# Patient Record
Sex: FEMALE | Race: WHITE | NOT HISPANIC OR LATINO | Employment: UNEMPLOYED | URBAN - METROPOLITAN AREA
[De-identification: names, ages, dates, MRNs, and addresses within clinical notes are randomized per-mention and may not be internally consistent; named-entity substitution may affect disease eponyms.]

---

## 2019-05-16 ASSESSMENT — MIFFLIN-ST. JEOR
SCORE: 1720.74
SCORE: 1720.74

## 2019-05-20 ENCOUNTER — SURGERY - HEALTHEAST (OUTPATIENT)
Dept: SURGERY | Facility: AMBULATORY SURGERY CENTER | Age: 59
End: 2019-05-20

## 2019-05-20 ENCOUNTER — HOSPITAL ENCOUNTER (OUTPATIENT)
Dept: SURGERY | Facility: AMBULATORY SURGERY CENTER | Age: 59
Discharge: HOME | End: 2019-05-20
Attending: PHYSICAL MEDICINE & REHABILITATION | Admitting: PHYSICAL MEDICINE & REHABILITATION
Payer: MEDICARE

## 2019-05-20 ASSESSMENT — MIFFLIN-ST. JEOR
SCORE: 1720.74
SCORE: 1720.74

## 2019-07-09 ENCOUNTER — OFFICE VISIT (OUTPATIENT)
Dept: FAMILY MEDICINE | Facility: CLINIC | Age: 59
End: 2019-07-09
Payer: MEDICARE

## 2019-07-09 VITALS
TEMPERATURE: 98.3 F | OXYGEN SATURATION: 93 % | SYSTOLIC BLOOD PRESSURE: 138 MMHG | BODY MASS INDEX: 45.1 KG/M2 | DIASTOLIC BLOOD PRESSURE: 76 MMHG | HEART RATE: 82 BPM | WEIGHT: 271 LBS | RESPIRATION RATE: 18 BRPM

## 2019-07-09 DIAGNOSIS — J44.9 CHRONIC OBSTRUCTIVE PULMONARY DISEASE, UNSPECIFIED COPD TYPE (H): Primary | ICD-10-CM

## 2019-07-09 DIAGNOSIS — R06.02 SOB (SHORTNESS OF BREATH): ICD-10-CM

## 2019-07-09 PROCEDURE — 99214 OFFICE O/P EST MOD 30 MIN: CPT | Performed by: FAMILY MEDICINE

## 2019-07-09 RX ORDER — ALBUTEROL SULFATE 90 UG/1
AEROSOL, METERED RESPIRATORY (INHALATION)
Refills: 0 | COMMUNITY
Start: 2019-07-01 | End: 2020-03-03

## 2019-07-09 RX ORDER — TIZANIDINE 2 MG/1
TABLET ORAL
Refills: 4 | COMMUNITY
Start: 2019-07-03

## 2019-07-09 RX ORDER — BUDESONIDE AND FORMOTEROL FUMARATE DIHYDRATE 160; 4.5 UG/1; UG/1
2 AEROSOL RESPIRATORY (INHALATION) 2 TIMES DAILY
Qty: 3 INHALER | Refills: 3 | Status: SHIPPED | OUTPATIENT
Start: 2019-07-09 | End: 2019-07-31

## 2019-07-09 RX ORDER — PREDNISONE 20 MG/1
TABLET ORAL
Refills: 0 | COMMUNITY
Start: 2019-07-08 | End: 2019-07-31

## 2019-07-09 RX ORDER — ALBUTEROL SULFATE 90 UG/1
2 AEROSOL, METERED RESPIRATORY (INHALATION)
COMMUNITY
Start: 2019-07-01 | End: 2019-07-31

## 2019-07-09 RX ORDER — CLONAZEPAM 0.5 MG/1
TABLET ORAL
Refills: 0 | COMMUNITY
Start: 2019-05-06 | End: 2019-11-05

## 2019-07-09 RX ORDER — TOPIRAMATE 100 MG/1
100 TABLET, FILM COATED ORAL DAILY
Refills: 1 | COMMUNITY
Start: 2019-03-18 | End: 2020-08-03

## 2019-07-09 RX ORDER — NALOXONE HYDROCHLORIDE 4 MG/.1ML
SPRAY NASAL
Refills: 0 | COMMUNITY
Start: 2019-03-04

## 2019-07-09 RX ORDER — TIOTROPIUM BROMIDE 18 UG/1
CAPSULE ORAL; RESPIRATORY (INHALATION)
Refills: 0 | COMMUNITY
Start: 2019-07-01 | End: 2019-07-09

## 2019-07-09 RX ORDER — BUSPIRONE HYDROCHLORIDE 15 MG/1
TABLET ORAL
Refills: 1 | COMMUNITY
Start: 2019-05-28 | End: 2019-11-05

## 2019-07-09 RX ORDER — ATORVASTATIN CALCIUM 20 MG/1
TABLET, FILM COATED ORAL
Refills: 3 | COMMUNITY
Start: 2019-03-27 | End: 2020-03-04

## 2019-07-09 RX ORDER — PRAVASTATIN SODIUM 40 MG
TABLET ORAL
Refills: 0 | COMMUNITY
Start: 2019-06-19 | End: 2020-03-03

## 2019-07-09 RX ORDER — CHOLECALCIFEROL (VITAMIN D3) 50 MCG
1 TABLET ORAL DAILY
Refills: 1 | COMMUNITY
Start: 2019-06-20

## 2019-07-09 RX ORDER — ALBUTEROL SULFATE 0.83 MG/ML
SOLUTION RESPIRATORY (INHALATION)
Refills: 0 | COMMUNITY
Start: 2019-07-02 | End: 2019-12-03

## 2019-07-09 RX ORDER — TIOTROPIUM BROMIDE 18 UG/1
18 CAPSULE ORAL; RESPIRATORY (INHALATION)
COMMUNITY
Start: 2019-07-01 | End: 2019-07-31

## 2019-07-09 RX ORDER — ARIPIPRAZOLE 10 MG/1
TABLET ORAL
Refills: 1 | COMMUNITY
Start: 2019-05-28 | End: 2019-11-05

## 2019-07-09 RX ORDER — METHYLPREDNISOLONE 4 MG
TABLET, DOSE PACK ORAL
Refills: 0 | COMMUNITY
Start: 2019-05-06 | End: 2019-07-31

## 2019-07-09 RX ORDER — MINOCYCLINE HYDROCHLORIDE 100 MG/1
CAPSULE ORAL
Refills: 2 | COMMUNITY
Start: 2019-06-07 | End: 2020-01-27

## 2019-07-09 RX ORDER — LISINOPRIL 10 MG/1
10 TABLET ORAL DAILY
Refills: 0 | COMMUNITY
Start: 2019-06-07 | End: 2020-01-27

## 2019-07-09 ASSESSMENT — PAIN SCALES - GENERAL: PAINLEVEL: NO PAIN (0)

## 2019-07-09 NOTE — PROGRESS NOTES
Subjective     Paula Ho is a 59 year old female who presents to clinic today for the following health issues:    HPI   ED/UC Followup:    Facility:  [facility] ED   Date of visit: 07/01/2019  Reason for visit: SOB and Edema   Current Status: still having SOB and coughing non-stop            Patient Active Problem List   Diagnosis     Chronic knee pain     History reviewed. No pertinent surgical history.    Social History     Tobacco Use     Smoking status: Current Every Day Smoker     Smokeless tobacco: Never Used   Substance Use Topics     Alcohol use: Yes     History reviewed. No pertinent family history.        Reviewed and updated as needed this visit by Provider         Review of Systems   ROS COMP: Constitutional, HEENT, cardiovascular, pulmonary, GI, , musculoskeletal, neuro, skin, endocrine and psych systems are negative, except as otherwise noted.      Objective    /76 (BP Location: Right arm, Patient Position: Chair, Cuff Size: Adult Regular)   Pulse 82   Temp 98.3  F (36.8  C) (Oral)   Resp 18   Wt 122.9 kg (271 lb)   SpO2 93%   BMI 45.10 kg/m    Body mass index is 45.1 kg/m .  Physical Exam   GENERAL: healthy, alert and no distress  NECK: no adenopathy, no asymmetry, masses, or scars and thyroid normal to palpation  RESP: diffuse wheezing  CV: regular rate and rhythm, normal S1 S2, no S3 or S4, no murmur, click or rub, no peripheral edema and peripheral pulses strong  ABDOMEN: soft, nontender, no hepatosplenomegaly, no masses and bowel sounds normal  MS: no gross musculoskeletal defects noted, no edema    Diagnostic Test Results:  Labs reviewed in Epic        Assessment & Plan     (J44.9) Chronic obstructive pulmonary disease, unspecified COPD type (H)  (primary encounter diagnosis)  Comment:   Plan: budesonide-formoterol (SYMBICORT) 160-4.5         MCG/ACT Inhaler        With exacerbation. Continue with antibiotic (doxycycline) and prednisone given from [facility]. Added  Symbicort BID. RTC in 1 month for recheck.    (R06.02) SOB (shortness of breath)  Comment:   Plan: CT Chest w Contrast        Likely from above. R/o PE, ca's. Patient has history of smoking.        Tobacco Cessation:   reports that she has been smoking.  She has never used smokeless tobacco.  Tobacco Cessation Action Plan: Information offered: Patient not interested at this time        See Patient Instructions    Return in about 1 month (around 8/9/2019) for COPD.    Esdras Dobson MD, MD  [facility]

## 2019-07-09 NOTE — PATIENT INSTRUCTIONS
At [facility], we strive to deliver an exceptional experience to you, every time we see you.  If you receive a survey in the mail, please send us back your thoughts. We really do value your feedback.    Based on your medical history, these are the current health maintenance/preventive care services that you are due for (some may have been done at this visit.)  Health Maintenance Due   Topic Date Due     PREVENTIVE CARE VISIT  1960     URINE DRUG SCREEN  1960     HEPATITIS C SCREENING  1960     ADVANCE CARE PLANNING  1960     MAMMO SCREENING  1960     PAP  1960     HIV SCREENING  05/29/1976     DTAP/TDAP/TD IMMUNIZATION (1 - Tdap) 05/29/1986     LIPID  05/29/2006     PHQ-2  01/01/2019         Suggested websites for health information:  Www.Stackpop.ISORG : Up to date and easily searchable information on multiple topics.  Www.HeadCount.gov : medication info, interactive tutorials, watch real surgeries online  Www.familydoctor.org : good info from the Academy of Family Physicians  Www.cdc.gov : public health info, travel advisories, epidemics (H1N1)  Www.aap.org : children's health info, normal development, vaccinations  Www.health.[facility].[place].us : [place] dept of health, public health issues in [place], N1N1    Your care team:                            Family Medicine Internal Medicine   MD Renan Chambers MD Shantel Branch-Fleming, MD Katya Georgiev PA-C Nam Ho, MD Pediatrics   STEPH Cancino, MD Inocencia Vanegas CNP, MD Deborah Mielke, MD Kim Thein, APRN CNP      Clinic hours: Monday - Thursday 7 am-7 pm; Fridays 7 am-5 pm.   Urgent care: Monday - Friday 11 am-9 pm; Saturday and Sunday 9 am-5 pm.  Pharmacy : Monday -Thursday 8 am-8 pm; Friday 8 am-6 pm; Saturday and Sunday 9 am-5 pm.     Clinic: [phone]   Pharmacy: [phone]

## 2019-07-10 ENCOUNTER — ANCILLARY PROCEDURE (OUTPATIENT)
Dept: CT IMAGING | Facility: CLINIC | Age: 59
End: 2019-07-10
Attending: FAMILY MEDICINE
Payer: MEDICARE

## 2019-07-10 DIAGNOSIS — R06.02 SOB (SHORTNESS OF BREATH): ICD-10-CM

## 2019-07-10 LAB — RADIOLOGIST FLAGS: NORMAL

## 2019-07-10 PROCEDURE — 71260 CT THORAX DX C+: CPT | Performed by: RADIOLOGY

## 2019-07-10 RX ORDER — IOPAMIDOL 755 MG/ML
82 INJECTION, SOLUTION INTRAVASCULAR ONCE
Status: COMPLETED | OUTPATIENT
Start: 2019-07-10 | End: 2019-07-10

## 2019-07-10 RX ADMIN — IOPAMIDOL 82 ML: 755 INJECTION, SOLUTION INTRAVASCULAR at 09:53

## 2019-07-10 NOTE — LETTER
July 10, 2019      Paula Ho  [address]    [place] [address]        Dear Paula,     Your CT scan of the lung showed a right upper lung small nodule. We should repeat another CT scan in 1 year to make sure this small nodule does not change in size.     Sincerely,   Esdras Dobson MD    Resulted Orders   CT Chest Pulmonary Embolism w Contrast   Result Value Ref Range    Radiologist flags Lung nodule     Narrative    CT CHEST PULMONARY EMBOLISM W CONTRAST, 7/10/2019 9:56 AM    History: shortness of breath; SOB (shortness of breath)    Comparison: None.    Technique: Helical acquisition of CT images of the chest from the lung  apices to the kidneys were acquired after the administration of  intravenous contrast according to the CT pulmonary angiogram protocol.  Axial images were reconstructed in 1 and 3 mm slice thickness. Coronal  reconstructions performed. Three-dimensional (3D) post-processed  angiographic images were reconstructed, archived to PACS and used in  the interpretation of this study    Findings:     The contrast bolus is adequate.  There is no pulmonary embolism.    Lung Parenchyma:  The central tracheobronchial tree is patent. No pneumothorax or  pleural effusion. Mild bibasilar atelectasis. No focal airspace  consolidation. Subsegmental atelectasis in the anterior/medial right  upper lobe. Mild apical predominant centrilobular emphysematous  changes. There is a 4 mm pulmonary nodule in the right lung apex  (series 5, image 55). Mild central bronchial wall thickening. Focus of  mucus plugging in the posterior right upper lobe (series 8 image 32).    Mediastinum:  The visualized gland is unremarkable in appearance. The heart is  normal in size, without pericardial. No substantial coronary artery  calcifications. The thoracic vessels are normal in caliber and  configuration. A few nonenlarged mediastinal lymph nodes. Enlarged  right hilar lymph node measuring 1.4 cm. No suspicious  axillary  adenopathy.    Upper abdomen:  Limited evaluation of the upper abdomen by contrast, bolus timing and  coverage. Fat-containing right adrenal lesion measuring 2.5 cm,  compatible with angiomyolipoma. The adrenal glands are within normal  limits. No acute findings within the visualized abdomen.    Bones and soft tissues:  No acute or suspicious osseous findings.      Impression    Impression:  1. No pulmonary embolism identified.  2. Mild central bronchial wall thickening, compatible with mild  bronchitis. Mild apically predominant centrilobular and paraseptal  emphysema.  3. Incidentally noted solid pulmonary nodule in the right upper lobe  measuring up to 4 mm. Recommend follow-up noncontrast CT in 12 months  per 2017 Fleischner Society guidelines.  4. Enlarged right hilar node measuring 1.4 cm, which may be reactive  in etiology. No additional suspicious adenopathy is identified within  the chest. Recommend attention on follow-up.    [Recommend Follow Up: Lung nodule]    This report will be copied to the [facility] to ensure a  provider acknowledges the finding.     I have personally reviewed the examination and initial interpretation  and I agree with the findings.    MICKEY HUERTA, DO

## 2019-07-31 ENCOUNTER — OFFICE VISIT (OUTPATIENT)
Dept: FAMILY MEDICINE | Facility: CLINIC | Age: 59
End: 2019-07-31
Payer: MEDICARE

## 2019-07-31 VITALS
TEMPERATURE: 98.2 F | SYSTOLIC BLOOD PRESSURE: 118 MMHG | RESPIRATION RATE: 20 BRPM | HEART RATE: 77 BPM | OXYGEN SATURATION: 92 % | HEIGHT: 65 IN | WEIGHT: 270 LBS | BODY MASS INDEX: 44.98 KG/M2 | DIASTOLIC BLOOD PRESSURE: 72 MMHG

## 2019-07-31 DIAGNOSIS — F17.200 TOBACCO DEPENDENCE SYNDROME: ICD-10-CM

## 2019-07-31 DIAGNOSIS — J44.9 CHRONIC OBSTRUCTIVE PULMONARY DISEASE, UNSPECIFIED COPD TYPE (H): ICD-10-CM

## 2019-07-31 DIAGNOSIS — J44.1 COPD EXACERBATION (H): Primary | ICD-10-CM

## 2019-07-31 PROCEDURE — 99214 OFFICE O/P EST MOD 30 MIN: CPT | Performed by: FAMILY MEDICINE

## 2019-07-31 RX ORDER — PREDNISONE 20 MG/1
TABLET ORAL
Qty: 20 TABLET | Refills: 0 | Status: SHIPPED | OUTPATIENT
Start: 2019-07-31 | End: 2019-09-04

## 2019-07-31 RX ORDER — BUDESONIDE AND FORMOTEROL FUMARATE DIHYDRATE 160; 4.5 UG/1; UG/1
2 AEROSOL RESPIRATORY (INHALATION) 2 TIMES DAILY
Qty: 3 INHALER | Refills: 3 | Status: SHIPPED | OUTPATIENT
Start: 2019-07-31 | End: 2021-08-31

## 2019-07-31 RX ORDER — VARENICLINE TARTRATE 1 MG/1
1 TABLET, FILM COATED ORAL 2 TIMES DAILY
Qty: 60 TABLET | Refills: 3 | Status: SHIPPED | OUTPATIENT
Start: 2019-07-31 | End: 2020-01-27

## 2019-07-31 RX ORDER — TIOTROPIUM BROMIDE 18 UG/1
18 CAPSULE ORAL; RESPIRATORY (INHALATION) DAILY
Qty: 90 CAPSULE | Refills: 3 | Status: SHIPPED | OUTPATIENT
Start: 2019-07-31 | End: 2020-05-01

## 2019-07-31 RX ORDER — ALBUTEROL SULFATE 90 UG/1
2 AEROSOL, METERED RESPIRATORY (INHALATION) EVERY 4 HOURS PRN
Qty: 1 INHALER | Refills: 3 | Status: SHIPPED | OUTPATIENT
Start: 2019-07-31 | End: 2021-11-01

## 2019-07-31 ASSESSMENT — PAIN SCALES - GENERAL: PAINLEVEL: MODERATE PAIN (5)

## 2019-07-31 ASSESSMENT — MIFFLIN-ST. JEOR: SCORE: 1805.59

## 2019-07-31 NOTE — PROGRESS NOTES
Subjective     Paula Ho is a 59 year old female who presents to clinic today for the following health issues:    HPI   RESPIRATORY SYMPTOMS FOLLOW UP      Duration: > one months    Description  Coughing, wheezing    Severity: severe    Accompanying signs and symptoms: shortness of breath    History (predisposing factors):  tobacco abuse, COPD    Precipitating or alleviating factors: None    Therapies tried and outcome:  Neb treatment, finished antibiotics, cough syrup OTC ( mucinex) - no relief.      Patient Active Problem List   Diagnosis     Chronic knee pain     History reviewed. No pertinent surgical history.    Social History     Tobacco Use     Smoking status: Current Every Day Smoker     Smokeless tobacco: Never Used   Substance Use Topics     Alcohol use: Yes     History reviewed. No pertinent family history.        Reviewed and updated as needed this visit by Provider         Review of Systems   ROS COMP: Constitutional, HEENT, cardiovascular, pulmonary, GI, , musculoskeletal, neuro, skin, endocrine and psych systems are negative, except as otherwise noted.      Objective    There were no vitals taken for this visit.  There is no height or weight on file to calculate BMI.  Physical Exam   GENERAL: healthy, alert and no distress  NECK: no adenopathy, no asymmetry, masses, or scars and thyroid normal to palpation  RESP: diffuse wheezing  CV: regular rate and rhythm, normal S1 S2, no S3 or S4, no murmur, click or rub, no peripheral edema and peripheral pulses strong  ABDOMEN: soft, nontender, no hepatosplenomegaly, no masses and bowel sounds normal  MS: no gross musculoskeletal defects noted, no edema    Diagnostic Test Results:  Labs reviewed in Epic        Assessment & Plan     1. COPD exacerbation (H)  Treat with oral steroidal tapering dose. Restart inhalers since patient not currently taking. RTC in 1 month for recheck.  - predniSONE (DELTASONE) 20 MG tablet; Take 3 tabs by mouth daily x 3 days,  "then 2 tabs daily x 3 days, then 1 tab daily x 3 days, then 1/2 tab daily x 3 days.  Dispense: 20 tablet; Refill: 0    2. Chronic obstructive pulmonary disease, unspecified COPD type (H)  Restart inhalers. Recheck in 1 month.  - albuterol (PROAIR HFA) 108 (90 Base) MCG/ACT inhaler; Inhale 2 puffs into the lungs every 4 hours as needed for shortness of breath / dyspnea or wheezing  Dispense: 1 Inhaler; Refill: 3  - budesonide-formoterol (SYMBICORT) 160-4.5 MCG/ACT Inhaler; Inhale 2 puffs into the lungs 2 times daily  Dispense: 3 Inhaler; Refill: 3  - tiotropium (SPIRIVA HANDIHALER) 18 MCG inhaled capsule; Inhale 1 capsule (18 mcg) into the lungs daily  Dispense: 90 capsule; Refill: 3    3. Tobacco dependence syndrome  Patient wants to quit. Trial Chantix. Discussed potential side effects.  - varenicline (CHANTIX KARISHMA) 0.5 MG X 11 & 1 MG X 42 tablet; Take 0.5 mg tab daily for 3 days, THEN 0.5 mg tab twice daily for 4 days, THEN 1 mg twice daily.  Dispense: 53 tablet; Refill: 0  - varenicline (CHANTIX) 1 MG tablet; Take 1 tablet (1 mg) by mouth 2 times daily  Dispense: 60 tablet; Refill: 3     Tobacco Cessation:   reports that she has been smoking.  She has never used smokeless tobacco.  Tobacco Cessation Action Plan: Pharmacotherapies : Chantix      BMI:   Estimated body mass index is 44.93 kg/m  as calculated from the following:    Height as of this encounter: 1.651 m (5' 5\").    Weight as of this encounter: 122.5 kg (270 lb).           Regular exercise  See Patient Instructions    Return in about 4 weeks (around 8/28/2019) for COPD.    Esdras Dobson MD, MD  [facility]      "

## 2019-08-02 ENCOUNTER — OFFICE VISIT (OUTPATIENT)
Dept: FAMILY MEDICINE | Facility: CLINIC | Age: 59
End: 2019-08-02
Payer: MEDICARE

## 2019-08-02 ENCOUNTER — NURSE TRIAGE (OUTPATIENT)
Dept: URGENT CARE | Facility: URGENT CARE | Age: 59
End: 2019-08-02

## 2019-08-02 VITALS
WEIGHT: 266.8 LBS | RESPIRATION RATE: 18 BRPM | BODY MASS INDEX: 44.45 KG/M2 | HEIGHT: 65 IN | SYSTOLIC BLOOD PRESSURE: 138 MMHG | OXYGEN SATURATION: 94 % | TEMPERATURE: 98.3 F | DIASTOLIC BLOOD PRESSURE: 81 MMHG | HEART RATE: 92 BPM

## 2019-08-02 DIAGNOSIS — R05.9 COUGH: Primary | ICD-10-CM

## 2019-08-02 DIAGNOSIS — R06.02 SOB (SHORTNESS OF BREATH): ICD-10-CM

## 2019-08-02 DIAGNOSIS — J44.1 COPD EXACERBATION (H): ICD-10-CM

## 2019-08-02 PROCEDURE — 99214 OFFICE O/P EST MOD 30 MIN: CPT | Mod: 25 | Performed by: NURSE PRACTITIONER

## 2019-08-02 PROCEDURE — 94640 AIRWAY INHALATION TREATMENT: CPT | Performed by: NURSE PRACTITIONER

## 2019-08-02 RX ORDER — CODEINE PHOSPHATE AND GUAIFENESIN 10; 100 MG/5ML; MG/5ML
1-2 SOLUTION ORAL EVERY 4 HOURS PRN
Qty: 120 ML | Refills: 0 | Status: SHIPPED | OUTPATIENT
Start: 2019-08-02 | End: 2019-08-05

## 2019-08-02 RX ORDER — IPRATROPIUM BROMIDE AND ALBUTEROL SULFATE 2.5; .5 MG/3ML; MG/3ML
3 SOLUTION RESPIRATORY (INHALATION) ONCE
Status: COMPLETED | OUTPATIENT
Start: 2019-08-02 | End: 2019-08-02

## 2019-08-02 RX ORDER — DOXYCYCLINE HYCLATE 100 MG
100 TABLET ORAL 2 TIMES DAILY
Qty: 20 TABLET | Refills: 0 | Status: SHIPPED | OUTPATIENT
Start: 2019-08-02 | End: 2019-09-04

## 2019-08-02 RX ADMIN — IPRATROPIUM BROMIDE AND ALBUTEROL SULFATE 3 ML: 2.5; .5 SOLUTION RESPIRATORY (INHALATION) at 14:10

## 2019-08-02 ASSESSMENT — PAIN SCALES - GENERAL: PAINLEVEL: NO PAIN (0)

## 2019-08-02 ASSESSMENT — MIFFLIN-ST. JEOR: SCORE: 1791.08

## 2019-08-02 NOTE — PATIENT INSTRUCTIONS
Start doxycycline 1 tab twice daily x10 day  Robitussin with codeine for cough  Continue prednisone, spiriva and albuterol  Follow up in 3 days if not improving

## 2019-08-02 NOTE — TELEPHONE ENCOUNTER
Reason for call:  Patient reporting a symptom    Symptom or request: uncontrollable coughing    Duration (how long have symptoms been present): for days    Have you been treated for this before? Yes    Additional comments: Mom wants triage to call patient asap because patient has uncontrollable coughing and was in tears when she called her mom.  Mom wants us to call and check on her.    Phone Number patient can be reached at:  Home number on file [phone] (home)    Best Time:  any    Can we leave a detailed message on this number:  YES    Call taken on 8/2/2019 at 10:27 AM by Lesley Herron

## 2019-08-02 NOTE — PROGRESS NOTES
Subjective     Paula Ho is a 59 year old female who presents to clinic today for the following health issues:    HPI     COPD Follow-Up    Overall, how are your COPD symptoms since your last clinic visit?  Much worse    How much fatigue or shortness of breath do you have when you are walking?  A lot more than usual    How much shortness of breath do you have when you are resting?  More than usual    How often do you cough? All the time    Have you noticed any change in your sputum/phlegm?  Yes- this AM - clear    Have you experienced a recent fever? No    Please describe how far you can walk without stopping to rest:  Unsure - pt states she hasn't tried    How many flights of stairs are you able to walk up without stopping?  Unsure - pt states she hasn't tried  Have you had any Emergency Room Visits, Urgent Care Visits, or Hospital Admissions because of your COPD since your last office visit?  Yes   - 1 month ago    How many times have you gone to the ED, Urgent Care, or been hospitalized for COPD since your last clinic visit?  1    History   Smoking Status     Current Every Day Smoker   Smokeless Tobacco     Never Used     No results found for: FEV1, EWH1XOE        Amount of exercise or physical activity: None    Problems taking medications regularly: No    Medication side effects: possibly - pt would like to discuss Lisinopril, Tizanidine, Amlodipine, Pravastatin about possible cause of cough    Diet: regular (no restrictions)      Can't stop coughing  Using albuterol neb, prednisone, spiriva, albuterol inhaler  Hasn't taken lisinopril or amlodipine in few weeks    Had chest CT few weeks ago:      CT CHEST PULMONARY EMBOLISM W CONTRAST, 7/10/2019 9:56 AM     History: shortness of breath; SOB (shortness of breath)     Comparison: None.     Technique: Helical acquisition of CT images of the chest from the lung  apices to the kidneys were acquired after the administration of  intravenous contrast according to  the CT pulmonary angiogram protocol.  Axial images were reconstructed in 1 and 3 mm slice thickness. Coronal  reconstructions performed. Three-dimensional (3D) post-processed  angiographic images were reconstructed, archived to PACS and used in  the interpretation of this study     Findings:      The contrast bolus is adequate.  There is no pulmonary embolism.     Lung Parenchyma:  The central tracheobronchial tree is patent. No pneumothorax or  pleural effusion. Mild bibasilar atelectasis. No focal airspace  consolidation. Subsegmental atelectasis in the anterior/medial right  upper lobe. Mild apical predominant centrilobular emphysematous  changes. There is a 4 mm pulmonary nodule in the right lung apex  (series 5, image 55). Mild central bronchial wall thickening. Focus of  mucus plugging in the posterior right upper lobe (series 8 image 32).     Mediastinum:  The visualized gland is unremarkable in appearance. The heart is  normal in size, without pericardial. No substantial coronary artery  calcifications. The thoracic vessels are normal in caliber and  configuration. A few nonenlarged mediastinal lymph nodes. Enlarged  right hilar lymph node measuring 1.4 cm. No suspicious axillary  adenopathy.     Upper abdomen:  Limited evaluation of the upper abdomen by contrast, bolus timing and  coverage. Fat-containing right adrenal lesion measuring 2.5 cm,  compatible with angiomyolipoma. The adrenal glands are within normal  limits. No acute findings within the visualized abdomen.     Bones and soft tissues:  No acute or suspicious osseous findings.                                                                      Impression:  1. No pulmonary embolism identified.  2. Mild central bronchial wall thickening, compatible with mild  bronchitis. Mild apically predominant centrilobular and paraseptal  emphysema.  3. Incidentally noted solid pulmonary nodule in the right upper lobe  measuring up to 4 mm. Recommend follow-up  noncontrast CT in 12 months  per 2017 Fleischner Society guidelines.  4. Enlarged right hilar node measuring 1.4 cm, which may be reactive  in etiology. No additional suspicious adenopathy is identified within  the chest. Recommend attention on follow-up.     [Recommend Follow Up: Lung nodule]     This report will be copied to the [facility] to ensure a  provider acknowledges the finding.      I have personally reviewed the examination and initial interpretation  and I agree with the findings.     MICKEY HUERTA, DO    Patient Active Problem List   Diagnosis     Chronic knee pain     History reviewed. No pertinent surgical history.    Social History     Tobacco Use     Smoking status: Current Every Day Smoker     Smokeless tobacco: Never Used   Substance Use Topics     Alcohol use: Yes     History reviewed. No pertinent family history.      Current Outpatient Medications   Medication Sig Dispense Refill     albuterol (PROAIR HFA) 108 (90 Base) MCG/ACT inhaler Inhale 2 puffs into the lungs every 4 hours as needed for shortness of breath / dyspnea or wheezing 1 Inhaler 3     albuterol (PROVENTIL) (2.5 MG/3ML) 0.083% neb solution NEBULIZE THE CONTENTS OF 1 VIAL EVERY 6 HOURS AS NEEDED.  0     amphetamine-dextroamphetamine (ADDERALL) 20 MG tablet Take 20 mg by mouth 2 times daily       ARIPiprazole (ABILIFY) 10 MG tablet TK 1 T PO D  1     atorvastatin (LIPITOR) 20 MG tablet TK 1 T PO QD  3     budesonide-formoterol (SYMBICORT) 160-4.5 MCG/ACT Inhaler Inhale 2 puffs into the lungs 2 times daily 3 Inhaler 3     busPIRone (BUSPAR) 15 MG tablet   1     clonazePAM (KLONOPIN) 0.5 MG tablet TK 1 T PO BID PRN  0     doxycycline hyclate (VIBRA-TABS) 100 MG tablet Take 1 tablet (100 mg) by mouth 2 times daily for 10 days 20 tablet 0     FLUoxetine (PROZAC) 40 MG capsule Take 40 mg by mouth daily       guaiFENesin-codeine (ROBITUSSIN AC) 100-10 MG/5ML solution Take 5-10 mLs by mouth every 4 hours as needed for cough  120 mL 0     lisinopril (PRINIVIL/ZESTRIL) 10 MG tablet Take 10 mg by mouth daily  0     minocycline (MINOCIN/DYNACIN) 100 MG capsule TAKE ONE CAPSULE BY MOUTH EVERY TWELVE HOURS  2     NARCAN 4 MG/0.1ML nasal spray INHALE VIA NASAL ROUTE FOR OVERDOSE AS NEEDED. MAY REPEAT AFTER 2-3 MINUTES  0     oxyCODONE (OXYCONTIN) 60 MG T12A 12 hr tablet Take 1 tablet by mouth 3 times daily       pravastatin (PRAVACHOL) 40 MG tablet take 1 tablet by mouth once a day in the evening  0     predniSONE (DELTASONE) 20 MG tablet Take 3 tabs by mouth daily x 3 days, then 2 tabs daily x 3 days, then 1 tab daily x 3 days, then 1/2 tab daily x 3 days. 20 tablet 0     tiotropium (SPIRIVA HANDIHALER) 18 MCG inhaled capsule Inhale 1 capsule (18 mcg) into the lungs daily 90 capsule 3     topiramate (TOPAMAX) 100 MG tablet TK 2 TS PO IN THE MORNING  1     varenicline (CHANTIX KARISHMA) 0.5 MG X 11 & 1 MG X 42 tablet Take 0.5 mg tab daily for 3 days, THEN 0.5 mg tab twice daily for 4 days, THEN 1 mg twice daily. 53 tablet 0     varenicline (CHANTIX) 1 MG tablet Take 1 tablet (1 mg) by mouth 2 times daily 60 tablet 3     VENTOLIN  (90 Base) MCG/ACT inhaler INHALE 2 PUFFS INTO THE LUNGS Q 6 H PRN  0     vitamin D3 (CHOLECALCIFEROL) 2000 units (50 mcg) tablet Take 1 tablet by mouth daily  1     tiZANidine (ZANAFLEX) 2 MG tablet TK 2 TO 3 TS PO QHS  4     Allergies   Allergen Reactions     Compazine [Prochlorperazine]      Droperidol      BP Readings from Last 3 Encounters:   08/02/19 138/81   07/31/19 118/72   07/09/19 138/76    Wt Readings from Last 3 Encounters:   08/02/19 121 kg (266 lb 12.8 oz)   07/31/19 122.5 kg (270 lb)   07/09/19 122.9 kg (271 lb)                 Reviewed and updated as needed this visit by Provider  Tobacco  Allergies  Meds  Problems  Med Hx  Surg Hx  Fam Hx         Review of Systems   ROS COMP: Constitutional, HEENT, cardiovascular, pulmonary, gi and gu systems are negative, except as otherwise noted.     "  Objective    /81 (BP Location: Right arm, Patient Position: Sitting, Cuff Size: Adult Large)   Pulse 92   Temp 98.3  F (36.8  C) (Oral)   Resp 18   Ht 1.651 m (5' 5\")   Wt 121 kg (266 lb 12.8 oz)   SpO2 94%   BMI 44.40 kg/m    Body mass index is 44.4 kg/m .  Physical Exam   GENERAL: healthy, alert and no distress  EYES: Eyes grossly normal to inspection, PERRL and conjunctivae and sclerae normal  HENT: ear canals and TM's normal, nose and mouth without ulcers or lesions  NECK: no adenopathy, no asymmetry, masses, or scars and thyroid normal to palpation  RESP: lungs clear to auscultation - no rales, rhonchi or wheezes  CV: regular rate and rhythm, normal S1 S2, no S3 or S4, no murmur, click or rub, no peripheral edema and peripheral pulses strong  PSYCH: mentation appears normal, affect normal/bright    Diagnostic Test Results:  none         Assessment & Plan       ICD-10-CM    1. Cough R05 ipratropium - albuterol 0.5 mg/2.5 mg/3 mL (DUONEB) neb solution 3 mL     doxycycline hyclate (VIBRA-TABS) 100 MG tablet     guaiFENesin-codeine (ROBITUSSIN AC) 100-10 MG/5ML solution   2. COPD exacerbation (H) J44.1 doxycycline hyclate (VIBRA-TABS) 100 MG tablet   3. SOB (shortness of breath) R06.02 doxycycline hyclate (VIBRA-TABS) 100 MG tablet     duoneb in clinic today  Start doxycycline 1 tab twice daily x10 day  Robitussin with codeine for cough - patient called her pain center and talked with Fabien who confirmed this was ok and would not violate her pain contract. He was on speaker phone and I heard the entire interaction.  Continue prednisone, spiriva and albuterol  Follow up in 3 days if not improving       Tobacco Cessation:   reports that she has been smoking.  She has never used smokeless tobacco.  Tobacco Cessation Action Plan: Information offered: Patient not interested at this time      BMI:   Estimated body mass index is 44.4 kg/m  as calculated from the following:    Height as of this encounter: " "1.651 m (5' 5\").    Weight as of this encounter: 121 kg (266 lb 12.8 oz).           See Patient Instructions    Return in about 3 days (around 8/5/2019).     The benefits, risks and potential side effects were discussed in detail. Black box warnings discussed as relevant. All patient questions were answered. The patient was instructed to follow up immediately if any adverse reactions develop.    Return precautions discussed, including when to seek urgent/emergent care.    Patient verbalizes understanding and agrees with plan of care. Patient stable for discharge.      LEONA aMrquez [facility]        "

## 2019-08-02 NOTE — TELEPHONE ENCOUNTER
Additional Information    Negative: Bluish (or gray) lips or face    Negative: Severe difficulty breathing (e.g., struggling for each breath, speaks in single words)    Negative: Rapid onset of cough and has hives    Negative: Coughing started suddenly after medicine, an allergic food or bee sting    Negative: Difficulty breathing after exposure to flames, smoke, or fumes    Negative: Sounds like a life-threatening emergency to the triager    Negative: Chest pain present when not coughing    Negative: Difficulty breathing    Negative: Passed out (i.e., fainted, collapsed and was not responding)    Negative: Coughed up > 1 tablespoon (15 ml) blood (Exception: blood-tinged sputum)    Negative: Fever > 103 F (39.4 C)    Negative: Fever > 101 F (38.3 C) and over 60 years of age    Negative: Fever > 100.0 F (37.8 C) and has diabetes mellitus or a weak immune system (e.g., HIV positive, cancer chemotherapy, organ transplant, splenectomy, chronic steroids)    Negative: Fever > 100.0 F (37.8 C) and bedridden (e.g., nursing home patient, stroke, chronic illness, recovering from surgery)    Negative: Increasing ankle swelling    Negative: Wheezing is present    Known COPD or other severe lung disease (i.e., bronchiectasis, cystic fibrosis, lung surgery) and worsening symptoms (i.e., increased sputum purulence or amount, increased breathing difficulty)    Negative: Using nasal washes and pain medicine > 24 hours and sinus pain persists    Negative: Fever returns after gone for over 24 hours and symptoms worse or not improved    Negative: Fever present > 3 days (72 hours)    Negative: Coughing up bunny-colored (reddish-brown) or blood-tinged sputum    Negative: SEVERE coughing spells (e.g., whooping sound after coughing, vomiting after coughing)    Protocols used: COUGH-A-OH    Latasha Nicolas RN, [facility] Triage    Scheduled in clinic to be seen.

## 2019-08-03 DIAGNOSIS — R05.9 COUGH: ICD-10-CM

## 2019-08-05 ENCOUNTER — OFFICE VISIT (OUTPATIENT)
Dept: URGENT CARE | Facility: URGENT CARE | Age: 59
End: 2019-08-05
Payer: MEDICARE

## 2019-08-05 VITALS
WEIGHT: 270.6 LBS | BODY MASS INDEX: 45.03 KG/M2 | DIASTOLIC BLOOD PRESSURE: 82 MMHG | RESPIRATION RATE: 24 BRPM | HEART RATE: 71 BPM | TEMPERATURE: 98.5 F | SYSTOLIC BLOOD PRESSURE: 124 MMHG | OXYGEN SATURATION: 96 %

## 2019-08-05 DIAGNOSIS — Z53.9 ERRONEOUS ENCOUNTER--DISREGARD: Primary | ICD-10-CM

## 2019-08-05 DIAGNOSIS — F33.9 EPISODE OF RECURRENT MAJOR DEPRESSIVE DISORDER, UNSPECIFIED DEPRESSION EPISODE SEVERITY (H): ICD-10-CM

## 2019-08-05 DIAGNOSIS — F41.9 ANXIETY DISORDER, UNSPECIFIED TYPE: Primary | ICD-10-CM

## 2019-08-05 RX ORDER — IPRATROPIUM BROMIDE AND ALBUTEROL SULFATE 2.5; .5 MG/3ML; MG/3ML
3 SOLUTION RESPIRATORY (INHALATION) ONCE
Status: DISCONTINUED | OUTPATIENT
Start: 2019-08-05 | End: 2019-08-05 | Stop reason: CLARIF

## 2019-08-05 RX ORDER — CODEINE PHOSPHATE AND GUAIFENESIN 10; 100 MG/5ML; MG/5ML
1-2 SOLUTION ORAL EVERY 4 HOURS PRN
Qty: 120 ML | Refills: 0 | Status: SHIPPED | OUTPATIENT
Start: 2019-08-05 | End: 2020-01-27

## 2019-08-05 ASSESSMENT — PAIN SCALES - GENERAL: PAINLEVEL: NO PAIN (0)

## 2019-08-05 NOTE — TELEPHONE ENCOUNTER
Med not covered. Will have to use the other medications she was given for cough. Please let patient know

## 2019-08-05 NOTE — TELEPHONE ENCOUNTER
Reason for Call:  Other prescription    Detailed comments: today 8/5/2019, patient already out of medication for cough and asking for a refill.    Phone Number Patient can be reached at: Home number on file [phone] (home)    Best Time: any    Can we leave a detailed message on this number? YES    Call taken on 8/5/2019 at 10:25 AM by Stephanie Llamas

## 2019-08-05 NOTE — PROGRESS NOTES
Patient left after completing the rooming process, as she was upset about her wait time today.  Vitals were reviewed.  Encounter will be marked as erroneous.  Please disregard.

## 2019-08-05 NOTE — TELEPHONE ENCOUNTER
Requested Prescriptions   Pending Prescriptions Disp Refills     guaiFENesin-codeine (ROBITUSSIN AC) 100-10 MG/5ML solution 120 mL 0     Sig: Take 5-10 mLs by mouth every 4 hours as needed for cough       There is no refill protocol information for this order        Routing refill request to provider for review/approval because:  Drug not on the [facility] refill protocol         Karen Mauro RN, BSN, PHN

## 2019-08-05 NOTE — PROGRESS NOTES
Patient transferring care to [place]. She has seen psychiatry and psychology in the past and is requesting referrals to transfer care to [facility].

## 2019-09-04 ENCOUNTER — OFFICE VISIT (OUTPATIENT)
Dept: FAMILY MEDICINE | Facility: CLINIC | Age: 59
End: 2019-09-04
Payer: MEDICARE

## 2019-09-04 VITALS
RESPIRATION RATE: 16 BRPM | DIASTOLIC BLOOD PRESSURE: 70 MMHG | TEMPERATURE: 98.3 F | SYSTOLIC BLOOD PRESSURE: 122 MMHG | BODY MASS INDEX: 45 KG/M2 | HEIGHT: 65 IN | OXYGEN SATURATION: 96 % | WEIGHT: 270.1 LBS | HEART RATE: 90 BPM
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
## 2019-09-04 DIAGNOSIS — L03.317 CELLULITIS OF BUTTOCK: ICD-10-CM

## 2019-09-04 PROCEDURE — 99213 OFFICE O/P EST LOW 20 MIN: CPT | Performed by: NURSE PRACTITIONER

## 2019-09-04 RX ORDER — MUPIROCIN 20 MG/G
OINTMENT TOPICAL 3 TIMES DAILY
Qty: 30 G | Refills: 1 | Status: SHIPPED | OUTPATIENT
Start: 2019-09-04 | End: 2020-03-03

## 2019-09-04 RX ORDER — CETIRIZINE HYDROCHLORIDE 10 MG/1
10 TABLET ORAL DAILY
Qty: 14 TABLET | Refills: 1 | Status: SHIPPED | OUTPATIENT
Start: 2019-09-04

## 2019-09-04 ASSESSMENT — PAIN SCALES - GENERAL: PAINLEVEL: SEVERE PAIN (7)

## 2019-09-04 ASSESSMENT — MIFFLIN-ST. JEOR: SCORE: 1806.05

## 2019-09-04 NOTE — PATIENT INSTRUCTIONS
Start cetirizine (Zyrtec) 10 mg daily x2 weeks  Start mupirocin ointment to the buttock bites that are infected  May use hydrocortisone cream to the areas that are bites but not infected  Patient Education     Bedbugs    After years of being very rare in the , bedbugs are making a comeback. These bugs are small, about the size of an apple seed. They are reddish-brown, oval, and look slightly flattened. Bedbugs feed on human and animal blood, usually at night during sleep. Bedbugs are a nuisance. But they are not a major threat to your health.  Facts about bedbugs    Bedbugs are active mainly at night. During the day, they hide in dark places, often in and around where people or animals sleep. They are commonly found on mattresses and boxsprings and behind headboards. But they can hide anywhere.    Bedbugs are small and hard to see. They are often carried from place to place in items like luggage, furniture, and clothing. This is why they spread so easily.    Bedbugs are not attracted to dirt. Even the cleanest house or hotel can have bedbugs.    Unlike mosquitoes, bedbugs do not transmit disease. If you are bitten, you do not have to worry about catching a blood-borne illness.    Insect repellents have little effect on bedbugs.    Adult bedbugs can live for several months without a blood feeding.    Bedbugs are very hard to get rid of. If an infestation is suspected, it is recommended that a professional  be called.  Signs of bedbugs  Bites can be the first sign of a bedbug infestation. When inspecting for the bugs, look in crevices of mattresses and box springs, behind the headboard, and in and on objects near or under the bed. You may see the bugs themselves. Or, you may see tiny dark stains on fabric or carpets. Smears of blood on sheets and nightclothes upon awakening are another sign. In some cases, the bugs are so well hidden they can t be found unless items are taken apart.  Bedbug  bites  Bedbugs look for food at night. They bite while people or animals are sleeping. The bites are most often painless. Many people never know they ve been bitten. But some people develop an itchy red welt or swelling. And if a person has an allergic reaction, severe itching, blisters, or hives can develop. Bites are often on areas that are exposed, such as the head, neck, arms, and hands. Bedbug bites are not dangerous and don t spread illness. But if the bite is scratched and the skin is broken and irritated, there is a chance that a skin infection can develop.  Treating bites  Bite symptoms usually go away on their own within a week or two. During this time, over-the-counter (OTC) hydrocortisone ointment or cream can help relieve itching and swelling. If itching is bad, an OTC antihistamine that s taken by mouth (oral) can help. If an infection develops from scratching the bites, your healthcare provider can prescribe an antibiotic.  If you were bitten by bedbugs in your home, talk to a licensed pest-control professional or company. They can inspect your home and help you get rid of the bugs safely.  When to call your healthcare provider   If you have bites, call your healthcare provider if you develop any of the following:    A fever of 100.4 F (38 C) or higher, or as directed by your provider    Signs of infection of the bites, such as increased swelling and pain, warmth, or oozing    Signs of allergic reaction, such as hives, spreading rash, throat itching or swelling, or wheezing   Avoiding bedbugs    Avoid buying used beds. But if you do buy used bed frames, mattresses, box springs, or other furniture, check them carefully for bedbugs before bringing them into your home.    If bedbugs are found or suspected in the bed, use mattress and box spring encasement covers that can seal in bedbugs so they will eventually die there.    When traveling, remove linens from the top of the bed and check the mattress and  headboard for signs of the bugs. Place luggage on a hard surface such as a table or on a luggage rack and not on the floor.    If you think you were exposed to bedbugs while traveling, wash all clothing in hot water as soon as you get home. Washing alone will not kill the bugs. Clothing must be put in a dryer at high temperatures, at least 113  F (45  C) for 1 hour.     Never  items discarded on the street for use in your home. These include bed frames, mattresses, box springs, or upholstered furniture. These items may carry bedbugs.     Date Last Reviewed: 3/1/2017    6276-4227 [employer]. [address] [place] [address], [place] [id number]. All rights reserved. This information is not intended as a substitute for professional medical care. Always follow your healthcare professional's instructions.

## 2019-09-04 NOTE — PROGRESS NOTES
Subjective     Paula Ho is a 59 year old female who presents to clinic today for the following health issues:    HPI     Concern - itching  Onset: 3 days    Description:   Itching - buttocks, feet, legs, arms --  - 3 sores on buttocks    Intensity: severe    Progression of Symptoms:  same    Accompanying Signs & Symptoms:  Some red spots in different areas    Previous history of similar problem:   Yes - pt states similar sx's a few months ago (took benadryl for it then)    Precipitating factors:   Worsened by: NA    Alleviating factors:  Improved by: brush - very short relief    Therapies Tried and outcome: Benadryl, Hydrocortisone cream - some improvement noted, Lotions, Vasoline    Requested more medication. Will run out Tuesday and can't get more from pain clinic until Thursday.  Oxycodone 30 mg 3 times daily #75/month, last fill 8/13  [facility] [place] [phone]    Patient Active Problem List   Diagnosis     Chronic knee pain     Anxiety disorder     Major depressive disorder, recurrent episode (H)     History reviewed. No pertinent surgical history.    Social History     Tobacco Use     Smoking status: Current Every Day Smoker     Smokeless tobacco: Never Used   Substance Use Topics     Alcohol use: Yes     History reviewed. No pertinent family history.      Current Outpatient Medications   Medication Sig Dispense Refill     albuterol (PROAIR HFA) 108 (90 Base) MCG/ACT inhaler Inhale 2 puffs into the lungs every 4 hours as needed for shortness of breath / dyspnea or wheezing 1 Inhaler 3     albuterol (PROVENTIL) (2.5 MG/3ML) 0.083% neb solution NEBULIZE THE CONTENTS OF 1 VIAL EVERY 6 HOURS AS NEEDED.  0     amphetamine-dextroamphetamine (ADDERALL) 20 MG tablet Take 20 mg by mouth 2 times daily       ARIPiprazole (ABILIFY) 10 MG tablet TK 1 T PO D  1     atorvastatin (LIPITOR) 20 MG tablet TK 1 T PO QD  3     budesonide-formoterol (SYMBICORT) 160-4.5 MCG/ACT Inhaler Inhale 2 puffs into the lungs 2 times  daily 3 Inhaler 3     busPIRone (BUSPAR) 15 MG tablet   1     cetirizine (ZYRTEC) 10 MG tablet Take 1 tablet (10 mg) by mouth daily 14 tablet 1     clonazePAM (KLONOPIN) 0.5 MG tablet TK 1 T PO BID PRN  0     FLUoxetine (PROZAC) 40 MG capsule Take 40 mg by mouth daily       guaiFENesin-codeine (ROBITUSSIN AC) 100-10 MG/5ML solution Take 5-10 mLs by mouth every 4 hours as needed for cough 120 mL 0     lisinopril (PRINIVIL/ZESTRIL) 10 MG tablet Take 10 mg by mouth daily  0     minocycline (MINOCIN/DYNACIN) 100 MG capsule TAKE ONE CAPSULE BY MOUTH EVERY TWELVE HOURS  2     mupirocin (BACTROBAN) 2 % external ointment Apply topically 3 times daily for 10 days 30 g 1     NARCAN 4 MG/0.1ML nasal spray INHALE VIA NASAL ROUTE FOR OVERDOSE AS NEEDED. MAY REPEAT AFTER 2-3 MINUTES  0     oxyCODONE (OXYCONTIN) 60 MG T12A 12 hr tablet Take 1 tablet by mouth 3 times daily       pravastatin (PRAVACHOL) 40 MG tablet take 1 tablet by mouth once a day in the evening  0     tiotropium (SPIRIVA HANDIHALER) 18 MCG inhaled capsule Inhale 1 capsule (18 mcg) into the lungs daily 90 capsule 3     tiZANidine (ZANAFLEX) 2 MG tablet TK 2 TO 3 TS PO QHS  4     topiramate (TOPAMAX) 100 MG tablet TK 2 TS PO IN THE MORNING  1     varenicline (CHANTIX KARISHMA) 0.5 MG X 11 & 1 MG X 42 tablet Take 0.5 mg tab daily for 3 days, THEN 0.5 mg tab twice daily for 4 days, THEN 1 mg twice daily. 53 tablet 0     varenicline (CHANTIX) 1 MG tablet Take 1 tablet (1 mg) by mouth 2 times daily 60 tablet 3     VENTOLIN  (90 Base) MCG/ACT inhaler INHALE 2 PUFFS INTO THE LUNGS Q 6 H PRN  0     vitamin D3 (CHOLECALCIFEROL) 2000 units (50 mcg) tablet Take 1 tablet by mouth daily  1     Allergies   Allergen Reactions     Compazine [Prochlorperazine]      Droperidol      BP Readings from Last 3 Encounters:   09/04/19 122/70   08/05/19 124/82   08/02/19 138/81    Wt Readings from Last 3 Encounters:   09/04/19 122.5 kg (270 lb 1.6 oz)   08/05/19 122.7 kg (270 lb 9.6 oz)  "  08/02/19 121 kg (266 lb 12.8 oz)                 Reviewed and updated as needed this visit by Provider  Tobacco  Allergies  Meds  Problems  Med Hx  Surg Hx  Fam Hx         Review of Systems   ROS COMP: Constitutional, HEENT, cardiovascular, pulmonary, gi and gu systems are negative, except as otherwise noted.      Objective    /70 (BP Location: Right arm, Patient Position: Sitting, Cuff Size: Adult Regular)   Pulse 90   Temp 98.3  F (36.8  C) (Oral)   Resp 16   Ht 1.651 m (5' 5\")   Wt 122.5 kg (270 lb 1.6 oz)   SpO2 96%   BMI 44.95 kg/m    Body mass index is 44.95 kg/m .  Physical Exam   GENERAL: healthy, alert and no distress  SKIN: open wounds to buttock - appears somewhat ulcerative with mild erythema. Each about 2 cm x 0.5 cm (x2). Also has red papules in clusters of 2-3 generalized to body  PSYCH: mentation appears normal, affect normal/bright    Diagnostic Test Results:  none         Assessment & Plan       ICD-10-CM    1. Bug bite, initial encounter W57.XXXA cetirizine (ZYRTEC) 10 MG tablet   2. Cellulitis of buttock L03.317 mupirocin (BACTROBAN) 2 % external ointment     Start cetirizine (Zyrtec) 10 mg daily x2 weeks  Start mupirocin ointment to the buttock bites that are infected  May use hydrocortisone cream to the areas that are bites but not infected    I will not give narcotics. She has pain contract with [facility] in [facility].     Tobacco Cessation:   reports that she has been smoking.  She has never used smokeless tobacco.        BMI:   Estimated body mass index is 44.95 kg/m  as calculated from the following:    Height as of this encounter: 1.651 m (5' 5\").    Weight as of this encounter: 122.5 kg (270 lb 1.6 oz).           See Patient Instructions    Return in about 1 week (around 9/11/2019), or if symptoms worsen or fail to improve.    The benefits, risks and potential side effects were discussed in detail. Black box warnings discussed as relevant. All patient " questions were answered. The patient was instructed to follow up immediately if any adverse reactions develop.    Return precautions discussed, including when to seek urgent/emergent care.    Patient verbalizes understanding and agrees with plan of care. Patient stable for discharge.      LEONA Marquez [facility]

## 2019-09-06 ENCOUNTER — TELEPHONE (OUTPATIENT)
Dept: FAMILY MEDICINE | Facility: CLINIC | Age: 59
End: 2019-09-06

## 2019-09-06 NOTE — TELEPHONE ENCOUNTER
Please call patient - I regret to inform her that she needs to discuss pain medication with [facility] as I cannot prescribe extra. There are several providers there so someone should be able to help. (patient requested extra pain medications to get her through to her next available date next Thursday as she thinks she'll run out on Tuesday)

## 2019-09-06 NOTE — TELEPHONE ENCOUNTER
This writer attempted to contact Paula on 09/06/19      Reason for call relay provider message, as below  and unable to leave message. Phone line busy, unable to leave message. Was prompted to call back again later.      If patient calls back:   Registered Nurse called. Follow Triage Call workflow        Catina Perdomo RN

## 2019-09-09 NOTE — TELEPHONE ENCOUNTER
This writer attempted to contact patient on 09/09/19      Reason for call provider's message and left message.      If patient calls back:   Registered Nurse called. Follow Triage Call workflow        Noemi Galvez RN

## 2019-09-09 NOTE — TELEPHONE ENCOUNTER
returned call    Best number to reach caller:   Paula Ho (Self) [phone] (H)     Is it ok to leave a detailed message: YES    Transferring to rn line

## 2019-09-24 ASSESSMENT — MIFFLIN-ST. JEOR: SCORE: 1811.46

## 2019-09-26 ENCOUNTER — SURGERY - HEALTHEAST (OUTPATIENT)
Dept: SURGERY | Facility: AMBULATORY SURGERY CENTER | Age: 59
End: 2019-09-26

## 2019-09-26 ASSESSMENT — MIFFLIN-ST. JEOR: SCORE: 1811.46

## 2019-10-07 ENCOUNTER — TELEPHONE (OUTPATIENT)
Dept: PSYCHOLOGY | Facility: CLINIC | Age: 59
End: 2019-10-07

## 2019-10-07 NOTE — TELEPHONE ENCOUNTER
"Called Paula to offer an earlier intake. She decided to take the appt on 10/10, saying \"I am really depressed. I need that appt.\" She will see her pain  and then come to the therapy appt.  "

## 2019-10-10 ENCOUNTER — OFFICE VISIT (OUTPATIENT)
Dept: PSYCHOLOGY | Facility: CLINIC | Age: 59
End: 2019-10-10
Payer: MEDICARE

## 2019-10-10 DIAGNOSIS — F41.9 ANXIETY DISORDER: ICD-10-CM

## 2019-10-10 DIAGNOSIS — F33.9 MAJOR DEPRESSIVE DISORDER, RECURRENT EPISODE (H): Primary | ICD-10-CM

## 2019-10-10 PROCEDURE — 90834 PSYTX W PT 45 MINUTES: CPT | Performed by: PSYCHOLOGIST

## 2019-10-10 ASSESSMENT — COLUMBIA-SUICIDE SEVERITY RATING SCALE - C-SSRS
3. HAVE YOU BEEN THINKING ABOUT HOW YOU MIGHT KILL YOURSELF?: YES
4. HAVE YOU HAD THESE THOUGHTS AND HAD SOME INTENTION OF ACTING ON THEM?: YES
4. HAVE YOU HAD THESE THOUGHTS AND HAD SOME INTENTION OF ACTING ON THEM?: NO
2. HAVE YOU ACTUALLY HAD ANY THOUGHTS OF KILLING YOURSELF LIFETIME?: YES
1. IN THE PAST MONTH, HAVE YOU WISHED YOU WERE DEAD OR WISHED YOU COULD GO TO SLEEP AND NOT WAKE UP?: YES
1. IN THE PAST MONTH, HAVE YOU WISHED YOU WERE DEAD OR WISHED YOU COULD GO TO SLEEP AND NOT WAKE UP?: YES
5. HAVE YOU STARTED TO WORK OUT OR WORKED OUT THE DETAILS OF HOW TO KILL YOURSELF? DO YOU INTEND TO CARRY OUT THIS PLAN?: YES
5. HAVE YOU STARTED TO WORK OUT OR WORKED OUT THE DETAILS OF HOW TO KILL YOURSELF? DO YOU INTEND TO CARRY OUT THIS PLAN?: NO
2. HAVE YOU ACTUALLY HAD ANY THOUGHTS OF KILLING YOURSELF?: YES

## 2019-10-10 ASSESSMENT — ANXIETY QUESTIONNAIRES
2. NOT BEING ABLE TO STOP OR CONTROL WORRYING: MORE THAN HALF THE DAYS
IF YOU CHECKED OFF ANY PROBLEMS ON THIS QUESTIONNAIRE, HOW DIFFICULT HAVE THESE PROBLEMS MADE IT FOR YOU TO DO YOUR WORK, TAKE CARE OF THINGS AT HOME, OR GET ALONG WITH OTHER PEOPLE: EXTREMELY DIFFICULT
1. FEELING NERVOUS, ANXIOUS, OR ON EDGE: MORE THAN HALF THE DAYS
5. BEING SO RESTLESS THAT IT IS HARD TO SIT STILL: MORE THAN HALF THE DAYS
6. BECOMING EASILY ANNOYED OR IRRITABLE: NEARLY EVERY DAY
GAD7 TOTAL SCORE: 15
7. FEELING AFRAID AS IF SOMETHING AWFUL MIGHT HAPPEN: MORE THAN HALF THE DAYS
3. WORRYING TOO MUCH ABOUT DIFFERENT THINGS: MORE THAN HALF THE DAYS

## 2019-10-10 ASSESSMENT — PATIENT HEALTH QUESTIONNAIRE - PHQ9
5. POOR APPETITE OR OVEREATING: MORE THAN HALF THE DAYS
SUM OF ALL RESPONSES TO PHQ QUESTIONS 1-9: 15

## 2019-10-10 NOTE — Clinical Note
Hello,Just wanted you to know that Paula came to her therapy intake bur was in too much pain to say the whole hour. She said she needs to see a psychiatrist to get her Adderall Rx so I referred her to timmy at [place] Oct 23. If that is not needed or appropriate for her, since you would need to follow up with the Rx, let me know. Would the pain clinic be helpful to her? She is scared of surgery.Thanks,Rocio
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
## 2019-10-11 ASSESSMENT — ANXIETY QUESTIONNAIRES: GAD7 TOTAL SCORE: 15

## 2019-10-22 ENCOUNTER — OFFICE VISIT (OUTPATIENT)
Dept: PSYCHOLOGY | Facility: CLINIC | Age: 59
End: 2019-10-22
Payer: MEDICARE

## 2019-10-22 DIAGNOSIS — F33.9 MAJOR DEPRESSIVE DISORDER, RECURRENT EPISODE (H): Primary | ICD-10-CM

## 2019-10-22 DIAGNOSIS — F41.9 ANXIETY DISORDER: ICD-10-CM

## 2019-10-22 PROCEDURE — 90791 PSYCH DIAGNOSTIC EVALUATION: CPT | Performed by: PSYCHOLOGIST

## 2019-10-23 ASSESSMENT — ANXIETY QUESTIONNAIRES
3. WORRYING TOO MUCH ABOUT DIFFERENT THINGS: MORE THAN HALF THE DAYS
5. BEING SO RESTLESS THAT IT IS HARD TO SIT STILL: SEVERAL DAYS
7. FEELING AFRAID AS IF SOMETHING AWFUL MIGHT HAPPEN: MORE THAN HALF THE DAYS
6. BECOMING EASILY ANNOYED OR IRRITABLE: MORE THAN HALF THE DAYS
1. FEELING NERVOUS, ANXIOUS, OR ON EDGE: MORE THAN HALF THE DAYS
2. NOT BEING ABLE TO STOP OR CONTROL WORRYING: MORE THAN HALF THE DAYS
GAD7 TOTAL SCORE: 13
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
## 2019-10-24 ASSESSMENT — ANXIETY QUESTIONNAIRES: GAD7 TOTAL SCORE: 13

## 2019-11-05 ENCOUNTER — OFFICE VISIT (OUTPATIENT)
Dept: PSYCHIATRY | Facility: CLINIC | Age: 59
End: 2019-11-05
Payer: MEDICARE

## 2019-11-05 VITALS
WEIGHT: 265.8 LBS | SYSTOLIC BLOOD PRESSURE: 134 MMHG | RESPIRATION RATE: 24 BRPM | OXYGEN SATURATION: 95 % | DIASTOLIC BLOOD PRESSURE: 77 MMHG | HEIGHT: 65 IN | BODY MASS INDEX: 44.28 KG/M2 | TEMPERATURE: 97.9 F | HEART RATE: 76 BPM

## 2019-11-05 DIAGNOSIS — Z51.81 ENCOUNTER FOR THERAPEUTIC DRUG MONITORING: ICD-10-CM

## 2019-11-05 DIAGNOSIS — F33.2 SEVERE EPISODE OF RECURRENT MAJOR DEPRESSIVE DISORDER, WITHOUT PSYCHOTIC FEATURES (H): ICD-10-CM

## 2019-11-05 DIAGNOSIS — F90.0 ATTENTION DEFICIT HYPERACTIVITY DISORDER (ADHD), PREDOMINANTLY INATTENTIVE TYPE: Primary | ICD-10-CM

## 2019-11-05 DIAGNOSIS — F17.200 TOBACCO DEPENDENCE: ICD-10-CM

## 2019-11-05 PROCEDURE — 90792 PSYCH DIAG EVAL W/MED SRVCS: CPT | Performed by: PSYCHIATRY & NEUROLOGY

## 2019-11-05 RX ORDER — DEXTROAMPHETAMINE SACCHARATE, AMPHETAMINE ASPARTATE, DEXTROAMPHETAMINE SULFATE AND AMPHETAMINE SULFATE 5; 5; 5; 5 MG/1; MG/1; MG/1; MG/1
20 TABLET ORAL 2 TIMES DAILY
Qty: 60 TABLET | Refills: 0 | Status: SHIPPED | OUTPATIENT
Start: 2019-11-05 | End: 2019-12-11

## 2019-11-05 RX ORDER — POLYETHYLENE GLYCOL 3350 17 G
2 POWDER IN PACKET (EA) ORAL
Qty: 168 LOZENGE | Refills: 1 | Status: SHIPPED | OUTPATIENT
Start: 2019-11-05 | End: 2020-01-27

## 2019-11-05 ASSESSMENT — ANXIETY QUESTIONNAIRES
IF YOU CHECKED OFF ANY PROBLEMS ON THIS QUESTIONNAIRE, HOW DIFFICULT HAVE THESE PROBLEMS MADE IT FOR YOU TO DO YOUR WORK, TAKE CARE OF THINGS AT HOME, OR GET ALONG WITH OTHER PEOPLE: EXTREMELY DIFFICULT
6. BECOMING EASILY ANNOYED OR IRRITABLE: NEARLY EVERY DAY
5. BEING SO RESTLESS THAT IT IS HARD TO SIT STILL: MORE THAN HALF THE DAYS
2. NOT BEING ABLE TO STOP OR CONTROL WORRYING: NEARLY EVERY DAY
7. FEELING AFRAID AS IF SOMETHING AWFUL MIGHT HAPPEN: MORE THAN HALF THE DAYS
GAD7 TOTAL SCORE: 18
1. FEELING NERVOUS, ANXIOUS, OR ON EDGE: MORE THAN HALF THE DAYS
3. WORRYING TOO MUCH ABOUT DIFFERENT THINGS: NEARLY EVERY DAY
4. TROUBLE RELAXING: NEARLY EVERY DAY

## 2019-11-05 ASSESSMENT — PAIN SCALES - GENERAL: PAINLEVEL: NO PAIN (0)

## 2019-11-05 ASSESSMENT — PATIENT HEALTH QUESTIONNAIRE - PHQ9: SUM OF ALL RESPONSES TO PHQ QUESTIONS 1-9: 16

## 2019-11-05 ASSESSMENT — MIFFLIN-ST. JEOR: SCORE: 1786.54
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
## 2019-11-05 NOTE — PATIENT INSTRUCTIONS
Treatment Plan:    Continue fluoxetine 60 mg daily for mood and anxiety, topiramate at 200 mg daily for mood.     Discontinue Abilify    Start Adderall 20 mg twice a day as needed for ADHD    Use the nicotine lozenges to try and cut back on smoking.     Go to the lab today for the drug screen.    Continue all other cares per primary care provider.     Continue all other medications as reviewed per electronic medical record today.     Safety plan reviewed. To the Emergency Department as needed or call after hours crisis line at [phone] or [phone]. [place] Crisis Text Line: Text [place] to 821385  or  Suicide LifeLine Chat: [facility].org/chat    Continue therapy as planned.    Schedule an appointment with me in 4 weeks or sooner as needed.  Call [facility] at [phone] to schedule.    Follow up with primary care provider as planned or sooner if needed for acute medical concerns.    Call the psychiatric nurse line with medication questions or concerns at [phone].    ClearFlow may be used to communicate with your provider, but this is not intended to be used for emergencies.    Patient Education:  Increased risk of complications with brain aneurysm if blood pressure too high or uncontrolled while on stimulant medication. Follow-up with primary care provider within a few weeks to check BP. Also ensure you are checking blood pressure at home. Do NOT take Adderall if your blood pressure is higher than 140/90.      Community Resources:    National Suicide Prevention Lifeline: 519.159.3961 (TTY: 417.121.2051). Call anytime for help.  (www.suicidepreventionlifeline.org)  National [place] on Mental Illness (www.raudel.org): 623.663.2488 or 472-239-5289.   Mental Health Association (www.mentalhealth.org): 656.807.9122 or 412-845-9686.  [place] Crisis Text Line: Text [place] to 972550  Suicide LifeLine Chat: suicideDevolia.org/chat

## 2019-11-06 ASSESSMENT — ANXIETY QUESTIONNAIRES: GAD7 TOTAL SCORE: 18

## 2019-11-11 ENCOUNTER — TELEPHONE (OUTPATIENT)
Dept: FAMILY MEDICINE | Facility: CLINIC | Age: 59
End: 2019-11-11

## 2019-11-11 NOTE — TELEPHONE ENCOUNTER
"RN took call.     Patient missed her scheduled apt today at 3:00 with provider, Demetra Meyer. Patient states she fell asleep and missed the time. Was able to borrow her neighbor's car for appointment so did find transportation but overslept and has now missed apt time.   Patient coughing very harshly on phone, crying, very upset and at a loss of what to do. \"I can't do this anymore\". Due to symptoms, RN again advised for ER now or as soon as able. Patient declined, refusing to go, stated \"they will do nothing for me and I am not going to sit there all night\". Patient is aware that [facility] has UC till 9 pm tonight, knows this is an option. Was made aware of RN's recommendation to ER several times.  Patient stating she will come in tomorrow, is able to have a ride, asking for help.  RN agreed to schedule patient for apt tomorrow at 9:00 am for Demetra Meyer CNP. Took same-day slot for this, per RN judgment.   Patient to call back with any worsening symptoms or difficulties.    Catina Perdomo RN  [facility]      "

## 2019-11-11 NOTE — TELEPHONE ENCOUNTER
Reason for call:  Patient reporting a symptom    Symptom or request: chest pain transferring to triage    Duration (how long have symptoms been present):     Have you been treated for this before?     Additional comments:     Phone Number patient can be reached at:      Best Time:      Can we leave a detailed message on this number:      Call taken on 11/11/2019 at 9:45 AM by Lesley Herron

## 2019-11-11 NOTE — TELEPHONE ENCOUNTER
"Writer took call from RN line.    Spoke with patient regarding symptoms.    Hurts for patient to take deep breath, having pain in left side rib cage area. Problem for about last 2-3 days. Has been coughing, history of COPD. About 3 days ago, woke up in morning very congested, lots of coughing-productive and mucous sounding. Has been persisting since, is not worsening, per patient.  Denies any fever, feels she is wheezing at night time, using nebulizer solution and albuterol inhaler since yesterday, is getting some relief from this. Taking shorter breaths as hurts to take deep breath in. No pain with exhalation. May have caused problem with excessive coughing. Potential COPD exacerbation.    During phone conversation, is crying while talking to RN. Is in pain, took one of her Oxycodone pain pills about 15-20 minutes ago. Rates pain 7/10 right now. Is very worried about herself and her dogs, has 2 dogs at home, is by herself, does not know where spouse is at this time, has not seen or heard from him in about 1 week. Has no one to help her at home, no transportation. Is asking for referral for SW, needs help and guidance. Does not want her dogs taken away from her, \"they are my life\". \"If anything were to happen to them, I would die.\"   Asking if can see Demetra Meyer CNP today.  Writer does agree for need to be seen and further evaluated today. Advised if in that severe of pain and can not breath, should go in to ER. Patient noted she can not do that, does not have car and refusing to call 9-1-1 (as suggested) due to her dogs, can not be in hospital or leave alone for long time. This idea made patient cry more.  Offered only OV apt with provider for today at 3:00 pm. Patient was happy with this, stated would give her time to call friends and see if she can get a ride to clinic. Writer also made her aware of UC and hours today, advised needs to be seen by someone today. Patient agreed and understanding. Will work on ride " for apt later today, will call back to office if problems with this or if worsening. Writer wanted earlier apt today, but patient insistent with seeing Demetra Meyer, patient is unsure if could get an earlier ride to clinic for any other appointments today.     Routing to provider to update for appointment later this afternoon, and to also consider placing referral for CC and/or SW outreach. Thank you.    Catina Perdomo RN  [facility]/ [facility]

## 2019-11-12 ENCOUNTER — PATIENT OUTREACH (OUTPATIENT)
Dept: CARE COORDINATION | Facility: CLINIC | Age: 59
End: 2019-11-12

## 2019-11-12 ENCOUNTER — OFFICE VISIT (OUTPATIENT)
Dept: FAMILY MEDICINE | Facility: CLINIC | Age: 59
End: 2019-11-12
Payer: MEDICARE

## 2019-11-12 ENCOUNTER — ANCILLARY PROCEDURE (OUTPATIENT)
Dept: GENERAL RADIOLOGY | Facility: CLINIC | Age: 59
End: 2019-11-12
Attending: NURSE PRACTITIONER
Payer: MEDICARE

## 2019-11-12 VITALS
HEART RATE: 93 BPM | SYSTOLIC BLOOD PRESSURE: 146 MMHG | DIASTOLIC BLOOD PRESSURE: 90 MMHG | RESPIRATION RATE: 24 BRPM | HEIGHT: 65 IN | OXYGEN SATURATION: 94 % | TEMPERATURE: 98.7 F | BODY MASS INDEX: 43.07 KG/M2 | WEIGHT: 258.5 LBS

## 2019-11-12 DIAGNOSIS — Z65.8 SUPPORT SYSTEM DEFICIT: ICD-10-CM

## 2019-11-12 DIAGNOSIS — R05.9 COUGH: ICD-10-CM

## 2019-11-12 DIAGNOSIS — F33.9 EPISODE OF RECURRENT MAJOR DEPRESSIVE DISORDER, UNSPECIFIED DEPRESSION EPISODE SEVERITY (H): ICD-10-CM

## 2019-11-12 DIAGNOSIS — R07.9 CHEST PAIN, UNSPECIFIED TYPE: Primary | ICD-10-CM

## 2019-11-12 DIAGNOSIS — F41.9 ANXIETY DISORDER, UNSPECIFIED TYPE: ICD-10-CM

## 2019-11-12 DIAGNOSIS — R07.9 CHEST PAIN, UNSPECIFIED TYPE: ICD-10-CM

## 2019-11-12 PROCEDURE — 99214 OFFICE O/P EST MOD 30 MIN: CPT | Performed by: NURSE PRACTITIONER

## 2019-11-12 PROCEDURE — 71046 X-RAY EXAM CHEST 2 VIEWS: CPT

## 2019-11-12 PROCEDURE — 93000 ELECTROCARDIOGRAM COMPLETE: CPT | Performed by: NURSE PRACTITIONER

## 2019-11-12 PROCEDURE — 96127 BRIEF EMOTIONAL/BEHAV ASSMT: CPT | Mod: 59 | Performed by: NURSE PRACTITIONER

## 2019-11-12 RX ORDER — DOXYCYCLINE 100 MG/1
100 CAPSULE ORAL 2 TIMES DAILY
Qty: 14 CAPSULE | Refills: 0 | Status: SHIPPED | OUTPATIENT
Start: 2019-11-12 | End: 2019-12-03

## 2019-11-12 RX ORDER — PREDNISONE 20 MG/1
20 TABLET ORAL 2 TIMES DAILY
Qty: 10 TABLET | Refills: 0 | Status: SHIPPED | OUTPATIENT
Start: 2019-11-12 | End: 2019-12-03

## 2019-11-12 ASSESSMENT — ANXIETY QUESTIONNAIRES
3. WORRYING TOO MUCH ABOUT DIFFERENT THINGS: NEARLY EVERY DAY
7. FEELING AFRAID AS IF SOMETHING AWFUL MIGHT HAPPEN: NEARLY EVERY DAY
1. FEELING NERVOUS, ANXIOUS, OR ON EDGE: NEARLY EVERY DAY
GAD7 TOTAL SCORE: 21
5. BEING SO RESTLESS THAT IT IS HARD TO SIT STILL: NEARLY EVERY DAY
2. NOT BEING ABLE TO STOP OR CONTROL WORRYING: NEARLY EVERY DAY
6. BECOMING EASILY ANNOYED OR IRRITABLE: NEARLY EVERY DAY

## 2019-11-12 ASSESSMENT — ACTIVITIES OF DAILY LIVING (ADL): DEPENDENT_IADLS:: CLEANING;COOKING;LAUNDRY;SHOPPING;MEAL PREPARATION;TRANSPORTATION;MEDICATION MANAGEMENT

## 2019-11-12 ASSESSMENT — PATIENT HEALTH QUESTIONNAIRE - PHQ9
5. POOR APPETITE OR OVEREATING: NEARLY EVERY DAY
SUM OF ALL RESPONSES TO PHQ QUESTIONS 1-9: 24

## 2019-11-12 ASSESSMENT — PAIN SCALES - GENERAL: PAINLEVEL: SEVERE PAIN (7)

## 2019-11-12 ASSESSMENT — MIFFLIN-ST. JEOR: SCORE: 1753.43

## 2019-11-12 NOTE — LETTER
[facility]  Complex Care Plan  About Me:    Patient Name:  Paula Ho    YOB: 1960  Age:         59 year old   [place] MRN:    [id number] Telephone Information:  Home Phone [phone]   Mobile Not on file.       Address:  [address]  [place] [address] Email address:  No e-mail address on record      Emergency Contact(s)    Name Relationship Lgl Grd Work Phone Home Phone Mobile Phone   FRANCY SINHA Mother   [phone]            Primary language:  English     needed? No   [place] [facility]:  [phone] op. 1  Other communication barriers: None  Preferred Method of Communication:     Current living arrangement: I live alone( left ,)  Mobility Status/ Medical Equipment: Independent w/Device    Health Maintenance  Health Maintenance Reviewed:      My Access Plan  Medical Emergency 911   Primary Clinic Line   -     24 Hour Appointment Line [phone] or  [phone]) (toll-free)   24 Hour Nurse Line [phone] (toll-free)   Preferred Urgent Care [facility], [phone]   Preferred Hospital     Preferred Pharmacy [id number] - [place], [place] - [address] [facility]     Behavioral Health Crisis Line The National Suicide Prevention Lifeline at 1-964.247.3605 or 911             My Care Team Members  Patient Care Team       Relationship Specialty Notifications Start End    Clinic, [facility] PCP - General   7/9/19     Phone: [phone] Fax: [phone]         [address] [place] [address]    Demetra Meyer APRN CNP Assigned PCP   9/15/19     Phone: [phone] Fax: [phone]         [address] [place] [address]    Ya Vance DO MD Psychiatry  11/5/19     Phone: [phone] Fax: [phone]         [address] [place] [address]    Cindy Ryder LSW Clinic Care Coordinator  Primary Care - CC  11/12/19     Lesley Henry BSW Clinic Care Coordinator Primary Care -   11/12/19      Care Coordination [facility]    Cindy Ryder LSW Clinic Care Coordinator Primary Care -   11/12/19 11/12/19    Lesley Henry BSW Lead Care Coordinator Primary Care -   11/12/19 11/12/19     Care Coordination [facility]            My Care Plans  Self Management and Treatment Plan  Goals and (Comments)  Goals        General    Mental Health Management (pt-stated)     Notes - Note created  11/12/2019  1:53 PM by Cindy Ryder LSW    Goal Statement: I want a Pending sale to [facility]   Measure of Success: will be assessed for a [facility]   Supportive Steps to Achieve: Pt will call Pending sale to [facility]  Barriers: already has a [facility] worker  Strengths: interested  Date to Achieve By: 12/15/19  Patient expressed understanding of goal: yes               Action Plans on File:        Advance Care Plans/Directives Type:        My Medical and Care Information  Problem List   Patient Active Problem List   Diagnosis     Chronic knee pain     Anxiety disorder     Major depressive disorder, recurrent episode (H)      Current Medications and Allergies:  See printed Medication Report.    Care Coordination Start Date: 11/12/2019   Frequency of Care Coordination: 2 weeks   Form Last Updated: 11/12/2019

## 2019-11-12 NOTE — PATIENT INSTRUCTIONS
Continue your nebs  Start doxycycline 1 tab twice daily x7 days  Start prednisone 20 mg twice daily x5 days  I have placed referral for care coordinator    If you are in crisis, you can call the Crisis Connection Hotline 24/7 at 471-735-1878  [facility] open 24/7 for anyone in crisis for assessment, evaluation and care: [phone]  If you are thinking of hurting yourself or someone else, you can also go to the emergency department or call 311

## 2019-11-12 NOTE — PROGRESS NOTES
Clinic Care Coordination Contact    Clinic Care Coordination Contact  OUTREACH Social Work     Referral Information:  Referral Source: PCP Transportation, [facility] services, support, dog care     Primary Diagnosis: Behavioral Health    Chief Complaint   Patient presents with     Clinic Care Coordination - Initial     Soc      Universal Utilization:   Clinic Utilization  Difficulty keeping appointments:: Yes  Compliance Concerns: Yes  No-Show Concerns: Yes  No PCP office visit in Past Year: No  Utilization    Last refreshed: 11/12/2019  1:27 PM:  Hospital Admissions 0           Last refreshed: 11/12/2019  1:27 PM:  ED Visits 0           Last refreshed: 11/12/2019  1:27 PM:  No Show Count (past year) 3              Current as of: 11/12/2019  1:27 PM            Clinical Concerns:  Current Medical Concerns:       Anxiety disorder    Chronic knee pain    Major depressive disorder, recurrent episode (H)        Current Behavioral Concerns: Anxiety , depression,   Concern for dogs if she enters the hospital . Is currently seeing a Psychologist, Rocio TRAORE  and Psychiatrist, Kaitlyn Vance .   Has a [facility] worker.. She thinks she might have to go into the hospital for MH  Care, but is concerned about care for her dogs. Provided [email or web address].  Pt stated no thoughts of hurting herself.   She would call her mother if concerned,  Provided with COPE    2:49 PM     Education Provided to patient: Role of [facility] to request a MH worker. Pet foster [email or web address].   COPE  Pain  Pain (GOAL):: Yes  Location of chronic pain:: back  Progression: Unchanged  Health Maintenance Reviewed:      Medication Management:  Has prescribed medication , taking as directed     Functional Status:  Dependent ADLs:: Ambulation-walker  Dependent IADLs::  Has a PCA 7 hours a day Cleaning, Cooking, Laundry, Shopping, Meal Preparation, Transportation, Medication Management  Bed or wheelchair confined:: No  Mobility  Status: Independent w/Device    Living Situation:  Current living arrangement:: I live alone( left , in process of a divorce.   Final Court date is 11/15/19/)  Type of residence:: Apartment    Diet/Exercise/Sleep:  Inadequate nutrition (GOAL):: No  Food Insecurity: (interested in delivered meals)  Tube Feeding: No  Exercise:: Currently not exercising    Transportation:  Transportation concerns (GOAL):: Yes  Transportation means:: Medical transport(wants [employer]  . Has [facility])     Psychosocial:  Mental health DX:: Yes  Mental health DX how managed:: Medication, Outpatient Counseling, Psychiatrist([facility] worker )  Informal Support system:: None (mother lives in [place]) just left      Financial/Insurance: are MA  Financial/Insurance concerns (GOAL):: No. Not identified at this time.     Resources and Interventions:  Current Resources:    Community Resources: [facility] worker through [employer] , PCA- 7 hours a day, OP Mental Health both Psychology and psychiatry in place      Equipment Currently Used at Home: raised toilet, grab bar, tub/shower for back pain     Advance Care Plan/Directive  Advanced Care Plans/Directives on file:: No    Referrals Placed: [facility] Resources(pt requesting a [facility]  )     Goals:   Goals        General    Mental Health Management (pt-stated)     Notes - Note created  11/12/2019  1:53 PM by Cindy Ryder LSW    Goal Statement: I want a [facility]   Measure of Success: will be assessed for a [facility]   Supportive Steps to Achieve: Pt will call [facility]  Barriers: already has a n [facility] worker  Strengths: interested  Date to Achieve By: 12/15/19  Patient expressed understanding of goal: yes            Patient/Caregiver understanding: fair    Outreach Frequency: 2 weeks  Future Appointments              In 4 weeks Rocio Swanson LP [facility], [facility]    In 1 month Rocio Swanson LP [facility], [facility]        Plan: Pt will call [facility], [phone],to request a  .  SW will send resource for pet foster care, and follow up in 3 weeks        DENI Romero / Constanza for Lesleyluan Henry  [facility] Primary Care   Care Coordination  [facility]  11/12/2019 3:03 PM

## 2019-11-12 NOTE — LETTER
[place] CARE COORDINATION  [facility]  [address] [place] [address]    November 12, 2019    Paula Ho  [address]   [facility] [address]      Dear Paula,    I am a clinic care coordinator who works with [facility] at the [facility] with Demetra Meyer CNP. I wanted to thank you for spending the time to talk with me. Today, I am covering for DENI Neal.  Below is a description of clinic care coordination and how I can further assist you.     The clinic care coordinator is a registered nurse and/or  who understand the health care system. The goal of clinic care coordination is to help you manage your health and improve access to the [facility] in the most efficient manner. The registered nurse can assist you in meeting your health care goals by providing education, coordinating services, and strengthening the communication among your providers. The  can assist you with financial, behavioral, psychosocial, chemical dependency, counseling, and/or psychiatric resources.    As we discussed, you were going to call [facility] [phone] to request a Mental Health .    I am enclosing an application for [employer] as you requested.  You will need to complete your part and bring it back to the clinic for Demetra Meyer to complete.    A possible resource for foster care for your dogs may be [email or web address].    Please feel free to contact Lesley Henry at [phone], with any questions or concerns. We at [facility] are focused on providing you with the highest-quality healthcare experience possible and that all starts with you.     Sincerely,         DENI Romero   [facility] Primary Care   Care Coordination  [facility]  11/12/2019 3:19 PM      Enclosed: I have enclosed a copy of the Complex Care Plan. This has  helpful information and goals that we have talked about. Please keep this in an easy to access place to use as needed.

## 2019-11-12 NOTE — RESULT ENCOUNTER NOTE
Please send letter:    Paula,  Your x-ray results were normal. Please let us know if you have any questions.  Thank you for allowing us to participate in your care.  I hope you're feeling better soon!  LEONA Marquez CNP

## 2019-11-12 NOTE — PROGRESS NOTES
Clinic Care Coordination Contact  [facility]/Voicemail -Social Work     Referral Source: PCP Financial, Transportation, M.H.     Clinical Data: Care Coordinator Outreach    Outreach attempted x 1.  Left message on patient's voicemail with call back information and requested return call to Lesley Henry.  Plan: Care Coordinator will send care coordination introduction letter with care coordinator contact information and explanation of care coordination services via mail. Care Coordinator will try to reach patient again in 1-2 business days.        DENI Romero / covering for DENI Neal  [facility] Primary Care   Care Coordination  [facility]  11/12/2019 1:13 PM

## 2019-11-12 NOTE — LETTER
November 12, 2019      Paula Ho  [address]   [place] [address]        Dear ,    Your x-ray results were normal. Please let us know if you have any questions.   Thank you for allowing us to participate in your care.   I hope you're feeling better soon!     LEONA Marquez CNP

## 2019-11-12 NOTE — PROGRESS NOTES
Subjective     Paula Ho is a 59 year old female who presents to clinic today for the following health issues:    HPI   COPD Follow-Up    Overall, how are your COPD symptoms since your last clinic visit?  No change    How much fatigue or shortness of breath do you have when you are walking?  More than usual    How much shortness of breath do you have when you are resting?  None    How often do you cough? Sometimes    Have you noticed any change in your sputum/phlegm?  No    Have you experienced a recent fever? No    Please describe how far you can walk without stopping to rest:  Less than 10 feet    How many flights of stairs are you able to walk up without stopping?  None    Have you had any Emergency Room Visits, Urgent Care Visits, or Hospital Admissions because of your COPD since your last office visit?  No    History   Smoking Status     Current Every Day Smoker     Packs/day: 0.00   Smokeless Tobacco     Never Used     No results found for: FEV1, EEZ1WKM      How many servings of fruits and vegetables do you eat daily?  2-3    On average, how many sweetened beverages do you drink each day (soda, juice, sweet tea, etc)?   1    How many days per week do you miss taking your medication? 0    Acute Illness     ENT Symptoms             Symptoms: cc Present Absent Comment   Fever/Chills   x    Fatigue  x     Muscle Aches  x     Eye Irritation   x    Sneezing   x    Nasal Jonnathan/Drg   x    Sinus Pressure/Pain  x  Headache    Loss of smell   x    Dental pain   x    Sore Throat   x    Swollen Glands   x    Ear Pain/Fullness   x    Cough  x     Wheeze  x  More at night    Chest Pain  x     Shortness of breath  x  Worse in the morning   Rash   x    Other   x      Symptom duration:  3 days    Symptom severity:  Severe    Treatments tried:  Inhalers and Rx medication- no relief    Contacts:  None         Pleasant 59 year old female presents with concerns for chest pain. She feels it is related to anxiety. No shortness  of breath associated. No nausea or vomiting. not worse with exertion. Only present when thinking about stressors. She has court for divorce on Friday. Her  is on cocaine and alcohol and comes and goes. She hasn't seen him since last week. She has a restraining order against him. Feels depressed. Doesn't want to eat or get dressed. Needs help with transportation. Sees back surgeon who told her she has a problem with L3-L4-L5 but she doesn't want to do back surgery because she doesn't have anyone to watch her dogs. Cough and cold symptoms for 3 days. Feels overwhelmed. Wants to move but can't find another section 8 housing place.    Patient Active Problem List   Diagnosis     Chronic knee pain     Anxiety disorder     Major depressive disorder, recurrent episode (H)     History reviewed. No pertinent surgical history.    Social History     Tobacco Use     Smoking status: Current Every Day Smoker     Packs/day: 0.00     Smokeless tobacco: Never Used   Substance Use Topics     Alcohol use: Yes     Family History   Family history unknown: Yes         Current Outpatient Medications   Medication Sig Dispense Refill     albuterol (PROAIR HFA) 108 (90 Base) MCG/ACT inhaler Inhale 2 puffs into the lungs every 4 hours as needed for shortness of breath / dyspnea or wheezing 1 Inhaler 3     albuterol (PROVENTIL) (2.5 MG/3ML) 0.083% neb solution NEBULIZE THE CONTENTS OF 1 VIAL EVERY 6 HOURS AS NEEDED.  0     amphetamine-dextroamphetamine (ADDERALL) 20 MG tablet Take 1 tablet (20 mg) by mouth 2 times daily 60 tablet 0     amphetamine-dextroamphetamine (ADDERALL) 20 MG tablet Take 20 mg by mouth 2 times daily       atorvastatin (LIPITOR) 20 MG tablet TK 1 T PO QD  3     budesonide-formoterol (SYMBICORT) 160-4.5 MCG/ACT Inhaler Inhale 2 puffs into the lungs 2 times daily 3 Inhaler 3     cetirizine (ZYRTEC) 10 MG tablet Take 1 tablet (10 mg) by mouth daily 14 tablet 1     doxycycline hyclate (VIBRAMYCIN) 100 MG capsule Take 1  capsule (100 mg) by mouth 2 times daily for 7 days 14 capsule 0     FLUoxetine (PROZAC) 40 MG capsule Take 60 mg by mouth daily       guaiFENesin-codeine (ROBITUSSIN AC) 100-10 MG/5ML solution Take 5-10 mLs by mouth every 4 hours as needed for cough 120 mL 0     lisinopril (PRINIVIL/ZESTRIL) 10 MG tablet Take 10 mg by mouth daily  0     minocycline (MINOCIN/DYNACIN) 100 MG capsule TAKE ONE CAPSULE BY MOUTH EVERY TWELVE HOURS  2     NARCAN 4 MG/0.1ML nasal spray INHALE VIA NASAL ROUTE FOR OVERDOSE AS NEEDED. MAY REPEAT AFTER 2-3 MINUTES  0     nicotine (COMMIT) 2 MG lozenge Place 1 lozenge (2 mg) inside cheek every hour as needed for smoking cessation 168 lozenge 1     oxyCODONE (OXYCONTIN) 60 MG T12A 12 hr tablet Take 1 tablet by mouth 3 times daily       pravastatin (PRAVACHOL) 40 MG tablet take 1 tablet by mouth once a day in the evening  0     predniSONE (DELTASONE) 20 MG tablet Take 1 tablet (20 mg) by mouth 2 times daily for 5 days 10 tablet 0     tiotropium (SPIRIVA HANDIHALER) 18 MCG inhaled capsule Inhale 1 capsule (18 mcg) into the lungs daily 90 capsule 3     tiZANidine (ZANAFLEX) 2 MG tablet TK 2 TO 3 TS PO QHS  4     topiramate (TOPAMAX) 100 MG tablet TK 2 TS PO IN THE MORNING  1     varenicline (CHANTIX KARISHMA) 0.5 MG X 11 & 1 MG X 42 tablet Take 0.5 mg tab daily for 3 days, THEN 0.5 mg tab twice daily for 4 days, THEN 1 mg twice daily. 53 tablet 0     varenicline (CHANTIX) 1 MG tablet Take 1 tablet (1 mg) by mouth 2 times daily 60 tablet 3     VENTOLIN  (90 Base) MCG/ACT inhaler INHALE 2 PUFFS INTO THE LUNGS Q 6 H PRN  0     vitamin D3 (CHOLECALCIFEROL) 2000 units (50 mcg) tablet Take 1 tablet by mouth daily  1     Allergies   Allergen Reactions     Compazine [Prochlorperazine]      Droperidol      Lisinopril      Seroquel [Quetiapine]      BP Readings from Last 3 Encounters:   11/12/19 (!) 146/90   11/05/19 134/77   09/04/19 122/70    Wt Readings from Last 3 Encounters:   11/12/19 117.3 kg (258 lb  "8 oz)   11/05/19 120.6 kg (265 lb 12.8 oz)   09/04/19 122.5 kg (270 lb 1.6 oz)                 Reviewed and updated as needed this visit by Provider  Tobacco  Allergies  Meds  Problems  Med Hx  Surg Hx  Fam Hx         Review of Systems   ROS COMP: Constitutional, HEENT, cardiovascular, pulmonary, GI, , musculoskeletal, neuro, skin, endocrine and psych systems are negative, except as otherwise noted.      Objective    BP (!) 146/90 (BP Location: Right arm, Patient Position: Sitting, Cuff Size: Adult Large)   Pulse 93   Temp 98.7  F (37.1  C) (Oral)   Resp 24   Ht 1.651 m (5' 5\")   Wt 117.3 kg (258 lb 8 oz)   SpO2 94%   BMI 43.02 kg/m    Body mass index is 43.02 kg/m .  Physical Exam   GENERAL: healthy, alert and no distress  EYES: Eyes grossly normal to inspection, PERRL and conjunctivae and sclerae normal  HENT: ear canals and TM's normal, nose and mouth without ulcers or lesions  NECK: no adenopathy, no asymmetry, masses, or scars and thyroid normal to palpation  RESP: lungs clear to auscultation - no rales, rhonchi or wheezes  CV: regular rate and rhythm, normal S1 S2, no S3 or S4, no murmur, click or rub  MS: no gross musculoskeletal defects noted, no edema  PSYCH: tearful    Diagnostic Test Results:  Results for orders placed or performed in visit on 11/12/19 (from the past 24 hour(s))   EKG 12-lead complete w/read - Clinics    Narrative    Sinus rhythm  Rate 66      QTcH 390     CHEST TWO VIEWS   11/12/2019 10:11 AM      HISTORY: Lower chest pain x3 days. Chest pain, unspecified type.  Cough.     COMPARISON: CT chest 7/10/2019.                                                                      IMPRESSION: No acute cardiopulmonary disease.     RAÚL SHARMA MD        Assessment & Plan     1. Chest pain, unspecified type  Related to anxiety/panic attack. Normal EKG and chest x-ray.  - EKG 12-lead complete w/read - Clinics  - XR Chest 2 Views; Future    2. Cough  Hx COPD. upper " respiratory infection symptoms on top of chronic COPD. Treatment below. XR normal.   - XR Chest 2 Views; Future  - doxycycline hyclate (VIBRAMYCIN) 100 MG capsule; Take 1 capsule (100 mg) by mouth 2 times daily for 7 days  Dispense: 14 capsule; Refill: 0  - predniSONE (DELTASONE) 20 MG tablet; Take 1 tablet (20 mg) by mouth 2 times daily for 5 days  Dispense: 10 tablet; Refill: 0    3. Episode of recurrent major depressive disorder, unspecified depression episode severity (H)  Requesting social work assistance as above. Denies thought to harm self or others. States her dogs are her support system. [place] but has significant health ailments and is only available by phone at times. Contracts for safety.  - CARE COORDINATION REFERRAL    4. Anxiety disorder, unspecified type  As above  - CARE COORDINATION REFERRAL    5. Support system deficit  As above  - CARE COORDINATION REFERRAL           See Patient Instructions    Return in about 1 week (around 11/19/2019), or if symptoms worsen or fail to improve.     The benefits, risks and potential side effects were discussed in detail. Black box warnings discussed as relevant. All patient questions were answered. The patient was instructed to follow up immediately if any adverse reactions develop.    Return precautions discussed, including when to seek urgent/emergent care.    Patient verbalizes understanding and agrees with plan of care. Patient stable for discharge.      LEONA Marquez [facility]

## 2019-11-13 ASSESSMENT — ANXIETY QUESTIONNAIRES: GAD7 TOTAL SCORE: 21

## 2019-12-03 ENCOUNTER — ANCILLARY PROCEDURE (OUTPATIENT)
Dept: GENERAL RADIOLOGY | Facility: CLINIC | Age: 59
End: 2019-12-03
Attending: NURSE PRACTITIONER
Payer: MEDICARE

## 2019-12-03 ENCOUNTER — OFFICE VISIT (OUTPATIENT)
Dept: FAMILY MEDICINE | Facility: CLINIC | Age: 59
End: 2019-12-03
Payer: MEDICARE

## 2019-12-03 VITALS
OXYGEN SATURATION: 94 % | HEIGHT: 65 IN | TEMPERATURE: 97.6 F | DIASTOLIC BLOOD PRESSURE: 76 MMHG | SYSTOLIC BLOOD PRESSURE: 121 MMHG | BODY MASS INDEX: 43.82 KG/M2 | HEART RATE: 85 BPM | WEIGHT: 263 LBS

## 2019-12-03 DIAGNOSIS — G89.29 CHRONIC BILATERAL LOW BACK PAIN WITH SCIATICA, SCIATICA LATERALITY UNSPECIFIED: ICD-10-CM

## 2019-12-03 DIAGNOSIS — M54.40 CHRONIC BILATERAL LOW BACK PAIN WITH SCIATICA, SCIATICA LATERALITY UNSPECIFIED: ICD-10-CM

## 2019-12-03 DIAGNOSIS — R05.9 COUGH: ICD-10-CM

## 2019-12-03 DIAGNOSIS — R05.9 COUGH: Primary | ICD-10-CM

## 2019-12-03 DIAGNOSIS — J44.9 CHRONIC OBSTRUCTIVE PULMONARY DISEASE, UNSPECIFIED COPD TYPE (H): ICD-10-CM

## 2019-12-03 DIAGNOSIS — I67.1 BRAIN ANEURYSM: ICD-10-CM

## 2019-12-03 DIAGNOSIS — M25.561 CHRONIC PAIN OF RIGHT KNEE: ICD-10-CM

## 2019-12-03 DIAGNOSIS — G89.29 CHRONIC PAIN OF RIGHT KNEE: ICD-10-CM

## 2019-12-03 PROCEDURE — 94640 AIRWAY INHALATION TREATMENT: CPT | Performed by: NURSE PRACTITIONER

## 2019-12-03 PROCEDURE — 71046 X-RAY EXAM CHEST 2 VIEWS: CPT

## 2019-12-03 PROCEDURE — 99214 OFFICE O/P EST MOD 30 MIN: CPT | Mod: 25 | Performed by: NURSE PRACTITIONER

## 2019-12-03 RX ORDER — ALBUTEROL SULFATE 0.83 MG/ML
SOLUTION RESPIRATORY (INHALATION)
Qty: 25 VIAL | Refills: 1 | Status: SHIPPED | OUTPATIENT
Start: 2019-12-03 | End: 2020-07-16

## 2019-12-03 RX ORDER — IPRATROPIUM BROMIDE AND ALBUTEROL SULFATE 2.5; .5 MG/3ML; MG/3ML
3 SOLUTION RESPIRATORY (INHALATION) ONCE
Status: COMPLETED | OUTPATIENT
Start: 2019-12-03 | End: 2019-12-03

## 2019-12-03 RX ORDER — GUAIFENESIN AND DEXTROMETHORPHAN HYDROBROMIDE 600; 30 MG/1; MG/1
1 TABLET, EXTENDED RELEASE ORAL EVERY 12 HOURS
Qty: 10 TABLET | Refills: 0 | Status: SHIPPED | OUTPATIENT
Start: 2019-12-03 | End: 2020-01-27

## 2019-12-03 RX ORDER — AZITHROMYCIN 250 MG/1
TABLET, FILM COATED ORAL
Qty: 6 TABLET | Refills: 0 | Status: SHIPPED | OUTPATIENT
Start: 2019-12-03 | End: 2020-01-27

## 2019-12-03 RX ADMIN — IPRATROPIUM BROMIDE AND ALBUTEROL SULFATE 3 ML: 2.5; .5 SOLUTION RESPIRATORY (INHALATION) at 12:14

## 2019-12-03 ASSESSMENT — MIFFLIN-ST. JEOR: SCORE: 1773.84

## 2019-12-03 ASSESSMENT — PAIN SCALES - GENERAL: PAINLEVEL: SEVERE PAIN (6)

## 2019-12-03 NOTE — NURSING NOTE
The following nebulizer treatment was given:     MEDICATION: Duoneb  : [employer]  LOT #: [id number]  EXPIRATION DATE:  MAY 21  NDC # 1378-1899-77     Nebulizer Start Time:  11:36 am   Nebulizer Stop Time:  11:46am  See Vital Signs Flowsheet    Nargis MARCOS

## 2019-12-03 NOTE — PROGRESS NOTES
Subjective     Paula Ho is a 59 year old female who presents to clinic today for the following health issues:    HPI   ENT Symptoms             Symptoms: cc Present Absent Comment   Fever/Chills  x     Fatigue  x     Muscle Aches  x     Eye Irritation   x    Sneezing   x    Nasal Jonnathan/Drg   x    Sinus Pressure/Pain   x    Loss of smell   x    Dental pain   x    Sore Throat   x    Swollen Glands   x    Ear Pain/Fullness   x    Cough  x     Wheeze  x     Chest Pain  x     Shortness of breath  x     Rash   x    Other         Symptom duration:  a couple weeks   Symptom severity:  severe   Treatments tried:  Prednisone & Doxycycline   Contacts:  none       Pleasant 59 year old female presents with the above concerns. Hx COPD. She was seen in clinic for similar symptoms 11/12/2019 for similar symptoms and given doxycycline and prednisone which she completed. She felt good for a couple of days and then symptoms returned. Now with wheezing, worse at night and hard time sleeping. Has home nebs but machine broke and needs a new one.    Very stressed recently due to recent divorce being finalized. No thoughts to harm self/others. Feels safe. Not afraid of ex-.     She brings in paperwork for [employer]. She has chronic back and right knee pain. She uses the following assistive devices: electric scooter, cane and walker.    She reported today that she has a history of brain aneurysm in Dec 2017. She thinks she's supposed to go back and see the specialist but she can't remember who she saw. Care Everywhere shows she was seen by [facility] and had intervention 12/23/2017 and due for repeat MRA Dec 2019. I gave her contact phone number from their note: [phone].    Requesting referral for physical therapy for knee and back pain. Also sees pain clinic.        Patient Active Problem List   Diagnosis     Chronic knee pain     Anxiety disorder     Major depressive disorder, recurrent episode (H)     Brain aneurysm      History reviewed. No pertinent surgical history.    Social History     Tobacco Use     Smoking status: Current Every Day Smoker     Packs/day: 0.00     Smokeless tobacco: Never Used   Substance Use Topics     Alcohol use: Yes     Family History   Family history unknown: Yes         Current Outpatient Medications   Medication Sig Dispense Refill     albuterol (PROAIR HFA) 108 (90 Base) MCG/ACT inhaler Inhale 2 puffs into the lungs every 4 hours as needed for shortness of breath / dyspnea or wheezing 1 Inhaler 3     albuterol (PROVENTIL) (2.5 MG/3ML) 0.083% neb solution NEBULIZE THE CONTENTS OF 1 VIAL EVERY 6 HOURS AS NEEDED. 25 vial 1     amphetamine-dextroamphetamine (ADDERALL) 20 MG tablet Take 1 tablet (20 mg) by mouth 2 times daily 60 tablet 0     amphetamine-dextroamphetamine (ADDERALL) 20 MG tablet Take 20 mg by mouth 2 times daily       atorvastatin (LIPITOR) 20 MG tablet TK 1 T PO QD  3     azithromycin (ZITHROMAX) 250 MG tablet Take 2 tablets (500 mg) by mouth daily for 1 day, THEN 1 tablet (250 mg) daily for 4 days. 6 tablet 0     budesonide-formoterol (SYMBICORT) 160-4.5 MCG/ACT Inhaler Inhale 2 puffs into the lungs 2 times daily 3 Inhaler 3     cetirizine (ZYRTEC) 10 MG tablet Take 1 tablet (10 mg) by mouth daily 14 tablet 1     dextromethorphan-guaiFENesin (MUCINEX DM)  MG 12 hr tablet Take 1 tablet by mouth every 12 hours 10 tablet 0     FLUoxetine (PROZAC) 40 MG capsule Take 60 mg by mouth daily       guaiFENesin-codeine (ROBITUSSIN AC) 100-10 MG/5ML solution Take 5-10 mLs by mouth every 4 hours as needed for cough 120 mL 0     lisinopril (PRINIVIL/ZESTRIL) 10 MG tablet Take 10 mg by mouth daily  0     minocycline (MINOCIN/DYNACIN) 100 MG capsule TAKE ONE CAPSULE BY MOUTH EVERY TWELVE HOURS  2     NARCAN 4 MG/0.1ML nasal spray INHALE VIA NASAL ROUTE FOR OVERDOSE AS NEEDED. MAY REPEAT AFTER 2-3 MINUTES  0     nicotine (COMMIT) 2 MG lozenge Place 1 lozenge (2 mg) inside cheek every hour as  "needed for smoking cessation 168 lozenge 1     order for DME Equipment being ordered: Nebulizer 1 Units 0     oxyCODONE (OXYCONTIN) 60 MG T12A 12 hr tablet Take 1 tablet by mouth 3 times daily       pravastatin (PRAVACHOL) 40 MG tablet take 1 tablet by mouth once a day in the evening  0     tiotropium (SPIRIVA HANDIHALER) 18 MCG inhaled capsule Inhale 1 capsule (18 mcg) into the lungs daily 90 capsule 3     tiZANidine (ZANAFLEX) 2 MG tablet TK 2 TO 3 TS PO QHS  4     topiramate (TOPAMAX) 100 MG tablet TK 2 TS PO IN THE MORNING  1     varenicline (CHANTIX KARISHMA) 0.5 MG X 11 & 1 MG X 42 tablet Take 0.5 mg tab daily for 3 days, THEN 0.5 mg tab twice daily for 4 days, THEN 1 mg twice daily. 53 tablet 0     varenicline (CHANTIX) 1 MG tablet Take 1 tablet (1 mg) by mouth 2 times daily 60 tablet 3     VENTOLIN  (90 Base) MCG/ACT inhaler INHALE 2 PUFFS INTO THE LUNGS Q 6 H PRN  0     vitamin D3 (CHOLECALCIFEROL) 2000 units (50 mcg) tablet Take 1 tablet by mouth daily  1     Allergies   Allergen Reactions     Compazine [Prochlorperazine]      Droperidol      Lisinopril      Seroquel [Quetiapine]          Reviewed and updated as needed this visit by Provider  Tobacco  Allergies  Meds  Problems  Med Hx  Surg Hx  Fam Hx         Review of Systems   ROS COMP: Constitutional, HEENT, cardiovascular, pulmonary, GI, , musculoskeletal, neuro, skin, endocrine and psych systems are negative, except as otherwise noted.      Objective    /76 (BP Location: Right arm, Patient Position: Chair, Cuff Size: Adult Large)   Pulse 85   Temp 97.6  F (36.4  C) (Oral)   Ht 1.651 m (5' 5\")   Wt 119.3 kg (263 lb)   SpO2 94%   BMI 43.77 kg/m    Body mass index is 43.77 kg/m .  Physical Exam   GENERAL: healthy, alert and no distress  EYES: Eyes grossly normal to inspection, PERRL and conjunctivae and sclerae normal  HENT: ear canals and TM's normal, nose and mouth without ulcers or lesions  NECK: no adenopathy, no asymmetry, " masses, or scars and thyroid normal to palpation  RESP: pre-neb: RLL rhonchi otherwise clear, post-neb: mostly unchanged, maybe mild improvement however patient reported improvement in symptoms   CV: regular rate and rhythm, normal S1 S2, no S3 or S4, no murmur, click or rub, no peripheral edema and peripheral pulses strong  ABDOMEN: soft, nontender, no hepatosplenomegaly, no masses and bowel sounds normal  MS: no gross musculoskeletal defects noted, no edema  PSYCH: mentation appears normal, affect normal/bright    Diagnostic Test Results:  Labs reviewed in Epic  No results found for this or any previous visit (from the past 24 hour(s)).   Chest x-ray: possible RLL infiltrate  Radiology overread pending        Assessment & Plan     1. Cough  If not improving will add prednisone. Just finished course of prednisone 3 weeks ago.   - XR Chest 2 Views; Future  - ipratropium - albuterol 0.5 mg/2.5 mg/3 mL (DUONEB) neb solution 3 mL  - albuterol (PROVENTIL) (2.5 MG/3ML) 0.083% neb solution; NEBULIZE THE CONTENTS OF 1 VIAL EVERY 6 HOURS AS NEEDED.  Dispense: 25 vial; Refill: 1  - dextromethorphan-guaiFENesin (MUCINEX DM)  MG 12 hr tablet; Take 1 tablet by mouth every 12 hours  Dispense: 10 tablet; Refill: 0  - azithromycin (ZITHROMAX) 250 MG tablet; Take 2 tablets (500 mg) by mouth daily for 1 day, THEN 1 tablet (250 mg) daily for 4 days.  Dispense: 6 tablet; Refill: 0  - Nebulizer and Supplies Order (patient will need separate RX for meds)  - order for DME; Equipment being ordered: Nebulizer  Dispense: 1 Units; Refill: 0    2. Chronic bilateral low back pain with sciatica, sciatica laterality unspecified  Referral for PT  - [facility]; Future    3. Chronic pain of right knee  Referral for physical therapy   - [facility]; Future    4. Brain aneurysm  2017. Due for recheck MRA Dec 2019. Gave her the number to schedule with her provider.     5. Chronic  "obstructive pulmonary disease, unspecified COPD type (H)  As above.  - XR Chest 2 Views; Future  - ipratropium - albuterol 0.5 mg/2.5 mg/3 mL (DUONEB) neb solution 3 mL  - albuterol (PROVENTIL) (2.5 MG/3ML) 0.083% neb solution; NEBULIZE THE CONTENTS OF 1 VIAL EVERY 6 HOURS AS NEEDED.  Dispense: 25 vial; Refill: 1  - dextromethorphan-guaiFENesin (MUCINEX DM)  MG 12 hr tablet; Take 1 tablet by mouth every 12 hours  Dispense: 10 tablet; Refill: 0  - azithromycin (ZITHROMAX) 250 MG tablet; Take 2 tablets (500 mg) by mouth daily for 1 day, THEN 1 tablet (250 mg) daily for 4 days.  Dispense: 6 tablet; Refill: 0  - Nebulizer and Supplies Order (patient will need separate RX for meds)  - order for DME; Equipment being ordered: Nebulizer  Dispense: 1 Units; Refill: 0     Tobacco Cessation:   reports that she has been smoking. She has been smoking about 0.00 packs per day. She has never used smokeless tobacco.  Tobacco Cessation Action Plan: Information offered: Patient not interested at this time      BMI:   Estimated body mass index is 43.77 kg/m  as calculated from the following:    Height as of this encounter: 1.651 m (5' 5\").    Weight as of this encounter: 119.3 kg (263 lb).           See Patient Instructions    Start azithromycin today - antibiotic  Start nebs today - for wheezing/cough  Start Mucinex for cough today    Call neurology clinic for follow up: [phone]    Schedule physical therapy for knee and back pain    Return in about 1 week (around 12/10/2019), or if symptoms worsen or fail to improve.    The benefits, risks and potential side effects were discussed in detail. Black box warnings discussed as relevant. All patient questions were answered. The patient was instructed to follow up immediately if any adverse reactions develop.    Return precautions discussed, including when to seek urgent/emergent care.    Patient verbalizes understanding and agrees with plan of care. Patient stable for " discharge.      LEONA Marquez [facility]      Nebulizer Documentation  The patient was seen 12/03/2019. After assessment, the patient will need to be treated with ongoing nebulizer for treatment/management of COPD.

## 2019-12-03 NOTE — PATIENT INSTRUCTIONS
Start azithromycin today - antibiotic  Start nebs today - for wheezing/cough  Start Mucinex for cough today    Call neurology clinic for follow up: [phone]    Schedule physical therapy for knee and back pain

## 2019-12-05 ENCOUNTER — PATIENT OUTREACH (OUTPATIENT)
Dept: CARE COORDINATION | Facility: CLINIC | Age: 59
End: 2019-12-05

## 2019-12-05 ENCOUNTER — TELEPHONE (OUTPATIENT)
Dept: FAMILY MEDICINE | Facility: CLINIC | Age: 59
End: 2019-12-05

## 2019-12-05 NOTE — TELEPHONE ENCOUNTER
Panel Management Review   One phone call and send letter if unable to reach them or MyChart message and send letter if not read after 2 weeks (You will get a message to your inbasket)          Health Maintenance Due   Topic Date Due     SPIROMETRY  1960     URINE DRUG SCREEN  1960     HEPATITIS C SCREENING  1960     ADVANCE CARE PLANNING  1960     COPD ACTION PLAN  1960     DEPRESSION ACTION PLAN  1960     MAMMO SCREENING  1960     HIV SCREENING  05/29/1976     MEDICARE ANNUAL WELLNESS VISIT  05/29/1979     HPV  05/29/1982     PAP  05/29/1986     LIPID  05/29/2006     DTAP/TDAP/TD IMMUNIZATION (2 - Td) 09/25/2017     INFLUENZA VACCINE (1) 09/01/2019        Fail List measure:         Patient is due/failing the following:   MAMMOGRAM and PAP    Action needed:   Patient needs office visit for annual exam.    Type of outreach:    Sent letter.    Questions for provider review:    None                                                                                                                               Niko Villegas MA

## 2019-12-05 NOTE — LETTER
December 5, 2019    Paula Ho  [address]   [place] [facility] [address]    Dear Paula Ho,     At [facility] we care about your health and are committed to providing quality patient care.    Which includes staying current on preventive cancer screenings.  You can increase your chances of finding and treating cancers through regular screenings.      Our records indicate you may be due for the following preventive screening(s):    Mammogram    Mammogram for breast cancer   Recommended every 1-2 years for women age 50 and older  Mammograms help detect breast cancer, which is the most common cancer among women in the [place].  You may need to start having mammograms earlier and more often if you have had breast cancer, breast problems, or a family history of breast cancer.     Cervical Cancer Screening    Pap smear is a screening test used to detect cervical cancer. It is recommended every three years for women 21 and older. The test should be done at least once every three years but women who are at greater risk for cervical cancer may need to have the test more often.    To schedule an appointment or discuss this screening further, you may contact us by phone at the [facility] at [phone] or online through the patient portal/[email or web address]    If you have had any of the screenings listed above at another facility, please call us so that we may update your chart.      Your partners in health,      Quality Committee at [facility]

## 2019-12-05 NOTE — PROGRESS NOTES
Clinic Care Coordination Contact    Follow Up Progress Note      Assessment: Initial call with this SW.  Patient reports she has contacted the county and discussed [place] assessment, as noted in goal below.  Patient is interested in having additional services, such as meal delivery and [employer].  Patient reports when calling, she was informed that her current PCA hours (7 hours daily) could change to allow for additional services under CADI waiver.  Patient reports having been with this PCA for years, stating she is very familiar with her needs.  She reports PCA often assists with morning routine due to chronic back (chronic bilateral low back pain) and right knee pain.  Per office visit note, Patient sees back surgeon who told her she has a problem with L3-L4-L5 but she doesn't want to do back surgery because she doesn't have anyone to watch her dogs. Recently, other SW provided resources for foster of her pets.  Patient reports that she has briefly checked into this, but then she got busy and has not been feeling well.  Referral given for PT for knee and back pain.  She is also seen by pain clinic.    Patient reports that she has researched fostering her dogs for surgery needs, she looked briefly but did not feel well and plans to do at later time.    Per chart review, Patient saw clinic provider yesterday for difficulty breathing.  She reports she feels better now, but had a bad morning.  She has history of COPD, she was seen in the clinic 11/12/2019 for a similar reason.  Per this office visit note, Patient is now wheezing which is worse at night, had difficulty sleeping recently.  Patient has home nebulizer but machine broke and needs a new one.  Provider gave order for new nebulizer.  Please see office visit note for specifics     Patient brought in [employer] paperwork for completion.  Office visit note reports that Patient is very stressed related to current divorce, no thoughts of self harm in that  visit or today.  She feels safe at this time.  Did not add goal as did not fully discuss and agree on patient centered plan.  Patient sees therapist and psychiatrist      It was noted in this office visit that Patient had brain aneurysm.  She is due for a recheck, was given this # from provider to call for recheck, which is due Dec 2019.    Brief call as Patient not feeling well.  We agreed to talk in a week.        Goals addressed this encounter:   Goals Addressed                 This Visit's Progress       General      Mental Health Management (pt-stated)   On Hold     Goal Statement: I want a [facility]   Measure of Success: will be assessed for a [facility]   Supportive Steps to Achieve: Pt will call [facility]  Barriers: already has a n [facility] worker  Strengths: interested  Date to Achieve By: 12/15/19  Patient expressed understanding of goal: yes    Patient is uncertain if she will proceed, CADI services may significantly reduce current PCA hours which is very important to her and her functional needs     DENI Perry, JASON   [facility]   [phone]  12/5/2019 8:42 AM             Intervention/Education provided during outreach: We talked briefly as Patient not feeling well.  SW provided her contact information      Outreach Frequency: monthly    Plan:     Care Coordinator will follow up in 1 week for further needs assessment,  Will update Patient's PCP    DENI Perry, JASON   [facility]   [phone]  12/5/2019 9:16 AM

## 2019-12-09 ENCOUNTER — TELEPHONE (OUTPATIENT)
Dept: FAMILY MEDICINE | Facility: CLINIC | Age: 59
End: 2019-12-09

## 2019-12-09 ENCOUNTER — MEDICAL CORRESPONDENCE (OUTPATIENT)
Dept: HEALTH INFORMATION MANAGEMENT | Facility: CLINIC | Age: 59
End: 2019-12-09

## 2019-12-09 NOTE — TELEPHONE ENCOUNTER
Demetra completed form for patient,this form and patient's med list will be deliver to the  this afternoon for pickup after 4p.  Denzel Veloz,  For [facility]    Please notify patient  to let them know the above info.  And remind the person who is picking up to bring their photo ID.  Denzel Veloz,  For [facility]     NOTE: If patient and or guardians calls the clinic before the care teams gets a chance to call them, please notify the caller the above information regarding pickup times, remind them to bring a photo ID, document and close the encounter.  Denzel Veloz,  For [facility]    FYI:  Anything completed after 2:00p will not be delivered until the next business day after 3p.   Denzel Veloz,  for [facility].

## 2019-12-09 NOTE — TELEPHONE ENCOUNTER
Called patient is notified of this writers note below.  Denzel Veloz,  For 1st Floor [facility] ([facility])

## 2019-12-10 ENCOUNTER — OFFICE VISIT (OUTPATIENT)
Dept: PSYCHIATRY | Facility: CLINIC | Age: 59
End: 2019-12-10
Payer: MEDICARE

## 2019-12-10 VITALS
TEMPERATURE: 97.4 F | DIASTOLIC BLOOD PRESSURE: 85 MMHG | HEART RATE: 94 BPM | SYSTOLIC BLOOD PRESSURE: 131 MMHG | WEIGHT: 264 LBS | OXYGEN SATURATION: 95 % | HEIGHT: 65 IN | BODY MASS INDEX: 43.99 KG/M2

## 2019-12-10 DIAGNOSIS — F90.0 ATTENTION DEFICIT HYPERACTIVITY DISORDER (ADHD), PREDOMINANTLY INATTENTIVE TYPE: ICD-10-CM

## 2019-12-10 DIAGNOSIS — Z51.81 ENCOUNTER FOR THERAPEUTIC DRUG MONITORING: ICD-10-CM

## 2019-12-10 DIAGNOSIS — G47.30 SLEEP APNEA, UNSPECIFIED TYPE: Primary | ICD-10-CM

## 2019-12-10 DIAGNOSIS — F33.2 SEVERE EPISODE OF RECURRENT MAJOR DEPRESSIVE DISORDER, WITHOUT PSYCHOTIC FEATURES (H): Primary | ICD-10-CM

## 2019-12-10 LAB

## 2019-12-10 PROCEDURE — 99214 OFFICE O/P EST MOD 30 MIN: CPT | Performed by: PSYCHIATRY & NEUROLOGY

## 2019-12-10 PROCEDURE — 99207 ZZC CDG-MDM COMPONENT: MEETS MODERATE - DOWN CODED: CPT | Performed by: PSYCHIATRY & NEUROLOGY

## 2019-12-10 PROCEDURE — 80306 DRUG TEST PRSMV INSTRMNT: CPT | Performed by: PSYCHIATRY & NEUROLOGY

## 2019-12-10 RX ORDER — FLUOXETINE 40 MG/1
80 CAPSULE ORAL DAILY
Qty: 60 CAPSULE | Refills: 1 | Status: SHIPPED | OUTPATIENT
Start: 2019-12-10 | End: 2020-02-24

## 2019-12-10 RX ORDER — DEXTROAMPHETAMINE SACCHARATE, AMPHETAMINE ASPARTATE, DEXTROAMPHETAMINE SULFATE AND AMPHETAMINE SULFATE 5; 5; 5; 5 MG/1; MG/1; MG/1; MG/1
20 TABLET ORAL 2 TIMES DAILY
Status: CANCELLED | OUTPATIENT
Start: 2019-12-10

## 2019-12-10 ASSESSMENT — ANXIETY QUESTIONNAIRES
1. FEELING NERVOUS, ANXIOUS, OR ON EDGE: NEARLY EVERY DAY
GAD7 TOTAL SCORE: 16
2. NOT BEING ABLE TO STOP OR CONTROL WORRYING: MORE THAN HALF THE DAYS
IF YOU CHECKED OFF ANY PROBLEMS ON THIS QUESTIONNAIRE, HOW DIFFICULT HAVE THESE PROBLEMS MADE IT FOR YOU TO DO YOUR WORK, TAKE CARE OF THINGS AT HOME, OR GET ALONG WITH OTHER PEOPLE: VERY DIFFICULT
5. BEING SO RESTLESS THAT IT IS HARD TO SIT STILL: SEVERAL DAYS
7. FEELING AFRAID AS IF SOMETHING AWFUL MIGHT HAPPEN: NEARLY EVERY DAY
3. WORRYING TOO MUCH ABOUT DIFFERENT THINGS: MORE THAN HALF THE DAYS
4. TROUBLE RELAXING: MORE THAN HALF THE DAYS
6. BECOMING EASILY ANNOYED OR IRRITABLE: NEARLY EVERY DAY

## 2019-12-10 ASSESSMENT — PATIENT HEALTH QUESTIONNAIRE - PHQ9: SUM OF ALL RESPONSES TO PHQ QUESTIONS 1-9: 14

## 2019-12-10 ASSESSMENT — MIFFLIN-ST. JEOR: SCORE: 1778.38

## 2019-12-10 NOTE — PATIENT INSTRUCTIONS
Treatment Plan:    Continue topamax 200 mg daily    Increase Prozac to 80 mg daily    Continue Adderall 20 mg BID     Get urine drug screen completed    Try to get sleep study scheduled. I will see if your primary care provider can place a referral.     Continue all other cares per primary care provider.     Continue all other medications as reviewed per electronic medical record today.     Safety plan reviewed. To the Emergency Department as needed or call after hours crisis line at [phone] or [phone]. [place] Crisis Text Line. Text [place] to 916214 or Suicide LifeLine Chat: suicidepreventionlifeline.org/chat    Continue therapy as planned. Work on DBT skills.     Schedule an appointment with me in 4 weeks or sooner as needed. Call [facility] at [phone] to schedule.    Follow up with primary care provider as planned or for acute medical concerns.    Call the psychiatric nurse line with medication questions or concerns at [phone].    [facility] may be used to communicate with your provider, but this is not intended to be used for emergencies.

## 2019-12-10 NOTE — Clinical Note
Just saw her for a f/u appt. Wondering if you would be willing to place a referral for a sleep study? It sounds as though she would be willing to participate (finally?). We'll see. I think it would be really beneficial. I worry about both BRUCE and central apnea due to her chronic narcotic pain meds. Thanks! -Ya

## 2019-12-10 NOTE — PROGRESS NOTES
"    Outpatient Psychiatric Progress Note    Name: Paula Ho   : 1960                    Primary Care Provider: LEONA Marquez CNP   Therapist: Rocio La LP    PHQ-9 scores:  PHQ-9 SCORE 2019 2019 12/10/2019   PHQ-9 Total Score 16 24 14       LIBRA-7 scores:  LIBRA-7 SCORE 2019 2019 12/10/2019   Total Score 18 21 16       Patient Identification:  Patient is a 59 year old,   White American female  who presents for return visit with me.  Patient is currently disabled. 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 Patient prefers to be called: \"Paula\".    Interim History:  I last saw Paula Ho for outpatient psychiatry Consultation on 19. During that appointment, we:      Continue fluoxetine 60 mg daily for mood and anxiety, topiramate at 200 mg daily for mood.     Discontinue Abilify    Start Adderall 20 mg twice a day as needed for ADHD    Use the nicotine lozenges to try and cut back on smoking.     Go to the lab today for the drug screen (pt left without completing)    Today Paula reports doing about the same however was quite excited to let me know her divorce paperwork went through.  She just got the paperwork in the mail and was opening it in the office.  She reports her focus and concentration has been a lot better and that \"I am getting things done finally.\"  The patient's energy and motivation has improved.  She does state her mood and anxiety have not improved much at all.  She has been having a lot going on and feels quite overwhelmed.  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  She continues to struggle with sleep.  Per the [place]  she is on narcotic pain medication and was given a recent clonazepam prescription.  She has been taking this for some of her anxiety and sleep.  She states someone told her before " "that she \"stops breathing\" when she is sleeping.  She told me someone in the past told her she should get a sleep study but she has never had one.  Her appetite is been good.  She has been checking her blood pressure at home and states it has remained at her baseline.  Before I even mentioned it, she felt bad that she forgot to do the urine drug screen at the last visit and states she will get it done today.  She has been working closely with her [facility] worker who has been encouraging the patient to do a DBT program.  This has been stressful for the patient, because she reports she does not have time to do an intensive therapy program due to the time commitment.  She is looking forward to meeting with her therapist again tomorrow.  I encouraged her to talk about a lot of these concerns with her therapist.  Patient denies having thoughts of suicide or self-harm.  She continues to have very passive and fleeting thoughts about the fact that it would be easier if she just was not here and didn't have to deal with all of this but reports she would never harm herself.    Psychiatric ROS:  Paula Ho reports mood has been: No change  Anxiety has been: About the same  Sleep has been: Continues to be poor  Kelli sxs: None  Psychosis sxs: None  ADHD/ADD sxs: Improved symptoms, see HPI; patient has had improved focus and concentration, task completion, motivation, energy, better organization, less forgetful  PTSD sxs: None  PHQ9 and GAD7 scores were reviewed today.   Medication side effects: Denies; denies that her sleep is worse after starting Adderall, denies new tics, denies chest pain, denies decreased appetite although she thinks she is overeating less  Current stressors include: Symptoms and Day-to-day tasks and feeling overwhelmed  Coping mechanisms and supports include: Therapy, Family, Hobbies and Friends    Current medications include:   Current Outpatient Medications   Medication Sig     albuterol (PROAIR HFA) " 108 (90 Base) MCG/ACT inhaler Inhale 2 puffs into the lungs every 4 hours as needed for shortness of breath / dyspnea or wheezing     albuterol (PROVENTIL) (2.5 MG/3ML) 0.083% neb solution NEBULIZE THE CONTENTS OF 1 VIAL EVERY 6 HOURS AS NEEDED.     amphetamine-dextroamphetamine (ADDERALL) 20 MG tablet Take 1 tablet (20 mg) by mouth 2 times daily     amphetamine-dextroamphetamine (ADDERALL) 20 MG tablet Take 20 mg by mouth 2 times daily     atorvastatin (LIPITOR) 20 MG tablet TK 1 T PO QD     budesonide-formoterol (SYMBICORT) 160-4.5 MCG/ACT Inhaler Inhale 2 puffs into the lungs 2 times daily     cetirizine (ZYRTEC) 10 MG tablet Take 1 tablet (10 mg) by mouth daily     dextromethorphan-guaiFENesin (MUCINEX DM)  MG 12 hr tablet Take 1 tablet by mouth every 12 hours     FLUoxetine (PROZAC) 40 MG capsule Take 2 capsules (80 mg) by mouth daily     guaiFENesin-codeine (ROBITUSSIN AC) 100-10 MG/5ML solution Take 5-10 mLs by mouth every 4 hours as needed for cough     lisinopril (PRINIVIL/ZESTRIL) 10 MG tablet Take 10 mg by mouth daily     minocycline (MINOCIN/DYNACIN) 100 MG capsule TAKE ONE CAPSULE BY MOUTH EVERY TWELVE HOURS     NARCAN 4 MG/0.1ML nasal spray INHALE VIA NASAL ROUTE FOR OVERDOSE AS NEEDED. MAY REPEAT AFTER 2-3 MINUTES     nicotine (COMMIT) 2 MG lozenge Place 1 lozenge (2 mg) inside cheek every hour as needed for smoking cessation     order for DME Equipment being ordered: Nebulizer     oxyCODONE (OXYCONTIN) 60 MG T12A 12 hr tablet Take 1 tablet by mouth 3 times daily     pravastatin (PRAVACHOL) 40 MG tablet take 1 tablet by mouth once a day in the evening     tiotropium (SPIRIVA HANDIHALER) 18 MCG inhaled capsule Inhale 1 capsule (18 mcg) into the lungs daily     tiZANidine (ZANAFLEX) 2 MG tablet TK 2 TO 3 TS PO QHS     topiramate (TOPAMAX) 100 MG tablet TK 2 TS PO IN THE MORNING     varenicline (CHANTIX KARISHMA) 0.5 MG X 11 & 1 MG X 42 tablet Take 0.5 mg tab daily for 3 days, THEN 0.5 mg tab twice  "daily for 4 days, THEN 1 mg twice daily.     varenicline (CHANTIX) 1 MG tablet Take 1 tablet (1 mg) by mouth 2 times daily     VENTOLIN  (90 Base) MCG/ACT inhaler INHALE 2 PUFFS INTO THE LUNGS Q 6 H PRN     vitamin D3 (CHOLECALCIFEROL) 2000 units (50 mcg) tablet Take 1 tablet by mouth daily     No current facility-administered medications for this visit.      The [place] Prescription Monitoring Program has been reviewed and there are no concerns about diversionary activity for controlled substances at this time. Patient receives narcotic pain medication and benzodiazepine from another doctor. No concerns regarding patient's stimulant use.     Previous medication trials include but not limited to:  Adderall  Effexor  Prozac  seroquel  Benadryl  chantix    Past Medical/Surgical History:  Patient Active Problem List   Diagnosis     Chronic knee pain     Anxiety disorder     Major depressive disorder, recurrent episode (H)     Brain aneurysm     Chronic pain syndrome     Asthma     Chronic GERD     Chronic pain     Cigarette smoker     DJD (degenerative joint disease)     Insomnia     Obesity, Class III, BMI 40-49.9 (morbid obesity) (H)     SAH (subarachnoid hemorrhage) (H)     Status post knee surgery     Tobacco use disorder     Jesús's disease (congenital syphilitic osteochondritis)     Social History:  Current Living situation:  Lives with , 2 dogs. Feels safe at home.  Current use of drugs or alcohol: Alcohol  Up to 3 x week, beer  Tobacco use: Yes Cigarettes  1 ppd Ready to quit?  Yes: cutting back  Nicotine Replacement Therapy tried: Nicotine patch, lozenge    Vital Signs:   /85 (BP Location: Left arm, Patient Position: Chair, Cuff Size: Adult Large)   Pulse 94   Temp 97.4  F (36.3  C) (Oral)   Ht 1.651 m (5' 5\")   Wt 119.7 kg (264 lb)   LMP  (LMP Unknown)   SpO2 95%   Breastfeeding No   BMI 43.93 kg/m      Labs:  Most recent laboratory results reviewed and no new labs.     Review " of Systems:  10 systems (general, cardiovascular, respiratory, eyes, ENT, endocrine, GI, , M/S, neurological) were reviewed. Most pertinent finding(s) is/are: Chronic knee and back pain. The remaining systems are all unremarkable.    Mental Status Examination:  Appearance: awake, alert, adequately groomed, obese, appeared stated age and no apparent distress  Attitude:  cooperative   Eye Contact:  good  Gait and Station: normal, no gross abnormalities noted by observation  Psychomotor Behavior:  no evidence of tardive dyskinesia, dystonia, or tics  Oriented to:  person, place, time, and situation  Attention Span and Concentration: Remains a little distractible, disorganized  Speech: Some mumbling at times otherwise regular rate, rhythm, and volume  Language: intact  Mood:  anxious  Affect:  mood congruent  Associations:  no loose associations  Thought Process: Circumstantial   Thought Content:  no evidence of suicidal ideation (see HPI) or homicidal ideation, no evidence of psychotic thought, no auditory hallucinations present and no visual hallucinations present  Recent and Remote Memory:  Intact to interview. Not formally assessed. No amnesia.  Fund of Knowledge: Low-normal to delayed (IQ testing about 70)  Insight:  fair  Judgment:  fair, adequate for safety  Impulse Control:  fair    Suicide Risk Assessment:  Today Paula Ho reports passive thoughts of death. In addition, there are notable risk factors for self-harm, including anxiety, hopelessness at times, withdrawing and mood change. However, risk is mitigated by ability to volunteer a safety plan, history of seeking help when needed, future oriented and identifies reasons to live including her two dogs. Based on all available evidence including the factors cited above, Paula Ho does not appear to be at imminent risk for self-harm, does not meet criteria for a 72-hr hold, and therefore remains appropriate for ongoing outpatient level of care.   A thorough assessment of risk factors related to suicide and self-harm have been reviewed and are noted above. The patient convincingly denies suicidality on several occasions. Local community safety resources printed and reviewed for patient to use if needed. There was no deceit detected, and the patient presented in a manner that was believable.     DSM5 Diagnosis:  Attention-Deficit/Hyperactivity Disorder  314.00 (F90.0) Predominantly inattentive presentation  296.33 (F33.2) Major Depressive Disorder, Recurrent Episode, Severe _w/o psychotic features  300.02 (F41.1) Generalized Anxiety Disorder  - Tobacco Dependence    Medical comorbidities include:   Patient Active Problem List    Diagnosis Date Noted     Brain aneurysm 12/03/2019     Priority: Medium     Dec 2017       SAH (subarachnoid hemorrhage) (H) 12/22/2017     Priority: Medium     Chronic GERD 01/26/2017     Priority: Medium     Chronic knee pain 12/12/2014     Priority: Medium     Cigarette smoker 07/08/2014     Priority: Medium     Chronic pain syndrome 04/24/2013     Priority: Medium     Obesity, Class III, BMI 40-49.9 (morbid obesity) (H) 03/27/2013     Priority: Medium     Status post knee surgery 03/27/2013     Priority: Medium     Per patient's recollection:  Circa 2002: right knee arthroscopy (Dr. Pappas)  Circa 2004: right knee partial knee replacement (Iam Ritchie MD)  Circa 2006: right TKA (Ron Ryder MD; [facility])  Circa 2008: right TKA revision due to infection (Ron Ryder MD; [facility])  Circa 2009: right TKA revision due to infection (Ron Ryder MD; [facility])  April 2011: right TKA (Ron Ryder MD; [facility])       Anxiety disorder 09/28/2011     Priority: Medium     Chronic pain 09/28/2011     Priority: Medium     Knee and low back         DJD (degenerative joint disease) 09/28/2011     Priority: Medium     Jesús's disease (congenital syphilitic osteochondritis) 09/28/2011     Priority:  Medium     Insomnia 04/13/2011     Priority: Medium     Insomnia  NOS       Major depressive disorder, recurrent episode (H) 07/31/2006     Priority: Medium     LW Onset:  [id number]  LW Onset:  [id number]  ; Depression Major Recurrent  NOS       Tobacco use disorder 07/31/2006     Priority: Medium     LW Onset:  [id number]  ; Tobacco Abuse       Asthma 11/15/2004     Priority: Medium     Asthma  NOS         Psychosocial & Contextual Factors:  Medical Comorbidites and Psychiatric symptoms and current day-to-day tasks    Assessment:  Paula Ho reports little to no improvement in her psychiatric symptoms apart from her ADD symptoms. Mood has remained about the same as well as her anxiety.  However, her jonna 7 and PHQ 9 would suggest a there has been some improvement. She has not noticed any worsening per se.  At this time we will increase her Prozac to 80 mg daily to see if we get any additional benefit from this medication.  She did not get a drug screen at the lab at last visit and so she will go today to have her urine drug screen completed.  As long as that is free of illicit substances we will continue her Adderall at 20 mg twice daily.  She has had improvement in her symptoms and her blood pressure and pulse remain under good control.  She reports pressures at home have also been good.  She denies any headaches.    I feel a lot of her symptoms are likely due to her current psychosocial stressors.  She seems quite overwhelmed with all of the things that she has been needing to do lately regarding doctors appointments, babysitting, and also with her divorce. Her [facility] worker has recommended the patient start DBT.  I feel DBT would be very beneficial for the patient, however, because it is a big commitment timewise, the patient may benefit from working on DBT skills with her individual therapist for the time being.  The patient does seem to have a lower frustration tolerance.  I also think she may have a lack of  nonpharmacologic coping skills for her mood and anxiety.    The patient continues to sleep poorly.  I recommend she seek a referral for sleep study.  She has been told in the past she stops breathing while she is sleeping.  She is also morbidly obese and on narcotic pain medication.  She could potentially be suffering from obstructive sleep apnea and or central sleep apnea.    Medication side effects and alternatives were reviewed. Health promotion activities recommended and reviewed today. All questions addressed. Education and counseling completed regarding risks and benefits of medications and psychotherapy options. Recommend therapy for additional support.     Treatment Plan:    Continue topamax 200 mg daily    Increase Prozac to 80 mg daily    Continue Adderall 20 mg BID     Get urine drug screen completed    Try to get sleep study scheduled. I will see if your primary care provider can place a referral.     Continue all other cares per primary care provider.     Continue all other medications as reviewed per electronic medical record today.     Safety plan reviewed. To the Emergency Department as needed or call after hours crisis line at [phone] or [phone]. [place] Crisis Text Line. Text [place] to 778659 or Suicide LifeLine Chat: suicidepreventionlifeline.org/chat    Continue therapy as planned. Work on DBT skills.     Schedule an appointment with me in 4 weeks or sooner as needed. Call [facility] at [phone] to schedule.    Follow up with primary care provider as planned or for acute medical concerns.    Call the psychiatric nurse line with medication questions or concerns at [phone].    MyChart may be used to communicate with your provider, but this is not intended to be used for emergencies.    Patient Status:  Patient will continue to be seen for ongoing consultation and stabilization.    Signed:   Ya Vance, DO  Psychiatry

## 2019-12-10 NOTE — Clinical Note
Just saw her for f/u. Wondering if you are able to implement any DBT skills with her (and perhaps you already have been:). Her [facility] worker has been trying to convince her to do a DBT program but the time commitment of that overwhelms Paula. Maybe some brief individual DBT stuff could be helpful?Thanks! -Ya

## 2019-12-11 ENCOUNTER — OFFICE VISIT (OUTPATIENT)
Dept: PSYCHOLOGY | Facility: CLINIC | Age: 59
End: 2019-12-11
Payer: MEDICARE

## 2019-12-11 DIAGNOSIS — F90.0 ATTENTION DEFICIT HYPERACTIVITY DISORDER (ADHD), PREDOMINANTLY INATTENTIVE TYPE: ICD-10-CM

## 2019-12-11 DIAGNOSIS — F33.1 MODERATE EPISODE OF RECURRENT MAJOR DEPRESSIVE DISORDER (H): Primary | ICD-10-CM

## 2019-12-11 PROCEDURE — 90834 PSYTX W PT 45 MINUTES: CPT | Performed by: PSYCHOLOGIST

## 2019-12-11 RX ORDER — DEXTROAMPHETAMINE SACCHARATE, AMPHETAMINE ASPARTATE, DEXTROAMPHETAMINE SULFATE AND AMPHETAMINE SULFATE 5; 5; 5; 5 MG/1; MG/1; MG/1; MG/1
20 TABLET ORAL 2 TIMES DAILY
Qty: 60 TABLET | Refills: 0 | Status: SHIPPED | OUTPATIENT
Start: 2019-12-11 | End: 2020-03-03

## 2019-12-11 ASSESSMENT — ANXIETY QUESTIONNAIRES: GAD7 TOTAL SCORE: 16
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
## 2019-12-16 ENCOUNTER — PATIENT OUTREACH (OUTPATIENT)
Dept: CARE COORDINATION | Facility: CLINIC | Age: 59
End: 2019-12-16

## 2019-12-16 NOTE — PROGRESS NOTES
Clinic Care Coordination Contact  [facility]/Voicemail       Clinical Data: Care Coordinator Outreach  Outreach attempted x 1.  Left message on patient's voicemail with call back information and requested return call.  Plan: Care Coordinator will try to reach patient again in 3-5 business days.    DENI Perry, MSW   [facility]  Care Coordination  [facility]   [phone]  12/16/2019 1:39 PM

## 2019-12-17 ENCOUNTER — THERAPY VISIT (OUTPATIENT)
Dept: PHYSICAL THERAPY | Facility: CLINIC | Age: 59
End: 2019-12-17
Attending: NURSE PRACTITIONER
Payer: MEDICARE

## 2019-12-17 DIAGNOSIS — M54.40 CHRONIC BILATERAL LOW BACK PAIN WITH SCIATICA, SCIATICA LATERALITY UNSPECIFIED: Primary | ICD-10-CM

## 2019-12-17 DIAGNOSIS — G89.29 CHRONIC PAIN OF RIGHT KNEE: ICD-10-CM

## 2019-12-17 DIAGNOSIS — G89.29 CHRONIC BILATERAL LOW BACK PAIN WITH SCIATICA, SCIATICA LATERALITY UNSPECIFIED: Primary | ICD-10-CM

## 2019-12-17 DIAGNOSIS — M25.561 CHRONIC PAIN OF RIGHT KNEE: ICD-10-CM

## 2019-12-17 PROCEDURE — 97110 THERAPEUTIC EXERCISES: CPT | Mod: GP | Performed by: PHYSICAL THERAPIST

## 2019-12-17 PROCEDURE — 97161 PT EVAL LOW COMPLEX 20 MIN: CPT | Mod: GP | Performed by: PHYSICAL THERAPIST

## 2019-12-17 PROCEDURE — 97112 NEUROMUSCULAR REEDUCATION: CPT | Mod: GP | Performed by: PHYSICAL THERAPIST

## 2019-12-17 ASSESSMENT — ACTIVITIES OF DAILY LIVING (ADL)
RAW_SCORE: 26
HOW_WOULD_YOU_RATE_THE_OVERALL_FUNCTION_OF_YOUR_KNEE_DURING_YOUR_USUAL_DAILY_ACTIVITIES?: ABNORMAL
AS_A_RESULT_OF_YOUR_KNEE_INJURY,_HOW_WOULD_YOU_RATE_YOUR_CURRENT_LEVEL_OF_DAILY_ACTIVITY?: SEVERELY ABNORMAL
GO UP STAIRS: I AM UNABLE TO DO THE ACTIVITY
STIFFNESS: THE SYMPTOM PREVENTS ME FROM ALL DAILY ACTIVITIES
WEAKNESS: THE SYMPTOM AFFECTS MY ACTIVITY SLIGHTLY
KNEEL ON THE FRONT OF YOUR KNEE: I AM UNABLE TO DO THE ACTIVITY
STAND: ACTIVITY IS VERY DIFFICULT
PAIN: THE SYMPTOM PREVENTS ME FROM ALL DAILY ACTIVITIES
SQUAT: I AM UNABLE TO DO THE ACTIVITY
SWELLING: I HAVE THE SYMPTOM BUT IT DOES NOT AFFECT MY ACTIVITY
LIMPING: THE SYMPTOM AFFECTS MY ACTIVITY MODERATELY
RISE FROM A CHAIR: ACTIVITY IS NOT DIFFICULT
KNEE_ACTIVITY_OF_DAILY_LIVING_SUM: 26
SIT WITH YOUR KNEE BENT: ACTIVITY IS NOT DIFFICULT
GO DOWN STAIRS: ACTIVITY IS VERY DIFFICULT
KNEE_ACTIVITY_OF_DAILY_LIVING_SCORE: 37.14
GIVING WAY, BUCKLING OR SHIFTING OF KNEE: I HAVE THE SYMPTOM BUT IT DOES NOT AFFECT MY ACTIVITY
WALK: ACTIVITY IS VERY DIFFICULT

## 2019-12-17 NOTE — LETTER
DEPARTMENT OF HEALTH AND HUMAN SERVICES  CENTERS FOR MEDICARE & MEDICAID SERVICES    PLAN/UPDATED PLAN OF PROGRESS FOR OUTPATIENT REHABILITATION    PATIENTS NAME:  Paula Ho     : 1960    PROVIDER NUMBER:    [id number]    HICN: [id number]      PROVIDER NAME: [employer] [facility] IF[facility]OMA [facility]    MEDICAL RECORD NUMBER: [id number]     START OF CARE DATE:  SOC Date: 19   TYPE:  PT    PRIMARY/TREATMENT DIAGNOSIS: (Pertinent Medical Diagnosis)     Chronic bilateral low back pain with sciatica, sciatica laterality unspecified  Chronic pain of right knee    VISITS FROM START OF CARE:  Rxs Used: 1     [place] [facility] Initial Evaluation  Subjective:  The history is provided by the patient. No  was used.   Type of problem:  Lumbar and sacroiliac   Condition occurred with:  Insidious onset. This is a chronic condition   Problem details: Chronic pain with  increase pain in past few months. DDD.  Knee pain is minimal..   Patient reports pain:  Lumbar spine left, lumbar spine right, SI joint right, lower lumbar spine and SI joint left.  Associated symptoms:  Loss of balance and loss of strength. Symptoms are exacerbated by twisting, standing, bending and carrying and relieved by rest and other (bio freeze).    Type of problem:  Right knee (HX of several surgeries, TKA , removal due to infections, TKR  )   Condition occurred with:  Other reason (TKA with multiple surgeries.). This is a recurrent condition   Problem details: Pt more concerned over back pain and bleed from aneursym.  Knee pain decreases when up and moving. .   Patient reports pain:  Anterior, posterior, in the joint, lateral and sub patellar. Radiates to: none. Associated with: grinding. Symptoms are exacerbated by bending/squatting, kneeling, ascending stairs, standing and walking and relieved by analgesics and activity/movement.    Paula Ho being seen for  Low back pain and R knee.    Problem began 2019 (MD visit). Where condition occurred: for unknown reasons.Problem occurred: DDD   Pain score: back 6/10 , knee 3/10. General health as reported by patient is fair. Pertinent medical history includes:  Asthma, concussions/dizziness, depression, high blood pressure, migraines/headaches, overweight and smoking.    Surgeries include:  Orthopedic surgery. Other surgery history details: Adam TKA, R several revisions, Aneursym, .  Current medications:  Anti-depressants, high blood pressure medication and pain medication.     Pain is  PATIENTS NAME:  Paula Ho   : 1960    described as aching and stabbing and is constant. Pain is the same all the time. Since onset symptoms are gradually worsening (minimal relief after injections 6 weeks ago.). Imaging testing: nothing recent. Previous treatment includes physical therapy. There was mild improvement following previous treatment.   Patient is . Work activity restrictions: NA.  Red flags:  Pain at rest/night, changes in bowel/bladder and other (eyes- Aneursym leaking, to see MD 2020., incouraged pt to seek help if symptoms worsen).              Objective:  Standing Alignment:    Cervical/Thoracic:  Thoracic kyphosis increased and forward head  Shoulder/UE:  Rounded shoulders  Lumbar:  Lordosis incr  Pelvic:  PSIS high L, iliac crest high L and ASIS high L  Flexibility/Screens:   Negative screens: Knee   Lumbar/SI Evaluation  ROM:  Arom wnl lumbar: flex min loss, ain with return to standing,ext mod oss pain end range, SB  min loss.  Strength: unable to balance for SLS, toe raises. 3+/5 in stiiting  Lumbar DTR's:  normal  Cord Signs:  normal  Lumbar Dermtomes:  normal  Lumbar Palpation:    Tenderness present at Left:    Erector Spinae; PSIS and Vertebral  Tenderness present at Right: Erector Spinae; Piriformis; PSIS; Gluteus Medius and Vertebral  Lumbar Provocation:  normal  Knee Evaluation:  ROM:  Arom wnl knee: ROM = bilaeral  an painfree.    Ligament Testing:  Normal  Special Tests: Normal  Palpation:  Normal  General   ROS    Assessment/Plan:    Patient is a 59 year old female with lumbar and right side knee complaints.    Patient has the following significant findings with corresponding treatment plan.                Diagnosis 1:  Lumbago  Pain -  hot/cold therapy, US, electric stimulation, mechanical traction, manual therapy, self management, education and home program  Decreased ROM/flexibility - manual therapy and therapeutic exercise  Decreased strength - therapeutic exercise and therapeutic activities  Impaired balance - neuro re-education and therapeutic activities  Impaired muscle performance - neuro re-education  Decreased function - therapeutic activities  Impaired posture - neuro re-education    Therapy Evaluation Codes:   1) History comprised of:   Personal factors that impact the plan of care:    PATIENTS NAME:  Paula Ho   : 1960      Anxiety and Overall behavior pattern.    Comorbidity factors that impact the plan of care are:      Depression and aneursym.     Medications impacting care: None.  2) Examination of Body Systems comprised of:   Body structures and functions that impact the plan of care:      Lumbar spine.   Activity limitations that impact the plan of care are:      Bathing, Bending, Cooking, Dressing, Lifting, Squatting/kneeling, Stairs, Standing and Walking.  3) Clinical presentation characteristics are:   Stable/Uncomplicated.  4) Decision-Making    Moderate complexity using standardized patient assessment instrument and/or measureable assessment of functional outcome.  Cumulative Therapy Evaluation is: Low complexity.    Previous and current functional limitations:  (See Goal Flow Sheet for this information)    Short term and Long term goals: (See Goal Flow Sheet for this information)     Communication ability:  Patient appears to be able to clearly communicate and understand verbal and  "written communication and follow directions correctly.  Treatment Explanation - The following has been discussed with the patient:   RX ordered/plan of care  Anticipated outcomes  Possible risks and side effects  This patient would benefit from PT intervention to resume normal activities.   Rehab potential is good.    Frequency:  1 X week, once daily  Duration:  for 6 weeks  Discharge Plan:  Achieve all LTG.  Independent in home treatment program.  Reach maximal therapeutic benefit.      Caregiver Signature/Credentials _____________________________ Date ________       Treating Provider: Judy Carlos PT   I have reviewed and certified the need for these services and plan of treatment while under my care.        PHYSICIAN'S SIGNATURE:   _______________________________  Date___________     LEONA Anderson CNP   Certification period:  Beginning of Cert date period: 12/17/19 to  End of Cert period date: 03/05/20   Functional Level Progress Report: Please see attached \"Goal Flow sheet for Functional level.\"  ____X____ Continue Services or       ________ DC Services                Service dates: From  SOC Date: 12/17/19 date to present                         "

## 2019-12-17 NOTE — PROGRESS NOTES
[place] [facility] Initial Evaluation  Subjective:  The history is provided by the patient. No  was used.   Type of problem:  Lumbar and sacroiliac   Condition occurred with:  Insidious onset. This is a chronic condition   Problem details: Chronic pain with  increase pain in past few months. DDD.  Knee pain is minimal..   Patient reports pain:  Lumbar spine left, lumbar spine right, SI joint right, lower lumbar spine and SI joint left.  Associated symptoms:  Loss of balance and loss of strength. Symptoms are exacerbated by twisting, standing, bending and carrying and relieved by rest and other (bio freeze).    Type of problem:  Right knee (HX of several surgeries, TKA , removal due to infections, TKR 2013 )   Condition occurred with:  Other reason (TKA with multiple surgeries.). This is a recurrent condition   Problem details: Pt more concerned over back pain and bleed from aneursym.  Knee pain decreases when up and moving. .   Patient reports pain:  Anterior, posterior, in the joint, lateral and sub patellar. Radiates to: none. Associated with: grinding. Symptoms are exacerbated by bending/squatting, kneeling, ascending stairs, standing and walking and relieved by analgesics and activity/movement.    Paula Ho being seen for  Low back pain and R knee.   Problem began 12/17/2019 (MD visit). Where condition occurred: for unknown reasons.Problem occurred: DDD   Pain score: back 6/10 , knee 3/10. General health as reported by patient is fair. Pertinent medical history includes:  Asthma, concussions/dizziness, depression, high blood pressure, migraines/headaches, overweight and smoking.    Surgeries include:  Orthopedic surgery. Other surgery history details: Adam TKA, R several revisions, Aneursym, .  Current medications:  Anti-depressants, high blood pressure medication and pain medication.     Pain is described as aching and stabbing and is constant. Pain is the same all the  time. Since onset symptoms are gradually worsening (minimal relief after injections 6 weeks ago.). Imaging testing: nothing recent. Previous treatment includes physical therapy. There was mild improvement following previous treatment.   Patient is . Work activity restrictions: NA.      Red flags:  Pain at rest/night, changes in bowel/bladder and other (eyes- Aneursym leaking, to see MD 1/6/2020., incouraged pt to seek help if symptoms worsen).                      Objective:  Standing Alignment:    Cervical/Thoracic:  Thoracic kyphosis increased and forward head  Shoulder/UE:  Rounded shoulders  Lumbar:  Lordosis incr  Pelvic:  PSIS high L, iliac crest high L and ASIS high L              Flexibility/Screens:   Negative screens: Knee                    Lumbar/SI Evaluation  ROM:  Arom wnl lumbar: flex min loss, ain with return to standing,ext mod oss pain end range, SB  min loss.    Strength: unable to balance for SLS, toe raises. 3+/5 in stiiting    Lumbar DTR's:  normal      Cord Signs:  normal    Lumbar Dermtomes:  normal                  Lumbar Palpation:    Tenderness present at Left:    Erector Spinae; PSIS and Vertebral  Tenderness present at Right: Erector Spinae; Piriformis; PSIS; Gluteus Medius and Vertebral    Lumbar Provocation:  normal                                               Knee Evaluation:  ROM:  Arom wnl knee: ROM = bilaeral an painfree.              Ligament Testing:  Normal                Special Tests: Normal      Palpation:  Normal                General     ROS    Assessment/Plan:    Patient is a 59 year old female with lumbar and right side knee complaints.    Patient has the following significant findings with corresponding treatment plan.                Diagnosis 1:  Lumbago  Pain -  hot/cold therapy, US, electric stimulation, mechanical traction, manual therapy, self management, education and home program  Decreased ROM/flexibility - manual therapy and therapeutic  exercise  Decreased strength - therapeutic exercise and therapeutic activities  Impaired balance - neuro re-education and therapeutic activities  Impaired muscle performance - neuro re-education  Decreased function - therapeutic activities  Impaired posture - neuro re-education    Therapy Evaluation Codes:   1) History comprised of:   Personal factors that impact the plan of care:      Anxiety and Overall behavior pattern.    Comorbidity factors that impact the plan of care are:      Depression and aneursym.     Medications impacting care: None.  2) Examination of Body Systems comprised of:   Body structures and functions that impact the plan of care:      Lumbar spine.   Activity limitations that impact the plan of care are:      Bathing, Bending, Cooking, Dressing, Lifting, Squatting/kneeling, Stairs, Standing and Walking.  3) Clinical presentation characteristics are:   Stable/Uncomplicated.  4) Decision-Making    Moderate complexity using standardized patient assessment instrument and/or measureable assessment of functional outcome.  Cumulative Therapy Evaluation is: Low complexity.    Previous and current functional limitations:  (See Goal Flow Sheet for this information)    Short term and Long term goals: (See Goal Flow Sheet for this information)     Communication ability:  Patient appears to be able to clearly communicate and understand verbal and written communication and follow directions correctly.  Treatment Explanation - The following has been discussed with the patient:   RX ordered/plan of care  Anticipated outcomes  Possible risks and side effects  This patient would benefit from PT intervention to resume normal activities.   Rehab potential is good.    Frequency:  1 X week, once daily  Duration:  for 6 weeks  Discharge Plan:  Achieve all LTG.  Independent in home treatment program.  Reach maximal therapeutic benefit.    Please refer to the daily flowsheet for treatment today, total treatment time  and time spent performing 1:1 timed codes.

## 2019-12-18 ENCOUNTER — MEDICAL CORRESPONDENCE (OUTPATIENT)
Dept: HEALTH INFORMATION MANAGEMENT | Facility: CLINIC | Age: 59
End: 2019-12-18

## 2019-12-18 ENCOUNTER — TRANSFERRED RECORDS (OUTPATIENT)
Dept: HEALTH INFORMATION MANAGEMENT | Facility: CLINIC | Age: 59
End: 2019-12-18

## 2019-12-23 ENCOUNTER — OFFICE VISIT (OUTPATIENT)
Dept: PSYCHOLOGY | Facility: CLINIC | Age: 59
End: 2019-12-23
Payer: MEDICARE

## 2019-12-23 DIAGNOSIS — F33.1 MODERATE EPISODE OF RECURRENT MAJOR DEPRESSIVE DISORDER (H): Primary | ICD-10-CM

## 2019-12-23 PROCEDURE — 90834 PSYTX W PT 45 MINUTES: CPT | Performed by: PSYCHOLOGIST
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
## 2019-12-23 NOTE — PROGRESS NOTES
Clinic Care Coordination Contact  Care Team Conversations    This is second call to Patient.  She is currently at psychiatrist office  and cannot talk.  We agreed on return call later this week.  She reports she does want to talk to  before her surgery consult on 1/6/2020.      DENI Perry, MSW   [facility]  Care Coordination  [facility]   [phone]  12/23/2019 1:16 PM

## 2019-12-27 ENCOUNTER — PATIENT OUTREACH (OUTPATIENT)
Dept: CARE COORDINATION | Facility: CLINIC | Age: 59
End: 2019-12-27

## 2019-12-27 NOTE — PROGRESS NOTES
Clinic Care Coordination Contact  [facility]/Voicemail       Clinical Data: Care Coordinator Outreach  Outreach attempted x 1.  Left message on patient's voicemail with call back information and requested return call.  Plan:Care Coordinator will try to reach patient again in 3-5 business days.    DENI Perry, MSW   [facility]  Care Coordination  [facility]   [phone]  12/27/2019 3:57 PM

## 2020-01-09 NOTE — PROGRESS NOTES
Clinic Care Coordination Contact  [facility]/Voicemail    Clinical Data: Care Coordinator Outreach  Outreach attempted x 2.  No answer after several rings   Plan:Care Coordinator will try to reach patient again in 3-5 business days.    DENI Perry, MSW   [facility]  Care Coordination  [facility]   [phone]  1/9/2020 5:07 PM

## 2020-01-14 ENCOUNTER — TELEPHONE (OUTPATIENT)
Dept: PSYCHIATRY | Facility: CLINIC | Age: 60
End: 2020-01-14

## 2020-01-14 NOTE — TELEPHONE ENCOUNTER
Provider  Dr Katlyn Fields is patients pain management provider at [facility] , they are considering possible discharge from their clinic and wants to discuss the patients stability and chat with Dr Vance. Yesterday the clinic faxed DEVORAH to [facility] fax. Given Peyton number as well      RN  Can call the staff @ [phone] office ask to speak to Allyson or [facility].     Dr Fields's cell is [phone]

## 2020-01-15 ENCOUNTER — PATIENT OUTREACH (OUTPATIENT)
Dept: CARE COORDINATION | Facility: CLINIC | Age: 60
End: 2020-01-15

## 2020-01-15 NOTE — TELEPHONE ENCOUNTER
Called [facility] and spoke with Fabien re: Paula's care there. DEVORAH sent to our clinic. They are considering discharging her from the pain clinic and wanted to ensure she does indeed have current psychiatric care. Pt has been seeing a primary care provider there in addition to her pain management provider Dr. Potts. It sounds as though the patient has recently transitioned to seeing a new primary care provider at [facility]. So remains unclear what role that provider plays vs Dr. Meyer, pt's primary care provider within [place]? Will discuss long-term psychiatric care referral with patient at next follow-up visit due to ongoing complex issues and no longer seeing [place] primary care provider.     Ya Vance,  on 1/15/2020 at 11:54 AM

## 2020-01-15 NOTE — PROGRESS NOTES
Clinic Care Coordination Contact    Follow Up Progress Note      Assessment: Call for update.  Patient stated she has not called the [facility] to discuss a SW as she is going to have aneurysm surgery Feb 6.  She would like call after that for needs assessment.  She reports she will be in the hospital for a several days.      Goals addressed this encounter:   Goals Addressed                 This Visit's Progress       General      Mental Health Management (pt-stated)   On Hold     Goal Statement: I want a [facility]   Measure of Success: will be assessed for a [facility]   Supportive Steps to Achieve: Pt will call [facility]  Barriers: already has a [facility] worker  Strengths: interested  Date to Achieve By: 12/15/19  Patient expressed understanding of goal: yes    Patient is uncertain if she will proceed, CADI services may significantly reduce current PCA hours which is very important to her and her functional needs     DENI Perry, JASON   [facility]  Care Coordination  [facility]   [phone]  12/5/2019 8:42 AM             Intervention/Education provided during outreach:      Outreach Frequency: monthly    Plan:     Care Coordinator will follow up the week of February 10th for update and needs assessment     DENI Perry, JASON   [facility]  Care [facility]   [phone]  1/15/2020 5:56 PM

## 2020-01-27 ENCOUNTER — OFFICE VISIT (OUTPATIENT)
Dept: FAMILY MEDICINE | Facility: CLINIC | Age: 60
End: 2020-01-27
Payer: MEDICARE

## 2020-01-27 VITALS
BODY MASS INDEX: 44.98 KG/M2 | RESPIRATION RATE: 18 BRPM | SYSTOLIC BLOOD PRESSURE: 134 MMHG | OXYGEN SATURATION: 95 % | WEIGHT: 270 LBS | TEMPERATURE: 98.1 F | HEIGHT: 65 IN | HEART RATE: 78 BPM | DIASTOLIC BLOOD PRESSURE: 85 MMHG

## 2020-01-27 DIAGNOSIS — Z01.818 PREOP GENERAL PHYSICAL EXAM: Primary | ICD-10-CM

## 2020-01-27 DIAGNOSIS — I67.1 BRAIN ANEURYSM: ICD-10-CM

## 2020-01-27 DIAGNOSIS — Z79.01 ANTICOAGULATED: ICD-10-CM

## 2020-01-27 DIAGNOSIS — F17.210 CIGARETTE SMOKER: ICD-10-CM

## 2020-01-27 DIAGNOSIS — J44.9 CHRONIC OBSTRUCTIVE PULMONARY DISEASE, UNSPECIFIED COPD TYPE (H): ICD-10-CM

## 2020-01-27 DIAGNOSIS — F11.90 CHRONIC, CONTINUOUS USE OF OPIOIDS: ICD-10-CM

## 2020-01-27 LAB
ALBUMIN SERPL-MCNC: 3.7 G/DL (ref 3.4–5)
ALP SERPL-CCNC: 112 U/L (ref 40–150)
ALT SERPL W P-5'-P-CCNC: 27 U/L (ref 0–50)
ANION GAP SERPL CALCULATED.3IONS-SCNC: 3 MMOL/L (ref 3–14)
AST SERPL W P-5'-P-CCNC: 15 U/L (ref 0–45)
BASOPHILS # BLD AUTO: 0 10E9/L (ref 0–0.2)
BASOPHILS NFR BLD AUTO: 0.3 %
BILIRUB SERPL-MCNC: 0.2 MG/DL (ref 0.2–1.3)
BUN SERPL-MCNC: 16 MG/DL (ref 7–30)
CALCIUM SERPL-MCNC: 8.9 MG/DL (ref 8.5–10.1)
CHLORIDE SERPL-SCNC: 110 MMOL/L (ref 94–109)
CO2 SERPL-SCNC: 26 MMOL/L (ref 20–32)
CREAT SERPL-MCNC: 0.55 MG/DL (ref 0.52–1.04)
DIFFERENTIAL METHOD BLD: NORMAL
EOSINOPHIL # BLD AUTO: 0.1 10E9/L (ref 0–0.7)
EOSINOPHIL NFR BLD AUTO: 0.6 %
ERYTHROCYTE [DISTWIDTH] IN BLOOD BY AUTOMATED COUNT: 14.3 % (ref 10–15)
GFR SERPL CREATININE-BSD FRML MDRD: >90 ML/MIN/{1.73_M2}
GLUCOSE SERPL-MCNC: 147 MG/DL (ref 70–99)
HCT VFR BLD AUTO: 44.4 % (ref 35–47)
HGB BLD-MCNC: 14 G/DL (ref 11.7–15.7)
INR PPP: 0.98 (ref 0.86–1.14)
LYMPHOCYTES # BLD AUTO: 1.6 10E9/L (ref 0.8–5.3)
LYMPHOCYTES NFR BLD AUTO: 20.1 %
MCH RBC QN AUTO: 31 PG (ref 26.5–33)
MCHC RBC AUTO-ENTMCNC: 31.5 G/DL (ref 31.5–36.5)
MCV RBC AUTO: 98 FL (ref 78–100)
MONOCYTES # BLD AUTO: 0.2 10E9/L (ref 0–1.3)
MONOCYTES NFR BLD AUTO: 2.6 %
NEUTROPHILS # BLD AUTO: 6 10E9/L (ref 1.6–8.3)
NEUTROPHILS NFR BLD AUTO: 76.4 %
PLATELET # BLD AUTO: 320 10E9/L (ref 150–450)
POTASSIUM SERPL-SCNC: 4.7 MMOL/L (ref 3.4–5.3)
PROT SERPL-MCNC: 7.1 G/DL (ref 6.8–8.8)
RBC # BLD AUTO: 4.52 10E12/L (ref 3.8–5.2)
SODIUM SERPL-SCNC: 139 MMOL/L (ref 133–144)
WBC # BLD AUTO: 7.8 10E9/L (ref 4–11)

## 2020-01-27 PROCEDURE — 93000 ELECTROCARDIOGRAM COMPLETE: CPT | Performed by: NURSE PRACTITIONER

## 2020-01-27 PROCEDURE — 85025 COMPLETE CBC W/AUTO DIFF WBC: CPT | Performed by: NURSE PRACTITIONER

## 2020-01-27 PROCEDURE — 99215 OFFICE O/P EST HI 40 MIN: CPT | Performed by: NURSE PRACTITIONER

## 2020-01-27 PROCEDURE — 36415 COLL VENOUS BLD VENIPUNCTURE: CPT | Performed by: NURSE PRACTITIONER

## 2020-01-27 PROCEDURE — 80053 COMPREHEN METABOLIC PANEL: CPT | Performed by: NURSE PRACTITIONER

## 2020-01-27 PROCEDURE — 85610 PROTHROMBIN TIME: CPT | Performed by: NURSE PRACTITIONER

## 2020-01-27 RX ORDER — CLOPIDOGREL BISULFATE 75 MG/1
TABLET ORAL
COMMUNITY
Start: 2020-01-17 | End: 2021-05-20

## 2020-01-27 RX ORDER — METHYLPREDNISOLONE 4 MG
TABLET, DOSE PACK ORAL
COMMUNITY
Start: 2020-01-23 | End: 2020-03-03

## 2020-01-27 ASSESSMENT — MIFFLIN-ST. JEOR: SCORE: 1805.59

## 2020-01-27 ASSESSMENT — PAIN SCALES - GENERAL: PAINLEVEL: EXTREME PAIN (8)

## 2020-01-27 NOTE — LETTER
January 28, 2020      Paula KYLEE Ho  [address]   [place] [address]        Dear ,    Your lab results were normal. Please let us know if you have any questions.   Thank you for allowing us to participate in your care.     Best of luck with your surgery,       LEONA Marquez CNP         Results for orders placed or performed in visit on 01/27/20   CBC with platelets differential     Status: None   Result Value Ref Range    WBC 7.8 4.0 - 11.0 10e9/L    RBC Count 4.52 3.8 - 5.2 10e12/L    Hemoglobin 14.0 11.7 - 15.7 g/dL    Hematocrit 44.4 35.0 - 47.0 %    MCV 98 78 - 100 fl    MCH 31.0 26.5 - 33.0 pg    MCHC 31.5 31.5 - 36.5 g/dL    RDW 14.3 10.0 - 15.0 %    Platelet Count 320 150 - 450 10e9/L    % Neutrophils 76.4 %    % Lymphocytes 20.1 %    % Monocytes 2.6 %    % Eosinophils 0.6 %    % Basophils 0.3 %    Absolute Neutrophil 6.0 1.6 - 8.3 10e9/L    Absolute Lymphocytes 1.6 0.8 - 5.3 10e9/L    Absolute Monocytes 0.2 0.0 - 1.3 10e9/L    Absolute Eosinophils 0.1 0.0 - 0.7 10e9/L    Absolute Basophils 0.0 0.0 - 0.2 10e9/L    Diff Method Automated Method    Comprehensive metabolic panel (BMP + Alb, Alk Phos, ALT, AST, Total. Bili, TP)     Status: Abnormal   Result Value Ref Range    Sodium 139 133 - 144 mmol/L    Potassium 4.7 3.4 - 5.3 mmol/L    Chloride 110 (H) 94 - 109 mmol/L    Carbon Dioxide 26 20 - 32 mmol/L    Anion Gap 3 3 - 14 mmol/L    Glucose 147 (H) 70 - 99 mg/dL    Urea Nitrogen 16 7 - 30 mg/dL    Creatinine 0.55 0.52 - 1.04 mg/dL    GFR Estimate >90 >60 mL/min/[1.73_m2]    GFR Estimate If Black >90 >60 mL/min/[1.73_m2]    Calcium 8.9 8.5 - 10.1 mg/dL    Bilirubin Total 0.2 0.2 - 1.3 mg/dL    Albumin 3.7 3.4 - 5.0 g/dL    Protein Total 7.1 6.8 - 8.8 g/dL    Alkaline Phosphatase 112 40 - 150 U/L    ALT 27 0 - 50 U/L    AST 15 0 - 45 U/L   INR     Status: None   Result Value Ref Range    INR 0.98 0.86 - 1.14

## 2020-01-27 NOTE — PATIENT INSTRUCTIONS
Let me know your blood pressure and cholesterol medication when you get home      Patient Education     Before Your Surgery      Call your surgeon if there is any change in your health. This includes signs of a cold or flu (such as a sore throat, runny nose, cough, rash or fever).    Do not smoke, drink alcohol or take over the counter medicine (unless your surgeon or primary care doctor tells you to) for the 24 hours before and after surgery.    If you take prescribed drugs: Follow your doctor s orders about which medicines to take and which to stop until after surgery.    Eating and drinking prior to surgery: follow the instructions from your surgeon    Take a shower or bath the night before surgery. Use the soap your surgeon gave you to gently clean your skin. If you do not have soap from your surgeon, use your regular soap. Do not shave or scrub the surgery site.  Wear clean pajamas and have clean sheets on your bed.   At [facility], we strive to deliver an exceptional experience to you, every time we see you.  If you receive a survey in the mail, please send us back your thoughts. We really do value your feedback.    Based on your medical history, these are the current health maintenance/preventive care services that you are due for (some may have been done at this visit.)  Health Maintenance Due   Topic Date Due     SPIROMETRY  1960     HEPATITIS C SCREENING  1960     ASTHMA ACTION PLAN  1960     ASTHMA CONTROL TEST  1960     ADVANCE CARE PLANNING  1960     COPD ACTION PLAN  1960     DEPRESSION ACTION PLAN  1960     MAMMO SCREENING  1960     HIV SCREENING  05/29/1976     MEDICARE ANNUAL WELLNESS VISIT  05/29/1979     PAP  05/29/1982     LIPID  05/29/2006     DTAP/TDAP/TD IMMUNIZATION (2 - Td) 09/25/2017     INFLUENZA VACCINE (1) 09/01/2019         Suggested websites for health information:  Www.Pricefalls.org : Up to date and easily searchable  information on multiple topics.  Www.medlineplus.gov : medication info, interactive tutorials, watch real surgeries online  Www.familydoctor.org : good info from the Academy of Family Physicians  Www.cdc.gov : public health info, travel advisories, epidemics (H1N1)  Www.aap.org : children's health info, normal development, vaccinations  Www.health.[facility].[place].us : [place] dept of health, public health issues in [place], N1N1    Your care team:                            Family Medicine Internal Medicine   MD Renan Chambers MD Shantel Branch-Fleming, MD Katya Georgiev PA-C Nam Ho, MD Pediatrics   Taurus Torres, PAPANKAJ Jaeger, RIGOBERTO Venegas APRN MD Inocencia Bangura MD Deborah Mielke, MD Kim Thein, APRN CNP      Clinic hours: Monday - Thursday 7 am-7 pm; Fridays 7 am-5 pm.   Urgent care: Monday - Friday 11 am-9 pm; Saturday and Sunday 9 am-5 pm.  Pharmacy : Monday -Thursday 8 am-8 pm; Friday 8 am-6 pm; Saturday and Sunday 9 am-5 pm.     Clinic: [phone]   Pharmacy: [phone]

## 2020-01-27 NOTE — PROGRESS NOTES
[address] [address]  [phone]  Dept: [phone]    PRE-OP EVALUATION:  Today's date: 2020    Paula Ho (: 1960) presents for pre-operative evaluation assessment as requested by Dr. Uribe.  She requires evaluation and anesthesia risk assessment prior to undergoing surgery/procedure for treatment of aneurysm .    Proposed Surgery/ Procedure: brain aneurysm   Date of Surgery/ Procedure: 2020  Time of Surgery/ Procedure: 7:00 AM   Hospital/Surgical Facility: [facility]   Fax number for surgical facility: [phone]  Primary Physician: Demetra Meyer  Type of Anesthesia Anticipated: to be determined    Patient has a Health Care Directive or Living Will:  YES     1. YES - Do you have a history of heart attack, stroke, stent, bypass or surgery on an artery in the head, neck, heart or legs? Brain aneurysm  2. NO - Do you ever have any pain or discomfort in your chest?  3. NO - Do you have a history of  Heart Failure?  4. YES - ARE YOUR TROUBLED BY SHORTNESS OF BREATH WHEN WALKING ON THE LEVEL, UP A SLIGHT HILL OR AT NIGHT? Walking too far  5. NO - Do you currently have a cold, bronchitis or other respiratory infection?  6. NO - Do you have a cough, shortness of breath or wheezing?  7. YES - DO YOU SOMETIMES GET PAINS IN THE CALVES OF YOUR LEGS WHEN YOU WALK? Walking too far  8. NO - Do you or anyone in your family have previous history of blood clots?  9. NO - Do you or does anyone in your family have a serious bleeding problem such as prolonged bleeding following surgeries or cuts?  10. NO - Have you ever had problems with anemia or been told to take iron pills?  11. NO - Have you had any abnormal blood loss such as black, tarry or bloody stools, or abnormal vaginal bleeding?  12. NO - Have you ever had a blood transfusion?  13. NO - Have you or any of your relatives ever had problems with anesthesia?  14. YES - DO YOU  HAVE SLEEP APNEA, EXCESSIVE SNORING OR DAYTIME DROWSINESS? Supposed to use CPAP but doesn't like it  15. NO - Do you have any prosthetic heart valves?  16. NO - Do you have prosthetic joints?  17. NO - Is there any chance that you may be pregnant?      HPI:     HPI related to upcoming procedure: history of brain aneurysm and recent MRI to check it revealed it is leaking per patient report      ASTHMA - Patient has a longstanding history of moderate-severe Asthma . Patient has been doing well overall noting NO SYMPTOMS RECENTLY and continues on medication regimen consisting of symbicort, Spiriva, albuterol without adverse reactions or side effects.     COPD - Patient has a longstanding history of moderate-severe COPD . Patient has been doing well overall noting NO SYMPTOMS RECENTLY and continues on medication regimen consisting of Symbicort, Spiriva, albuterol without adverse reactions or side effects.    DEPRESSION - Patient has a long history of Depression of moderate severity requiring medication for control with recent symptoms being stable..Current symptoms of depression include depressed mood.     HYPERLIPIDEMIA - Patient has a long history of significant Hyperlipidemia requiring medication for treatment with recent good control. Patient reports no problems or side effects with the medication.       MEDICAL HISTORY:     Patient Active Problem List    Diagnosis Date Noted     Chronic obstructive pulmonary disease, unspecified COPD type (H) 02/04/2020     Priority: Medium     Anticoagulated 02/04/2020     Priority: Medium     Brain aneurysm 12/03/2019     Priority: Medium     Dec 2017       SAH (subarachnoid hemorrhage) (H) 12/22/2017     Priority: Medium     Chronic GERD 01/26/2017     Priority: Medium     Chronic knee pain 12/12/2014     Priority: Medium     Cigarette smoker 07/08/2014     Priority: Medium     Chronic, continuous use of opioids 04/24/2013     Priority: Medium     Managed by pain clinic outside  [facility]       Obesity, Class III, BMI 40-49.9 (morbid obesity) (H) 03/27/2013     Priority: Medium     Status post knee surgery 03/27/2013     Priority: Medium     Per patient's recollection:  Circa 2002: right knee arthroscopy (Dr. Pappas)  Circa 2004: right knee partial knee replacement (Iam Ritchie MD)  Circa 2006: right TKA (Ron Ryder MD; [facility])  Circa 2008: right TKA revision due to infection (Ron Ryder MD; [facility])  Circa 2009: right TKA revision due to infection (Ron Ryder MD; [facility])  April 2011: right TKA (Ron Ryder MD; [facility])       Anxiety disorder 09/28/2011     Priority: Medium     Chronic pain 09/28/2011     Priority: Medium     Knee and low back         DJD (degenerative joint disease) 09/28/2011     Priority: Medium     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 No pertinent past medical history.  History reviewed. No pertinent surgical history.  Current Outpatient Medications   Medication Sig Dispense Refill     albuterol (PROAIR HFA) 108 (90 Base) MCG/ACT inhaler Inhale 2 puffs into the lungs every 4 hours as needed for shortness of breath / dyspnea or wheezing 1 Inhaler 3     albuterol (PROVENTIL) (2.5 MG/3ML) 0.083% neb solution NEBULIZE THE CONTENTS OF 1 VIAL EVERY 6 HOURS AS NEEDED. 25 vial 1     amphetamine-dextroamphetamine (ADDERALL) 20 MG tablet Take 1 tablet (20 mg) by mouth 2 times daily 60 tablet 0     atorvastatin (LIPITOR) 20 MG tablet TK 1 T PO QD  3      budesonide-formoterol (SYMBICORT) 160-4.5 MCG/ACT Inhaler Inhale 2 puffs into the lungs 2 times daily 3 Inhaler 3     cetirizine (ZYRTEC) 10 MG tablet Take 1 tablet (10 mg) by mouth daily 14 tablet 1     clopidogrel (PLAVIX) 75 MG tablet        FLUoxetine (PROZAC) 40 MG capsule Take 2 capsules (80 mg) by mouth daily 60 capsule 1     methylPREDNISolone (MEDROL DOSEPAK) 4 MG tablet therapy pack        NARCAN 4 MG/0.1ML nasal spray INHALE VIA NASAL ROUTE FOR OVERDOSE AS NEEDED. MAY REPEAT AFTER 2-3 MINUTES  0     order for DME Equipment being ordered: Nebulizer 1 Units 0     oxyCODONE HCl (ROXICODONE) 20 MG TABS immediate release tablet Take 20 mg by mouth 3 times daily as needed       oxyCODONE IR (ROXICODONE) 10 MG tablet Take 10 mg by mouth 2 times daily as needed       pravastatin (PRAVACHOL) 40 MG tablet take 1 tablet by mouth once a day in the evening  0     tiotropium (SPIRIVA HANDIHALER) 18 MCG inhaled capsule Inhale 1 capsule (18 mcg) into the lungs daily 90 capsule 3     tiZANidine (ZANAFLEX) 2 MG tablet TK 2 TO 3 TS PO QHS  4     topiramate (TOPAMAX) 100 MG tablet TK 2 TS PO IN THE MORNING  1     VENTOLIN  (90 Base) MCG/ACT inhaler INHALE 2 PUFFS INTO THE LUNGS Q 6 H PRN  0     vitamin D3 (CHOLECALCIFEROL) 2000 units (50 mcg) tablet Take 1 tablet by mouth daily  1     OTC products: None, except as noted above    Allergies   Allergen Reactions     Compazine [Prochlorperazine]      Droperidol      Lisinopril      Seroquel [Quetiapine]       Latex Allergy: NO    Social History     Tobacco Use     Smoking status: Current Every Day Smoker     Packs/day: 0.00     Smokeless tobacco: Never Used   Substance Use Topics     Alcohol use: Yes     History   Drug Use No       REVIEW OF SYSTEMS:   Constitutional, neuro, ENT, endocrine, pulmonary, cardiac, gastrointestinal, genitourinary, musculoskeletal, integument and psychiatric systems are negative, except as otherwise noted.    EXAM:   /85 (BP Location:  "Right arm, Patient Position: Chair, Cuff Size: Adult Large)   Pulse 78   Temp 98.1  F (36.7  C) (Oral)   Resp 18   Ht 1.651 m (5' 5\")   Wt 122.5 kg (270 lb)   LMP  (LMP Unknown)   SpO2 95%   BMI 44.93 kg/m      GENERAL APPEARANCE: healthy, alert and no distress     EYES: EOMI, PERRL     HENT: ear canals and TM's normal and nose and mouth without ulcers or lesions     NECK: no adenopathy, no asymmetry, masses, or scars and thyroid normal to palpation     RESP: lungs clear to auscultation - no rales, rhonchi or wheezes     CV: regular rates and rhythm, normal S1 S2, no S3 or S4 and no murmur, click or rub     ABDOMEN:  soft, nontender, no HSM or masses and bowel sounds normal     MS: extremities normal- no gross deformities noted, no evidence of inflammation in joints, FROM in all extremities.     SKIN: no suspicious lesions or rashes     NEURO: Normal strength and tone, sensory exam grossly normal, mentation intact and speech normal     PSYCH: mentation appears normal. and affect normal/bright     LYMPHATICS: No cervical adenopathy    DIAGNOSTICS:     Labs Resulted Today:   Results for orders placed or performed in visit on 01/27/20   CBC with platelets differential     Status: None   Result Value Ref Range    WBC 7.8 4.0 - 11.0 10e9/L    RBC Count 4.52 3.8 - 5.2 10e12/L    Hemoglobin 14.0 11.7 - 15.7 g/dL    Hematocrit 44.4 35.0 - 47.0 %    MCV 98 78 - 100 fl    MCH 31.0 26.5 - 33.0 pg    MCHC 31.5 31.5 - 36.5 g/dL    RDW 14.3 10.0 - 15.0 %    Platelet Count 320 150 - 450 10e9/L    % Neutrophils 76.4 %    % Lymphocytes 20.1 %    % Monocytes 2.6 %    % Eosinophils 0.6 %    % Basophils 0.3 %    Absolute Neutrophil 6.0 1.6 - 8.3 10e9/L    Absolute Lymphocytes 1.6 0.8 - 5.3 10e9/L    Absolute Monocytes 0.2 0.0 - 1.3 10e9/L    Absolute Eosinophils 0.1 0.0 - 0.7 10e9/L    Absolute Basophils 0.0 0.0 - 0.2 10e9/L    Diff Method Automated Method    Comprehensive metabolic panel (BMP + Alb, Alk Phos, ALT, AST, Total. " "Bili, TP)     Status: Abnormal   Result Value Ref Range    Sodium 139 133 - 144 mmol/L    Potassium 4.7 3.4 - 5.3 mmol/L    Chloride 110 (H) 94 - 109 mmol/L    Carbon Dioxide 26 20 - 32 mmol/L    Anion Gap 3 3 - 14 mmol/L    Glucose 147 (H) 70 - 99 mg/dL    Urea Nitrogen 16 7 - 30 mg/dL    Creatinine 0.55 0.52 - 1.04 mg/dL    GFR Estimate >90 >60 mL/min/[1.73_m2]    GFR Estimate If Black >90 >60 mL/min/[1.73_m2]    Calcium 8.9 8.5 - 10.1 mg/dL    Bilirubin Total 0.2 0.2 - 1.3 mg/dL    Albumin 3.7 3.4 - 5.0 g/dL    Protein Total 7.1 6.8 - 8.8 g/dL    Alkaline Phosphatase 112 40 - 150 U/L    ALT 27 0 - 50 U/L    AST 15 0 - 45 U/L   INR     Status: None   Result Value Ref Range    INR 0.98 0.86 - 1.14   EKG 12-lead complete w/read - Clinics     Status: None    Narrative    Sinus rhythm  Rate 68      QTcH 390       No results for input(s): HGB, PLT, INR, NA, POTASSIUM, CR, A1C in the last 70290 hours.     IMPRESSION:   Reason for surgery/procedure: \"leaking aneurysm after coiling\" per MRI from December 2019  Diagnosis/reason for consult: pre-operative evaluation, anxiety, depression, chronic pain on chronic opioids, GERD, tobacco use, asthma/COPD.    The proposed surgical procedure is considered INTERMEDIATE risk.    REVISED CARDIAC RISK INDEX  The patient has the following serious cardiovascular risks for perioperative complications such as (MI, PE, VFib and 3  AV Block):  Cerebrovascular Disease (TIA or CVA)  INTERPRETATION: 1 risks: Class II (low risk - 0.9% complication rate)    The patient has the following additional risks for perioperative complications:  The ASCVD Risk score ([place] FINNEY Jr., et al., 2013) failed to calculate for the following reasons:    Cannot find a previous HDL lab    Cannot find a previous total cholesterol lab  High tolerance to opioid analgesics due to chronic opioid use for chronic back and knee pain  Morbid obesity      ICD-10-CM    1. Preop general physical exam Z01.818 EKG " 12-lead complete w/read - Clinics     CBC with platelets differential     Comprehensive metabolic panel (BMP + Alb, Alk Phos, ALT, AST, Total. Bili, TP)     INR   2. Brain aneurysm I67.1 CBC with platelets differential     Comprehensive metabolic panel (BMP + Alb, Alk Phos, ALT, AST, Total. Bili, TP)     INR   3. Anticoagulated Z79.01 INR   4. Chronic, continuous use of opioids F11.90    5. Cigarette smoker F17.210    6. Chronic obstructive pulmonary disease, unspecified COPD type (H) J44.9        RECOMMENDATIONS:     --Consult hospital rounder / IM to assist post-op medical management    Cardiovascular Risk  Performs 4 METs exercise without symptoms (Light housework (dusting, washing dishes)) .   *note: patient is not on atorvastatin and pravastatin but she's unsure which one she is taking currently - she is to call and let us know to update records      Pulmonary Risk  Incentive spirometry post op  Respiratory Therapy (Respiratory Care IP Consult)  post op  Advised smoking cessation.       Obstructive Sleep Apnea (or suspected sleep apnea)  Hospital staff are advised to monitor for sleep related oxygen desaturations due to suspicion of BRUCE      --Patient is to take all scheduled medications on the day of surgery EXCEPT for modifications listed below.    **Addendum 2/4/2020: patient with influenza A diagnosed 2/1 at [facility] and admitted to ICU on bipap before leaving [place]. Mild shortness of breath today. No fever since hospital. Continues to smoke. Recommend postponing elective surgery due to influenza A in setting of COPD and asthma.     SURGERY NOT RECOMMENDED due to current influenza A in setting of COPD and asthma.    Signed Electronically by: LEONA Marquez CNP    Copy of this evaluation report is provided to requesting physician.    [facility] Preop Guidelines    Revised Cardiac Risk Index

## 2020-01-27 NOTE — Clinical Note
Please fax to surgery center with update in red - in short, surgery not recommended due to current influenza A infection n setting of ongoing COPD and asthma

## 2020-01-28 NOTE — RESULT ENCOUNTER NOTE
Please send letter:    Paula,  Your lab results were normal. Please let us know if you have any questions.  Thank you for allowing us to participate in your care.  Best of luck with your surgery,  LEONA Marquez CNP

## 2020-01-29 RX ORDER — OXYCODONE HYDROCHLORIDE 10 MG/1
10 TABLET ORAL 2 TIMES DAILY PRN
COMMUNITY
End: 2020-03-04

## 2020-01-29 RX ORDER — OXYCODONE HYDROCHLORIDE 20 MG/1
20 TABLET ORAL 3 TIMES DAILY PRN
COMMUNITY
End: 2020-03-03

## 2020-02-01 ENCOUNTER — TRANSFERRED RECORDS (OUTPATIENT)
Dept: HEALTH INFORMATION MANAGEMENT | Facility: CLINIC | Age: 60
End: 2020-02-01

## 2020-02-02 ENCOUNTER — NURSE TRIAGE (OUTPATIENT)
Dept: NURSING | Facility: CLINIC | Age: 60
End: 2020-02-02

## 2020-02-02 NOTE — TELEPHONE ENCOUNTER
"Patient stating she was at [facility] and tested positive for influenza. Patient reporting they were planning to admit patient to ICU.  Patient stating she left as she had to find someone to care for her dogs. \"Can someone call in some antibiotics or something?\"   Advised patient to return to the ER. Patient denies change in symptoms since leaving against medical advice yesterday. Patient denies needing 911 to transport her. Stating her boyfriend is able to drive her back in now.      Jennifer Robledo RN  [facility]      Reason for Disposition    [1] Difficulty breathing AND [2] not severe AND [3] not from stuffy nose (e.g., not relieved by cleaning out the nose)    Additional Information    Negative: Severe difficulty breathing (e.g., struggling for each breath, speaks in single words)    Negative: Bluish (or gray) lips or face now    Negative: Shock suspected (e.g., cold/pale/clammy skin, too weak to stand, low BP, rapid pulse)    Negative: Sounds like a life-threatening emergency to the triager    Negative: Chest pain  (Exception: MILD central chest pain, present only when coughing)    Negative: Headache and stiff neck (can't touch chin to chest)    Protocols used: INFLUENZA FOLLOW-UP CALL-A-AH      "

## 2020-02-04 ENCOUNTER — OFFICE VISIT (OUTPATIENT)
Dept: FAMILY MEDICINE | Facility: CLINIC | Age: 60
End: 2020-02-04
Payer: MEDICARE

## 2020-02-04 ENCOUNTER — TELEPHONE (OUTPATIENT)
Dept: FAMILY MEDICINE | Facility: CLINIC | Age: 60
End: 2020-02-04

## 2020-02-04 VITALS
TEMPERATURE: 98.4 F | WEIGHT: 263.1 LBS | BODY MASS INDEX: 43.83 KG/M2 | DIASTOLIC BLOOD PRESSURE: 74 MMHG | OXYGEN SATURATION: 94 % | SYSTOLIC BLOOD PRESSURE: 109 MMHG | RESPIRATION RATE: 16 BRPM | HEART RATE: 75 BPM | HEIGHT: 65 IN

## 2020-02-04 DIAGNOSIS — J45.40 MODERATE PERSISTENT ASTHMA WITHOUT COMPLICATION: ICD-10-CM

## 2020-02-04 DIAGNOSIS — J44.9 CHRONIC OBSTRUCTIVE PULMONARY DISEASE, UNSPECIFIED COPD TYPE (H): ICD-10-CM

## 2020-02-04 DIAGNOSIS — J10.1 INFLUENZA A: Primary | ICD-10-CM

## 2020-02-04 PROBLEM — Z79.01 ANTICOAGULATED: Status: ACTIVE | Noted: 2020-02-04

## 2020-02-04 LAB
FLUAV+FLUBV AG SPEC QL: NEGATIVE
FLUAV+FLUBV AG SPEC QL: NEGATIVE
SPECIMEN SOURCE: NORMAL

## 2020-02-04 PROCEDURE — 99214 OFFICE O/P EST MOD 30 MIN: CPT | Performed by: NURSE PRACTITIONER

## 2020-02-04 PROCEDURE — 87804 INFLUENZA ASSAY W/OPTIC: CPT | Performed by: NURSE PRACTITIONER

## 2020-02-04 ASSESSMENT — ASTHMA QUESTIONNAIRES
ACT_TOTALSCORE: 16
QUESTION_3 LAST FOUR WEEKS HOW OFTEN DID YOUR ASTHMA SYMPTOMS (WHEEZING, COUGHING, SHORTNESS OF BREATH, CHEST TIGHTNESS OR PAIN) WAKE YOU UP AT NIGHT OR EARLIER THAN USUAL IN THE MORNING: TWO OR THREE NIGHTS A WEEK
QUESTION_1 LAST FOUR WEEKS HOW MUCH OF THE TIME DID YOUR ASTHMA KEEP YOU FROM GETTING AS MUCH DONE AT WORK, SCHOOL OR AT HOME: A LITTLE OF THE TIME
QUESTION_4 LAST FOUR WEEKS HOW OFTEN HAVE YOU USED YOUR RESCUE INHALER OR NEBULIZER MEDICATION (SUCH AS ALBUTEROL): TWO OR THREE TIMES PER WEEK
QUESTION_5 LAST FOUR WEEKS HOW WOULD YOU RATE YOUR ASTHMA CONTROL: WELL CONTROLLED
QUESTION_2 LAST FOUR WEEKS HOW OFTEN HAVE YOU HAD SHORTNESS OF BREATH: THREE TO SIX TIMES A WEEK

## 2020-02-04 ASSESSMENT — PAIN SCALES - GENERAL: PAINLEVEL: NO PAIN (0)

## 2020-02-04 ASSESSMENT — MIFFLIN-ST. JEOR: SCORE: 1774.29

## 2020-02-04 NOTE — PATIENT INSTRUCTIONS
Due to current infection with influenza A, elective surgery is not recommended. Please follow up in 1 week for re-evaluation. Please let your surgeon know and we will fax updated pre-op as well.    Push fluids  Rest  Stop smoking  If worsening, follow up right away

## 2020-02-04 NOTE — PROGRESS NOTES
Subjective     Paula Ho is a 59 year old female who presents to clinic today for the following health issues:    HPI   ENT Symptoms             Symptoms: cc Present Absent Comment   Fever/Chills  x     Fatigue   x    Muscle Aches   x    Eye Irritation   x    Sneezing   x    Nasal Jonnathan/Drg   x    Sinus Pressure/Pain   x    Loss of smell  x     Dental pain   x    Sore Throat   x    Swollen Glands   x    Ear Pain/Fullness   x    Cough   x    Wheeze  x     Chest Pain   x    Shortness of breath  x     Rash   x    Other   x      Symptom duration:  3 days ago   Symptom severity: Feels good    Treatments tried:  inhaler    Contacts:  no      Pleasant 59 year old female presents for follow up from hospital. Diagnosed with influenza A on 2/1 and admitted to ICU at [facility] on bipap. She went home AMA shortly thereafter. 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 She denies chest pain. Has some mild shortness of breath. Continues with inhalers. Not doing nebs because needs new machine - order placed but needs prior auth per patient.    Interface, Nmhcradord[facility] In - 02/01/2020  1:09 AM CST  EXAM: PORTABLE CHEST RADIOGRAPH, 2/1/2020    CLINICAL DATA: Shortness of breath.    COMPARISON: 5/18/2012.    FINDINGS: Portable AP exam.      Lungs are clear with large volumes.    Heart size normal. No pulmonary edema or pleural fluid.    No pneumothorax (patient semiupright).    IMPRESSION  IMPRESSION:     No acute finding in the chest. Clear lungs.    REPORT SIGNED BY DR. Real Joyce    Not using neb because insurance wants prior auth for machine.    DME (Durable Medical Equipment) Orders and Documentation  Orders Placed This Encounter   Procedures     Nebulizer and Supplies Order      The patient was assessed and it was determined the patient is in need of the following listed DME Supplies/Equipment. Please complete supporting documentation below to demonstrate  medical necessity.      Nebulizer Documentation  The patient was seen 02/04/2020. After assessment, the patient will need to be treated with ongoing nebulizer for treatment/management of asthma and COPD.    Patient Active Problem List   Diagnosis     Chronic knee pain     Anxiety disorder     Major depressive disorder, recurrent episode (H)     Brain aneurysm     Chronic, continuous use of opioids     Asthma     Chronic GERD     Chronic pain     Cigarette smoker     DJD (degenerative joint disease)     Insomnia     Obesity, Class III, BMI 40-49.9 (morbid obesity) (H)     SAH (subarachnoid hemorrhage) (H)     Status post knee surgery     Tobacco use disorder     Jesús's disease (congenital syphilitic osteochondritis)     Chronic obstructive pulmonary disease, unspecified COPD type (H)     Anticoagulated     History reviewed. No pertinent surgical history.    Social History     Tobacco Use     Smoking status: Current Every Day Smoker     Packs/day: 0.00     Smokeless tobacco: Never Used   Substance Use Topics     Alcohol use: Yes     Family History   Family history unknown: Yes         Current Outpatient Medications   Medication Sig Dispense Refill     albuterol (PROAIR HFA) 108 (90 Base) MCG/ACT inhaler Inhale 2 puffs into the lungs every 4 hours as needed for shortness of breath / dyspnea or wheezing 1 Inhaler 3     albuterol (PROVENTIL) (2.5 MG/3ML) 0.083% neb solution NEBULIZE THE CONTENTS OF 1 VIAL EVERY 6 HOURS AS NEEDED. 25 vial 1     amphetamine-dextroamphetamine (ADDERALL) 20 MG tablet Take 1 tablet (20 mg) by mouth 2 times daily 60 tablet 0     atorvastatin (LIPITOR) 20 MG tablet TK 1 T PO QD  3     budesonide-formoterol (SYMBICORT) 160-4.5 MCG/ACT Inhaler Inhale 2 puffs into the lungs 2 times daily 3 Inhaler 3     cetirizine (ZYRTEC) 10 MG tablet Take 1 tablet (10 mg) by mouth daily 14 tablet 1     clopidogrel (PLAVIX) 75 MG tablet        FLUoxetine (PROZAC) 40 MG capsule Take 2 capsules (80 mg) by mouth  "daily 60 capsule 1     methylPREDNISolone (MEDROL DOSEPAK) 4 MG tablet therapy pack        NARCAN 4 MG/0.1ML nasal spray INHALE VIA NASAL ROUTE FOR OVERDOSE AS NEEDED. MAY REPEAT AFTER 2-3 MINUTES  0     order for DME Equipment being ordered: Nebulizer 1 Units 0     oxyCODONE HCl (ROXICODONE) 20 MG TABS immediate release tablet Take 20 mg by mouth 3 times daily as needed       oxyCODONE IR (ROXICODONE) 10 MG tablet Take 10 mg by mouth 2 times daily as needed       pravastatin (PRAVACHOL) 40 MG tablet take 1 tablet by mouth once a day in the evening  0     tiotropium (SPIRIVA HANDIHALER) 18 MCG inhaled capsule Inhale 1 capsule (18 mcg) into the lungs daily 90 capsule 3     tiZANidine (ZANAFLEX) 2 MG tablet TK 2 TO 3 TS PO QHS  4     topiramate (TOPAMAX) 100 MG tablet TK 2 TS PO IN THE MORNING  1     VENTOLIN  (90 Base) MCG/ACT inhaler INHALE 2 PUFFS INTO THE LUNGS Q 6 H PRN  0     vitamin D3 (CHOLECALCIFEROL) 2000 units (50 mcg) tablet Take 1 tablet by mouth daily  1     Allergies   Allergen Reactions     Compazine [Prochlorperazine]      Droperidol      Lisinopril      Seroquel [Quetiapine]      BP Readings from Last 3 Encounters:   02/04/20 109/74   01/27/20 134/85   12/10/19 131/85    Wt Readings from Last 3 Encounters:   02/04/20 119.3 kg (263 lb 1.6 oz)   01/27/20 122.5 kg (270 lb)   12/10/19 119.7 kg (264 lb)                      Reviewed and updated as needed this visit by Provider         Review of Systems   ROS COMP: Constitutional, HEENT, cardiovascular, pulmonary, GI, , musculoskeletal, neuro, skin, endocrine and psych systems are negative, except as otherwise noted.      Objective    /74 (BP Location: Left arm, Patient Position: Chair, Cuff Size: Adult Regular)   Pulse 75   Temp 98.4  F (36.9  C) (Oral)   Resp 16   Ht 1.651 m (5' 5\")   Wt 119.3 kg (263 lb 1.6 oz)   LMP  (LMP Unknown)   SpO2 94%   Breastfeeding No   BMI 43.78 kg/m    Body mass index is 43.78 kg/m .  Physical Exam " "  GENERAL: healthy, alert and no distress  EYES: Eyes grossly normal to inspection, PERRL and conjunctivae and sclerae normal  HENT: ear canals and TM's normal, nose and mouth without ulcers or lesions  NECK: no adenopathy, no asymmetry, masses, or scars and thyroid normal to palpation  RESP: mild expiratory wheezing, no rhonchi or crackles  CV: regular rate and rhythm, normal S1 S2, no S3 or S4, no murmur, click or rub, no peripheral edema  MS: no gross musculoskeletal defects noted, no edema  PSYCH: mentation appears normal, affect normal/bright    Diagnostic Test Results:  Labs reviewed in Epic        Assessment & Plan       ICD-10-CM    1. Influenza A J10.1 Influenza A/B antigen   2. Chronic obstructive pulmonary disease, unspecified COPD type (H) J44.9 PULMONARY MEDICINE REFERRAL     Nebulizer and Supplies Order   3. Moderate persistent asthma without complication J45.40 PULMONARY MEDICINE REFERRAL     Nebulizer and Supplies Order   I recommend against elective surgery this week due to acute influenza A infection in setting of ongoing COPD and asthma. If feeling better in 1 week, we can re-address. Also, I will refer to pulmonology to see if we can get better control of her COPD/asthma at baseline.     BMI:   Estimated body mass index is 43.78 kg/m  as calculated from the following:    Height as of this encounter: 1.651 m (5' 5\").    Weight as of this encounter: 119.3 kg (263 lb 1.6 oz).           See Patient Instructions      Due to current infection with influenza A, elective surgery is not recommended. Please follow up in 1 week for re-evaluation. Please let your surgeon know and we will fax updated pre-op as well.    Push fluids  Rest  Stop smoking  If worsening, follow up right away    Return in about 1 week (around 2/11/2020).     Return precautions discussed, including when to seek urgent/emergent care.    Patient verbalizes understanding and agrees with plan of care. Patient stable for " discharge.      LEONA Marquez CNP  [facility]

## 2020-02-05 ENCOUNTER — TELEPHONE (OUTPATIENT)
Dept: PSYCHOLOGY | Facility: CLINIC | Age: 60
End: 2020-02-05

## 2020-02-05 ASSESSMENT — ASTHMA QUESTIONNAIRES: ACT_TOTALSCORE: 16

## 2020-02-05 NOTE — PROGRESS NOTES
Preop note and office note faxed to surgery center with provider message on cover sheet.    Bala Garza CMA

## 2020-02-05 NOTE — TELEPHONE ENCOUNTER
Called pt at 8:45 after she No Showed last week. I asked her to confirm her appt for next Wednesday by th end of today or I would open it for someone else.

## 2020-02-06 ENCOUNTER — TELEPHONE (OUTPATIENT)
Dept: FAMILY MEDICINE | Facility: CLINIC | Age: 60
End: 2020-02-06

## 2020-02-06 NOTE — TELEPHONE ENCOUNTER
Per jamari Mccoy for nebulizer machine. Where does patient plan to get nebulizer? [facility] or medical supply store?      This writer attempted to contact Paula on 02/06/20  Reason for call DME order questions and left message to return call.  When patient calls back, please contact 1st floor [facility]. routine priority.        Bala Garza

## 2020-02-07 DIAGNOSIS — J44.9 COPD (CHRONIC OBSTRUCTIVE PULMONARY DISEASE) (H): Primary | ICD-10-CM

## 2020-02-10 ENCOUNTER — OFFICE VISIT (OUTPATIENT)
Dept: FAMILY MEDICINE | Facility: CLINIC | Age: 60
End: 2020-02-10
Payer: MEDICARE

## 2020-02-10 VITALS
BODY MASS INDEX: 44.15 KG/M2 | SYSTOLIC BLOOD PRESSURE: 143 MMHG | HEART RATE: 82 BPM | OXYGEN SATURATION: 96 % | TEMPERATURE: 97.9 F | HEIGHT: 65 IN | WEIGHT: 265 LBS | DIASTOLIC BLOOD PRESSURE: 89 MMHG

## 2020-02-10 DIAGNOSIS — J44.1 CHRONIC OBSTRUCTIVE PULMONARY DISEASE WITH ACUTE EXACERBATION (H): ICD-10-CM

## 2020-02-10 DIAGNOSIS — Z63.9 FAMILY DISTRESS: ICD-10-CM

## 2020-02-10 DIAGNOSIS — F33.2 SEVERE EPISODE OF RECURRENT MAJOR DEPRESSIVE DISORDER, WITHOUT PSYCHOTIC FEATURES (H): Primary | ICD-10-CM

## 2020-02-10 PROCEDURE — 99214 OFFICE O/P EST MOD 30 MIN: CPT | Performed by: NURSE PRACTITIONER

## 2020-02-10 RX ORDER — PREDNISONE 20 MG/1
20 TABLET ORAL 2 TIMES DAILY
Qty: 10 TABLET | Refills: 0 | Status: SHIPPED | OUTPATIENT
Start: 2020-02-10 | End: 2020-03-03

## 2020-02-10 SDOH — SOCIAL STABILITY - SOCIAL INSECURITY: PROBLEM RELATED TO PRIMARY SUPPORT GROUP, UNSPECIFIED: Z63.9

## 2020-02-10 ASSESSMENT — MIFFLIN-ST. JEOR: SCORE: 1782.91

## 2020-02-10 ASSESSMENT — PAIN SCALES - GENERAL: PAINLEVEL: NO PAIN (0)

## 2020-02-10 NOTE — TELEPHONE ENCOUNTER
RECORDS RECEIVED FROM: internal    DATE RECEIVED: 2.10.20   NOTES STATUS DETAILS   OFFICE NOTE from referring provider Internal 2.4.20 leif shetty    OFFICE NOTE from other specialist Internal 7/31/19 ho    DISCHARGE SUMMARY from hospital N/A    DISCHARGE REPORT from the ER Care Everywhere CE- [facility]- 7/1/19 [facility]    MEDICATION LIST internal     IMAGING  (NEED IMAGES AND REPORTS)     CT SCAN Internal/CE Int- 7/10/19  CE- [facility]- 12.18.19,    CHEST XRAY (CXR) Internal/ in process/CE 12.3.19, 11.12.19 5.1.20- scheduled   CE- [facility]- 2.1.20   CE- [facility]- 7/1/19,    TESTS     PULMONARY FUNCTION TESTING (PFT) In process 5.1.20- scheduled        Action 2.10.20 sv   Action Taken Records request sent to [facility] and [facility]  CXR- 2.1.20  CT- 12.18.19  [facility]  CXR- 7/1/19  Message sent to nurse to place PFT order           Action 2.17.20 sv   Action Taken Received images      Action 2.18.20 sv   Action Taken Received reports, sent to [facility]

## 2020-02-10 NOTE — TELEPHONE ENCOUNTER
This writer attempted to contact patient on 02/10/20      Reason for call informed patient can  neb from clinic and left detailed message.      If patient calls back:   1st floor [place] Care Team (MA/TC) called. Inform patient that someone from the team will contact them, document that pt called and route to care team.     HUDDLED WITH BRIANNE CALIX TO GIVE PATIENT NEB FROM CLINIC    Niko Villegas MA

## 2020-02-10 NOTE — TELEPHONE ENCOUNTER
PA would be handled by the clinic staff since it is a medical billing item- the [facility] PA Team only handles PA's for pharmacy benefit medications.
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
## 2020-02-10 NOTE — PATIENT INSTRUCTIONS
For your breathing:  Continue home inhalers  Start prednisone 1 tab twice daily x5 days  Follow up if not improving in 5 days, sooner as needed    Referral sent for partial hospitalization program for depression - they will call you to schedule  If you feel like you might hurt yourself or someone else, call 911 or go to the emergency department  The [facility] has a behavioral health emergency department that you could consider

## 2020-02-11 NOTE — TELEPHONE ENCOUNTER
Called spoke with patient informed to stop by clinic &  neb machine from the . Neb machine placed at the  for .    SO Villegas MA

## 2020-02-12 ENCOUNTER — PATIENT OUTREACH (OUTPATIENT)
Dept: CARE COORDINATION | Facility: CLINIC | Age: 60
End: 2020-02-12

## 2020-02-12 NOTE — PROGRESS NOTES
"Clinic Care Coordination Contact  [facility]/Voicemail       Clinical Data: Care Coordinator Outreach  Outreach attempted x 1.  Left message on patient's voicemail with call back information and requested return call.  Plan: Care Coordinator will try to reach patient again in 1-2 business days.    ADD: Patient contacted [facility] back. She stated that she is on her way to the clinic. Unable to meet with patient in clinic d/t time constraints. She stated that she is going to be calling the [facility] to see if she can be \"checked in to their mental health unit\". She stated that she feels she needs some more assistance \"in that area right now\". Patient will be calling patient on 2/13    DENI Canchola  Primary Care Clinic- Social Work Care Coordinator  [facility] [place]  Ph: [phone]  2/12/2020 3:49 PM      "

## 2020-02-13 ENCOUNTER — TELEPHONE (OUTPATIENT)
Dept: PSYCHIATRY | Facility: CLINIC | Age: 60
End: 2020-02-13

## 2020-02-13 NOTE — TELEPHONE ENCOUNTER
Reason for Call:   medication refill: Adderal    Do you use a [facility]?  Name of the pharmacy and phone number for the current request:  [facility] [phone]    Name of the medication requested: Adderal refill    Other request: Intake made follow up apt. With Rajiv on 3/3/20    Can we leave a detailed message on this number? YES    Phone number patient can be reached at: Home number on file [phone] (home)    Best Time: any    Call taken on 2/13/2020 at 10:10 AM by Real Perez

## 2020-02-14 ENCOUNTER — HOSPITAL ENCOUNTER (OUTPATIENT)
Dept: BEHAVIORAL HEALTH | Facility: CLINIC | Age: 60
Discharge: HOME | End: 2020-02-14
Attending: PSYCHIATRY & NEUROLOGY | Admitting: PSYCHIATRY & NEUROLOGY
Payer: MEDICARE

## 2020-02-14 PROCEDURE — 90791 PSYCH DIAGNOSTIC EVALUATION: CPT | Performed by: SOCIAL WORKER

## 2020-02-14 ASSESSMENT — COLUMBIA-SUICIDE SEVERITY RATING SCALE - C-SSRS
ATTEMPT LIFETIME: YES
4. HAVE YOU HAD THESE THOUGHTS AND HAD SOME INTENTION OF ACTING ON THEM?: NO
2. HAVE YOU ACTUALLY HAD ANY THOUGHTS OF KILLING YOURSELF LIFETIME?: YES
3. HAVE YOU BEEN THINKING ABOUT HOW YOU MIGHT KILL YOURSELF?: YES
REASONS FOR IDEATION LIFETIME: MOSTLY TO END OR STOP THE PAIN (YOU COULDN'T GO ON LIVING WITH THE PAIN OR HOW YOU WERE FEELING)
REASONS FOR IDEATION PAST MONTH: MOSTLY TO END OR STOP THE PAIN (YOU COULDN'T GO ON LIVING WITH THE PAIN OR HOW YOU WERE FEELING)
4. HAVE YOU HAD THESE THOUGHTS AND HAD SOME INTENTION OF ACTING ON THEM?: YES
1. IN THE PAST MONTH, HAVE YOU WISHED YOU WERE DEAD OR WISHED YOU COULD GO TO SLEEP AND NOT WAKE UP?: NO
LETHALITY/MEDICAL DAMAGE CODE MOST RECENT ACTUAL ATTEMPT: MINOR PHYSICAL DAMAGE
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
## 2020-02-14 NOTE — PROGRESS NOTES
Clinic Care Coordination Contact  [facility]Voic[facility]       Clinical Data: Care Coordinator Outreach  Outreach attempted x 1.  Left message on patient's voicemail with call back information and requested return call.  Plan: Care Coordinator will try to reach patient again in 3-5 business days.    DENI Canchola  Primary Care Clinic- Social Work Care Coordinator  [facility]  Ph: [phone]  2/14/2020 2:29 PM

## 2020-02-14 NOTE — TELEPHONE ENCOUNTER
M for Paula that she will need to see Dr Vance to discuss refill.    Last visit with Dr Vance: 12/10/19. Adderrall was started at that time. See Dr Vance's telephone encounter 1/14/20 re: Paula's PCP change and possible referral to long term psych care to be discussed at next visit.    Hannah Proctor RN on 2/14/2020 at 12:01 PM

## 2020-02-14 NOTE — ADDENDUM NOTE
Encounter addended by: Paula Calvo LICSW on: 2/14/2020 4:41 PM   Actions taken: Clinical Note Signed

## 2020-02-14 NOTE — PROGRESS NOTES
"Outpatient Mental Health Services - Adult    MY COPING PLAN FOR SAFETY    PATIENT'S NAME: Paula Ho  MRN:   [id number]  SAFETY PLAN:  Step 1: Warning signs / cues (Thoughts, images, mood, situation, behavior) that a crisis may be developing:    Thoughts: \"I don't matter\", \"I'm a burden\" and \"I can't do this anymore\"    Images: overdosing visions    Thinking Processes: ruminations (can't stop thinking about my problems): yes, intrusive thoughts (bothersome, unwanted thoughts that come out of nowhere): yes and highly critical and negative thoughts: yes    Mood: worsening depression, hopelessness, helplessness and intense anger    Behaviors: isolating/withdrawing , aggression, not taking care of myself, not taking care of my responsibilities, sleeping too much and not sleeping enough    Situations: relationship problems, trauma  and financial stress   Step 2: Coping strategies - Things I can do to take my mind off of my problems without contacting another person (relaxation technique, physical activity):    Distress Tolerance Strategies:  nothing    Physical Activities: too much pain    Focus on helpful thoughts:  \"I will get through this\"  Step 3: People and social settings that provide distraction:   Name: [place] friend Phone: [phone]     [facility]   Step 4: Remind myself of people and things that are important to me and worth living for:  \" my mother and dogs\".   Step 5: When I am in crisis, I can ask these people to help me use my safety plan:   Name:  [place] Phone:      Step 6: Making the environment safe:     stated it is safe  Step 7: Professionals or agencies I can contact during a crisis:    Suicide Prevention Lifeline: 5-862-974-DWPM (4586)    Crisis Text Line Service (available 24 hours a day, 7 days a week): Text [place] to 478674    Call  **CRISIS (557789) from a cell phone to talk to a team of professionals who can help you.  Crisis Services By [facility]: Phone Number:   [place]     [phone]   [place]    " [phone]   [place]    [phone]   [place]    [phone]   [place]    [phone]   [place] [phone]   [place]     [phone]       Call 911 or go to my nearest emergency department.     I helped develop this safety plan and agree to use it when needed.  I have been given a copy of this plan.      Client signature _________________________________________________________________  Today s date:  2/14/2020  Adapted from Safety Plan Template 2008 Carol Thomas and Mike Whitten is reprinted with the express permission of the authors.  No portion of the Safety Plan Template may be reproduced without the express, written permission.  You can contact the authors at [email or web address] or [email or web address].

## 2020-02-19 ENCOUNTER — TELEPHONE (OUTPATIENT)
Dept: BEHAVIORAL HEALTH | Facility: CLINIC | Age: 60
End: 2020-02-19

## 2020-02-19 NOTE — TELEPHONE ENCOUNTER
Returned her call regarding delaying the 55+ start date due to appointments. She also adds that she is not doing well and may need to be hospitalized. She is at the pain 's office now and will call back later today.

## 2020-02-20 ENCOUNTER — TELEPHONE (OUTPATIENT)
Dept: BEHAVIORAL HEALTH | Facility: CLINIC | Age: 60
End: 2020-02-20

## 2020-02-20 NOTE — PROGRESS NOTES
Clinic Care Coordination Contact  [facility]/Voicemail       Clinical Data: Care Coordinator Outreach  Outreach attempted x 2. Spoke with patient long enough to share that she was unable to talk at the moment. This is [facility]'s second attempt at reaching patient.  Plan: Care Coordinator will try to reach patient again in 1-2 business days.    DENI Canchola  Primary Care Clinic- Social Work Care Coordinator  [facility] [place]  Ph: [phone]  2/20/2020 2:04 PM

## 2020-02-24 DIAGNOSIS — F33.2 SEVERE EPISODE OF RECURRENT MAJOR DEPRESSIVE DISORDER, WITHOUT PSYCHOTIC FEATURES (H): ICD-10-CM

## 2020-02-24 RX ORDER — FLUOXETINE 40 MG/1
80 CAPSULE ORAL DAILY
Qty: 60 CAPSULE | Refills: 0 | Status: SHIPPED | OUTPATIENT
Start: 2020-02-24 | End: 2020-06-10

## 2020-02-24 ASSESSMENT — ACTIVITIES OF DAILY LIVING (ADL): DEPENDENT_IADLS:: CLEANING;COOKING;LAUNDRY;SHOPPING;MEAL PREPARATION;TRANSPORTATION;MEDICATION MANAGEMENT

## 2020-02-24 NOTE — PROGRESS NOTES
Clinic Care Coordination Contact    Follow Up Progress Note      Assessment: Spoke with patient. She stated that she was supposed to be starting intensive outpatient therapy through [facility], however, the time slot she is reserved for won't work out for her. She stated that she was scheduled for her first IOP appointment in the morning tomorrow (2/24), however, her pain clinic just scheduled her for a morning appointment tomorrow. [facility] reviewed goal to obtain CADI worker. She stated that she is still skeptical and unsure if she wants to pursue this, but after further explanation, she stated that she would consider calling to get that assessment completed. No further questions.    Goals addressed this encounter:   Goals Addressed                 This Visit's Progress       Patient Stated      Mental Health Management (pt-stated)   On Hold     Goal Statement: I want a [facility]   Measure of Success: will be assessed for a [facility]   Supportive Steps to Achieve: Pt will call [facility]  Barriers: already has a n [facility] worker  Strengths: interested  Date to Achieve By: 12/15/19  Patient expressed understanding of goal: yes    Patient is uncertain if she will proceed, CADI services may significantly reduce current PCA hours which is very important to her and her functional needs     2/24/2020: Patient still skeptical on getting a CADI worker, but would be willing to look in to it more seriously.         Mental Health Management (pt-stated)        Goal Statement: I need to start intensive outpatient program.   Date Goal set: 2/24/2020  Barriers: Only available certain times of the day and time slot overlaps with a lot of her appointments  Strengths: Patient is motivated to attending IOP through [facility]  Date to Achieve By: 4/24/2020  Patient expressed understanding of goal: Yes  Action steps to achieve this goal:  1. I will communicate with the Intensive Outpatient Program through [facility] to notify  them that I will not be able to attend my first appt on 2/25.  2. I will work with IOP through [facility] to see if there are other time slots I can go in to                 Intervention/Education provided during outreach: Open ended questions     Outreach Frequency: monthly    Plan:     Care Coordinator will follow up in 2 weeks.    DENI Canchola  Primary Care Clinic- Social Work Care Coordinator  [facility] [place] [facility] [place]  Ph: [phone]  2/24/2020 3:52 PM

## 2020-02-24 NOTE — TELEPHONE ENCOUNTER
Refill for: FLUoxetine (PROZAC) 40 MG capsule    Last Appointment: 12/10/19    Next Appointment: 3/3/20    No Shows/Cancellations since last appointment: none    Last Refill in Epic (date and amount/how many days):    Disp Refills Start End AARON   FLUoxetine (PROZAC) 40 MG capsule 60 capsule 1 12/10/2019  --   Sig - Route: Take 2 capsules (80 mg) by mouth daily - Oral     Last office visit note reviewed and summarized below:  Treatment Plan:    Continue topamax 200 mg daily    Increase Prozac to 80 mg daily    Continue Adderall 20 mg BID     Get urine drug screen completed    Try to get sleep study scheduled. I will see if your primary care provider can place a referral.     Continue all other cares per primary care provider.     Continue all other medications as reviewed per electronic medical record today.     Continue therapy as planned. Work on DBT skills.     Schedule an appointment with me in 4 weeks or sooner as needed.    Refill x1 sent. Patient will need follow up for further refills.      Alicia Jackson, RN    Nurse Liaison  [facility]

## 2020-02-24 NOTE — TELEPHONE ENCOUNTER
"Requested Prescriptions   Pending Prescriptions Disp Refills     FLUoxetine (PROZAC) 40 MG capsule  Last Written Prescription Date:  12/10/19  Last Fill Quantity: 60,  # refills: 1   Last Office Visit with [facility], P or [facility] prescribing provider:  02/10/2020-[place]   Future Office Visit:    Next 5 appointments (look out 90 days)    Mar 03, 2020 11:45 AM CST  Return Visit with Ya Vance DO  [facility] ([facility] [id number]  [facility] [id number] [phone]        60 capsule 1     Sig: Take 2 capsules (80 mg) by mouth daily       SSRIs Protocol Failed - 2/24/2020 12:16 PM        Failed - PHQ-9 score less than 5 in past 6 months     Please review last PHQ-9 score.           Passed - Medication is active on med list        Passed - Patient is age 18 or older        Passed - No active pregnancy on record        Passed - No positive pregnancy test in last 12 months        Passed - Recent (6 mo) or future (30 days) visit within the authorizing provider's specialty     Patient had office visit in the last 6 months or has a visit in the next 30 days with authorizing provider or within the authorizing provider's specialty.  See \"Patient Info\" tab in inbasket, or \"Choose Columns\" in Meds & Orders section of the refill encounter.              "

## 2020-02-26 ENCOUNTER — TELEPHONE (OUTPATIENT)
Dept: BEHAVIORAL HEALTH | Facility: CLINIC | Age: 60
End: 2020-02-26

## 2020-02-26 NOTE — TELEPHONE ENCOUNTER
Spoke with Paula. She is upset that her pain dr is reducing her pain meds. She has a dr appointment tomorrow, but will start 55+ on 3/2.

## 2020-03-02 ENCOUNTER — HOSPITAL ENCOUNTER (OUTPATIENT)
Dept: BEHAVIORAL HEALTH | Facility: CLINIC | Age: 60
End: 2020-03-02
Attending: PSYCHIATRY & NEUROLOGY
Payer: MEDICARE

## 2020-03-02 PROBLEM — F33.2 MDD (MAJOR DEPRESSIVE DISORDER), RECURRENT SEVERE, WITHOUT PSYCHOSIS (H): Status: ACTIVE | Noted: 2020-03-02

## 2020-03-02 PROCEDURE — 90853 GROUP PSYCHOTHERAPY: CPT | Performed by: SOCIAL WORKER

## 2020-03-02 PROCEDURE — G0177 OPPS/PHP; TRAIN & EDUC SERV: HCPCS | Performed by: OCCUPATIONAL THERAPIST

## 2020-03-02 ASSESSMENT — ANXIETY QUESTIONNAIRES
6. BECOMING EASILY ANNOYED OR IRRITABLE: SEVERAL DAYS
7. FEELING AFRAID AS IF SOMETHING AWFUL MIGHT HAPPEN: MORE THAN HALF THE DAYS
5. BEING SO RESTLESS THAT IT IS HARD TO SIT STILL: SEVERAL DAYS
1. FEELING NERVOUS, ANXIOUS, OR ON EDGE: SEVERAL DAYS
2. NOT BEING ABLE TO STOP OR CONTROL WORRYING: SEVERAL DAYS
GAD7 TOTAL SCORE: 8
3. WORRYING TOO MUCH ABOUT DIFFERENT THINGS: SEVERAL DAYS

## 2020-03-02 ASSESSMENT — PATIENT HEALTH QUESTIONNAIRE - PHQ9
5. POOR APPETITE OR OVEREATING: SEVERAL DAYS
SUM OF ALL RESPONSES TO PHQ QUESTIONS 1-9: 9

## 2020-03-02 NOTE — GROUP NOTE
Process Group Note    PATIENT'S NAME: Paula Ho  MRN:   [id number]  :   1960  ACCT. 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   Area(s) of treatment focus addressed in this session included Symptom Management, Personal Safety, Abstinence/Relapse Prevention, Develop Socialization / Interpersonal Relationship Skills and Physical Health .    Paula began the 55+ outpatient program today. She reports having co-morbid health issues with an Aneurysm in her brain and needs brain surgery on .  She has a limited support system. Her mom resides up near [place]. Her brother resides north [facility] and has Stage 4 cancer.  She has a friend who is working in [place]. Paula also processed the interpersonal relationship dynamics with her ex- who continues to intermittently stop by. She describes him as a narcissist. Paula was given a copy of her safety plan to use as needed. Denies S/I or safety issues currently. She has a pre-op medical appointment on Wednesday and will be absent from treatment.       Therapeutic Interventions/Treatment Strategies:  Psychotherapist offered support, feedback and validation and reinforced use of skills. Treatment modalities used include Cognitive Behavioral Therapy.    Assessment:    Patient response:   Patient responded to session by accepting feedback, giving feedback, listening, focusing on goals and  verbalizing understanding    Possible barriers to participation / learning include: and no barriers identified    Health Issues:   None reported       Substance Use Review:   Substance Use: No active concerns identified.    Mental Status/Behavioral Observations  Appearance:   Appropriate   Eye Contact:   Good   Psychomotor Behavior: Normal   Attitude:   Cooperative   Orientation:   All  Speech   Rate / Production: Normal    Volume:  Normal   Mood:    Anxious  Depressed   Affect:    Appropriate   Thought Content:   Clear and Safety denies any current safety concerns including suicidal ideation, self-harm, and homicidal ideation  Thought Form:  Coherent  Goal Directed  Logical     Insight:    Good     Plan:     Safety Plan: No current safety concerns identified.  Recommended that patient call 911 or go to the local ED should there be a change in any of these risk factors.     Barriers to treatment: None identified    Patient Contracts (see media tab):  None    Substance Use: Not addressed in session     Continue or Discharge: Patient will continue in 55+ Program (55+) as planned. Patient is likely to benefit from learning and using skills as they work toward the goals identified in their treatment plan. Paula will resume the program on Friday, March 6.      Bharti Osman, RASHEED  March 2, 2020

## 2020-03-02 NOTE — ADDENDUM NOTE
Encounter addended by: Paula Calvo LICSW on: 3/2/2020 4:29 PM   Actions taken: Visit Navigator Flowsheet section accepted

## 2020-03-02 NOTE — GROUP NOTE
Psychotherapy Group Note    PATIENT'S NAME: Paula Ho  MRN:   [id number]  :   1960  ACCT. NUMBER: [id number]  DATE OF SERVICE: 3/02/20  START TIME: 11:00 AM  END TIME: 11:50 AM  FACILITATOR: Bharti Osman LICSW  TOPIC: MH EBP Group: Self-Awareness  55+ Program  TRACK: A2    NUMBER OF PARTICIPANTS: 3    Summary of Group / Topics Discussed:  Self-Awareness: Self-Compassion: Patients received overview of key concepts in developing self-compassion. Patients discussed mindfulness, self-kindness, and finding common humanity. Patients identified their current approach to problems in their lives and learned skills for increasing self-compassion. Patients identified ways they can put self-compassion skills into practice and problem solve barriers to application of skills.     Patient Session Goals / Objectives:    [place] components of self-compassion    Identify ways to practice self-compassion in daily life    Problem solve barriers to self-compassion practice      Patient Participation / Response:  Fully participated with the group by sharing personal reflections / insights and openly received / provided feedback with other participants.    Demonstrated understanding of topics discussed through group discussion and participation and Demonstrated understanding of values, strengths, and challenges to learn about themselves    Treatment Plan:  Patient has an initial individualized treatment plan that was created as part of their diagnostic assessment / admission process.  A master individualized treatment plan is in the process of being developed with the patient and multi-disciplinary care team.    RASHEED Will

## 2020-03-03 ENCOUNTER — OFFICE VISIT (OUTPATIENT)
Dept: PSYCHIATRY | Facility: CLINIC | Age: 60
End: 2020-03-03
Payer: MEDICARE

## 2020-03-03 ENCOUNTER — TELEPHONE (OUTPATIENT)
Dept: ADDICTION MEDICINE | Facility: CLINIC | Age: 60
End: 2020-03-03

## 2020-03-03 VITALS
WEIGHT: 267.2 LBS | OXYGEN SATURATION: 94 % | DIASTOLIC BLOOD PRESSURE: 83 MMHG | BODY MASS INDEX: 44.52 KG/M2 | HEIGHT: 65 IN | RESPIRATION RATE: 16 BRPM | HEART RATE: 76 BPM | SYSTOLIC BLOOD PRESSURE: 132 MMHG | TEMPERATURE: 97.9 F

## 2020-03-03 VITALS — WEIGHT: 267 LBS | BODY MASS INDEX: 42.91 KG/M2 | HEIGHT: 66 IN

## 2020-03-03 DIAGNOSIS — F11.20 OPIOID DEPENDENCE WITH CURRENT USE (H): ICD-10-CM

## 2020-03-03 DIAGNOSIS — F33.1 MODERATE EPISODE OF RECURRENT MAJOR DEPRESSIVE DISORDER (H): Primary | ICD-10-CM

## 2020-03-03 DIAGNOSIS — F13.20 BENZODIAZEPINE DEPENDENCE (H): ICD-10-CM

## 2020-03-03 DIAGNOSIS — F98.8 ATTENTION DEFICIT DISORDER, UNSPECIFIED HYPERACTIVITY PRESENCE: ICD-10-CM

## 2020-03-03 LAB
AMPHETAMINES UR QL: ABNORMAL NG/ML
BARBITURATES UR QL SCN: NOT DETECTED NG/ML
BENZODIAZ UR QL SCN: ABNORMAL NG/ML
BUPRENORPHINE UR QL: NOT DETECTED NG/ML
CANNABINOIDS UR QL: NOT DETECTED NG/ML
COCAINE UR QL SCN: NOT DETECTED NG/ML
D-METHAMPHET UR QL: NOT DETECTED NG/ML
METHADONE UR QL SCN: NOT DETECTED NG/ML
OPIATES UR QL SCN: NOT DETECTED NG/ML
OXYCODONE UR QL SCN: ABNORMAL NG/ML
PCP UR QL SCN: NOT DETECTED NG/ML
PROPOXYPH UR QL: NOT DETECTED NG/ML
TRICYCLICS UR QL SCN: NOT DETECTED NG/ML

## 2020-03-03 PROCEDURE — 99214 OFFICE O/P EST MOD 30 MIN: CPT | Performed by: PSYCHIATRY & NEUROLOGY

## 2020-03-03 PROCEDURE — 80306 DRUG TEST PRSMV INSTRMNT: CPT | Performed by: PSYCHIATRY & NEUROLOGY

## 2020-03-03 ASSESSMENT — MIFFLIN-ST. JEOR
SCORE: 1807.85
SCORE: 1792.89

## 2020-03-03 ASSESSMENT — ANXIETY QUESTIONNAIRES
2. NOT BEING ABLE TO STOP OR CONTROL WORRYING: SEVERAL DAYS
GAD7 TOTAL SCORE: 7
5. BEING SO RESTLESS THAT IT IS HARD TO SIT STILL: SEVERAL DAYS
6. BECOMING EASILY ANNOYED OR IRRITABLE: SEVERAL DAYS
3. WORRYING TOO MUCH ABOUT DIFFERENT THINGS: SEVERAL DAYS
4. TROUBLE RELAXING: SEVERAL DAYS
1. FEELING NERVOUS, ANXIOUS, OR ON EDGE: SEVERAL DAYS
GAD7 TOTAL SCORE: 8
7. FEELING AFRAID AS IF SOMETHING AWFUL MIGHT HAPPEN: SEVERAL DAYS
IF YOU CHECKED OFF ANY PROBLEMS ON THIS QUESTIONNAIRE, HOW DIFFICULT HAVE THESE PROBLEMS MADE IT FOR YOU TO DO YOUR WORK, TAKE CARE OF THINGS AT HOME, OR GET ALONG WITH OTHER PEOPLE: SOMEWHAT DIFFICULT

## 2020-03-03 ASSESSMENT — PAIN SCALES - GENERAL: PAINLEVEL: NO PAIN (0)

## 2020-03-03 ASSESSMENT — PATIENT HEALTH QUESTIONNAIRE - PHQ9: SUM OF ALL RESPONSES TO PHQ QUESTIONS 1-9: 11

## 2020-03-03 NOTE — TELEPHONE ENCOUNTER
Please review referral. Please route back to reception pool #[phone].     Thank you,    Bisi Eddy    [facility]

## 2020-03-03 NOTE — PATIENT INSTRUCTIONS
Treatment Plan:    Continue topamax 100 mg daily    Continue Prozac 80 mg daily    Discontinue Adderall since your aneurysm is unstable and surgery is soon. Adderall can raise blood pressure and heart rate and further risk worsening your aneurysm.     Get urine drug screen completed.    Referral placed for addiction medicine.     Try to get sleep study scheduled. I will see if your primary care provider can place a referral.     Continue all other cares per primary care provider.     Continue all other medications as reviewed per electronic medical record today.     Safety plan reviewed. To the Emergency Department as needed or call after hours crisis line at [phone] or [phone]. [place] Crisis Text Line. Text [place] to 824040 or Suicide LifeLine Chat: suicidepreventionSunLinkline.org/chat    Continue therapy as planned. Work on DBT skills.     Schedule an appointment with me in 4 weeks or sooner as needed. Call [facility] at [phone] to schedule.    Follow up with primary care provider as planned or for acute medical concerns.    Call the psychiatric nurse line with medication questions or concerns at [phone].    SpaceClaimt may be used to communicate with your provider, but this is not intended to be used for emergencies.    Thank you for our work together in the Psychiatry Collaborative Care Model at [facility]. This is our last visit together since you need to establish long-term care. If you are not doing well, please contact your Primary Care Provider office.     This is our last appointment together in the CCPS model since you will need to establish LONG-TERM psychiatric care:  04/06/2020 Office Visit Psychiatry Rosendo Blandon MD    [address] [address]    [place] [address] [phone] (Fax)

## 2020-03-03 NOTE — GROUP NOTE
Psychoeducation Group Note    PATIENT'S NAME: Paula Ho  MRN:   [id number]  :   1960  ACCT. NUMBER: [id number]  DATE OF SERVICE: 3/02/20  START TIME: 10:00 AM  END TIME: 10:50 AM  FACILITATOR: Neelima Richardson OTR/L  TOPIC: MH Life Skills Group: Sensory Approaches in Mental Health  55+ Program  TRACK: A2    NUMBER OF PARTICIPANTS: 3    Summary of Group / Topics Discussed:  Sensory Approaches in Mental Health:  Focused Activity: Patients were provided with verbal and experiential education to identify physical and sensorimotor based activities that can be utilized for stress management, self care, health maintenance, and self regulation.  Patients increased knowledge and awareness of activities that support good self care, build resiliency, and prevent relapse through healthy engagement in mindful focused activities for effective coping with illness and reduction of maladaptive coping skills.     Patient Session Goals / Objectives:    Identified specific physical and sensorimotor based activities for stress management, self care, health maintenance, and self regulation      Improved awareness of activities that are meaningful focused activities that support good self care, build resiliency, and prevent relapse and how this applies to current daily life    Established a plan for practice of these skills in their own environments    Practiced and reflected on how to generalize taught skills to their everyday life      Patient Participation / Response:  Fully participated with the group by sharing personal reflections / insights and openly received / provided feedback with other participants.    Patient presentation: anxious mood; reported numerous health concerns and additional stressors; active participant in group, Demonstrated understanding of content through identifying and practicing engagement in meaningful task work for coping/regulating mood  and Patient would benefit from additional opportunities  to practice the content to be able to generalize it to their everyday life with increased intentionality, consistency, and efficacy in support of their psychiatric recovery     Paula began the 55+ Program today.  She was oriented to Life Skills groups and Life Skills clinic.  Reported numerous stressors in her life and has brain surgery scheduled for 3/18/20.  Reported she is unsure of her recovery time frame.  Reported she has an appointment on 3/4/20 and will miss group.     Treatment Plan:  Patient has an initial individualized treatment plan that was created as part of their diagnostic assessment / admission process.  A master individualized treatment plan is in the process of being developed with the patient and multi-disciplinary care team.    Neelima Richardson, OTR/L
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
## 2020-03-03 NOTE — PROGRESS NOTES
RN Review of Medical History / Physical Health Screen  Outpatient Mental Health Programs - Adult    55+ Program    PATIENT'S NAME: Paula Ho  MRN:   [id number]  :   1960  ACCT. NUMBER: [id number]  CURRENT AGE:  59 year old    DATE OF DIAGNOSTIC ASSESSMENT: 20  DATE OF ADMISSION: 3/2/20     Please see Diagnostic Assessment for additional Medical History.     General Health:   Have you had any exposure to any communicable disease in the past 2-3 weeks? no     Are you aware of safe sex practices? yes       Nutrition:    Are you on a special diet? If yes, please explain:  no   Do you have any concerns regarding your nutritional status? If yes, please explain:  no   Have you had any appetite changes in the last 3 months?  No     Have you had any weight loss or weight gain in the last 3 months?  No     Do you have a history of an eating disorder? no   Do you have a history of being in an eating disorder program? no     Patient height and weight recorded by RN in epic flowsheet: yes      BMI Review:  Was the patient informed of BMI? yes      Findings Above,  General nutrition education         Fall Risk:   Have you had any falls in the past 3 months? no     Do you currently use any assistive devices for mobility?   no      Additional Comments/Assessment: no acute concerns reported    Per completion of the Medical History / Physical Health Screen, is there a recommendation to see / follow up with a primary care physician/clinic or dentist?    No.      Ivis Anderson RN  3/3/2020

## 2020-03-04 ENCOUNTER — OFFICE VISIT (OUTPATIENT)
Dept: FAMILY MEDICINE | Facility: CLINIC | Age: 60
End: 2020-03-04
Payer: MEDICARE

## 2020-03-04 VITALS
RESPIRATION RATE: 16 BRPM | DIASTOLIC BLOOD PRESSURE: 75 MMHG | HEIGHT: 66 IN | TEMPERATURE: 97.8 F | BODY MASS INDEX: 42.91 KG/M2 | HEART RATE: 79 BPM | OXYGEN SATURATION: 95 % | WEIGHT: 267 LBS | SYSTOLIC BLOOD PRESSURE: 117 MMHG

## 2020-03-04 DIAGNOSIS — F17.200 TOBACCO USE DISORDER: ICD-10-CM

## 2020-03-04 DIAGNOSIS — E66.01 OBESITY, CLASS III, BMI 40-49.9 (MORBID OBESITY) (H): ICD-10-CM

## 2020-03-04 DIAGNOSIS — I67.1 BRAIN ANEURYSM: ICD-10-CM

## 2020-03-04 DIAGNOSIS — F33.2 MDD (MAJOR DEPRESSIVE DISORDER), RECURRENT SEVERE, WITHOUT PSYCHOSIS (H): ICD-10-CM

## 2020-03-04 DIAGNOSIS — Z79.01 ANTICOAGULATED: ICD-10-CM

## 2020-03-04 DIAGNOSIS — F11.90 CHRONIC, CONTINUOUS USE OF OPIOIDS: ICD-10-CM

## 2020-03-04 DIAGNOSIS — J44.9 CHRONIC OBSTRUCTIVE PULMONARY DISEASE, UNSPECIFIED COPD TYPE (H): ICD-10-CM

## 2020-03-04 DIAGNOSIS — Z01.818 PREOP GENERAL PHYSICAL EXAM: Primary | ICD-10-CM

## 2020-03-04 LAB
ALBUMIN SERPL-MCNC: 3.9 G/DL (ref 3.4–5)
ALP SERPL-CCNC: 105 U/L (ref 40–150)
ALT SERPL W P-5'-P-CCNC: 26 U/L (ref 0–50)
ANION GAP SERPL CALCULATED.3IONS-SCNC: 8 MMOL/L (ref 3–14)
AST SERPL W P-5'-P-CCNC: 15 U/L (ref 0–45)
BASOPHILS # BLD AUTO: 0 10E9/L (ref 0–0.2)
BASOPHILS NFR BLD AUTO: 0.3 %
BILIRUB SERPL-MCNC: 0.2 MG/DL (ref 0.2–1.3)
BUN SERPL-MCNC: 11 MG/DL (ref 7–30)
CALCIUM SERPL-MCNC: 9.3 MG/DL (ref 8.5–10.1)
CHLORIDE SERPL-SCNC: 108 MMOL/L (ref 94–109)
CO2 SERPL-SCNC: 23 MMOL/L (ref 20–32)
CREAT SERPL-MCNC: 0.59 MG/DL (ref 0.52–1.04)
DIFFERENTIAL METHOD BLD: NORMAL
EOSINOPHIL # BLD AUTO: 0.2 10E9/L (ref 0–0.7)
EOSINOPHIL NFR BLD AUTO: 2.7 %
ERYTHROCYTE [DISTWIDTH] IN BLOOD BY AUTOMATED COUNT: 13.5 % (ref 10–15)
GFR SERPL CREATININE-BSD FRML MDRD: >90 ML/MIN/{1.73_M2}
GLUCOSE SERPL-MCNC: 97 MG/DL (ref 70–99)
HCT VFR BLD AUTO: 42.3 % (ref 35–47)
HGB BLD-MCNC: 13.4 G/DL (ref 11.7–15.7)
INR PPP: 0.97 (ref 0.86–1.14)
LYMPHOCYTES # BLD AUTO: 3.1 10E9/L (ref 0.8–5.3)
LYMPHOCYTES NFR BLD AUTO: 39.4 %
MCH RBC QN AUTO: 30.5 PG (ref 26.5–33)
MCHC RBC AUTO-ENTMCNC: 31.7 G/DL (ref 31.5–36.5)
MCV RBC AUTO: 96 FL (ref 78–100)
MONOCYTES # BLD AUTO: 0.5 10E9/L (ref 0–1.3)
MONOCYTES NFR BLD AUTO: 5.8 %
NEUTROPHILS # BLD AUTO: 4.1 10E9/L (ref 1.6–8.3)
NEUTROPHILS NFR BLD AUTO: 51.8 %
PLATELET # BLD AUTO: 317 10E9/L (ref 150–450)
POTASSIUM SERPL-SCNC: 4 MMOL/L (ref 3.4–5.3)
PROT SERPL-MCNC: 7.3 G/DL (ref 6.8–8.8)
RBC # BLD AUTO: 4.39 10E12/L (ref 3.8–5.2)
SODIUM SERPL-SCNC: 139 MMOL/L (ref 133–144)
WBC # BLD AUTO: 7.9 10E9/L (ref 4–11)

## 2020-03-04 PROCEDURE — 85610 PROTHROMBIN TIME: CPT | Performed by: NURSE PRACTITIONER

## 2020-03-04 PROCEDURE — 36415 COLL VENOUS BLD VENIPUNCTURE: CPT | Performed by: NURSE PRACTITIONER

## 2020-03-04 PROCEDURE — 80053 COMPREHEN METABOLIC PANEL: CPT | Performed by: NURSE PRACTITIONER

## 2020-03-04 PROCEDURE — 85025 COMPLETE CBC W/AUTO DIFF WBC: CPT | Performed by: NURSE PRACTITIONER

## 2020-03-04 PROCEDURE — 99215 OFFICE O/P EST HI 40 MIN: CPT | Performed by: NURSE PRACTITIONER

## 2020-03-04 RX ORDER — LEVOTHYROXINE SODIUM 25 UG/1
25 TABLET ORAL DAILY
COMMUNITY
End: 2021-03-11

## 2020-03-04 RX ORDER — PRAVASTATIN SODIUM 40 MG
40 TABLET ORAL DAILY
COMMUNITY
End: 2023-01-04

## 2020-03-04 RX ORDER — CLONAZEPAM 0.5 MG/1
0.5 TABLET ORAL 2 TIMES DAILY PRN
COMMUNITY
End: 2020-08-03

## 2020-03-04 RX ORDER — LOSARTAN POTASSIUM 50 MG/1
50 TABLET ORAL DAILY
COMMUNITY
End: 2023-02-28

## 2020-03-04 RX ORDER — OXYCODONE HYDROCHLORIDE 15 MG/1
15 TABLET ORAL 3 TIMES DAILY
COMMUNITY

## 2020-03-04 RX ORDER — DEXTROAMPHETAMINE SACCHARATE, AMPHETAMINE ASPARTATE, DEXTROAMPHETAMINE SULFATE AND AMPHETAMINE SULFATE 5; 5; 5; 5 MG/1; MG/1; MG/1; MG/1
20 TABLET ORAL 2 TIMES DAILY
COMMUNITY
End: 2020-08-03

## 2020-03-04 ASSESSMENT — PAIN SCALES - GENERAL: PAINLEVEL: NO PAIN (0)

## 2020-03-04 ASSESSMENT — MIFFLIN-ST. JEOR: SCORE: 1807.85

## 2020-03-04 ASSESSMENT — ANXIETY QUESTIONNAIRES: GAD7 TOTAL SCORE: 7

## 2020-03-04 NOTE — PROGRESS NOTES
[address] [address]  [phone]  Dept: [phone]    PRE-OP EVALUATION:  Today's date: 3/4/2020    Paula Ho (: 1960) presents for pre-operative evaluation assessment as requested by Dr. Uribe.  She requires evaluation and anesthesia risk assessment prior to undergoing surgery/procedure for treatment of brain aneurysm .    Proposed Surgery/ Procedure: Coil/Flow-Diverter Embolization  Date of Surgery/ Procedure: 3/19/2020  Time of Surgery/ Procedure: 7:30 am  Hospital/Surgical Facility: [facility]  Fax number for surgical facility: [phone]  Primary Physician: Demetra Meyer  Type of Anesthesia Anticipated: General    Patient has a Health Care Directive or Living Will:  NO    1. YES - DO YOU HAVE A HISTORY OF HEART ATTACK, STROKE, STENT, BYPASS OR SURGERY ON AN ARTERY IN THE HEAD, NECK, HEART OR LEG?  Had aneurysm years ago and had coil placed.  2. NO - Do you ever have any pain or discomfort in your chest?  3. NO - Do you have a history of  Heart Failure?  4. NO - Are you troubled by shortness of breath when: walking on the level, up a slight hill or at night?  5. NO - Do you currently have a cold, bronchitis or other respiratory infection?  6. NO - Do you have a cough, shortness of breath or wheezing?  7. NO - Do you sometimes get pains in the calves of your legs when you walk?  8. NO - Do you or anyone in your family have previous history of blood clots?  9. NO - Do you or does anyone in your family have a serious bleeding problem such as prolonged bleeding following surgeries or cuts?  10. NO - Have you ever had problems with anemia or been told to take iron pills?  11. NO - Have you had any abnormal blood loss such as black, tarry or bloody stools, or abnormal vaginal bleeding?  12. NO - Have you ever had a blood transfusion?  13. YES- Have you or any of your relatives ever had problems with  anesthesia?  14. NO - Do you have sleep apnea, excessive snoring or daytime drowsiness?  15. NO - Do you have any prosthetic heart valves?  16. NO - Do you have prosthetic joints?  17. NO - Is there any chance that you may be pregnant?      HPI:     HPI related to upcoming procedure: history of brain aneurysm and recent MRI to check it revealed it is leaking per patient report        ASTHMA - Patient has a longstanding history of moderate-severe Asthma . Patient has been doing well overall noting NO SYMPTOMS RECENTLY and continues on medication regimen consisting of symbicort, Spiriva, albuterol without adverse reactions or side effects.      COPD - Patient has a longstanding history of moderate-severe COPD . Patient has been doing well overall noting NO SYMPTOMS RECENTLY and continues on medication regimen consisting of Symbicort, Spiriva, albuterol without adverse reactions or side effects.     DEPRESSION - Patient has a long history of Depression of moderate severity requiring medication for control with recent symptoms being stable - in partial inpatient program. Current symptoms of depression include depressed mood.      HYPERLIPIDEMIA - Patient has a long history of significant Hyperlipidemia requiring medication for treatment with recent good control. Patient reports no problems or side effects with the medication.     MEDICAL HISTORY:     Patient Active Problem List    Diagnosis Date Noted     MDD (major depressive disorder), recurrent severe, without psychosis (H) 03/02/2020     Priority: Medium     Chronic obstructive pulmonary disease, unspecified COPD type (H) 02/04/2020     Priority: Medium     Anticoagulated 02/04/2020     Priority: Medium     Brain aneurysm 12/03/2019     Priority: Medium     Dec 2017       SAH (subarachnoid hemorrhage) (H) 12/22/2017     Priority: Medium     Chronic GERD 01/26/2017     Priority: Medium     Chronic knee pain 12/12/2014     Priority: Medium     Cigarette smoker  07/08/2014     Priority: Medium     Chronic, continuous use of opioids 04/24/2013     Priority: Medium     Managed by pain clinic outside of [place]       Obesity, Class III, BMI 40-49.9 (morbid obesity) (H) 03/27/2013     Priority: Medium     Status post knee surgery 03/27/2013     Priority: Medium     Per patient's recollection:  Circa 2002: right knee arthroscopy (Dr. Pappas)  Circa 2004: right knee partial knee replacement (Iam Ritchie MD)  Circa 2006: right TKA (Ron Ryder MD; [facility])  Circa 2008: right TKA revision due to infection (Ron Ryder MD; [facility])  Circa 2009: right TKA revision due to infection (Ron Ryder MD; [facility])  April 2011: right TKA (Ron Ryder MD; [facility])       Anxiety disorder 09/28/2011     Priority: Medium     Chronic pain 09/28/2011     Priority: Medium     Knee and low back         DJD (degenerative joint disease) 09/28/2011     Priority: Medium     Jesús's disease (congenital syphilitic osteochondritis) 09/28/2011     Priority: Medium     Insomnia 04/13/2011     Priority: Medium     Insomnia  NOS       Major depressive disorder, recurrent episode (H) 07/31/2006     Priority: Medium     LW Onset:  89Gys92  LW Onset:  08Jst56  ; Depression Major Recurrent  NOS       Tobacco use disorder 07/31/2006     Priority: Medium     LW Onset:  67Onn66  ; Tobacco Abuse       Asthma 11/15/2004     Priority: Medium     Asthma  NOS        Past Medical History:   Diagnosis Date     Brain aneurysm      COPD (chronic obstructive pulmonary disease) (H)      Depressive disorder      History of knee replacement procedure of right knee      Lumbar spine pain      Uncomplicated asthma      Past Surgical History:   Procedure Laterality Date     APPENDECTOMY       ENT SURGERY      tonsils     ORTHOPEDIC SURGERY      4 on r knee     Current Outpatient Medications   Medication Sig Dispense Refill     albuterol (PROAIR HFA) 108 (90 Base) MCG/ACT inhaler  Inhale 2 puffs into the lungs every 4 hours as needed for shortness of breath / dyspnea or wheezing 1 Inhaler 3     albuterol (PROVENTIL) (2.5 MG/3ML) 0.083% neb solution NEBULIZE THE CONTENTS OF 1 VIAL EVERY 6 HOURS AS NEEDED. 25 vial 1     amphetamine-dextroamphetamine (ADDERALL) 20 MG tablet Take 20 mg by mouth 2 times daily       budesonide-formoterol (SYMBICORT) 160-4.5 MCG/ACT Inhaler Inhale 2 puffs into the lungs 2 times daily 3 Inhaler 3     cetirizine (ZYRTEC) 10 MG tablet Take 1 tablet (10 mg) by mouth daily 14 tablet 1     clonazePAM (KLONOPIN) 0.5 MG tablet Take 0.5 mg by mouth 2 times daily as needed       clopidogrel (PLAVIX) 75 MG tablet        FLUoxetine (PROZAC) 40 MG capsule Take 2 capsules (80 mg) by mouth daily 60 capsule 0     levothyroxine (SYNTHROID/LEVOTHROID) 25 MCG tablet Take 25 mcg by mouth daily       losartan (COZAAR) 50 MG tablet Take 50 mg by mouth daily       Multiple Vitamins-Minerals (MULTIVITAMIN ADULT PO)        NARCAN 4 MG/0.1ML nasal spray INHALE VIA NASAL ROUTE FOR OVERDOSE AS NEEDED. MAY REPEAT AFTER 2-3 MINUTES  0     order for DME Equipment being ordered: Nebulizer 1 Units 0     oxyCODONE (XTAMPZA) 18 MG 12 hr tablet Take 18 mg by mouth every 12 hours       oxyCODONE IR (ROXICODONE) 15 MG tablet Take 7.5 mg by mouth 2 times daily as needed       pravastatin (PRAVACHOL) 40 MG tablet Take 40 mg by mouth daily       tiotropium (SPIRIVA HANDIHALER) 18 MCG inhaled capsule Inhale 1 capsule (18 mcg) into the lungs daily 90 capsule 3     tiZANidine (ZANAFLEX) 2 MG tablet TK 2 TO 3 TS PO QHS  4     topiramate (TOPAMAX) 100 MG tablet Take 100 mg by mouth daily  1     vitamin D3 (CHOLECALCIFEROL) 2000 units (50 mcg) tablet Take 1 tablet by mouth daily  1     OTC products: None, except as noted above    Allergies   Allergen Reactions     Compazine [Prochlorperazine]      Droperidol      Lisinopril      Seroquel [Quetiapine]       Latex Allergy: NO    Social History     Tobacco Use      "Smoking status: Current Every Day Smoker     Packs/day: 0.00     Smokeless tobacco: Never Used   Substance Use Topics     Alcohol use: Yes     History   Drug Use No       REVIEW OF SYSTEMS:   Constitutional, neuro, ENT, endocrine, pulmonary, cardiac, gastrointestinal, genitourinary, musculoskeletal, integument and psychiatric systems are negative, except as otherwise noted.    EXAM:   /75 (BP Location: Right arm, Patient Position: Sitting, Cuff Size: Adult Large)   Pulse 79   Temp 97.8  F (36.6  C) (Oral)   Resp 16   Ht 1.676 m (5' 6\")   Wt 121.1 kg (267 lb)   LMP  (LMP Unknown)   SpO2 95%   BMI 43.09 kg/m      GENERAL APPEARANCE: healthy, alert and no distress     EYES: EOMI, PERRL     HENT: ear canals and TM's normal and nose and mouth without ulcers or lesions     NECK: no adenopathy, no asymmetry, masses, or scars and thyroid normal to palpation     RESP: lungs clear to auscultation - no rales, rhonchi or wheezes     CV: regular rates and rhythm, normal S1 S2, no S3 or S4 and no murmur, click or rub     ABDOMEN:  soft, nontender, no HSM or masses and bowel sounds normal     MS: extremities normal- no gross deformities noted, no evidence of inflammation in joints, FROM in all extremities.     SKIN: no suspicious lesions or rashes     NEURO: Normal strength and tone, sensory exam grossly normal, mentation intact and speech normal     PSYCH: mentation appears normal. and affect normal/bright     LYMPHATICS: No cervical adenopathy    DIAGNOSTICS:     Labs Resulted Today:   Results for orders placed or performed in visit on 03/04/20   CBC with platelets differential     Status: None   Result Value Ref Range    WBC 7.9 4.0 - 11.0 10e9/L    RBC Count 4.39 3.8 - 5.2 10e12/L    Hemoglobin 13.4 11.7 - 15.7 g/dL    Hematocrit 42.3 35.0 - 47.0 %    MCV 96 78 - 100 fl    MCH 30.5 26.5 - 33.0 pg    MCHC 31.7 31.5 - 36.5 g/dL    RDW 13.5 10.0 - 15.0 %    Platelet Count 317 150 - 450 10e9/L    % Neutrophils 51.8 % " "   % Lymphocytes 39.4 %    % Monocytes 5.8 %    % Eosinophils 2.7 %    % Basophils 0.3 %    Absolute Neutrophil 4.1 1.6 - 8.3 10e9/L    Absolute Lymphocytes 3.1 0.8 - 5.3 10e9/L    Absolute Monocytes 0.5 0.0 - 1.3 10e9/L    Absolute Eosinophils 0.2 0.0 - 0.7 10e9/L    Absolute Basophils 0.0 0.0 - 0.2 10e9/L    Diff Method Automated Method    Comprehensive metabolic panel (BMP + Alb, Alk Phos, ALT, AST, Total. Bili, TP)     Status: None   Result Value Ref Range    Sodium 139 133 - 144 mmol/L    Potassium 4.0 3.4 - 5.3 mmol/L    Chloride 108 94 - 109 mmol/L    Carbon Dioxide 23 20 - 32 mmol/L    Anion Gap 8 3 - 14 mmol/L    Glucose 97 70 - 99 mg/dL    Urea Nitrogen 11 7 - 30 mg/dL    Creatinine 0.59 0.52 - 1.04 mg/dL    GFR Estimate >90 >60 mL/min/[1.73_m2]    GFR Estimate If Black >90 >60 mL/min/[1.73_m2]    Calcium 9.3 8.5 - 10.1 mg/dL    Bilirubin Total 0.2 0.2 - 1.3 mg/dL    Albumin 3.9 3.4 - 5.0 g/dL    Protein Total 7.3 6.8 - 8.8 g/dL    Alkaline Phosphatase 105 40 - 150 U/L    ALT 26 0 - 50 U/L    AST 15 0 - 45 U/L   INR     Status: None   Result Value Ref Range    INR 0.97 0.86 - 1.14       Recent Labs   Lab Test 01/27/20  1440   HGB 14.0      INR 0.98      POTASSIUM 4.7   CR 0.55   Had EKG Jan 2020     IMPRESSION:   Reason for surgery/procedure: \"leaking aneurysm after coiling\" per MRI from December 2019  Diagnosis/reason for consult: pre-operative evaluation, anxiety, depression, chronic pain on chronic opioids, GERD, tobacco use, asthma/COPD.    The proposed surgical procedure is considered HIGH risk.    REVISED CARDIAC RISK INDEX  The patient has the following serious cardiovascular risks for perioperative complications such as (MI, PE, VFib and 3  AV Block):  Cerebrovascular Disease (TIA or CVA)  INTERPRETATION: 1 risks: Class II (low risk - 0.9% complication rate)    The patient has the following additional risks for perioperative complications:  The ASCVD Risk score (Farmervilledana FINNEY Jr., et al., " 2013) failed to calculate for the following reasons:    Cannot find a previous HDL lab    Cannot find a previous total cholesterol lab  High tolerance to opioid analgesics due to chronic opioid use for knee and back pain  Morbid obesity      ICD-10-CM    1. Preop general physical exam Z01.818 CBC with platelets differential     Comprehensive metabolic panel (BMP + Alb, Alk Phos, ALT, AST, Total. Bili, TP)     INR   2. Brain aneurysm I67.1 CBC with platelets differential     Comprehensive metabolic panel (BMP + Alb, Alk Phos, ALT, AST, Total. Bili, TP)     INR   3. Anticoagulated Z79.01 CBC with platelets differential     Comprehensive metabolic panel (BMP + Alb, Alk Phos, ALT, AST, Total. Bili, TP)     INR   4. Chronic, continuous use of opioids F11.90    5. Chronic obstructive pulmonary disease, unspecified COPD type (H) J44.9    6. Tobacco use disorder F17.200    7. Obesity, Class III, BMI 40-49.9 (morbid obesity) (H) E66.01    8. MDD (major depressive disorder), recurrent severe, without psychosis (H) F33.2        RECOMMENDATIONS:     --Consult hospital rounder / IM to assist post-op medical management     Cardiovascular Risk  Performs 4 METs exercise without symptoms (Light housework (dusting, washing dishes)) .       Pulmonary Risk  Incentive spirometry post op  Respiratory Therapy (Respiratory Care IP Consult)  post op  Advised smoking cessation.         Obstructive Sleep Apnea (or suspected sleep apnea)  Hospital staff are advised to monitor for sleep related oxygen desaturations due to suspicion of BRUCE        --Patient is to take all scheduled medications on the day of surgery EXCEPT for modifications listed below.  -Stop Adderall until after surgery (per psychiatry)  -No opioids day of surgery    APPROVAL GIVEN to proceed with proposed procedure, without further diagnostic evaluation       Signed Electronically by: LEONA Marquez CNP    Copy of this evaluation report is provided to requesting  physician.    [facility] Preop Guidelines    Revised Cardiac Risk Index

## 2020-03-04 NOTE — PATIENT INSTRUCTIONS
Before Your Surgery      Call your surgeon if there is any change in your health. This includes signs of a cold or flu (such as a sore throat, runny nose, cough, rash or fever).    Do not smoke, drink alcohol or take over the counter medicine (unless your surgeon or primary care doctor tells you to) for the 24 hours before and after surgery.    If you take prescribed drugs: Follow your doctor s orders about which medicines to take and which to stop until after surgery.    Eating and drinking prior to surgery: follow the instructions from your surgeon    Take a shower or bath the night before surgery. Use the soap your surgeon gave you to gently clean your skin. If you do not have soap from your surgeon, use your regular soap. Do not shave or scrub the surgery site.  Wear clean pajamas and have clean sheets on your bed.     At [facility], we strive to deliver an exceptional experience to you, every time we see you.  If you receive a survey in the mail, please send us back your thoughts. We really do value your feedback.    Based on your medical history, these are the current health maintenance/preventive care services that you are due for (some may have been done at this visit.)  Health Maintenance Due   Topic Date Due     SPIROMETRY  1960     HEPATITIS C SCREENING  1960     ADVANCE CARE PLANNING  1960     COPD ACTION PLAN  1960     DEPRESSION ACTION PLAN  1960     MAMMO SCREENING  1960     HIV SCREENING  05/29/1976     MEDICARE ANNUAL WELLNESS VISIT  05/29/1979     PAP  05/29/1982     LIPID  05/29/2006     DTAP/TDAP/TD IMMUNIZATION (2 - Td) 09/25/2017     INFLUENZA VACCINE (1) 09/01/2019         Suggested websites for health information:  Www.[employer].org : Up to date and easily searchable information on multiple topics.  Www.medlineplus.gov : medication info, interactive tutorials, watch real surgeries online  Www.familydoctor.org : good info from the  Academy [employer]  Www.cdc.gov : public health info, travel advisories, epidemics (H1N1)  Www.aap.org : children's health info, normal development, vaccinations  Www.health.[facility].[place].us : [place] dept of health, public health issues in [place], N1N1    Your care team:                            Family Medicine Internal Medicine   MD Renan Chambers MD Shantel Branch-Fleming, MD Katya Georgiev PA-C Nam Ho, MD Pediatrics   STEPH Cancino, RIGOBERTO Venegas APRN CNP   MD Inocencia Martin MD Deborah Mielke, MD Kim Thein, APRN CNP      Clinic hours: Monday - Thursday 7 am-7 pm; Fridays 7 am-5 pm.   Urgent care: Monday - Friday 11 am-9 pm; Saturday and Sunday 9 am-5 pm.  Pharmacy : Monday -Thursday 8 am-8 pm; Friday 8 am-6 pm; Saturday and Sunday 9 am-5 pm.     Clinic: [phone]   Pharmacy: [phone]

## 2020-03-05 NOTE — TELEPHONE ENCOUNTER
Spoke with psychiatric provider Ya Vance.  Has been prescribed opioids through pain clinic as well as ongoing benzodiazepines.  She has a history of ADHD well-documented and has been prescribed Adderall in the past.  Would likely benefit from Suboxone and possible long-term benzodiazepine taper.  Will be referred to addiction medicine for further evaluation of possible substance use disorder versus dependence and weaning were changed to Suboxone

## 2020-03-06 ENCOUNTER — TELEPHONE (OUTPATIENT)
Dept: PALLIATIVE MEDICINE | Facility: CLINIC | Age: 60
End: 2020-03-06

## 2020-03-06 ENCOUNTER — TELEPHONE (OUTPATIENT)
Dept: FAMILY MEDICINE | Facility: CLINIC | Age: 60
End: 2020-03-06

## 2020-03-06 DIAGNOSIS — G89.29 CHRONIC LOW BACK PAIN WITHOUT SCIATICA, UNSPECIFIED BACK PAIN LATERALITY: ICD-10-CM

## 2020-03-06 DIAGNOSIS — G89.29 CHRONIC PAIN OF RIGHT KNEE: Primary | ICD-10-CM

## 2020-03-06 DIAGNOSIS — M54.50 CHRONIC LOW BACK PAIN WITHOUT SCIATICA, UNSPECIFIED BACK PAIN LATERALITY: ICD-10-CM

## 2020-03-06 DIAGNOSIS — M25.561 CHRONIC PAIN OF RIGHT KNEE: Primary | ICD-10-CM

## 2020-03-06 DIAGNOSIS — G89.4 CHRONIC PAIN SYNDROME: ICD-10-CM

## 2020-03-06 DIAGNOSIS — F11.90 CHRONIC, CONTINUOUS USE OF OPIOIDS: ICD-10-CM

## 2020-03-06 NOTE — TELEPHONE ENCOUNTER
Writer called patient. She states already got the lab results. She is calling about medication and wants call back today asap.    Routing to Demetra Meyer.  Bala Garza CMA

## 2020-03-06 NOTE — TELEPHONE ENCOUNTER
Pt is scheduled with Brie Titus on 03/16 @ 8:30 @ [facility] . Closing encounter as no further follow up is needed.     Bisi Eddy    [facility]

## 2020-03-06 NOTE — TELEPHONE ENCOUNTER
Please call Paula and let her know I said the following:  I have reviewed Paula's chart and [place] . I only manage pain for short bursts rather than chronic pain and I feel she is best suited to be treated by pain specialist. I have placed referral to pain management here at [place] and they will determine if they will take over prescribing. In the meantime until she gets in with the pain clinic at [place], she should continue to see her current pain clinic.

## 2020-03-06 NOTE — TELEPHONE ENCOUNTER
Reason for Call:  Other call back    Detailed comments: Pt calling and was told to return phone call  to Provider and would like a phone call back regarding a concern over medication.    Phone Number Patient can be reached at: Home number on file [phone] (home)    Best Time: anytime    Can we leave a detailed message on this number? YES    Call taken on 3/6/2020 at 12:23 PM by Rosendo Baker

## 2020-03-06 NOTE — TELEPHONE ENCOUNTER

## 2020-03-06 NOTE — TELEPHONE ENCOUNTER
Reason for Call:  Other call back    Detailed comments: Pt states that provider told her that if she hasn't called her by noon today to call in to provider. She would like to request a call back. Thank you.    Phone Number Patient can be reached at: Home number on file [phone] (home)    Best Time: Any    Can we leave a detailed message on this number? NO, said she will answer    Call taken on 3/6/2020 at 10:56 AM by Sole Granado

## 2020-03-06 NOTE — TELEPHONE ENCOUNTER
Patient contacted and informed of the below per provider documentation. Patient verbalizes and appreciates plan. She said [place] called her and she has an appointment with pain clinic on the 16th. She understands she is to use her current pain clinic until she is transitioned over to the [facility] pain clinic.    After chatting with provider, it appears she has an appointment with addiction medicine on the 16th, not pain clinic.     It is unclear if pain clinic will call the patient to schedule appointment. In addition, this is a complex patient and there is also a referral for addiction medicine and pain clinic. Could the team please call  pain clinic #[phone] and request schedulers look into this patient's referrals and call to assist patient to get scheduled?     Thank you!    Nola Fortune RN  [facility]

## 2020-03-09 ENCOUNTER — HOSPITAL ENCOUNTER (OUTPATIENT)
Dept: BEHAVIORAL HEALTH | Facility: CLINIC | Age: 60
End: 2020-03-09
Attending: PSYCHIATRY & NEUROLOGY
Payer: MEDICARE

## 2020-03-09 PROCEDURE — 99207 ZZC CDG-CODE CATEGORY CHANGED: CPT | Performed by: NURSE PRACTITIONER

## 2020-03-09 PROCEDURE — 99214 OFFICE O/P EST MOD 30 MIN: CPT | Performed by: NURSE PRACTITIONER

## 2020-03-09 PROCEDURE — 90853 GROUP PSYCHOTHERAPY: CPT | Performed by: SOCIAL WORKER

## 2020-03-09 PROCEDURE — G0177 OPPS/PHP; TRAIN & EDUC SERV: HCPCS | Performed by: OCCUPATIONAL THERAPIST

## 2020-03-09 NOTE — GROUP NOTE
"Psychoeducation Group Note    PATIENT'S NAME: Paula Ho  MRN:   [id number]  :   1960  ACCT. NUMBER: [id number]  DATE OF SERVICE: 3/09/20  START TIME: 10:00 AM  END TIME: 10:50 AM  FACILITATOR: Neelima Richardson OTR/L  TOPIC: MH Life Skills Group: Lifestyle Balance and Structure  55+ Program  TRACK: A2    NUMBER OF PARTICIPANTS: 5    Summary of Group / Topics Discussed:  Lifestyle Balance and Strucure:  Occupations: Patients were provided with an overview on the importance of daily occupations in support of mental health management.  Patients were assisted to establish, restore, and/or modify performance skills and patterns for improved engagement and promotion of positive mental health through meaningful occupations.  Patients gained awareness of the connection between who they are (self identity) with what they do.    Patient Session Goals / Objectives:    Increased awareness of how patient s functioning in identified meaningful occupations are impacted by their mental health status     Developed performance skills and performance patterns to enhance occupational engagement    Explored ways to generalize new awareness and skills to their everyday life        Patient Participation / Response:  Fully participated with the group by sharing personal reflections / insights and openly received / provided feedback with other participants.    Patient presentation: constricted affect; fair to good concentration; able to ask for assistance as needed, Demonstrated understanding of content through practicing engagment in focused task for coping.  Reported it \"helped me relax\"  and Patient would benefit from additional opportunities to practice the content to be able to generalize it to their everyday life with increased intentionality, consistency, and efficacy in support of their psychiatric recovery    Treatment Plan:  Patient has a current master individualized treatment plan and today was our weekly review of " the patient's progress.  See Epic treatment plan for progress / updates on goals and plan.    Neelima Richardson, OTR/L

## 2020-03-09 NOTE — GROUP NOTE
Process Group Note    PATIENT'S NAME: Puala Ho  MRN:   [id number]  :   1960  ACCT. NUMBER: [id number]  DATE OF SERVICE: 3/09/20  START TIME:  9:00 AM  END TIME:  9:50 AM  FACILITATOR: Bharti Osman [facility]  TOPIC:  Process Group    Diagnoses:  Intellectual Disabilites  317 (F70) Mild- per client report. She stated she was tested at [facility] and that is what they told her.   296.33 (F33.2) Major Depressive Disorder, Recurrent Episode, Severe _ and With anxious distress       55+ Program  TRACK: A2    NUMBER OF PARTICIPANTS: 5          Data:    Session content: At the start of this group, patients were invited to check in by identifying themselves, describing their current emotional status, and identifying issues to address in this group.   Area(s) of treatment focus addressed in this session included Symptom Management, Personal Safety, Community Resources/Discharge Planning, Abstinence/Relapse Prevention, Develop / Improve Independent Living Skills and Develop Socialization / Interpersonal Relationship Skills.    Paula resumed programming. She reports anxious mood, depressed mood with irritability. Denies S/I or safety issues today. She processed having co-morbid physical health issues and her frustration with chronic pain. She is currently in process of moving her providers to one system which is [facility]. She has an appointment on  at 8:30 am with the [facility]. She reports urges to buy chemicals. She having an appointment with Addiction Medicine as well as Pain Management. She continues to get ready for her surgery next week.       Therapeutic Interventions/Treatment Strategies:  Psychotherapist offered support, feedback and validation and reinforced use of skills. Treatment modalities used include Cognitive Behavioral Therapy.    Assessment:    Patient response:   Patient responded to session by accepting feedback, listening, being attentive,  accepting support and verbalizing understanding    Possible barriers to participation / learning include: and no barriers identified    Health Issues:   Yes: Has a brain Aneurysm       Substance Use Review:   Substance Use: Last use: Last week bought art [employer]    Mental Status/Behavioral Observations  Appearance:   Appropriate   Eye Contact:   Good   Psychomotor Behavior: Normal   Attitude:   Cooperative   Orientation:   All  Speech   Rate / Production: Normal    Volume:  Normal   Mood:    Anxious  Depressed  Irritable   Affect:    Appropriate   Thought Content:   Clear and Safety denies any current safety concerns including suicidal ideation, self-harm, and homicidal ideation  Thought Form:  Coherent  Goal Directed  Logical     Insight:    Good     Plan:     Safety Plan: No current safety concerns identified.  Recommended that patient call 911 or go to the local ED should there be a change in any of these risk factors.     Barriers to treatment: None identified    Patient Contracts (see media tab):  None    Substance Use: Not addressed in session     Continue or Discharge: Patient will continue in 55+ Program (55+) as planned. Patient is likely to benefit from learning and using skills as they work toward the goals identified in their treatment plan.  Paula will continue in treatment for mood stabilization and symptom management education to increase functioning level.      Bharti Osman, [facility]  March 9, 2020

## 2020-03-09 NOTE — GROUP NOTE
Psychotherapy Group Note    PATIENT'S NAME: Paula Ho  MRN:   [id number]  :   1960  ACCT. NUMBER: [id number]  DATE OF SERVICE: 3/09/20  START TIME: 11:00 AM  END TIME: 11:50 AM  FACILITATOR: Bharti Osman LICSW  TOPIC: MH EBP Group: Self-Awareness  55+ Program  TRACK: A2    NUMBER OF PARTICIPANTS: 5    Summary of Group / Topics Discussed:  Self-Awareness: Grief: Patients were provided with an overview of how personal losses impact their thoughts, feelings, and behaviors. Different stages of grief were discussed, with a focus on the personal and individual experiences of grief as a natural response to loss. The relationship between grief, depression, and anxiety was also discussed. Patients were provided with information regarding different ways of processing grief and shared their personal experiences.     Patient Session Goals / Objectives:    Defined and explored the concept of grief and the grieving process    Discussed relationship between grief, depression, and anxiety     Normalized and recognized the purpose/benefits of the grieving process    Discussed management of the thoughts and feelings associated with grief      Patient Participation / Response:  Fully participated with the group by sharing personal reflections / insights and openly received / provided feedback with other participants.    Demonstrated understanding of topics discussed through group discussion and participation    Treatment Plan:  Patient has an initial individualized treatment plan that was created as part of their diagnostic assessment / admission process.  A master individualized treatment plan is in the process of being developed with the patient and multi-disciplinary care team.    RASHEED Will
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
## 2020-03-09 NOTE — TELEPHONE ENCOUNTER
See other message, patient has been contacted by the pain clinic.  Denzel Veloz,  For 1st Floor Primary [facility] ([facility])    Patient forgot to take her papers regarding her upcoming surgery on 3/19/2020. Patient has an appointment today with [facility], patient's papers is put in the MA basket to give to patient.  Denzel Veloz,  For 1st Floor Primary [facility] ([facility])

## 2020-03-09 NOTE — PROGRESS NOTES
Pre-op and labs printed and faxed.  Denzel Veloz,  For 1st Floor [facility] ([facility])

## 2020-03-10 ENCOUNTER — PATIENT OUTREACH (OUTPATIENT)
Dept: CARE COORDINATION | Facility: CLINIC | Age: 60
End: 2020-03-10

## 2020-03-10 NOTE — PROGRESS NOTES
"Clinic Care Coordination Contact    Follow Up Progress Note      Assessment: [facility] contacted patient for monthly follow up. Patient stated that she has surgery on Thursday. She stated \"it is what it is\" when asked how she is feeling about it. Patient has not pursued anything in regards to the CADI waiver yet. She reports that she has had a lot of appointments lately and is focusing on her surgery coming up on Thursday. She will look more seriously in to this once things calm down. Patient has started her IOP program and is enjoying it. She feels that it is going well.    Goals addressed this encounter:   Goals Addressed                 This Visit's Progress       Patient Stated      Mental Health Management (pt-stated)   On Hold     Goal Statement: I want a [facility]   Measure of Success: will be assessed for a [facility]   Supportive Steps to Achieve: Pt will call [facility]  Barriers: already has an [facility] worker  Strengths: interested  Date to Achieve By: 4/10/2020  Patient expressed understanding of goal: yes    Patient is uncertain if she will proceed, CADI services may significantly reduce current PCA hours which is very important to her and her functional needs     2/24/2020: Patient still skeptical on getting a CADI worker, but would be willing to look in to it more seriously.     3/10/2020: Patient is looking for a new [facility] worker. Has not pursued CADI waiver yet. Does plan to do this        COMPLETED: Mental Health Management (pt-stated)        Goal Statement: I need to start intensive outpatient program.   Date Goal set: 2/24/2020  Barriers: Only available certain times of the day and time slot overlaps with a lot of her appointments  Strengths: Patient is motivated to attending IOP through [facility]  Date to Achieve By: 4/24/2020  Patient expressed understanding of goal: Yes  Action steps to achieve this goal:  1. I will communicate with the Intensive Outpatient Program through [facility] to " notify them that I will not be able to attend my first appt on 2/25.  2. I will work with IOP through [facility] to see if there are other time slots I can go in to    3/10/2020: Patient started IOP program and states it is going well.                Intervention/Education provided during outreach: Open ended questions     Outreach Frequency: monthly    Plan:   No further follow up needed at this time. Care Coordinator will follow up in 1 month    DENI Canchola  Primary Care Clinic- Social Work Care Coordinator  [facility] [place]  Ph: [phone]  3/10/2020 11:22 AM

## 2020-03-10 NOTE — PROGRESS NOTES
Psychiatry staffing: case discussed  Diagnosis: MDD. Intellectual disability.     Current Outpatient Medications   Medication     albuterol (PROAIR HFA) 108 (90 Base) MCG/ACT inhaler     albuterol (PROVENTIL) (2.5 MG/3ML) 0.083% neb solution     amphetamine-dextroamphetamine (ADDERALL) 20 MG tablet     budesonide-formoterol (SYMBICORT) 160-4.5 MCG/ACT Inhaler     cetirizine (ZYRTEC) 10 MG tablet     clonazePAM (KLONOPIN) 0.5 MG tablet     clopidogrel (PLAVIX) 75 MG tablet     FLUoxetine (PROZAC) 40 MG capsule     levothyroxine (SYNTHROID/LEVOTHROID) 25 MCG tablet     losartan (COZAAR) 50 MG tablet     Multiple Vitamins-Minerals (MULTIVITAMIN ADULT PO)     NARCAN 4 MG/0.1ML nasal spray     order for DME     oxyCODONE (XTAMPZA) 18 MG 12 hr tablet     oxyCODONE IR (ROXICODONE) 15 MG tablet     pravastatin (PRAVACHOL) 40 MG tablet     tiotropium (SPIRIVA HANDIHALER) 18 MCG inhaled capsule     tiZANidine (ZANAFLEX) 2 MG tablet     topiramate (TOPAMAX) 100 MG tablet     vitamin D3 (CHOLECALCIFEROL) 2000 units (50 mcg) tablet     No current facility-administered medications for this encounter.      Past Medical History:   Diagnosis Date     Brain aneurysm      COPD (chronic obstructive pulmonary disease) (H)      Depressive disorder      History of knee replacement procedure of right knee      Lumbar spine pain      Uncomplicated asthma

## 2020-03-10 NOTE — ADDENDUM NOTE
Encounter addended by: April Hairston APRN CNP on: 3/10/2020 12:25 PM   Actions taken: Clinical Note Signed

## 2020-03-11 ENCOUNTER — TELEPHONE (OUTPATIENT)
Dept: FAMILY MEDICINE | Facility: CLINIC | Age: 60
End: 2020-03-11

## 2020-03-11 ENCOUNTER — HOSPITAL ENCOUNTER (OUTPATIENT)
Dept: BEHAVIORAL HEALTH | Facility: CLINIC | Age: 60
End: 2020-03-11
Attending: PSYCHIATRY & NEUROLOGY
Payer: MEDICARE

## 2020-03-11 PROCEDURE — 90853 GROUP PSYCHOTHERAPY: CPT | Performed by: SOCIAL WORKER

## 2020-03-11 PROCEDURE — G0177 OPPS/PHP; TRAIN & EDUC SERV: HCPCS | Performed by: OCCUPATIONAL THERAPIST

## 2020-03-11 PROCEDURE — G0177 OPPS/PHP; TRAIN & EDUC SERV: HCPCS

## 2020-03-11 NOTE — GROUP NOTE
Psychoeducation Group Note    PATIENT'S NAME: Paula Ho  MRN:   [id number]  :   1960  ACCT. NUMBER: [id number]  DATE OF SERVICE: 3/11/20  START TIME: 10:00 AM  END TIME: 10:50 AM  FACILITATOR: Ivis Anderson RN  TOPIC: MH RN Group: [facility]  55+ Program  TRACK: a2    NUMBER OF PARTICIPANTS: 5    Summary of Group / Topics Discussed:  Foundations of Health: Nutrition: Macronutrients: Patient were provided education on major macronutrients, their role in the body, and why it is important to meet daily nutritional needs. Obstacles to meeting daily nutritional needs were identified in group discussion and strategies to promote improved nutrition were explored. Macronutrients discussed include: carbohydrates, proteins and amino acids, fats, fiber, and water.     Patient Session Goals / Objectives:  ? Discussed the role of diet on mood, physical health, energy level, and the development of chronic disease.  ? Identified daily nutritional needs recommended by the USDA via My Plate education resources  ? Developing increased health literacy skills in finding credible nutrition information from reliable sources        Patient Participation / Response:  Fully participated with the group by sharing personal reflections / insights and openly received / provided feedback with other participants.    Demonstrated understanding of topics discussed through group discussion and participation and Identified / Expressed personal readiness to practice skills    Treatment Plan:  Patient has a current master individualized treatment plan.  See Epic treatment plan for more information.    Ivis Anderson RN
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
## 2020-03-11 NOTE — TELEPHONE ENCOUNTER
Labs printed and mailed to patient's home address.  Denzel Veloz,  For [facility]st Floor Primary [facility] ([facility])    Pre-op and labs were faxed after patient's appointment with [facility] on 3/4/2020.  Denzel Veloz,  For 1st Floor Primary Care ([facility])

## 2020-03-11 NOTE — TELEPHONE ENCOUNTER
.Reason for Call:  pre-op done on 03-04-20    Detailed comments: Patient is requesting test results from that visit; surgery 03-19-20 and if info was faxed; Thank you     Phone Number Patient can be reached at: Home number on file [phone] (home)    Best Time:   any    Can we leave a detailed message on this number? YES    Call taken on 3/11/2020 at 8:45 AM by Nayely Cárdenas

## 2020-03-12 ENCOUNTER — HOSPITAL ENCOUNTER (OUTPATIENT)
Dept: BEHAVIORAL HEALTH | Facility: CLINIC | Age: 60
End: 2020-03-12
Attending: PSYCHIATRY & NEUROLOGY
Payer: MEDICARE

## 2020-03-12 PROCEDURE — 90853 GROUP PSYCHOTHERAPY: CPT | Performed by: SOCIAL WORKER

## 2020-03-12 PROCEDURE — G0177 OPPS/PHP; TRAIN & EDUC SERV: HCPCS

## 2020-03-12 NOTE — GROUP NOTE
Psychoeducation Group Note    PATIENT'S NAME: Paula Ho  MRN:   [id number]  :   1960  ACCT. NUMBER: [id number]  DATE OF SERVICE: 3/12/20  START TIME: 10:00 AM  END TIME: 10:50 AM  FACILITATOR: Ivis Anderson RN  TOPIC: MH RN Group: Medication Education and Management  55+ Program  TRACK: a2    NUMBER OF PARTICIPANTS: 5    Summary of Group / Topics Discussed:  Medication Educations and Management:  Medication Jeopardy: Patients provided education regarding medication safety, antidepressants, side effects, neuroleptics, expected medication outcomes, knowledge of diagnosis, symptoms, and symptom management through an engaging jeopardy-style format.     Patient Session Goals / Objectives:    ? Participated in team-based [employer] Captureopardy game  ? Identified strategies for safe use, handling, and disposal of medications  ? Discussed basic aspects of medication safety, side effects, adverse outcomes and contraindications        Patient Participation / Response:  Fully participated with the group by sharing personal reflections / insights and openly received / provided feedback with other participants.     Demonstrated understanding of topics discussed through group discussion and participation    Treatment Plan:  Patient has a current master individualized treatment plan.  See Epic treatment plan for more information.    Ivis Anderson RN

## 2020-03-12 NOTE — GROUP NOTE
Psychoeducation Group Note    PATIENT'S NAME: Paula Ho  MRN:   [id number]  :   1960  ACCT. NUMBER: [id number]  DATE OF SERVICE: 3/11/20  START TIME: 11:00 AM  END TIME: 11:50 AM  FACILITATOR: Neelima Richardson OTR/L  TOPIC: MH Life Skills Group: Lifestyle Balance and Structure  55+ Program  TRACK: A2    NUMBER OF PARTICIPANTS: 5    Summary of Group / Topics Discussed:  Lifestyle Balance and Strucure:  Benefits of Leisure on Mental Health: Patients explored and learned about the benefits and possibilities of leisure activity to create lifestyle balance that supports their mental and physical wellbeing.  Patients were assisted to identify individualized leisure values and interests, recognized the benefits of leisure activity on mental health and problem solved barriers to leisure engagement and strategies to overcome.         Patient Session Goals / Objectives:    Increased awareness of the importance of engagement in leisure activities to support lifestyle balance and perceived quality of life    Identified strategies to recognize and challenge barriers to leisure participation     Facilitated exploration of meaningful leisure interests and values    Practiced and reflected on how to generalize taught skills to their everyday life        Patient Participation / Response:  Fully participated with the group by sharing personal reflections / insights and openly received / provided feedback with other participants.    Patient presentation: distracted; depressed and anxious mood; active at times giving suggestions and sharing personal examples, Verbalized understanding of content and Patient would benefit from additional opportunities to practice the content to be able to generalize it to their everyday life with increased intentionality, consistency, and efficacy in support of their psychiatric recovery    Treatment Plan:  Patient has an initial individualized treatment plan that was created as part of their  diagnostic assessment / admission process.  A master individualized treatment plan is in the process of being developed with the patient and multi-disciplinary care team.    Neelima Richardson OTR/L

## 2020-03-12 NOTE — GROUP NOTE
Psychotherapy Group Note    PATIENT'S NAME: Paula Ho  MRN:   [id number]  :   1960  ACCT. NUMBER: [id number]  DATE OF SERVICE: 3/12/20  START TIME:  9:00 AM  END TIME:  9:50 AM  FACILITATOR: Bharti Osman LICSW  TOPIC:  EBP Group: Emotions Management  55+ Program  TRACK: A2    NUMBER OF PARTICIPANTS: 5    Summary of Group / Topics Discussed:  Emotions Management: Understanding Emotions: Patients discussed the purpose of emotions and function they serve in our lives.  Reviewed core emotions, why they happen (triggers), and how they occur. The group assisted one anothers' understanding that: emotional experiences are important; difficult emotions have a place in our lives; and the differences between various emotions.    Patient Session Goals / Objectives:    Demonstrate understanding of types various emotions    Identify and discuss specific emotions and when they occur; understand triggers    Discuss barriers to emotional regulation      Patient Participation / Response:  Fully participated with the group by sharing personal reflections / insights and openly received / provided feedback with other participants.    Demonstrated understanding of topics discussed through group discussion and participation, Expressed understanding of the relevance / importance of emotions management skills at distressing times in life and Practiced 2-3 new skills in session    Treatment Plan:  Patient has a current master individualized treatment plan.  See Epic treatment plan for more information.    RASHEED Will

## 2020-03-12 NOTE — GROUP NOTE
Process Group Note    PATIENT'S NAME: Paula Ho  MRN:   [id number]  :   1960  ACCT. NUMBER: [id number]  DATE OF SERVICE: 3/12/20  START TIME: 11:00 AM  END TIME: 11:50 AM  FACILITATOR: Bharti Osman [facility]  TOPIC:  Process Group    Diagnoses:  Intellectual Disabilites  317 (F70) Mild- per client report. She stated she was tested at [facility] and that is what they told her.   296.33 (F33.2) Major Depressive Disorder, Recurrent Episode, Severe _ and With anxious distress       55+ Program  TRACK: A2    NUMBER OF PARTICIPANTS: 5          Data:    Session content: At the start of this group, patients were invited to check in by identifying themselves, describing their current emotional status, and identifying issues to address in this group.   Area(s) of treatment focus addressed in this session included Symptom Management, Personal Safety, Community Resources/Discharge Planning, Abstinence/Relapse Prevention, Develop / Improve Independent Living Skills and Develop Socialization / Interpersonal Relationship Skills.    Paula reports anxious mood with worry, depressed mood, interrupted sleep pattern. She reports back pain. Denies S/I or safety issues. She processed her feelings in group associated with concerns with getting the Coronavirus. She identified the ways that she is coping with her fear. She also sanitized her home. She is reading the [employer] website to get informed. Paula is worried about her dog, Peanut who is ill. She has an appointment with the Vet today. She maintains daily contact with her mom. Paula has an appointment with the [facility] on Monday. She is going to bring her medication.        Therapeutic Interventions/Treatment Strategies:  Psychotherapist offered support, feedback and validation and reinforced use of skills. Treatment modalities used include Cognitive Behavioral Therapy.    Assessment:    Patient response:   Patient responded to session  by accepting feedback, giving feedback, listening, focusing on goals and verbalizing understanding    Possible barriers to participation / learning include: and no barriers identified    Health Issues:   Yes: Pain, Associated Psychological Distress       Substance Use Review:   Substance Use: No active concerns identified.    Mental Status/Behavioral Observations  Appearance:   Appropriate   Eye Contact:   Good   Psychomotor Behavior: Normal   Attitude:   Cooperative   Orientation:   All  Speech   Rate / Production: Normal    Volume:  Normal   Mood:    Anxious  Depressed   Affect:    Appropriate  Worrisome   Thought Content:   Rumination and Safety denies any current safety concerns including suicidal ideation, self-harm, and homicidal ideation  Thought Form:  Coherent  Goal Directed  Logical     Insight:    Good     Plan:     Safety Plan: No current safety concerns identified.  Recommended that patient call 911 or go to the local ED should there be a change in any of these risk factors.     Barriers to treatment: None identified    Patient Contracts (see media tab):  None    Substance Use: Not addressed in session     Continue or Discharge: Patient will continue in 55+ Program (55+) as planned. Patient is likely to benefit from learning and using skills as they work toward the goals identified in their treatment plan.  Paula will continue in the program for mood stabilization and symptom management education to increase functioning level.       Bharti Osman, [facility]  March 12, 2020
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
## 2020-03-16 ENCOUNTER — OFFICE VISIT (OUTPATIENT)
Dept: ADDICTION MEDICINE | Facility: CLINIC | Age: 60
End: 2020-03-16
Payer: MEDICARE

## 2020-03-16 VITALS
HEIGHT: 66 IN | BODY MASS INDEX: 43.55 KG/M2 | HEART RATE: 71 BPM | OXYGEN SATURATION: 97 % | RESPIRATION RATE: 16 BRPM | WEIGHT: 271 LBS | TEMPERATURE: 96.8 F

## 2020-03-16 DIAGNOSIS — F11.20 OPIOID DEPENDENCE, UNCOMPLICATED (H): Primary | ICD-10-CM

## 2020-03-16 DIAGNOSIS — G89.29 OTHER CHRONIC PAIN: ICD-10-CM

## 2020-03-16 DIAGNOSIS — Z79.899 LONG TERM PRESCRIPTION BENZODIAZEPINE USE: ICD-10-CM

## 2020-03-16 DIAGNOSIS — F33.2 MDD (MAJOR DEPRESSIVE DISORDER), RECURRENT SEVERE, WITHOUT PSYCHOSIS (H): ICD-10-CM

## 2020-03-16 LAB

## 2020-03-16 PROCEDURE — 80306 DRUG TEST PRSMV INSTRMNT: CPT | Performed by: NURSE PRACTITIONER

## 2020-03-16 PROCEDURE — 99204 OFFICE O/P NEW MOD 45 MIN: CPT | Performed by: NURSE PRACTITIONER

## 2020-03-16 ASSESSMENT — MIFFLIN-ST. JEOR: SCORE: 1826

## 2020-03-19 ENCOUNTER — TELEPHONE (OUTPATIENT)
Dept: PALLIATIVE MEDICINE | Facility: CLINIC | Age: 60
End: 2020-03-19

## 2020-03-19 ENCOUNTER — TRANSFERRED RECORDS (OUTPATIENT)
Dept: HEALTH INFORMATION MANAGEMENT | Facility: CLINIC | Age: 60
End: 2020-03-19

## 2020-04-01 ENCOUNTER — DOCUMENTATION ONLY (OUTPATIENT)
Dept: CARE COORDINATION | Facility: CLINIC | Age: 60
End: 2020-04-01

## 2020-04-02 ENCOUNTER — TELEPHONE (OUTPATIENT)
Dept: FAMILY MEDICINE | Facility: CLINIC | Age: 60
End: 2020-04-02

## 2020-04-02 NOTE — TELEPHONE ENCOUNTER
Reason for Call:  Other prescription    Detailed comments: Pt is requesting that something be sent over to the pharmacy as she thinks she has a UTI. She would like a call back to advise if a telephone visit is needed or to confirm. Thank you.    [id number] - [facility] [address] AT [address]  [phone]    Phone Number Patient can be reached at: Home number on file [phone] (home)    Best Time: Any    Can we leave a detailed message on this number? YES    Call taken on 4/2/2020 at 5:25 PM by Sole Granado

## 2020-04-02 NOTE — TELEPHONE ENCOUNTER
Called patient, informed her Demetra is done for the day and offered her to come to urgent care or make telephone appointment for tomorrow. Patient declined urgent care and made telephone appointment with provider tomorrow morning.    Caity Alvarado MA

## 2020-04-03 ENCOUNTER — VIRTUAL VISIT (OUTPATIENT)
Dept: FAMILY MEDICINE | Facility: CLINIC | Age: 60
End: 2020-04-03
Payer: MEDICARE

## 2020-04-03 DIAGNOSIS — N30.00 ACUTE CYSTITIS WITHOUT HEMATURIA: Primary | ICD-10-CM

## 2020-04-03 PROCEDURE — G2012 BRIEF CHECK IN BY MD/QHP: HCPCS | Performed by: NURSE PRACTITIONER

## 2020-04-03 RX ORDER — CEPHALEXIN 500 MG/1
500 CAPSULE ORAL 2 TIMES DAILY
Qty: 10 CAPSULE | Refills: 0 | Status: SHIPPED | OUTPATIENT
Start: 2020-04-03 | End: 2020-06-22

## 2020-04-03 ASSESSMENT — PAIN SCALES - GENERAL: PAINLEVEL: NO PAIN (0)

## 2020-04-03 NOTE — PROGRESS NOTES
"Subjective     Paula Ho is a 59 year old female who is being evaluated via a billable telephone visit.      The patient has been notified of following:     \"This telephone visit will be conducted via a call between you and your physician/provider. We have found that certain health care needs can be provided without the need for a physical exam.  This service lets us provide the care you need with a short phone conversation.  If a prescription is necessary we can send it directly to your pharmacy.  If lab work is needed we can place an order for that and you can then stop by our lab to have the test done at a later time.    If during the course of the call the physician/provider feels a telephone visit is not appropriate, you will not be charged for this service.\"     Patient has given verbal consent for Telephone visit?  Yes    Paula Ho complains of   Chief Complaint   Patient presents with     UTI       ALLERGIES  Compazine [prochlorperazine]; Droperidol; Lisinopril; and Seroquel [quetiapine]    URINARY TRACT SYMPTOMS  Onset: 1 day    Description:   Painful urination (Dysuria): no            Frequency: YES but only small amounts  Blood in urine (Hematuria): no   Delay in urine (Hesitency): no     Intensity: moderate    Progression of Symptoms:  improving    Accompanying Signs & Symptoms:  Fever/chills: no   Flank pain no   Nausea and vomiting: no   Any vaginal symptoms: burning  Abdominal/Pelvic Pain: no     History:   History of frequent UTI's: no   History of kidney stones: no   Sexually Active: no   Possibility of pregnancy: No    Precipitating factors:   None    Therapies Tried and outcome: None    Last UTI years ago  Had catheter in for recent surgery  No fever  No abdominal pain  No nausea or vomiting  No rash        Patient Active Problem List   Diagnosis     Chronic knee pain     Anxiety disorder     Major depressive disorder, recurrent episode (H)     Brain aneurysm     Chronic, continuous " use of opioids     Asthma     Chronic GERD     Chronic pain     Cigarette smoker     DJD (degenerative joint disease)     Insomnia     Obesity, Class III, BMI 40-49.9 (morbid obesity) (H)     SAH (subarachnoid hemorrhage) (H)     Status post knee surgery     Tobacco use disorder     Jesús's disease (congenital syphilitic osteochondritis)     Chronic obstructive pulmonary disease, unspecified COPD type (H)     Anticoagulated     MDD (major depressive disorder), recurrent severe, without psychosis (H)     Past Surgical History:   Procedure Laterality Date     APPENDECTOMY       ENT SURGERY      tonsils     ORTHOPEDIC SURGERY      4 on r knee       Social History     Tobacco Use     Smoking status: Current Every Day Smoker     Packs/day: 0.00     Smokeless tobacco: Never Used   Substance Use Topics     Alcohol use: Yes     Family History   Problem Relation Age of Onset     Depression Mother      Substance Abuse Father          Current Outpatient Medications   Medication Sig Dispense Refill     albuterol (PROAIR HFA) 108 (90 Base) MCG/ACT inhaler Inhale 2 puffs into the lungs every 4 hours as needed for shortness of breath / dyspnea or wheezing 1 Inhaler 3     albuterol (PROVENTIL) (2.5 MG/3ML) 0.083% neb solution NEBULIZE THE CONTENTS OF 1 VIAL EVERY 6 HOURS AS NEEDED. 25 vial 1     amphetamine-dextroamphetamine (ADDERALL) 20 MG tablet Take 20 mg by mouth 2 times daily       budesonide-formoterol (SYMBICORT) 160-4.5 MCG/ACT Inhaler Inhale 2 puffs into the lungs 2 times daily 3 Inhaler 3     cephALEXin (KEFLEX) 500 MG capsule Take 1 capsule (500 mg) by mouth 2 times daily for 5 days 10 capsule 0     cetirizine (ZYRTEC) 10 MG tablet Take 1 tablet (10 mg) by mouth daily 14 tablet 1     clonazePAM (KLONOPIN) 0.5 MG tablet Take 0.5 mg by mouth 2 times daily as needed       clopidogrel (PLAVIX) 75 MG tablet        FLUoxetine (PROZAC) 40 MG capsule Take 2 capsules (80 mg) by mouth daily 60 capsule 0     levothyroxine  (SYNTHROID/LEVOTHROID) 25 MCG tablet Take 25 mcg by mouth daily       losartan (COZAAR) 50 MG tablet Take 50 mg by mouth daily       Multiple Vitamins-Minerals (MULTIVITAMIN ADULT PO)        NARCAN 4 MG/0.1ML nasal spray INHALE VIA NASAL ROUTE FOR OVERDOSE AS NEEDED. MAY REPEAT AFTER 2-3 MINUTES  0     order for DME Equipment being ordered: Nebulizer 1 Units 0     oxyCODONE (XTAMPZA) 18 MG 12 hr tablet Take 18 mg by mouth every 12 hours       oxyCODONE IR (ROXICODONE) 15 MG tablet Take 7.5 mg by mouth 2 times daily as needed       pravastatin (PRAVACHOL) 40 MG tablet Take 40 mg by mouth daily       tiotropium (SPIRIVA HANDIHALER) 18 MCG inhaled capsule Inhale 1 capsule (18 mcg) into the lungs daily 90 capsule 3     tiZANidine (ZANAFLEX) 2 MG tablet TK 2 TO 3 TS PO QHS  4     topiramate (TOPAMAX) 100 MG tablet Take 100 mg by mouth daily  1     vitamin D3 (CHOLECALCIFEROL) 2000 units (50 mcg) tablet Take 1 tablet by mouth daily  1     Allergies   Allergen Reactions     Compazine [Prochlorperazine]      Droperidol      Lisinopril      Seroquel [Quetiapine]        Reviewed and updated as needed this visit by Provider         Review of Systems   ROS COMP: Constitutional, HEENT, cardiovascular, pulmonary, gi and gu systems are negative, except as otherwise noted.       Objective   Reported vitals:  LMP  (LMP Unknown)    healthy, alert and no distress  Psych: Alert and oriented times 3; coherent speech, normal   rate and volume, able to articulate logical thoughts, able   to abstract reason, no tangential thoughts, no hallucinations   or delusions  Her affect is normal     Diagnostic Test Results:  Labs reviewed in Epic        Assessment/Plan:  1. Acute cystitis without hematuria  Discussed treating over the phone vs sample in clinic. Patient prefers to do phone treatment due to COVID 19. If she is not improving I will enter UA/UC order and she can go to lab and do lab only visit. Supportive cares discussed.  - cephALEXin  (KEFLEX) 500 MG capsule; Take 1 capsule (500 mg) by mouth 2 times daily for 5 days  Dispense: 10 capsule; Refill: 0    Return in about 3 days (around 4/6/2020), or if symptoms worsen or fail to improve.    The benefits, risks and potential side effects were discussed in detail. All patient questions were answered. The patient was instructed to follow up immediately if any adverse reactions develop.    Return precautions discussed, including when to seek urgent/emergent care.    Patient verbalizes understanding and agrees with plan of care. .      Phone call duration:  6 minutes    LEONA Marquez CNP

## 2020-04-08 ENCOUNTER — HOSPITAL ENCOUNTER (OUTPATIENT)
Dept: BEHAVIORAL HEALTH | Facility: CLINIC | Age: 60
End: 2020-04-08
Attending: PSYCHIATRY & NEUROLOGY
Payer: MEDICARE

## 2020-04-08 PROCEDURE — 90853 GROUP PSYCHOTHERAPY: CPT | Mod: GT | Performed by: SOCIAL WORKER

## 2020-04-08 NOTE — GROUP NOTE
Process Group Note  Telemedicine Visit: The patient's condition can be safely assessed and treated via synchronous audio and visual telemedicine encounter.      Reason for Telemedicine Visit: Services only offered telehealth    Originating Site (Patient Location): Patient's home    Distant Site (Provider Location): [facility]    Consent:  The patient/guardian has verbally consented to: the potential risks and benefits of telemedicine (video visit) versus in person care; bill my insurance or make self-payment for services provided; and responsibility for payment of non-covered services.     Mode of Communication:  Video Conference via [employer]    As the provider I attest to compliance with applicable laws and regulations related to telemedicine.    PATIENT'S NAME: Paula Ho  MRN:   [id number]  :   1960  ACCT. NUMBER: [id number]  DATE OF SERVICE: 20  START TIME:  1:00 PM  END TIME:  1:50 PM  FACILITATOR: Bharti Osman [facility]  TOPIC:  Process Group    Diagnoses:  Intellectual Disabilites  317 (F70) Mild- per client report. She stated she was tested at [facility] and that is what they told her.   296.33 (F33.2) Major Depressive Disorder, Recurrent Episode, Severe _ and With anxious distress       55+ Program  TRACK: B2    NUMBER OF PARTICIPANTS: 5          Data:    Session content: At the start of this group, patients were invited to check in by identifying themselves, describing their current emotional status, and identifying issues to address in this group.   Area(s) of treatment focus addressed in this session included Symptom Management, Personal Safety, Community Resources/Discharge Planning and Develop / Improve Independent Living Skills.    Paula processed her feelings related to services being interrupted., changes in her lifestyle related to COVID-19 as well as healing from her surgery. She is feeling frustrated and socially isolated. She also processed  the interpersonal relationship dynamics with her mother including limited support. She resides with [facility] who are supportive and has in home services. She was given numbers to her outpatient health providers.      Therapeutic Interventions/Treatment Strategies:  Psychotherapist offered support, feedback and validation and reinforced use of skills. Treatment modalities used include Cognitive Behavioral Therapy.    Assessment:    Patient response:   Patient responded to session by verbalizing understanding    Possible barriers to participation / learning include: COVID-19    Health Issues:   None reported       Substance Use Review:   Substance Use: No active concerns identified.    Mental Status/Behavioral Observations  Appearance:   Appropriate   Eye Contact:   Good   Psychomotor Behavior: Normal   Attitude:   Cooperative   Orientation:   All  Speech   Rate / Production: Normal    Volume:  Normal   Mood:    Anxious  Depressed   Affect:    Appropriate   Thought Content:   Clear and Safety denies any current safety concerns including suicidal ideation, self-harm, and homicidal ideation  Thought Form:  Coherent  Goal Directed  Logical     Insight:    Good     Plan:     Safety Plan: No current safety concerns identified.  Recommended that patient call 911 or go to the local ED should there be a change in any of these risk factors.     Barriers to treatment: COVID-19    Patient Contracts (see media tab):  None    Substance Use: Not addressed in session     Continue or Discharge: Patient will continue in 55+ Program (55+) as planned. Patient is likely to benefit from learning and using skills as they work toward the goals identified in their treatment plan. Paula will be on the Telemedicine Group Visit on Friday.      Bharti Osman, [facility]  April 8, 2020

## 2020-04-09 ENCOUNTER — PATIENT OUTREACH (OUTPATIENT)
Dept: CARE COORDINATION | Facility: CLINIC | Age: 60
End: 2020-04-09

## 2020-04-09 NOTE — ADDENDUM NOTE
Encounter addended by: Bharti Osman, LICSW on: 4/9/2020 8:42 AM   Actions taken: Charge Capture section accepted

## 2020-04-09 NOTE — PROGRESS NOTES
Clinic Care Coordination Contact  [facility]/Voicemail       Clinical Data: Care Coordinator Outreach  Outreach attempted x 1.  Left message on patient's voicemail with call back information and requested return call.  Plan:  Care Coordinator will try to reach patient again in 2 weeks.    DENI Canchola  Primary Care Clinic- Social Work Care Coordinator  [facility] [place]  Ph: [phone]  4/9/2020 11:31 AM

## 2020-04-10 ENCOUNTER — MEDICAL CORRESPONDENCE (OUTPATIENT)
Dept: HEALTH INFORMATION MANAGEMENT | Facility: CLINIC | Age: 60
End: 2020-04-10

## 2020-04-10 ENCOUNTER — DOCUMENTATION ONLY (OUTPATIENT)
Dept: FAMILY MEDICINE | Facility: CLINIC | Age: 60
End: 2020-04-10

## 2020-04-10 ENCOUNTER — TELEPHONE (OUTPATIENT)
Dept: BEHAVIORAL HEALTH | Facility: CLINIC | Age: 60
End: 2020-04-10

## 2020-04-10 NOTE — PROGRESS NOTES
I received fax via [facility] requesting ICD 10 codes for albuterol. Completed and faxed back at 3:39 PM April 10, 2020

## 2020-04-10 NOTE — TELEPHONE ENCOUNTER
Called Paula today to follow up on absence from group.  Requested she call RASHEED Lemus to follow up.  Will inform team of our conversation.

## 2020-04-14 PROBLEM — G89.29 CHRONIC MIDLINE LOW BACK PAIN WITH BILATERAL SCIATICA: Chronic | Status: ACTIVE | Noted: 2020-01-31

## 2020-04-14 PROBLEM — G89.29 CHRONIC MIDLINE LOW BACK PAIN WITH BILATERAL SCIATICA: Status: ACTIVE | Noted: 2020-01-31

## 2020-04-14 PROBLEM — I10 BENIGN ESSENTIAL HYPERTENSION: Status: ACTIVE | Noted: 2020-04-14

## 2020-04-14 PROBLEM — K21.9 CHRONIC GERD: Status: ACTIVE | Noted: 2017-01-26

## 2020-04-14 PROBLEM — J96.00 ACUTE RESPIRATORY FAILURE (H): Status: RESOLVED | Noted: 2020-02-01 | Resolved: 2020-04-14

## 2020-04-14 PROBLEM — M54.41 CHRONIC MIDLINE LOW BACK PAIN WITH BILATERAL SCIATICA: Chronic | Status: ACTIVE | Noted: 2020-01-31

## 2020-04-14 PROBLEM — F90.9 ATTENTION DEFICIT HYPERACTIVITY DISORDER (ADHD): Status: ACTIVE | Noted: 2020-01-31

## 2020-04-14 PROBLEM — I60.9 SAH (SUBARACHNOID HEMORRHAGE) (H): Status: ACTIVE | Noted: 2017-12-22

## 2020-04-14 PROBLEM — M54.42 CHRONIC MIDLINE LOW BACK PAIN WITH BILATERAL SCIATICA: Chronic | Status: ACTIVE | Noted: 2020-01-31

## 2020-04-14 PROBLEM — I67.1 BRAIN ANEURYSM: Status: ACTIVE | Noted: 2019-12-03

## 2020-04-14 PROBLEM — Z86.79 HISTORY OF SUBARACHNOID HEMORRHAGE: Chronic | Status: ACTIVE | Noted: 2017-12-22

## 2020-04-14 PROBLEM — M54.41 CHRONIC MIDLINE LOW BACK PAIN WITH BILATERAL SCIATICA: Status: ACTIVE | Noted: 2020-01-31

## 2020-04-14 PROBLEM — E78.5 HYPERLIPIDEMIA: Status: ACTIVE | Noted: 2020-04-14

## 2020-04-14 PROBLEM — F90.9 ATTENTION DEFICIT HYPERACTIVITY DISORDER (ADHD): Chronic | Status: ACTIVE | Noted: 2020-01-31

## 2020-04-14 PROBLEM — M54.42 CHRONIC MIDLINE LOW BACK PAIN WITH BILATERAL SCIATICA: Status: ACTIVE | Noted: 2020-01-31

## 2020-04-14 PROBLEM — J96.00 ACUTE RESPIRATORY FAILURE (H): Status: ACTIVE | Noted: 2020-02-01

## 2020-04-14 PROBLEM — F33.2 MDD (MAJOR DEPRESSIVE DISORDER), RECURRENT SEVERE, WITHOUT PSYCHOSIS (H): Chronic | Status: ACTIVE | Noted: 2020-03-02

## 2020-04-15 ENCOUNTER — TELEPHONE (OUTPATIENT)
Dept: BEHAVIORAL HEALTH | Facility: CLINIC | Age: 60
End: 2020-04-15

## 2020-04-15 NOTE — TELEPHONE ENCOUNTER
This author contacted the patient on this date at 3:52 pm in order to complete a program oversight call. The patient was unavailable. As such, this author left a message with the purpose of her call, contact info, and the request that the patient return her call.

## 2020-04-20 ENCOUNTER — TELEPHONE (OUTPATIENT)
Dept: BEHAVIORAL HEALTH | Facility: CLINIC | Age: 60
End: 2020-04-20

## 2020-04-20 NOTE — TELEPHONE ENCOUNTER
Writer left message regarding patient getting [facility] set-up; contact information was provided: [phone].    MARILIN Ramirez on 4/20/2020 at 10:28 AM

## 2020-04-20 NOTE — TELEPHONE ENCOUNTER
"The patient has been notified of following:       this telephone call is being conducted over the phone. Here at [facility], we have found that certain health care needs can be provided without the need for a physical exam.  This service lets us provide the care you need with a phone conversation.     D.    During today s call, this author identified herself, her role with [facility] programming, and the purpose for the phone call (to provide program oversight). In addition, this author checked in about the patient s mental health diagnoses and how they are managing mental health symptoms at this time. The patient identified they are talking their medications, talking with their PCA, and taking care of their dogs to manage mental health symptoms at this time. After, this author checked in to determine if the patient is finding relief from their presenting issue (depression) as a result of services. The patient stated they were finding benefit from programming when they were able to attend prior to tech issues. The patient and this author then tried to troubleshoot tech issues. Later in the call, this author inquired about the patient s thoughts/feelings related to current programming efforts via telephone/telehealth platforms. The patient stated she found programming efforts to be \"okay,\" but found calls with the OT to be most helpful. The patient also identified that returning to face-to-face groups would be beneficial for their care moving forward. In addition, they identified that they believe they are receiving an appropriate level of care for their symptoms. When asked about medications, the patient identified that their medications have changed. More specifically, they identified that they now take 20mg of oxycodone 3x/daily and 5mg of oxycodone 2x/daily. Towards the conclusion of the call, the patient denied they are currently working with a psychiatric provider (on hold due to COVID) and denied they work with an " individual therapist. This author provided recommendations for therapy providers in their area ([facility] in [place], [facility] in [place], and [facility] in [facility]). They were urged to continue services and reach out for both psychiatric and individual therapy sessions. Before concluding, safety was assessed. The patient confirmed they have experienced feelings of depression in the past 2 weeks, denied they have experienced thoughts of ending their life in the past 2 weeks, and denied they have ever attempted to end their life. The patient was assured this author, Maricel Cisse, or Chaitanya ASIF would be in touch with her regarding IT issues. The patient verbally consented to be contacted by Ms. Cisse or Chaitanya ASIF.    I.     Follow up on individual therapy recommendations (see above for referrals provided) and schedule a medication management appointment    A.  During the call, the patient was oriented x4. They were cooperative during the call as evidenced by their engagement and answers to questions. In addition, they were receptive to recommendations made at the completion of the call.     P.   The patient was urged to continue working closely with their programming team (therapist, OT, nurses). They were also urged to continue follow-up care with their medication management provider and establish individual therapy.      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 She stated she was tested at [facility] and that is what they told her.   296.33 (F33.2) Major Depressive Disorder, Recurrent Episode, Severe _ and With anxious distress      I have reviewed the note as documented above.  This accurately captures the substance of my conversation with the patient.    Phone call contact time  Call Started at 9:00am  Call Ended at 10:12am    Elicia Washington PsyD, GOGO

## 2020-04-20 NOTE — TELEPHONE ENCOUNTER
Writer and patient worked to connect patient to [facility] and patient successfully connected via her email on her phone ([email or web address]).    Maricel Cisse, MARILIN on 4/20/2020 at 11:35 AM

## 2020-04-23 ENCOUNTER — PATIENT OUTREACH (OUTPATIENT)
Dept: CARE COORDINATION | Facility: CLINIC | Age: 60
End: 2020-04-23

## 2020-04-23 NOTE — PROGRESS NOTES
Clinic Care Coordination Contact  [facility]/Voicemail       Clinical Data: Care Coordinator Outreach  Outreach attempted x 2.  Left message on patient's voicemail with call back information and requested return call.  Plan: Care Coordinator will try to reach patient again in 2 weeks    DENI Canchola  Primary Care Clinic- Social Work Care Coordinator  [facility] [place]  Ph: [phone]  4/23/2020 11:44 AM

## 2020-04-30 ENCOUNTER — TELEPHONE (OUTPATIENT)
Dept: BEHAVIORAL HEALTH | Facility: CLINIC | Age: 60
End: 2020-04-30

## 2020-05-01 ENCOUNTER — PRE VISIT (OUTPATIENT)
Dept: PULMONOLOGY | Facility: CLINIC | Age: 60
End: 2020-05-01

## 2020-05-01 ENCOUNTER — VIRTUAL VISIT (OUTPATIENT)
Dept: PULMONOLOGY | Facility: CLINIC | Age: 60
End: 2020-05-01
Attending: NURSE PRACTITIONER
Payer: MEDICARE

## 2020-05-01 ENCOUNTER — TELEPHONE (OUTPATIENT)
Dept: BEHAVIORAL HEALTH | Facility: CLINIC | Age: 60
End: 2020-05-01

## 2020-05-01 DIAGNOSIS — F17.210 CIGARETTE NICOTINE DEPENDENCE WITHOUT COMPLICATION: ICD-10-CM

## 2020-05-01 DIAGNOSIS — J44.9 CHRONIC OBSTRUCTIVE PULMONARY DISEASE, UNSPECIFIED COPD TYPE (H): Primary | ICD-10-CM

## 2020-05-01 RX ORDER — TIOTROPIUM BROMIDE 18 UG/1
18 CAPSULE ORAL; RESPIRATORY (INHALATION) DAILY
Qty: 90 CAPSULE | Refills: 3 | Status: SHIPPED | OUTPATIENT
Start: 2020-05-01 | End: 2022-06-30

## 2020-05-01 NOTE — PATIENT INSTRUCTIONS
1. It was very nice talking to you today.     2. Continue current therapies including symbicort, spiriva and albuterol inhalers, and albuterol nebulizers, as directed.     3. Please call if you need refills of anything prior to them running out.     4. Try to stay active and I would encourage getting some sunshine while engaging in social distancing.     5. Wash your hands regularly.     6. If you have issues with seasonal allergies, trial an over the counter antihistamine such as claritin (loratidine) or zyrtec (cetirizine).     Return to clinic in 3 months or sooner if needed.  Please call the clinic with any questions or concerns ([phone]).
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
## 2020-05-01 NOTE — PROGRESS NOTES
"Paula Ho is a 59 year old female who is being evaluated via a billable video visit.      The patient has been notified of following:     \"This video visit will be conducted via a call between you and your physician/provider. We have found that certain health care needs can be provided without the need for an in-person physical exam.  This service lets us provide the care you need with a video conversation.  If a prescription is necessary we can send it directly to your pharmacy.  If lab work is needed we can place an order for that and you can then stop by our lab to have the test done at a later time.    Video visits are billed at different rates depending on your insurance coverage.  Please reach out to your insurance provider with any questions.    If during the course of the call the physician/provider feels a video visit is not appropriate, you will not be charged for this service.\"    Patient has given verbal consent for Video visit? Yes    How would you like to obtain your AVS? Mail a copy    Patient would like the video invitation sent by: Text to cell phone: [phone]    Will anyone else be joining your video visit? No        Video-Visit Details    Type of service:  Video Visit    Video Start Time:   Video End Time:     Originating Location (pt. Location):     Distant Location (provider location):  [facility]     Platform used for Video Visit:             "

## 2020-05-04 ENCOUNTER — TELEPHONE (OUTPATIENT)
Dept: ONCOLOGY | Facility: CLINIC | Age: 60
End: 2020-05-04

## 2020-05-04 NOTE — TELEPHONE ENCOUNTER
Tobacco Treatment Program at the [facility] attempted to reach Ms. Ho on 5/4/2020 regarding the tobacco cessation program to help Ms. Ho to quit smoking. We will attempt to reach Ms. Ho another time.     [facility]aryn Martinez [facility]  Tobacco Treatment Specialist  PH: [phone]

## 2020-05-05 ENCOUNTER — VIRTUAL VISIT (OUTPATIENT)
Dept: FAMILY MEDICINE | Facility: CLINIC | Age: 60
End: 2020-05-05
Payer: MEDICARE

## 2020-05-05 ENCOUNTER — RECORDS - HEALTHEAST (OUTPATIENT)
Dept: SCHEDULING | Facility: CLINIC | Age: 60
End: 2020-05-05

## 2020-05-05 ENCOUNTER — NURSE TRIAGE (OUTPATIENT)
Dept: NURSING | Facility: CLINIC | Age: 60
End: 2020-05-05

## 2020-05-05 DIAGNOSIS — Z79.01 ANTICOAGULATED: ICD-10-CM

## 2020-05-05 DIAGNOSIS — Z71.89 EDUCATED ABOUT COVID-19 VIRUS INFECTION: ICD-10-CM

## 2020-05-05 DIAGNOSIS — Z20.822 EXPOSURE TO COVID-19 VIRUS: Primary | ICD-10-CM

## 2020-05-05 DIAGNOSIS — S40.021A CONTUSION OF RIGHT UPPER EXTREMITY, INITIAL ENCOUNTER: Primary | ICD-10-CM

## 2020-05-05 PROCEDURE — 99442 ZZC PHYSICIAN TELEPHONE EVALUATION 11-20 MIN: CPT | Performed by: NURSE PRACTITIONER

## 2020-05-05 NOTE — PROGRESS NOTES
"Paula Ho is a 59 year old female who is being evaluated via a billable video visit.      The patient has been notified of following:     \"This video visit will be conducted via a call between you and your physician/provider. We have found that certain health care needs can be provided without the need for an in-person physical exam.  This service lets us provide the care you need with a video conversation.  If a prescription is necessary we can send it directly to your pharmacy.  If lab work is needed we can place an order for that and you can then stop by our lab to have the test done at a later time.    Video visits are billed at different rates depending on your insurance coverage.  Please reach out to your insurance provider with any questions.    If during the course of the call the physician/provider feels a video visit is not appropriate, you will not be charged for this service.\"    Patient has given verbal consent for Video visit? Yes    How would you like to obtain your AVS? Mail a copy    Patient would like the video invitation sent by: Text to cell phone: [phone]    Will anyone else be joining your video visit? No        Subjective     Paula Ho is a 59 year old female who presents to clinic today for the following health issues:    HPI  Musculoskeletal problem/pain      Duration: this morning    Description  Location: right arm     Intensity:  moderate    Accompanying signs and symptoms: bruising    History  Previous similar problem: no   Previous evaluation:  none    Precipitating or alleviating factors:  Trauma or overuse: YES  Aggravating factors include: raising right arm.    Therapies tried and outcome: nothing    Video visit was switched to phone visit as patient had difficulty connecting with the codey. \"The blue [place] keeps spinning.\"    Paula has concerns about excessive bruising. Today she has a new bruise from her right elbow to shoulder. It is very painful and limits her " arm movement. She recently had an aneurysm repaired and is on plavix. She is currently taking Plavix every other day. This is all managed by [facility] and her neurosurgeon. She was last seen by them with dosage adjustment on 5/1, 4 days ago.     She also has questions about COVID 19 antibody testing. She was very ill in February and was in the ICU and wants to be tested to see if she had COVID.     Patient Active Problem List   Diagnosis     Chronic knee pain     LIBRA (generalized anxiety disorder)     Brain aneurysm     Chronic, continuous use of opioids     Asthma     Chronic GERD     Chronic pain     Cigarette smoker     DJD (degenerative joint disease)     Insomnia     Obesity, Class III, BMI 40-49.9 (morbid obesity) (H)     History of subarachnoid hemorrhage     Status post knee surgery     Tobacco use disorder     Chronic obstructive pulmonary disease, unspecified COPD type (H)     MDD (major depressive disorder), recurrent severe, without psychosis (H)     Attention deficit hyperactivity disorder (ADHD)     Chronic midline low back pain with bilateral sciatica     Hyperlipidemia     Benign essential hypertension     Past Surgical History:   Procedure Laterality Date     APPENDECTOMY       ORTHOPEDIC SURGERY  2002    Circa 2002: right knee arthroscopy (Dr. Pappas)     ORTHOPEDIC SURGERY  2004    Circa 2004: right knee partial knee replacement (Iam Ritchie MD)     ORTHOPEDIC SURGERY  2006    Circa 2006: right TKA (Ron Ryder MD; [facility])     ORTHOPEDIC SURGERY  2008    Circa 2008: right TKA revision due to infection (Ron Ryder MD; [facility])     ORTHOPEDIC SURGERY  2009    Circa 2009: right TKA revision due to infection (Ron Ryder MD; [facility])     ORTHOPEDIC SURGERY  2011 April 2011: right TKA (Ron Ryder MD; [facility])     TONSILLECTOMY      tonsils       Social History     Tobacco Use     Smoking status: Current Every Day Smoker      Packs/day: 0.00     Smokeless tobacco: Never Used   Substance Use Topics     Alcohol use: Yes     Family History   Problem Relation Age of Onset     Depression Mother      Substance Abuse Father          Current Outpatient Medications   Medication Sig Dispense Refill     albuterol (PROAIR HFA) 108 (90 Base) MCG/ACT inhaler Inhale 2 puffs into the lungs every 4 hours as needed for shortness of breath / dyspnea or wheezing 1 Inhaler 3     albuterol (PROVENTIL) (2.5 MG/3ML) 0.083% neb solution NEBULIZE THE CONTENTS OF 1 VIAL EVERY 6 HOURS AS NEEDED. 25 vial 1     amphetamine-dextroamphetamine (ADDERALL) 20 MG tablet Take 20 mg by mouth 2 times daily       budesonide-formoterol (SYMBICORT) 160-4.5 MCG/ACT Inhaler Inhale 2 puffs into the lungs 2 times daily 3 Inhaler 3     cetirizine (ZYRTEC) 10 MG tablet Take 1 tablet (10 mg) by mouth daily 14 tablet 1     clonazePAM (KLONOPIN) 0.5 MG tablet Take 0.5 mg by mouth 2 times daily as needed       clopidogrel (PLAVIX) 75 MG tablet        FLUoxetine (PROZAC) 40 MG capsule Take 2 capsules (80 mg) by mouth daily 60 capsule 0     levothyroxine (SYNTHROID/LEVOTHROID) 25 MCG tablet Take 25 mcg by mouth daily       losartan (COZAAR) 50 MG tablet Take 50 mg by mouth daily       Multiple Vitamins-Minerals (MULTIVITAMIN ADULT PO)        NARCAN 4 MG/0.1ML nasal spray INHALE VIA NASAL ROUTE FOR OVERDOSE AS NEEDED. MAY REPEAT AFTER 2-3 MINUTES  0     nicotine (NICODERM CQ) 7 MG/24HR 24 hr patch Place 1 patch onto the skin every 24 hours 30 patch 3     nicotine (NICOTROL) 10 MG inhaler Use 1 cartridge as needed for urge to smoke by puffing over course of 20min.  Use 6-16 cart/day; reduce number of cart/day over 6-12 weeks. 50 Cartridge 3     order for DME Equipment being ordered: Nebulizer 1 Units 0     oxyCODONE (XTAMPZA) 18 MG 12 hr tablet Take 18 mg by mouth every 12 hours       oxyCODONE IR (ROXICODONE) 15 MG tablet Take 7.5 mg by mouth 2 times daily as needed       pravastatin  "(PRAVACHOL) 40 MG tablet Take 40 mg by mouth daily       tiotropium (SPIRIVA HANDIHALER) 18 MCG inhaled capsule Inhale 1 capsule (18 mcg) into the lungs daily 90 capsule 3     tiZANidine (ZANAFLEX) 2 MG tablet TK 2 TO 3 TS PO QHS  4     topiramate (TOPAMAX) 100 MG tablet Take 100 mg by mouth daily  1     vitamin D3 (CHOLECALCIFEROL) 2000 units (50 mcg) tablet Take 1 tablet by mouth daily  1     Allergies   Allergen Reactions     Compazine [Prochlorperazine]      Droperidol      Lisinopril      Seroquel [Quetiapine]        Reviewed and updated as needed this visit by Provider         Review of Systems   ROS COMP: Constitutional, HEENT, cardiovascular, pulmonary, gi and gu systems are negative, except as otherwise noted.      Objective    LMP  (LMP Unknown)   Estimated body mass index is 43.74 kg/m  as calculated from the following:    Height as of 3/16/20: 1.676 m (5' 6\").    Weight as of 3/16/20: 122.9 kg (271 lb).  Physical Exam     GENERAL: healthy, alert and no distress  RESP: Speaking normally in complete sentences  PSYCH: mentation appears normal, affect normal/bright, judgement and insight intact, normal speech and appearance well-groomed      Diagnostic Test Results:  Labs reviewed in Epic        Assessment & Plan       ICD-10-CM    1. Contusion of right upper extremity, initial encounter  S40.021A    2. Anticoagulated  Z79.01    3. Educated About Covid-19 Virus Infection  Z71.89    Sounds like a hematoma. I recommended she follow up with the provider who manages her anticoagulation today. She verbalizes understanding and reports she will call when we get off the phone. Emergency department precautions discussed.    I also advised her to call central scheduling to schedule COVID 19 antibody testing.        Phone time: 11 min      Return in about 1 week (around 5/12/2020), or if symptoms worsen or fail to improve.     Return precautions discussed, including when to seek urgent/emergent care.    Patient " verbalizes understanding and agrees with plan of care.     Update 5:10pm: I called patient to be sure she got in touch with her provider who manages her anticoagulation. She reports she talked to the clinic and they were going to talk to her provider and call her back with instructions this evening. She reports it has not gotten worse. UC/ED precautions reviewed again.         LEONA Marquez [facility]

## 2020-05-05 NOTE — TELEPHONE ENCOUNTER
"Pt calling   Was sick with possible corona virus in January  & PCP wants pt to have antibody testing.  Patient is calling requesting COVID serologic antibody testing.  NOTE: Serologic testing is a blood test for 'antibodies' which are made at 10-14 days after you have had symptoms of COVID or were exposed and had an asymptomatic infection.  This does NOT test you for 'active' infection or tell you if you are contagious.    Are you a healthcare worker?  No  Do you have cough, fever, myalgias, or shortness of breath?  No  Were you exposed to a lab confirmed positive or possible case of COVID-19?  Possible exposure *>90  days ago.  Possible exposure > 14 days ago.      The patient was informed: \"Testing is limited each day and it may take time for testing to be available to everyone who has called.  We will be calling you to schedule testing- please confirm the best number to reach you is [phone].\"    Lab order placed per COVID Serologic Testing standing orders.          "

## 2020-05-06 ENCOUNTER — PATIENT OUTREACH (OUTPATIENT)
Dept: CARE COORDINATION | Facility: CLINIC | Age: 60
End: 2020-05-06

## 2020-05-06 NOTE — LETTER
[facility]ORD[facility]  [address]  [place] [address]      May 6, 2020    Paula Ho  [address]   [place] [address]      Dear Paula,    I have been unsuccessful in reaching you since our last contact. At this time the Care Coordination team will make no further attempts to reach you, however this does not change your ability to continue receiving care from your providers at your primary care clinic. If you need additional support from a care coordinator in the future please contact me at [phone].    All of us at [facility] are invested in your health and are here to assist you in meeting your goals.     Sincerely,    DENI Canchola  Primary Care Clinic- Social Work Care Coordinator  [facility] [place]  Ph: [phone]

## 2020-05-07 NOTE — TELEPHONE ENCOUNTER
Tobacco Treatment Program at the [facility] attempted to reach Ms. Ho on 5/7/2020 regarding the tobacco cessation program to help Ms. Ho to quit smoking. We will attempt to reach Ms. Ho another time.     [facility]aryn Martinez [facility]  Tobacco Treatment Specialist  PH: [phone]

## 2020-05-12 DIAGNOSIS — Z20.822 EXPOSURE TO COVID-19 VIRUS: ICD-10-CM

## 2020-05-12 PROCEDURE — 86769 SARS-COV-2 COVID-19 ANTIBODY: CPT | Mod: 90 | Performed by: EMERGENCY MEDICINE

## 2020-05-12 PROCEDURE — 99000 SPECIMEN HANDLING OFFICE-LAB: CPT | Performed by: EMERGENCY MEDICINE

## 2020-05-12 PROCEDURE — 36415 COLL VENOUS BLD VENIPUNCTURE: CPT | Performed by: EMERGENCY MEDICINE

## 2020-05-14 LAB
COVID-19 SPIKE RBD ABY TITER: NORMAL
COVID-19 SPIKE RBD ABY: NEGATIVE

## 2020-05-20 ENCOUNTER — FCC EXTENDED DOCUMENTATION (OUTPATIENT)
Dept: PSYCHOLOGY | Facility: CLINIC | Age: 60
End: 2020-05-20

## 2020-05-20 DIAGNOSIS — F33.1 MODERATE EPISODE OF RECURRENT MAJOR DEPRESSIVE DISORDER (H): Primary | ICD-10-CM
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
## 2020-05-21 NOTE — TELEPHONE ENCOUNTER
Tobacco Treatment Program at the [facility] attempted to reach Ms. Ho on 5/21/2020 regarding the tobacco cessation program to help Ms. Ho to quit smoking. We will attempt to reach Ms. Ho another time.     Karyn Martinez [facility]  Tobacco Treatment Specialist  PH: [phone]

## 2020-05-27 ENCOUNTER — TRANSFERRED RECORDS (OUTPATIENT)
Dept: HEALTH INFORMATION MANAGEMENT | Facility: CLINIC | Age: 60
End: 2020-05-27

## 2020-05-28 ENCOUNTER — PATIENT OUTREACH (OUTPATIENT)
Dept: CARE COORDINATION | Facility: CLINIC | Age: 60
End: 2020-05-28
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
## 2020-05-28 DIAGNOSIS — S41.112A: ICD-10-CM

## 2020-05-28 NOTE — PROGRESS NOTES
Clinic Care Coordination Contact  Community Health Worker Initial Outreach         Patient accepts CC: No, Patient has all resources at this time.     Hawa MAYNARD Community Health Worker  Clinic Care Coordination  [facility] : Jason Galeana  Phone: [phone]      Reason for Referral: Care Transition: ED to outpatient  Additional pertinent details: Perri 5/27/20      Notes:  F/U with general surgery in 2 days?  Keflex 4 times a day

## 2020-06-02 ENCOUNTER — AMBULATORY - HEALTHEAST (OUTPATIENT)
Dept: SURGERY | Facility: AMBULATORY SURGERY CENTER | Age: 60
End: 2020-06-02

## 2020-06-02 DIAGNOSIS — Z11.59 ENCOUNTER FOR SCREENING FOR OTHER VIRAL DISEASES: ICD-10-CM

## 2020-06-03 ENCOUNTER — VIRTUAL VISIT (OUTPATIENT)
Dept: FAMILY MEDICINE | Facility: CLINIC | Age: 60
End: 2020-06-03
Payer: MEDICARE

## 2020-06-03 VITALS — HEIGHT: 66 IN | WEIGHT: 280 LBS | BODY MASS INDEX: 45 KG/M2

## 2020-06-03 DIAGNOSIS — F33.2 MDD (MAJOR DEPRESSIVE DISORDER), RECURRENT SEVERE, WITHOUT PSYCHOSIS (H): Primary | Chronic | ICD-10-CM

## 2020-06-03 DIAGNOSIS — T14.8XXA STAB WOUND: ICD-10-CM

## 2020-06-03 PROCEDURE — 99214 OFFICE O/P EST MOD 30 MIN: CPT | Mod: 95 | Performed by: NURSE PRACTITIONER

## 2020-06-03 ASSESSMENT — PAIN SCALES - GENERAL: PAINLEVEL: SEVERE PAIN (7)

## 2020-06-03 ASSESSMENT — MIFFLIN-ST. JEOR: SCORE: 1861.82
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
## 2020-06-03 NOTE — PATIENT INSTRUCTIONS
At [facility], we strive to deliver an exceptional experience to you, every time we see you. If you receive a survey, please complete it as we do value your feedback.  If you have MyChart, you can expect to receive results automatically within 24 hours of their completion.  Your provider will send a note interpreting your results as well.   If you do not have MyChart, you should receive your results in about a week by mail.    Your care team:                            Family Medicine Internal Medicine   MD Renan Chambers MD Shantel Branch-Fleming, MD Katya Georgiev PA-C Megan Hill, APRDAVID Dobson, MD Pediatrics   Taurus Torres, PAPANKAJ Jaeger, MD Alison Ba APRN CNP   MD Inocencia Martin MD Deborah Mielke, MD Kim Thein, APRN [facility]      Clinic hours: Monday - Thursday 7 am-7 pm; Fridays 7 am-5 pm.   Urgent care: Monday - Friday 11 am-9 pm; Saturday and Sunday 9 am-5 pm.    Clinic: [phone]       [facility]: Monday - Thursday 8 am - 7 pm; Friday 8 am - 6 pm  [facility]: [phone]     Use [email or web address] for 24/7 diagnosis and treatment of dozens of conditions.

## 2020-06-08 DIAGNOSIS — F33.2 SEVERE EPISODE OF RECURRENT MAJOR DEPRESSIVE DISORDER, WITHOUT PSYCHOTIC FEATURES (H): ICD-10-CM

## 2020-06-09 NOTE — TELEPHONE ENCOUNTER
PHQ-9 score:    PHQ 3/3/2020   PHQ-9 Total Score 11   Q9: Thoughts of better off dead/self-harm past 2 weeks More than half the days   F/U: Thoughts of suicide or self-harm -   F/U: Self harm-plan -   F/U: Self-harm action -   F/U: Safety concerns -     Routing refill request to provider for review/approval because:  PHQ9 score too elevated to pass [facility] refill protocol    Renita Pineda RN  [facility]

## 2020-06-10 RX ORDER — FLUOXETINE 40 MG/1
80 CAPSULE ORAL DAILY
Qty: 60 CAPSULE | Refills: 0 | Status: SHIPPED | OUTPATIENT
Start: 2020-06-10 | End: 2020-08-03

## 2020-06-10 NOTE — TELEPHONE ENCOUNTER
Tobacco Treatment Team at the [facility] spoke with Ms. Ho on 6/10/2020 regarding the tobacco cessation program and she agreed to participate with the program.We have set up appt for initial tobacco cessation visit via phone 6/11/20 at 10:00am    RIVAS Mejia  Tobacco Treatment Specialist  PH: [phone]

## 2020-06-11 ENCOUNTER — VIRTUAL VISIT (OUTPATIENT)
Dept: ONCOLOGY | Facility: CLINIC | Age: 60
End: 2020-06-11
Attending: INTERNAL MEDICINE
Payer: MEDICARE

## 2020-06-11 DIAGNOSIS — J44.9 CHRONIC OBSTRUCTIVE PULMONARY DISEASE, UNSPECIFIED COPD TYPE (H): ICD-10-CM

## 2020-06-11 DIAGNOSIS — F17.210 CIGARETTE NICOTINE DEPENDENCE WITHOUT COMPLICATION: ICD-10-CM

## 2020-06-11 DIAGNOSIS — Z72.0 TOBACCO ABUSE DISORDER: Primary | ICD-10-CM

## 2020-06-11 PROCEDURE — 40000114 ZZH STATISTIC NO CHARGE CLINIC VISIT
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
## 2020-06-16 ENCOUNTER — PATIENT OUTREACH (OUTPATIENT)
Dept: CARE COORDINATION | Facility: CLINIC | Age: 60
End: 2020-06-16

## 2020-06-16 NOTE — PROGRESS NOTES
Social Work Telephone Message Note  [facility]    Patient Name:  Paula Ho  /Age:  1960 (60 year old)    Referral Source: Karyn Martinez   Reason for Referral:  housing    Pt was referred to talk with me about housing and she left 2 messages on my voicemail indicating she wanted to meet with me asap to help her find housing. I called her back and had to leave a message. I indicated that I'm not a  but could talk with her about housing and may have info that could help. Will await her return call.     JASON Love, [facility]    [facility]  [phone]

## 2020-06-17 ENCOUNTER — TELEPHONE (OUTPATIENT)
Dept: FAMILY MEDICINE | Facility: CLINIC | Age: 60
End: 2020-06-17

## 2020-06-17 NOTE — TELEPHONE ENCOUNTER
.Reason for Call: Request for an order     Order or referral being requested: COVID-19 TESTING     Date needed: as soon as possible    Has the patient been seen by the PCP for this problem? Not Applicable    Additional comments: pt states she will be having back surgery and a COVID-19 TEST is needed before she can schedule the surgery appointment -  States test is needed by June 19th or 20th - pt is requesting a call back with order status so she can call to get both appointments scheduled.     Phone number Patient can be reached at:  Cell number on file:    Telephone Information:   Mobile [phone]       Best Time:      Can we leave a detailed message on this number?  YES    Call taken on 6/17/2020 at 10:38 AM by Victor Hugo Carlos

## 2020-06-22 ENCOUNTER — PATIENT OUTREACH (OUTPATIENT)
Dept: CARE COORDINATION | Facility: CLINIC | Age: 60
End: 2020-06-22

## 2020-06-22 ENCOUNTER — VIRTUAL VISIT (OUTPATIENT)
Dept: FAMILY MEDICINE | Facility: CLINIC | Age: 60
End: 2020-06-22
Payer: MEDICARE

## 2020-06-22 DIAGNOSIS — F33.2 SEVERE EPISODE OF RECURRENT MAJOR DEPRESSIVE DISORDER, WITHOUT PSYCHOTIC FEATURES (H): Primary | ICD-10-CM

## 2020-06-22 DIAGNOSIS — Z11.59 SCREENING FOR VIRAL DISEASE: Primary | ICD-10-CM

## 2020-06-22 PROCEDURE — U0003 INFECTIOUS AGENT DETECTION BY NUCLEIC ACID (DNA OR RNA); SEVERE ACUTE RESPIRATORY SYNDROME CORONAVIRUS 2 (SARS-COV-2) (CORONAVIRUS DISEASE [COVID-19]), AMPLIFIED PROBE TECHNIQUE, MAKING USE OF HIGH THROUGHPUT TECHNOLOGIES AS DESCRIBED BY CMS-2020-01-R: HCPCS | Performed by: PHYSICAL MEDICINE & REHABILITATION

## 2020-06-22 PROCEDURE — 99441 ZZC PHYSICIAN TELEPHONE EVALUATION 5-10 MIN: CPT | Performed by: NURSE PRACTITIONER

## 2020-06-22 NOTE — LETTER
June 25, 2020        Paula Ho  [address]   [place] [address]    This letter provides a written record that you were tested for COVID-19 on 6/22/20.       Your result was negative. This means that we didn t find the virus that causes COVID-19 in your sample. A test may show negative when you do actually have the virus. This can happen when the virus is in the early stages of infection, before you feel illness symptoms.    If you have symptoms   Stay home and away from others (self-isolate) until you meet ALL of the guidelines below:    You ve had no fever--and no medicine that reduces fever--for 3 full days (72 hours). And      Your other symptoms have gotten better. For example, your cough or breathing has improved. And     At least 10 days have passed since your symptoms started.    During this time:    Stay home. Don t go to work, school or anywhere else.     Stay in your own room, including for meals. Use your own bathroom if you can.    Stay away from others in your home. No hugging, kissing or shaking hands. No visitors.    Clean  high touch  surfaces often (doorknobs, counters, handles, etc.). Use a household cleaning spray or wipes. You can find a full list on the EPA website at www.epa.gov/pesticide-registration/list-n-disinfectants-use-against-sars-cov-2.    Cover your mouth and nose with a mask, tissue or washcloth to avoid spreading germs.    Wash your hands and face often with soap and water.    Going back to work  Check with your employer for any guidelines to follow for going back to work.    Employers: This document serves as formal notice that your employee tested negative for COVID-19, as of the testing date shown above.

## 2020-06-22 NOTE — PATIENT INSTRUCTIONS
At [facility], we strive to deliver an exceptional experience to you, every time we see you. If you receive a survey, please complete it as we do value your feedback.  If you have MyChart, you can expect to receive results automatically within 24 hours of their completion.  Your provider will send a note interpreting your results as well.   If you do not have MyChart, you should receive your results in about a week by mail.    Your care team:                            Family Medicine Internal Medicine   MD Renan Chambers MD Shantel Branch-Fleming, MD Katya Georgiev PA-C Megan Hill, APRDAVID Dobson, MD Pediatrics   Taurus Torres, PAPANKAJ Jaeger, MD Alison Ba APRN CNP   MD Inocencia Martin MD Deborah Mielke, MD Kim Thein, APRN [facility]      Clinic hours: Monday - Thursday 7 am-7 pm; Fridays 7 am-5 pm.   Urgent care: Monday - Friday 11 am-9 pm; Saturday and Sunday 9 am-5 pm.    Clinic: [phone]       [facility]: Monday - Thursday 8 am - 7 pm; Friday 8 am - 6 pm  [facility]: [phone]     Use [email or web address] for 24/7 diagnosis and treatment of dozens of conditions.

## 2020-06-22 NOTE — LETTER
June 22, 2020      Paula Ho  [address]   [place] [facility] [address]        To Whom It May Concern,      Paula Ho is a patient of our clinic. I am writing today to provide some insight into her medical and emotional conditions and to request additional assistance for her so she can continue to get the care that she needs. She suffers from complex medical conditions and has around the clock care. She uses a PCA during the day and has family stay with her at night. Additionally, she has 2 small dogs, which are emotional support dogs. It is necessary for her to have them live with her as she suffers from severe mental health conditions and they help her tremendously.           Sincerely,        LEONA Marquez CNP

## 2020-06-22 NOTE — PROGRESS NOTES
"Paula Ho is a 60 year old female who is being evaluated via a billable telephone visit.      The patient has been notified of following:     \"This telephone visit will be conducted via a call between you and your physician/provider. We have found that certain health care needs can be provided without the need for a physical exam.  This service lets us provide the care you need with a short phone conversation.  If a prescription is necessary we can send it directly to your pharmacy.  If lab work is needed we can place an order for that and you can then stop by our lab to have the test done at a later time.    Telephone visits are billed at different rates depending on your insurance coverage. During this emergency period, for some insurers they may be billed the same as an in-person visit.  Please reach out to your insurance provider with any questions.    If during the course of the call the physician/provider feels a telephone visit is not appropriate, you will not be charged for this service.\"    Patient has given verbal consent for Telephone visit?  Yes    What phone number would you like to be contacted at? [phone]    How would you like to obtain your AVS? Mail a copy    Subjective     Paula Ho is a 60 year old female who presents via phone visit today for the following health issues:    HPI    60 year old female initiated a virtual visit to request a letter for her housing situation. She has a section 8 voucher but is having trouble finding a new apartment due to her health conditions and the care she requires. She has a PCA for 11 hours per day and her cousin stays with her at night. They help with ADLs, housekeeping as well as help provide stability for her mental health. She feels anxious and depressed when left alone. She was previously seeing psychiatry and was going to a 55+ group for mental health but with COVID 19, these programs have either stopped or she's struggled doing them " "virtually. Her dogs also provide tremendous help to her. They have previously been classified as emotional support animals. She wants to get re-established with psychiatry and is looking for a therapist that will see her in person. She states, \"Ever since this coronavirus I have nobody.\" She denies thoughts to harm herself. Feels safe. Contracts for safety.    Patient Active Problem List   Diagnosis     Chronic knee pain     LIBRA (generalized anxiety disorder)     Brain aneurysm     Chronic, continuous use of opioids     Asthma     Chronic GERD     Chronic pain     Cigarette smoker     DJD (degenerative joint disease)     Insomnia     Obesity, Class III, BMI 40-49.9 (morbid obesity) (H)     History of subarachnoid hemorrhage     Status post knee surgery     Tobacco use disorder     Chronic obstructive pulmonary disease, unspecified COPD type (H)     MDD (major depressive disorder), recurrent severe, without psychosis (H)     Attention deficit hyperactivity disorder (ADHD)     Chronic midline low back pain with bilateral sciatica     Hyperlipidemia     Benign essential hypertension     Past Surgical History:   Procedure Laterality Date     APPENDECTOMY       ORTHOPEDIC SURGERY  2002    Circa 2002: right knee arthroscopy (Dr. Pappas)     ORTHOPEDIC SURGERY  2004    Circa 2004: right knee partial knee replacement (Iam Ritchie MD)     ORTHOPEDIC SURGERY  2006    Circa 2006: right TKA (Ron Ryder MD; [facility])     ORTHOPEDIC SURGERY  2008    Circa 2008: right TKA revision due to infection (Ron Ryder MD; [facility])     ORTHOPEDIC SURGERY  2009    Circa 2009: right TKA revision due to infection (Ron Ryder MD; [facility])     ORTHOPEDIC SURGERY  2011 April 2011: right TKA (Ron Ryder MD; [facility])     TONSILLECTOMY      tonsils       Social History     Tobacco Use     Smoking status: Current Every Day Smoker     Packs/day: 0.00     Smokeless tobacco: Never Used "   Substance Use Topics     Alcohol use: Yes     Family History   Problem Relation Age of Onset     Depression Mother      Substance Abuse Father          Current Outpatient Medications   Medication Sig Dispense Refill     albuterol (PROAIR HFA) 108 (90 Base) MCG/ACT inhaler Inhale 2 puffs into the lungs every 4 hours as needed for shortness of breath / dyspnea or wheezing 1 Inhaler 3     albuterol (PROVENTIL) (2.5 MG/3ML) 0.083% neb solution NEBULIZE THE CONTENTS OF 1 VIAL EVERY 6 HOURS AS NEEDED. 25 vial 1     amphetamine-dextroamphetamine (ADDERALL) 20 MG tablet Take 20 mg by mouth 2 times daily       budesonide-formoterol (SYMBICORT) 160-4.5 MCG/ACT Inhaler Inhale 2 puffs into the lungs 2 times daily 3 Inhaler 3     cetirizine (ZYRTEC) 10 MG tablet Take 1 tablet (10 mg) by mouth daily 14 tablet 1     clonazePAM (KLONOPIN) 0.5 MG tablet Take 0.5 mg by mouth 2 times daily as needed       clopidogrel (PLAVIX) 75 MG tablet        FLUoxetine (PROZAC) 40 MG capsule Take 2 capsules (80 mg) by mouth daily 60 capsule 0     levothyroxine (SYNTHROID/LEVOTHROID) 25 MCG tablet Take 25 mcg by mouth daily       losartan (COZAAR) 50 MG tablet Take 50 mg by mouth daily       Multiple Vitamins-Minerals (MULTIVITAMIN ADULT PO)        NARCAN 4 MG/0.1ML nasal spray INHALE VIA NASAL ROUTE FOR OVERDOSE AS NEEDED. MAY REPEAT AFTER 2-3 MINUTES  0     nicotine (NICODERM CQ) 7 MG/24HR 24 hr patch Place 1 patch onto the skin every 24 hours 30 patch 3     nicotine (NICOTROL) 10 MG inhaler Use 1 cartridge as needed for urge to smoke by puffing over course of 20min.  Use 6-16 cart/day; reduce number of cart/day over 6-12 weeks. 50 Cartridge 3     order for DME Equipment being ordered: Nebulizer 1 Units 0     oxyCODONE (XTAMPZA) 18 MG 12 hr tablet Take 18 mg by mouth every 12 hours       oxyCODONE IR (ROXICODONE) 15 MG tablet Take 7.5 mg by mouth 2 times daily as needed       pravastatin (PRAVACHOL) 40 MG tablet Take 40 mg by mouth daily        tiotropium (SPIRIVA HANDIHALER) 18 MCG inhaled capsule Inhale 1 capsule (18 mcg) into the lungs daily 90 capsule 3     tiZANidine (ZANAFLEX) 2 MG tablet TK 2 TO 3 TS PO QHS  4     topiramate (TOPAMAX) 100 MG tablet Take 100 mg by mouth daily  1     vitamin D3 (CHOLECALCIFEROL) 2000 units (50 mcg) tablet Take 1 tablet by mouth daily  1     Allergies   Allergen Reactions     Compazine [Prochlorperazine]      Droperidol      Lisinopril      Seroquel [Quetiapine]        Reviewed and updated as needed this visit by Provider         Review of Systems   Constitutional, HEENT, cardiovascular, pulmonary, gi and gu systems are negative, except as otherwise noted.       Objective   Reported vitals:  LMP  (LMP Unknown)    healthy, alert and no distress  PSYCH: Alert and oriented times 3; coherent speech, normal   rate and volume, able to articulate logical thoughts, able   to abstract reason, no tangential thoughts, no hallucinations   or delusions  Her affect is normal  RESP: No cough, no audible wheezing, able to talk in full sentences  Remainder of exam unable to be completed due to telephone visits    Diagnostic Test Results:  Labs reviewed in Epic        Assessment/Plan:  1. Severe episode of recurrent major depressive disorder, without psychotic features (H)  Note written for housing. I recommend she re-establish with psychiatry and counseling - I recommend in person visits if at all possible as patient benefits most from that type of visit. Contracts for safety. Follow up 4 weeks, sooner as needed.   - MENTAL HEALTH REFERRAL  - Adult; Psychiatry; Psychiatry and Psychotherapy (Individual/Couple/Family Therapy); Other: [facility] [phone]; We will contact you to schedule the appointment or please call with any questi...    Has back injection scheduled in a couple of days. Has COVID 19 testing this afternoon.     Return in about 4 weeks (around 7/20/2020), or if symptoms worsen or fail to  improve.    Return precautions discussed, including when to seek urgent/emergent care.    Patient verbalizes understanding and agrees with plan of care. Patient stable for discharge.      Phone call duration:  9 minutes (1:25-1:34)    LEONA Marquez CNP

## 2020-06-23 ENCOUNTER — VIRTUAL VISIT (OUTPATIENT)
Dept: FAMILY MEDICINE | Facility: CLINIC | Age: 60
End: 2020-06-23
Payer: MEDICARE

## 2020-06-23 ENCOUNTER — TELEPHONE (OUTPATIENT)
Dept: FAMILY MEDICINE | Facility: CLINIC | Age: 60
End: 2020-06-23

## 2020-06-23 VITALS — BODY MASS INDEX: 42.91 KG/M2 | HEIGHT: 66 IN | WEIGHT: 267 LBS

## 2020-06-23 DIAGNOSIS — F11.90 CHRONIC, CONTINUOUS USE OF OPIOIDS: Primary | ICD-10-CM

## 2020-06-23 DIAGNOSIS — M54.41 CHRONIC MIDLINE LOW BACK PAIN WITH BILATERAL SCIATICA: Chronic | ICD-10-CM

## 2020-06-23 DIAGNOSIS — G89.29 CHRONIC KNEE PAIN, UNSPECIFIED LATERALITY: Chronic | ICD-10-CM

## 2020-06-23 DIAGNOSIS — M25.569 CHRONIC KNEE PAIN, UNSPECIFIED LATERALITY: Chronic | ICD-10-CM

## 2020-06-23 DIAGNOSIS — M54.42 CHRONIC MIDLINE LOW BACK PAIN WITH BILATERAL SCIATICA: Chronic | ICD-10-CM

## 2020-06-23 DIAGNOSIS — G89.29 CHRONIC MIDLINE LOW BACK PAIN WITH BILATERAL SCIATICA: Chronic | ICD-10-CM

## 2020-06-23 LAB
SARS-COV-2 RNA SPEC QL NAA+PROBE: NOT DETECTED
SPECIMEN SOURCE: NORMAL

## 2020-06-23 PROCEDURE — 99442 ZZC PHYSICIAN TELEPHONE EVALUATION 11-20 MIN: CPT | Performed by: NURSE PRACTITIONER

## 2020-06-23 RX ORDER — HYDROCODONE BITARTRATE AND ACETAMINOPHEN 5; 325 MG/1; MG/1
1 TABLET ORAL EVERY 6 HOURS PRN
Qty: 8 TABLET | Refills: 0 | Status: SHIPPED | OUTPATIENT
Start: 2020-06-23 | End: 2020-08-03

## 2020-06-23 ASSESSMENT — ANXIETY QUESTIONNAIRES
5. BEING SO RESTLESS THAT IT IS HARD TO SIT STILL: SEVERAL DAYS
GAD7 TOTAL SCORE: 13
7. FEELING AFRAID AS IF SOMETHING AWFUL MIGHT HAPPEN: NOT AT ALL
1. FEELING NERVOUS, ANXIOUS, OR ON EDGE: NEARLY EVERY DAY
2. NOT BEING ABLE TO STOP OR CONTROL WORRYING: NEARLY EVERY DAY
IF YOU CHECKED OFF ANY PROBLEMS ON THIS QUESTIONNAIRE, HOW DIFFICULT HAVE THESE PROBLEMS MADE IT FOR YOU TO DO YOUR WORK, TAKE CARE OF THINGS AT HOME, OR GET ALONG WITH OTHER PEOPLE: EXTREMELY DIFFICULT
6. BECOMING EASILY ANNOYED OR IRRITABLE: SEVERAL DAYS
3. WORRYING TOO MUCH ABOUT DIFFERENT THINGS: NEARLY EVERY DAY

## 2020-06-23 ASSESSMENT — MIFFLIN-ST. JEOR: SCORE: 1802.85

## 2020-06-23 ASSESSMENT — PATIENT HEALTH QUESTIONNAIRE - PHQ9
5. POOR APPETITE OR OVEREATING: MORE THAN HALF THE DAYS
SUM OF ALL RESPONSES TO PHQ QUESTIONS 1-9: 13
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
## 2020-06-23 NOTE — TELEPHONE ENCOUNTER
.Reason for call:  Other   Patient called regarding (reason for call): prescription  Additional comments: pharmacy called with questions about hydrocortisone. Pharmacy wanted to call and let provider know that pt gets pain medication from a different provider. Is provider okay with prescribing the medication on top of the pain pills pt gets from another provider? Please call pharmacy to confirm. [phone]    Phone number to reach patient:  Home number on file [phone] (home)    Best Time:  anytime    Can we leave a detailed message on this number?  YES    Travel screening: Negative

## 2020-06-23 NOTE — PATIENT INSTRUCTIONS
At [facility], we strive to deliver an exceptional experience to you, every time we see you. If you receive a survey, please complete it as we do value your feedback.  If you have MyChart, you can expect to receive results automatically within 24 hours of their completion.  Your provider will send a note interpreting your results as well.   If you do not have MyChart, you should receive your results in about a week by mail.    Your care team:                            Family Medicine Internal Medicine   MD Renan Chambers MD Shantel Branch-Fleming, MD Katya Georgiev PA-C Megan Hill, APRDAVID Dobson, MD Pediatrics   Taurus Torres, PAPANKAJ Jaeger, MD Alison Ba APRN CNP   MD Inocencia Martin MD Deborah Mielke, MD Kim Thein, APRN [facility]      Clinic hours: Monday - Thursday 7 am-7 pm; Fridays 7 am-5 pm.   Urgent care: Monday - Friday 11 am-9 pm; Saturday and Sunday 9 am-5 pm.    Clinic: [phone]       [facility]: Monday - Thursday 8 am - 7 pm; Friday 8 am - 6 pm  [facility]: [phone]     Use [email or web address] for 24/7 diagnosis and treatment of dozens of conditions.
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
## 2020-06-23 NOTE — TELEPHONE ENCOUNTER
"Called and spoke with pharmacist     \"Normal medication oxycodone 15mg TID  5mg BID\" Prescribed by Katlyn Heranndez.    Last filled:  06/16/2020 for a 28day supply.     Pharmacist would like to know if he should be filling the medication that was sent it today:     HYDROcodone-acetaminophen (NORCO) 5-325 MG tablet  8 tablet  0  6/23/2020 6/26/2020  --    Sig - Route: Take 1 tablet by mouth every 6 hours as needed for severe pain - Oral    Sent to pharmacy as: HYDROcodone-Acetaminophen 5-325 MG Oral Tablet (NORCO)    Class: E-Prescribe    Earliest Fill Date: 6/23/2020    Order: [id number]        Routing to provider to please advise and review.    Renita Pineda RN  [facility]    "

## 2020-06-23 NOTE — TELEPHONE ENCOUNTER
Patient states that Demetra is requested to call the following pharmacy so the Vicodin can be released to the patient.    [id number] - [place], [place] - [address] (Pharmacy) [phone]     Patient can be reached @ [phone].

## 2020-06-24 ASSESSMENT — ANXIETY QUESTIONNAIRES: GAD7 TOTAL SCORE: 13

## 2020-06-24 ASSESSMENT — ASTHMA QUESTIONNAIRES: ACT_TOTALSCORE: 16

## 2020-06-24 ASSESSMENT — MIFFLIN-ST. JEOR: SCORE: 1784.25

## 2020-06-24 NOTE — PROGRESS NOTES
"Social Work Intervention  [facility]    Data/Intervention:    Patient Name:  Paula Ho  /Age:  1960 (60 year old)    Visit Type: telephone  Referral Source:   Reason for Referral:  housing    Collaborated With:    -Paula Meyer CNP    Patient Concerns/Issues:   Pt contacted me asking for assistance with a letter she needs for housing as well as getting reconnected with a therapist, a psychiatrist and her  from the [facility]. She isn't sure who the  is/was.   She indicated that she has a new place she could move into if she can get a letter indicating that she needs to have someone stay with her. She states that due to mental health and back issues, she has PCA services daily for several hours as well as her cousin who stays with her at night. \"Being alone is hard for me\". She was going to the 55+ mental health treatment program at [facility] until the pandemic started and the visits are now video. She stated she had a very difficult time trying to join via video.  She has a section 8 voucher. She indicated she has an appt with her PCP Demetra Meyer in the next hour.    Intervention/Education/Resources Provided:  Clarified the needs re the letter and sent a note to Demetra Meyer CNP who did address it with her at the visit. Also  Messaged with Demetra about psych/therapist and she wrote orders for that. I also messaged Pt's previous therapist Rocio Swanson LP who indicated that she couldn't see Pt anymore due to Medicare coverage.    Assessment/Plan:  I will f/u with Pt next week to follow up on access to mental health care.     Provided patient/family with contact information and availability.    Merary Arenas, MSW, [facility]    [facility]  [phone]  "

## 2020-06-25 ENCOUNTER — SURGERY - HEALTHEAST (OUTPATIENT)
Dept: SURGERY | Facility: AMBULATORY SURGERY CENTER | Age: 60
End: 2020-06-25

## 2020-06-25 ASSESSMENT — MIFFLIN-ST. JEOR: SCORE: 1784.25

## 2020-06-25 NOTE — RESULT ENCOUNTER NOTE
Assessment/Plan:    Controlled type 2 diabetes mellitus without complication, without long-term current use of insulin (HCC)  Lab Results   Component Value Date    HGBA1C 5 4 09/19/2017       No results for input(s): POCGLU in the last 72 hours  Blood Sugar Average: Last 72 hrs:  DM type 2 without complications - controlled with last A1C 5 4 - pt no longer following with Endo (retired) - order for Bw given - foot exam done today, UTD on eye exam, on Ace but no statin    Other specified hypothyroidism  Has had some wgt gain and fatigue and hair loss - overdue for Bw - order given, con't current levothyroxine dose for now    Essential hypertriglyceridemia  Due for Bw - order given, con't current fenofibrate for now    Hypovitaminosis D  Due for Vit D level - order given, con't current supplement for now       Diagnoses and all orders for this visit:    Controlled type 2 diabetes mellitus without complication, without long-term current use of insulin (HCC)  -     CBC and differential  -     Comprehensive metabolic panel  -     Hemoglobin A1C  -     Lipid panel  -     Microalbumin / creatinine urine ratio  -     TSH, 3rd generation; Future  -     T4, free; Future  -     TSH, 3rd generation  -     T4, free    Other specified hypothyroidism  -     CBC and differential  -     Comprehensive metabolic panel  -     Hemoglobin A1C  -     Lipid panel  -     Microalbumin / creatinine urine ratio  -     TSH, 3rd generation; Future  -     T4, free; Future  -     TSH, 3rd generation  -     T4, free    Essential hypertriglyceridemia  -     CBC and differential  -     Comprehensive metabolic panel  -     Hemoglobin A1C  -     Lipid panel  -     Microalbumin / creatinine urine ratio  -     TSH, 3rd generation; Future  -     T4, free; Future  -     TSH, 3rd generation  -     T4, free    Hypovitaminosis D  -     Vitamin D 25 hydroxy;  Future  -     Vitamin D 25 hydroxy  -     CBC and differential  -     Comprehensive metabolic Coronavirus (COVID-19) Notification    Your result for COVID-19 is Negative  Letter sent that will serve as a formal notice for your employer panel  -     Hemoglobin A1C  -     Lipid panel  -     Microalbumin / creatinine urine ratio  -     TSH, 3rd generation; Future  -     T4, free; Future  -     TSH, 3rd generation  -     T4, free    Cough  Comments:  Cough in pt with asthma with abnormal lung exam - start Augmentin and Advair INH bid, con't neb/rescue inhaler prn, call with new/worse symptoms, did recommend flu vaccine which pt will get when she is feeling better  Orders:  -     albuterol (2 5 mg/3 mL) 0 083 % nebulizer solution; Take 1 vial (2 5 mg total) by nebulization every 6 (six) hours as needed for wheezing or shortness of breath for up to 30 days  -     Fluticasone-Salmeterol 55-14 MCG/ACT AEPB; Inhale 1 puff 2 (two) times a day  -     amoxicillin-clavulanate (AUGMENTIN) 875-125 mg per tablet; Take 1 tablet by mouth every 12 (twelve) hours for 7 days  -     CBC and differential  -     Comprehensive metabolic panel  -     Hemoglobin A1C  -     Lipid panel  -     Microalbumin / creatinine urine ratio  -     TSH, 3rd generation; Future  -     T4, free; Future  -     TSH, 3rd generation  -     T4, free      Mammo 4/18    PAP 11/17 - has appt for next week    [facility]    Pt is deferring flu vaccine today d/t current cold    Subjective:      Patient ID: Pilar Pagan is a 65 y o  female  HPI Pt here for follow up appt  Def of controlled vs uncontrolled DM was reviewed  Diet was reviewed - wgt up 5 lbs from May 2018  She is no longer following with Endo and last BW was 9/17  She is taking her DM meds as directed  She is UTD on eye exam (6/18) but no recent foot exam is in the chart  She notes no sores/ulcers on feet  Has intermittent numbness at lateral ankle on R foot  She is on ACE but no statin/ASA  She is taking her Levothyroxine at 200 mcg daily  Her last TFT's were in 9/17  She has gained about 5 lbs from May  She notes daytime fatigue she has had no C/D  She has had hair loss        She is on Fenofibrate and has not had FLP in over a year  She does not recall every being on a statin in the past      She follows with Derm for her psoriasis - had some blotches on chest and had bx and was told poss lupus  She has BW ordered - has not done yet  Has been having cold symptoms x 5 days  Congestion/ST/cough/SOB/wheezing  Is using nebs and rescue inhalers more  Mammo 4/18    PAP 11/17 - has appt for next week    [place] 9/14 - 5 yr follow up    Pt is deferring flu vaccine today d/t current cold    Review of Systems   Constitutional: Positive for unexpected weight change  Negative for chills and fever  HENT: Positive for congestion  Negative for ear discharge, ear pain and sore throat  Some cold symptoms with congestion/itchy ears and cough   Eyes: Negative for pain and visual disturbance  Respiratory: Positive for cough  Negative for shortness of breath and wheezing  Cardiovascular: Negative for chest pain, palpitations and leg swelling  Gastrointestinal: Negative for abdominal pain, blood in stool, constipation, diarrhea, nausea and vomiting  Endocrine: Negative for polydipsia and polyuria  Genitourinary: Negative for difficulty urinating and dysuria  Musculoskeletal: Negative for back pain and neck pain  Skin: Negative for rash and wound  Neurological: Negative for dizziness, syncope, light-headedness and headaches  Hematological: Negative for adenopathy  Psychiatric/Behavioral: Negative for behavioral problems and confusion  Objective:    /72   Pulse 90   Temp 98 4 °F (36 9 °C)   Ht 5' 1" (1 549 m)   Wt 85 5 kg (188 lb 6 4 oz)   BMI 35 60 kg/m²      Physical Exam   Constitutional: She appears well-developed and well-nourished  No distress  HENT:   Head: Normocephalic and atraumatic  Right Ear: External ear normal    Left Ear: External ear normal    Mouth/Throat: Oropharynx is clear and moist    Eyes: Conjunctivae are normal  Right eye exhibits no discharge  Left eye exhibits no discharge  Neck: Neck supple  No tracheal deviation present  Cardiovascular: Normal rate, regular rhythm and normal heart sounds  Exam reveals no friction rub  Pulses are no weak pulses  No murmur heard  Pulses:       Dorsalis pedis pulses are 2+ on the right side, and 2+ on the left side  Pulmonary/Chest: Effort normal  No respiratory distress  She has wheezes  She has rales  Abdominal: Soft  She exhibits no distension  There is no tenderness  There is no guarding  Musculoskeletal: She exhibits no edema  Feet:   Right Foot:   Skin Integrity: Positive for dry skin  Negative for ulcer, skin breakdown, erythema, warmth or callus  Left Foot:   Skin Integrity: Positive for dry skin  Negative for ulcer, skin breakdown, erythema, warmth or callus  Lymphadenopathy:     She has cervical adenopathy  Neurological: She is alert  She exhibits normal muscle tone  Skin: Skin is warm and dry  No rash noted  Psychiatric: She has a normal mood and affect  Her behavior is normal    Nursing note and vitals reviewed  Patient's shoes and socks removed  Right Foot/Ankle   Right Foot Inspection  Skin Exam: skin normal, skin intact and dry skin no warmth, no callus, no erythema, no maceration, no abnormal color, no pre-ulcer, no ulcer and no callus                          Toe Exam: ROM and strength within normal limits  Sensory   Vibration: intact    Monofilament testing: intact  Vascular    The right DP pulse is 2+  Left Foot/Ankle  Left Foot Inspection  Skin Exam: skin normal, skin intact and dry skinno warmth, no erythema, no maceration, normal color, no pre-ulcer, no ulcer and no callus                         Toe Exam: ROM and strength within normal limits                   Sensory   Vibration: intact    Monofilament: intact  Vascular    The left DP pulse is 2+  Assign Risk Category:  No deformity present; No loss of protective sensation;  No weak pulses       Risk: 0

## 2020-07-01 ENCOUNTER — VIRTUAL VISIT (OUTPATIENT)
Dept: ONCOLOGY | Facility: CLINIC | Age: 60
End: 2020-07-01
Attending: INTERNAL MEDICINE
Payer: MEDICARE

## 2020-07-01 DIAGNOSIS — Z72.0 TOBACCO ABUSE DISORDER: Primary | ICD-10-CM

## 2020-07-01 PROCEDURE — 40000114 ZZH STATISTIC NO CHARGE CLINIC VISIT

## 2020-07-01 NOTE — PROGRESS NOTES
TOBACCO TREATMENT  VISIT      Subjective:      Patient is a 60 year old White female that was contacted to participate in the Tobacco Treatment Program for Nicotine Dependence on 7/1/2020 by the Tobacco Treatment Specialist. Patient  reports that she has been smoking. She has been smoking about 0.00 packs per day. She has never used smokeless tobacco.       Information:      Agreed to Participate in Program?: Yes    Referral Type: Provider referral     Smoking Status: Every day smoker     Has attempted to quit in the last 30 days?: Yes     Previous Cessation Medication Used : 21mg nicotine patch;7mg nicotine patch;Nicotine inhaler     Tobacco Product Used : Cigarettes       Time to First Use : <30min     Time of Last Cigarette: smoked cigarette today (at least one puff)     Barriers to quit: Limited stress coping tools;Lack of support;Other household member smokes    Visit Type: Phone    Active in Cancer Treatment?: No    Medication Treatment Plan: Already on cessation medication    Cessation Medication Used : 7mg nicotine patch;Nicotine inhaler       Summary of visit:      Paula Ho is still smoking/using tobacco: Yes  These MI interventions were used: Expressed Empathy/Understanding, Supported Autonomy, Collaboration, Evocation, Permission to raise concern or advise and Change talk (evoked)  We discussed: Risks of smoking  Benefits of quitting  Ways to cope with cravings and manage triggers  Total abstinence  Patient is ready to quit smoking or continue to stay 100% smoke-free.  Advice to quit and impact of smoking provided to patient: setting quit date, stress coping skills development, provided resources (sw phone number), no safe amount of smoking.         Plan:      Quit Date: 8/7/20  1. Patient will increase use of nicotine inhaler daily by using more cartridges, avoiding stressing situations that trigger smoking by limiting exposure to stressful people.   2. Patient will call pharmacy today and fill  prescription for 7mg patches and try use of 7mg nicotine patches 1x before next visit.   3. Call pulmonary SW Merary Arenas (P: [phone]) again to assist with housing concerns and section 8.      Follow-up arranged? Yes  Amount of time spent counselinmins    RIVAS Mejia  Tobacco Treatment Specialist

## 2020-07-01 NOTE — LETTER
7/1/2020         RE: Paula Ho  [address]  [place] [id number]        Dear Colleague,    Thank you for referring your patient, Paula Ho, to the [facility]. Please see a copy of my visit note below.    TOBACCO TREATMENT  VISIT      Subjective:      Patient is a 60 year old White female that was contacted to participate in the Tobacco Treatment Program for Nicotine Dependence on 7/1/2020 by the Tobacco Treatment Specialist. Patient  reports that she has been smoking. She has been smoking about 0.00 packs per day. She has never used smokeless tobacco.       Information:      Agreed to Participate in Program?: Yes    Referral Type: Provider referral     Smoking Status: Every day smoker     Has attempted to quit in the last 30 days?: Yes     Previous Cessation Medication Used : 21mg nicotine patch;7mg nicotine patch;Nicotine inhaler     Tobacco Product Used : Cigarettes       Time to First Use : <30min     Time of Last Cigarette: smoked cigarette today (at least one puff)     Barriers to quit: Limited stress coping tools;Lack of support;Other household member smokes    Visit Type: Phone    Active in Cancer Treatment?: No    Medication Treatment Plan: Already on cessation medication    Cessation Medication Used : 7mg nicotine patch;Nicotine inhaler       Summary of visit:      Paula Ho is still smoking/using tobacco: Yes  These MI interventions were used: Expressed Empathy/Understanding, Supported Autonomy, Collaboration, Evocation, Permission to raise concern or advise and Change talk (evoked)  We discussed: Risks of smoking  Benefits of quitting  Ways to cope with cravings and manage triggers  Total abstinence  Patient is ready to quit smoking or continue to stay 100% smoke-free.  Advice to quit and impact of smoking provided to patient: setting quit date, stress coping skills development, provided resources (sw phone number), no safe amount of  smoking.         Plan:      Quit Date: 20  1. Patient will increase use of nicotine inhaler daily by using more cartridges, avoiding stressing situations that trigger smoking by limiting exposure to stressful people.   2. Patient will call pharmacy today and fill prescription for 7mg patches and try use of 7mg nicotine patches 1x before next visit.   3. Call pulmonary SW Merary Arenas (P: [phone]) again to assist with housing concerns and section 8.      Follow-up arranged? Yes  Amount of time spent counselinmins    RIVAS Mejia  Tobacco Treatment Specialist              Again, thank you for allowing me to participate in the care of your patient.        Sincerely,        Tobacco Treatment Specialist

## 2020-07-02 ENCOUNTER — TELEPHONE (OUTPATIENT)
Dept: PULMONOLOGY | Facility: CLINIC | Age: 60
End: 2020-07-02
Payer: MEDICARE

## 2020-07-02 NOTE — TELEPHONE ENCOUNTER
[facility] Call Center    Phone Message    May a detailed message be left on voicemail: yes     Reason for Call: Other: Pt has orders for full PFT' s, please reach out to pt to assist with scheduling, thanks!     Action Taken: Message routed to:  [facility] Pulmonary    Travel Screening: Not Applicable

## 2020-07-03 ENCOUNTER — ANCILLARY PROCEDURE (OUTPATIENT)
Dept: GENERAL RADIOLOGY | Facility: CLINIC | Age: 60
End: 2020-07-03
Attending: INTERNAL MEDICINE
Payer: MEDICARE

## 2020-07-03 ENCOUNTER — ANCILLARY PROCEDURE (OUTPATIENT)
Dept: CT IMAGING | Facility: CLINIC | Age: 60
End: 2020-07-03
Attending: INTERNAL MEDICINE
Payer: MEDICARE

## 2020-07-03 DIAGNOSIS — J44.9 COPD (CHRONIC OBSTRUCTIVE PULMONARY DISEASE) (H): ICD-10-CM

## 2020-07-06 ENCOUNTER — TELEPHONE (OUTPATIENT)
Dept: PULMONOLOGY | Facility: CLINIC | Age: 60
End: 2020-07-06

## 2020-07-07 ENCOUNTER — TELEPHONE (OUTPATIENT)
Dept: PSYCHOLOGY | Facility: CLINIC | Age: 60
End: 2020-07-07
Payer: MEDICARE

## 2020-07-07 ENCOUNTER — TELEPHONE (OUTPATIENT)
Dept: PULMONOLOGY | Facility: CLINIC | Age: 60
End: 2020-07-07

## 2020-07-14 DIAGNOSIS — R05.9 COUGH: ICD-10-CM

## 2020-07-14 DIAGNOSIS — J44.9 CHRONIC OBSTRUCTIVE PULMONARY DISEASE, UNSPECIFIED COPD TYPE (H): ICD-10-CM

## 2020-07-15 ENCOUNTER — VIRTUAL VISIT (OUTPATIENT)
Dept: PSYCHOLOGY | Facility: CLINIC | Age: 60
End: 2020-07-15
Attending: NURSE PRACTITIONER
Payer: MEDICARE

## 2020-07-15 DIAGNOSIS — F33.1 MODERATE EPISODE OF RECURRENT MAJOR DEPRESSIVE DISORDER (H): Primary | ICD-10-CM

## 2020-07-15 PROCEDURE — 99207 ZZC NO BILLABLE SERVICE THIS VISIT: CPT | Mod: TEL
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
## 2020-07-15 NOTE — PATIENT INSTRUCTIONS
"Patient Education     Major Depression  What is depression?   Depression is a serious mood disorder that affects your whole body including your mood and thoughts. It touches every part of your life. It s important to know that depression is not a weakness or character flaw. It s a chemical imbalance in your brain that needs to be treated.  If you have one episode of depression, you are at risk of having more throughout life.  If you don t get treatment, depression can happen more often and be more serious.   What causes depression?   Depression is caused by an imbalance of brain chemicals. Other factors also play a role. It also tends to run in families. Depression can be triggered by life events or certain illnesses. It can also develop without a clear trigger.  What are the symptoms of depression?   While each person may experience symptoms differently, these are the most common symptoms of depression:    Lasting sad, anxious, or  empty  mood    Loss of interest in almost all activities    Appetite and weight changes    Changes in sleep patterns, such as inability to sleep or sleeping too much    Slowing of physical activity, speech, and thinking OR agitation, increased restlessness, and irritability    Decreased energy, feeling tired or \"slowed down\" almost every day    Ongoing feelings of worthlessness or feelings of undue guilt    Trouble concentrating or making decisions    Repeating thoughts of death or suicide, wishing to die, or attempting suicide (Note: This needs emergency treatment )  If you have 5 or more of these symptoms for at least 2 weeks, you may be diagnosed with depression. These symptoms would be a noticeable change from what s  normal  for you  The symptoms of depression may look like other mental health conditions. Always see a healthcare provider for a diagnosis.  How is depression diagnosed?   Depression can happen along with other medical conditions. These include heart disease, or cancer, " as well as other mental health conditions. Early diagnosis and treatment is key to recovery.  A diagnosis is made after a careful mental health exam and medical history done. This is usually done by a mental health professional.  How is depression treated?   Treatment for depression may include one or a combination of the following:    Medicine. Antidepressants work by affecting the brain chemicals. Know that it takes 4 to 8 weeks for these medicines to have a full effect. Keep taking the medicine, even if it doesn t seem to be working at first. Never stop taking your medicine without first talking to your healthcare provider. Some people have to switch medicines or add medicines to get results. Work closely with your healthcare provider to find treatment that works for you.    Therapy. This is most often cognitive behavioral or interpersonal therapy. It focuses on changing the distorted views you have of yourself and your situation. It also works to improve relationships, and identify and manage stressors in your life.    Electroconvulsive therapy (ECT). This treatment may be used to treat severe, life-threatening depression that has not responded to medicines. A mild electrical current is passed through the brain. This triggers a brief seizure. For unknown reasons, the seizures help restore the normal balance of chemicals in the brain and ease symptoms.  With treatment, you should start to feel better within a few weeks. Without treatment, symptoms can last for weeks, months, or even years. Continued treatment may help to prevent depression from appearing again.  Depression can make you feel exhausted, worthless, helpless, and hopeless. It s important to realize that these negative views are part of the depression and don't reflect reality. Negative thinking fades as treatment starts to take effect. Meanwhile, consider the following:    Get help. Some research shows that if depression is treated as soon as  "possible, long-term problems are decreased. If you think you may be depressed, see a healthcare provider as soon as possible.    Set realistic goals in light of the depression and don t take on too much.    Break large tasks into small ones. Set priorities, and do what you can as you can.    Try to be with other people and confide in someone. It s usually better than being alone and secretive.    Do things that make you feel better. Going to a movie, gardening, or taking part in Mu-ism, social, or other activities may help. Doing something nice for someone else can also help you feel better.    Get regular exercise, studies show exercise can improve mood.    Expect your mood to get better slowly, not right away. Feeling better takes time.    Eat healthy, well-balanced meals.    Stay away from alcohol and drugs. These can make depression worse.    It's best to delay important decisions until the depression has lifted. Before deciding to make a big change --change jobs, get  or  -- discuss it with others who know you well and have a more objective view of your situation.    Remember: People don t \"snap out of\" a depression. But they can feel a little better day-by-day.    Try to be patient and focus on the positives. This may help replace the negative thinking that is part of the depression. The negative thoughts will fade as your depression responds to treatment.    Let your family and friends help you  When should I call my healthcare provider?  If you have 5 or more of these symptoms for at least 2 weeks, call your healthcare provider:     Lasting sad, anxious, or  empty  mood    Loss of interest in almost all activities    Appetite and weight changes    Changes in sleep patterns, such as inability to sleep or sleeping too much    Slowing of physical activity, speech, and thinking OR agitation, increased restlessness, and irritability    Decreased energy, feeling tired or \"slowed down\" almost " every day    Ongoing feelings of worthlessness or feelings of undue guilt    Trouble concentrating or making decisions    Repeating thoughts of death or suicide, wishing to die, or attempting suicide (Note: This needs emergency treatment )    If you have thoughts of harming yourself, tell someone right away. Call 911 or go to a hospital emergency room. Ask a friend or family member to stay with you. Don't stay alone. You can also call the toll-free 24-hour hotline of the National Suicide Prevention Lifeline at 800-273-talk (630.548.3061); TTY: 569-263-5LJY (9189) and talk to a trained counselor.  Key points about depression    Depression is a serious mood disorder that affects your whole body including your mood and thoughts.    It s caused by a chemical imbalance in the brain. Some types of depression seem to run in families.    Depression causes ongoing, extreme feelings of sadness, helplessness, hopeless, and irritability. These feelings are usually a noticeable change from what s  normal  for you, and they last for more than 2 weeks.    Depression may be diagnosed after a careful psychiatric exam and medical history done by a mental health professional.    Depression is most often treated with medicine or therapy, or a combination of both.    Next steps  Tips to help you get the most from a visit to your healthcare provider:    Know the reason for your visit and what you want to happen.    Before your visit, write down questions you want answered.    Bring someone with you to help you ask questions and remember what your provider tells you.    At the visit, write down the name of a new diagnosis, and any new medicines, treatments, or tests. Also write down any new instructions your provider gives you.    Know why a new medicine or treatment is prescribed, and how it will help you. Also know what the side effects are.    Ask if your condition can be treated in other ways.    Know why a test or procedure is  recommended and what the results could mean.    Know what to expect if you do not take the medicine or have the test or procedure.    If you have a follow-up appointment, write down the date, time, and purpose for that visit.    Know how you can contact your provider if you have questions.      [id number] [employer]. [address] [place] [address], [place] [address]. All rights reserved. This information is not intended as a substitute for professional medical care. Always follow your healthcare professional's instructions.

## 2020-07-16 RX ORDER — ALBUTEROL SULFATE 0.83 MG/ML
SOLUTION RESPIRATORY (INHALATION)
Qty: 25 VIAL | Refills: 1 | Status: SHIPPED | OUTPATIENT
Start: 2020-07-16 | End: 2021-11-01

## 2020-07-16 NOTE — TELEPHONE ENCOUNTER
Routing refill request to provider for review/approval because:  Failed ACT    Latasha Nicolas RN, [facility] Triage

## 2020-07-22 ENCOUNTER — TELEPHONE (OUTPATIENT)
Dept: ONCOLOGY | Facility: CLINIC | Age: 60
End: 2020-07-22

## 2020-07-22 ENCOUNTER — VIRTUAL VISIT (OUTPATIENT)
Dept: ONCOLOGY | Facility: CLINIC | Age: 60
End: 2020-07-22
Attending: NURSE PRACTITIONER
Payer: MEDICARE

## 2020-07-22 DIAGNOSIS — Z72.0 TOBACCO ABUSE DISORDER: Primary | ICD-10-CM

## 2020-07-22 PROCEDURE — 40001009 ZZH VIDEO/TELEPHONE VISIT; NO CHARGE

## 2020-07-22 RX ORDER — NICOTINE 21 MG/24HR
1 PATCH, TRANSDERMAL 24 HOURS TRANSDERMAL EVERY 24 HOURS
Qty: 30 PATCH | Refills: 3 | Status: CANCELLED | OUTPATIENT
Start: 2020-07-22
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
## 2020-07-22 NOTE — TELEPHONE ENCOUNTER
Patient found use of 7mg nicotine patch ineffective. Reasoning for use of 7mg was previous experience feeling dizzy with use. Patient reports use of 7mg had no impact on her urge to smoke and would sometimes double patch with no success managing urge to smoke.     Request to use 21mg nicotine patch, patient is smoking 20 CPD. Also has use of nicotine inhaler as needed in addition to patch.     Karyn Martinez, [facility]  Tobacco Treatment Specialist  PH: [phone]
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
## 2020-07-23 ENCOUNTER — PATIENT OUTREACH (OUTPATIENT)
Dept: CARE COORDINATION | Facility: CLINIC | Age: 60
End: 2020-07-23

## 2020-07-23 NOTE — PROGRESS NOTES
Social Work Telephone Message Note  [facility]    Patient Name:  Paula Ho  /Age:  1960 (60 year old)    Referral Source: Karyn Martinez CMA  Reason for Referral:  Resource for moving    Contacted Patient on 2020 and left her a voice message encouraging her to contact the [employer] and provided phone # to see if there is a resource to help her with moving into her new home.  Also left my contact info if further questions.     JASON Love, [facility]    [facility]  [phone]

## 2020-07-29 ENCOUNTER — TELEPHONE (OUTPATIENT)
Dept: PSYCHOLOGY | Facility: CLINIC | Age: 60
End: 2020-07-29

## 2020-07-31 ENCOUNTER — TELEPHONE (OUTPATIENT)
Dept: PSYCHOLOGY | Facility: CLINIC | Age: 60
End: 2020-07-31

## 2020-08-03 ENCOUNTER — VIRTUAL VISIT (OUTPATIENT)
Dept: PSYCHIATRY | Facility: CLINIC | Age: 60
End: 2020-08-03
Payer: MEDICARE

## 2020-08-03 ENCOUNTER — VIRTUAL VISIT (OUTPATIENT)
Dept: BEHAVIORAL HEALTH | Facility: CLINIC | Age: 60
End: 2020-08-03
Payer: MEDICARE

## 2020-08-03 DIAGNOSIS — F33.2 SEVERE EPISODE OF RECURRENT MAJOR DEPRESSIVE DISORDER, WITHOUT PSYCHOTIC FEATURES (H): ICD-10-CM

## 2020-08-03 DIAGNOSIS — R69 DIAGNOSIS DEFERRED: Primary | ICD-10-CM

## 2020-08-03 DIAGNOSIS — F41.1 GAD (GENERALIZED ANXIETY DISORDER): ICD-10-CM

## 2020-08-03 DIAGNOSIS — F17.200 TOBACCO DEPENDENCE: Primary | ICD-10-CM

## 2020-08-03 PROCEDURE — 99443 ZZC PHYSICIAN TELEPHONE EVALUATION 21-30 MIN: CPT | Mod: 95 | Performed by: NURSE PRACTITIONER

## 2020-08-03 RX ORDER — TOPIRAMATE 100 MG/1
100 TABLET, FILM COATED ORAL 2 TIMES DAILY
Qty: 60 TABLET | Refills: 1 | Status: SHIPPED | OUTPATIENT
Start: 2020-08-03 | End: 2023-08-18

## 2020-08-03 RX ORDER — FLUOXETINE 40 MG/1
40 CAPSULE ORAL 2 TIMES DAILY
Qty: 60 CAPSULE | Refills: 1 | Status: SHIPPED | OUTPATIENT
Start: 2020-08-03 | End: 2020-10-30

## 2020-08-03 RX ORDER — BUSPIRONE HYDROCHLORIDE 5 MG/1
5 TABLET ORAL 2 TIMES DAILY
Qty: 60 TABLET | Refills: 1 | Status: SHIPPED | OUTPATIENT
Start: 2020-08-03 | End: 2020-09-10

## 2020-08-03 ASSESSMENT — ANXIETY QUESTIONNAIRES
3. WORRYING TOO MUCH ABOUT DIFFERENT THINGS: NEARLY EVERY DAY
GAD7 TOTAL SCORE: 17
1. FEELING NERVOUS, ANXIOUS, OR ON EDGE: NEARLY EVERY DAY
2. NOT BEING ABLE TO STOP OR CONTROL WORRYING: NEARLY EVERY DAY
7. FEELING AFRAID AS IF SOMETHING AWFUL MIGHT HAPPEN: SEVERAL DAYS
6. BECOMING EASILY ANNOYED OR IRRITABLE: NEARLY EVERY DAY
5. BEING SO RESTLESS THAT IT IS HARD TO SIT STILL: SEVERAL DAYS
IF YOU CHECKED OFF ANY PROBLEMS ON THIS QUESTIONNAIRE, HOW DIFFICULT HAVE THESE PROBLEMS MADE IT FOR YOU TO DO YOUR WORK, TAKE CARE OF THINGS AT HOME, OR GET ALONG WITH OTHER PEOPLE: VERY DIFFICULT

## 2020-08-03 ASSESSMENT — PATIENT HEALTH QUESTIONNAIRE - PHQ9
5. POOR APPETITE OR OVEREATING: NEARLY EVERY DAY
SUM OF ALL RESPONSES TO PHQ QUESTIONS 1-9: 22

## 2020-08-03 NOTE — Clinical Note
Anson Mccrary,  I am prescribing Paula buspirone for her anxiety symptoms and reviewed my reluctance to prescribe her benzodiazepine medications which she took well.  I then referred her  to receive behavioral home health services so as to access community support services she had in place before COVID.  She will continue fluoxetine and I asked her to consider talk therapy in the future.  Thank you for the referral.
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
## 2020-08-03 NOTE — Clinical Note
Hello - I was able to connect with this patient today and enroll her into the [facility] case management program. Please let me know if you have additional questions or concerns.    Take care,  JASON Mann [facility]  [facility]   [facility]  [phone]  August 3, 2020  3:10 PM

## 2020-08-03 NOTE — PROGRESS NOTES
[facility]  No data recorded      Social Work Care Navigator Note      Patient: Paula Ho  Date: August 3, 2020  Preferred Name: Paula    Previous PHQ-9:   PHQ-9 SCORE 6/23/2020 7/7/2020 8/3/2020   PHQ-9 Total Score 13 12 22     Previous LIBRA-7:   LIBRA-7 SCORE 6/23/2020 7/7/2020 8/3/2020   Total Score 13 12 17     ARNALDO LEVEL:  No flowsheet data found.    Preferred Contact:  No data recorded    Type of Contact Today: Phone call (patient / identified key support person reached)      Data: (subjective / Objective):    [facility] Introduction:  Hi my name is JASON Mann from your [facility] primary care clinic.     I work closely with your primary care provider, Demetra Meyer.     If it's ok I'd like to talk about some new services available to you, at no out of pocket cost to you.      Before we get started can you verify your insurance for me? Medicare with [employer] [facility]    What social work or case management services do you receive? (If so, are you receiving ACT or TCM?).  None - patient states she used to have a [facility]  but is not connected with services at this time.    Getting to Know You - Whole Person Care:  This new service is called [facility], which is designed to support you as a whole person beyond just your medical needs.        I'm here to be a central point of contact for your healthcare needs and to help with:    Housing    Transportation    Financial resources    Comprehensive Health needs (appointment help, medication costs, etc.)    Employment    Education    Health Insurance applications    And connecting with social supports or community resources    Out of the things I mentioned what would you find helpful?  Patient states she lives alone in her apartment with 11 hours per day of PCA services and owns her own vehicle. Patient states she would like access to [employer]. Patient states she used to have an [employer] worker but  does not at this time and would like assistance getting new services. Patient sees therapist Nithya Winter at [facility].    Patient states she just received a Section 8 voucher that approves her for a two-bedroom apartment and would like assistance to find housing. [facility] and patient to meet for Health & Wellness Assessment on 08/12/20.    Diagnostic Assessment completed 08/03/2020 by Tigist Manjarrez.    Patient response to [facility] Service offering:   Patient enrolled in [facility] today     [facility] mailed out patient welcome letter and enrollment. [facility] completed [facility] enrollment forms and faxed to HIM.     JASON Mann LGSW [facility]   [facility]  [phone]  August 3, 2020  3:19 PM

## 2020-08-04 ASSESSMENT — ANXIETY QUESTIONNAIRES: GAD7 TOTAL SCORE: 17

## 2020-08-06 NOTE — PATIENT INSTRUCTIONS
1.  Buspirone 5 mg by mouth twice daily for anxiety  2.  Fluoxetine 80 mg daily  3.  Refer to behavioral home health care for obtaining access to community resources  4.  Consider talk therapy to process trauma history and manage depression symptoms      Continue all other medical directions per primary care provider.     Continue all other medications as reviewed per electronic medical record today.     Safety plan reviewed. To the Emergency Department as needed or call after hours crisis line at [phone] or [phone]. [place] Crisis Text Line: Text [place] to 790545  or  Suicide LifeLine Chat: suicideMediaBoost.org/chat/    To schedule individual or family therapy, call [facility] at [phone].     Schedule an appointment with me in September weeks or sooner as needed.  Call [facility] at [phone] to schedule.    Follow up with primary care provider as planned or for acute medical concerns.    Call the psychiatric nurse line with medication questions or concerns at [phone].    [employer] Services[employer] may be used to communicate with your provider, but this is not intended to be used for emergencies.    Crisis Resources:    National Suicide Prevention Lifeline: 939.326.4288 (TTY: 416.482.1903). Call anytime for help.  (www.suicidepreventionlifeline.org)  National Springer on Mental Illness (www.raudel.org): 607.483.9038 or 609-523-0836.   Mental Health Association (www.mentalhealth.org): 254.544.1803 or 838-357-7088.  [place] Crisis Text Line: Text [place] to 948837  Suicide LifeLine Chat: suicideMediaBoost.org/chat

## 2020-08-06 NOTE — TELEPHONE ENCOUNTER
Tobacco Treatment Program at the [facility] attempted to reach Ms. Ho on 8/6/2020 regarding the tobacco cessation program to help Ms. Ho to quit smoking. We will attempt to reach Ms. Ho another time.     Karyn Martinez [facility]  Tobacco Treatment Specialist  PH: [phone]

## 2020-08-12 ENCOUNTER — VIRTUAL VISIT (OUTPATIENT)
Dept: BEHAVIORAL HEALTH | Facility: CLINIC | Age: 60
End: 2020-08-12
Payer: MEDICARE

## 2020-08-12 ENCOUNTER — VIRTUAL VISIT (OUTPATIENT)
Dept: ONCOLOGY | Facility: CLINIC | Age: 60
End: 2020-08-12
Attending: NURSE PRACTITIONER
Payer: MEDICARE

## 2020-08-12 DIAGNOSIS — Z72.0 TOBACCO ABUSE DISORDER: Primary | ICD-10-CM

## 2020-08-12 DIAGNOSIS — R69 DIAGNOSIS DEFERRED: Primary | ICD-10-CM

## 2020-08-12 PROCEDURE — 40000114 ZZH STATISTIC NO CHARGE CLINIC VISIT

## 2020-08-12 ASSESSMENT — ANXIETY QUESTIONNAIRES
3. WORRYING TOO MUCH ABOUT DIFFERENT THINGS: NEARLY EVERY DAY
4. TROUBLE RELAXING: NEARLY EVERY DAY
1. FEELING NERVOUS, ANXIOUS, OR ON EDGE: NEARLY EVERY DAY
GAD7 TOTAL SCORE: 16
7. FEELING AFRAID AS IF SOMETHING AWFUL MIGHT HAPPEN: SEVERAL DAYS
5. BEING SO RESTLESS THAT IT IS HARD TO SIT STILL: NOT AT ALL
IF YOU CHECKED OFF ANY PROBLEMS ON THIS QUESTIONNAIRE, HOW DIFFICULT HAVE THESE PROBLEMS MADE IT FOR YOU TO DO YOUR WORK, TAKE CARE OF THINGS AT HOME, OR GET ALONG WITH OTHER PEOPLE: EXTREMELY DIFFICULT
6. BECOMING EASILY ANNOYED OR IRRITABLE: NEARLY EVERY DAY
2. NOT BEING ABLE TO STOP OR CONTROL WORRYING: NEARLY EVERY DAY

## 2020-08-12 ASSESSMENT — PATIENT HEALTH QUESTIONNAIRE - PHQ9: SUM OF ALL RESPONSES TO PHQ QUESTIONS 1-9: 20

## 2020-08-12 NOTE — Clinical Note
Beau Meyer - I met with this patient yesterday. She stated that she needed a lung test appointment. I am not sure what she was referring to but did not see anything scheduled. Can you follow-up with this? I also mailed out a [facility] Mobility form to the patient and let her know she would need to make an appointment with you to have this filled out and returned to [facility].    Anson Alves - I met with this patient yesterday and she stated that another therapist Makayla Estrella reached out to her and she was confused by this, as she thought you were her therapist. I am wondering if you could provide some clarity about this and reach out to the patient?    Please let me know if you have additional questions or concerns.    Thank you,  JASON Mann [facility]  Behavioral Health [place] ([facility])   [facility]  [phone]  August 13, 2020  9:58 AM

## 2020-08-12 NOTE — LETTER
Behavioral Health Home ([facility]): Health Action Plan  [facility] Clinic: [place]    Well and Beyond      Name: Paula Ho  Preferred Name: Paula  : 1960  MRN: [id number]    2020    [facility]  [address] [place] [address]    Dear Paula,    I have enclosed the Health Action Plan (HAP) that we completed together that includes your goals for Behavioral Health [place] ([facility]) Services. As you continue being enrolled in Behavioral Health Home ([facility]) services, I wanted to give you some information so you know what to expect moving forward. Per our conversation I have included [employer] Mob[employer] Form in mailing, Quit Partner in mailing, Moving resources in mailing, [place] Choice assessment - referral completed, Lung test - message pcp - completed, Makayla Estrella - psychology appt? - message [place] - completed. [facility] - referral - completed, and Meals on Wheels - referral placed.    Our next appointment is Wednesday, Aug. 26th at 2pm via telephone appointment.    What to Expect on a Monthly Basis: It is a requirement that I make contact with you on a monthly basis (by phone or meeting with you in clinic) to check in on how things are going and if you need any assistance. Please feel free to reach out to your [facility] team between these contacts if you need assistance or have any questions.    What to Expect every Six Months: Every six months, it is a requirement that we complete a Health Action Plan (HAP) review. During this in-clinic appointment, we will monitor our progress towards existing goals and set new goals for the next 6 month time period.    What kinds of support can [facility] offer now that I am enrolled?   - Housing Coordination  - Transportation Resources  - Financial Resources  - Coordination with the [facility] for Benefits (MA, SNAP benefits, etc)  - Disability Eligibility and Benefits  - Employment and Education Coordination  - Disability Related Information and Education Resources  -  "Referrals for mental health services, chemical dependency assessment/treatment, etc     If you or someone you know is experiencing a mental health crisis and you need help, the following crisis hotlines are available to help.    If you are in immediate danger, call 911.  Suicide Prevention Lifeline: 3-987-135-YXUW (3978)  Crisis Text Line Service: Text \"[place]\" to 939093.    [facility] Emergency Department - Behavioral Emergency Center  [address] [place] [address]  [phone] [facility] Crisis Response Services (Adults & Children)  [phone]   [facility] - Acute Psychiatric Services (APS)  Assessment & Referral: [phone]  Suicide Hotline: 484.588.9651 [place] Crisis Team  733.207.4690   [facility] Adult Mental Health Services   [phone]  Referrals: [phone]  Crisis: [phone] [facility] - Emergency Department  [address] [place] [address]  [phone]   [place] LinkVet  3-203-YsqnSbe (1-550.135.1098) [facility] Mental Health and Resource Crisis Line  [phone]   [facility] - Community Outreach for Psychiatric Emergencies  [phone] [facility] Crisis Services - [facility] Adult Mental Health Services - Crisis Services (24/7)  Information & Referrals: [phone]  Crisis Line: 874.655.9265   [facility] Emergency Center (24/7)  [phone] TDD Crisis Help Line Serving [facility]  [phone]  or text  [place]  to 904670    [facility]  Mental Health  [address] [place] [address]  [phone]  [address] [place] [address]  [phone]  web: co.[facility].  Mental-Health    [facility]  Mental Health  [address], [place] [address] [phone]  web: [facility]" Services    [facility]  Family Services  [address], [place] [address]  [phone]  web: [facility] Services    [facility]  Family Services  [address] [place] [address]  [phone]  web: co.[facility].[place]./adult mental health    [facility]  [address] [facility], [place] [address]  [phone]  [address] [facility] [address] [place] [address]  [phone]  Toll Free: [phone]  web: co.[place].[facility] and human services     Please let me know if you have any questions or if there is anything that we can assist with. I can be reached by phone, Hubspan message, or by email. I look forward to continuing to work with you!    Sincerely,          JASON Mann [facility]  Behavioral Health Home ([facility])   [phone]  [email or web address]

## 2020-08-12 NOTE — PROGRESS NOTES
Behavioral Health Home Services  No data recorded      Social Work Care Navigator Note      Patient: Paula Ho  Date: August 12, 2020  Preferred Name: Paula    Previous PHQ-9:   PHQ-9 SCORE 7/7/2020 8/3/2020 8/12/2020   PHQ-9 Total Score 12 22 20     Previous LIBRA-7:   LIBRA-7 SCORE 7/7/2020 8/3/2020 8/12/2020   Total Score 12 17 16     ARNALDO LEVEL:  No flowsheet data found.    Preferred Contact:  No data recorded    Type of Contact Today: Phone call (patient / identified key support person reached)      Data: (subjective / Objective):    Patient came in to complete the comprehensive wellness assessment for Behavioral Health Home Services.  See [facility] Flowsheets for details on the assessment.  See [facility], Behavioral Health [place] for a copy of the patient's care plan.    Patient stated Goal Areas:  Health;Mental Health;Financial and Social Service Benefits;Transportation    Patient stated goals:  Patient would like assistance with her physical and mental health for creating a balanced and healthy lifestyle. Patient would like assistance with continued SSDI, CADI and social service assistance to aid with county benefits to increase her financial stability. Patient would like assistance with additional transportation services, such as [employer] for reliable transportation to get to her appointments, run errands and get out into the community.     Patient would like assistance with her physical and mental health for creating a balanced and healthy lifestyle. Patient states she has a lot of appointments and sometimes has difficulty keeping track of all of them. Patient states she does have a PCA that comes in every day and is approved for 11.5 hours per day. Patient states she now has a psychiatric prescriber. Patient states her therapist is JASON Marcum at [facility].    Patient states she received a phone call from another therapist and is confused by this, as she is currently seeing a therapist. [facility]  messaged Joselyn Winter MSTOPHER [facility] to gain insight into this and will connect with patient about this.    Patient states she used to have ARMHS services and would like to again. [facility] placed referral with Tex Montilla for ARMHS program.    Patient states she has been diagnosed with COPD and is working with pulmonology. Patient states she has started working with their tobacco treatment program but would like additional resources. [facility] mailed patient Quit Partner resources and provided patient with support/empathy and motivational interviewing.    Patient states she was supposed to have a lung test and follow-up but she has not been scheduled for anything at this time. CC messaged PCP to see what test patient is needing to be scheduled for and will follow up with patient.      Patient states appreciation for extra case management support from [facility] bi-monthly.      Patient would like assistance with continued SSDI, CADI and social service assistance to aid with county benefits to increase her financial stability. Patient states she currently receives SSDI and social security benefits, but the paperwork can be confusing at times and she often has questions. [facility] and patient will meet bi-monthly to go over paperwork and address any patient questions or concerns.     Patient is wanting to see if she would qualify for CADI services, as she is needing equipment for her COPD such as, air purifier, vacuum with Hepa filter, and a new mattress pad. CC placed referral to [facility] Choice  [place]elly. [facility] will follow-up with patient to monitor when assessment is scheduled. [facility] recommended that patient's PCA Sharla attend assessment for additional support and insight into the patients health and mental health needs for CADI services.    Patient has received section-8 voucher to be able to move into a two bedroom apartment. Patient has been looking and may have found apartment. Patient requesting  information on movers. [facility] mailed resources to patient.     Patient states she is interested in receiving Meals on Wheels. [facility] placed referral with agency.      Patient would like assistance with additional transportation services, such as [employer] for reliable transportation to get to her appointments, run errands and get out into the community. Patient states she does have her own vehicle but often finds it difficult to drive do to her chronic health conditions. [facility] mailed out form for [employer] to patient. [facility] educated patient that she would need to make an appointment with her PCP to complete form and then mail it in to [employer]. Patient states understanding. [facility] to monitor and check in with patient about progress with this goal. [facility] mailed out low cost maintenance and auto repair resources.    Patient stated strengths:    Patients states her strengths are the support of her dogs, good advocate for herself, support of good friends, and care for others. Patient states she has a big heart.    JASON Mann LGSW Behavioral Health Home ([facility])   [facility]  [phone]  August 13, 2020  9:52 AM    Addendum:  Health Action Plan approved by Behavioral Health Clinician 8/17/2020. [facility] sent patient a copy of approved HAP in the mail. Next Health Action Plan review due in February of 2021.    [facility] received phone call today from [employer] stating patient has an appointment for intake with MARYA Nicole Supervisor at [phone] on Aug. 26th at 2pm; once completed patient will be assigned [facility] provider.    DENI Mann  8/17/2020  3:03 PM

## 2020-08-12 NOTE — TELEPHONE ENCOUNTER
REwquest for 21mg nicotine patches, patient is smokin 18-20 cigarettes daily. Has 7mg patch, not working for her.     Karyn Martinez, [facility]  Tobacco Treatment Specialist  PH: [phone]

## 2020-08-12 NOTE — LETTER
Behavioral Health Home ([facility]): Health Action Plan  [facility] Clinic: [place]    Well and Beyond      Name: Paula Ho  Preferred Name: Paula  : 1960  MRN: [id number]      My Goals  Goal Areas: Health;Mental Health;Financial and Social Service Benefits;Transportation    Patient stated goals: Patient would like assistance with her physical and mental health for creating a balanced and healthy lifestyle. Patient would like assistance with continued SSDI, CADI and social service assistance to aid with [facility] benefits to increase her financial stability. Patient would like assistance with additional transportation services, such as [employer] for reliable transportation to get to her appointments, run errands and get out into the community.    Strengths related to each goal: Patients states her strengths are the support of her dogs, good advocate for herself, support of good friends, and care for others. Patient states she has a big heart.    Services and Supports Needed: The [facility] Team will provide monthly contact with patient in order to monitor progress towards goals.    Activities / Actions of Team to support goal(s): [facility] team will locate appropriate resources that align with patient's goals and promote health and wellness.    Activities / Actions of Patient / Parent / Guardian to support goal(s): It is a requirement that patient's primary care physician is through the [facility] system and that they are on Medical Assistance/Medicaid. If either of these were to change or if patient needs any type of assistance, they are to reach out to their Behavioral Health [place] ([facility]) team.        Recommended Referral  Tobacco cessation referrals made?: Yes (see comments)  Mental Health / Chemical Dependency Referrals: Yes  Substance Use Referrals: Not Applicable  Mental Health Referrals: [facility] Referrals: [facility] Financial Services;[facility] ;Other (see comments)        My Team Members  and Their Contact Information  Patient Care Team       Relationship Specialty Notifications Start End    MitchDemetra APRN CNP PCP - General Nurse Practitioner  12/5/19     Phone: [phone] Fax: [phone]         [address] [place] [address]    Mitch DemetraLEONA Sibley CNP Assigned PCP   9/15/19     Phone: [phone] Fax: [phone]         [address] [place] [address]    Karyn Martinez, AMALIA Other (see comments)   6/11/20 6/11/21    Tobacco Treatment Specialist    Phone: [phone]         Therkildsen, Tgiist Faiza, NP Nurse Practitioner Nurse Practitioner Admissions 8/3/20     Psychiatry    Phone: [phone] Fax: [phone]             [facility] [address] [place] [address]    Joselyn Winter Therapist  Admissions 8/3/20     Therapist    Phone: [phone] Fax: [phone]          [facility]  [address]  [facility] [address]    Domonique Harris LSW  Clinic Admissions 8/3/20     [facility] [phone]          My Wellness Plan  Safety Concerns: None Reported / Observed  Recommendations / Plan for safety concerns: A safety and risk management plan has not been developed at this time, however patient was encouraged to call [facility] should there be a change in any of these risk factors.  Crisis Plan (emergencies / when urgent support needed): Patient states she feels comfortable contacting her PCA Sharla or calling 911 in a crisis or emergency situation.          Paula Ho co-developed the Health Action Plan with the [facility] Team and received a copy of this document.  Date Health Action Plan Completed/Updated: 08/12/20

## 2020-08-12 NOTE — PROGRESS NOTES
TOBACCO TREATMENT  VISIT      Subjective:      Patient is a 60 year old White female that was contacted to participate in the Tobacco Treatment Program for Nicotine Dependence on 8/12/2020 by the Tobacco Treatment Specialist. Patient  reports that she has been smoking. She has been smoking about 0.50 packs per day. She has never used smokeless tobacco.     Things are not going well at this time. Mom will be having surgery and she is concerned about the outcome. Brother has cancer at this time as well. Patient is still trying to move but doesn t have movers and states it s be challenging to pack, gets winded. Admits smoking has continued to be an issue. Has a close friend who is very supportive at this time.        Information:      Agreed to Participate in Program?: Yes    Referral Type: Provider referral     Smoking Status: Every day smoker     Has attempted to quit in the last 30 days?: Yes     Previous Cessation Medication Used : 21mg nicotine patch;7mg nicotine patch;Nicotine inhaler     Tobacco Product Used : Cigarettes           Amount of Use (CPD) : 17     Time to First Use : <30min     Time of Last Cigarette: smoked cigarette today (at least one puff)                     Barriers to quit: Limited stress coping tools;Lack of support;Other household member smokes    Visit Type: Phone    Active in Cancer Treatment?: No    Medication Treatment Plan: Already on cessation medication       Summary of visit:      Paula Ho is still smoking/using tobacco: Yes  These MI interventions were used: Supported Autonomy, Collaboration, Evocation, Permission to raise concern or advise, Open-ended questions, Reflections: simple and complex and Change talk (evoked)  We discussed: Benefits of quitting  Ways to cope with cravings and manage triggers  Hints for managing nicotine withdrawal  Ways to reduce stress to prevent relapse  Patient is ready to quit smoking or continue to stay 100% smoke-free.  Advice to quit and  impact of smoking provided to patient: stress coping skills development, resources for housing,         Plan:      1. Contact disabilityhub and  for housing help  2. Continue use of nicotine patches to reduce smoking.   3. Aim to go through boxes/pack to keep self busy to not smoke.     Follow-up arranged? Yes  Amount of time spent counselinmins    RIVAS Mejia  Tobacco Treatment Specialist

## 2020-08-12 NOTE — LETTER
8/12/2020         RE: Paula Ho  [address]  [facility] [address]        Dear Colleague,    Thank you for referring your patient, Paula Ho, to the [facility]. Please see a copy of my visit note below.    TOBACCO TREATMENT  VISIT      Subjective:      Patient is a 60 year old White female that was contacted to participate in the Tobacco Treatment Program for Nicotine Dependence on 8/12/2020 by the Tobacco Treatment Specialist. Patient  reports that she has been smoking. She has been smoking about 0.50 packs per day. She has never used smokeless tobacco.       Information:      Agreed to Participate in Program?: Yes    Referral Type: Provider referral                              Smoking Status: Every day smoker     Has attempted to quit in the last 30 days?: Yes     Previous Cessation Medication Used : 21mg nicotine patch;7mg nicotine patch;Nicotine inhaler     Tobacco Product Used : Cigarettes           Amount of Use (CPD) : 17     Time to First Use : <30min     Time of Last Cigarette: smoked cigarette today (at least one puff)                     Barriers to quit: Limited stress coping tools;Lack of support;Other household member smokes    Visit Type: Phone    Active in Cancer Treatment?: No    Medication Treatment Plan: Already on cessation medication       Summary of visit:      Paula Ho is still smoking/using tobacco: Yes  These MI interventions were used: Supported Autonomy, Collaboration, Evocation, Permission to raise concern or advise, Open-ended questions, Reflections: simple and complex and Change talk (evoked)  We discussed: Benefits of quitting  Ways to cope with cravings and manage triggers  Hints for managing nicotine withdrawal  Ways to reduce stress to prevent relapse  Patient is ready to quit smoking or continue to stay 100% smoke-free.  Advice to quit and impact of smoking provided to patient: stress coping skills development,  resources for housing,         Plan:      1. Contact disabilityhub and  for housing help  2. Continue use of nicotine patches to reduce smoking.   3. Aim to go through boxes/pack to keep self busy to not smoke.     Follow-up arranged? Yes  Amount of time spent counselinmins    RIVAS Mejia  Tobacco Treatment Specialist              Again, thank you for allowing me to participate in the care of your patient.        Sincerely,        Tobacco Treatment Specialist

## 2020-08-13 ENCOUNTER — VIRTUAL VISIT (OUTPATIENT)
Dept: ANESTHESIOLOGY | Facility: CLINIC | Age: 60
End: 2020-08-13
Attending: NURSE PRACTITIONER
Payer: MEDICARE

## 2020-08-13 DIAGNOSIS — G89.29 CHRONIC MIDLINE LOW BACK PAIN WITH BILATERAL SCIATICA: Primary | Chronic | ICD-10-CM

## 2020-08-13 DIAGNOSIS — M54.41 CHRONIC MIDLINE LOW BACK PAIN WITH BILATERAL SCIATICA: Primary | Chronic | ICD-10-CM

## 2020-08-13 DIAGNOSIS — F11.20 OPIOID DEPENDENCE, UNCOMPLICATED (H): ICD-10-CM

## 2020-08-13 DIAGNOSIS — M54.42 CHRONIC MIDLINE LOW BACK PAIN WITH BILATERAL SCIATICA: Primary | Chronic | ICD-10-CM

## 2020-08-13 RX ORDER — NICOTINE 21 MG/24HR
1 PATCH, TRANSDERMAL 24 HOURS TRANSDERMAL EVERY 24 HOURS
Qty: 30 PATCH | Refills: 3 | Status: SHIPPED | OUTPATIENT
Start: 2020-08-13 | End: 2020-12-22

## 2020-08-13 RX ORDER — GUANFACINE 1 MG/1
1 TABLET ORAL AT BEDTIME
Qty: 30 TABLET | Refills: 0 | Status: SHIPPED | OUTPATIENT
Start: 2020-08-13 | End: 2020-09-24

## 2020-08-13 ASSESSMENT — PAIN SCALES - GENERAL: PAINLEVEL: EXTREME PAIN (8)

## 2020-08-13 ASSESSMENT — ANXIETY QUESTIONNAIRES: GAD7 TOTAL SCORE: 16
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
## 2020-08-13 NOTE — PROGRESS NOTES
AVS mailed to the pt with specific instruction that UDS needs to be completed, and the pt needs to sign the CSA and send it back to the clinic.     Nicki Ford LPN
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
## 2020-08-13 NOTE — PATIENT INSTRUCTIONS
Medications:    guanFACINE (TENEX) 1 MG tablet- Take 1 tablet (1 mg) by mouth At Bedtime.    Please provide the clinic with a minium of 1 week notice, on all prescription refills.     Referrals:    Chiropractic Referral- Call to schedule [phone].    Psychology Referral placed- (Someone will contact you to schedule)  Pain psychology Therapies Requested- Biofeedback, Mindfulness training, CBT, Coping and relaxation therapy.     Imaging:    Lumbar Xray ordered- Call to schedule your imaging.    IMAGING SERVICES HOURS:    All imaging modalities are available from 7 a.m. - 9 p.m. Monday through Friday  X-ray, CT, MRI, and General Ultrasound appointments are available from 7 a.m. -3:30 p.m. on Saturdays  X-ray, CT and MRI appointments are available from 8 a.m. - 4:30 p.m. on Sundays  Please call [phone] to schedule imaging exams    Treatment planning:    Urine Drug screen ordered- Please make an appointment with a [place] location to have this done.     A Controlled Substance agreement was mailed to you, as well as a copy for your records. Please sign and date the clinic's copy and mail it back, using the envelope enclosed.     Recommended Follow up:  4-6 weeks with Dr. Irizarry, or earlier if clinically indicated.        Please call [phone] to schedule this appointment if you don't already have an appointment made.     To speak with a nurse, schedule/reschedule/cancel a clinic appointment, or request a medication refill call: [phone]    You can also reach us by [employer]: [email or web address]

## 2020-08-13 NOTE — PROGRESS NOTES
"Paula Ho is a 60 year old female who is being evaluated via a billable video visit.      The patient has been notified of following:     \"This video visit will be conducted via a call between you and your physician/provider. We have found that certain health care needs can be provided without the need for an in-person physical exam.  This service lets us provide the care you need with a video conversation.  If a prescription is necessary we can send it directly to your pharmacy.  If lab work is needed we can place an order for that and you can then stop by our lab to have the test done at a later time.    Video visits are billed at different rates depending on your insurance coverage.  Please reach out to your insurance provider with any questions.    If during the course of the call the physician/provider feels a video visit is not appropriate, you will not be charged for this service.\"    Patient has given verbal consent for Video visit? Yes  How would you like to obtain your AVS? Mail a copy  If you are dropped from the video visit, the video invite should be resent to: Text to cell phone: [phone]  Will anyone else be joining your video visit? No        Rose Denson CMA        "

## 2020-08-14 ENCOUNTER — DOCUMENTATION ONLY (OUTPATIENT)
Dept: BEHAVIORAL HEALTH | Facility: CLINIC | Age: 60
End: 2020-08-14

## 2020-08-14 NOTE — PROGRESS NOTES
Documentation Only     Upon patients enrollment into [facility] services, Epic did not allow for the appropriate selection for [facility] location for utilization on the ERV dashboard. Updated [facility] brief needs flowsheet to reflect this on the ER dashboard.      JASON Mann [facility]  [facility]   [facility]  [phone]  August 14, 2020  3:13 PM

## 2020-08-26 ENCOUNTER — TELEPHONE (OUTPATIENT)
Dept: BEHAVIORAL HEALTH | Facility: CLINIC | Age: 60
End: 2020-08-26

## 2020-08-26 ENCOUNTER — APPOINTMENT (OUTPATIENT)
Dept: BEHAVIORAL HEALTH | Facility: CLINIC | Age: 60
End: 2020-08-26
Payer: MEDICARE

## 2020-08-26 NOTE — TELEPHONE ENCOUNTER
[facility] Services  [facility]: [place]        Social Work Care Navigator Note      Patient: Paula Ho  Date: August 26, 2020  Preferred Name: Paula    Previous PHQ-9:   PHQ-9 SCORE 7/7/2020 8/3/2020 8/12/2020   PHQ-9 Total Score 12 22 20     Previous LIBRA-7:   LIBRA-7 SCORE 7/7/2020 8/3/2020 8/12/2020   Total Score 12 17 16     ARNALDO LEVEL:  No flowsheet data found.    Preferred Contact:  Need for : No  Preferred Contact: Cell      Type of Contact Today: Phone call (not reached/unavailable)      Data: (subjective / Objective):  Attempted to reach patient for [facility] monthly check-in, but was unsuccessful, left message.  Plan to attempt again.      JSAON Mann [facility]  [facility]   [facility]  [phone]  August 26, 2020  2:13 PM

## 2020-08-27 ENCOUNTER — THERAPY VISIT (OUTPATIENT)
Dept: CHIROPRACTIC MEDICINE | Facility: CLINIC | Age: 60
End: 2020-08-27
Attending: ANESTHESIOLOGY
Payer: MEDICARE

## 2020-08-27 DIAGNOSIS — G89.29 CHRONIC LOW BACK PAIN: ICD-10-CM

## 2020-08-27 DIAGNOSIS — M62.838 SPASM OF MUSCLE: ICD-10-CM

## 2020-08-27 DIAGNOSIS — M99.05 SEGMENTAL DYSFUNCTION OF PELVIC REGION: Primary | ICD-10-CM

## 2020-08-27 DIAGNOSIS — M99.02 THORACIC SEGMENT DYSFUNCTION: ICD-10-CM

## 2020-08-27 DIAGNOSIS — M54.42 CHRONIC MIDLINE LOW BACK PAIN WITH BILATERAL SCIATICA: Chronic | ICD-10-CM

## 2020-08-27 DIAGNOSIS — M54.50 CHRONIC LOW BACK PAIN: ICD-10-CM

## 2020-08-27 DIAGNOSIS — G89.29 CHRONIC MIDLINE LOW BACK PAIN WITH BILATERAL SCIATICA: Chronic | ICD-10-CM

## 2020-08-27 DIAGNOSIS — M54.41 CHRONIC MIDLINE LOW BACK PAIN WITH BILATERAL SCIATICA: Chronic | ICD-10-CM

## 2020-08-27 DIAGNOSIS — M99.03 SEGMENTAL DYSFUNCTION OF LUMBAR REGION: ICD-10-CM

## 2020-08-27 PROCEDURE — 98941 CHIROPRACT MANJ 3-4 REGIONS: CPT | Mod: AT | Performed by: CHIROPRACTOR

## 2020-08-27 PROCEDURE — 99203 OFFICE O/P NEW LOW 30 MIN: CPT | Mod: 25 | Performed by: CHIROPRACTOR

## 2020-08-27 NOTE — PROGRESS NOTES
Initial Chiropractic Clinic Visit    PCP: Demetra Meyer    Paula Ho is a 60 year old female who is seen  in consultation at the request of  Dane Irizarry M.D. presenting with low back pain . Patient reports that the onset was around 2014 insidiously.  When asked, patient denies:, falling, slipping, bending and reaching or sleeping awkwardly. The pain is located in her lower lumbar spine on the right. She rates the pain at an 8 out of 10 and describes the pain as a dull achy pain with periods of sharpness depending one movement/position.  There are currently no ratiatind symptoms and her sleep is interrupted by low back pain.    Injury:     Location of Pain: right, lower lumbar at the following level(s) L4  and L5   Duration of Pain: 6+ year(s)  Rating of Pain at worst: 9/10  Rating of Pain Currently: 8/10  Symptoms are better with: Rest and laying down  Symptoms are worse with: standing, stairs and walking  Additional Features:      Health History  as reported by the patient:    How does the patient rate their own health:   Fair    Current or past medical history:   Asthma, Chest pain, Depression, Emphysema, Migraines/headaches, Numbness/tingling, Overweight, Pain at night/rest, Sleep disorder/apnea and Smoking    Medical allergies  None    Past Traumas/Surgeries  Orthopedic: right total knee    Family History  The family history includes Depression in her mother; Substance Abuse in her father.    Medications:  Anti-depressants and Pain    Occupation:      Primary job tasks:       Barriers as home/work:   stairs    Additional health Issues:                 Paula was asked to complete the Oswestry Low Back Disability Index and Mahnaz Start Back screening tool, today in the office.  Patient declined to complete the form.. Keel Start Total Score: Sub Score:      Review of Systems  Musculoskeletal: as above  Remainder of review of systems is negative including constitutional, CV, pulmonary, GI, Skin  "and Neurologic except as noted in HPI or medical history.    Past Medical History:   Diagnosis Date     Acute respiratory failure (H) 02/01/2020    Diagnosed with influenza A on 2/1 and admitted to ICU at [facility] on bipap. She went home AMA shortly thereafter.      Past Surgical History:   Procedure Laterality Date     APPENDECTOMY       ORTHOPEDIC SURGERY  2002    Circa 2002: right knee arthroscopy (Dr. Pappas)     ORTHOPEDIC SURGERY  2004    Circa 2004: right knee partial knee replacement (Iam Ritchie MD)     ORTHOPEDIC SURGERY  2006    Circa 2006: right TKA (Ron Ryder MD; [facility])     ORTHOPEDIC SURGERY  2008    Circa 2008: right TKA revision due to infection (Ron Ryder MD; [facility])     ORTHOPEDIC SURGERY  2009    Circa 2009: right TKA revision due to infection (Ron Ryder MD; [facility])     ORTHOPEDIC SURGERY  2011 April 2011: right TKA (Ron Ryder MD; [facility])     TONSILLECTOMY      tonsils     Objective  LMP  (LMP Unknown)       GENERAL APPEARANCE: healthy, alert and mild distress   GAIT: NORMAL  SKIN: no suspicious lesions or rashes  NEURO: Normal strength and tone, mentation intact and speech normal  PSYCH:  mentation appears normal and affect normal/bright    Low back exam:    Inspection:  \"     no visible deformity in the low back       normal skin\",    ROM:       full flexion       limited extension due to pain    Tender:       paraspinal muscles    Non Tender:       remainder of lumbar spine    Strength:       hip flexion 5/5 Normal       knee extension 5/5 Normal       ankle dorsiflexion 5/5 Normal       ankle plantarflexion 5/5 Normal       dorsiflexion of the great toe 5/5 Normal    Sensation:      grossly intact throughout lower extremities    Segmental spinal dysfunction/restrictions found at:T11 , T12 , L4 , L5  and PSIS Right     The following soft tissue hypotonicities were observed:Piriformis: right, referred pain: no    Trigger " points were found in:Lumbar erector spine and Piriformis    Muscle spasm found in:Lumbar erector spine and Piriformis      Radiology:      Assessment:    1. Segmental dysfunction of pelvic region    2. Chronic low back pain    3. Segmental dysfunction of lumbar region    4. Spasm of muscle    5. Thoracic segment dysfunction    6. Chronic midline low back pain with bilateral sciatica        RX ordered/plan of care  Anticipated outcomes  Possible risks and side effects    After discussing the risk and benefits of care, patient consented to treatment    Prognosis: Good      Patient's condition:  Patient had restrictions pre-manipulation    Treatment effectiveness:  Post manipulation there is better intersegmental movement and Patient claims to feel looser post manipulation      Plan:    Procedures:    Evaluation and Management:  41302 Moderate level exam 30 min    CMT:  74909 Chiropractic manipulative treatment 3-4 regions performed   Thoracic: Activator, T10, T11, T12, Prone  Lumbar: Activator, L4, L5, Prone  Pelvis: Drop Table, PSIS Right , Prone    Modalities:  37706: MSTM:  To Lumbar erector spine and Piriformis  for 5 min    Therapeutic procedures:  None    Response to Treatment  Reduction in symptoms as reported by patient    Prognosis: Good      Treatment plan and goals:  Goals:  SLEEPING: the patient specific goal is to attain his pre-injury status of 5 hours comfortably  Walking to have Paula walk for 15 min.    Frequency of care  Duration of care is estimated to be 8 weeks, from the initial treatment.  It is estimated that the patient will need a total of 8 visits to resolve this episode.  For the initial therapeutic trial of care, the frequency is recommended at 1 X week, once daily.  A reevaluation would be clinically appropriate in 8 visits, to determine progress and further course of care.    In-Office Treatment  Evaluation  Spinal Chiropractic Manipulative Therapy:    Modalities:  MSTM      Recommendations:    Instructions:  ice 20 minutes every other hour as needed    Follow-up:  Return to care in one week     Disclaimer: This note consists of symbols derived from keyboarding, dictation and/or voice recognition software. As a result, there may be errors in the script that have gone undetected. Please consider this when interpreting information found in this chart.

## 2020-09-02 ENCOUNTER — THERAPY VISIT (OUTPATIENT)
Dept: CHIROPRACTIC MEDICINE | Facility: CLINIC | Age: 60
End: 2020-09-02
Payer: MEDICARE

## 2020-09-02 DIAGNOSIS — M99.03 SEGMENTAL DYSFUNCTION OF LUMBAR REGION: ICD-10-CM

## 2020-09-02 DIAGNOSIS — M99.05 SEGMENTAL DYSFUNCTION OF PELVIC REGION: Primary | ICD-10-CM

## 2020-09-02 DIAGNOSIS — M99.02 THORACIC SEGMENT DYSFUNCTION: ICD-10-CM

## 2020-09-02 DIAGNOSIS — M62.838 SPASM OF MUSCLE: ICD-10-CM

## 2020-09-02 DIAGNOSIS — M54.50 LUMBAGO: ICD-10-CM

## 2020-09-02 PROCEDURE — 98941 CHIROPRACT MANJ 3-4 REGIONS: CPT | Mod: AT | Performed by: CHIROPRACTOR

## 2020-09-02 NOTE — PROGRESS NOTES
Visit #:  2 of 8, based on treatment plan    Subjective:  Paula Ho is a 60 year old female who is seen in f/u up for:        Segmental dysfunction of pelvic region  Lumbago  Segmental dysfunction of lumbar region  Spasm of muscle  Thoracic segment dysfunction.     Since last visit on 8/27/2020,  Paula Ho reports the following changes: Pain immediately after last treatment: 5/10 and their pain level today 7/10.  Paula reports that she was feeling better for about a day and then her symptoms began to return.  She had a difficult night last night, tossing and turning due to back pain.    Area of chief complaint:  Lumbar :  Symptoms are graded at 7/10. The quality is described as stiff, achey, dull.  Motion has been about the same. Patient feels that they felt better and then her symptoms returned.        Objective:  The following was observed:    P: palpatory tendernessPiriformis R>>L    A: static palpation demonstrates intersegmental asymmetry , thoracic, lumbar, pelvis    R: motion palpation notes restricted motion, T10, T11 , T12 , L4 , L5  and PSIS Right     T: The following soft tissue hypotonicities were observed:  Piriformis: right, ache, dull pain and stiff, referred pain: no      Assessment:    Segmental spinal dysfunction/restrictions found at:  T10  T11  T12  L4  L5  PSIS Right    Diagnoses:      1. Segmental dysfunction of pelvic region    2. Lumbago    3. Segmental dysfunction of lumbar region    4. Spasm of muscle    5. Thoracic segment dysfunction        Patient's condition:  Patient had restrictions pre-manipulation    Treatment effectiveness:  Post manipulation there is better intersegmental movement and Patient claims to feel looser post manipulation      Procedures:  CMT:  12861 Chiropractic manipulative treatment 3-4 regions performed   Thoracic: Activator, T10, T11, T12, Prone  Lumbar: Activator, L4, L5, Prone  Pelvis: Drop Table, PSIS Right , Prone    Modalities:  87886: MSTM:  To  Piriformis  for 5 min    Therapeutic procedures:  None      Prognosis: Good    Progress towards Goals: Patient is making progress towards the goal     Response to Treatment:   Reduction in symptoms as reported by patient      Recommendations:    Instructions:  walk 10 minutes    Follow-up:   Return to care in one week

## 2020-09-02 NOTE — TELEPHONE ENCOUNTER
Tobacco Treatment Program at the [facility] attempted to reach Ms. Ho on 9/2/2020 for scheduled tobacco cessation visit to help Ms. Ho to quit smoking. We will attempt to reach Ms. Ho another time.     [facility]  Tobacco Treatment Specialist  PH: [phone]    Called twice, left msgs.

## 2020-09-09 ENCOUNTER — VIRTUAL VISIT (OUTPATIENT)
Dept: BEHAVIORAL HEALTH | Facility: CLINIC | Age: 60
End: 2020-09-09
Payer: MEDICARE

## 2020-09-09 DIAGNOSIS — R69 DIAGNOSIS DEFERRED: Primary | ICD-10-CM

## 2020-09-09 NOTE — PROGRESS NOTES
Behavioral Health Home Services  [facility]: [place]        Social Work Care Navigator Note      Patient: Paula Ho  Date: September 9, 2020  Preferred Name: Paula    Previous PHQ-9:   PHQ-9 SCORE 7/7/2020 8/3/2020 8/12/2020   PHQ-9 Total Score 12 22 20     Previous LIBRA-7:   LIBRA-7 SCORE 7/7/2020 8/3/2020 8/12/2020   Total Score 12 17 16     ARNALDO LEVEL:  No flowsheet data found.    Preferred Contact:  Need for : No  Preferred Contact: Cell      Type of Contact Today: Phone call (patient / identified key support person reached)      Data: (subjective / Objective):  Recent ED/IP Admission or Discharge?   None    Patient Goals:  Goal Areas: Health;Mental Health;Financial and Social Service Benefits;Transportation  Patient stated goals: Patient would like assistance with her physical and mental health for creating a balanced and healthy lifestyle. Patient would like assistance with continued SSDI, CADI and social service assistance to aid with county benefits to increase her financial stability. Patient would like assistance with additional transportation services, such as [employer] for reliable transportation to get to her appointments, run errands and get out into the community.        [facility] Core Service Provided:  Comprehensive Care Management: utilized the electronic medical record / patient registry to identify and support patient's health conditions / needs more effectively   Care Transitions: focused on the coordinated and seamless movement of patient between or within different levels of care or settings  Care Coordination: provided care management services/referrals necessary to ensure patient and their identified supports have access to medical, behavioral health, pharmacology and recovery support services.  Ensured that patient's care is integrated across all settings and services.   Referral to Community and Social Support Services: Provided patient with referrals (see plan  section)  Followed-up with patient on whether or not they completed a referral    Current Stressors / Issues / Care Plan Objective Addressed Today:  Patient states her back is quite painful today. Patient states she has decided to move to a two-bedroom apartment in [place], [place] as of Oct. 2nd [id number], [place] [address]. Patient states she is nervous to move to new building that does not have an elevator. Patient states she has been preparing and packing. Patient states she is trying to train her two dogs to use a potty pad since they will be on the 2nd floor and it may be difficult for her to let her dogs out.    Patient states she is receiving Meals on Wheels, is on the waiting list for ARM, received application for [facility] - 3P (will be seeing provider soon to have this completed) but has not heard back from [facility] Choice Assessment at [phone]. [facility] called to place referral again, awaiting response.    Patient states her PCA is helpful and [facility] provided moving resources and referrals to [facility] [place] [facility]. Patient states she has been trying to apply for EA for moving expenses but has not heard back. CC to monitor.        Intervention:  Motivational Interviewing: Expressed Empathy/Understanding, Supported Autonomy, Collaboration, Evocation, Permission to raise concern or advise, Open-ended questions and Reflections: simple and complex   Target Behavior(s): Explored and resolved challenges to attending appointments as scheduled and Explored current social supports and reinforced opportunities to increase engagement    Assessment: (Progress on Goals / Homework):  Patient would benefit from continued coordination in reaching their goals set for the Behavioral Health Home ([facility]) program. [facility] reviewed Health Action Plan goals and will continue to monitor progress and work with patient and their care team.      Plan: (Homework, other):  Patient was encouraged to continue to  seek condition-related information and education.      Scheduled a Phone follow up appointment with MISA RICE in 2 weeks     Patient has set self-identified goals and will monitor progress until the next appointment on: 09/23/2020.         JASON Mann [facility]  Behavioral Health Home ([facility])   [facility]  [phone]  September 9, 2020  3:19 PM

## 2020-09-10 ENCOUNTER — VIRTUAL VISIT (OUTPATIENT)
Dept: PSYCHIATRY | Facility: CLINIC | Age: 60
End: 2020-09-10
Payer: MEDICARE

## 2020-09-10 DIAGNOSIS — F41.1 GAD (GENERALIZED ANXIETY DISORDER): Primary | ICD-10-CM

## 2020-09-10 DIAGNOSIS — F90.0 ATTENTION DEFICIT HYPERACTIVITY DISORDER (ADHD), PREDOMINANTLY INATTENTIVE TYPE: ICD-10-CM

## 2020-09-10 DIAGNOSIS — F43.10 PTSD (POST-TRAUMATIC STRESS DISORDER): ICD-10-CM

## 2020-09-10 DIAGNOSIS — F33.1 MODERATE EPISODE OF RECURRENT MAJOR DEPRESSIVE DISORDER (H): ICD-10-CM

## 2020-09-10 PROCEDURE — 99214 OFFICE O/P EST MOD 30 MIN: CPT | Mod: 95 | Performed by: NURSE PRACTITIONER

## 2020-09-10 RX ORDER — BUSPIRONE HYDROCHLORIDE 10 MG/1
10 TABLET ORAL 2 TIMES DAILY
Qty: 60 TABLET | Refills: 1 | Status: SHIPPED | OUTPATIENT
Start: 2020-09-10 | End: 2020-10-30

## 2020-09-10 RX ORDER — ATOMOXETINE 25 MG/1
25 CAPSULE ORAL DAILY
Qty: 30 CAPSULE | Refills: 1 | Status: SHIPPED | OUTPATIENT
Start: 2020-09-10 | End: 2020-10-30 | Stop reason: DRUGHIGH

## 2020-09-10 ASSESSMENT — ANXIETY QUESTIONNAIRES
3. WORRYING TOO MUCH ABOUT DIFFERENT THINGS: SEVERAL DAYS
7. FEELING AFRAID AS IF SOMETHING AWFUL MIGHT HAPPEN: NOT AT ALL
IF YOU CHECKED OFF ANY PROBLEMS ON THIS QUESTIONNAIRE, HOW DIFFICULT HAVE THESE PROBLEMS MADE IT FOR YOU TO DO YOUR WORK, TAKE CARE OF THINGS AT HOME, OR GET ALONG WITH OTHER PEOPLE: VERY DIFFICULT
1. FEELING NERVOUS, ANXIOUS, OR ON EDGE: SEVERAL DAYS
GAD7 TOTAL SCORE: 5
2. NOT BEING ABLE TO STOP OR CONTROL WORRYING: SEVERAL DAYS
6. BECOMING EASILY ANNOYED OR IRRITABLE: SEVERAL DAYS
5. BEING SO RESTLESS THAT IT IS HARD TO SIT STILL: NOT AT ALL

## 2020-09-10 ASSESSMENT — PATIENT HEALTH QUESTIONNAIRE - PHQ9
SUM OF ALL RESPONSES TO PHQ QUESTIONS 1-9: 8
5. POOR APPETITE OR OVEREATING: SEVERAL DAYS

## 2020-09-10 NOTE — PATIENT INSTRUCTIONS
1.  Buspirone increased to 10 mg by mouth twice daily for anxiety  2.  Fluoxetine 80 mg daily  3.  Refer to behavioral home health care for obtaining access to community resources  4.  Consider talk therapy to process trauma history and manage depression symptoms      Continue all other medications as reviewed per electronic medical record today.     Safety plan reviewed. To the Emergency Department as needed or call after hours crisis line at [phone] or [phone]. [place] Crisis Text Line. Text [place] to 042969 or Suicide LifeLine Chat: [employer].org/chat/    To schedule individual or family therapy, call [facility] at [phone].    Schedule an appointment with me in 6 weeks or sooner as needed. Call [facility] at [phone] to schedule.    Follow up with primary care provider as planned or for acute medical concerns.    Call the psychiatric nurse line with medication questions or concerns at [phone].    [employer] may be used to communicate with your provider, but this is not intended to be used for emergencies.    Crisis Resources:    National Suicide Prevention Lifeline: 982.225.1393 (TTY: 759.215.3434). Call anytime for help.  (www.suicidepreventionlifeline.org)  National [place] on Mental Illness (www.raudel.org): 273.673.7822 or 153-812-3794.   Mental Health Association (www.mentalhealth.org): 302.258.2925 or 759-589-5430.  [place] Crisis Text Line: Text [place] to 048210  Suicide LifeLine Chat: suicidepaOnde.org/chat

## 2020-09-10 NOTE — PROGRESS NOTES
"Paula Ho is a 60 year old female who is being evaluated via a billable telephone visit.      The patient has been notified of following:     \"This telephone visit will be conducted via a call between you and your physician/provider. We have found that certain health care needs can be provided without the need for a physical exam.  This service lets us provide the care you need with a short phone conversation.  If a prescription is necessary we can send it directly to your pharmacy.  If lab work is needed we can place an order for that and you can then stop by our lab to have the test done at a later time.    Telephone visits are billed at different rates depending on your insurance coverage. During this emergency period, for some insurers they may be billed the same as an in-person visit.  Please reach out to your insurance provider with any questions.    If during the course of the call the physician/provider feels a telephone visit is not appropriate, you will not be charged for this service.\"    Patient has given verbal consent for Telephone visit?  Yes    What phone number would you like to be contacted at? [phone]    How would you like to obtain your AVS? MyChart    Phone call duration: 30 minutes    Tigist Manjarrez NP        Outpatient Psychiatric Progress Note    Name: Paula Ho   : 1960                    Primary Care Provider: LEONA Marquez CNP   Therapist: None     PHQ-9 scores:  PHQ-9 SCORE 8/3/2020 2020 9/10/2020   PHQ-9 Total Score 22 20 8       LIBRA-7 scores:  LIBRA-7 SCORE 8/3/2020 2020 9/10/2020   Total Score 17 16 5       Patient Identification:    Patient is a 60 year old year old, single  White American female  who presents for return visit with me.  Patient is currently disabled. Patient attended the session alone. Patient prefers to be called: \"Paula\".    Interim History:    I last saw Paula Ho for outpatient psychiatry Consultation on " August 3, 2020.     During that appointment, she reported that she had been off of her  medications for several months due to her provider leaving and COVID restrictions that  Have left her without community resources that she once had.  I am referring her to behavioral home care for assistance in getting community services she once had.  .  She has a trauma and questionable substance use history.  Given that she is taking opioid medications for chronic back and knee pain,  I am not going to prescribe her benzodiazepine medication for her anxiety due to risk for respiratory depression exacerbated by her emphysema diagnosis.  She verbalized understanding of this.  Instead she agreed to try buspirone 5 mg twice daily.   For her depression and anxiety she will take her fluoxetine 80 mg daily.  I asked her to consider talk therapy  To process life stressorsand grief reactions due the loss of her .  She does not feel safe where she lives and alternative housing will be explored with behavioral home health care services.  .     Current medications include: albuterol (PROAIR HFA) 108 (90 Base) MCG/ACT inhaler, Inhale 2 puffs into the lungs every 4 hours as needed for shortness of breath / dyspnea or wheezing  albuterol (PROVENTIL) (2.5 MG/3ML) 0.083% neb solution, NEBULIZE THE CONTENTS OF 1 VIAL EVERY 6 HOURS AS NEEDED.  budesonide-formoterol (SYMBICORT) 160-4.5 MCG/ACT Inhaler, Inhale 2 puffs into the lungs 2 times daily  busPIRone (BUSPAR) 5 MG tablet, Take 1 tablet (5 mg) by mouth 2 times daily  clopidogrel (PLAVIX) 75 MG tablet,   FLUoxetine (PROZAC) 40 MG capsule, Take 1 capsule (40 mg) by mouth 2 times daily  guanFACINE (TENEX) 1 MG tablet, Take 1 tablet (1 mg) by mouth At Bedtime  levothyroxine (SYNTHROID/LEVOTHROID) 25 MCG tablet, Take 25 mcg by mouth daily  losartan (COZAAR) 50 MG tablet, Take 50 mg by mouth daily  Multiple Vitamins-Minerals (MULTIVITAMIN ADULT PO),   NARCAN 4 MG/0.1ML nasal spray, INHALE  "VIA NASAL ROUTE FOR OVERDOSE AS NEEDED. MAY REPEAT AFTER 2-3 MINUTES  nicotine (NICODERM CQ) 21 MG/24HR 24 hr patch, Place 1 patch onto the skin every 24 hours  nicotine (NICOTROL) 10 MG inhaler, Use 1 cartridge as needed for urge to smoke by puffing over course of 20min.  Use 6-16 cart/day; reduce number of cart/day over 6-12 weeks.  order for DME, Equipment being ordered: Nebulizer  oxyCODONE IR (ROXICODONE) 15 MG tablet, Take 7.5 mg by mouth 2 times daily as needed  pravastatin (PRAVACHOL) 40 MG tablet, Take 40 mg by mouth daily  tiotropium (SPIRIVA HANDIHALER) 18 MCG inhaled capsule, Inhale 1 capsule (18 mcg) into the lungs daily  tiZANidine (ZANAFLEX) 2 MG tablet, TK 2 TO 3 TS PO QHS  topiramate (TOPAMAX) 100 MG tablet, Take 1 tablet (100 mg) by mouth 2 times daily  vitamin D3 (CHOLECALCIFEROL) 2000 units (50 mcg) tablet, Take 1 tablet by mouth daily  cetirizine (ZYRTEC) 10 MG tablet, Take 1 tablet (10 mg) by mouth daily (Patient not taking: Reported on 8/3/2020)    No current facility-administered medications on file prior to visit.        The [place] Prescription Monitoring Program has been reviewed and there are no concerns about diversionary activity for controlled substances at this time.      I was able to review most recent Primary Care Provider, specialty provider, and therapy visit notes that I have access to.     Today, patient reports   That she is in a lot of pain.  She is going to move October 2.  Many days she cries and if she knew she would go to [facility] if she would kill herself, she would do it but she feels like she would go to [facility] instead.  She has two dogs.  She has not heard from the CADI waiver.  She hurts worse when she goes to the chiropractor so she has opted not to go.   She has daily PCA visits.  Tested for ADHD through [facility].  Paula tells me that it is hard for her to finish projects.  Her mind is jumping \"all over\".  She has 5 tabs of Adderall left.  She also " informed me that she is receiving clonazepam from her primary provider and is adamant about not overusing her prescriptions.       has a past medical history of Acute respiratory failure (H) (02/01/2020).    Social history updates:    Paula is isolating to herself in her apartment.  She engages in very limited leisure activities.    Substance use updates:    She denies alcohol use  Tobacco use: Yes Cigarettes  Ready to quit?  Yes is trying to cut back nicotine Replacement Therapy tried: None    Vital Signs:   LMP  (LMP Unknown)     Labs:    Most recent laboratory results reviewed and no new labs.     Review of Systems:  10 systems (general, cardiovascular, respiratory, eyes, ENT, endocrine, GI, , M/S, neurological) were reviewed. Most pertinent finding(s) is/are: Chronic lower back pain, reports no chest pain, no skin rashes, no shortness of breath. The remaining systems are all unremarkable.    Mental Status Examination:  Appearance:  awake, alert and severe distress  Attitude:  cooperative   Eye Contact:  Unable to assess  Gait and Station: No dizziness or falls  Psychomotor Behavior:  Unable to assess  Oriented to:  time, person, and place  Attention Span and Concentration:  Normal  Speech:   pressured speech and Speaks: English  Mood:  anxious and depressed  Affect:  intensity is heightened  Associations:  no loose associations  Thought Process:  tangental  Thought Content:  passive suicidal ideation present, no auditory hallucinations present and no visual hallucinations present  Recent and Remote Memory:  intact Not formally assessed. No amnesia.  Fund of Knowledge: appropriate  Insight:  fair  Judgment:  fair  Impulse Control:  fair    Suicide Risk Assessment:  Today Paula Ho reports that she has entertaining thoughts of wanting to end her life but would not do so due to her spiritual beliefs. In addition, there are notable risk factors for self-harm, including single status, anxiety, substance  abuse, comorbid medical condition of Chronic pain, suicidal ideation, hopelessness and mood change. However, risk is mitigated by spiritual/Cheondoism beliefs, history of seeking help when needed, future oriented, no access to firearms or weapons, denies suicidal intent or plan and denies homicidal ideation, intent, or plan. Therefore, based on all available evidence including the factors cited above, Paula Ho does not appear to be at imminent risk for self-harm, does not meet criteria for a 72-hr hold, and therefore remains appropriate for ongoing outpatient level of care.  A thorough assessment of risk factors related to suicide and self-harm have been reviewed and are noted above. The patient convincingly denies suicidality on several occasions. Local community safety resources printed and reviewed for patient to use if needed. There was no deceit detected, and the patient presented in a manner that was believable.     DSM5 Diagnosis:  296.32 (F33.1) Major Depressive Disorder, Recurrent Episode, Moderate With melancholic features  300.02 (F41.1) Generalized Anxiety Disorder  309.81 (F43.10) Posttraumatic Stress Disorder (includes Posttraumatic Stress Disorder for Children 6 Years and Younger)  Without dissociative symptoms    Medical comorbidities include:   Patient Active Problem List    Diagnosis Date Noted     Hyperlipidemia 04/14/2020     Priority: Medium     Benign essential hypertension 04/14/2020     Priority: Medium     MDD (major depressive disorder), recurrent severe, without psychosis (H) 03/02/2020     Priority: Medium     Chronic obstructive pulmonary disease, unspecified COPD type (H) 02/04/2020     Priority: Medium     No PFTS  In EPIC       Attention deficit hyperactivity disorder (ADHD) 01/31/2020     Priority: Medium     Chronic midline low back pain with bilateral sciatica 01/31/2020     Priority: Medium     Brain aneurysm 12/03/2019     Priority: Medium     Dec 2017  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 Device is MR compatible to 3 BREANN 3/2020       History of subarachnoid hemorrhage 12/22/2017     Priority: Medium     H/O of SAH, Cam 2, Hunt-Thomas 1, from a ruptured 9mm left Pcomm aneurysm with a wide neck and a fetal left PCA arising from the aneurysm neck, s/p successful coil embolization 12/23/2017 by Dr. Otto Hurley       Chronic GERD 01/26/2017     Priority: Medium     Chronic knee pain 12/12/2014     Priority: Medium     Cigarette smoker 07/08/2014     Priority: Medium     Chronic, continuous use of opioids 04/24/2013     Priority: Medium     Managed by pain clinic outside of [place].  UDS + cocaine in December 2019 without confirmation testing at her pain clinic per patient report       Obesity, Class III, BMI 40-49.9 (morbid obesity) (H) 03/27/2013     Priority: Medium     Status post knee surgery 03/27/2013     Priority: Medium     Per patient's recollection:  Circa 2002: right knee arthroscopy (Dr. Pappas)  Circa 2004: right knee partial knee replacement (Iam Ritchie MD)  Circa 2006: right TKA (Ron Ryder MD; [facility])  Circa 2008: right TKA revision due to infection (Ron Ryder MD; [facility])  Circa 2009: right TKA revision due to infection (Ron Ryder MD; [facility])  April 2011: right TKA (Ron Ryder MD; [facility])       LIBRA (generalized anxiety disorder) 09/28/2011     Priority: Medium     Chronic pain 09/28/2011     Priority: Medium     Knee and low back         DJD (degenerative joint disease) 09/28/2011     Priority: Medium     Insomnia 04/13/2011     Priority: Medium     Insomnia  NOS       Tobacco use disorder 07/31/2006     Priority: Medium     Asthma 11/15/2004     Priority: Medium     Asthma  NOS         Assessment:    Paula Ho reports feeling more anxious and depressed.  She is moving on October 2 and is feeling overwhelmed about the process.  She continues to sleep  poorly due to chronic pain issues.  Given her lack of access to community needs I am referring her to behavioral home health care to get support.  She will continue with fluoxetine 80 mg daily.  Her buspirone was increased to 10 mg twice daily to address anxiety issues.  I am asking her to consider trauma therapy given her history.  She tells me she is getting clonazepam for primary care provider which is not something I am comfortable prescribing to her as she is adamant about using it..    Medication side effects and alternatives were reviewed. Health promotion activities recommended and reviewed today. All questions addressed. Education and counseling completed regarding risks and benefits of medications and psychotherapy options.    Treatment Plan:      1.  Buspirone increased to 10 mg by mouth twice daily for anxiety  2.  Fluoxetine 80 mg daily  3.  Refer to behavioral home health care for obtaining access to community resources  4.  Consider talk therapy to process trauma history and manage depression symptoms      Continue all other medications as reviewed per electronic medical record today.     Safety plan reviewed. To the Emergency Department as needed or call after hours crisis line at [phone] or [phone]. [place] Crisis Text Line. Text [place] to 828438 or Suicide LifeLine Chat: suicidepreventionlifeline.org/chat/    To schedule individual or family therapy, call [facility] at [phone].    Schedule an appointment with me in 6 weeks or sooner as needed. Call [facility] at [phone] to schedule.    Follow up with primary care provider as planned or for acute medical concerns.    Call the psychiatric nurse line with medication questions or concerns at [phone].    MyChart may be used to communicate with your provider, but this is not intended to be used for emergencies.    Crisis Resources:    National Suicide Prevention Lifeline: 303.880.5233 (TTY:  990.363.6574). Call anytime for help.  (www.suicidepreventionlifeline.org)  National [place] on Mental Illness (www.raudel.org): 177.719.9147 or 070-939-3454.   Mental Health Association (www.mentalhealth.org): 526.851.9371 or 250-502-7093.  [place] Crisis Text Line: Text [place] to 930588  Suicide LifeLine Chat: suicideBlackDuck.org/chat    Administrative Billing:   Time spent with patient was 30 minutes and greater than 50% of time or 20 minutes was spent in counseling and coordination of care regarding above diagnoses and treatment plan.    Patient Status:  Patient will continue to be seen for ongoing consultation and stabilization.    Signed:   VIRGILIO Kidd-BC   Psychiatry

## 2020-09-10 NOTE — PROGRESS NOTES
"Paula Ho is a 60 year old female who is being evaluated via a billable telephone visit.      The patient has been notified of following:     \"This telephone visit will be conducted via a call between you and your physician/provider. We have found that certain health care needs can be provided without the need for a physical exam.  This service lets us provide the care you need with a short phone conversation.  If a prescription is necessary we can send it directly to your pharmacy.  If lab work is needed we can place an order for that and you can then stop by our lab to have the test done at a later time.    Telephone visits are billed at different rates depending on your insurance coverage. During this emergency period, for some insurers they may be billed the same as an in-person visit.  Please reach out to your insurance provider with any questions.    If during the course of the call the physician/provider feels a telephone visit is not appropriate, you will not be charged for this service.\"    Patient has given verbal consent for Telephone visit?  Yes    What phone number would you like to be contacted at? [phone]    How would you like to obtain your AVS? MyChart    Phone call duration: 30 minutes    Tigist Manjarrez NP        Outpatient Psychiatric Progress Note    Name: Paula Ho   : 1960                    Primary Care Provider: LEONA Marquez CNP   Therapist: ***     PHQ-9 scores:  PHQ-9 SCORE 8/3/2020 2020 9/10/2020   PHQ-9 Total Score 22 20 8       LIBRA-7 scores:  LIBRA-7 SCORE 8/3/2020 2020 9/10/2020   Total Score 17 16 5       Patient Identification:    Patient is a 60 year old year old, {[facility] RELATIONSHIP STATUS:517724}  White American female  who presents for return visit with me.  Patient is currently {[facility] EMPLOYMENT:411983}. Patient attended the session {[facility] ATTENDANCE:208392}. Patient prefers to be called: \"***\".    Interim History:    I last saw " Paula Ho for outpatient psychiatry {[facility] LAST [facility]ISIT:834162} on ***.     During that appointment, we *** .     Current medications include: albuterol (PROAIR HFA) 108 (90 Base) MCG/ACT inhaler, Inhale 2 puffs into the lungs every 4 hours as needed for shortness of breath / dyspnea or wheezing  albuterol (PROVENTIL) (2.5 MG/3ML) 0.083% neb solution, NEBULIZE THE CONTENTS OF 1 VIAL EVERY 6 HOURS AS NEEDED.  budesonide-formoterol (SYMBICORT) 160-4.5 MCG/ACT Inhaler, Inhale 2 puffs into the lungs 2 times daily  busPIRone (BUSPAR) 5 MG tablet, Take 1 tablet (5 mg) by mouth 2 times daily  clopidogrel (PLAVIX) 75 MG tablet,   FLUoxetine (PROZAC) 40 MG capsule, Take 1 capsule (40 mg) by mouth 2 times daily  guanFACINE (TENEX) 1 MG tablet, Take 1 tablet (1 mg) by mouth At Bedtime  levothyroxine (SYNTHROID/LEVOTHROID) 25 MCG tablet, Take 25 mcg by mouth daily  losartan (COZAAR) 50 MG tablet, Take 50 mg by mouth daily  Multiple Vitamins-Minerals (MULTIVITAMIN ADULT PO),   NARCAN 4 MG/0.1ML nasal spray, INHALE VIA NASAL ROUTE FOR OVERDOSE AS NEEDED. MAY REPEAT AFTER 2-3 MINUTES  nicotine (NICODERM CQ) 21 MG/24HR 24 hr patch, Place 1 patch onto the skin every 24 hours  nicotine (NICOTROL) 10 MG inhaler, Use 1 cartridge as needed for urge to smoke by puffing over course of 20min.  Use 6-16 cart/day; reduce number of cart/day over 6-12 weeks.  order for DME, Equipment being ordered: Nebulizer  oxyCODONE IR (ROXICODONE) 15 MG tablet, Take 7.5 mg by mouth 2 times daily as needed  pravastatin (PRAVACHOL) 40 MG tablet, Take 40 mg by mouth daily  tiotropium (SPIRIVA HANDIHALER) 18 MCG inhaled capsule, Inhale 1 capsule (18 mcg) into the lungs daily  tiZANidine (ZANAFLEX) 2 MG tablet, TK 2 TO 3 TS PO QHS  topiramate (TOPAMAX) 100 MG tablet, Take 1 tablet (100 mg) by mouth 2 times daily  vitamin D3 (CHOLECALCIFEROL) 2000 units (50 mcg) tablet, Take 1 tablet by mouth daily  cetirizine (ZYRTEC) 10 MG tablet, Take 1 tablet (10 mg) by  "mouth daily (Patient not taking: Reported on 8/3/2020)    No current facility-administered medications on file prior to visit.        The [place] Prescription Monitoring Program has been reviewed and there are no concerns about diversionary activity for controlled substances at this time.      I was able to review most recent Primary Care Provider, specialty provider, and therapy visit notes that I have access to.     Today, patient reports ***  That she is in a lot of pain.  She is going to move October 2.  Many days she cries and if she knew she would go to heaven.  She has two dogs.  She has not heard from the [employer] waiver.  She hurts worse when she goes to the chiropractor.   She has daily PCA visits.  Tested for ADHD through [facility]  It is hard for her to finish projects.  Her mind is jumping all over\".  She has 5 tabs of Adderall left.       has a past medical history of Acute respiratory failure (H) (02/01/2020).    Social history updates:    ***    Substance use updates:    ***  Tobacco use: {[facility] YES/NO:703319}    Vital Signs:   LMP  (LMP Unknown)     Labs:    Most recent laboratory results reviewed and no new labs. ***    Review of Systems:  10 systems (general, cardiovascular, respiratory, eyes, ENT, endocrine, GI, , M/S, neurological) were reviewed. Most pertinent finding(s) is/are: ***. The remaining systems are all unremarkable.    Mental Status Examination:  Appearance:  {[facility] APPEARANCE:617880}  Attitude:  { :945238}   Eye Contact:  {[facility] EYE:483589}  Gait and Station: {[facility] GAIT:009306}  Psychomotor Behavior:  { :803129}  Oriented to:  { :209955}  Attention Span and Concentration:  {:504983}  Speech:   {[facility] SPEECH:179470}  Mood:  { :476928}  Affect:  { :896425}  Associations:  { :274180}  Thought Process:  { :453282}  Thought Content:  {[facility] TC:972112}  Recent and Remote Memory:  { :359746} Not formally assessed. No amnesia.  Fund of Knowledge: { :918355}  Insight:  { " :760499}  Judgment:  { :355448}  Impulse Control:  { :276077}    Suicide Risk Assessment:  Today Paula Ho reports ***. In addition, there are notable risk factors for self-harm, including {[facility] SUICIDE RISKS:302764}. However, risk is mitigated by {[facility] SUICIDE PROTECT:061034}. Therefore, based on all available evidence including the factors cited above, Paula Ho does not appear to be at imminent risk for self-harm, does not meet criteria for a 72-hr hold, and therefore remains appropriate for ongoing outpatient level of care.  A thorough assessment of risk factors related to suicide and self-harm have been reviewed and are noted above. The patient convincingly denies suicidality on several occasions. Local community safety resources printed and reviewed for patient to use if needed. There was no deceit detected, and the patient presented in a manner that was believable.     DSM5 Diagnosis:  {DSM5  Diagnosis:497456}    Medical comorbidities include:   Patient Active Problem List    Diagnosis Date Noted     Hyperlipidemia 04/14/2020     Priority: Medium     Benign essential hypertension 04/14/2020     Priority: Medium     MDD (major depressive disorder), recurrent severe, without psychosis (H) 03/02/2020     Priority: Medium     Chronic obstructive pulmonary disease, unspecified COPD type (H) 02/04/2020     Priority: Medium     No PFTS  In EPIC       Attention deficit hyperactivity disorder (ADHD) 01/31/2020     Priority: Medium     Chronic midline low back pain with bilateral sciatica 01/31/2020     Priority: Medium     Brain aneurysm 12/03/2019     Priority: Medium     Dec 2017  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 Device is MR compatible to 3 BREANN 3/2020       History of subarachnoid hemorrhage 12/22/2017     Priority: Medium     H/O of SAH, Cam 2, Hunt-Thomas 1, from a ruptured 9mm left Pcomm aneurysm with a wide neck and a fetal left PCA arising  from the aneurysm neck, s/p successful coil embolization 12/23/2017 by Dr. Otto Hurley       Chronic GERD 01/26/2017     Priority: Medium     Chronic knee pain 12/12/2014     Priority: Medium     Cigarette smoker 07/08/2014     Priority: Medium     Chronic, continuous use of opioids 04/24/2013     Priority: Medium     Managed by pain clinic outside of [place].  UDS + cocaine in December 2019 without confirmation testing at her pain clinic per patient report       Obesity, Class III, BMI 40-49.9 (morbid obesity) (H) 03/27/2013     Priority: Medium     Status post knee surgery 03/27/2013     Priority: Medium     Per patient's recollection:  Circa 2002: right knee arthroscopy (Dr. Pappas)  Circa 2004: right knee partial knee replacement (Iam Ritchie MD)  Circa 2006: right TKA (Ron Ryder MD; [facility])  Circa 2008: right TKA revision due to infection (Ron Ryder MD; [facility])  Circa 2009: right TKA revision due to infection (Ron Ryder MD; [facility])  April 2011: right TKA (Ron Ryder MD; [facility])       LIBRA (generalized anxiety disorder) 09/28/2011     Priority: Medium     Chronic pain 09/28/2011     Priority: Medium     Knee and low back         DJD (degenerative joint disease) 09/28/2011     Priority: Medium     Insomnia 04/13/2011     Priority: Medium     Insomnia  NOS       Tobacco use disorder 07/31/2006     Priority: Medium     Asthma 11/15/2004     Priority: Medium     Asthma  NOS         Assessment:    Paula Ho  ***.    Medication side effects and alternatives were reviewed. Health promotion activities recommended and reviewed today. All questions addressed. Education and counseling completed regarding risks and benefits of medications and psychotherapy options.    Treatment Plan:        ***    Continue all other medications as reviewed per electronic medical record today.     Safety plan reviewed. To the Emergency Department as needed or call  after hours crisis line at [phone] or [phone]. [place] Crisis Text Line. Text [place] to 851326 or Suicide LifeLine Chat: suicideSumavisos.org/chat/    To schedule individual or family therapy, call [facility] at [phone].    Schedule an appointment with me in *** or sooner as needed. Call [facility] at [phone] to schedule.    Follow up with primary care provider as planned or for acute medical concerns.    Call the psychiatric nurse line with medication questions or concerns at [phone].    Adfaceshart may be used to communicate with your provider, but this is not intended to be used for emergencies.    Crisis Resources:    National Suicide Prevention Lifeline: 318.683.4627 (TTY: 221.622.4130). Call anytime for help.  (www.suicidepreventionlifeline.org)  National [place] on Mental Illness (www.raudel.org): 196.351.8412 or 171-353-6103.   Mental Health Association (www.mentalhealth.org): 502.563.2970 or 312-418-7257.  [place] Crisis Text Line: Text [place] to 911386  Suicide LifeLine Chat: suicideSumavisos.org/chat    Administrative Billing:   Time spent with patient was 30 minutes and greater than 50% of time or 20 minutes was spent in counseling and coordination of care regarding above diagnoses and treatment plan.    Patient Status:  {SCHUHSTATUS:073815}    Signed:   VIRGILIO Kidd-BC   Psychiatry

## 2020-09-11 ASSESSMENT — ANXIETY QUESTIONNAIRES: GAD7 TOTAL SCORE: 5

## 2020-09-16 NOTE — TELEPHONE ENCOUNTER
Tobacco Treatment Program at the [facility] attempted to reach Ms. Ho on 9/16/2020 regarding the tobacco cessation program to help Ms. Ho to quit smoking. We will attempt to reach Ms. Ho another time.     [facility]aryn Martinez [facility]  Tobacco Treatment Specialist  PH: [phone]

## 2020-09-24 ENCOUNTER — VIRTUAL VISIT (OUTPATIENT)
Dept: BEHAVIORAL HEALTH | Facility: CLINIC | Age: 60
End: 2020-09-24
Payer: MEDICARE

## 2020-09-24 DIAGNOSIS — M54.42 CHRONIC MIDLINE LOW BACK PAIN WITH BILATERAL SCIATICA: Chronic | ICD-10-CM

## 2020-09-24 DIAGNOSIS — M54.41 CHRONIC MIDLINE LOW BACK PAIN WITH BILATERAL SCIATICA: Chronic | ICD-10-CM

## 2020-09-24 DIAGNOSIS — G89.29 CHRONIC MIDLINE LOW BACK PAIN WITH BILATERAL SCIATICA: Chronic | ICD-10-CM

## 2020-09-24 DIAGNOSIS — R69 DIAGNOSIS DEFERRED: Primary | ICD-10-CM

## 2020-09-24 RX ORDER — GUANFACINE 1 MG/1
1 TABLET ORAL AT BEDTIME
Qty: 30 TABLET | Refills: 1 | Status: SHIPPED | OUTPATIENT
Start: 2020-09-24 | End: 2020-10-30

## 2020-09-24 NOTE — PROGRESS NOTES
Behavioral Health Home Services  [facility]: [place]        Social Work Care Navigator Note      Patient: Paula Ho  Date: September 24, 2020  Preferred Name: Paula    Previous PHQ-9:   PHQ-9 SCORE 8/3/2020 8/12/2020 9/10/2020   PHQ-9 Total Score 22 20 8     Previous LIBRA-7:   LIBRA-7 SCORE 8/3/2020 8/12/2020 9/10/2020   Total Score 17 16 5     ARNALDO LEVEL:  No flowsheet data found.    Preferred Contact:  Need for : No  Preferred Contact: Cell      Type of Contact Today: Phone call (patient / identified key support person reached)      Data: (subjective / Objective):  Recent ED/IP Admission or Discharge?   None    Patient Goals:  Goal Areas: Health;Mental Health;Financial and Social Service Benefits;Transportation  Patient stated goals: Patient would like assistance with her physical and mental health for creating a balanced and healthy lifestyle. Patient would like assistance with continued SSDI, CADI and social service assistance to aid with county benefits to increase her financial stability. Patient would like assistance with additional transportation services, such as [employer] for reliable transportation to get to her appointments, run errands and get out into the community.        [facility] Core Service Provided:  Comprehensive Care Management: utilized the electronic medical record / patient registry to identify and support patient's health conditions / needs more effectively   Care Transitions: focused on the coordinated and seamless movement of patient between or within different levels of care or settings  Care Coordination: provided care management services/referrals necessary to ensure patient and their identified supports have access to medical, behavioral health, pharmacology and recovery support services.  Ensured that patient's care is integrated across all settings and services.   Individual and Family Support: aimed to help clients reduce barriers to achieving goals, increase  health literacy and knowledge about chronic condition(s), increase self-efficacy skills, and improve health outcomes  Referral to Community and Social Support Services: Provided patient with referrals (see plan section)  Followed-up with patient on whether or not they completed a referral    Current Stressors / Issues / Care Plan Objective Addressed Today:  Patient states she is stressed and in pain from packing and her upcoming move on Oct. 2nd. Patient states she has hired movers to help her move but is concerned about the cost. Patient states she did get a call from [facility] services yesterday but she was unable to talk to them at the time. Patient states they told her they would call again but [facility] encouraged patient to call them back to follow-up, as they may provide help with moving supports/resources and place a referral to the Bridging program.    Patient states she received a long term care (LTC) packet of paperwork from [facility] and has no idea what this is for. [facility] will be calling [facility] to follow-up on CADI referral and get information on LTC paperwork. Patient may need help filling out LTC forms, [facility] to place referral to Behavioral Health Home ([facility]) Community Health Worker when needed.     [facility] placed phone call to [facility] at [phone] ([place]). [facility] states they did receive referral but no  has been assigned yet. [facility] states they should be assigning a worker in the next week or so. [facility] states the LT paperwork is for the CADI assessment and  will go over paperwork with patient during assessment. [facility] called patient to update. Patient stated understanding.    Patient states she is currently getting meals through [employer] but when she moves to [place] she will not be able to continue services. CC to check on additional meal delivery services, such as [employer] once patient has  moved.        Intervention:  Motivational Interviewing: Expressed Empathy/Understanding, Supported Autonomy, Collaboration, Evocation, Permission to raise concern or advise, Open-ended questions and Reflections: simple and complex   Target Behavior(s): Explored and resolved challenges to attending appointments as scheduled and Explored current social supports and reinforced opportunities to increase engagement    Assessment: (Progress on Goals / Homework):  Patient would benefit from continued coordination in reaching their goals set for the Behavioral Health Home ([facility]) program. [facility] reviewed Health Action Plan goals and will continue to monitor progress and work with patient and their care team.      Plan: (Homework, other):  Patient was encouraged to continue to seek condition-related information and education.      Scheduled a Phone follow up appointment with SW  in 2 weeks     Patient has set self-identified goals and will monitor progress until the next appointment on: 10/08/2020.        JASON Mann [facility]  Behavioral Health Home ([facility])   [facility]  [phone]  September 24, 2020  3:35 PM

## 2020-10-02 ENCOUNTER — VIRTUAL VISIT (OUTPATIENT)
Dept: BEHAVIORAL HEALTH | Facility: CLINIC | Age: 60
End: 2020-10-02
Payer: MEDICARE

## 2020-10-02 DIAGNOSIS — R69 DIAGNOSIS DEFERRED: Primary | ICD-10-CM

## 2020-10-02 PROCEDURE — 99207 PR NO BILLABLE SERVICE THIS VISIT: CPT

## 2020-10-02 NOTE — PROGRESS NOTES
Behavioral Health Home Services  [facility]: [place]        Social Work Care Navigator Note      Patient: Paula Ho  Date: October 2, 2020  Preferred Name: Paula    Previous PHQ-9:   PHQ-9 SCORE 8/3/2020 8/12/2020 9/10/2020   PHQ-9 Total Score 22 20 8     Previous LIBRA-7:   LIBRA-7 SCORE 8/3/2020 8/12/2020 9/10/2020   Total Score 17 16 5     ARNALDO LEVEL:  No flowsheet data found.    Preferred Contact:  Need for : No  Preferred Contact: Cell      Type of Contact Today: Phone call (patient / identified key support person reached)      Data: (subjective / Objective):  Recent ED/IP Admission or Discharge?   None    Patient Goals:  Goal Areas: Health;Mental Health;Financial and Social Service Benefits;Transportation  Patient stated goals: Patient would like assistance with her physical and mental health for creating a balanced and healthy lifestyle. Patient would like assistance with continued SSDI, CADI and social service assistance to aid with county benefits to increase her financial stability. Patient would like assistance with additional transportation services, such as [employer] for reliable transportation to get to her appointments, run errands and get out into the community.        [facility] Core Service Provided:  Comprehensive Care Management: utilized the electronic medical record / patient registry to identify and support patient's health conditions / needs more effectively   Care Transitions: focused on the coordinated and seamless movement of patient between or within different levels of care or settings  Care Coordination: provided care management services/referrals necessary to ensure patient and their identified supports have access to medical, behavioral health, pharmacology and recovery support services.  Ensured that patient's care is integrated across all settings and services.   Individual and Family Support: aimed to help clients reduce barriers to achieving goals, increase health  literacy and knowledge about chronic condition(s), increase self-efficacy skills, and improve health outcomes  Referral to Community and Social Support Services: Assisted in scheduling an appointment to a referral / service (see plan section)  Coordinated / Confirmed patient's appointment time or referral and transportation plan  Followed-up with patient on whether or not they completed a referral    Current Stressors / Issues / Care Plan Objective Addressed Today:  Patient called [facility] in tears this am. Patient states she has the opportunity to move into a two bedroom apartment at her old apartment building, which is what she wants to do. Patient states she is having difficulty with getting her new apartment , [address] at [phone], to send email cancellation to her Section [employer] worker, Aga at [phone]. Patient states her old apartment landlord Erica is wondering how long this process might take, as patient's old one-bedroom apartment has been rented out.     SW and patient were able to conference call patient's Section [employer]  today to discuss how to remove barriers and move forward with patient wanting to stay at her old apartment building and move to a new two bedroom. Section 8 states all she needs is an email cancellation from Yessenia Rosario to move forward with paperwork and approval for patient to stay at old apartment building but move to 2-bedroom.     Section 8 states once email is received she will be able to start new paperwork for approved ground floor two bedroom apartment. Section 8 states that patient is not allowed to pay out of pocket any monies to upgrade apartments, as patient has been financially certified through the Counts include 234 beds at the [facility] for her income. Patient states understanding of this but does have safety concerns about being on the ground floor. SWCC and patient discussed coping skills, use of her PCA and additional supports to make her feel comfortable in her new  ground floor apartment she will be moving in to.    SWCC and patient were able to conference call patient's new apartment building and request speaking to Yessenia Rosario, .  Aisha answered the phone and stated Yessenia was not available or on site at this time. SWCC, patient and Aisha discussed cancellation and what has been requested by Section 8 housing. Aisha states agreement that patient does not need to fill out additional cancellation paperwork, but patient will lose $200 deposit (patient stated understanding of this) and will have [address] email cancellation notice to Section [employer] .    SWCC and patient were able to conference call patient's current apartment , Erica, to discuss and update her with the recent information.  is wondering how long it will be until Section 8 housing paperwork and onsite inspection can be completed. [facility] inquired if  had contact information for Aga  with Section 8 to coordinate paperwork and site inspection..  states she has been emailing with Section and will reach out to discuss timeline.  states patient may be able to move to new two-bedroom ground floor apartment this weekend, but will need to contact Section 8 before this can happen.    Patient states she will be calling the movers to place things on hold until she gets authorization from Section 8 and current . [facility] provided support/empathy, education, resources, motivational interviewing, and care coordination today and will be following up with patient next week.    Intervention:  Motivational Interviewing: Expressed Empathy/Understanding, Supported Autonomy, Collaboration, Evocation, Permission to raise concern or advise, Open-ended questions, Reflections: simple and complex, Rolled with resistance: Emphasized patient autonomy, Simple reflection, Reframed sustain talk in the direction  of change and Evoked patient agenda, Change talk (evoked) and Reframe   Target Behavior(s): Explored current social supports and reinforced opportunities to increase engagement    Assessment: (Progress on Goals / Homework):  Patient would benefit from continued coordination in reaching their goals set for the Behavioral Health Home ([facility]) program. SWCC reviewed Health Action Plan goals and will continue to monitor progress and work with patient and their care team.      Plan: (Homework, other):  Patient was encouraged to continue to seek condition-related information and education.      Scheduled a Phone follow up appointment with SW CC in 1 week     Patient has set self-identified goals and will monitor progress until the next appointment on: 10/08/2020.         JASON Mann [facility]  Behavioral Health Home ([facility])   [facility]  [phone]  October 2, 2020  11:55 AM

## 2020-10-08 ENCOUNTER — VIRTUAL VISIT (OUTPATIENT)
Dept: BEHAVIORAL HEALTH | Facility: CLINIC | Age: 60
End: 2020-10-08
Payer: MEDICARE

## 2020-10-08 DIAGNOSIS — R69 DIAGNOSIS DEFERRED: Primary | ICD-10-CM

## 2020-10-08 NOTE — PROGRESS NOTES
Behavioral Health Home Services  [facility]: [place]        Social Work Care Navigator Note      Patient: Paula Ho  Date: October 8, 2020  Preferred Name: Paula    Previous PHQ-9:   PHQ-9 SCORE 8/3/2020 8/12/2020 9/10/2020   PHQ-9 Total Score 22 20 8     Previous LIBRA-7:   LIBRA-7 SCORE 8/3/2020 8/12/2020 9/10/2020   Total Score 17 16 5     ARNALDO LEVEL:  No flowsheet data found.    Preferred Contact:  Need for : No  Preferred Contact: Cell      Type of Contact Today: Phone call (patient / identified key support person reached)      Data: (subjective / Objective):  Recent ED/IP Admission or Discharge?   None    Patient Goals:  Goal Areas: Health;Mental Health;Financial and Social Service Benefits;Transportation  Patient stated goals: Patient would like assistance with her physical and mental health for creating a balanced and healthy lifestyle. Patient would like assistance with continued SSDI, CADI and social service assistance to aid with county benefits to increase her financial stability. Patient would like assistance with additional transportation services, such as [employer] for reliable transportation to get to her appointments, run errands and get out into the community.        [facility] Core Service Provided:  Comprehensive Care Management: utilized the electronic medical record / patient registry to identify and support patient's health conditions / needs more effectively   Care Transitions: focused on the coordinated and seamless movement of patient between or within different levels of care or settings  Care Coordination: provided care management services/referrals necessary to ensure patient and their identified supports have access to medical, behavioral health, pharmacology and recovery support services.  Ensured that patient's care is integrated across all settings and services.   Individual and Family Support: aimed to help clients reduce barriers to achieving goals, increase health  literacy and knowledge about chronic condition(s), increase self-efficacy skills, and improve health outcomes  Referral to Community and Social Support Services: Provided patient with referrals (see plan section)  Followed-up with patient on whether or not they completed a referral    Current Stressors / Issues / Care Plan Objective Addressed Today:  Patient states she has successfully moved into her first floor apartment at her current apartment building. Patient states she is tired, sore and bruised from all the moving. Patient states she is not feeling well this am. Patient states she is feeling a bit nauseas and has diarrhea. Patient states her PCA will be at her house this morning and will be checking in with her, taking her vitals and temp. [facility] provided patient with the [facility] phone number and nurses line and encouraged patient to call if symptoms worsened.    Patient states she did receive a call from [facility]  for CADI waiver intake but she was not able to talk with them at the time. Patient states she has their phone number and will be calling to schedule an intake appointment.    Patient states she is interested in getting [employer] delivery services, as she does not like Meals on Wheels, which she is currently receiving. [facility] called [employer] and was told that patient's [facility]  would need to place the referral for this for services to start. [facility] placed phone call to [facility] Care Team at [phone] to send referral to [employer], left message, awaiting response. [facility] provided support empathy, education, resources and care coordination today.      Intervention:  Motivational Interviewing: Expressed Empathy/Understanding, Supported Autonomy, Collaboration, Evocation, Permission to raise concern or advise, Open-ended questions and Reflections: simple and complex   Target Behavior(s): Explored and resolved challenges to attending appointments as scheduled, Explored  current social supports and reinforced opportunities to increase engagement and Explored patient's current diet and knowledge of influence on mood    Assessment: (Progress on Goals / Homework):  Patient would benefit from continued coordination in reaching their goals set for the Behavioral Health Home ([facility]) program. SWCC reviewed Health Action Plan goals and will continue to monitor progress and work with patient and their care team.      Plan: (Homework, other):  Patient was encouraged to continue to seek condition-related information and education.      Scheduled a Phone follow up appointment with MISA CC in 1 week     Patient has set self-identified goals and will monitor progress until the next appointment on: 10/15/2020.         JASON Mann [facility]  Behavioral Health Home ([facility])   [facility]  [phone]  October 8, 2020  10:35 AM

## 2020-10-09 ENCOUNTER — TELEPHONE (OUTPATIENT)
Dept: BEHAVIORAL HEALTH | Facility: CLINIC | Age: 60
End: 2020-10-09

## 2020-10-09 NOTE — TELEPHONE ENCOUNTER
Behavioral Health Home Services  [facility]        Social Work Care Navigator Note      Patient: Paula Ho  Date: October 9, 2020  Preferred Name: Paula    Previous PHQ-9:   PHQ-9 SCORE 8/3/2020 8/12/2020 9/10/2020   PHQ-9 Total Score 22 20 8     Previous LIBRA-7:   LIBRA-7 SCORE 8/3/2020 8/12/2020 9/10/2020   Total Score 17 16 5     ARNALDO LEVEL:  No flowsheet data found.    Preferred Contact:  Need for : No  Preferred Contact: Cell      Type of Contact Today: Phone call (patient / identified key support person reached)      Data: (subjective / Objective):  Patient Goals  Goal Areas: Health;Mental Health;Financial and Social Service Benefits;Transportation  Patient stated goals: Patient would like assistance with her physical and mental health for creating a balanced and healthy lifestyle. Patient would like assistance with continued SSDI, CADI and social service assistance to aid with county benefits to increase her financial stability. Patient would like assistance with additional transportation services, such as [employer] for reliable transportation to get to her appointments, run errands and get out into the community.      [facility] Service Provided:  Comprehensive Care Management: utilized the electronic medical record / patient registry to identify and support patient's health conditions / needs more effectively   Care Transitions: focused on the coordinated and seamless movement of patient between or within different levels of care or settings  Care Coordination: provided care management services/referrals necessary to ensure patient and their identified supports have access to medical, behavioral health, pharmacology and recovery support services.  Ensured that patient's care is integrated across all settings and services.   Referral to Community and Social Support Services: Assisted in scheduling an appointment to a referral / service (see plan section)  Coordinated / Confirmed  patient's appointment time or referral and transportation plan  Followed-up with patient on whether or not they completed a referral     Data:  [facility] received phone call from [facility]  Wanda Mendez, for referral to [employer], per patient request. [facility]  states that [facility] will need to contact [facility] to place referral for [employer].   [facility] placed phone call to [facility] and spoke with Froylan, who confirmed [place] has [place] services scheduled for Monday, Oct 12th with Orville Linda at [phone]. [facility] was transferred to  and was able to leave a message that patient would like referral placed for [employer] to be included in assessment. [facility] was able to leave  [facility]'s contact information in case she made need additional collateral information.     Plan:  [facility] to follow-up with patient to discuss assessment and outcome    JASON Mann [facility]  Behavioral Health [place] ([facility])   [facility]  [phone]  October 9, 2020  8:36 AM

## 2020-10-12 ENCOUNTER — TELEPHONE (OUTPATIENT)
Dept: BEHAVIORAL HEALTH | Facility: CLINIC | Age: 60
End: 2020-10-12

## 2020-10-12 NOTE — TELEPHONE ENCOUNTER
Behavioral Health Home Services  [facility]        Social Work Care Navigator Note      Patient: Paula Ho  Date: October 12, 2020  Preferred Name: Paula    Previous PHQ-9:   PHQ-9 SCORE 8/3/2020 8/12/2020 9/10/2020   PHQ-9 Total Score 22 20 8     Previous LIBRA-7:   LIBRA-7 SCORE 8/3/2020 8/12/2020 9/10/2020   Total Score 17 16 5     ARNALDO LEVEL:  No flowsheet data found.    Preferred Contact:  Need for : No  Preferred Contact: Cell      Type of Contact Today: Phone call (patient / identified key support person reached)      Data: (subjective / Objective):  Patient Goals  Goal Areas: Health;Mental Health;Financial and Social Service Benefits;Transportation  Patient stated goals: Patient would like assistance with her physical and mental health for creating a balanced and healthy lifestyle. Patient would like assistance with continued SSDI, CADI and social service assistance to aid with county benefits to increase her financial stability. Patient would like assistance with additional transportation services, such as [employer] for reliable transportation to get to her appointments, run errands and get out into the community.      [facility] Service Provided:  Comprehensive Care Management: utilized the electronic medical record / patient registry to identify and support patient's health conditions / needs more effectively   Care Transitions: focused on the coordinated and seamless movement of patient between or within different levels of care or settings  Care Coordination: provided care management services/referrals necessary to ensure patient and their identified supports have access to medical, behavioral health, pharmacology and recovery support services.  Ensured that patient's care is integrated across all settings and services.   Referral to Community and Social Support Services: Coordinated / Confirmed patient's appointment time or referral and transportation plan     Data:  [facility]  received phone call from [facility] [place]  Orville Linda at [phone].  states needing information for [facility]  to contact them for [employer] referral.  states she will be contacting Wanda Mendez, [facility]  to place referral at [phone].  states she will be contacting patient to see if she would like to continue with CADI referral, as this may impact her PCA of 11.5 hours per day.    Plan:  SWCC will follow-up with patient and continue to monitor [place] referral.    JASON Mann [facility]  Behavioral Health Home ([facility])   [facility]  [phone]  October 12, 2020  1:24 PM

## 2020-10-15 ENCOUNTER — VIRTUAL VISIT (OUTPATIENT)
Dept: BEHAVIORAL HEALTH | Facility: CLINIC | Age: 60
End: 2020-10-15
Payer: MEDICARE

## 2020-10-15 DIAGNOSIS — R69 DIAGNOSIS DEFERRED: Primary | ICD-10-CM

## 2020-10-15 NOTE — PROGRESS NOTES
Behavioral Health Home Services  [facility] Clinic: [place]        Social Work Care Navigator Note      Patient: Paula Ho  Date: October 15, 2020  Preferred Name: Paula    Previous PHQ-9:   PHQ-9 SCORE 8/3/2020 8/12/2020 9/10/2020   PHQ-9 Total Score 22 20 8     Previous LIBRA-7:   LIBRA-7 SCORE 8/3/2020 8/12/2020 9/10/2020   Total Score 17 16 5     ARNALDO LEVEL:  No flowsheet data found.    Preferred Contact:  Need for : No  Preferred Contact: Cell      Type of Contact Today: Phone call (patient / identified key support person reached)      Data: (subjective / Objective):  Recent ED/IP Admission or Discharge?   None    Patient Goals:  Goal Areas: Health;Mental Health;Financial and Social Service Benefits;Transportation  Patient stated goals: Patient would like assistance with her physical and mental health for creating a balanced and healthy lifestyle. Patient would like assistance with continued SSDI, CADI and social service assistance to aid with county benefits to increase her financial stability. Patient would like assistance with additional transportation services, such as [employer] for reliable transportation to get to her appointments, run errands and get out into the community.        [facility] Core Service Provided:  Comprehensive Care Management: utilized the electronic medical record / patient registry to identify and support patient's health conditions / needs more effectively   Care Transitions: focused on the coordinated and seamless movement of patient between or within different levels of care or settings  Care Coordination: provided care management services/referrals necessary to ensure patient and their identified supports have access to medical, behavioral health, pharmacology and recovery support services.  Ensured that patient's care is integrated across all settings and services.   Individual and Family Support: aimed to help clients reduce barriers to achieving goals, increase health  literacy and knowledge about chronic condition(s), increase self-efficacy skills, and improve health outcomes  Referral to Community and Social Support Services: Provided patient with referrals (see plan section)  Assisted in scheduling an appointment to a referral / service (see plan section)  Followed-up with patient on whether or not they completed a referral    Current Stressors / Issues / Care Plan Objective Addressed Today:  Patient states she is doing ok and slowly making progress after her move last week. Patient states she has not been sleeping well due to her chronic pain. Patient states she was able to connect with [place]  Or[place] Linda via telephone at [phone]. Patient states she will be calling [facility]  to follow up on the benefits of CADI v her current PCA services and to see if  placed referral for [employer].    Patient states she is needing to follow up with her psychiatrist. [facility] was able to schedule appointment for patient today. Patient states she has upcoming diagnostic testing and yearly follow up for her brain aneurysm on Nov. 18th. Patient states she will be at the hospital for 6-7 hours for contrast testing and diagnostics but expects to come home the same day.    Patient reports additional pain since her move and has been spending a lot more time in bed. Patient states she is interested in having OT come out to do a safety check and an assessment at her new apartment. Patient states she has an appointment with her provider tomorrow. [facility] messaged provider to place referral to OT for safety check and assessment.    Intervention:  Motivational Interviewing: Expressed Empathy/Understanding, Supported Autonomy, Collaboration, Evocation, Permission to raise concern or advise, Open-ended questions, Reflections: simple and complex and Rolled with resistance: Emphasized patient autonomy, Simple reflection and Evoked patient agenda   Target Behavior(s): Explored  thoughts about taking an anti-depressant, Explored thoughts and readiness to participate in individual therapy, Explored and resolved challenges to attending appointments as scheduled, Explored patient's knowledge of the impact of exercise on mood, Explored current exercise activities and thoughts about how this influences mood, Explored current social supports and reinforced opportunities to increase engagement and Explored current sleep hygeine and patient's perception and knowledge of relationship to mood    Assessment: (Progress on Goals / Homework):  Patient would benefit from continued coordination in reaching their goals set for the Behavioral Health Home ([facility]) program. SWCC reviewed Health Action Plan goals and will continue to monitor progress and work with patient and their care team.      Plan: (Homework, other):  Patient was encouraged to continue to seek condition-related information and education.      Scheduled a Phone follow up appointment with MISA RICE in 2 weeks     Patient has set self-identified goals and will monitor progress until the next appointment on: 10/29/2020.         JASON Mann LGSW Behavioral Health Home ([facility])   [facility]  [phone]  October 15, 2020  10:29 AM                  Next 5 appointments (look out 90 days)    Oct 16, 2020 11:00 AM  Office Visit with LEONA Anderson CNP [facility] ([facility]) [address] [address]  [phone]   Nov 11, 2020 11:20 AM  Pre-Op physical with LEONA Anderson CNP [facility] ([facility]) [address] [address]  [phone]

## 2020-10-15 NOTE — Clinical Note
Anson Hollis I was able to connect with this patient today and she reports that she has been having additional pain and spending more time in bed. Patient stated that she has an appointment with you tomorrow and was wondering if you could place an order to OT for a safety check and assessment and complete the face-to-face for this. What are your thoughts? She just recently moved to a new apartment and I thought this might be a good idea.    Thank you,  JASON Mann [facility]  Behavioral Health [place] ([facility])   [facility]  [phone]  October 15, 2020  10:32 AM

## 2020-10-19 NOTE — TELEPHONE ENCOUNTER
Tobacco Treatment Program at the [facility] attempted to reach Ms. Ho on 10/19/2020 regarding the tobacco cessation program to help Ms. Ho to quit smoking. We will attempt to reach Ms. Ho another time.     Karyn Martinez [facility]  Tobacco Treatment Specialist  PH: [phone]

## 2020-10-29 ENCOUNTER — VIRTUAL VISIT (OUTPATIENT)
Dept: BEHAVIORAL HEALTH | Facility: CLINIC | Age: 60
End: 2020-10-29
Payer: MEDICARE

## 2020-10-29 DIAGNOSIS — R69 DIAGNOSIS DEFERRED: Primary | ICD-10-CM

## 2020-10-29 NOTE — PROGRESS NOTES
Behavioral Health Home Services  [facility] [place]        Social Work Care Navigator Note      Patient: Paula Ho  Date: October 29, 2020  Preferred Name: Paula    Previous PHQ-9:   PHQ-9 SCORE 8/3/2020 8/12/2020 9/10/2020   PHQ-9 Total Score 22 20 8     Previous LIBRA-7:   LIBRA-7 SCORE 8/3/2020 8/12/2020 9/10/2020   Total Score 17 16 5     ARNALDO LEVEL:  No flowsheet data found.    Preferred Contact:  Need for : No  Preferred Contact: Cell      Type of Contact Today: Phone call (patient / identified key support person reached)      Data: (subjective / Objective):  Recent ED/IP Admission or Discharge?   None    Patient Goals:  Goal Areas: Health;Mental Health;Financial and Social Service Benefits;Transportation  Patient stated goals: Patient would like assistance with her physical and mental health for creating a balanced and healthy lifestyle. Patient would like assistance with continued SSDI, CADI and social service assistance to aid with county benefits to increase her financial stability. Patient would like assistance with additional transportation services, such as [employer] for reliable transportation to get to her appointments, run errands and get out into the community.        [facility] Core Service Provided:  Comprehensive Care Management: utilized the electronic medical record / patient registry to identify and support patient's health conditions / needs more effectively   Care Transitions: focused on the coordinated and seamless movement of patient between or within different levels of care or settings  Care Coordination: provided care management services/referrals necessary to ensure patient and their identified supports have access to medical, behavioral health, pharmacology and recovery support services.  Ensured that patient's care is integrated across all settings and services.   Individual and Family Support: aimed to help clients reduce barriers to achieving goals, increase health  literacy and knowledge about chronic condition(s), increase self-efficacy skills, and improve health outcomes  Referral to Community and Social Support Services: Assisted in scheduling an appointment to a referral / service (see plan section)  Followed-up with patient on whether or not they completed a referral    Current Stressors / Issues / Care Plan Objective Addressed Today:  Patient reports she had a rough night and did not sleep well. Patient reports she may have had an allergic reaction to her medication with her hand swelling up. Patient reports this has happened before. Patient reports she has been able to take some benadryl, get some rest and will have her PCA monitor. [facility] encouraged patient to check in with PCP or clinic triage RN if symptoms worsen. Patient states agreement with this.    Patient and [facility] discussed meal delivery options. Patient's [place] [employer]  [place] at [phone], informed [facility] that patient is ineligible for meal delivery services duel to her [facility] insurance and will need CADI waiver to be able access further meal delivery options. [facility] was able to inform patient of this and will continue to receive Meals on Wheels for now. Patient reported that she left a message a few days ago to connect with [place] [employer]  to weigh the benefits of a CADI waiver versus her current PCA services. Patient reports she will be calling worker again today.    Patient and [facility] discussed patient's upcoming testing and pre-op appointment on Nov 11th with her PCP. [facility] messaged provider to remind her to place order for OT safety check and assessment and complete face to face. Patient recently moved to [facility] and would like to see what accomodation she could make and improve her upper body strength.     Patient states she is tired today and is looking forward to connecting with her psychiatrist tomorrow to review her medications. [facility] will connect with patient again in two  weeks.    Patient is making progress within her goal areas: Health;Mental Health;Financial and Social Service Benefits;Transportation.    Intervention:  Motivational Interviewing: Expressed Empathy/Understanding, Supported Autonomy, Collaboration, Evocation, Permission to raise concern or advise, Open-ended questions and Reflections: simple and complex   Target Behavior(s): Explored thoughts about taking an anti-depressant, Explored and resolved challenges related to taking anti-depressants as prescribed, Explored and resolved challenges to attending appointments as scheduled, Explored current social supports and reinforced opportunities to increase engagement and Explored current sleep hygeine and patient's perception and knowledge of relationship to mood    Assessment: (Progress on Goals / Homework):  Patient would benefit from continued coordination in reaching their goals set for the Behavioral Health Home ([facility]) program. [facility] reviewed Health Action Plan goals and will continue to monitor progress and work with patient and their care team.      Plan: (Homework, other):  Patient was encouraged to continue to seek condition-related information and education.      Scheduled a Phone follow up appointment with SW  in 2 weeks     Patient has set self-identified goals and will monitor progress until the next appointment on: 11/12/2020.         JASON Mann [facility]  Behavioral Health Home ([facility])   [facility]  [phone]  October 29, 2020  10:27 AM                  Next 5 appointments (look out 90 days)    Nov 11, 2020 11:20 AM  Pre-Op physical with LEONA Anderson CNP  [facility] ([facility]) [address] [place] [address] [id number] [phone]

## 2020-10-29 NOTE — Clinical Note
Anson Mccrary - I wanted to reach out to you to see if you could place an order for OT safety check and assessment and complete face to face, as patient has an appointment with you on 11/11. Patient recently moved to new apartment and would like to see what accomodation she could make and improve her upper body strength.    Thank you,  JASON Mann LGSW Behavioral Health Home ([facility])   [facility]  [phone]  October 29, 2020  10:29 AM

## 2020-10-30 ENCOUNTER — VIRTUAL VISIT (OUTPATIENT)
Dept: PSYCHIATRY | Facility: CLINIC | Age: 60
End: 2020-10-30
Payer: MEDICARE

## 2020-10-30 DIAGNOSIS — F41.1 GAD (GENERALIZED ANXIETY DISORDER): ICD-10-CM

## 2020-10-30 DIAGNOSIS — F98.8 ATTENTION DEFICIT DISORDER, UNSPECIFIED HYPERACTIVITY PRESENCE: Primary | ICD-10-CM

## 2020-10-30 DIAGNOSIS — Z71.89 ENCOUNTER FOR HOME SAFETY REVIEW FOR INJURY PREVENTION: Primary | ICD-10-CM

## 2020-10-30 DIAGNOSIS — F33.2 SEVERE EPISODE OF RECURRENT MAJOR DEPRESSIVE DISORDER, WITHOUT PSYCHOTIC FEATURES (H): ICD-10-CM

## 2020-10-30 PROCEDURE — 99214 OFFICE O/P EST MOD 30 MIN: CPT | Mod: 95 | Performed by: NURSE PRACTITIONER

## 2020-10-30 RX ORDER — BUSPIRONE HYDROCHLORIDE 10 MG/1
10 TABLET ORAL 2 TIMES DAILY
Qty: 60 TABLET | Refills: 1 | Status: SHIPPED | OUTPATIENT
Start: 2020-10-30 | End: 2020-12-22 | Stop reason: DRUGHIGH

## 2020-10-30 RX ORDER — CLONIDINE HYDROCHLORIDE 0.1 MG/1
0.1 TABLET ORAL AT BEDTIME
Qty: 30 TABLET | Refills: 1 | Status: SHIPPED | OUTPATIENT
Start: 2020-10-30 | End: 2020-12-22

## 2020-10-30 RX ORDER — ATOMOXETINE 40 MG/1
40 CAPSULE ORAL DAILY
Qty: 30 CAPSULE | Refills: 1 | Status: SHIPPED | OUTPATIENT
Start: 2020-10-30 | End: 2020-12-22

## 2020-10-30 RX ORDER — FLUOXETINE 40 MG/1
40 CAPSULE ORAL 2 TIMES DAILY
Qty: 60 CAPSULE | Refills: 1 | Status: SHIPPED | OUTPATIENT
Start: 2020-10-30 | End: 2020-12-22

## 2020-10-30 ASSESSMENT — PATIENT HEALTH QUESTIONNAIRE - PHQ9
SUM OF ALL RESPONSES TO PHQ QUESTIONS 1-9: 8
5. POOR APPETITE OR OVEREATING: SEVERAL DAYS

## 2020-10-30 ASSESSMENT — ANXIETY QUESTIONNAIRES
7. FEELING AFRAID AS IF SOMETHING AWFUL MIGHT HAPPEN: NOT AT ALL
6. BECOMING EASILY ANNOYED OR IRRITABLE: NEARLY EVERY DAY
2. NOT BEING ABLE TO STOP OR CONTROL WORRYING: SEVERAL DAYS
1. FEELING NERVOUS, ANXIOUS, OR ON EDGE: SEVERAL DAYS
5. BEING SO RESTLESS THAT IT IS HARD TO SIT STILL: NOT AT ALL
3. WORRYING TOO MUCH ABOUT DIFFERENT THINGS: SEVERAL DAYS
IF YOU CHECKED OFF ANY PROBLEMS ON THIS QUESTIONNAIRE, HOW DIFFICULT HAVE THESE PROBLEMS MADE IT FOR YOU TO DO YOUR WORK, TAKE CARE OF THINGS AT HOME, OR GET ALONG WITH OTHER PEOPLE: EXTREMELY DIFFICULT
GAD7 TOTAL SCORE: 7

## 2020-10-30 NOTE — TELEPHONE ENCOUNTER
FINAL ATTEMPT TO REACH    Tobacco Treatment Program at the [facility] attempted to reach Ms. Ho on 10/30/2020 regarding the tobacco cessation program to help Ms. Ho to quit smoking.     Karyn Martinez [facility]  Tobacco Treatment Specialist  PH: [phone]

## 2020-10-30 NOTE — PROGRESS NOTES
"Paula Ho is a 60 year old female who is being evaluated via a billable telephone visit.      The patient has been notified of following:     \"This telephone visit will be conducted via a call between you and your physician/provider. We have found that certain health care needs can be provided without the need for a physical exam.  This service lets us provide the care you need with a short phone conversation.  If a prescription is necessary we can send it directly to your pharmacy.  If lab work is needed we can place an order for that and you can then stop by our lab to have the test done at a later time.    Telephone visits are billed at different rates depending on your insurance coverage. During this emergency period, for some insurers they may be billed the same as an in-person visit.  Please reach out to your insurance provider with any questions.    If during the course of the call the physician/provider feels a telephone visit is not appropriate, you will not be charged for this service.\"    Patient has given verbal consent for Telephone visit?  Yes    What phone number would you like to be contacted at? [phone]    How would you like to obtain your AVS? Mail a copy    Phone call duration: 30 minutes    Tigist Manjarrez NP        Outpatient Psychiatric Progress Note    Name: Paula Ho   : 1960                    Primary Care Provider: LEONA Marquez CNP   Therapist: None    PHQ-9 scores:  PHQ-9 SCORE 8/3/2020 2020 9/10/2020   PHQ-9 Total Score 22 20 8       LIBRA-7 scores:  LIBRA-7 SCORE 8/3/2020 2020 9/10/2020   Total Score 17 16 5       Patient Identification:    Patient is a 60 year old year old, single  White American female  who presents for return visit with me.  Patient is currently unemployed. Patient attended the session alone. Patient prefers to be called: \" Paula\".    Interim History:    I last saw Paula Ho for outpatient psychiatry Return Visit " on September 10, 2020.     During that appointment, she reported  feeling more anxious and depressed.  She is moving on October 2 and is feeling overwhelmed about the process.  She continues to sleep poorly due to chronic pain issues.  Given her lack of access to community needs I am referring her to behavioral home health care to get support.  She will continue with fluoxetine 80 mg daily.  Her buspirone was increased to 10 mg twice daily to address anxiety issues.  I am asking her to consider trauma therapy given her history.  She tells me she is getting clonazepam for primary care provider which is not something I am comfortable prescribing to her as she is adamant about using it..    .     Current medications include:      albuterol (PROAIR HFA) 108 (90 Base) MCG/ACT inhaler, Inhale 2 puffs into the lungs every 4 hours as needed for shortness of breath / dyspnea or wheezing       albuterol (PROVENTIL) (2.5 MG/3ML) 0.083% neb solution, NEBULIZE THE CONTENTS OF 1 VIAL EVERY 6 HOURS AS NEEDED.       atomoxetine (STRATTERA) 25 MG capsule, Take 1 capsule (25 mg) by mouth daily       budesonide-formoterol (SYMBICORT) 160-4.5 MCG/ACT Inhaler, Inhale 2 puffs into the lungs 2 times daily       busPIRone (BUSPAR) 10 MG tablet, Take 1 tablet (10 mg) by mouth 2 times daily       cetirizine (ZYRTEC) 10 MG tablet, Take 1 tablet (10 mg) by mouth daily (Patient not taking: Reported on 8/3/2020)       clopidogrel (PLAVIX) 75 MG tablet,        FLUoxetine (PROZAC) 40 MG capsule, Take 1 capsule (40 mg) by mouth 2 times daily       guanFACINE (TENEX) 1 MG tablet, Take 1 tablet (1 mg) by mouth At Bedtime       levothyroxine (SYNTHROID/LEVOTHROID) 25 MCG tablet, Take 25 mcg by mouth daily       losartan (COZAAR) 50 MG tablet, Take 50 mg by mouth daily       Multiple Vitamins-Minerals (MULTIVITAMIN ADULT PO),        NARCAN 4 MG/0.1ML nasal spray, INHALE VIA NASAL ROUTE FOR OVERDOSE AS NEEDED. MAY REPEAT AFTER 2-3 MINUTES       nicotine  (NICODERM CQ) 21 MG/24HR 24 hr patch, Place 1 patch onto the skin every 24 hours       nicotine (NICOTROL) 10 MG inhaler, Use 1 cartridge as needed for urge to smoke by puffing over course of 20min.  Use 6-16 cart/day; reduce number of cart/day over 6-12 weeks.       order for DME, Equipment being ordered: Nebulizer       oxyCODONE IR (ROXICODONE) 15 MG tablet, Take 7.5 mg by mouth 2 times daily as needed       pravastatin (PRAVACHOL) 40 MG tablet, Take 40 mg by mouth daily       tiotropium (SPIRIVA HANDIHALER) 18 MCG inhaled capsule, Inhale 1 capsule (18 mcg) into the lungs daily       tiZANidine (ZANAFLEX) 2 MG tablet, TK 2 TO 3 TS PO QHS       topiramate (TOPAMAX) 100 MG tablet, Take 1 tablet (100 mg) by mouth 2 times daily       vitamin D3 (CHOLECALCIFEROL) 2000 units (50 mcg) tablet, Take 1 tablet by mouth daily    No current facility-administered medications on file prior to visit.        The [place] Prescription Monitoring Program has been reviewed and there are no concerns about diversionary activity for controlled substances at this time.      I was able to review most recent Primary Care Provider, specialty provider, and therapy visit notes that I have access to.     Today, patient reports that she has been missing calls.  She moved.  Now she is trying to get settled but is in a lot of pain and can only organize a little at a time.  She gets tired during the day.  She has no energy and does not want to get out of bed.  Pain medication only lasts 2 to 2 and 1/2 hours.  She wants to sit and cry all the time.  She has friends and family for support.       has a past medical history of Acute respiratory failure (H) (02/01/2020).    Social history updates:    Since moving to her new apartment any day has been busy unpacking.  She lives with her 2 Pomeranians.      Substance use updates:    She does not drink alcohol  Tobacco use: Yes Cigarettes  Ready to quit?  No  Nicotine Replacement Therapy tried: Nicotine  patch    Vital Signs:   LMP  (LMP Unknown)     Labs:    Most recent laboratory results reviewed and no new labs.     Review of Systems:  10 systems (general, cardiovascular, respiratory, eyes, ENT, endocrine, GI, , M/S, neurological) were reviewed. Most pertinent finding(s) is/are: She reports chronic pain, malaise, no chest pain, occasional hand swelling. The remaining systems are all unremarkable.    Mental Status Examination:  Appearance:  awake, alert  Attitude:  cooperative   Eye Contact:  Unable to assess  Gait and Station: No assistive Devices used and No dizziness or falls  Psychomotor Behavior:  Unable to assess  Oriented to:  time, person, and place  Attention Span and Concentration:  Normal  Speech:   clear, coherent and Speaks: English  Mood:  anxious and depressed  Affect:  intensity is heightened  Associations:  no loose associations  Thought Process:  logical and goal oriented  Thought Content:  no evidence of suicidal ideation or homicidal ideation, no auditory hallucinations present and no visual hallucinations present  Recent and Remote Memory:  intact Not formally assessed. No amnesia.  Fund of Knowledge: appropriate  Insight:  good  Judgment:  intact  Impulse Control:  intact    Suicide Risk Assessment:  Today Paula oH reports that she is having no thoughts to want to harm herself or to hurt other people. In addition, there are notable risk factors for self-harm, including single status, anxiety, comorbid medical condition of Chronic pain, withdrawing and mood change. However, risk is mitigated by history of seeking help when needed, future oriented, no access to firearms or weapons, denies suicidal intent or plan and denies homicidal ideation, intent, or plan. Therefore, based on all available evidence including the factors cited above, Paula Ho does not appear to be at imminent risk for self-harm, does not meet criteria for a 72-hr hold, and therefore remains appropriate for  ongoing outpatient level of care.  A thorough assessment of risk factors related to suicide and self-harm have been reviewed and are noted above. The patient convincingly denies suicidality on several occasions. Local community safety resources printed and reviewed for patient to use if needed. There was no deceit detected, and the patient presented in a manner that was believable.     DSM5 Diagnosis:  Attention-Deficit/Hyperactivity Disorder  314.01 (F90.8) Other Specified Attention-Deficit / Hyperactiity Disorder  296.32 (F33.1) Major Depressive Disorder, Recurrent Episode, Moderate With melancholic features  300.02 (F41.1) Generalized Anxiety Disorder    Medical comorbidities include:   Patient Active Problem List    Diagnosis Date Noted     Hyperlipidemia 04/14/2020     Priority: Medium     Benign essential hypertension 04/14/2020     Priority: Medium     MDD (major depressive disorder), recurrent severe, without psychosis (H) 03/02/2020     Priority: Medium     Chronic obstructive pulmonary disease, unspecified COPD type (H) 02/04/2020     Priority: Medium     No PFTS  In EPIC       Attention deficit hyperactivity disorder (ADHD) 01/31/2020     Priority: Medium     Chronic midline low back pain with bilateral sciatica 01/31/2020     Priority: Medium     Brain aneurysm 12/03/2019     Priority: Medium     Dec 2017  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 Device is MR compatible to 3 BREANN 3/2020       History of subarachnoid hemorrhage 12/22/2017     Priority: Medium     H/O of SAH, Cam 2, Hunt-Thomas 1, from a ruptured 9mm left Pcomm aneurysm with a wide neck and a fetal left PCA arising from the aneurysm neck, s/p successful coil embolization 12/23/2017 by Dr. Otto Hurley       Chronic GERD 01/26/2017     Priority: Medium     Chronic knee pain 12/12/2014     Priority: Medium     Cigarette smoker 07/08/2014     Priority: Medium     Chronic, continuous use of opioids  04/24/2013     Priority: Medium     Managed by pain clinic outside of [place].  UDS + cocaine in December 2019 without confirmation testing at her pain clinic per patient report       Obesity, Class III, BMI 40-49.9 (morbid obesity) (H) 03/27/2013     Priority: Medium     Status post knee surgery 03/27/2013     Priority: Medium     Per patient's recollection:  Circa 2002: right knee arthroscopy (Dr. Pappas)  Circa 2004: right knee partial knee replacement (Iam Ritchie MD)  Circa 2006: right TKA (Ron Ryder MD; [facility])  Circa 2008: right TKA revision due to infection (Ron Ryder MD; [facility])  Circa 2009: right TKA revision due to infection (Ron Ryder MD; [facility])  April 2011: right TKA (Ron Ryder MD; [facility])       LIBRA (generalized anxiety disorder) 09/28/2011     Priority: Medium     Chronic pain 09/28/2011     Priority: Medium     Knee and low back         DJD (degenerative joint disease) 09/28/2011     Priority: Medium     Insomnia 04/13/2011     Priority: Medium     Insomnia  NOS       Tobacco use disorder 07/31/2006     Priority: Medium     Asthma 11/15/2004     Priority: Medium     Asthma  NOS         Assessment:    Paula Ho reports ongoing depression and anxiety symptoms but they have been less intense since moving to her new apartment.  She continues to have trouble sleeping at night.  Paula is concerned about her limited focus and concentration to complete projects around her apartment.  To address all these concerns, she will continue fluoxetine 40 mg twice daily, buspirone 10 mg twice daily.  Strattera will be increased to 40 mg daily to with address attentional concerns.  Guanfacine has been discontinued at bedtime and instead she will take clonidine 0.1 mg tablet to try to help her relax and get better sleep.  Finally, I will connect with Tamar Blakely to see if she would be able to meet with Paula for a few sessions to help her  develop coping skills to manage her anxiety and depression.  Chronic pain complicates this picture..    Medication side effects and alternatives were reviewed. Health promotion activities recommended and reviewed today. All questions addressed. Education and counseling completed regarding risks and benefits of medications and psychotherapy options.    Treatment Plan:        1.  Continue Fluoxetine 40 mg twice daily    2.  Discontinue Guanfacine    3.  Add Clonidine 0.1 mg at bedtime    3.  Continue Buspirone 10 mg twice daily    4.  Increase Strattera to  40 mg daily     5.  See Tamar Blakely for talk therapy       Continue all other medications as reviewed per electronic medical record today.     Safety plan reviewed. To the Emergency Department as needed or call after hours crisis line at [phone] or [phone]. [place] Crisis Text [place]. Text [place] to 559204 or Suicide LifeLine Chat: StrongSteam.org/chat/    To schedule individual or family therapy, call [facility] at [phone].    Schedule an appointment with me in 6 weeks  or sooner as needed. Call [facility] at [phone] to schedule.    Follow up with primary care provider as planned or for acute medical concerns.    Call the psychiatric nurse line with medication questions or concerns at [phone].    Ourpalmhart may be used to communicate with your provider, but this is not intended to be used for emergencies.    Crisis Resources:    National Suicide Prevention Lifeline: 789.799.5657 (TTY: 305.309.5063). Call anytime for help.  (www.suicidepreventionlifeline.org)  National [place] on Mental Illness (www.raudel.org): 608.685.4839 or 324-122-9282.   Mental Health Association (www.mentalhealth.org): 693.570.6494 or 323-647-5366.  [place] Crisis Text [place]: Text [place] to 927546  Suicide LifeLine Chat: StrongSteam.org/chat    Administrative Billing:   Time spent with patient was 30 minutes and  greater than 50% of time or 20 minutes was spent in counseling and coordination of care regarding above diagnoses and treatment plan.    Patient Status:  Patient will continue to be seen for ongoing consultation and stabilization.    Signed:   VIRGILIO Kidd-BC   Psychiatry

## 2020-10-30 NOTE — PATIENT INSTRUCTIONS
1.  Continue Fluoxetine 40 mg twice daily    2.  Discontinue Guanfacine    3.  Add Clonidine 0.1 mg at bedtime    3.  Continue Buspirone 10 mg twice daily    4.  Increase Strattera to  40 mg daily     5.  See Tamar Blakely for talk therapy       Continue all other medications as reviewed per electronic medical record today.     Safety plan reviewed. To the Emergency Department as needed or call after hours crisis line at [phone] or [phone]. [place] Crisis Text Line. Text [place] to 637407 or Suicide LifeLine Chat: suicideRollbar.org/chat/    To schedule individual or family therapy, call [facility] at [phone].    Schedule an appointment with me in 6 weeks  or sooner as needed. Call [facility] at [phone] to schedule.    Follow up with primary care provider as planned or for acute medical concerns.    Call the psychiatric nurse line with medication questions or concerns at [phone].    SocialBrohart may be used to communicate with your provider, but this is not intended to be used for emergencies.    Crisis Resources:    National Suicide Prevention Lifeline: 944.534.3526 (TTY: 746.639.3865). Call anytime for help.  (www.suicidepreventionlifeline.org)  National [place] on Mental Illness (www.raudel.org): 848.102.1022 or 704-397-2769.   Mental Health Association (www.mentalhealth.org): 178.215.2450 or 065-405-6069.  [place] Crisis Text [place]: Text [place] to 602295  Suicide LifeLine Chat: suicideTour Engineline.org/chat

## 2020-10-31 ASSESSMENT — ANXIETY QUESTIONNAIRES: GAD7 TOTAL SCORE: 7

## 2020-11-02 ENCOUNTER — TELEPHONE (OUTPATIENT)
Dept: BEHAVIORAL HEALTH | Facility: CLINIC | Age: 60
End: 2020-11-02

## 2020-11-02 DIAGNOSIS — I10 BENIGN ESSENTIAL HYPERTENSION: Primary | ICD-10-CM

## 2020-11-02 NOTE — TELEPHONE ENCOUNTER
[facility]  [facility]: [place]        Social Work Care Navigator Note      Patient: Paula Ho  Date: November 2, 2020  Preferred Name: Paula    Previous PHQ-9:   PHQ-9 SCORE 8/12/2020 9/10/2020 10/30/2020   PHQ-9 Total Score 20 8 8     Previous LIBRA-7:   LIBRA-7 SCORE 8/12/2020 9/10/2020 10/30/2020   Total Score 16 5 7     ARNALDO LEVEL:  No flowsheet data found.    Preferred Contact:  Need for : No  Preferred Contact: Cell      Type of Contact Today: Phone call (not reached/unavailable)      Data: (subjective / Objective):  Attempted to reach patient for [facility] Care Coordination, but was unsuccessful, left detailed message.  Appointment scheduled for Nov 12th.    [facility] received message from psychiatric prescriber inquiring if BP monitor was covered by insurance. [facility] was able to speak with Joy from [employer] Customer Service at [phone].  [facility] states patient is eligible for BP monitor every three years and to date has not used this benefit.  [facility] provided [facility] with billing code  Automatic Blood Pressure Monitor.    [facility] messaged patient's PCP to place order, send to DME processing facility, and message patient.    [facility] messaged patient's psychiatry provider with update.    JASON Mann [facility]  [facility]   [facility]  [phone]  November 2, 2020  11:02 AM            Next 5 appointments (look out 90 days)    Nov 11, 2020 11:20 AM  Pre-Op physical with LEONA Anderson CNP  [facility] ([facility]) [address] [place] [id number] [phone]

## 2020-11-02 NOTE — TELEPHONE ENCOUNTER
DME (Durable Medical Equipment) Orders and Documentation  Orders Placed This Encounter   Procedures     Home Blood Pressure Monitor Order      The patient was assessed and it was determined the patient is in need of the following listed DME Supplies/Equipment. Please complete supporting documentation below to demonstrate medical necessity.      DME All Other Item(s) Documentation    List reason for need and supporting documentation for medical necessity below for each DME item.     1. Home BP monitor - for patient to monitor home BP        Order placed in 1st floor TC basket on 2nd floor

## 2020-11-03 NOTE — TELEPHONE ENCOUNTER
DME rx for home blood pressure monitor faxed to [facility] medical equipment, fax [phone].      ROSANNA Steiner.

## 2020-11-05 ENCOUNTER — TELEPHONE (OUTPATIENT)
Dept: BEHAVIORAL HEALTH | Facility: CLINIC | Age: 60
End: 2020-11-05

## 2020-11-11 ENCOUNTER — OFFICE VISIT (OUTPATIENT)
Dept: FAMILY MEDICINE | Facility: CLINIC | Age: 60
End: 2020-11-11
Payer: MEDICARE

## 2020-11-11 ENCOUNTER — ANCILLARY PROCEDURE (OUTPATIENT)
Dept: GENERAL RADIOLOGY | Facility: CLINIC | Age: 60
End: 2020-11-11
Attending: NURSE PRACTITIONER
Payer: MEDICARE

## 2020-11-11 VITALS
DIASTOLIC BLOOD PRESSURE: 72 MMHG | BODY MASS INDEX: 44.68 KG/M2 | RESPIRATION RATE: 14 BRPM | TEMPERATURE: 97.9 F | SYSTOLIC BLOOD PRESSURE: 107 MMHG | HEART RATE: 76 BPM | OXYGEN SATURATION: 96 % | WEIGHT: 276.8 LBS

## 2020-11-11 DIAGNOSIS — R06.02 SHORTNESS OF BREATH: ICD-10-CM

## 2020-11-11 DIAGNOSIS — Z01.818 PRE-OP EXAM: Primary | ICD-10-CM

## 2020-11-11 DIAGNOSIS — R07.9 CHEST PAIN, UNSPECIFIED TYPE: ICD-10-CM

## 2020-11-11 DIAGNOSIS — I67.1 BRAIN ANEURYSM: ICD-10-CM

## 2020-11-11 DIAGNOSIS — Z23 NEED FOR PROPHYLACTIC VACCINATION AND INOCULATION AGAINST INFLUENZA: ICD-10-CM

## 2020-11-11 DIAGNOSIS — R07.89 ATYPICAL CHEST PAIN: ICD-10-CM

## 2020-11-11 LAB
ANION GAP SERPL CALCULATED.3IONS-SCNC: 4 MMOL/L (ref 3–14)
BUN SERPL-MCNC: 18 MG/DL (ref 7–30)
CALCIUM SERPL-MCNC: 9.3 MG/DL (ref 8.5–10.1)
CHLORIDE SERPL-SCNC: 106 MMOL/L (ref 94–109)
CO2 SERPL-SCNC: 28 MMOL/L (ref 20–32)
CREAT SERPL-MCNC: 0.63 MG/DL (ref 0.52–1.04)
GFR SERPL CREATININE-BSD FRML MDRD: >90 ML/MIN/{1.73_M2}
GLUCOSE SERPL-MCNC: 103 MG/DL (ref 70–99)
HGB BLD-MCNC: 14.3 G/DL (ref 11.7–15.7)
POTASSIUM SERPL-SCNC: 4.6 MMOL/L (ref 3.4–5.3)
SODIUM SERPL-SCNC: 138 MMOL/L (ref 133–144)

## 2020-11-11 PROCEDURE — 99215 OFFICE O/P EST HI 40 MIN: CPT | Mod: 25 | Performed by: NURSE PRACTITIONER

## 2020-11-11 PROCEDURE — 90682 RIV4 VACC RECOMBINANT DNA IM: CPT | Performed by: NURSE PRACTITIONER

## 2020-11-11 PROCEDURE — 93000 ELECTROCARDIOGRAM COMPLETE: CPT | Performed by: NURSE PRACTITIONER

## 2020-11-11 PROCEDURE — 86769 SARS-COV-2 COVID-19 ANTIBODY: CPT | Performed by: NURSE PRACTITIONER

## 2020-11-11 PROCEDURE — 36415 COLL VENOUS BLD VENIPUNCTURE: CPT | Performed by: NURSE PRACTITIONER

## 2020-11-11 PROCEDURE — 80048 BASIC METABOLIC PNL TOTAL CA: CPT | Performed by: NURSE PRACTITIONER

## 2020-11-11 PROCEDURE — 71046 X-RAY EXAM CHEST 2 VIEWS: CPT | Performed by: RADIOLOGY

## 2020-11-11 PROCEDURE — 85018 HEMOGLOBIN: CPT | Performed by: NURSE PRACTITIONER

## 2020-11-11 PROCEDURE — G0008 ADMIN INFLUENZA VIRUS VAC: HCPCS | Performed by: NURSE PRACTITIONER

## 2020-11-11 RX ORDER — CLONAZEPAM 0.5 MG/1
0.5 TABLET ORAL 2 TIMES DAILY PRN
COMMUNITY
End: 2021-12-29

## 2020-11-11 RX ORDER — OXYCODONE HYDROCHLORIDE 5 MG/1
5 TABLET ORAL EVERY 6 HOURS PRN
COMMUNITY
End: 2021-02-23

## 2020-11-11 ASSESSMENT — PAIN SCALES - GENERAL: PAINLEVEL: NO PAIN (0)

## 2020-11-11 NOTE — PROGRESS NOTES
[address] [address]  Phone: [phone]  Primary Provider: Demetra Monsalve  Pre-op Performing Provider: DEMETRA MONSALVE    PREOPERATIVE EVALUATION:  Today's date: 11/11/2020    Paula Ho is a 60 year old female who presents for a preoperative evaluation.    Surgical Information:  Surgery/Procedure: Patient is unsure of exact procedure. She reports it is for ongoing assessment of her brain aneurysm and she will be sedated for several hours.  Surgery Location: [facility]  Surgeon: Rony  Surgery Date: 11/18/20  Time of Surgery: 6:30AM  Where patient plans to recover: At home with family  Fax number for surgical facility: TBD    Type of Anesthesia Anticipated: to be determined    Subjective     HPI related to upcoming procedure: Patient with history of brain aneurysm which required intervention March 2020. She is unsure of procedure to be done next week which is to further evaluate aneurysm.    Has chest pain that moves around her chest - sometimes just to the right of her sterum, middle, and left chest. She has shortness of breath as well as some activity intolerance. Sometimes the shortness of breath and chest pain occur simultaneous and sometimes it's independent of one another. Unable to do a flight of stairs without getting winded. Pain doesn't always occur with stairs. She's not sure if the pain radiates. No nausea or vomiting. No diaphoresis. Recently turned down opportunity to move to second floor apartment due to chest pain and shortness of breath. Smoking 1/2 ppd. Established with pulmonology. Due for follow up. Tried to schedule PFTs but unable due to COVID.      Preop Questions 11/11/2020   1. Have you ever had a heart attack or stroke? No   2. Have you ever had surgery on your heart or blood vessels, such as a stent placement, a coronary artery bypass, or surgery on an artery in your head, neck, heart, or legs? YES -  stent for brain aneurysm   3. Do you have chest pain with activity? YES    4. Do you have a history of  heart failure? No   5. Do you currently have a cold, bronchitis or symptoms of other infection? No   6. Do you have a cough, shortness of breath, or wheezing? YES - ongoing. Followed by pulmonology. She was unable to complete PFTs due to COVID.     7. Do you or anyone in your family have previous history of blood clots? No   8. Do you or does anyone in your family have a serious bleeding problem such as prolonged bleeding following surgeries or cuts? No   9. Have you ever had problems with anemia or been told to take iron pills? YES - past   10. Have you had any abnormal blood loss such as black, tarry or bloody stools, or abnormal vaginal bleeding? No   11. Have you ever had a blood transfusion? No   12. Are you willing to have a blood transfusion if it is medically needed before, during, or after your surgery? Yes   13. Have you or any of your relatives ever had problems with anesthesia? No   14. Do you have sleep apnea, excessive snoring or daytime drowsiness? YES - was supposed to do sleep study but on hold due to COVID   14a. Do you have a CPAP machine? No   15. Do you have any artifical heart valves or other implanted medical devices like a pacemaker, defibrillator, or continuous glucose monitor? No   16. Do you have artificial joints? YES - knee   17. Are you allergic to latex? No   18. Is there any chance that you may be pregnant? No     Health Care Directive:  Patient does not have a Health Care Directive or Living Will: Discussed advance care planning with patient; however, patient declined at this time.    Preoperative Review of :   reviewed - controlled substances prescribed by other outside provider(s).        [id number]}  HPI related to upcoming procedure: history of brain aneurysm and recent MRI to check it revealed it is leaking per patient report        ASTHMA - Patient has a longstanding  history of moderate-severe Asthma . Patient has been doing well overall noting NO SYMPTOMS RECENTLY and continues on medication regimen consisting of symbicort, Spiriva, albuterol without adverse reactions or side effects.      COPD - Patient has a longstanding history of moderate-severe COPD . Patient has been doing well overall noting NO SYMPTOMS RECENTLY and continues on medication regimen consisting of Symbicort, Spiriva, albuterol without adverse reactions or side effects.     DEPRESSION - Patient has a long history of Depression of moderate severity requiring medication for control with recent symptoms being stable - in partial inpatient program. Current symptoms of depression include depressed mood.      HYPERLIPIDEMIA - Patient has a long history of significant Hyperlipidemia requiring medication for treatment with recent good control. Patient reports no problems or side effects with the medication.       Review of SystemsExcept as noted above  CONSTITUTIONAL: NEGATIVE for fever, chills, change in weight  INTEGUMENTARY/SKIN: NEGATIVE for worrisome rashes, moles or lesions  EYES: NEGATIVE for vision changes or irritation  ENT/MOUTH: NEGATIVE for ear, mouth and throat problems  RESP: NEGATIVE for significant cough or SOB  BREAST: NEGATIVE for masses, tenderness or discharge  CV: NEGATIVE for chest pain, palpitations or peripheral edema  GI: NEGATIVE for nausea, abdominal pain, heartburn, or change in bowel habits  : NEGATIVE for frequency, dysuria, or hematuria  MUSCULOSKELETAL: NEGATIVE for significant arthralgias or myalgia  NEURO: NEGATIVE for weakness, dizziness or paresthesias  ENDOCRINE: NEGATIVE for temperature intolerance, skin/hair changes  HEME: NEGATIVE for bleeding problems  PSYCHIATRIC: NEGATIVE for changes in mood or affect    Patient Active Problem List    Diagnosis Date Noted     Hyperlipidemia 04/14/2020     Priority: Medium     Benign essential hypertension 04/14/2020     Priority: Medium      MDD (major depressive disorder), recurrent severe, without psychosis (H) 03/02/2020     Priority: Medium     Chronic obstructive pulmonary disease, unspecified COPD type (H) 02/04/2020     Priority: Medium     No PFTS  In EPIC       Attention deficit hyperactivity disorder (ADHD) 01/31/2020     Priority: Medium     Chronic midline low back pain with bilateral sciatica 01/31/2020     Priority: Medium     Brain aneurysm 12/03/2019     Priority: Medium     Dec 2017  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 Device is MR compatible to 3 BREANN 3/2020       History of subarachnoid hemorrhage 12/22/2017     Priority: Medium     H/O of SAH, Cam 2, Hunt-Thomas 1, from a ruptured 9mm left Pcomm aneurysm with a wide neck and a fetal left PCA arising from the aneurysm neck, s/p successful coil embolization 12/23/2017 by Dr. Otto Hurley       Chronic GERD 01/26/2017     Priority: Medium     Chronic knee pain 12/12/2014     Priority: Medium     Cigarette smoker 07/08/2014     Priority: Medium     Chronic, continuous use of opioids 04/24/2013     Priority: Medium     Managed by pain clinic outside of [place].  UDS + cocaine in December 2019 without confirmation testing at her pain clinic per patient report       Obesity, Class III, BMI 40-49.9 (morbid obesity) (H) 03/27/2013     Priority: Medium     Status post knee surgery 03/27/2013     Priority: Medium     Per patient's recollection:  Circa 2002: right knee arthroscopy (Dr. Pappas)  Circa 2004: right knee partial knee replacement (Iam Ritchie MD)  Circa 2006: right TKA (Ron Ryder MD; [facility])  Circa 2008: right TKA revision due to infection (Ron Ryder MD; [facility])  Circa 2009: right TKA revision due to infection (Ron Ryder MD; [facility])  April 2011: right TKA (Ron Ryder MD; [facility])       LIBRA (generalized anxiety disorder) 09/28/2011     Priority: Medium     Chronic pain  09/28/2011     Priority: Medium     Knee and low back         DJD (degenerative joint disease) 09/28/2011     Priority: Medium     Insomnia 04/13/2011     Priority: Medium     Insomnia  NOS       Tobacco use disorder 07/31/2006     Priority: Medium     Asthma 11/15/2004     Priority: Medium     Asthma  NOS        Past Medical History:   Diagnosis Date     Acute respiratory failure (H) 02/01/2020    Diagnosed with influenza A on 2/1 and admitted to ICU at [facility] on bipap. She went home AMA shortly thereafter.      Past Surgical History:   Procedure Laterality Date     APPENDECTOMY       ORTHOPEDIC SURGERY  2002    Circa 2002: right knee arthroscopy (Dr. Pappas)     ORTHOPEDIC SURGERY  2004    Circa 2004: right knee partial knee replacement (Iam Ritchie MD)     ORTHOPEDIC SURGERY  2006    Circa 2006: right TKA (Ron Ryder MD; [facility])     ORTHOPEDIC SURGERY  2008    Circa 2008: right TKA revision due to infection (Ron Ryder MD; [facility])     ORTHOPEDIC SURGERY  2009    Circa 2009: right TKA revision due to infection (Ron Ryder MD; [facility])     ORTHOPEDIC SURGERY  2011 April 2011: right TKA (Ron Ryder MD; [facility])     TONSILLECTOMY      tonsils     Current Outpatient Medications   Medication Sig Dispense Refill     albuterol (PROAIR HFA) 108 (90 Base) MCG/ACT inhaler Inhale 2 puffs into the lungs every 4 hours as needed for shortness of breath / dyspnea or wheezing 1 Inhaler 3     albuterol (PROVENTIL) (2.5 MG/3ML) 0.083% neb solution NEBULIZE THE CONTENTS OF 1 VIAL EVERY 6 HOURS AS NEEDED. 25 vial 1     atomoxetine (STRATTERA) 40 MG capsule Take 1 capsule (40 mg) by mouth daily 30 capsule 1     budesonide-formoterol (SYMBICORT) 160-4.5 MCG/ACT Inhaler Inhale 2 puffs into the lungs 2 times daily 3 Inhaler 3     busPIRone (BUSPAR) 10 MG tablet Take 1 tablet (10 mg) by mouth 2 times daily 60 tablet 1     cetirizine (ZYRTEC) 10 MG tablet Take 1 tablet  (10 mg) by mouth daily 14 tablet 1     clonazePAM (KLONOPIN) 0.5 MG tablet Take 0.5 mg by mouth 2 times daily as needed       cloNIDine (CATAPRES) 0.1 MG tablet Take 1 tablet (0.1 mg) by mouth At Bedtime 30 tablet 1     clopidogrel (PLAVIX) 75 MG tablet        FLUoxetine (PROZAC) 40 MG capsule Take 1 capsule (40 mg) by mouth 2 times daily 60 capsule 1     levothyroxine (SYNTHROID/LEVOTHROID) 25 MCG tablet Take 25 mcg by mouth daily       losartan (COZAAR) 50 MG tablet Take 50 mg by mouth daily       Multiple Vitamins-Minerals (MULTIVITAMIN ADULT PO)        oxyCODONE (ROXICODONE) 5 MG tablet Take 5 mg by mouth every 6 hours as needed       oxyCODONE IR (ROXICODONE) 15 MG tablet Take 7.5 mg by mouth 2 times daily as needed       pravastatin (PRAVACHOL) 40 MG tablet Take 40 mg by mouth daily       tiotropium (SPIRIVA HANDIHALER) 18 MCG inhaled capsule Inhale 1 capsule (18 mcg) into the lungs daily 90 capsule 3     tiZANidine (ZANAFLEX) 2 MG tablet TK 2 TO 3 TS PO QHS  4     topiramate (TOPAMAX) 100 MG tablet Take 1 tablet (100 mg) by mouth 2 times daily 60 tablet 1     vitamin D3 (CHOLECALCIFEROL) 2000 units (50 mcg) tablet Take 1 tablet by mouth daily  1     NARCAN 4 MG/0.1ML nasal spray INHALE VIA NASAL ROUTE FOR OVERDOSE AS NEEDED. MAY REPEAT AFTER 2-3 MINUTES  0     nicotine (NICODERM CQ) 21 MG/24HR 24 hr patch Place 1 patch onto the skin every 24 hours (Patient not taking: Reported on 11/11/2020) 30 patch 3     nicotine (NICOTROL) 10 MG inhaler Use 1 cartridge as needed for urge to smoke by puffing over course of 20min.  Use 6-16 cart/day; reduce number of cart/day over 6-12 weeks. (Patient not taking: Reported on 11/11/2020) 50 Cartridge 3     order for DME Equipment being ordered: Nebulizer 1 Units 0       Allergies   Allergen Reactions     Compazine [Prochlorperazine]      Droperidol      Lisinopril      Seroquel [Quetiapine]         Social History     Tobacco Use     Smoking status: Current Every Day Smoker      Packs/day: 0.50     Smokeless tobacco: Never Used   Substance Use Topics     Alcohol use: Not Currently     Family History   Problem Relation Age of Onset     Depression Mother      Substance Abuse Father      History   Drug Use No     Comment: remote history of recreational cocaine and marijuana use         Objective     /72   Pulse 76   Temp 97.9  F (36.6  C) (Oral)   Resp 14   Wt 125.6 kg (276 lb 12.8 oz)   LMP  (LMP Unknown)   SpO2 96%   BMI 44.68 kg/m      Physical Exam    GENERAL APPEARANCE: healthy, alert and no distress     EYES: EOMI,  PERRL     HENT: ear canals and TM's normal and nose and mouth without ulcers or lesions     NECK: no adenopathy, no asymmetry, masses, or scars and thyroid normal to palpation     RESP: lungs clear to auscultation - no rales, rhonchi or wheezes     CV: regular rates and rhythm, normal S1 S2, no S3 or S4 and no murmur, click or rub     ABDOMEN:  soft, nontender, obese abdomen     MS: extremities normal- no gross deformities noted, no evidence of inflammation in joints, FROM in all extremities.     SKIN: no suspicious lesions or rashes     NEURO: Normal strength and tone, sensory exam grossly normal, mentation intact and speech normal     PSYCH: mentation appears normal. and affect normal/bright     LYMPHATICS: No cervical adenopathy    Recent Labs   Lab Test 03/04/20  1519 01/27/20  1440   HGB 13.4 14.0    320   INR 0.97 0.98    139   POTASSIUM 4.0 4.7   CR 0.59 0.55        Diagnostics:  Recent Results (from the past 48 hour(s))   Basic metabolic panel  (Ca, Cl, CO2, Creat, Gluc, K, Na, BUN)    Collection Time: 11/11/20 12:26 PM   Result Value Ref Range    Sodium 138 133 - 144 mmol/L    Potassium 4.6 3.4 - 5.3 mmol/L    Chloride 106 94 - 109 mmol/L    Carbon Dioxide 28 20 - 32 mmol/L    Anion Gap 4 3 - 14 mmol/L    Glucose 103 (H) 70 - 99 mg/dL    Urea Nitrogen 18 7 - 30 mg/dL    Creatinine 0.63 0.52 - 1.04 mg/dL    GFR Estimate >90 >60  mL/min/[1.73_m2]    GFR Estimate If Black >90 >60 mL/min/[1.73_m2]    Calcium 9.3 8.5 - 10.1 mg/dL   Hemoglobin    Collection Time: 11/11/20 12:26 PM   Result Value Ref Range    Hemoglobin 14.3 11.7 - 15.7 g/dL      EKG: Sinus rhythm   Rate 68         QTcH 394    Revised Cardiac Risk Index (RCRI):  The patient has the following serious cardiovascular risks for perioperative complications:   - No serious cardiac risks = 0 points     RCRI Interpretation: 0 points: Class I (very low risk - 0.4% complication rate)           Assessment & Plan   The proposed surgical procedure is considered HIGH risk.    Pre-op exam  - Basic metabolic panel  (Ca, Cl, CO2, Creat, Gluc, K, Na, BUN)  - Hemoglobin  Brain aneurysm  Atypical chest pain  Patient reports worsening chest pain and shortness of breath. EKG unremarkable. While this might be her ongoing breathing issues, recommend cardiology clearance before proceeding with elective surgery.   - EKG 12-lead complete w/read - Clinics  - XR Chest 2 Views; Future  - CARDIOLOGY EVAL ADULT REFERRAL; Future    Shortness of breath  As above  - EKG 12-lead complete w/read - Clinics  - XR Chest 2 Views; Future  - COVID-19 Virus (Coronavirus) Antibody & Titer Reflex  - CARDIOLOGY EVAL ADULT REFERRAL; Future    Need for prophylactic vaccination and inoculation against influenza  - INFLUENZA QUAD, RECOMBINANT, P-FREE (RIV4) (FLUBLOCK) [27773]  Possible Sleep Apnea: patient has sleep evaluation planned   No flowsheet data found.         Risks and Recommendations:  The patient has the following additional risks and recommendations for perioperative complications:   - Consult Hospitalist / IM to assist with post-op medical management   - Morbid obesity (BMI >40)  Cardiovascular:   - Cardiology consulted  Pulmonary:    - Incentive spirometry post-op   - Active nicotine user, advised smoking cessation  Obstructive Sleep Apnea:   Has sleep evaluation planned.   Social and Substance:    -  Patient is taking medications for chronic pain   - Active nicotine user, advised smoking cessation    Medication Instructions:  Patient is to take all scheduled medications on the day of surgery EXCEPT for modifications listed below:   Per neuroradiologist.    RECOMMENDATION:  Patient referred to cardiology for evaluation before surgery. Surgery approval pending completion of consultation.    Signed Electronically by: LEONA Marquez CNP    Copy of this evaluation report is provided to requesting physician.    This visit took place during the COVID 19 global pandemic.   PPE worn during the visit included: surgical mask and face shield by me and mask by patient     Preop [facility] [place] Preop Guidelines    Revised Cardiac Risk Index

## 2020-11-12 ENCOUNTER — TELEPHONE (OUTPATIENT)
Dept: FAMILY MEDICINE | Facility: CLINIC | Age: 60
End: 2020-11-12

## 2020-11-12 ENCOUNTER — VIRTUAL VISIT (OUTPATIENT)
Dept: BEHAVIORAL HEALTH | Facility: CLINIC | Age: 60
End: 2020-11-12
Payer: MEDICARE

## 2020-11-12 DIAGNOSIS — R69 DIAGNOSIS DEFERRED: Primary | ICD-10-CM

## 2020-11-12 LAB
COVID-19 SPIKE RBD ABY TITER: NORMAL
COVID-19 SPIKE RBD ABY: NEGATIVE

## 2020-11-12 NOTE — PROGRESS NOTES
Behavioral Health Home Services  [facility]: [place]        Social Work Care Navigator Note      Patient: Paula Ho  Date: November 12, 2020  Preferred Name: Paula    Previous PHQ-9:   PHQ-9 SCORE 8/12/2020 9/10/2020 10/30/2020   PHQ-9 Total Score 20 8 8     Previous LIBRA-7:   LIBRA-7 SCORE 8/12/2020 9/10/2020 10/30/2020   Total Score 16 5 7     ARNALDO LEVEL:  No flowsheet data found.    Preferred Contact:  Need for : No  Preferred Contact: Cell      Type of Contact Today: Phone call (patient / identified key support person reached)      Data: (subjective / Objective):  Recent ED/IP Admission or Discharge?   None    Patient Goals:  Goal Areas: Health;Mental Health;Financial and Social Service Benefits;Transportation  Patient stated goals: Patient would like assistance with her physical and mental health for creating a balanced and healthy lifestyle. Patient would like assistance with continued SSDI, CADI and social service assistance to aid with county benefits to increase her financial stability. Patient would like assistance with additional transportation services, such as [employer] for reliable transportation to get to her appointments, run errands and get out into the community.        [facility] Core Service Provided:  Comprehensive Care Management: utilized the electronic medical record / patient registry to identify and support patient's health conditions / needs more effectively   Care Transitions: focused on the coordinated and seamless movement of patient between or within different levels of care or settings  Care Coordination: provided care management services/referrals necessary to ensure patient and their identified supports have access to medical, behavioral health, pharmacology and recovery support services.  Ensured that patient's care is integrated across all settings and services.   Individual and Family Support: aimed to help clients reduce barriers to achieving goals, increase  health literacy and knowledge about chronic condition(s), increase self-efficacy skills, and improve health outcomes  Referral to Community and Social Support Services: Provided patient with referrals (see plan section)  Assisted in scheduling an appointment to a referral / service (see plan section)  Coordinated / Confirmed patient's appointment time or referral and transportation plan  Followed-up with patient on whether or not they completed a referral    Current Stressors / Issues / Care Plan Objective Addressed Today:  [facility] and patient able to meet today via telehealth visit for Behavioral Health Home ([facility]) monthly check-in.    1. Discussed patient's physical health symptoms have increased. Patient reports she has recently had more difficulty breathing and followed up with her PCP yesterday who referred her to radiology to complete echo next week. Patient reports she has not heard from home care for OT safety check and upper body strengthening. PCP placed order on 10/30/2020. [facility] was able to provide patient with [facility] phone number at [phone]. Patient reports she will call and follow up with referral.    Patient reports she is experiencing increasing back pain. Patient reports she is concerned her current pain medications are not affective. Patient reports she has been working with her pain clinic. [facility] provided support/empathy, patient self-advocacy, and coping skills.    Patient reports she has not picked up her blood pressure monitor and is wondering if this could be mailed. [facility] provided patient with phone number at [phone]. Patient reports she will call to follow-up with medical equipment.    2. Discussed current mental health. Patient reports due to pain she has been tired and listless, affecting her mood. Patient reports she recently had an appointment with psychiatric prescriber and her medications have been changed to help manage these symptoms. [facility] will continue to monitor and  discuss with patient. [facility] was able to schedule psychiatric follow-up appointment for patient today.  Patient reports she was able to connect with Behavioral Health Clinician Tamar Blakely for additional support.    3. Discussed PCA v CADI waiver. Patient reports she has not contacted [facility] at [phone] to discuss pros and cons of each service to decide which one she would like to receive services from. [facility] encouraged patient to reach out to , as they will close case after  days after referral has been placed. Patient reports understanding and will call [facility] [employer]  today.    4. Discussed financial and community/ resources. Patient reports she would like information for winter clothing/boots/coats and holiday resources. [facility] mailed out resources to patient today.      Intervention:  Motivational Interviewing: Expressed Empathy/Understanding, Supported Autonomy, Collaboration, Evocation, Permission to raise concern or advise, Open-ended questions, Reflections: simple and complex and Change talk (evoked)   Target Behavior(s): Explored thoughts about taking an anti-depressant, Explored and resolved challenges related to taking anti-depressants as prescribed, Explored thoughts and readiness to participate in individual therapy, Explored and resolved challenges to attending appointments as scheduled, Explored patient's knowledge of the impact of exercise on mood, Explored current exercise activities and thoughts about how this influences mood, Explored current social supports and reinforced opportunities to increase engagement and Explored current sleep hygeine and patient's perception and knowledge of relationship to mood    Assessment: (Progress on Goals / Homework):  Patient would benefit from continued coordination in reaching their goals set for the Behavioral Health Home ([facility]) program. [facility] reviewed Health Action Plan goals and will continue to  monitor progress and work with patient and their care team.      Plan: (Homework, other):  Patient was encouraged to continue to seek condition-related information and education.      Scheduled a Phone follow up appointment with MISA RICE in 3 weeks     Patient has set self-identified goals and will monitor progress until the next appointment on: 12/03/2020.         JASON Mann [facility]  Behavioral Health Home ([facility])   [facility]  [phone]  November 12, 2020  2:40 PM

## 2020-11-12 NOTE — TELEPHONE ENCOUNTER
I called Merary back. Because of jeffrey aneurysm, dobutamine stress test not appropriate.    I will refer to cardiology to be cleared before surgery due to chest pain and shortness of breath.

## 2020-11-12 NOTE — TELEPHONE ENCOUNTER
Merary from [facility]  Has questions regarding the stress test you ordered this patient    Call to discuss [phone]

## 2020-11-16 ENCOUNTER — TELEPHONE (OUTPATIENT)
Dept: FAMILY MEDICINE | Facility: CLINIC | Age: 60
End: 2020-11-16

## 2020-11-16 NOTE — TELEPHONE ENCOUNTER
COVID antibody test was negative. She has not had COVID.    Also, please remind her to schedule with cardiology because she needs to be cleared before her procedure this week

## 2020-11-16 NOTE — TELEPHONE ENCOUNTER
.Reason for call:  Results   Name of test or procedure: antibody test  Date of test or procedure: 11/12/2020  Location of test or procedure: [facility]    Additional comments: pt would like a call back with results    Phone number to reach patient:  Home number on file [phone] (home)    Best Time:  anytime    Can we leave a detailed message on this number?  YES    Travel screening: Negative

## 2020-11-16 NOTE — TELEPHONE ENCOUNTER
Patient requesting results for 11/11/20 COVID antibody that have not yet been interpreted yet.    Karishma Chan RN

## 2020-11-17 NOTE — TELEPHONE ENCOUNTER
PRIMARY CARE PHYSICIAN:  Kaia Villafana PA-C    CHIEF COMPLAINT:  Chest pain.    HISTORY OF PRESENT ILLNESS:  Patient is a 53-year-old male with past history   of CVA, seizure disorder, who came to the ER with above.  Per patient, he had   CVAs last year.  Since then, he has been having intermittent chest pain over   the past year.  At this time, he is having more chest pain that is why his   significant other brought him here for further checkup.  Also, patient was   having some tingling and numbness sensation over his left side of the body and   they wanted to rule out whether he is having another stroke.  Patient said he   has a sharp chest pain on the left side of the chest, worsened with cough,   nothing makes it better.  Pain does radiate to the arm, periodically   associated with tingling and numbness sensation.  Also, he was complaining   about some productive cough with yellowish sputum production.  Apart from   that, patient denied any abdominal pain, bowel or bladder problem.    REVIEW OF SYSTEMS:  All other systems were reviewed and negative, the rest by   HPI.    ALLERGY HISTORY:  HE ALLERGIC TO ASPIRIN AND NSAIDS.    PAST MEDICAL HISTORY:  History of cerebrovascular accident, essential   hypertension, seizure disorder, and asthma.    SOCIAL HISTORY:  The patient smokes half a pack a day and drinks a few beers a   day.  Denies illicit drug abuse.    OUTPATIENT MEDICATIONS:  Amlodipine 10 mg daily, trazodone 100 mg at bedtime,   hydralazine 50 mg b.i.d., metoprolol 25 mg daily, Keppra 500 mg b.i.d.,   mirtazapine 15 mg at bedtime, and lisinopril 20 mg daily.    FAMILY HISTORY:  No pertinent family history.    PHYSICAL EXAMINATION:  VITAL SIGNS:  Temperature 98.6, pulse rate 86, respiratory rate 18, blood   pressure 130/86, satting 94% on room air.  GENERAL:  Alert, communicative.  HEENT:  Normocephalic, atraumatic.  NECK:  Supple.  No neck gland enlargement.  CARDIOVASCULAR:  Normal first and second  Patient contacted and informed. She will call to schedule asap and R/S procedure since it is tomorrow.    Bala Garza CMA     sound.  No murmur.  RESPIRATORY:  Normal respiratory effort.  No wheezing.  ABDOMEN:  Soft, bowel sounds present, nontender.  CENTRAL NERVOUS SYSTEM:  Cranial nerves II-X intact.  No neurological deficit.  EXTREMITIES:  No edema, clubbing, or cyanosis.  PSYCHIATRIC:  Normal affect and mood.  Alert and oriented x4.  SKIN:  Warm and moist.    LABORATORY DATA:  WBC 6.3, hemoglobin 12.3, platelets 128.  Sodium 136,   potassium 3.8, BUN 6, creatinine 0.7, and troponin negative.    IMAGING:  CT scan of the head, no acute issue identified.  Chest x-ray, no   acute issue identified.    ASSESSMENT AND PLAN:  1.  Chest pain, pleuritic in nature.  Etiology from atypical pneumonia versus   pulmonary embolism versus musculoskeletal pain.  I will check D-dimer.  If   D-dimer is elevated, then I will go ahead and order CT/PE study to rule out   pulmonary embolism.  Also, I will treat the patient with IV azithromycin for   atypical pneumonia.  I will continue to check his troponins and get a stress   test for him in the morning since he never had stress test done in the past.  2.  Left-sided tingling and numbness sensation.  CT scan of the head is   negative.  MRI of the head is pending.  3.  Microcytic anemia, hemoglobin is stable.  Patient is on iron supplement.  4.  Hyperglycemia.  We will check A1c for him.  5.  Elevated liver enzymes, chronic, but compared to previous blood test has   been improving.  We will follow up with CMP in the morning.  6.  Deep venous thrombosis prophylaxis, heparin.  7.  Patient is a full code.  8.  Nicotine dependence: spent>10 minutes on smoking cessation education.       ____________________________________     MD NAYLA SANDHU / SUSY    DD:  05/16/2017 15:51:46  DT:  05/16/2017 18:16:40    D#:  [id number]  Job#:  [id number]

## 2020-11-20 ENCOUNTER — VIRTUAL VISIT (OUTPATIENT)
Dept: CARDIOLOGY | Facility: CLINIC | Age: 60
End: 2020-11-20
Payer: MEDICARE

## 2020-11-20 DIAGNOSIS — R07.1 CHEST PAIN ON BREATHING: Primary | ICD-10-CM

## 2020-11-20 PROCEDURE — 99203 OFFICE O/P NEW LOW 30 MIN: CPT | Mod: 95 | Performed by: INTERNAL MEDICINE

## 2020-11-20 NOTE — PATIENT INSTRUCTIONS
Thank you for considering participating in the [employer] research study.  My research coordinator, Bright will call you with details about the study.  In the end, we will plan on doing a test to look for heart vessel blockages, either with a CT scan or a stress test.
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
## 2020-12-03 ENCOUNTER — ALLIED HEALTH/NURSE VISIT (OUTPATIENT)
Dept: BEHAVIORAL HEALTH | Facility: CLINIC | Age: 60
End: 2020-12-03
Payer: MEDICARE

## 2020-12-03 DIAGNOSIS — R69 DIAGNOSIS DEFERRED: Primary | ICD-10-CM

## 2020-12-03 NOTE — PROGRESS NOTES
Behavioral Health Home Services  [facility]: [place]        Social Work Care Navigator Note      Patient: Paula Ho  Date: December 3, 2020  Preferred Name: Paula    Previous PHQ-9:   PHQ-9 SCORE 8/12/2020 9/10/2020 10/30/2020   PHQ-9 Total Score 20 8 8     Previous LIBRA-7:   LIBRA-7 SCORE 8/12/2020 9/10/2020 10/30/2020   Total Score 16 5 7     ARNALDO LEVEL:  No flowsheet data found.    Preferred Contact:  Need for : No  Preferred Contact: Cell      Type of Contact Today: Phone call (patient / identified key support person reached)      Data: (subjective / Objective):  Recent ED/IP Admission or Discharge?   None    Patient Goals:  Goal Areas: Health;Mental Health;Financial and Social Service Benefits;Transportation  Patient stated goals: Patient would like assistance with her physical and mental health for creating a balanced and healthy lifestyle. Patient would like assistance with continued SSDI, CADI and social service assistance to aid with county benefits to increase her financial stability. Patient would like assistance with additional transportation services, such as [employer] for reliable transportation to get to her appointments, run errands and get out into the community.        [facility] Core Service Provided:  Comprehensive Care Management: utilized the electronic medical record / patient registry to identify and support patient's health conditions / needs more effectively   Care Transitions: focused on the coordinated and seamless movement of patient between or within different levels of care or settings  Care Coordination: provided care management services/referrals necessary to ensure patient and their identified supports have access to medical, behavioral health, pharmacology and recovery support services.  Ensured that patient's care is integrated across all settings and services.   Individual and Family Support: aimed to help clients reduce barriers to achieving goals, increase  health literacy and knowledge about chronic condition(s), increase self-efficacy skills, and improve health outcomes  Referral to Community and Social Support Services: Followed-up with patient on whether or not they completed a referral    Current Stressors / Issues / Care Plan Objective Addressed Today:  [facility] and patient were able to meet today via telehealth visit for Behavioral Health Home ([facility]) monthly check-in.    1. Patient reports she has not received her blood pressure monitor and has not called  Home medical. [facility] placed call to [phone] and spoke with [place]. Intake reports they do have order but are waiting for Advanced Beneficiary notice from patient. [facility] had intake email form and will check in with patient at next visit and either help her fill it out or make sure she has returned this.    2. Patient reports she continues to experience pain and SOB in her chest. Patient reports she is following up with providers to help determine what may be causing this. Patient continues to work with pain clinic.    3. [facility] to check on  Home Care orderat [phone] for OT safety check and upper body strengthening.    4.  Discussed current mental health. Patient reports due to pain she has been tired and listless, affecting her mood. Patient reports she will be following up with her psychiatric prescriber and her medications later this month to help manage these symptoms. [facility] will continue to monitor and discuss with patient. [facility] to check to see if patient needing additional support of Behavioral Health Clinician appointment at next visit.    5. Patient reports she did connect with  [place] [facility]  [place] at [phone] to discuss pros and cons of each service and has decided she will not be applying for CADI services and is happy with currently level of PCA services and additional supports.    Intervention:  Motivational Interviewing: Expressed Empathy/Understanding, Supported Autonomy,  Collaboration, Evocation, Permission to raise concern or advise, Open-ended questions and Reflections: simple and complex   Target Behavior(s): Explored thoughts about taking an anti-depressant, Explored and resolved challenges related to taking anti-depressants as prescribed, Explored thoughts and readiness to participate in individual therapy, Explored and resolved challenges to attending appointments as scheduled, Explored current social supports and reinforced opportunities to increase engagement and Explored current sleep hygeine and patient's perception and knowledge of relationship to mood    Assessment: (Progress on Goals / Homework):  Patient would benefit from continued coordination in reaching their goals set for the Behavioral Health Home ([facility]) program. [facility] reviewed Health Action Plan goals and will continue to monitor progress and work with patient and their care team.      Plan: (Homework, other):  Patient was encouraged to continue to seek condition-related information and education.      Scheduled a Phone follow up appointment with MISA RICE in 2 weeks     Patient has set self-identified goals and will monitor progress until the next appointment on: 12/17/2020.         JASON Mann [facility]  Behavioral Health Home ([facility])   [facility]  [phone]

## 2020-12-08 ENCOUNTER — TELEPHONE (OUTPATIENT)
Dept: FAMILY MEDICINE | Facility: CLINIC | Age: 60
End: 2020-12-08

## 2020-12-08 NOTE — TELEPHONE ENCOUNTER
Reason for Call:  Other call back    Detailed comments: patient calling she states all her covid tests came back negative and she went and did the saliva testing and it came back positive on 12/6/2020 and she would like someone to call her to discuss how long she quarantines, if she has to get tested again.Please advise.Thank you     Phone Number Patient can be reached at: Cell number on file:    Telephone Information:   Mobile [phone]       Best Time: any    Can we leave a detailed message on this number? YES    Call taken on 12/8/2020 at 5:48 PM by Rebekah Issa

## 2020-12-09 ENCOUNTER — VIRTUAL VISIT (OUTPATIENT)
Dept: FAMILY MEDICINE | Facility: CLINIC | Age: 60
End: 2020-12-09
Payer: MEDICARE

## 2020-12-09 DIAGNOSIS — I20.89 STABLE ANGINA PECTORIS (H): Primary | ICD-10-CM

## 2020-12-09 DIAGNOSIS — U07.1 INFECTION DUE TO 2019 NOVEL CORONAVIRUS: Primary | ICD-10-CM

## 2020-12-09 PROCEDURE — 99442 PR PHYSICIAN TELEPHONE EVALUATION 11-20 MIN: CPT | Mod: 95 | Performed by: NURSE PRACTITIONER

## 2020-12-09 NOTE — PROGRESS NOTES
"Paula Ho is a 60 year old female who is being evaluated via a billable telephone visit.      The patient has been notified of following:     \"This telephone visit will be conducted via a call between you and your physician/provider. We have found that certain health care needs can be provided without the need for a physical exam.  This service lets us provide the care you need with a short phone conversation.  If a prescription is necessary we can send it directly to your pharmacy.  If lab work is needed we can place an order for that and you can then stop by our lab to have the test done at a later time.    Telephone visits are billed at different rates depending on your insurance coverage. During this emergency period, for some insurers they may be billed the same as an in-person visit.  Please reach out to your insurance provider with any questions.    If during the course of the call the physician/provider feels a telephone visit is not appropriate, you will not be charged for this service.\"    Patient has given verbal consent for Telephone visit?  Yes    What phone number would you like to be contacted at? See demographics    How would you like to obtain your AVS? MyChart    Subjective     Paula Ho is a 60 year old female who presents via phone visit today for the following health issues:    HPI     Pleasant 60 year old female initiated a virtual visit to discuss COVID test results  Had positive COVID test 2 days ago  For the most part she is feeling well  Nose stuffy at night  Normal taste and smell  Baseline shortness of breath  Normal taste and smell  She was exposed to landlord who was exposed to someone who was in her home with COVID           Review of Systems   Constitutional, HEENT, cardiovascular, pulmonary, gi and gu systems are negative, except as otherwise noted.       Objective          Vitals:  No vitals were obtained today due to virtual visit.    healthy, alert and no " "distress  PSYCH: Alert and oriented times 3; coherent speech, normal   rate and volume, able to articulate logical thoughts, able   to abstract reason, no tangential thoughts, no hallucinations   or delusions  Her affect is normal  RESP: No cough, no audible wheezing, able to talk in full sentences  Remainder of exam unable to be completed due to telephone visits            Assessment/Plan:    Assessment & Plan     Infection due to 2019 novel coronavirus  Symptoms are very mild right now and patient reports she's virtually asymptomatic - she has her baseline symptoms. I recommend fluids, rest, OTC medications and close follow up if worsening at all, especially given her history of pulmonary issues.        Tobacco Cessation:   reports that she has been smoking. She has been smoking about 0.50 packs per day. She has never used smokeless tobacco.        BMI:   Estimated body mass index is 44.68 kg/m  as calculated from the following:    Height as of 6/23/20: 1.676 m (5' 6\").    Weight as of 11/11/20: 125.6 kg (276 lb 12.8 oz).            See Patient Instructions    Return in about 1 week (around 12/16/2020), or if symptoms worsen or fail to improve.    LEONA Marquez CNP  [facility]    Phone call duration:  12:12 minutes (2:49-3:01)                "

## 2020-12-09 NOTE — TELEPHONE ENCOUNTER
Please call patient and assist her with scheduling virtual apt or tell her to submit Evisit please or she can visit [facility] or [facility] website for general answers to COVID questions.     Karishma Chan RN

## 2020-12-09 NOTE — TELEPHONE ENCOUNTER
Pt schedule virtual visit December 9, 2020 with Demetra Meyer CNP to discuss    ROSANNA Steiner.

## 2020-12-15 ENCOUNTER — TELEPHONE (OUTPATIENT)
Dept: CARDIOLOGY | Facility: CLINIC | Age: 60
End: 2020-12-15

## 2020-12-15 NOTE — TELEPHONE ENCOUNTER
1st attempt.    Left voicemail with number for [facility] to schedule CTA. Pt is aware they are not done at [place].    Mago Bullock  Medical Specialty Procedure   [facility]

## 2020-12-15 NOTE — LETTER
[facility] CARE COORDINATION  [address] [address]    November 12, 2019    Paula Ho  [address]   [facility] [address]      Dear Paula,    I am a clinic care coordinator who works with [facility] at the [facility]. Demetra  I have been trying to reach you recently to introduce Clinic Care Coordination and to see if there was anything I could assist you with.  I am covering for another , Lesley CHEEMA, today.  I wanted toprovide you with a contact number, so that you can call  with questions or concerns about your health care. Below is a description of clinic care coordination and how I can further assist you.     The clinic care coordinator is a registered nurse and/or  who understand the health care system. The goal of clinic care coordination is to help you manage your health and improve access to the [facility] in the most efficient manner. The registered nurse can assist you in meeting your health care goals by providing education, coordinating services, and strengthening the communication among your providers. The  can assist you with financial, behavioral, psychosocial, chemical dependency, counseling, and/or psychiatric resources.    Please feel free to contact me at ***, with any questions or concerns. We at [facility] are focused on providing you with the highest-quality healthcare experience possible and that all starts with you.     Sincerely,     DENI LIAO    Enclosed: {ccenclosed:[id number]}  
hip kit

## 2020-12-17 ENCOUNTER — ALLIED HEALTH/NURSE VISIT (OUTPATIENT)
Dept: BEHAVIORAL HEALTH | Facility: CLINIC | Age: 60
End: 2020-12-17
Payer: MEDICARE

## 2020-12-17 ENCOUNTER — TELEPHONE (OUTPATIENT)
Dept: BEHAVIORAL HEALTH | Facility: CLINIC | Age: 60
End: 2020-12-17

## 2020-12-17 DIAGNOSIS — R69 DIAGNOSIS DEFERRED: Primary | ICD-10-CM

## 2020-12-17 NOTE — PROGRESS NOTES
Behavioral Health Home Services  [facility]: [place]        Social Work Care Navigator Note      Patient: Paula Ho  Date: December 17, 2020  Preferred Name: Paula    Previous PHQ-9:   PHQ-9 SCORE 8/12/2020 9/10/2020 10/30/2020   PHQ-9 Total Score 20 8 8     Previous LIBRA-7:   LIBRA-7 SCORE 8/12/2020 9/10/2020 10/30/2020   Total Score 16 5 7     ARNALDO LEVEL:  No flowsheet data found.    Preferred Contact:  Need for : No  Preferred Contact: Cell      Type of Contact Today: Phone call (patient / identified key support person reached)      Data: (subjective / Objective):  Recent ED/IP Admission or Discharge?   None    Patient Goals:  Goal Areas: Health;Mental Health;Financial and Social Service Benefits;Transportation  Patient stated goals: Patient would like assistance with her physical and mental health for creating a balanced and healthy lifestyle. Patient would like assistance with continued SSDI, CADI and social service assistance to aid with county benefits to increase her financial stability. Patient would like assistance with additional transportation services, such as [employer] for reliable transportation to get to her appointments, run errands and get out into the community.        [facility] Core Service Provided:  Comprehensive Care Management: utilized the electronic medical record / patient registry to identify and support patient's health conditions / needs more effectively   Care Transitions: focused on the coordinated and seamless movement of patient between or within different levels of care or settings  Care Coordination: provided care management services/referrals necessary to ensure patient and their identified supports have access to medical, behavioral health, pharmacology and recovery support services.  Ensured that patient's care is integrated across all settings and services.   Individual and Family Support: aimed to help clients reduce barriers to achieving goals, increase  health literacy and knowledge about chronic condition(s), increase self-efficacy skills, and improve health outcomes  Referral to Community and Social Support Services: Provided patient with referrals (see plan section)  Assisted in scheduling an appointment to a referral / service (see plan section)  Followed-up with patient on whether or not they completed a referral    Current Stressors / Issues / Care Plan Objective Addressed Today:  [facility] and patient were able to meet today for telehealth visit and Behavioral Health Home ([facility]) monthly check-in.    1. Patient reports she has not received the form for blood pressure monitor and has not called [facility]. [facility] was able to mail out Advanced Beneficiary Notice for patient to sign and also sent pre-stamped/pre-addressed envelope for patient to mail form into [facility]. CC and patient will continue to monitor.     2. 2. Patient reports she continues to experience pain and SOB in her chest. Patient reports she was recently diagnosed with Covid and wonders if this is a symptom. [facility] encouraged patient to reach out to providers or go to the ED if symptoms worsen. Patient continues to following up with providers. Patient reports she needs to get a CTS scan, [facility] was able to provide patient with phone number for scheduling department today. Patient reports she will follow up with this.  Patient continues to work with pain clinic.    3. Patient reports her PCA has not been able to come over due to her current diagnosis of Covid. Patient reports her symptoms seem to be mild and reports her quarantine period is almost over. Patient reports no need for additional services right now but would like to start working with OT at home for upper body strengthening. [facility] was able to follow-up with OT order placed on 10/30 and OT intake reports for home services a new home care order will be needed. [facility] messaged provider.    4.  Discussed current mental health. Patient  reports due to pain she has been tired and listless, affecting her mood. Patient reports she will be following up with her psychiatric prescriber and her medications later this month to help manage these symptoms. [facility] will continue to monitor and discuss with patient.          Intervention:  Motivational Interviewing: Expressed Empathy/Understanding, Supported Autonomy, Collaboration, Evocation, Permission to raise concern or advise, Open-ended questions and Reflections: simple and complex   Target Behavior(s): Explored thoughts about taking an anti-depressant, Explored and resolved challenges related to taking anti-depressants as prescribed, Explored thoughts and readiness to participate in individual therapy, Explored and resolved challenges to attending appointments as scheduled, Explored patient's knowledge of the impact of exercise on mood, Explored current exercise activities and thoughts about how this influences mood, Explored current social supports and reinforced opportunities to increase engagement and Explored current sleep hygeine and patient's perception and knowledge of relationship to mood    Assessment: (Progress on Goals / Homework):  Patient would benefit from continued coordination in reaching their goals set for the Behavioral Health Home ([facility]) program. [facility] reviewed Health Action Plan goals and will continue to monitor progress and work with patient and their care team.      Plan: (Homework, other):  Patient was encouraged to continue to seek condition-related information and education.      Scheduled a Phone follow up appointment with MISA RICE in 3 weeks     Patient has set self-identified goals and will monitor progress until the next appointment on: 01/07/2021.        JASON Mann [facility]  Behavioral Health Home ([facility])   [facility]  [phone]

## 2020-12-17 NOTE — TELEPHONE ENCOUNTER
Behavioral Health Home Services  [facility]: [place]        Social Work Care Navigator Note      Patient: Paula Ho  Date: December 17, 2020  Preferred Name: Paula    Previous PHQ-9:   PHQ-9 SCORE 8/12/2020 9/10/2020 10/30/2020   PHQ-9 Total Score 20 8 8     Previous LIBRA-7:   LIBRA-7 SCORE 8/12/2020 9/10/2020 10/30/2020   Total Score 16 5 7     ARNALDO LEVEL:  No flowsheet data found.    Preferred Contact:  Need for : No  Preferred Contact: Cell      Type of Contact Today: Phone call (not reached/unavailable)      Data: (subjective / Objective):  Attempted to reach patient for [facility] [place] ([facility]) monthly check-in, but was unsuccessful, left message.  Plan to attempt again.     JASON Mann [facility]  [facility] [place] ([facility])   [facility]  [phone]  December 17, 2020  10:48 AM

## 2020-12-17 NOTE — Clinical Note
Anson Mccrary,    I was able to meet with this patient today and follow up with OT for referral. It looks like a new referral for [facility] for RN (medication and vitals) and OT safety eval/upper body strengthening would need to be placed.  Could you do this for her?    Thank you,  JASON Mann [facility]  Behavioral Health Home (formerly [facility])   [facility]  [phone]

## 2020-12-18 DIAGNOSIS — I67.1 BRAIN ANEURYSM: Primary | ICD-10-CM

## 2020-12-18 DIAGNOSIS — J44.9 CHRONIC OBSTRUCTIVE PULMONARY DISEASE, UNSPECIFIED COPD TYPE (H): ICD-10-CM

## 2020-12-21 DIAGNOSIS — G89.29 CHRONIC MIDLINE LOW BACK PAIN WITH BILATERAL SCIATICA: Chronic | ICD-10-CM

## 2020-12-21 DIAGNOSIS — M54.42 CHRONIC MIDLINE LOW BACK PAIN WITH BILATERAL SCIATICA: Chronic | ICD-10-CM

## 2020-12-21 DIAGNOSIS — M54.41 CHRONIC MIDLINE LOW BACK PAIN WITH BILATERAL SCIATICA: Chronic | ICD-10-CM

## 2020-12-21 RX ORDER — GUANFACINE 1 MG/1
1 TABLET ORAL AT BEDTIME
Qty: 30 TABLET | Refills: 0 | Status: SHIPPED | OUTPATIENT
Start: 2020-12-21 | End: 2020-12-22

## 2020-12-21 NOTE — TELEPHONE ENCOUNTER
Patient notified to schedule clinic follow up with Dr. De La Rosa for continued pain management and medication management.    Guanfacine refilled with no additional refills.

## 2020-12-22 ENCOUNTER — VIRTUAL VISIT (OUTPATIENT)
Dept: PSYCHOLOGY | Facility: CLINIC | Age: 60
End: 2020-12-22
Payer: MEDICARE

## 2020-12-22 DIAGNOSIS — F98.8 ATTENTION DEFICIT DISORDER, UNSPECIFIED HYPERACTIVITY PRESENCE: ICD-10-CM

## 2020-12-22 DIAGNOSIS — F33.2 SEVERE EPISODE OF RECURRENT MAJOR DEPRESSIVE DISORDER, WITHOUT PSYCHOTIC FEATURES (H): Primary | ICD-10-CM

## 2020-12-22 DIAGNOSIS — F41.1 GAD (GENERALIZED ANXIETY DISORDER): ICD-10-CM

## 2020-12-22 PROCEDURE — 99214 OFFICE O/P EST MOD 30 MIN: CPT | Mod: 95 | Performed by: NURSE PRACTITIONER

## 2020-12-22 RX ORDER — CLONIDINE HYDROCHLORIDE 0.1 MG/1
0.1 TABLET ORAL AT BEDTIME
Qty: 30 TABLET | Refills: 1 | Status: SHIPPED | OUTPATIENT
Start: 2020-12-22 | End: 2021-03-24

## 2020-12-22 RX ORDER — BUSPIRONE HYDROCHLORIDE 5 MG/1
5 TABLET ORAL 3 TIMES DAILY
Qty: 30 TABLET | Refills: 1 | Status: SHIPPED | OUTPATIENT
Start: 2020-12-22 | End: 2021-01-05

## 2020-12-22 RX ORDER — ATOMOXETINE 40 MG/1
40 CAPSULE ORAL DAILY
Qty: 30 CAPSULE | Refills: 1 | Status: SHIPPED | OUTPATIENT
Start: 2020-12-22 | End: 2021-02-23 | Stop reason: DRUGHIGH

## 2020-12-22 RX ORDER — FLUOXETINE 40 MG/1
40 CAPSULE ORAL 2 TIMES DAILY
Qty: 60 CAPSULE | Refills: 1 | Status: SHIPPED | OUTPATIENT
Start: 2020-12-22 | End: 2021-03-24

## 2020-12-22 RX ORDER — BUPROPION HYDROCHLORIDE 150 MG/1
150 TABLET ORAL EVERY MORNING
Qty: 150 TABLET | Refills: 1 | Status: SHIPPED | OUTPATIENT
Start: 2020-12-22 | End: 2021-01-21

## 2020-12-22 ASSESSMENT — ANXIETY QUESTIONNAIRES
GAD7 TOTAL SCORE: 15
2. NOT BEING ABLE TO STOP OR CONTROL WORRYING: NEARLY EVERY DAY
6. BECOMING EASILY ANNOYED OR IRRITABLE: NEARLY EVERY DAY
IF YOU CHECKED OFF ANY PROBLEMS ON THIS QUESTIONNAIRE, HOW DIFFICULT HAVE THESE PROBLEMS MADE IT FOR YOU TO DO YOUR WORK, TAKE CARE OF THINGS AT HOME, OR GET ALONG WITH OTHER PEOPLE: EXTREMELY DIFFICULT
5. BEING SO RESTLESS THAT IT IS HARD TO SIT STILL: NOT AT ALL
1. FEELING NERVOUS, ANXIOUS, OR ON EDGE: NEARLY EVERY DAY
3. WORRYING TOO MUCH ABOUT DIFFERENT THINGS: NEARLY EVERY DAY
7. FEELING AFRAID AS IF SOMETHING AWFUL MIGHT HAPPEN: NOT AT ALL

## 2020-12-22 ASSESSMENT — PATIENT HEALTH QUESTIONNAIRE - PHQ9
5. POOR APPETITE OR OVEREATING: NEARLY EVERY DAY
SUM OF ALL RESPONSES TO PHQ QUESTIONS 1-9: 17

## 2020-12-22 NOTE — PATIENT INSTRUCTIONS
1.  Add Wellbutrin  mg daily  2.  Decrease Buspirone to  5 mg twice  3.  Fluoxetine 40 mg twice daily  4.  Continue Clonidine 0.1  mg at bedtime   5.  Continue Atomoxetine 40 mg daily  6.  Take Clonazepam sparingly      Continue all other medications as reviewed per electronic medical record today.     Safety plan reviewed. To the Emergency Department as needed or call after hours crisis line at [phone] or [phone]. [place] Crisis Text Line. Text [place] to 439935 or Suicide LifeLine Chat: suicidePalm Commerce Information Technology.org/chat/    To schedule individual or family therapy, call [facility] at [phone].    Schedule an appointment with me in January or sooner as needed. Call [facility] at [phone] to schedule.    Follow up with primary care provider as planned or for acute medical concerns.    Call the psychiatric nurse line with medication questions or concerns at [phone].    [employer] may be used to communicate with your provider, but this is not intended to be used for emergencies.    Crisis Resources:    National Suicide Prevention Lifeline: 505.999.5773 (TTY: 342.876.3576). Call anytime for help.  (www.suicidepreventionlifeline.org)  National [place] on Mental Illness (www.raudel.org): 929.630.7659 or 403-027-0935.   Mental Health Association (www.mentalhealth.org): 680.250.5509 or 988-745-6018.  [place] Crisis Text Line: Text [place] to 175386  Suicide LifeLine Chat: suicidePalm Commerce Information Technology.org/chat

## 2020-12-22 NOTE — PROGRESS NOTES
"Paula Ho is a 60 year old female who is being evaluated via a billable telephone visit.      The patient has been notified of following:     \"This telephone visit will be conducted via a call between you and your physician/provider. We have found that certain health care needs can be provided without the need for a physical exam.  This service lets us provide the care you need with a short phone conversation.  If a prescription is necessary we can send it directly to your pharmacy.  If lab work is needed we can place an order for that and you can then stop by our lab to have the test done at a later time.    Telephone visits are billed at different rates depending on your insurance coverage. During this emergency period, for some insurers they may be billed the same as an in-person visit.  Please reach out to your insurance provider with any questions.    If during the course of the call the physician/provider feels a telephone visit is not appropriate, you will not be charged for this service.\"    Patient has given verbal consent for Telephone visit?  Yes    What phone number would you like to be contacted at? [phone]    How would you like to obtain your AVS? MyChart    Phone call duration: 30 minutes    Tigist Manjarrez NP          Outpatient Psychiatric Progress Note    Name: Paula Ho   : 1960                    Primary Care Provider: LEONA Marquez CNP   Therapist: None      PHQ-9 scores:  PHQ-9 SCORE 9/10/2020 10/30/2020 2020   PHQ-9 Total Score 8 8 17       LIBRA-7 scores:  LIBRA-7 SCORE 9/10/2020 10/30/2020 2020   Total Score 5 7 15       Patient Identification:    Patient is a 60 year old year old, single  White American female  who presents for return visit with me.  Patient is currently unemployed. Patient attended the session alone. Patient prefers to be called: \"Paula\".    Interim History:    I last saw Paula Ho for outpatient psychiatry Return " Visit on October 30, 2020.     During that appointment, she reported  ongoing depression and anxiety symptoms but they have been less intense since moving to her new apartment.  She continues to have trouble sleeping at night.  Paula is concerned about her limited focus and concentration to complete projects around her apartment.  To address all these concerns, she will continue fluoxetine 40 mg twice daily, buspirone 10 mg twice daily.  Strattera will be increased to 40 mg daily to with address attentional concerns.  Guanfacine has been discontinued at bedtime and instead she will take clonidine 0.1 mg tablet to try to help her relax and get better sleep.  Finally, I will connect with Tamar Blakely to see if she would be able to meet with Paula for a few sessions to help her develop coping skills to manage her anxiety and depression.  Chronic pain complicates this picture.. .     Current medications include:      albuterol (PROAIR HFA) 108 (90 Base) MCG/ACT inhaler, Inhale 2 puffs into the lungs every 4 hours as needed for shortness of breath / dyspnea or wheezing       albuterol (PROVENTIL) (2.5 MG/3ML) 0.083% neb solution, NEBULIZE THE CONTENTS OF 1 VIAL EVERY 6 HOURS AS NEEDED.       atomoxetine (STRATTERA) 40 MG capsule, Take 1 capsule (40 mg) by mouth daily       budesonide-formoterol (SYMBICORT) 160-4.5 MCG/ACT Inhaler, Inhale 2 puffs into the lungs 2 times daily       busPIRone (BUSPAR) 10 MG tablet, Take 1 tablet (10 mg) by mouth 2 times daily       cetirizine (ZYRTEC) 10 MG tablet, Take 1 tablet (10 mg) by mouth daily       clonazePAM (KLONOPIN) 0.5 MG tablet, Take 0.5 mg by mouth 2 times daily as needed       cloNIDine (CATAPRES) 0.1 MG tablet, Take 1 tablet (0.1 mg) by mouth At Bedtime       clopidogrel (PLAVIX) 75 MG tablet,        FLUoxetine (PROZAC) 40 MG capsule, Take 1 capsule (40 mg) by mouth 2 times daily       levothyroxine (SYNTHROID/LEVOTHROID) 25 MCG tablet, Take 25 mcg by mouth daily        losartan (COZAAR) 50 MG tablet, Take 50 mg by mouth daily       oxyCODONE (ROXICODONE) 5 MG tablet, Take 5 mg by mouth every 6 hours as needed       oxyCODONE IR (ROXICODONE) 15 MG tablet, Take 7.5 mg by mouth 2 times daily as needed       pravastatin (PRAVACHOL) 40 MG tablet, Take 40 mg by mouth daily       tiotropium (SPIRIVA HANDIHALER) 18 MCG inhaled capsule, Inhale 1 capsule (18 mcg) into the lungs daily       tiZANidine (ZANAFLEX) 2 MG tablet, TK 2 TO 3 TS PO QHS       topiramate (TOPAMAX) 100 MG tablet, Take 1 tablet (100 mg) by mouth 2 times daily       vitamin D3 (CHOLECALCIFEROL) 2000 units (50 mcg) tablet, Take 1 tablet by mouth daily       guanFACINE (TENEX) 1 MG tablet, Take 1 tablet (1 mg) by mouth At Bedtime (Patient not taking: Reported on 12/22/2020)       Multiple Vitamins-Minerals (MULTIVITAMIN ADULT PO),        NARCAN 4 MG/0.1ML nasal spray, INHALE VIA NASAL ROUTE FOR OVERDOSE AS NEEDED. MAY REPEAT AFTER 2-3 MINUTES       nicotine (NICODERM CQ) 21 MG/24HR 24 hr patch, Place 1 patch onto the skin every 24 hours (Patient not taking: Reported on 11/11/2020)       nicotine (NICOTROL) 10 MG inhaler, Use 1 cartridge as needed for urge to smoke by puffing over course of 20min.  Use 6-16 cart/day; reduce number of cart/day over 6-12 weeks. (Patient not taking: Reported on 11/11/2020)       order for DME, Equipment being ordered: Nebulizer    No current facility-administered medications on file prior to visit.        The [place] Prescription Monitoring Program has been reviewed and there are no concerns about diversionary activity for controlled substances at this time.      I was able to review most recent Primary Care Provider, specialty provider, and therapy visit notes that I have access to.     Today, patient reports that she is having times crying when thinking of deaths that have happened during this time.  She had tested positive for COVID and had to be under quarantined.  The [facility]  will check her lungs next Monday.  She has very little energy.  She has been sleeping a lot.  The past two days she has been crying.       has a past medical history of Acute respiratory failure (H) (02/01/2020).    Social history updates:    She has no friends and she  Does not stay in contact with her mother.      Substance use updates:    She denies alcohol use - she does not like using the cannabis vapor  Tobacco use: Yes Cigarettes  Ready to quit?  No  Nicotine Replacement Therapy tried: none     Vital Signs:   LMP  (LMP Unknown)     Labs:    Most recent laboratory results reviewed and no new labs.     Review of Systems:  10 systems (general, cardiovascular, respiratory, eyes, ENT, endocrine, GI, , M/S, neurological) were reviewed. Most pertinent finding(s) is/are: She reports chronic pain, no chest pain, no skin rashes. The remaining systems are all unremarkable.    Mental Status Examination:  Appearance:  awake, alert and Moderate distress  Attitude:  cooperative   Eye Contact:  Unable to assess  Gait and Station: No dizziness or falls  Psychomotor Behavior:  Unable to assess  Oriented to:  time, person, and place  Attention Span and Concentration:  Normal  Speech:   pressured speech and Speaks: English  Mood:  anxious and depressed  Affect:  intensity is heightened  Associations:  no loose associations  Thought Process:  linear  Thought Content:  passive suicidal ideation present, no auditory hallucinations present and no visual hallucinations present  Recent and Remote Memory:  intact Not formally assessed. No amnesia.  Fund of Knowledge: appropriate  Insight:  good  Judgment:  intact  Impulse Control:  intact    Suicide Risk Assessment:  Today Paula Ho reports that she is having some thoughts of not wanting to live. In addition, there are notable risk factors for self-harm, including single status, anxiety, suicidal ideation, purposelessness/no reason for living, hopelessness and mood change.  However, risk is mitigated by history of seeking help when needed, denies suicidal intent or plan and denies homicidal ideation, intent, or plan. Therefore, based on all available evidence including the factors cited above, Paula Ho does not appear to be at imminent risk for self-harm, does not meet criteria for a 72-hr hold, and therefore remains appropriate for ongoing outpatient level of care.  A thorough assessment of risk factors related to suicide and self-harm have been reviewed and are noted above. The patient convincingly denies suicidality on several occasions. Local community safety resources printed and reviewed for patient to use if needed. There was no deceit detected, and the patient presented in a manner that was believable.     DSM5 Diagnosis:  Attention-Deficit/Hyperactivity Disorder  314.01 (F90.8) Other Specified Attention-Deficit / Hyperactiity Disorder  296.33 (F33.2) Major Depressive Disorder, Recurrent Episode, Severe With mixed features  300.02 (F41.1) Generalized Anxiety Disorder    Medical comorbidities include:   Patient Active Problem List    Diagnosis Date Noted     Infection due to 2019 novel coronavirus 12/09/2020     Priority: Medium     Hyperlipidemia 04/14/2020     Priority: Medium     Benign essential hypertension 04/14/2020     Priority: Medium     MDD (major depressive disorder), recurrent severe, without psychosis (H) 03/02/2020     Priority: Medium     Chronic obstructive pulmonary disease, unspecified COPD type (H) 02/04/2020     Priority: Medium     No PFTS  In EPIC       Attention deficit hyperactivity disorder (ADHD) 01/31/2020     Priority: Medium     Chronic midline low back pain with bilateral sciatica 01/31/2020     Priority: Medium     Brain aneurysm 12/03/2019     Priority: Medium     Dec 2017  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 Device is MR compatible to 3 BREANN 3/2020       History of subarachnoid hemorrhage  12/22/2017     Priority: Medium     H/O of SAH, Cam 2, Hunt-Thomas 1, from a ruptured 9mm left Pcomm aneurysm with a wide neck and a fetal left PCA arising from the aneurysm neck, s/p successful coil embolization 12/23/2017 by Dr. Otto Hurley       Chronic GERD 01/26/2017     Priority: Medium     Chronic knee pain 12/12/2014     Priority: Medium     Cigarette smoker 07/08/2014     Priority: Medium     Chronic, continuous use of opioids 04/24/2013     Priority: Medium     Managed by pain clinic outside of [place].  UDS + cocaine in December 2019 without confirmation testing at her pain clinic per patient report       Obesity, Class III, BMI 40-49.9 (morbid obesity) (H) 03/27/2013     Priority: Medium     Status post knee surgery 03/27/2013     Priority: Medium     Per patient's recollection:  Circa 2002: right knee arthroscopy (Dr. Pappas)  Circa 2004: right knee partial knee replacement (Iam Ritchie MD)  Circa 2006: right TKA (Ron Ryder MD; [facility])  Circa 2008: right TKA revision due to infection (Ron Ryder MD; [facility])  Circa 2009: right TKA revision due to infection (Ron Ryder MD; [facility])  April 2011: right TKA (Ron Ryder MD; [facility])       LIBRA (generalized anxiety disorder) 09/28/2011     Priority: Medium     Chronic pain 09/28/2011     Priority: Medium     Knee and low back         DJD (degenerative joint disease) 09/28/2011     Priority: Medium     Insomnia 04/13/2011     Priority: Medium     Insomnia  NOS       Tobacco use disorder 07/31/2006     Priority: Medium     Asthma 11/15/2004     Priority: Medium     Asthma  NOS         Assessment:    Paula Ho presented today in great distress.  She is saddened by being alone during the holidays and feels rejected by her mother whom she gave $500.02.  Given her ongoing depression symptoms and low motivation and energy I added Wellbutrin  mg daily.  Buspirone is ineffective in managing  her anxiety and I decreased it to 5 mg twice daily.  She will continue with her fluoxetine 40 mg twice daily to combat depression and anxiety symptoms.  She is instructed to take her clonazepam sparingly and no prescriptions were written today.  Clonidine 0.1 mg at bedtime is ordered to help her sleep and decrease nightmares.  She will continue with atomoxetine 40 mg daily for attentional deficits and improve focus and concentration to manage things around her home.  Finally she will continue seeing social work through behavioral health home care to access community resources so as to be autonomous.      Medication side effects and alternatives were reviewed. Health promotion activities recommended and reviewed today. All questions addressed. Education and counseling completed regarding risks and benefits of medications and psychotherapy options.    Treatment Plan:      1.  Add Wellbutrin  mg daily  2.  Decrease Buspirone to  5 mg twice  daily  3.  Fluoxetine 40 mg twice daily  4.  Continue Clonidine 0.1  mg at bedtime   5.  Continue Atomoxetine 40 mg daily  6.  Take Clonazepam sparingly      Continue all other medications as reviewed per electronic medical record today.     Safety plan reviewed. To the Emergency Department as needed or call after hours crisis line at [phone] or [phone]. [place] Crisis Text Line. Text [place] to 636613 or Suicide LifeLine Chat: suicidepreventionlifeline.org/chat/    To schedule individual or family therapy, call [facility] at [phone].    Schedule an appointment with me in January or sooner as needed. Call [facility] at [phone] to schedule.    Follow up with primary care provider as planned or for acute medical concerns.    Call the psychiatric nurse line with medication questions or concerns at [phone].    MyChart may be used to communicate with your provider, but this is not intended to be used for emergencies.    Crisis  Resources:    National Suicide Prevention Lifeline: 454.627.1219 (TTY: 551.996.6369). Call anytime for help.  (www.suicidepreventionlifeline.org)  National [employer] Mental Illness (www.raudel.org): 526.266.9915 or 794-110-3078.   Mental Health Association (www.mentalhealth.org): 219.162.7927 or 017-653-2040.  [place] Crisis Text Line: Text [place] to 403746  Suicide LifeLine Chat: suicidepreBriefCam.org/chat    Administrative Billing:   Time spent with patient was 30 minutes and greater than 50% of time or 20 minutes was spent in counseling and coordination of care regarding above diagnoses and treatment plan.    Patient Status:  Patient will continue to be seen for ongoing consultation and stabilization.    Signed:   VIRGILIO Kidd-BC   Psychiatry

## 2020-12-23 ASSESSMENT — ANXIETY QUESTIONNAIRES: GAD7 TOTAL SCORE: 15

## 2020-12-24 ENCOUNTER — TELEPHONE (OUTPATIENT)
Dept: FAMILY MEDICINE | Facility: CLINIC | Age: 60
End: 2020-12-24

## 2020-12-24 NOTE — TELEPHONE ENCOUNTER
S: Referral for HC due to Covid  B: Hx of depression, anxiety  A: Call to pt who reports she will be out of town for the next few days, not homebound  R: Will cancel HC referral  Thank you  Gloria Cartwright RN BSN  [facility]  Clinical Coordinator  [phone]

## 2020-12-28 ENCOUNTER — HOSPITAL ENCOUNTER (OUTPATIENT)
Dept: CT IMAGING | Facility: CLINIC | Age: 60
Discharge: HOME | End: 2020-12-28
Attending: INTERNAL MEDICINE | Admitting: INTERNAL MEDICINE
Payer: MEDICARE

## 2020-12-28 VITALS
OXYGEN SATURATION: 96 % | DIASTOLIC BLOOD PRESSURE: 64 MMHG | RESPIRATION RATE: 20 BRPM | SYSTOLIC BLOOD PRESSURE: 107 MMHG | HEART RATE: 61 BPM

## 2020-12-28 DIAGNOSIS — I20.89 STABLE ANGINA PECTORIS (H): ICD-10-CM

## 2020-12-28 PROCEDURE — 75574 CT ANGIO HRT W/3D IMAGE: CPT

## 2020-12-28 PROCEDURE — 250N000013 HC RX MED GY IP 250 OP 250 PS 637: Mod: GY | Performed by: STUDENT IN AN ORGANIZED HEALTH CARE EDUCATION/TRAINING PROGRAM

## 2020-12-28 PROCEDURE — 250N000011 HC RX IP 250 OP 636: Performed by: STUDENT IN AN ORGANIZED HEALTH CARE EDUCATION/TRAINING PROGRAM

## 2020-12-28 PROCEDURE — 75574 CT ANGIO HRT W/3D IMAGE: CPT | Mod: 26 | Performed by: STUDENT IN AN ORGANIZED HEALTH CARE EDUCATION/TRAINING PROGRAM

## 2020-12-28 RX ORDER — NITROGLYCERIN 0.4 MG/1
.4-.8 TABLET SUBLINGUAL
Status: DISCONTINUED | OUTPATIENT
Start: 2020-12-28 | End: 2020-12-29 | Stop reason: HOSPADM

## 2020-12-28 RX ORDER — METOPROLOL TARTRATE 1 MG/ML
5-15 INJECTION, SOLUTION INTRAVENOUS
Status: DISCONTINUED | OUTPATIENT
Start: 2020-12-28 | End: 2020-12-29 | Stop reason: HOSPADM

## 2020-12-28 RX ORDER — ONDANSETRON 2 MG/ML
4 INJECTION INTRAMUSCULAR; INTRAVENOUS
Status: DISCONTINUED | OUTPATIENT
Start: 2020-12-28 | End: 2020-12-29 | Stop reason: HOSPADM

## 2020-12-28 RX ORDER — ACYCLOVIR 200 MG/1
0-1 CAPSULE ORAL
Status: DISCONTINUED | OUTPATIENT
Start: 2020-12-28 | End: 2020-12-29 | Stop reason: HOSPADM

## 2020-12-28 RX ORDER — DIPHENHYDRAMINE HYDROCHLORIDE 50 MG/ML
25-50 INJECTION INTRAMUSCULAR; INTRAVENOUS
Status: DISCONTINUED | OUTPATIENT
Start: 2020-12-28 | End: 2020-12-29 | Stop reason: HOSPADM

## 2020-12-28 RX ORDER — METOPROLOL TARTRATE 25 MG/1
25-100 TABLET, FILM COATED ORAL
Status: COMPLETED | OUTPATIENT
Start: 2020-12-28 | End: 2020-12-28

## 2020-12-28 RX ORDER — IVABRADINE 5 MG/1
5-15 TABLET, FILM COATED ORAL
Status: DISCONTINUED | OUTPATIENT
Start: 2020-12-28 | End: 2020-12-29 | Stop reason: HOSPADM

## 2020-12-28 RX ORDER — DIPHENHYDRAMINE HCL 25 MG
25 CAPSULE ORAL
Status: DISCONTINUED | OUTPATIENT
Start: 2020-12-28 | End: 2020-12-29 | Stop reason: HOSPADM

## 2020-12-28 RX ORDER — IOPAMIDOL 755 MG/ML
120 INJECTION, SOLUTION INTRAVASCULAR ONCE
Status: COMPLETED | OUTPATIENT
Start: 2020-12-28 | End: 2020-12-28

## 2020-12-28 RX ORDER — METHYLPREDNISOLONE SODIUM SUCCINATE 125 MG/2ML
125 INJECTION, POWDER, LYOPHILIZED, FOR SOLUTION INTRAMUSCULAR; INTRAVENOUS
Status: DISCONTINUED | OUTPATIENT
Start: 2020-12-28 | End: 2020-12-29 | Stop reason: HOSPADM

## 2020-12-28 RX ADMIN — NITROGLYCERIN 0.8 MG: 0.4 TABLET SUBLINGUAL at 09:48

## 2020-12-28 RX ADMIN — IOPAMIDOL 120 ML: 755 INJECTION, SOLUTION INTRAVENOUS at 09:41

## 2020-12-28 RX ADMIN — METOPROLOL TARTRATE 100 MG: 100 TABLET, FILM COATED ORAL at 08:24

## 2020-12-28 NOTE — PROGRESS NOTES
Pt arrived for Coronary CT angiogram. Test, meds and side effects reviewed with pt.  Resting HR  60 bpm. Given 100 mg PO Metoprolol per verbal order. Administered 0.8 mg SL nitro on CTA table per order. CTA completed.  Patient tolerated procedure well and denies symptoms of allergic reaction.  Post monitoring completed and VSS.  D/C instructions reviewed with pt whom verbalized understanding of need to increase PO fluids today. D/C to gold waiting room accompanied by staff.

## 2020-12-28 NOTE — RESULT ENCOUNTER NOTE
Your test result is normal. No change in plan. Follow up as recommended.     Dr Forte  Note to self - No CAC present.

## 2021-01-05 ENCOUNTER — OFFICE VISIT (OUTPATIENT)
Dept: FAMILY MEDICINE | Facility: CLINIC | Age: 61
End: 2021-01-05
Payer: MEDICARE

## 2021-01-05 VITALS
RESPIRATION RATE: 20 BRPM | SYSTOLIC BLOOD PRESSURE: 136 MMHG | BODY MASS INDEX: 44.68 KG/M2 | WEIGHT: 278 LBS | OXYGEN SATURATION: 95 % | HEIGHT: 66 IN | DIASTOLIC BLOOD PRESSURE: 82 MMHG | HEART RATE: 81 BPM | TEMPERATURE: 98.1 F

## 2021-01-05 DIAGNOSIS — I67.1 BRAIN ANEURYSM: ICD-10-CM

## 2021-01-05 DIAGNOSIS — Z01.818 PREOP GENERAL PHYSICAL EXAM: Primary | ICD-10-CM

## 2021-01-05 DIAGNOSIS — Z12.31 VISIT FOR SCREENING MAMMOGRAM: ICD-10-CM

## 2021-01-05 DIAGNOSIS — J06.9 UPPER RESPIRATORY TRACT INFECTION, UNSPECIFIED TYPE: ICD-10-CM

## 2021-01-05 PROCEDURE — 99214 OFFICE O/P EST MOD 30 MIN: CPT | Performed by: NURSE PRACTITIONER

## 2021-01-05 RX ORDER — AZITHROMYCIN 250 MG/1
TABLET, FILM COATED ORAL
Qty: 6 TABLET | Refills: 0 | Status: SHIPPED | OUTPATIENT
Start: 2021-01-05 | End: 2021-01-10

## 2021-01-05 ASSESSMENT — MIFFLIN-ST. JEOR: SCORE: 1852.75

## 2021-01-05 ASSESSMENT — PAIN SCALES - GENERAL: PAINLEVEL: NO PAIN (0)

## 2021-01-05 NOTE — PROGRESS NOTES
[address] [address]  Phone: [phone]  Primary Provider: Demetra Monsalve  Pre-op Performing Provider: DEMETRA MONSALVE    PREOPERATIVE EVALUATION:  Today's date: 1/5/2021    Paula Ho is a 60 year old female who presents for a preoperative evaluation.    Surgical Information:  Surgery/Procedure: repair brain aneurysm  Surgery Location: [facility]  Surgeon: unknown  Surgery Date: 01/12/21  Time of Surgery: 10:30am  Where patient plans to recover: At home with family  Fax number for surgical facility: TBD    Type of Anesthesia Anticipated: General    Subjective     HPI related to upcoming procedure: Patient with history of brain aneurysm which required intervention March 2020. She is unsure of procedure to be done next week which is to further evaluate aneurysm.    She had pre-op November 2020 for this procedure at which time she reported chest pain that moved around her chest and shortness of breath. She met with cardiology and had CTA angio coronary artery which was unremarkable and cardiology signed off.  Paula has COPD, smokes and had COVID infection early December. She also admits to vaping just prior to the above symptoms commencing.    Preop Questions 1/5/2021   1. Have you ever had a heart attack or stroke? No   2. Have you ever had surgery on your heart or blood vessels, such as a stent placement, a coronary artery bypass, or surgery on an artery in your head, neck, heart, or legs? YES - stent for brain aneurysm   3. Do you have chest pain with activity? YES - negative CTA coronary    4. Do you have a history of  heart failure? No   5. Do you currently have a cold, bronchitis or symptoms of other infection? No   6. Do you have a cough, shortness of breath, or wheezing? No   7. Do you or anyone in your family have previous history of blood clots? No   8. Do you or does anyone in your family have a serious bleeding problem such  as prolonged bleeding following surgeries or cuts? No   9. Have you ever had problems with anemia or been told to take iron pills? YES - past   10. Have you had any abnormal blood loss such as black, tarry or bloody stools, or abnormal vaginal bleeding? No   11. Have you ever had a blood transfusion? No   12. Are you willing to have a blood transfusion if it is medically needed before, during, or after your surgery? Yes   13. Have you or any of your relatives ever had problems with anesthesia? No   14. Do you have sleep apnea, excessive snoring or daytime drowsiness? YES - was supposed to do sleep study but on hold due to COVID   14a. Do you have a CPAP machine? No   15. Do you have any artifical heart valves or other implanted medical devices like a pacemaker, defibrillator, or continuous glucose monitor? No   16. Do you have artificial joints? YES - knee   17. Are you allergic to latex? No   18. Is there any chance that you may be pregnant? No       Health Care Directive:  Patient does not have a Health Care Directive or Living Will: Discussed advance care planning with patient; however, patient declined at this time.    Preoperative Review of :   reviewed - controlled substances reflected in medication list.        HPI related to upcoming procedure: history of brain aneurysm and recent MRI to check it revealed it is leaking per patient report        ASTHMA - Patient has a longstanding history of moderate-severe Asthma . Patient has been doing well overall noting NO SYMPTOMS RECENTLY and continues on medication regimen consisting of symbicort, Spiriva, albuterol without adverse reactions or side effects.      COPD - Patient has a longstanding history of moderate-severe COPD . Patient has been doing well overall noting NO SYMPTOMS RECENTLY and continues on medication regimen consisting of Symbicort, Spiriva, albuterol without adverse reactions or side effects.     DEPRESSION - Patient has a long history of  Depression of moderate severity requiring medication for control with recent symptoms being stable - in partial inpatient program. Current symptoms of depression include depressed mood.      HYPERLIPIDEMIA - Patient has a long history of significant Hyperlipidemia requiring medication for treatment with recent good control. Patient reports no problems or side effects with the medication.     Review of Systems except as noted above  CONSTITUTIONAL: NEGATIVE for fever, chills, change in weight  INTEGUMENTARY/SKIN: NEGATIVE for worrisome rashes, moles or lesions  EYES: NEGATIVE for vision changes or irritation  ENT/MOUTH: NEGATIVE for ear, mouth and throat problems  RESP: NEGATIVE for significant cough or SOB  BREAST: NEGATIVE for masses, tenderness or discharge  CV: NEGATIVE for chest pain, palpitations or peripheral edema  GI: NEGATIVE for nausea, abdominal pain, heartburn, or change in bowel habits  : NEGATIVE for frequency, dysuria, or hematuria  MUSCULOSKELETAL: NEGATIVE for significant arthralgias or myalgia  NEURO: NEGATIVE for weakness, dizziness or paresthesias  ENDOCRINE: NEGATIVE for temperature intolerance, skin/hair changes  HEME: NEGATIVE for bleeding problems  PSYCHIATRIC: NEGATIVE for changes in mood or affect    Patient Active Problem List    Diagnosis Date Noted     Infection due to 2019 novel coronavirus 12/09/2020     Priority: Medium     Hyperlipidemia 04/14/2020     Priority: Medium     Benign essential hypertension 04/14/2020     Priority: Medium     MDD (major depressive disorder), recurrent severe, without psychosis (H) 03/02/2020     Priority: Medium     Chronic obstructive pulmonary disease, unspecified COPD type (H) 02/04/2020     Priority: Medium     No PFTS  In EPIC       Attention deficit hyperactivity disorder (ADHD) 01/31/2020     Priority: Medium     Chronic midline low back pain with bilateral sciatica 01/31/2020     Priority: Medium     Brain aneurysm 12/03/2019     Priority:  Medium     Dec 2017  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 Device is MR compatible to 3 BREANN 3/2020       History of subarachnoid hemorrhage 12/22/2017     Priority: Medium     H/O of SAH, Cam 2, Hunt-Thomas 1, from a ruptured 9mm left Pcomm aneurysm with a wide neck and a fetal left PCA arising from the aneurysm neck, s/p successful coil embolization 12/23/2017 by Dr. Otto Hurley       Chronic GERD 01/26/2017     Priority: Medium     Chronic knee pain 12/12/2014     Priority: Medium     Cigarette smoker 07/08/2014     Priority: Medium     Chronic, continuous use of opioids 04/24/2013     Priority: Medium     Managed by pain clinic outside of [place].  UDS + cocaine in December 2019 without confirmation testing at her pain clinic per patient report       Obesity, Class III, BMI 40-49.9 (morbid obesity) (H) 03/27/2013     Priority: Medium     Status post knee surgery 03/27/2013     Priority: Medium     Per patient's recollection:  Circa 2002: right knee arthroscopy (Dr. Pappas)  Circa 2004: right knee partial knee replacement (Iam Ritchie MD)  Circa 2006: right TKA (Ron Ryder MD; [facility])  Circa 2008: right TKA revision due to infection (Ron Ryder MD; [facility])  Circa 2009: right TKA revision due to infection (Ron Ryder MD; [facility])  April 2011: right TKA (Ron Ryder MD; [facility])       LIBRA (generalized anxiety disorder) 09/28/2011     Priority: Medium     Chronic pain 09/28/2011     Priority: Medium     Knee and low back         DJD (degenerative joint disease) 09/28/2011     Priority: Medium     Insomnia 04/13/2011     Priority: Medium     Insomnia  NOS       Tobacco use disorder 07/31/2006     Priority: Medium     Asthma 11/15/2004     Priority: Medium     Asthma  NOS        Past Medical History:   Diagnosis Date     Acute respiratory failure (H) 02/01/2020    Diagnosed with influenza A on 2/1 and admitted to  ICU at [facility] on bipap. She went home AMA shortly thereafter.      Past Surgical History:   Procedure Laterality Date     APPENDECTOMY       ORTHOPEDIC SURGERY  2002    Circa 2002: right knee arthroscopy (Dr. Pappas)     ORTHOPEDIC SURGERY  2004    Circa 2004: right knee partial knee replacement (Iam Ritchie MD)     ORTHOPEDIC SURGERY  2006    Circa 2006: right TKA (Ron Ryder MD; [facility])     ORTHOPEDIC SURGERY  2008    Circa 2008: right TKA revision due to infection (Ron Ryder MD; [facility])     ORTHOPEDIC SURGERY  2009    Circa 2009: right TKA revision due to infection (Ron Ryder MD; [facility])     ORTHOPEDIC SURGERY  2011 April 2011: right TKA (Ron Ryder MD; [facility])     TONSILLECTOMY      tonsils     Current Outpatient Medications   Medication Sig Dispense Refill     albuterol (PROAIR HFA) 108 (90 Base) MCG/ACT inhaler Inhale 2 puffs into the lungs every 4 hours as needed for shortness of breath / dyspnea or wheezing 1 Inhaler 3     albuterol (PROVENTIL) (2.5 MG/3ML) 0.083% neb solution NEBULIZE THE CONTENTS OF 1 VIAL EVERY 6 HOURS AS NEEDED. 25 vial 1     atomoxetine (STRATTERA) 40 MG capsule Take 1 capsule (40 mg) by mouth daily 30 capsule 1     budesonide-formoterol (SYMBICORT) 160-4.5 MCG/ACT Inhaler Inhale 2 puffs into the lungs 2 times daily 3 Inhaler 3     buPROPion (WELLBUTRIN XL) 150 MG 24 hr tablet Take 1 tablet (150 mg) by mouth every morning 150 tablet 1     cetirizine (ZYRTEC) 10 MG tablet Take 1 tablet (10 mg) by mouth daily 14 tablet 1     clonazePAM (KLONOPIN) 0.5 MG tablet Take 0.5 mg by mouth 2 times daily as needed       cloNIDine (CATAPRES) 0.1 MG tablet Take 1 tablet (0.1 mg) by mouth At Bedtime 30 tablet 1     clopidogrel (PLAVIX) 75 MG tablet        FLUoxetine (PROZAC) 40 MG capsule Take 1 capsule (40 mg) by mouth 2 times daily 60 capsule 1     levothyroxine (SYNTHROID/LEVOTHROID) 25 MCG tablet Take 25 mcg by mouth daily   "     losartan (COZAAR) 50 MG tablet Take 50 mg by mouth daily       Multiple Vitamins-Minerals (MULTIVITAMIN ADULT PO)        NARCAN 4 MG/0.1ML nasal spray INHALE VIA NASAL ROUTE FOR OVERDOSE AS NEEDED. MAY REPEAT AFTER 2-3 MINUTES  0     order for DME Equipment being ordered: Nebulizer 1 Units 0     oxyCODONE (ROXICODONE) 5 MG tablet Take 5 mg by mouth every 6 hours as needed       oxyCODONE IR (ROXICODONE) 15 MG tablet Take 7.5 mg by mouth 2 times daily as needed       pravastatin (PRAVACHOL) 40 MG tablet Take 40 mg by mouth daily       tiotropium (SPIRIVA HANDIHALER) 18 MCG inhaled capsule Inhale 1 capsule (18 mcg) into the lungs daily 90 capsule 3     tiZANidine (ZANAFLEX) 2 MG tablet TK 2 TO 3 TS PO QHS  4     topiramate (TOPAMAX) 100 MG tablet Take 1 tablet (100 mg) by mouth 2 times daily 60 tablet 1     vitamin D3 (CHOLECALCIFEROL) 2000 units (50 mcg) tablet Take 1 tablet by mouth daily  1       Allergies   Allergen Reactions     Compazine [Prochlorperazine]      Droperidol      Lisinopril      Seroquel [Quetiapine]         Social History     Tobacco Use     Smoking status: Current Every Day Smoker     Packs/day: 0.50     Smokeless tobacco: Never Used   Substance Use Topics     Alcohol use: Not Currently     Family History   Problem Relation Age of Onset     Depression Mother      Substance Abuse Father      History   Drug Use No     Comment: remote history of recreational cocaine and marijuana use         Objective     /82 (BP Location: Right arm, Patient Position: Sitting, Cuff Size: Adult Large)   Pulse 81   Temp 98.1  F (36.7  C) (Oral)   Resp 20   Ht 1.676 m (5' 6\")   Wt 126.1 kg (278 lb)   LMP  (LMP Unknown)   SpO2 95%   BMI 44.87 kg/m      Physical Exam    GENERAL APPEARANCE: healthy, alert and no distress     EYES: EOMI, PERRL     HENT: ear canals and TM's normal and nose and mouth without ulcers or lesions     NECK: no adenopathy, no asymmetry, masses, or scars and thyroid normal to " palpation     RESP: lungs clear to auscultation - no rales, rhonchi or wheezes     CV: regular rates and rhythm, normal S1 S2, no S3 or S4 and no murmur, click or rub     ABDOMEN:  soft, nontender, no HSM or masses and bowel sounds normal     MS: extremities normal- no gross deformities noted, no evidence of inflammation in joints, FROM in all extremities.     SKIN: no suspicious lesions or rashes     NEURO: Normal strength and tone, sensory exam grossly normal, mentation intact and speech normal     PSYCH: mentation appears normal. and affect normal/bright     LYMPHATICS: No cervical adenopathy    Recent Labs   Lab Test 11/11/20  1226 03/04/20  1519 01/27/20  1440   HGB 14.3 13.4 14.0   PLT  --  317 320   INR  --  0.97 0.98    139 139   POTASSIUM 4.6 4.0 4.7   CR 0.63 0.59 0.55   Patient had EKG and labs last month and were per her baseline and unremarkable.     Diagnostics:  No labs were ordered during this visit.   No EKG this visit, completed in the last 90 days.    Revised Cardiac Risk Index (RCRI):  The patient has the following serious cardiovascular risks for perioperative complications:   - No serious cardiac risks = 0 points     RCRI Interpretation: 0 points: Class I (very low risk - 0.4% complication rate)           Assessment & Plan   The proposed surgical procedure is considered HIGH risk.    Paula was seen today for pre-op exam.    Diagnoses and all orders for this visit:    Preop general physical exam    Brain aneurysm    Upper respiratory tract infection, unspecified type  -     azithromycin (ZITHROMAX) 250 MG tablet; Take 2 tablets (500 mg) by mouth daily for 1 day, THEN 1 tablet (250 mg) daily for 4 days.    Visit for screening mammogram  -     *MA Screening Digital Bilateral; Future      Possible Sleep Apnea: patient has sleep evaluation planned   No flowsheet data found.     Risks and Recommendations:  The patient has the following additional risks and recommendations for perioperative  complications:   - Consult Hospitalist / IM to assist with post-op medical management   - Morbid obesity (BMI >40)  Cardiovascular:   - Cardiology consulted and signed off.   Pulmonary:    - Incentive spirometry post-op   - Active nicotine user, advised smoking cessation  Obstructive Sleep Apnea:   Has sleep evaluation planned.   Social and Substance:    - Patient is taking medications for chronic pain   - Active nicotine user, advised smoking cessation    Medication Instructions:  Patient is to take all scheduled medications on the day of surgery EXCEPT for modifications listed below:   Per neurosurgeon    RECOMMENDATION:  APPROVAL GIVEN to proceed with proposed procedure, without further diagnostic evaluation.    Signed Electronically by: LEONA Marquez CNP    Copy of this evaluation report is provided to requesting physician.    Preop [facility] Preop Guidelines    Revised Cardiac Risk Index

## 2021-01-05 NOTE — PATIENT INSTRUCTIONS
"To schedule your imaging exam at any of the [facility] imaging locations, please call [phone]    Patient Education    Preparing for Your Surgery  Getting started  A surgery nurse will call you to review your health history and instructions. They will give you an arrival time based on your scheduled surgery time.  Please be ready to share the following:    Your doctor's clinic name and phone number    Your medical, surgical and anesthesia history    A list of allergies and sensitivities    A list of medicines, including herbal treatments and over-the-counter drugs    Whether the patient has a legal guardian (ask how to send us the papers in advance)  If your child is having surgery, please ask for a copy of Preparing for Your Child's Surgery.    Preparing for surgery    Within 30 days of surgery: Have an exam at your family clinic (primary care clinic), or go to a pre-operative clinic. This exam is called a \"History and Physical,\" or H&P.    At your H&P exam, talk to your care team about all medicines you take. If you need to stop any medicines before surgery, ask when to start taking them again.  ? We do this for your safety. Many medicines can make you bleed too much during surgery. Some change how well surgery (anesthesia) drugs work.    Call your insurance company to see what it will and won't pay for. Ask if they need to pre-approve the surgery. (If no insurance, call [phone].)    Call your surgeon's clinic if there's any change in your health. This includes signs of a cold or flu (sore throat, runny nose, cough, rash, fever). It also includes a scrape or scratch near the surgery site.    If you have questions on the day of surgery, call your surgery center.  Eating and drinking guidelines  For your safety: Unless your surgeon tells you otherwise, follow the guidelines below.    Eat and drink as usual until 8 hours before surgery. After that, no food or milk.    Drink clear liquids until 2 hours " before surgery. These are liquids you can see through, like water, Gatorade and Propel Water. You may also have black coffee and tea (no cream or milk).    Nothing by mouth within 2 hours of surgery. This includes gum, candy and breath mints.    Stop alcohol the midnight before surgery.    If your family clinic tells you to take medicine on the morning of surgery, it's okay to take it with a sip of water.  Preventing infection    Shower or bathe the night before and morning of your surgery. Follow the instructions your clinic gave you. (If no instructions, use regular soap.)    Don't shave or clip hair near your surgery site. This can lead to skin infection.    Don't smoke the morning of surgery. Smoking increases the risk of infection. You may chew nicotine gum up to 2 hours before surgery. A nicotine patch is okay.  ? Note: Some surgeries require you to completely quit smoking and nicotine. Check with your surgeon.    Your care team will make every effort to keep you safe from infection. We will:  ? Clean our hands often with soap and water (or an alcohol-based hand rub).  ? Clean the skin at your surgery site with a special soap that kills germs. We'll also remove hair from the site as needed.  ? Wear special hair covers, masks, gowns and gloves during surgery.  ? Give antibiotic medicine, if prescribed. Not all surgeries need antibiotics.  What to bring on the day of surgery    Photo ID and insurance card    Copy of your health care directive, if you have one    Glasses and hearing aides (bring cases)  ? You can't wear contacts during surgery    Inhaler and eye drops, if you use them (tell us about these when you arrive)    CPAP machine or breathing device, if you use them    A few personal items, if spending the night    If you have . . .  ? A pacemaker or ICD (cardiac defibrillator): Bring the ID card.  ? An implanted stimulator: Bring the remote control.  ? A legal guardian: Bring a copy of the certified  (court-stamped) guardianship papers.  Please remove any jewelry, including body piercings. Leave jewelry and other valuables at home.  If you're going home the day of surgery  Important: If you don't follow the rules below, we must cancel your surgery.     Arrange for someone to drive you home after surgery. You may not drive, take a taxi or take public transportation by yourself (unless you'll have local anesthesia only).    Arrange for a responsible adult to stay with you overnight. If you don't, we may keep you in the hospital overnight, and you may need to pay the costs yourself.  Questions?   If you have any questions for your care team, list them here: _________________________________________________________________________________________________________________________________________________________________________________________________________________________________________________________________________________________________________________________  For informational purposes only. Not to replace the advice of your health care provider. Copyright   8240-2522 [employer]. All rights reserved. Clinically reviewed by Laya Reyez MD. [employer] [id number] - REV 07/19.

## 2021-01-07 ENCOUNTER — ALLIED HEALTH/NURSE VISIT (OUTPATIENT)
Dept: BEHAVIORAL HEALTH | Facility: CLINIC | Age: 61
End: 2021-01-07
Payer: MEDICARE

## 2021-01-07 DIAGNOSIS — R69 DIAGNOSIS DEFERRED: Primary | ICD-10-CM

## 2021-01-07 NOTE — PROGRESS NOTES
Behavioral Health Home Services  [facility]: [place]        Social Work Care Navigator Note      Patient: Paula Ho  Date: January 7, 2021  Preferred Name: Paula    Previous PHQ-9:   PHQ-9 SCORE 9/10/2020 10/30/2020 12/22/2020   PHQ-9 Total Score 8 8 17     Previous LIBRA-7:   LIBRA-7 SCORE 9/10/2020 10/30/2020 12/22/2020   Total Score 5 7 15     ARNALDO LEVEL:  No flowsheet data found.    Preferred Contact:  Need for : No  Preferred Contact: Cell      Type of Contact Today: Phone call (patient / identified key support person reached)      Data: (subjective / Objective):  Recent ED/IP Admission or Discharge?   None    Patient Goals:  Goal Areas: Health;Mental Health;Financial and Social Service Benefits;Transportation  Patient stated goals: Patient would like assistance with her physical and mental health for creating a balanced and healthy lifestyle. Patient would like assistance with continued SSDI, CADI and social service assistance to aid with county benefits to increase her financial stability. Patient would like assistance with additional transportation services, such as [employer] for reliable transportation to get to her appointments, run errands and get out into the community.        [facility] Core Service Provided:  Comprehensive Care Management: utilized the electronic medical record / patient registry to identify and support patient's health conditions / needs more effectively   Care Transitions: focused on the coordinated and seamless movement of patient between or within different levels of care or settings  Care Coordination: provided care management services/referrals necessary to ensure patient and their identified supports have access to medical, behavioral health, pharmacology and recovery support services.  Ensured that patient's care is integrated across all settings and services.   Individual and Family Support: aimed to help clients reduce barriers to achieving goals, increase  health literacy and knowledge about chronic condition(s), increase self-efficacy skills, and improve health outcomes  Referral to Community and Social Support Services: Provided patient with referrals (see plan section)  Coordinated / Confirmed patient's appointment time or referral and transportation plan  Followed-up with patient on whether or not they completed a referral    Current Stressors / Issues / Care Plan Objective Addressed Today:  SWCC and patient were able to meet today via telehealth visit for Behavioral Health Home ([facility]) monthly check-in.    1. Patient reports she has not received the form [facility] mailed out to her for blood pressure monitor and has not called [facility]. Patient and SWCC confirmed correct address listed. [facility] mailed out Advanced Beneficiary Notice again for patient to sign and also sent pre-stamped/pre-addressed envelope for patient to mail form into [facility]. SWCC and patient will continue to monitor.    2. Patient reports she continues to experience pain and SOB in her chest. CC encouraged patient to reach out to providers or go to the ED if symptoms worsen. Patient continues to following up with providers.   Patient reports she is scheduled for an upcoming pulmonary exam/imaging. Patient reports she will also be undergoing a follow up procedure for her aneurism at [facility] next Tues, Jan. 12th.     Patient reports she is recovering well from Covid and now has her daily PCA services back. Patient reports she was denied services for home care, as they told her she was not homebound, although she is certified disabled by the [facility]/state and receives benefits for this. Patient reports she found the home care rep rude and did not understand that she was indeed home but did not want services on [place] day. Patient reports if another home care referral needed to be placed she would like to try [facility] home care. Patient would still like OT at home for upper body strengthening.  Patient continues to work with pain clinic.    3. Discussed current mental health. Patient reports due to pain she has been tired and listless, affecting her mood. Patient reports psychiatric provider did increase her Wellbutrin in December, but is not noticing a change. Patient reports she will be following up with her psychiatric prescriber and her medications later this month to help manage these symptoms. [facility] will continue to monitor and discuss with patient.     Intervention:  Motivational Interviewing: Expressed Empathy/Understanding, Supported Autonomy, Collaboration, Evocation, Permission to raise concern or advise, Reflections: simple and complex and Change talk (evoked)   Target Behavior(s): Explored thoughts about taking an anti-depressant, Explored and resolved challenges related to taking anti-depressants as prescribed, Explored thoughts and readiness to participate in individual therapy, Explored and resolved challenges to attending appointments as scheduled, Explored patient's knowledge of the impact of exercise on mood, Explored current exercise activities and thoughts about how this influences mood, Explored current social supports and reinforced opportunities to increase engagement and Explored current sleep hygeine and patient's perception and knowledge of relationship to mood    Assessment: (Progress on Goals / Homework):  Patient would benefit from continued coordination in reaching their goals set for the Behavioral Health Home ([facility]) program. [facility] reviewed Health Action Plan goals and will continue to monitor progress and work with patient and their care team.      Plan: (Homework, other):  Patient was encouraged to continue to seek condition-related information and education.      Scheduled a Phone follow up appointment with [facility] in 2 weeks     Patient has set self-identified goals and will monitor progress until the next appointment on: 01/21/2021.        JASON Mann [facility]  Behavioral  Health [place] ([facility])   [facility]  [phone]

## 2021-01-11 NOTE — PROGRESS NOTES
Pre-op and labs printed and faxed to [facility] at fax # [phone].  Denzel Veloz,  For 1st Floor Primary Care

## 2021-01-14 ENCOUNTER — HEALTH MAINTENANCE LETTER (OUTPATIENT)
Age: 61
End: 2021-01-14

## 2021-01-20 ENCOUNTER — TELEPHONE (OUTPATIENT)
Dept: BEHAVIORAL HEALTH | Facility: CLINIC | Age: 61
End: 2021-01-20

## 2021-01-20 ENCOUNTER — ALLIED HEALTH/NURSE VISIT (OUTPATIENT)
Dept: BEHAVIORAL HEALTH | Facility: CLINIC | Age: 61
End: 2021-01-20
Payer: MEDICARE

## 2021-01-20 DIAGNOSIS — G89.29 CHRONIC MIDLINE LOW BACK PAIN WITH BILATERAL SCIATICA: ICD-10-CM

## 2021-01-20 DIAGNOSIS — G89.29 CHRONIC KNEE PAIN, UNSPECIFIED LATERALITY: ICD-10-CM

## 2021-01-20 DIAGNOSIS — M54.41 CHRONIC MIDLINE LOW BACK PAIN WITH BILATERAL SCIATICA: ICD-10-CM

## 2021-01-20 DIAGNOSIS — R69 DIAGNOSIS DEFERRED: Primary | ICD-10-CM

## 2021-01-20 DIAGNOSIS — M25.569 CHRONIC KNEE PAIN, UNSPECIFIED LATERALITY: ICD-10-CM

## 2021-01-20 DIAGNOSIS — M54.42 CHRONIC MIDLINE LOW BACK PAIN WITH BILATERAL SCIATICA: ICD-10-CM

## 2021-01-20 DIAGNOSIS — F11.90 CHRONIC, CONTINUOUS USE OF OPIOIDS: Primary | ICD-10-CM

## 2021-01-20 NOTE — Clinical Note
Anson Mccrary,    I spoke with this patient today. She was upset and crying reporting that her pain clinic would no longer provide services to her. Patient reports her labs at the pain clinic indicated cocaine use, patient denies this. Pain clinic recommending patient to go to detox and 30-day treatment program. Patient denies this and reports she does not want to do any of this, especially leaving her dogs.     Is it possible to put in a referral to one of our  Pain clinics for them to see her and have your team reach out to her to check in with her?    I also reached out to Behavioral Health Clinician Tamar Blakely and she will also be reaching out to the patient for additional support.    Let me know if you have additional questions or concerns.    Thank you,  JASON Mann [facility]  Behavioral Health Home ([facility])   [facility]  [phone]  January 20, 2021  2:38 PM

## 2021-01-20 NOTE — TELEPHONE ENCOUNTER
"Called patient  - per request -  regarding issues with pain clinic. Patient was \"let go\" because of drugs - cocaine in system. She was told to do inpatient tx and come back after that. Patient was tearful about losing her dogs if she goes to inpatient treatment. She does not believe it is needed. Discussed possibly switching to [facility] pain clinic and talking to PCP further about this.  Patient would like a note sent to PCP regarding this request. Patient is not suicidal at this time. Discussed coping behaviors she could do over the next few days to help herself feel better. Patient reported she will try to do this. She will go to ED if needed.  Scheduled an appt with this writer on Monday, 1/25/2021@ 230 pm.  Will continue to assess as needed.   Fang)Reddy,RASHEED,[facility]    "

## 2021-01-20 NOTE — PROGRESS NOTES
Behavioral Health Home Services  [facility]: [facility]        Social Work Care Navigator Note      Patient: Paula Ho  Date: January 20, 2021  Preferred Name: Paula    Previous PHQ-9:   PHQ-9 SCORE 9/10/2020 10/30/2020 12/22/2020   PHQ-9 Total Score 8 8 17     Previous LIBRA-7:   LIBRA-7 SCORE 9/10/2020 10/30/2020 12/22/2020   Total Score 5 7 15     ARNALDO LEVEL:  No flowsheet data found.    Preferred Contact:  Need for : No  Preferred Contact: Cell      Type of Contact Today: Phone call (patient / identified key support person reached)      Data: (subjective / Objective):  Recent ED/IP Admission or Discharge?   None    Patient Goals:  Goal Areas: Health;Mental Health;Financial and Social Service Benefits;Transportation  Patient stated goals: Patient would like assistance with her physical and mental health for creating a balanced and healthy lifestyle. Patient would like assistance with continued SSDI, CADI and social service assistance to aid with county benefits to increase her financial stability. Patient would like assistance with additional transportation services, such as [employer] for reliable transportation to get to her appointments, run errands and get out into the community.        [facility] Core Service Provided:  Comprehensive Care Management: utilized the electronic medical record / patient registry to identify and support patient's health conditions / needs more effectively   Care Transitions: focused on the coordinated and seamless movement of patient between or within different levels of care or settings  Care Coordination: provided care management services/referrals necessary to ensure patient and their identified supports have access to medical, behavioral health, pharmacology and recovery support services.  Ensured that patient's care is integrated across all settings and services.   Individual and Family Support: aimed to help clients reduce barriers to achieving goals, increase  "health literacy and knowledge about chronic condition(s), increase self-efficacy skills, and improve health outcomes  Referral to Community and Social Support Services: Provided patient with referrals (see plan section)  Assisted in scheduling an appointment to a referral / service (see plan section)  Followed-up with patient on whether or not they completed a referral    Current Stressors / Issues / Care Plan Objective Addressed Today:  CC received phone call from patient today. Patient upset and crying on the phone with SWCC today. Patient reports she is needing to find new pain clinic. SWCC and patient discussed concerns, provided support/empathy, reframed and provided coping skills. Patient reports she is feeling overwhelmed by stressors of having to find new pain clinic to help prescribe her medications. Patient reports she has thoughts of \"I am tired of being in pain.\" Patient reports no thoughts today to harm her self or others. Patient reports she would never harm herself as she has her dogs to care for and needs to be there for them. Patient does have PCA that comes in daily as well to help with daily ADLs.    CC messaged Behavioral Health Clinician Tamar Blakely to check in with patient today. Behavioral Health Clinician reports she will be able to call patient today to check in with her.     SWCC and patient discussed referral to [facility] pain clinic. Patient reports she is agreeable to this. SWCC messaged patient's PCP to place referral to pain clinic.    Intervention:  Motivational Interviewing: Expressed Empathy/Understanding, Supported Autonomy, Collaboration, Evocation, Permission to raise concern or advise, Open-ended questions, Reflections: simple and complex, Rolled with resistance: Emphasized patient autonomy, Simple reflection, Reframed sustain talk in the direction of change and Evoked patient agenda, Change talk (evoked) and Reframe   Target Behavior(s): Explored thoughts about " taking an anti-depressant, Explored and resolved challenges related to taking anti-depressants as prescribed, Explored thoughts and readiness to participate in individual therapy, Explored and resolved challenges to attending appointments as scheduled, Explored current social supports and reinforced opportunities to increase engagement and Explored patient's perception of how alcohol and / or drugs influences mood    Assessment: (Progress on Goals / Homework):  Patient would benefit from continued coordination in reaching their goals set for the Behavioral Health Home ([facility]) program. SWCC reviewed Health Action Plan goals and will continue to monitor progress and work with patient and their care team.      Plan: (Homework, other):  Patient was encouraged to continue to seek condition-related information and education.      Scheduled a Phone follow up appointment with MISA RICE in 1 week     Patient has set self-identified goals and will monitor progress until the next appointment on: 01/21/2021.         JASON Mann [facility]  Behavioral Health Home ([facility])   [facility]  [phone]  January 20, 2021  2:35 PM

## 2021-01-21 ENCOUNTER — ALLIED HEALTH/NURSE VISIT (OUTPATIENT)
Dept: BEHAVIORAL HEALTH | Facility: CLINIC | Age: 61
End: 2021-01-21
Payer: MEDICARE

## 2021-01-21 ENCOUNTER — TELEPHONE (OUTPATIENT)
Dept: ANESTHESIOLOGY | Facility: CLINIC | Age: 61
End: 2021-01-21

## 2021-01-21 ENCOUNTER — VIRTUAL VISIT (OUTPATIENT)
Dept: PSYCHOLOGY | Facility: CLINIC | Age: 61
End: 2021-01-21
Payer: MEDICARE

## 2021-01-21 DIAGNOSIS — F33.2 MDD (MAJOR DEPRESSIVE DISORDER), RECURRENT SEVERE, WITHOUT PSYCHOSIS (H): Primary | Chronic | ICD-10-CM

## 2021-01-21 DIAGNOSIS — F98.8 ATTENTION DEFICIT DISORDER, UNSPECIFIED HYPERACTIVITY PRESENCE: ICD-10-CM

## 2021-01-21 DIAGNOSIS — F41.1 GAD (GENERALIZED ANXIETY DISORDER): ICD-10-CM

## 2021-01-21 DIAGNOSIS — R69 DIAGNOSIS DEFERRED: Primary | ICD-10-CM

## 2021-01-21 PROCEDURE — 99214 OFFICE O/P EST MOD 30 MIN: CPT | Mod: 95 | Performed by: NURSE PRACTITIONER

## 2021-01-21 RX ORDER — MIRTAZAPINE 15 MG/1
15 TABLET, FILM COATED ORAL AT BEDTIME
Qty: 30 TABLET | Refills: 1 | Status: SHIPPED | OUTPATIENT
Start: 2021-01-21 | End: 2021-03-24 | Stop reason: SINTOL

## 2021-01-21 ASSESSMENT — ANXIETY QUESTIONNAIRES
IF YOU CHECKED OFF ANY PROBLEMS ON THIS QUESTIONNAIRE, HOW DIFFICULT HAVE THESE PROBLEMS MADE IT FOR YOU TO DO YOUR WORK, TAKE CARE OF THINGS AT HOME, OR GET ALONG WITH OTHER PEOPLE: EXTREMELY DIFFICULT
2. NOT BEING ABLE TO STOP OR CONTROL WORRYING: NEARLY EVERY DAY
GAD7 TOTAL SCORE: 18
7. FEELING AFRAID AS IF SOMETHING AWFUL MIGHT HAPPEN: NEARLY EVERY DAY
1. FEELING NERVOUS, ANXIOUS, OR ON EDGE: NEARLY EVERY DAY
5. BEING SO RESTLESS THAT IT IS HARD TO SIT STILL: NOT AT ALL
6. BECOMING EASILY ANNOYED OR IRRITABLE: NEARLY EVERY DAY
3. WORRYING TOO MUCH ABOUT DIFFERENT THINGS: NEARLY EVERY DAY

## 2021-01-21 ASSESSMENT — PATIENT HEALTH QUESTIONNAIRE - PHQ9
5. POOR APPETITE OR OVEREATING: NEARLY EVERY DAY
SUM OF ALL RESPONSES TO PHQ QUESTIONS 1-9: 19

## 2021-01-21 NOTE — PROGRESS NOTES
Behavioral Health Home Services  [facility]        Social Work Care Navigator Note      Patient: Paula Ho  Date: January 21, 2021  Preferred Name: Paula    Previous PHQ-9:   PHQ-9 SCORE 9/10/2020 10/30/2020 12/22/2020   PHQ-9 Total Score 8 8 17     Previous LIBRA-7:   LIBRA-7 SCORE 9/10/2020 10/30/2020 12/22/2020   Total Score 5 7 15     ARNALDO LEVEL:  No flowsheet data found.    Preferred Contact:  Need for : No  Preferred Contact: Cell      Type of Contact Today: Phone call (patient / identified key support person reached)      Data: (subjective / Objective):  Recent ED/IP Admission or Discharge?   None    Patient Goals:  Goal Areas: Health;Mental Health;Financial and Social Service Benefits;Transportation  Patient stated goals: Patient would like assistance with her physical and mental health for creating a balanced and healthy lifestyle. Patient would like assistance with continued SSDI, CADI and social service assistance to aid with county benefits to increase her financial stability. Patient would like assistance with additional transportation services, such as [employer] for reliable transportation to get to her appointments, run errands and get out into the community.        [facility] Core Service Provided:  Comprehensive Care Management: utilized the electronic medical record / patient registry to identify and support patient's health conditions / needs more effectively   Care Transitions: focused on the coordinated and seamless movement of patient between or within different levels of care or settings  Care Coordination: provided care management services/referrals necessary to ensure patient and their identified supports have access to medical, behavioral health, pharmacology and recovery support services.  Ensured that patient's care is integrated across all settings and services.   Individual and Family Support: aimed to help clients reduce barriers to achieving goals, increase  health literacy and knowledge about chronic condition(s), increase self-efficacy skills, and improve health outcomes  Referral to Community and Social Support Services: Provided patient with referrals (see plan section)  Coordinated / Confirmed patient's appointment time or referral and transportation plan  Followed-up with patient on whether or not they completed a referral    Current Stressors / Issues / Care Plan Objective Addressed Today:  SWCC and patient were able to meet today for Behavioral Health [place] ([facility]) monthly check-in via telehealth visit. All required ROIs have been filed with HIM/patient chart.    1. Patient able to connect with Tamar and now will be starting to see her on a more regular basis. Patient reports she found it very helpful and she felt supported while meeting with therapist yesterday and appreciates the additional support.    2. Struggling with her back pain - dropped by pain clinic, denies any drug use or treatment needed - PCP able to place referral to one of our  pain clinics, hopefully this will help everyone, as we would have access to provider notes. [facility] provided patient phone number to schedule with pain clinic, in case she misses call from scheduling.    3. She is looking into second opinion for her back pain. Patient reports she will be following up with providers for additional referrals. [facility] provided support/empathy and motivational interviewing today.    4. Not sleeping well due to back pain - investigating setting up appt with chiropractor once established with pain clinic, as she has found this beneficial in the past.    5. Mood - depressed and anxious. Reporting 3-4 panic attacks in the past two weeks. Patient reports she will be meeting with her psychiatry provider later today. Provider changed medications a bit, added additional medication to help with sleep.    6. Putting hold on OT referral until after est. w/chiropractic at pain clinic.    7. Patient finally  filled out paperwork for BP cuff. [facility] called  Home Medical at [phone], Telma (customer service), she reports she has not received Advanced Beneficiary Notice at this time. However, Telma was able to investigate a bit further and was able to dispense BP cuff today and it should be mailing out to patient within 5-7 business days.    8. Patient reports since her Covid restrictions her PCAs have changed a bit. Patient reports she now has two daily ([employer]). Patient reports they are a good support for her when she is feeling down to keep her company and help with her daily ADLs.    9. Patient reports her  follow up procedure for her aneurism at [facility] on Tues, Jan. 12th went well and the findings looked stable and her outlook good. Patient reports she will return to them in a year for annual check-in.    10. Patient reports her relationship with her mother has been strained in the past month or two. Patient reports she has been seeking support by talking daily to her brother on the phone.    11. Patient continues to report SOB and was recently placed on antibiotic. Patient reports small amount of improvement.      Intervention:  Motivational Interviewing: Expressed Empathy/Understanding, Supported Autonomy, Collaboration, Evocation, Permission to raise concern or advise, Open-ended questions, Reflections: simple and complex, Change talk (evoked) and Reframe   Target Behavior(s): Explored thoughts about taking an anti-depressant, Explored and resolved challenges related to taking anti-depressants as prescribed, Explored thoughts and readiness to participate in individual therapy, Explored and resolved challenges to attending appointments as scheduled, Explored patient's knowledge of the impact of exercise on mood, Explored current exercise activities and thoughts about how this influences mood, Explored current social supports and reinforced opportunities to increase engagement and Explored current sleep  ruba and patient's perception and knowledge of relationship to mood    Assessment: (Progress on Goals / Homework):  Patient would benefit from continued coordination in reaching their goals set for the Behavioral Health Home ([facility]) program. SWCC reviewed Health Action Plan goals and will continue to monitor progress and work with patient and their care team.      Plan: (Homework, other):  Patient was encouraged to continue to seek condition-related information and education.      Scheduled a Phone follow up appointment with MISA  in 2 weeks     Patient has set self-identified goals and will monitor progress until the next appointment on: 02/04/2021.         JASON Mann [facility]  Behavioral Health Home ([facility])   [facility]  [phone]                    Next 5 appointments (look out 90 days)    Jan 25, 2021  2:30 PM  Telephone Visit with RASHEED Maya  [facility] ([facility][note 3]) [address] [place] [address] [id number] [phone]

## 2021-01-21 NOTE — PROGRESS NOTES
"Paula is a 60 year old who is being evaluated via a billable telephone visit.      What phone number would you like to be contacted at? [phone]  How would you like to obtain your AVS? [facility]          Outpatient Psychiatric Progress Note    Name: Paula Ho   : 1960                    Primary Care Provider: LEONA Marquez CNP   Therapist: None     PHQ-9 scores:  PHQ-9 SCORE 10/30/2020 2020 2021   PHQ-9 Total Score 8 17 19       LIBRA-7 scores:  LIBRA-7 SCORE 10/30/2020 2020 2021   Total Score 7 15 18       Patient Identification:    Patient is a 60 year old year old, single  White American female  who presents for return visit with me.  Patient is currently unemployed. Patient attended the session alone. Patient prefers to be called: \"Paula\".    Interim History:    I last saw Paula Ho for outpatient psychiatry Return Visit on 2020.     During that appointment, she presented  in great distress.  She was saddened by being alone during the holidays and feels rejected by her mother whom she gave $500.02.  Given her ongoing depression symptoms and low motivation and energy I added Wellbutrin  mg daily.  Buspirone was ineffective in managing her anxiety and I decreased it to 5 mg twice daily.  She will continue with her fluoxetine 40 mg twice daily to combat depression and anxiety symptoms.  She is instructed to take her clonazepam sparingly and no prescriptions were written today.  Clonidine 0.1 mg at bedtime is ordered to help her sleep and decrease nightmares.  She will continue with atomoxetine 40 mg daily for attentional deficits and improve focus and concentration to manage things around her home.  Finally she will continue seeing social work through behavioral health home care to access community resources so as to be autonomous.      .     Current medications include:      albuterol (PROAIR HFA) 108 (90 Base) MCG/ACT inhaler, Inhale 2 puffs into " the lungs every 4 hours as needed for shortness of breath / dyspnea or wheezing       albuterol (PROVENTIL) (2.5 MG/3ML) 0.083% neb solution, NEBULIZE THE CONTENTS OF 1 VIAL EVERY 6 HOURS AS NEEDED.       atomoxetine (STRATTERA) 40 MG capsule, Take 1 capsule (40 mg) by mouth daily       budesonide-formoterol (SYMBICORT) 160-4.5 MCG/ACT Inhaler, Inhale 2 puffs into the lungs 2 times daily       buPROPion (WELLBUTRIN XL) 150 MG 24 hr tablet, Take 1 tablet (150 mg) by mouth every morning       cetirizine (ZYRTEC) 10 MG tablet, Take 1 tablet (10 mg) by mouth daily       clonazePAM (KLONOPIN) 0.5 MG tablet, Take 0.5 mg by mouth 2 times daily as needed       cloNIDine (CATAPRES) 0.1 MG tablet, Take 1 tablet (0.1 mg) by mouth At Bedtime       clopidogrel (PLAVIX) 75 MG tablet,        FLUoxetine (PROZAC) 40 MG capsule, Take 1 capsule (40 mg) by mouth 2 times daily       levothyroxine (SYNTHROID/LEVOTHROID) 25 MCG tablet, Take 25 mcg by mouth daily       losartan (COZAAR) 50 MG tablet, Take 50 mg by mouth daily       Multiple Vitamins-Minerals (MULTIVITAMIN ADULT PO),        order for DME, Equipment being ordered: Nebulizer       oxyCODONE (ROXICODONE) 5 MG tablet, Take 5 mg by mouth every 6 hours as needed       oxyCODONE IR (ROXICODONE) 15 MG tablet, Take 7.5 mg by mouth 2 times daily as needed       pravastatin (PRAVACHOL) 40 MG tablet, Take 40 mg by mouth daily       tiotropium (SPIRIVA HANDIHALER) 18 MCG inhaled capsule, Inhale 1 capsule (18 mcg) into the lungs daily       tiZANidine (ZANAFLEX) 2 MG tablet, TK 2 TO 3 TS PO QHS       topiramate (TOPAMAX) 100 MG tablet, Take 1 tablet (100 mg) by mouth 2 times daily       vitamin D3 (CHOLECALCIFEROL) 2000 units (50 mcg) tablet, Take 1 tablet by mouth daily       NARCAN 4 MG/0.1ML nasal spray, INHALE VIA NASAL ROUTE FOR OVERDOSE AS NEEDED. MAY REPEAT AFTER 2-3 MINUTES    No current facility-administered medications on file prior to visit.        The [place] Prescription  Monitoring Program has been reviewed and there are no concerns about diversionary activity for controlled substances at this time.      I was able to review most recent Primary Care Provider, specialty provider, and therapy visit notes that I have access to.     Today, patient reports that she is looking for a different pain doctor.  She had been accused of having cocaine in her system.  In visiting with her she was tearful off and on.  She takes clonazepam when she  Becomes so distressed and not every day.  The pain clinic wants her to go to detox and then to a 30 day program.  She is tired of being depressed.  Paula told me that she will not kill herself.  She wants her back to be better and does not want to take opioid medication.       has a past medical history of Acute respiratory failure (H) (02/01/2020).    Social history updates:    Paula lives alone.  She has financial stress and is unemployed.  Chronic and severe pain prohibits her from supporting herslef.    Substance use updates:    She does not drink alcohol  Tobacco use: Yes Cigarettes  Ready to quit?  No  Nicotine Replacement Therapy tried: None     Vital Signs:   LMP  (LMP Unknown)     Labs:    Most recent laboratory results reviewed and no new labs.     Review of Systems:  10 systems (general, cardiovascular, respiratory, eyes, ENT, endocrine, GI, , M/S, neurological) were reviewed. Most pertinent finding(s) is/are: Generalized chronic pain, no chest pain, some mild shortness of breath, no skin rashes. The remaining systems are all unremarkable.    Mental Status Examination:  Appearance:  awake, alert and Moderate distress  Attitude:  cooperative   Eye Contact:  unable to assess  Gait and Station: No dizziness or falls, Uses cane and Uses walker  Psychomotor Behavior:  unable to assess  Oriented to:  time, person, and place  Attention Span and Concentration:  Normal  Speech:   clear, coherent, pressured speech and Speaks: English  Mood:  anxious,  sad  and depressed  Affect:  intensity is heightened  Associations:  no loose associations  Thought Process:  tangental  Thought Content:  passive suicidal ideation present, no auditory hallucinations present and no visual hallucinations present  Recent and Remote Memory:  intact Not formally assessed. No amnesia.  Fund of Knowledge: appropriate  Insight:  good  Judgment:  intact  Impulse Control:  fair    Suicide Risk Assessment:  Today Paula Ho reports that she has intermittent thoughts of not wanting to live. In addition, there are notable risk factors for self-harm, including single status, anxiety, substance abuse, comorbid medical condition of chronic pain, purposelessness/no reason for living, hopelessness and mood change. However, risk is mitigated by history of seeking help when needed, denies suicidal intent or plan and denies homicidal ideation, intent, or plan. Therefore, based on all available evidence including the factors cited above, Paula Ho does not appear to be at imminent risk for self-harm, does not meet criteria for a 72-hr hold, and therefore remains appropriate for ongoing outpatient level of care.  A thorough assessment of risk factors related to suicide and self-harm have been reviewed and are noted above. The patient convincingly denies suicidality on several occasions. Local community safety resources printed and reviewed for patient to use if needed. There was no deceit detected, and the patient presented in a manner that was believable.     DSM5 Diagnosis:  Attention-Deficit/Hyperactivity Disorder  314.01 (F90.8) Other Specified Attention-Deficit / Hyperactiity Disorder  296.33 (F33.2) Major Depressive Disorder, Recurrent Episode, Severe With mixed features  300.02 (F41.1) Generalized Anxiety Disorder    Medical comorbidities include:   Patient Active Problem List    Diagnosis Date Noted     Infection due to 2019 novel coronavirus 12/09/2020     Priority: Medium      Hyperlipidemia 04/14/2020     Priority: Medium     Benign essential hypertension 04/14/2020     Priority: Medium     MDD (major depressive disorder), recurrent severe, without psychosis (H) 03/02/2020     Priority: Medium     Chronic obstructive pulmonary disease, unspecified COPD type (H) 02/04/2020     Priority: Medium     No PFTS  In EPIC       Attention deficit hyperactivity disorder (ADHD) 01/31/2020     Priority: Medium     Chronic midline low back pain with bilateral sciatica 01/31/2020     Priority: Medium     Brain aneurysm 12/03/2019     Priority: Medium     Dec 2017  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 Device is MR compatible to 3 BREANN 3/2020       History of subarachnoid hemorrhage 12/22/2017     Priority: Medium     H/O of SAH, Cam 2, Hunt-Thomas 1, from a ruptured 9mm left Pcomm aneurysm with a wide neck and a fetal left PCA arising from the aneurysm neck, s/p successful coil embolization 12/23/2017 by Dr. Otto Hurley       Chronic GERD 01/26/2017     Priority: Medium     Chronic knee pain 12/12/2014     Priority: Medium     Cigarette smoker 07/08/2014     Priority: Medium     Chronic, continuous use of opioids 04/24/2013     Priority: Medium     Managed by pain clinic outside of [place].  UDS + cocaine in December 2019 without confirmation testing at her pain clinic per patient report       Obesity, Class III, BMI 40-49.9 (morbid obesity) (H) 03/27/2013     Priority: Medium     Status post knee surgery 03/27/2013     Priority: Medium     Per patient's recollection:  Circa 2002: right knee arthroscopy (Dr. Pappas)  Circa 2004: right knee partial knee replacement (Iam Ritchie MD)  Circa 2006: right TKA (Ron Ryder MD; [facility])  Circa 2008: right TKA revision due to infection (Ron Ryder MD; [facility])  Circa 2009: right TKA revision due to infection (Ron Ryder MD; [facility])  April 2011: right TKA (Ron Ryder  MD; [facility])       LIBRA (generalized anxiety disorder) 09/28/2011     Priority: Medium     Chronic pain 09/28/2011     Priority: Medium     Knee and low back         DJD (degenerative joint disease) 09/28/2011     Priority: Medium     Insomnia 04/13/2011     Priority: Medium     Insomnia  NOS       Tobacco use disorder 07/31/2006     Priority: Medium     Asthma 11/15/2004     Priority: Medium     Asthma  NOS         Assessment:    Paula Ho presented tearful and in distress today as she has been trying to manage this pain she has been experiencing for quite some time.  She is in the process of looking for a new pain clinic as she maintains she is not using other substances when drug screens are conducted.  Her anxiety has escalated and therefore I will discontinue the Wellbutrin XL to not make things worse..  I added mirtazapine 15 mg at bedtime to try to help her sleep at night and to live some of her depression which continues.  He will continue the atomoxetine at 40 mg daily for attentional difficulties and will continue fluoxetine for her depression and anxiety symptoms at 40 mg twice daily.  I asked her to consider seeing Tamar Blakely for individual talk therapy given the large amount of life stressors she is experiencing.  She will continue to follow with behavioral health home care social work services to continue to access community supports as her situation changes.  Even though she presented hopeless, she told me she would not do anything to end her life.    Medication side effects and alternatives were reviewed. Health promotion activities recommended and reviewed today. All questions addressed. Education and counseling completed regarding risks and benefits of medications and psychotherapy options.    Treatment Plan:        1.  Discontinue Wellbutrin XL    2.  Add mirtazapine 15 mg at bedtime    3.  Continue atomoxetine 40 mg daily    4.  Continue fluoxetine 40 mg twice daily    5.   Take clonazepam sparingly    6.  Continue clonidine 0.1 mg at bedtime    7.  Continue behavior health home care services    8.  See Tamar Blakely for talk therapy when able to      Continue all other medications as reviewed per electronic medical record today.     Safety plan reviewed. To the Emergency Department as needed or call after hours crisis line at [phone] or [phone]. [place] Crisis Text [place]. Text [place] to 961209 or Suicide LifeLine Chat: suicideGoIP International.org/chat/    To schedule individual or family therapy, call [facility] at [phone].    Schedule an appointment with me in February or sooner as needed. Call [facility] at [phone] to schedule.    Follow up with primary care provider as planned or for acute medical concerns.    Call the psychiatric nurse line with medication questions or concerns at [phone].    MyChart may be used to communicate with your provider, but this is not intended to be used for emergencies.    Crisis Resources:    National Suicide Prevention Lifeline: 759.783.8160 (TTY: 590.140.5716). Call anytime for help.  (www.suicidepreventionlifeline.org)  National [place] on Mental Illness (www.raudel.org): 127.891.5403 or 342-620-8757.   Mental Health Association (www.mentalhealth.org): 974.379.3821 or 186-258-2365.  [place] Crisis Text [place]: Text [place] to 802889  Suicide LifeLine Chat: suicideGoIP International.org/chat    Administrative Billing:   Time spent with patient was 30 minutes and greater than 50% of time or 20 minutes was spent in counseling and coordination of care regarding above diagnoses and treatment plan.    Patient Status:  Patient will continue to be seen for ongoing consultation and stabilization.    Signed:   VIRGILIO Kidd-BC   Psychiatry

## 2021-01-21 NOTE — PATIENT INSTRUCTIONS
1.  Discontinue Wellbutrin XL    2.  Add mirtazapine 15 mg at bedtime    3.  Continue atomoxetine 40 mg daily    4.  Continue fluoxetine 40 mg twice daily    5.  Take clonazepam sparingly    6.  Continue clonidine 0.1 mg at bedtime    7.  Continue behavior health home care services    8.  See Tamar Blakely for talk therapy when able to      Continue all other medications as reviewed per electronic medical record today.     Safety plan reviewed. To the Emergency Department as needed or call after hours crisis line at [phone] or [phone]. [place] Crisis Text [place]. Text [place] to 000310 or Suicide LifeLine Chat: [employer].org/chat/    To schedule individual or family therapy, call [facility] at [phone].    Schedule an appointment with me in February or sooner as needed. Call [facility] at [phone] to schedule.    Follow up with primary care provider as planned or for acute medical concerns.    Call the psychiatric nurse line with medication questions or concerns at [phone].    Sliced Appleshart may be used to communicate with your provider, but this is not intended to be used for emergencies.    Crisis Resources:    National Suicide Prevention Lifeline: 581.275.3848 (TTY: 170.117.7188). Call anytime for help.  (www.suicidepreventionlifeline.org)  National [place] on Mental Illness (www.raudel.org): 743.152.2110 or 722-420-0851.   Mental Health Association (www.mentalhealth.org): 548.661.4771 or 589-740-2842.  [place] Crisis Text [place]: Text [place] to 057300  Suicide LifeLine Chat: [employer].org/chat

## 2021-01-21 NOTE — TELEPHONE ENCOUNTER
[facility] Call Center    Phone Message    May a detailed message be left on voicemail: yes     Reason for Call: Medication Question or concern regarding medication   Prescription Clarification  Name of Medication: oxyCODONE IR (ROXICODONE) 15 MG tablet  Prescribing Provider: Dr. De La Rosa   Pharmacy: N/A   What on the order needs clarification? Patient was wondering if Dr. De La Rosa would be able to fill this for her because patient will be out by 02/19 and her appointment is not until March.    Patient also wants to know if there's something she could sign for her medications so she only has to deal with the pain clinic. Please call patient to advise.. Thank you.           Action Taken: Message routed to:  [facility] Pain    Travel Screening: Not Applicable

## 2021-01-22 ASSESSMENT — ANXIETY QUESTIONNAIRES: GAD7 TOTAL SCORE: 18

## 2021-01-22 NOTE — TELEPHONE ENCOUNTER
I called and LVM for the pt informing her that we can not refill any medication for her since she is not an established patient with our clinic. I informed the pt that she would have to contact whoever is currently prescribing her medication for a refill.    If you have any further questions or concerns you can call [phone].    Rose Denson, CMA

## 2021-01-25 ENCOUNTER — VIRTUAL VISIT (OUTPATIENT)
Dept: BEHAVIORAL HEALTH | Facility: CLINIC | Age: 61
End: 2021-01-25
Payer: MEDICARE

## 2021-01-25 DIAGNOSIS — F33.2 MDD (MAJOR DEPRESSIVE DISORDER), RECURRENT SEVERE, WITHOUT PSYCHOSIS (H): Primary | ICD-10-CM

## 2021-01-25 DIAGNOSIS — F41.1 GAD (GENERALIZED ANXIETY DISORDER): ICD-10-CM

## 2021-01-25 PROCEDURE — 90832 PSYTX W PT 30 MINUTES: CPT | Mod: 95 | Performed by: SOCIAL WORKER

## 2021-01-25 NOTE — Clinical Note
Anson Escoto could you ask your MA to call [facility] and set up follow up appt with you? Thanks,   Tamar

## 2021-01-25 NOTE — PROGRESS NOTES
"Paula Ho is a 60 year old female who is being evaluated via a telephone visit.      The patient has been notified of the following:     \"We have found that certain health care needs can be provided without the need for a face to face visit.  This service lets us provide the care you need with a short phone conversation.      I will have full access to your [place] medical record during this entire phone call.   I will be taking notes for your medical record.     Since this is like an office visit, we will bill your insurance company for this service.  Please check with your medical insurance if this type of telephone visit/virtual care is covered.  You may be responsible for the cost of this service if insurance coverage is denied.      There are potential benefits and risks of telephone visits (e.g. limits to patient confidentiality) that differ from in-person visits.?  Confidentiality still applies for telephone services, and nobody will record the visit.  It is important to be in a quiet, private space that is free of distractions (including cell phone or other devices) during the visit.??     If during the course of the call I believe a telephone visit is not appropriate, you will not be charged for this service\"    Consent has been obtained for this service by care team member: yes.    Start time: 3 pm    End time: 325 pm    [facility]: Integrated Behavioral Health  January 25, 2021      Behavioral Health Clinician Progress Note    Patient Name: Paula Ho           Service Type:  Phone Visit      Service Location:   Phone call (patient / identified key support person reached)        Session Length: 16 - 37      Attendees: Patient    Visit Activities (Refresh list every visit): [facility] and [facility] Only    Diagnostic Assessment Date: 8/3/2020 by Tigist Manjarrez NP Collaborative Psychiatry  Treatment Plan Review Date: not completed  See Flowsheets for today's PHQ-9 and LIBRA-7 " results  Previous PHQ-9:   PHQ-9 SCORE 10/30/2020 12/22/2020 1/21/2021   PHQ-9 Total Score 8 17 19     Previous LIBRA-7:   LIBRA-7 SCORE 10/30/2020 12/22/2020 1/21/2021   Total Score 7 15 18       ARNALDO LEVEL:  No flowsheet data found.    DATA  Extended Session (60+ minutes): No  Interactive Complexity: No  Crisis: No  [facility] Patient: Yes, addressed the follow [facility] Core Component(s):                          Health and Wellness Promotion    Treatment Objective(s) Addressed in This Session:  Target Behavior(s): disease management/lifestyle changes decrease symptoms related to chronic pain    Depressed Mood: Increase interest, engagement, and pleasure in doing things  Decrease frequency and intensity of feeling down, depressed, hopeless  Improve quantity and quality of night time sleep / decrease daytime naps  Feel less tired and more energy during the day   Improve diet, appetite, mindful eating, and / or meal planning  Identify negative self-talk and behaviors: challenge core beliefs, myths, and actions  Improve concentration, focus, and mindfulness in daily activities   Feel less fidgety, restless or slow in daily activities / interpersonal interactions  Anxiety: will experience a reduction in anxiety, will develop more effective coping skills to manage anxiety symptoms, will develop healthy cognitive patterns and beliefs and will increase ability to function adaptively  Relationship Problems: will address relationship difficulties in a more adaptive manner  Functional Impairment: will effectively address problems that interfere with adaptive functioning  Psychological distress related to Pain    Current Stressors / Issues:  First session with patient. She reports she continues to be upset with past pain medication providers.  She also reports struggling with pain that affects her depression.  Discussed symptom management using DBT skills - distress tolerance. Patient will return in one week.       Progress on Treatment  Objective(s) / Homework:  none established    Motivational Interviewing    MI Intervention: Expressed Empathy/Understanding, Supported Autonomy, Collaboration, Evocation, Permission to raise concern or advise, Open-ended questions, Change talk (evoked) and Reframe     Change Talk Expressed by the Patient: Desire to change Need to change Committment to change    Provider Response to Change Talk: E - Evoked more info from patient about behavior change, A - Affirmed patient's thoughts, decisions, or attempts at behavior change, R - Reflected patient's change talk and S - Summarized patient's change talk statements      Care Plan review completed: No    Medication Review:  No changes to current psychiatric medication(s)    Medication Compliance:  Yes    Changes in Health Issues:   Yes: Pain, Associated Psychological Distress    Chemical Use Review:   Substance Use: Chemical use reviewed, no active concerns identified      Tobacco Use: Yes, first time assessed.  Patient reports frequency of use daily. Patient declined discussion at this time    Assessment: Current Emotional / Mental Status (status of significant symptoms):  Risk status (Self / Other harm or suicidal ideation)  Patient has had a history of suicidal ideation: since childhood and suicide attempts: patient reports history although does not want to discuss  Patient denies current fears or concerns for personal safety.  Patient denies current or recent suicidal ideation or behaviors.  Patient denies current or recent homicidal ideation or behaviors.  Patient denies current or recent self injurious behavior or ideation.  Patient denies other safety concerns.  A safety and risk management plan has not been developed at this time, however patient was encouraged to call [facility] should there be a change in any of these risk factors.    Appearance:   phone visit   Eye Contact:   phone visit   Psychomotor Behavior: phone visit   Attitude:   Cooperative   Interested Friendly Pleasant Attentive  Orientation:   All  Speech   Rate / Production: Normal    Volume:  Normal   Mood:    Anxious  Irritable  Normal Sad  Panicked  Affect:    phone visit   Thought Content:  Clear  Rumination   Thought Form:  Coherent  Logical   Insight:    adequate for safety during session    Diagnoses:  1. MDD (major depressive disorder), recurrent severe, without psychosis (H)    2. LIBRA (generalized anxiety disorder)        Collateral Reports Completed:  Routed note to PCP    Plan: (Homework, other):  Patient was given information about behavioral services and encouraged to schedule a follow up appointment with the clinic [facility] in 2 weeks.  She was also given deep Breathing Strategies to practice when experiencing anxiety and depression.  CD Recommendations: No indications of CD issues.  Ayala (Mindy),RASHEED,[facility]

## 2021-01-26 ENCOUNTER — DOCUMENTATION ONLY (OUTPATIENT)
Dept: BEHAVIORAL HEALTH | Facility: CLINIC | Age: 61
End: 2021-01-26

## 2021-01-26 NOTE — PROGRESS NOTES
DOCUMENTATION ONLY:  Upon patients enrollment into [facility] services, [facility] did not allow for the appropriate selection for [facility] location for utilization on the ERV dashboard. Updated [facility] brief needs flowsheet to reflect this on the new Power  dashboard.    JASON Mann [facility]  [facility] ([facility]   [facility] [place]  [phone]  January 26, 2021  11:51 AM

## 2021-02-01 ENCOUNTER — VIRTUAL VISIT (OUTPATIENT)
Dept: BEHAVIORAL HEALTH | Facility: CLINIC | Age: 61
End: 2021-02-01
Payer: MEDICARE

## 2021-02-01 DIAGNOSIS — F41.1 GAD (GENERALIZED ANXIETY DISORDER): ICD-10-CM

## 2021-02-01 DIAGNOSIS — F33.2 MDD (MAJOR DEPRESSIVE DISORDER), RECURRENT SEVERE, WITHOUT PSYCHOSIS (H): Primary | ICD-10-CM

## 2021-02-01 NOTE — PROGRESS NOTES
Called patient re:scheduled appt. She reported she was at the hospital as her niece was in an accident. She would like to reschedule appt for tomorrow @ 4 pm.   Ayala (Mindy)[facility]

## 2021-02-02 ENCOUNTER — VIRTUAL VISIT (OUTPATIENT)
Dept: BEHAVIORAL HEALTH | Facility: CLINIC | Age: 61
End: 2021-02-02
Payer: MEDICARE

## 2021-02-02 DIAGNOSIS — F33.2 MDD (MAJOR DEPRESSIVE DISORDER), RECURRENT SEVERE, WITHOUT PSYCHOSIS (H): Primary | ICD-10-CM

## 2021-02-02 DIAGNOSIS — F41.1 GAD (GENERALIZED ANXIETY DISORDER): ICD-10-CM

## 2021-02-02 PROCEDURE — 90832 PSYTX W PT 30 MINUTES: CPT | Mod: 95 | Performed by: SOCIAL WORKER

## 2021-02-02 NOTE — PROGRESS NOTES
"Paula Ho is a 60 year old female who is being evaluated via a telephone visit.      The patient has been notified of the following:     \"We have found that certain health care needs can be provided without the need for a face to face visit.  This service lets us provide the care you need with a short phone conversation.      I will have full access to your [place] medical record during this entire phone call.   I will be taking notes for your medical record.     Since this is like an office visit, we will bill your insurance company for this service.  Please check with your medical insurance if this type of telephone visit/virtual care is covered.  You may be responsible for the cost of this service if insurance coverage is denied.      There are potential benefits and risks of telephone visits (e.g. limits to patient confidentiality) that differ from in-person visits.?  Confidentiality still applies for telephone services, and nobody will record the visit.  It is important to be in a quiet, private space that is free of distractions (including cell phone or other devices) during the visit.??     If during the course of the call I believe a telephone visit is not appropriate, you will not be charged for this service\"    Consent has been obtained for this service by care team member: yes.    Start time: 4 pm    End time: 425  pm    [facility] Primary Care: Integrated Behavioral Health  February 2, 2021      Behavioral Health Clinician Progress Note    Patient Name: Paula Ho           Service Type:  Phone Visit      Service Location:   Phone call (patient / identified key support person reached)        Session Length: 16 - 37      Attendees: Patient    Visit Activities (Refresh list every visit): [facility] [place] [facility]    Diagnostic Assessment Date: 8/3/2020 by Tigist Manjarrez NP Collaborative Psychiatry  Treatment Plan Review Date: not completed  See Flowsheets for today's PHQ-9 and " LIBRA-7 results  Previous PHQ-9:   PHQ-9 SCORE 10/30/2020 12/22/2020 1/21/2021   PHQ-9 Total Score 8 17 19     Previous LIBRA-7:   LIBRA-7 SCORE 10/30/2020 12/22/2020 1/21/2021   Total Score 7 15 18       ARNALDO LEVEL:  No flowsheet data found.    DATA  Extended Session (60+ minutes): No  Interactive Complexity: No  Crisis: No  [facility] Patient: Yes, addressed the follow [facility] Core Component(s):                          Health and Wellness Promotion    Treatment Objective(s) Addressed in This Session:  Target Behavior(s): disease management/lifestyle changes decrease symptoms related to chronic pain    Depressed Mood: Increase interest, engagement, and pleasure in doing things  Decrease frequency and intensity of feeling down, depressed, hopeless  Improve quantity and quality of night time sleep / decrease daytime naps  Feel less tired and more energy during the day   Improve diet, appetite, mindful eating, and / or meal planning  Identify negative self-talk and behaviors: challenge core beliefs, myths, and actions  Improve concentration, focus, and mindfulness in daily activities   Feel less fidgety, restless or slow in daily activities / interpersonal interactions  Anxiety: will experience a reduction in anxiety, will develop more effective coping skills to manage anxiety symptoms, will develop healthy cognitive patterns and beliefs and will increase ability to function adaptively  Relationship Problems: will address relationship difficulties in a more adaptive manner  Functional Impairment: will effectively address problems that interfere with adaptive functioning  Psychological distress related to Pain    Current Stressors / Issues:  Patient reports symptoms continue.   She also reports struggling with pain that affects her depression. Discussed communication with family members.  Patient will return in two weeks.       Progress on Treatment Objective(s) / Homework:  none established    Motivational Interviewing    MI  Intervention: Expressed Empathy/Understanding, Supported Autonomy, Collaboration, Evocation, Permission to raise concern or advise, Open-ended questions, Change talk (evoked) and Reframe     Change Talk Expressed by the Patient: Desire to change Need to change Committment to change    Provider Response to Change Talk: E - Evoked more info from patient about behavior change, A - Affirmed patient's thoughts, decisions, or attempts at behavior change, R - Reflected patient's change talk and S - Summarized patient's change talk statements      Care Plan review completed: No    Medication Review:  No changes to current psychiatric medication(s)    Medication Compliance:  Yes    Changes in Health Issues:   Yes: Pain, Associated Psychological Distress    Chemical Use Review:   Substance Use: Chemical use reviewed, no active concerns identified      Tobacco Use: Yes, first time assessed.  Patient reports frequency of use daily. Patient declined discussion at this time    Assessment: Current Emotional / Mental Status (status of significant symptoms):  Risk status (Self / Other harm or suicidal ideation)  Patient has had a history of suicidal ideation: since childhood and suicide attempts: patient reports history although does not want to discuss  Patient denies current fears or concerns for personal safety.  Patient denies current or recent suicidal ideation or behaviors.  Patient denies current or recent homicidal ideation or behaviors.  Patient denies current or recent self injurious behavior or ideation.  Patient denies other safety concerns.  A safety and risk management plan has not been developed at this time, however patient was encouraged to call [facility] should there be a change in any of these risk factors.    Appearance:   phone visit   Eye Contact:   phone visit   Psychomotor Behavior: phone visit   Attitude:   Cooperative  Interested Friendly Pleasant Attentive  Orientation:   All  Speech   Rate /  Production: Normal    Volume:  Normal   Mood:    Anxious  Normal  Affect:    phone visit   Thought Content:  Clear  Rumination   Thought Form:  Coherent  Logical   Insight:    adequate for safety during session    Diagnoses:  1. MDD (major depressive disorder), recurrent severe, without psychosis (H)    2. LIBRA (generalized anxiety disorder)        Collateral Reports Completed:  Routed note to PCP    Plan: (Homework, other):  Patient was given information about behavioral services and encouraged to schedule a follow up appointment with the clinic [facility] in 2 weeks.  She was also given deep Breathing Strategies to practice when experiencing anxiety and depression.  CD Recommendations: No indications of CD issues.  RASHEED Ayala (Mindy),[facility]

## 2021-02-03 ENCOUNTER — ANCILLARY PROCEDURE (OUTPATIENT)
Dept: GENERAL RADIOLOGY | Facility: CLINIC | Age: 61
End: 2021-02-03
Attending: ANESTHESIOLOGY
Payer: MEDICARE

## 2021-02-03 ENCOUNTER — VIRTUAL VISIT (OUTPATIENT)
Dept: ANESTHESIOLOGY | Facility: CLINIC | Age: 61
End: 2021-02-03
Payer: MEDICARE

## 2021-02-03 VITALS — HEIGHT: 66 IN | BODY MASS INDEX: 45.8 KG/M2 | WEIGHT: 285 LBS

## 2021-02-03 DIAGNOSIS — G89.29 CHRONIC MIDLINE LOW BACK PAIN WITH BILATERAL SCIATICA: Primary | ICD-10-CM

## 2021-02-03 DIAGNOSIS — M54.41 CHRONIC MIDLINE LOW BACK PAIN WITH BILATERAL SCIATICA: Primary | ICD-10-CM

## 2021-02-03 DIAGNOSIS — F11.20 OPIOID DEPENDENCE, UNCOMPLICATED (H): ICD-10-CM

## 2021-02-03 DIAGNOSIS — F11.20 OPIOID DEPENDENCE, CONTINUOUS (H): ICD-10-CM

## 2021-02-03 DIAGNOSIS — M54.42 CHRONIC MIDLINE LOW BACK PAIN WITH BILATERAL SCIATICA: Primary | ICD-10-CM

## 2021-02-03 DIAGNOSIS — M54.42 CHRONIC MIDLINE LOW BACK PAIN WITH BILATERAL SCIATICA: Chronic | ICD-10-CM

## 2021-02-03 DIAGNOSIS — M54.41 CHRONIC MIDLINE LOW BACK PAIN WITH BILATERAL SCIATICA: Chronic | ICD-10-CM

## 2021-02-03 DIAGNOSIS — G89.29 CHRONIC MIDLINE LOW BACK PAIN WITH BILATERAL SCIATICA: Chronic | ICD-10-CM

## 2021-02-03 LAB

## 2021-02-03 PROCEDURE — 80306 DRUG TEST PRSMV INSTRMNT: CPT | Performed by: NURSE PRACTITIONER

## 2021-02-03 PROCEDURE — 72110 X-RAY EXAM L-2 SPINE 4/>VWS: CPT | Performed by: RADIOLOGY

## 2021-02-03 PROCEDURE — 99214 OFFICE O/P EST MOD 30 MIN: CPT | Mod: 95 | Performed by: STUDENT IN AN ORGANIZED HEALTH CARE EDUCATION/TRAINING PROGRAM

## 2021-02-03 ASSESSMENT — MIFFLIN-ST. JEOR: SCORE: 1884.5

## 2021-02-03 ASSESSMENT — PAIN SCALES - GENERAL: PAINLEVEL: MODERATE PAIN (4)
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
## 2021-02-03 NOTE — PATIENT INSTRUCTIONS
Imaging:    Complete xray previously ordered for you by Dr. Irizarry. You may visit any [facility]     IMAGING SERVICES HOURS:    All imaging modalities are available from 7 a.m. - 9 p.m. Monday through Friday  X-ray, CT, MRI, and General Ultrasound appointments are available from 7 a.m. -3:30 p.m. on Saturdays  X-ray, CT and MRI appointments are available from 8 a.m. - 4:30 p.m. on Sundays  Please call [phone] to schedule imaging exams        Treatment planning:    The providers will not take over opioid management, given the recent urine drug screen positive for cocaine.       Recommended Follow up:      As needed with Dr. Irizarry        Please call [phone], option #1 to schedule your clinic appointment if you don't already have an appointment scheduled.        To speak with a nurse, schedule/reschedule/cancel a clinic appointment, or request a medication refill call: [phone], option #1.    You can also reach us by [facility] EVAK[employer] ROSSII: [email or web address]

## 2021-02-03 NOTE — PROGRESS NOTES
Paula is a 60 year old who is being evaluated via a billable video visit.      How would you like to obtain your AVS? MyChart  If the video visit is dropped, the invitation should be resent by: Text to cell phone: [phone]  Will anyone else be joining your video visit? No          HPI     Review of Systems       Physical Exam

## 2021-02-04 ENCOUNTER — ALLIED HEALTH/NURSE VISIT (OUTPATIENT)
Dept: BEHAVIORAL HEALTH | Facility: CLINIC | Age: 61
End: 2021-02-04
Payer: MEDICARE

## 2021-02-04 DIAGNOSIS — R69 DIAGNOSIS DEFERRED: Primary | ICD-10-CM

## 2021-02-04 ASSESSMENT — ANXIETY QUESTIONNAIRES
2. NOT BEING ABLE TO STOP OR CONTROL WORRYING: NEARLY EVERY DAY
6. BECOMING EASILY ANNOYED OR IRRITABLE: NEARLY EVERY DAY
3. WORRYING TOO MUCH ABOUT DIFFERENT THINGS: SEVERAL DAYS
GAD7 TOTAL SCORE: 10
IF YOU CHECKED OFF ANY PROBLEMS ON THIS QUESTIONNAIRE, HOW DIFFICULT HAVE THESE PROBLEMS MADE IT FOR YOU TO DO YOUR WORK, TAKE CARE OF THINGS AT HOME, OR GET ALONG WITH OTHER PEOPLE: SOMEWHAT DIFFICULT
4. TROUBLE RELAXING: NOT AT ALL
7. FEELING AFRAID AS IF SOMETHING AWFUL MIGHT HAPPEN: NOT AT ALL
5. BEING SO RESTLESS THAT IT IS HARD TO SIT STILL: NOT AT ALL
1. FEELING NERVOUS, ANXIOUS, OR ON EDGE: NEARLY EVERY DAY

## 2021-02-04 ASSESSMENT — PATIENT HEALTH QUESTIONNAIRE - PHQ9: SUM OF ALL RESPONSES TO PHQ QUESTIONS 1-9: 19

## 2021-02-04 NOTE — LETTER
"Behavioral Health Home ([facility]): Health Action Plan  [facility]: [place]    [facility]      Name: Paula Ho  Preferred Name: Paula  : 1960  MRN: [id number]    2021    [facility]  [address] [place] [address]    Dear Paula,    I have enclosed the Health Action Plan (HAP) that we completed together that includes your goals for Behavioral Health Home ([facility]) Services. As you continue being enrolled in Behavioral Health Home ([facility]) services, I wanted to give you some information so you know what to expect moving forward.    What to Expect on a Monthly Basis: It is a requirement that I make contact with you on a monthly basis (by phone or meeting with you in clinic) to check in on how things are going and if you need any assistance. Please feel free to reach out to your [facility] team between these contacts if you need assistance or have any questions.    What to Expect every Six Months: Every six months, it is a requirement that we complete a Health Action Plan (HAP) review. During this in-clinic appointment, we will monitor our progress towards existing goals and set new goals for the next 6 month time period.    What kinds of support can [facility] offer now that I am enrolled?   - Housing Coordination  - Transportation Resources  - Financial Resources  - Coordination with the [facility] for Benefits (MA, SNAP benefits, etc)  - Disability Eligibility and Benefits  - Employment and Education Coordination  - Disability Related Information and Education Resources  - Referrals for mental health services, chemical dependency assessment/treatment, etc     If you or someone you know is experiencing a mental health crisis and you need help, the following crisis hotlines are available to help.    If you are in immediate danger, call 911.  Suicide Prevention Lifeline: 1-944-014-TALK (9964)  Crisis Text Line Service: Text \"[place]\" to 426185.    [facility] Emergency Department - Behavioral Emergency Center  [address] [place] [address]  [phone] [facility] - [employer] Crisis Response Services (Adults & Children)  [phone]   [facility] - Acute Psychiatric Services (APS)  Assessment & Referral: [phone]  Suicide Hotline: 579.745.5434 [facility] Crisis Team  [phone]   [facility] Adult Mental Health Services   [phone]  Referrals: [phone]  Crisis: [phone] [facility] [place] [facility] - Emergency Department  [address] [place] [address]  [phone]   [place] Link[employer]et  5-851-GwjvUzb ([phone]) [facility] Mental Health and Resource Crisis Line  [phone]   [facility] - Community Outreach for Psychiatric Emergencies  [phone] [facility] Crisis Services - [facility] Adult Mental Health Services - Crisis Services (24/7)  Information & Referrals: [phone]  Crisis Line: [phone]   [facility] Emergency Center (24/7)  [phone] TDD Crisis Help Line Serving [facility]  [phone]  or [facility]  to [phone]    [facility]  Mental Health  [address] [place] [address]  [phone]  [address] [place] [address]  [phone]  web: co.[facility].mn.  Mental-Health    [facility]  Mental Health  [address] [place] [address]  [phone]  web: [facility] Family [facility]  Family Services  [address] [address]  [phone]  web: [facility]  Family Services  [address] [place] [address]  [phone]  web: co.[facility].[place]./adult mental health    [facility] and [facility]  [address], [facility] [address], [place] [address]  [phone]  [address]  [place] [address]  [phone]  Toll Free: [phone]  web: [email or web address] and human services     Please let me know if you have any questions or if there is anything that we can assist with. I can be reached by phone, [email or web address] message, or by email. I look forward to continuing to work with you!    Sincerely,          JASON Mann [facility]  [facility]al [facility] [place] ([facility])   [phone]  [email or web address]

## 2021-02-04 NOTE — PROGRESS NOTES
Behavioral Health Home Services  [facility]: [place]        Social Work Care Navigator Note      Patient: Paula Ho  Date: February 4, 2021  Preferred Name: Paula    Previous PHQ-9:   PHQ-9 SCORE 10/30/2020 12/22/2020 1/21/2021   PHQ-9 Total Score 8 17 19     Previous LIBRA-7:   LIBRA-7 SCORE 10/30/2020 12/22/2020 1/21/2021   Total Score 7 15 18     ARNALDO LEVEL:  No flowsheet data found.    Preferred Contact:  Need for : No  Preferred Contact: Cell      Type of Contact Today: Phone call (patient / identified key support person reached)      Data: (subjective / Objective):    Patient came in to complete the comprehensive wellness assessment for Behavioral Health Home Services.  See [facility] Flowsheets for details on the assessment.  See [facility], Behavioral [facility] for a copy of the patient's care plan.    Patient stated Goal Areas:  Health;Mental Health;Financial and Social Service Benefits;Transportation     Patient stated goals:  Patient would like assistance with her physical and mental health for creating a balanced and healthy lifestyle. Patient would like assistance with continued SSDI and social service assistance to aid with county benefits to increase her financial stability. Patient would like assistance with additional transportation services, such as [employer] for reliable transportation to get to her appointments, run errands and get out into the community.      Patient would like assistance with her physical and mental health for creating a balanced and healthy lifestyle. Patient states she has a lot of appointments and sometimes has difficulty keeping track of all of them. Patient states she does have a PCA that comes in every day and is approved for 11.5 hours per day. Patient states she now has a psychiatric prescriber. Patient reports she meets with Behavioral Health Clinician 1-2 times per month.     Patient reports she is looking for a new pain clinic, as her previous clinic  discharged her due to UA coming back positive. Patient reports she is trying to see if  pain clinic will work with her. [facility] and patient to monitor and provide support and resources as requested/needed.    Patient reports she is tired of living with current back pain and would like second opinion from [facility] [employer] at [phone].    Address:  [address] [place] [address] to messaged provider.     Patient states she used to have ARMHS services and would like to again. [facility] placed referral with [employer] for ARMHS program. Per patient we are putting this request on hold until after Covid, as patient is nervous to have someone come out to her home at this time.     Patient states she has been diagnosed with COPD and is working with pulmonology. Patient states she has started working with their tobacco treatment program but would like additional resources. [facility] mailed patient [employer] resources and provided patient with support/empathy and motivational interviewing. Patient continues to work on this. [facility] to [facility] and follow-up with patient.     Patient reports she needs a new walker and requests assistance from [facility]. Patient reports it has been over six years since she got a new walker. [facility] messaged patient's provider to place DME order with [facility].     Patient reports she finally received BP monitor and cuff from [facility].    Patient states appreciation for extra case management support from [facility] bi-monthly.        Patient would like assistance with continued SSDI, CADI and social service assistance to aid with [employer] benefits to increase her financial stability. Patient states she currently receives SSDI and social security benefits, but the paperwork can be confusing at times and she often has questions. [facility] and patient will continue to meet bi-monthly to go over paperwork and address any patient  questions or concerns.      Patient is wanting to see if she would qualify for CADI services, as she is needing equipment for her COPD such as, air purifier, vacuum with Hepa filter, and a new mattress pad. [facility] placed referral to [facility]. [facility] will follow-up with patient to monitor when assessment is scheduled. [facility] recommended that patient's PCA Sharla attend assessment for additional support and insight into the patients health and mental health needs for CADI services. Patient did speak with CADI intake and at this time has decided not to move forward with CADI services, as she is concerned about impacting her daily hours for PCA services.     Patient has received section-8 voucher to be able to move into a two bedroom apartment. Patient has been looking and may have found apartment. Patient requesting information on movers. [facility] mailed resources to patient. - This goal is completed, patient has now moved into two bedroom apartment.     Patient states she is interested in receiving Meals on Wheels. [facility] placed referral with agency. [facility] update - patient unable to access these services until she is on a CADI waver. [facility] has informed patient of this and patient reports understanding.        Patient would like assistance with additional transportation services, such as [employer] for reliable transportation to get to her appointments, run errands and get out into the community. Patient states she does have her own vehicle but often finds it difficult to drive do to her chronic health conditions. [facility] mailed out form for [employer] to patient. [facility] educated patient that she would need to make an appointment with her PCP to complete form and then mail it in to [employer]. Patient states understanding. [facility] to monitor and check in with patient about progress with this goal. [facility] mailed out low cost maintenance and auto repair resources. Patient reports she did receive  resources but never followed up with [employer].  [facility] mailed out application again and will follow-up with patient.     Patient stated strengths:     Patients states her strengths are the support of her dogs, good advocate for herself, support of good friends, and care for others. Patient states she has a big heart.      JASON Mann LGSW Behavioral Health [place] [facility]   [facility]  [phone]      ADDENDUM:    Health Action Plan approved by Behavioral Health Clinician 2/8/2021. [facility] sent patient a copy of approved HAP in the mail. Next Health Action Plan review due in August of 2021.    DENI Mann  2/8/2021  1:22 PM

## 2021-02-04 NOTE — Clinical Note
Hello - I was able to meet with the patient and update her Behavioral Health Home ([facility]) Health Action Plan today for the next six months.    Demetra - Patient is requesting referral to [facility] for second opinion with surgeon for back pain. Can you place order for this?    Patient reports she would like a new walker as hers is over 6-years old. Can you place DME to [facility] for new walker?    Patient is also requesting [employer]. I did send her out a form in Sept but she was not able to connect with you to get this filled out. I have remailed the form out to her and will follow-up with her when she gets it and make an in person appointment with you.    Please see my note for any additional information or clarification and let me know if you have questions.    Thank you,  JASON Mann LGSW Behavioral Health Home ([facility])   [facility]  [phone]

## 2021-02-04 NOTE — LETTER
Behavioral Health Home ([facility]): Health Action Plan  [facility] Clinic: [place]    [facility]      Name: Paula Ho  Preferred Name: Paula  : 1960  MRN: [id number]        My Goals  Goal Areas: Health;Mental Health;Financial and Social Service Benefits;Transportation    Patient stated goals: Patient would like assistance with her physical and mental health for creating a balanced and healthy lifestyle. Patient would like assistance with continued SSDI and social service assistance to aid with [facility] benefits to increase her financial stability. Patient would like assistance with additional transportation services, such as [employer] for reliable transportation to get to her appointments, run errands and get out into the community.     Strengths related to each goal: Patients states her strengths are the support of her dogs, good advocate for herself, support of good friends, and care for others. Patient states she has a big heart.    Services and Supports Needed: The [facility] Team will provide monthly contact with patient in order to monitor progress towards goals.    Activities / Actions of Team to support goal(s): [facility] team will locate appropriate resources that align with patient's goals and promote health and wellness.    Activities / Actions of Patient / Parent / Guardian to support goal(s): It is a requirement that patient's primary care physician is through the [facility] system and that they are on Medical Assistance/Medicaid. If either of these were to change or if patient needs any type of assistance, they are to reach out to their Behavioral Health Home ([facility]) team.        Recommended Referral  Tobacco cessation referrals made?: Yes (see comments)  Mental Health / Chemical Dependency Referrals: Yes  Substance Use Referrals: Not Applicable  Mental Health Referrals: [facility]  [facility] Referrals: [facility] ;Other (see comments)          My Team Members and  Their Contact Information  Patient Care Team       Relationship Specialty Notifications Start End    Demetra Meyer APRN CNP PCP - General Nurse Practitioner  12/5/19     Phone: [phone] Fax: [phone]         [address] [place] [address]    Demetra Meyer APRN CNP Assigned PCP   9/15/19     Phone: [phone] Fax: [phone]         [address] [facility] [address]    Karyn Martinez, AMALIA Other (see comments)   6/11/20 6/11/21    Tobacco Treatment Specialist    Phone: [phone]         Therkildsen, Tigist Faiza, NP Nurse Practitioner Nurse Practitioner Admissions 8/3/20     Psychiatry    Phone: [phone] Fax: [phone]         [address] [place] [address]    Joselyn Winter Therapist  Admissions 8/3/20     Therapist    Phone: [phone] Fax: [phone]          [facility] PSYCHIATRY  [address] [facility] [address]  [facility] [address]    Domonique Harris LSW  Clinic Admissions 8/3/20     [facility] [phone]    Bree Batista MD Assigned Pulmonology Provider   10/23/20     Phone: [phone] Fax: [phone]         [address] [place] [address]    Tigist Manjarrez NP Assigned Behavioral Health Provider   11/15/20     Phone: [phone] Fax: [phone]         [address] [place] [address]    Ace Forte MD Assigned Heart and Vascular Provider   11/29/20     Phone: [phone] Fax: [phone]         [address] [facility] [address] [facility] [address]    [facility] [place] [facility]  12/23/20     Phone: [phone]         Dane Irizarry MD Anesthesiologist Anesthesiology  1/21/21     Phone: [phone] Pager: [phone] Fax: [phone] [address] [facility] [address]          My Wellness Plan  Safety Concerns: None Reported / Observed  Recommendations / Plan for safety concerns: A safety and risk management plan has not been developed at this time,  however patient was encouraged to call [facility] Crisis / 911 should there be a change in any of these risk factors.  Crisis Plan (emergencies / when urgent support needed): Patient states she feels comfortable contacting her PCA Sharla or calling 911 in a crisis or emergency situation.          Paula Ho co-developed the Health Action Plan with the [facility] Team and received a copy of this document.  Date Health Action Plan Completed/Updated: 02/04/21

## 2021-02-05 ASSESSMENT — ANXIETY QUESTIONNAIRES: GAD7 TOTAL SCORE: 10

## 2021-02-09 ENCOUNTER — ALLIED HEALTH/NURSE VISIT (OUTPATIENT)
Dept: NURSING | Facility: CLINIC | Age: 61
End: 2021-02-09
Payer: MEDICARE

## 2021-02-09 DIAGNOSIS — R07.9 RIGHT-SIDED CHEST PAIN: ICD-10-CM

## 2021-02-09 DIAGNOSIS — Z86.16 HISTORY OF COVID-19: ICD-10-CM

## 2021-02-09 DIAGNOSIS — R06.02 SHORTNESS OF BREATH: ICD-10-CM

## 2021-02-09 PROCEDURE — 99207 PR NO CHARGE NURSE ONLY: CPT

## 2021-02-09 PROCEDURE — 93000 ELECTROCARDIOGRAM COMPLETE: CPT

## 2021-02-15 ENCOUNTER — VIRTUAL VISIT (OUTPATIENT)
Dept: BEHAVIORAL HEALTH | Facility: CLINIC | Age: 61
End: 2021-02-15
Payer: MEDICARE

## 2021-02-15 DIAGNOSIS — F33.2 MDD (MAJOR DEPRESSIVE DISORDER), RECURRENT SEVERE, WITHOUT PSYCHOSIS (H): Primary | ICD-10-CM

## 2021-02-15 DIAGNOSIS — F41.1 GAD (GENERALIZED ANXIETY DISORDER): ICD-10-CM

## 2021-02-15 PROCEDURE — 90834 PSYTX W PT 45 MINUTES: CPT | Mod: 95 | Performed by: SOCIAL WORKER

## 2021-02-15 NOTE — PROGRESS NOTES
"Paula Ho is a 60 year old female who is being evaluated via a telephone visit.      The patient has been notified of the following:     \"We have found that certain health care needs can be provided without the need for a face to face visit.  This service lets us provide the care you need with a short phone conversation.      I will have full access to your [place] medical record during this entire phone call.   I will be taking notes for your medical record.     Since this is like an office visit, we will bill your insurance company for this service.  Please check with your medical insurance if this type of telephone visit/virtual care is covered.  You may be responsible for the cost of this service if insurance coverage is denied.      There are potential benefits and risks of telephone visits (e.g. limits to patient confidentiality) that differ from in-person visits.?  Confidentiality still applies for telephone services, and nobody will record the visit.  It is important to be in a quiet, private space that is free of distractions (including cell phone or other devices) during the visit.??     If during the course of the call I believe a telephone visit is not appropriate, you will not be charged for this service\"    Consent has been obtained for this service by care team member: yes.    Start time: 230 pm    End time: 320 pm    [facility] Primary Care: Integrated Behavioral Health  February 15, 2021      Behavioral Health Clinician Progress Note    Patient Name: Paula Ho           Service Type:  Phone Visit      Service Location:   Phone call (patient / identified key support person reached)        Session Length: 38 - 52      Attendees: Patient    Visit Activities (Refresh list every visit): [facility] and [facility] Only    Diagnostic Assessment Date: 8/3/2020 by Tigist Manjarrez NP Collaborative Psychiatry  Treatment Plan Review Date: not completed  See Flowsheets for today's PHQ-9 and " LIBRA-7 results  Previous PHQ-9:   PHQ-9 SCORE 12/22/2020 1/21/2021 2/4/2021   PHQ-9 Total Score 17 19 19     Previous LIBRA-7:   LIBRA-7 SCORE 12/22/2020 1/21/2021 2/4/2021   Total Score 15 18 10       ARNALDO LEVEL:  No flowsheet data found.    DATA  Extended Session (60+ minutes): No  Interactive Complexity: No  Crisis: No  [facility] Patient: Yes, addressed the follow [facility] Core Component(s):                          Health and Wellness Promotion    Treatment Objective(s) Addressed in This Session:  Target Behavior(s): disease management/lifestyle changes decrease symptoms related to chronic pain    Depressed Mood: Increase interest, engagement, and pleasure in doing things  Decrease frequency and intensity of feeling down, depressed, hopeless  Improve quantity and quality of night time sleep / decrease daytime naps  Feel less tired and more energy during the day   Improve diet, appetite, mindful eating, and / or meal planning  Identify negative self-talk and behaviors: challenge core beliefs, myths, and actions  Improve concentration, focus, and mindfulness in daily activities   Feel less fidgety, restless or slow in daily activities / interpersonal interactions  Anxiety: will experience a reduction in anxiety, will develop more effective coping skills to manage anxiety symptoms, will develop healthy cognitive patterns and beliefs and will increase ability to function adaptively  Relationship Problems: will address relationship difficulties in a more adaptive manner  Functional Impairment: will effectively address problems that interfere with adaptive functioning  Psychological distress related to Pain    Current Stressors / Issues:  Patient reports symptoms continue. She continues struggling with pain that affects her depression. She also had a Rule 25 today due possible pain medication issues. Patient also reports weight gain and this has affected her self-esteem. She is interested in weight management. Discussed coping  behaviors to help manage emotion dysregulation. Patient will return in two weeks.     Progress on Treatment Objective(s) / Homework:  none established    Motivational Interviewing    MI Intervention: Expressed Empathy/Understanding, Supported Autonomy, Collaboration, Evocation, Permission to raise concern or advise, Open-ended questions, Change talk (evoked) and Reframe     Change Talk Expressed by the Patient: Desire to change Need to change Committment to change    Provider Response to Change Talk: E - Evoked more info from patient about behavior change, A - Affirmed patient's thoughts, decisions, or attempts at behavior change, R - Reflected patient's change talk and S - Summarized patient's change talk statements      Care Plan review completed: No    Medication Review:  No changes to current psychiatric medication(s)    Medication Compliance:  Yes    Changes in Health Issues:   Yes: Pain, Associated Psychological Distress    Chemical Use Review:   Substance Use: Chemical use reviewed, no active concerns identified      Tobacco Use: Yes, first time assessed.  Patient reports frequency of use daily. Patient declined discussion at this time    Assessment: Current Emotional / Mental Status (status of significant symptoms):  Risk status (Self / Other harm or suicidal ideation)  Patient has had a history of suicidal ideation: since childhood and suicide attempts: patient reports history although does not want to discuss  Patient denies current fears or concerns for personal safety.  Patient denies current or recent suicidal ideation or behaviors.  Patient denies current or recent homicidal ideation or behaviors.  Patient denies current or recent self injurious behavior or ideation.  Patient denies other safety concerns.  A safety and risk management plan has not been developed at this time, however patient was encouraged to call [facility] should there be a change in any of these risk  factors.    Appearance:   phone visit   Eye Contact:   phone visit   Psychomotor Behavior: phone visit   Attitude:   Cooperative  Interested Attentive  Orientation:   All  Speech   Rate / Production: Normal    Volume:  Normal   Mood:    Anxious  Depressed  Irritable  Normal Sad   Affect:    phone visit   Thought Content:  Clear  Rumination   Thought Form:  Coherent  Logical   Insight:    adequate for safety during session    Diagnoses:  1. MDD (major depressive disorder), recurrent severe, without psychosis (H)    2. LIBRA (generalized anxiety disorder)        Collateral Reports Completed:  Routed note to PCP    Plan: (Homework, other):  Patient was given information about behavioral services and encouraged to schedule a follow up appointment with the clinic [facility] in 2 weeks.  She was also given deep Breathing Strategies to practice when experiencing anxiety and depression.  CD Recommendations: No indications of CD issues.  RASHEED Ayala (Mindy),[facility]

## 2021-02-18 ENCOUNTER — ALLIED HEALTH/NURSE VISIT (OUTPATIENT)
Dept: BEHAVIORAL HEALTH | Facility: CLINIC | Age: 61
End: 2021-02-18
Payer: MEDICARE

## 2021-02-18 DIAGNOSIS — R69 DIAGNOSIS DEFERRED: Primary | ICD-10-CM

## 2021-02-18 NOTE — Clinical Note
Demetra Hollis I was able to meet with this patient today. She is requesting the following referrals today: Weight Management clinic and scooter DME order.  She has not received her walker but I have a call in to find out the status of this.     We were able to schedule an appointment with you next week to finish filling out [employer] and having that faxed in, plus she is reporting her asthma is worse and wondering about taking an over the counter decongestant.    She was able to find a new pain clinic - [facility] located in [place] and on [facility]. They did have her complete a Rule 25 and recommended her for methadone clinic. Patient has refused this and wants to be followed by pain clinic.    You will find additional info in my note.   Take care,  JASON Mann [facility]  Behavioral Health Home ([facility])   [facility]  [phone]

## 2021-02-23 ENCOUNTER — TELEPHONE (OUTPATIENT)
Dept: BEHAVIORAL HEALTH | Facility: CLINIC | Age: 61
End: 2021-02-23

## 2021-02-23 ENCOUNTER — MEDICAL CORRESPONDENCE (OUTPATIENT)
Dept: HEALTH INFORMATION MANAGEMENT | Facility: CLINIC | Age: 61
End: 2021-02-23

## 2021-02-23 ENCOUNTER — OFFICE VISIT (OUTPATIENT)
Dept: FAMILY MEDICINE | Facility: CLINIC | Age: 61
End: 2021-02-23
Payer: MEDICARE

## 2021-02-23 VITALS
OXYGEN SATURATION: 96 % | DIASTOLIC BLOOD PRESSURE: 82 MMHG | SYSTOLIC BLOOD PRESSURE: 129 MMHG | HEIGHT: 66 IN | WEIGHT: 282 LBS | BODY MASS INDEX: 45.32 KG/M2 | HEART RATE: 71 BPM

## 2021-02-23 DIAGNOSIS — R79.89 ELEVATED TSH: ICD-10-CM

## 2021-02-23 DIAGNOSIS — Z02.89 ENCOUNTER FOR COMPLETION OF FORM WITH PATIENT: ICD-10-CM

## 2021-02-23 DIAGNOSIS — J44.9 CHRONIC OBSTRUCTIVE PULMONARY DISEASE, UNSPECIFIED COPD TYPE (H): ICD-10-CM

## 2021-02-23 DIAGNOSIS — M54.41 CHRONIC MIDLINE LOW BACK PAIN WITH BILATERAL SCIATICA: Primary | Chronic | ICD-10-CM

## 2021-02-23 DIAGNOSIS — R63.5 WEIGHT GAIN: ICD-10-CM

## 2021-02-23 DIAGNOSIS — G89.29 CHRONIC MIDLINE LOW BACK PAIN WITH BILATERAL SCIATICA: Primary | Chronic | ICD-10-CM

## 2021-02-23 DIAGNOSIS — F40.240 CLAUSTROPHOBIA: ICD-10-CM

## 2021-02-23 DIAGNOSIS — M54.42 CHRONIC MIDLINE LOW BACK PAIN WITH BILATERAL SCIATICA: Primary | Chronic | ICD-10-CM

## 2021-02-23 LAB
ALBUMIN SERPL-MCNC: 3.8 G/DL (ref 3.4–5)
ALP SERPL-CCNC: 102 U/L (ref 40–150)
ALT SERPL W P-5'-P-CCNC: 28 U/L (ref 0–50)
ANION GAP SERPL CALCULATED.3IONS-SCNC: 4 MMOL/L (ref 3–14)
AST SERPL W P-5'-P-CCNC: 10 U/L (ref 0–45)
BASOPHILS # BLD AUTO: 0 10E9/L (ref 0–0.2)
BASOPHILS NFR BLD AUTO: 0.2 %
BILIRUB SERPL-MCNC: 0.1 MG/DL (ref 0.2–1.3)
BUN SERPL-MCNC: 14 MG/DL (ref 7–30)
CALCIUM SERPL-MCNC: 9.2 MG/DL (ref 8.5–10.1)
CHLORIDE SERPL-SCNC: 108 MMOL/L (ref 94–109)
CO2 SERPL-SCNC: 27 MMOL/L (ref 20–32)
CREAT SERPL-MCNC: 0.55 MG/DL (ref 0.52–1.04)
DIFFERENTIAL METHOD BLD: NORMAL
EOSINOPHIL # BLD AUTO: 0.3 10E9/L (ref 0–0.7)
EOSINOPHIL NFR BLD AUTO: 3.1 %
ERYTHROCYTE [DISTWIDTH] IN BLOOD BY AUTOMATED COUNT: 12.7 % (ref 10–15)
GFR SERPL CREATININE-BSD FRML MDRD: >90 ML/MIN/{1.73_M2}
GLUCOSE SERPL-MCNC: 88 MG/DL (ref 70–99)
HCT VFR BLD AUTO: 41.4 % (ref 35–47)
HGB BLD-MCNC: 13.4 G/DL (ref 11.7–15.7)
LYMPHOCYTES # BLD AUTO: 3.8 10E9/L (ref 0.8–5.3)
LYMPHOCYTES NFR BLD AUTO: 44.7 %
MCH RBC QN AUTO: 30.7 PG (ref 26.5–33)
MCHC RBC AUTO-ENTMCNC: 32.4 G/DL (ref 31.5–36.5)
MCV RBC AUTO: 95 FL (ref 78–100)
MONOCYTES # BLD AUTO: 0.5 10E9/L (ref 0–1.3)
MONOCYTES NFR BLD AUTO: 5.8 %
NEUTROPHILS # BLD AUTO: 3.9 10E9/L (ref 1.6–8.3)
NEUTROPHILS NFR BLD AUTO: 46.2 %
PLATELET # BLD AUTO: 294 10E9/L (ref 150–450)
POTASSIUM SERPL-SCNC: 4 MMOL/L (ref 3.4–5.3)
PROT SERPL-MCNC: 7.2 G/DL (ref 6.8–8.8)
RBC # BLD AUTO: 4.37 10E12/L (ref 3.8–5.2)
SODIUM SERPL-SCNC: 139 MMOL/L (ref 133–144)
T4 FREE SERPL-MCNC: 0.79 NG/DL (ref 0.76–1.46)
TSH SERPL DL<=0.005 MIU/L-ACNC: 5.77 MU/L (ref 0.4–4)
WBC # BLD AUTO: 8.5 10E9/L (ref 4–11)

## 2021-02-23 PROCEDURE — 84443 ASSAY THYROID STIM HORMONE: CPT | Performed by: NURSE PRACTITIONER

## 2021-02-23 PROCEDURE — 99214 OFFICE O/P EST MOD 30 MIN: CPT | Performed by: NURSE PRACTITIONER

## 2021-02-23 PROCEDURE — 36415 COLL VENOUS BLD VENIPUNCTURE: CPT | Performed by: NURSE PRACTITIONER

## 2021-02-23 PROCEDURE — 85025 COMPLETE CBC W/AUTO DIFF WBC: CPT | Performed by: NURSE PRACTITIONER

## 2021-02-23 PROCEDURE — 80053 COMPREHEN METABOLIC PANEL: CPT | Performed by: NURSE PRACTITIONER

## 2021-02-23 PROCEDURE — 84439 ASSAY OF FREE THYROXINE: CPT | Performed by: NURSE PRACTITIONER

## 2021-02-23 RX ORDER — DIAZEPAM 5 MG
5 TABLET ORAL
Qty: 1 TABLET | Refills: 0 | Status: SHIPPED | OUTPATIENT
Start: 2021-02-23 | End: 2021-05-05

## 2021-02-23 ASSESSMENT — PAIN SCALES - GENERAL: PAINLEVEL: NO PAIN (0)

## 2021-02-23 ASSESSMENT — MIFFLIN-ST. JEOR: SCORE: 1870.89

## 2021-02-23 NOTE — PROGRESS NOTES
"    Assessment & Plan     Chronic midline low back pain with bilateral sciatica  Patient would like to see new neurosurgery clinic but needs updated MRI before she can schedule. She is also interested in scooter due to pain and breathing.   - MR Lumbar Spine w/o Contrast; Future  - T4 free  - Wheelchair Scooter Order; Future    Weight gain  Labs today. TSH slightly elevated. Will recheck and get TPO. If normal will refer to comprehensive weight management clinic.   - TSH with free T4 reflex  - CBC with platelets differential  - Comprehensive metabolic panel (BMP + Alb, Alk Phos, ALT, AST, Total. Bili, TP)  - **TSH with free T4 reflex FUTURE anytime; Future  - Thyroid peroxidase antibody; Future    Claustrophobia  Discussed indications. Do not take clonazepam on the same day. Patient states she takes clonazepam sparingly and is in agreement. Discussed that she needs a  to and from the procedure.   - diazepam (VALIUM) 5 MG tablet; Take 1 tablet (5 mg) by mouth once as needed (claustrophobia with MRI) Take 30-60 min before procedure.    Encounter for completion of form with patient  [facility]. Copy sent for abstraction. Gave her the original.     Elevated TSH  As above.   - **TSH with free T4 reflex FUTURE anytime; Future  - Thyroid peroxidase antibody; Future    Chronic obstructive pulmonary disease, unspecified COPD type (H)  Referral to therapy for evaluation for scooter.   She is also due for follow up with pulmonology.  - Wheelchair Scooter Order; Future         Tobacco Cessation:   reports that she has been smoking. She has been smoking about 0.50 packs per day. She has never used smokeless tobacco.  Tobacco Cessation Action Plan: Information offered: Patient not interested at this time    BMI:   Estimated body mass index is 45.52 kg/m  as calculated from the following:    Height as of this encounter: 1.676 m (5' 6\").    Weight as of this encounter: 127.9 kg (282 lb). " "  Weight management plan: Discussed healthy diet and exercise guidelines    See Patient Instructions    Return in about 4 weeks (around 3/23/2021).     Return precautions discussed, including when to seek urgent/emergent care.    Patient verbalizes understanding and agrees with plan of care. Patient stable for discharge.      LEONA Marquez CNP  [facility] is a 60 year old who presents for the following health issues     HPI     She tried to schedule with [facility] for a second opinion regarding her back pain but they won't see her without an updated MRI. She has not had an MRI in the last year. She was previously seen by Dr. Sipple at [facility] and has had several injections. She has never had back surgery. Her back issues are long standing, at least 4-5 years. She has low back pain with right sided radiculopathy. She denies loss of bowel or bladder. No saddle anesthesia. No numbness, tingling or weakness, although she does use a walker and cane to walk. She is also requesting a scooter to help with mobility. Along those lines, she has [employer] forms to complete today. She is unable to take a city bus because she can't walk far or do stairs. She estimates that she can walk <1 block due to pain and breathing concerns.    She has noticed that she's gained quite a bit of weight and is often tired. Would like some labs done. If labs are normal she would like to consider comprehensive weight management clinic.    Lastly, she would like to look into getting a motorized scooter due to her breathing, back pain and right knee pain.     Review of Systems   Constitutional, HEENT, cardiovascular, pulmonary, GI, , musculoskeletal, neuro, skin, endocrine and psych systems are negative, except as otherwise noted.      Objective    /82   Pulse 71   Ht 1.676 m (5' 6\")   Wt 127.9 kg (282 lb)   LMP  (LMP Unknown)   SpO2 96%   BMI " 45.52 kg/m    Body mass index is 45.52 kg/m .  Physical Exam   GENERAL: healthy, alert and no distress  EYES: Eyes grossly normal to inspection, PERRL and conjunctivae and sclerae normal  HENT: ear canals and TM's normal, nose and mouth without ulcers or lesions  NECK: no adenopathy, no asymmetry, masses, or scars and thyroid normal to palpation  RESP: lungs clear to auscultation - no rales, rhonchi or wheezes  CV: regular rate and rhythm, normal S1 S2, no S3 or S4, no murmur, click or rub, no peripheral edema  ABDOMEN: soft, nontender, obese  MS: no gross musculoskeletal defects noted, no edema  SKIN: no suspicious lesions or rashes  NEURO: Normal strength and tone, mentation intact and speech normal  PSYCH: mentation appears normal, affect normal/bright    Results for orders placed or performed in visit on 02/23/21   TSH with free T4 reflex     Status: Abnormal   Result Value Ref Range    TSH 5.77 (H) 0.40 - 4.00 mU/L   CBC with platelets differential     Status: None   Result Value Ref Range    WBC 8.5 4.0 - 11.0 10e9/L    RBC Count 4.37 3.8 - 5.2 10e12/L    Hemoglobin 13.4 11.7 - 15.7 g/dL    Hematocrit 41.4 35.0 - 47.0 %    MCV 95 78 - 100 fl    MCH 30.7 26.5 - 33.0 pg    MCHC 32.4 31.5 - 36.5 g/dL    RDW 12.7 10.0 - 15.0 %    Platelet Count 294 150 - 450 10e9/L    % Neutrophils 46.2 %    % Lymphocytes 44.7 %    % Monocytes 5.8 %    % Eosinophils 3.1 %    % Basophils 0.2 %    Absolute Neutrophil 3.9 1.6 - 8.3 10e9/L    Absolute Lymphocytes 3.8 0.8 - 5.3 10e9/L    Absolute Monocytes 0.5 0.0 - 1.3 10e9/L    Absolute Eosinophils 0.3 0.0 - 0.7 10e9/L    Absolute Basophils 0.0 0.0 - 0.2 10e9/L    Diff Method Automated Method    Comprehensive metabolic panel (BMP + Alb, Alk Phos, ALT, AST, Total. Bili, TP)     Status: Abnormal   Result Value Ref Range    Sodium 139 133 - 144 mmol/L    Potassium 4.0 3.4 - 5.3 mmol/L    Chloride 108 94 - 109 mmol/L    Carbon Dioxide 27 20 - 32 mmol/L    Anion Gap 4 3 - 14 mmol/L     Glucose 88 70 - 99 mg/dL    Urea Nitrogen 14 7 - 30 mg/dL    Creatinine 0.55 0.52 - 1.04 mg/dL    GFR Estimate >90 >60 mL/min/[1.73_m2]    GFR Estimate If Black >90 >60 mL/min/[1.73_m2]    Calcium 9.2 8.5 - 10.1 mg/dL    Bilirubin Total 0.1 (L) 0.2 - 1.3 mg/dL    Albumin 3.8 3.4 - 5.0 g/dL    Protein Total 7.2 6.8 - 8.8 g/dL    Alkaline Phosphatase 102 40 - 150 U/L    ALT 28 0 - 50 U/L    AST 10 0 - 45 U/L   T4 free     Status: None   Result Value Ref Range    T4 Free 0.79 0.76 - 1.46 ng/dL               DME (Durable Medical Equipment) Orders and Documentation  No orders of the defined types were placed in this encounter.     The patient was assessed and it was determined the patient is in need of the following listed DME Supplies/Equipment. Please complete supporting documentation below to demonstrate medical necessity.      DME All Other Item(s) Documentation    List reason for need and supporting documentation for medical necessity below for each DME item.     1. Motorized scooter - referral to therapy for assessment

## 2021-02-23 NOTE — PATIENT INSTRUCTIONS
Schedule MRI  To schedule your imaging exam at any of the [facility] imaging locations, please call [phone]    Take valium 30-60 min before MRI. You need a  to and from your MRI  Do not take your clonazepam the same day    Patient Education    At [facility], we strive to deliver an exceptional experience to you, every time we see you. If you receive a survey, please complete it as we do value your feedback.  If you have MyChart, you can expect to receive results automatically within 24 hours of their completion.  Your provider will send a note interpreting your results as well.   If you do not have MyChart, you should receive your results in about a week by mail.    Your care team:                            Family Medicine Internal Medicine   MD Renan Chambers MD Shantel Branch-Fleming, MD Srinivasa Vaka, MD Katya Belousova, PAPANKAJ Meyer, APRN CNP    Esdras Dobson MD Pediatrics   Taurus Torres, PABunnyC  Maxine Jaeger, CNP MD Alison Villeda APRN CNP   MD Inocencia Martin MD Deborah Mielke, MD Kim Thein, APRN [facility]      Clinic hours: Monday - Thursday 7 am-6 pm; Fridays 7 am-5 pm.   Urgent care: Monday - Friday 11 am-9 pm; Saturday and Sunday 9 am-5 pm.    Clinic: [phone]       [facility]: Monday - Thursday 8 am - 7 pm; Friday 8 am - 6 pm  [facility]: [phone]     Use [email or web address] for 24/7 diagnosis and treatment of dozens of conditions.

## 2021-02-23 NOTE — TELEPHONE ENCOUNTER
Behavioral Health Home Services  [facility]: [place]        Social Work Care Navigator Note      Patient: Paula Ho  Date: February 23, 2021  Preferred Name: Paula    Previous PHQ-9:   PHQ-9 SCORE 12/22/2020 1/21/2021 2/4/2021   PHQ-9 Total Score 17 19 19     Previous LIBRA-7:   LIBRA-7 SCORE 12/22/2020 1/21/2021 2/4/2021   Total Score 15 18 10     ARNALDO LEVEL:  No flowsheet data found.    Preferred Contact:  Need for : No  Preferred Contact: Cell      Type of Contact Today: Phone call (patient / identified key support person reached)      Data: (subjective / Objective):  Patient Goals  Goal Areas: Health;Mental Health;Financial and Social Service Benefits;Transportation  Patient stated goals: Patient would like assistance with her physical and mental health for creating a balanced and healthy lifestyle. Patient would like assistance with continued SSDI and social service assistance to aid with county benefits to increase her financial stability. Patient would like assistance with additional transportation services, such as [employer] for reliable transportation to get to her appointments, run errands and get out into the community.       [facility] Service Provided:  Comprehensive Care Management: utilized the electronic medical record / patient registry to identify and support patient's health conditions / needs more effectively   Care Transitions: focused on the coordinated and seamless movement of patient between or within different levels of care or settings  Care Coordination: provided care management services/referrals necessary to ensure patient and their identified supports have access to medical, behavioral health, pharmacology and recovery support services.  Ensured that patient's care is integrated across all settings and services.   Individual and Family Support: aimed to help clients reduce barriers to achieving goals, increase health literacy and knowledge about chronic condition(s), increase  self-efficacy skills, and improve health outcomes  Referral to Community and Social Support Services: Assisted in scheduling an appointment to a referral / service (see plan section)  Coordinated / Confirmed patient's appointment time or referral and transportation plan  Followed-up with patient on whether or not they completed a referral     Data:  [facility] called [facility] at [phone] and spoke with Intake Dilma. Intake reports they did not have rx for walker but were able to pull rx through Epic and will now start the process of filling DME order for 4-Wheel Walker (with seat).  Intake reports they will be reaching out to patient to update her on status of rx.    Plan:  [facility] will follow up with patient at next appointment to make sure DME has contacted her and she has received walker or status of delivery.    JASON Mann [facility]  Behavioral Health Home ([facility])   [facility]  [phone]  February 23, 2021  10:11 AM                  Next 5 appointments (look out 90 days)    Feb 23, 2021  2:20 PM  SHORT with LEONA Anderson CNP  [facility] ([facility] ) [address] [place] [id number] [phone]

## 2021-03-01 ENCOUNTER — VIRTUAL VISIT (OUTPATIENT)
Dept: BEHAVIORAL HEALTH | Facility: CLINIC | Age: 61
End: 2021-03-01
Payer: MEDICARE

## 2021-03-01 DIAGNOSIS — F41.1 GAD (GENERALIZED ANXIETY DISORDER): ICD-10-CM

## 2021-03-01 DIAGNOSIS — F33.2 MDD (MAJOR DEPRESSIVE DISORDER), RECURRENT SEVERE, WITHOUT PSYCHOSIS (H): Primary | ICD-10-CM

## 2021-03-01 NOTE — PROGRESS NOTES
Called patient regarding today's appt. Patient sounded tearful and reported she had to run and pick something up and wondered if able to rescheduled. Rescheduled for 3/2/2021 @ 3pm.  Ayala (Mindy),[facility]

## 2021-03-02 DIAGNOSIS — R63.5 WEIGHT GAIN: ICD-10-CM

## 2021-03-02 DIAGNOSIS — R79.89 ELEVATED TSH: ICD-10-CM

## 2021-03-02 PROCEDURE — 86376 MICROSOMAL ANTIBODY EACH: CPT | Performed by: NURSE PRACTITIONER

## 2021-03-02 PROCEDURE — 84439 ASSAY OF FREE THYROXINE: CPT | Performed by: NURSE PRACTITIONER

## 2021-03-02 PROCEDURE — 84443 ASSAY THYROID STIM HORMONE: CPT | Performed by: NURSE PRACTITIONER

## 2021-03-02 PROCEDURE — 36415 COLL VENOUS BLD VENIPUNCTURE: CPT | Performed by: NURSE PRACTITIONER

## 2021-03-03 LAB
T4 FREE SERPL-MCNC: 0.74 NG/DL (ref 0.76–1.46)
TSH SERPL DL<=0.005 MIU/L-ACNC: 6.55 MU/L (ref 0.4–4)

## 2021-03-04 ENCOUNTER — ALLIED HEALTH/NURSE VISIT (OUTPATIENT)
Dept: BEHAVIORAL HEALTH | Facility: CLINIC | Age: 61
End: 2021-03-04
Payer: MEDICARE

## 2021-03-04 DIAGNOSIS — R69 DIAGNOSIS DEFERRED: Primary | ICD-10-CM

## 2021-03-04 LAB — THYROPEROXIDASE AB SERPL-ACNC: <10 IU/ML

## 2021-03-04 NOTE — PROGRESS NOTES
Behavioral Health Home Services  [facility]: [place]        Social Work Care Navigator Note      Patient: Paula Ho  Date: March 4, 2021  Preferred Name: Paula    Previous PHQ-9:   PHQ-9 SCORE 1/21/2021 2/4/2021 2/23/2021   PHQ-9 Total Score 19 19 15     Previous LIBRA-7:   LIBRA-7 SCORE 1/21/2021 2/4/2021 2/23/2021   Total Score 18 10 13     ARNALDO LEVEL:  No flowsheet data found.    Preferred Contact:  Need for : No  Preferred Contact: Cell      Type of Contact Today: Phone call (patient / identified key support person reached)      Data: (subjective / Objective):  Recent ED/IP Admission or Discharge?   None    Patient Goals:  Goal Areas: Health;Mental Health;Financial and Social Service Benefits;Transportation  Patient stated goals: Patient would like assistance with her physical and mental health for creating a balanced and healthy lifestyle. Patient would like assistance with continued SSDI and social service assistance to aid with county benefits to increase her financial stability. Patient would like assistance with additional transportation services, such as [employer] for reliable transportation to get to her appointments, run errands and get out into the community.         [facility] Core Service Provided:  Comprehensive Care Management: utilized the electronic medical record / patient registry to identify and support patient's health conditions / needs more effectively   Care Transitions: focused on the coordinated and seamless movement of patient between or within different levels of care or settings  Care Coordination: provided care management services/referrals necessary to ensure patient and their identified supports have access to medical, behavioral health, pharmacology and recovery support services.  Ensured that patient's care is integrated across all settings and services.   Individual and Family Support: aimed to help clients reduce barriers to achieving goals, increase health literacy  and knowledge about chronic condition(s), increase self-efficacy skills, and improve health outcomes  Referral to Community and Social Support Services: Provided patient with referrals (see plan section)  Assisted in scheduling an appointment to a referral / service (see plan section)  Coordinated / Confirmed patient's appointment time or referral and transportation plan  Followed-up with patient on whether or not they completed a referral    Current Stressors / Issues / Care Plan Objective Addressed Today:  SWCC and patient were able to meet today for [facility] monthly check-in via telehealth visit. All required ROIs have been filed with HIM/patient chart.    1. Patient reports she was able to meet with her PCP to have her [employer] forms signed and to discuss her worsening asthma. Patient needs updated MRI for neurosurgery, this has been scheduled and then she will see neurosurgeon.     Patient would also like a referral placed for the  Weight Management Clinic. Patient reports she is concerned about all the pounds she has put on and worries this is impacting her health. Patient reports she has been trying the Keto diet but has not found it to be helping. [facility] will follow-up with patient at next visit.     Patient reports she has received her walker and is in the process of being fitted for her new scooter. Patient reports she and her PCP finished filling out her [employer] forms and she has sent them in and is just waiting for her [employer] card to be mailed to her.     Patient reports she has not found a new chiropractor. [facility] mailed patient chiropractic  provider list in her area today.    Patient reports chest pain and breathing is better in the past two weeks. Patient reports her blood pressure has been high. [facility] encouraged patient to contact provider if symptoms worsen or increase. [facility] reviewed patient chart and patient is due for follow-up with PCP around the 23rd of  this month.       2. Patient reports she lies her new pain clinic at [facility] in [place] and another location on [facility] that is closer to her home. Patient reports she has completed a Rule 25 (completed by [facility] in [place]) that was required for admission into the pain clinic. Patient reports she has started an outpatient program through [facility] counseling M-F from 9:30am to 12:30pm. Patient reports she just started this week. Patient reports so she likes it and finds it supportive. Patient requesting to meet with Behavioral Health Clinician Tamar Blakely. [facility] was able to schedule patient with provider today.        3. 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 [facility] to monitor and follow-up with patient.    Intervention:  Motivational Interviewing: Expressed Empathy/Understanding, Supported Autonomy, Collaboration, Evocation, Permission to raise concern or advise, Open-ended questions, Reflections: simple and complex, Rolled with resistance: Emphasized patient autonomy, Simple reflection, Reframed sustain talk in the direction of change and Evoked patient agenda, Change talk (evoked) and Reframe   Target Behavior(s): Explored thoughts about taking an anti-depressant, Explored and resolved challenges related to taking anti-depressants as prescribed, Explored thoughts and readiness to participate in individual therapy, Explored and resolved challenges to attending appointments as scheduled, Explored current social supports and reinforced opportunities to increase engagement, Explored current sleep hygeine and patient's perception and knowledge of relationship to mood and Explored patient's perception of how alcohol and / or drugs influences mood    Assessment: (Progress on Goals / Homework):  Patient would benefit from continued coordination in reaching their  goals set for the Behavioral Health Home ([facility]) program. CC reviewed Health Action Plan goals and will continue to monitor progress and work with patient and their care team.      Plan: (Homework, other):  Patient was encouraged to continue to seek condition-related information and education.      Scheduled a Phone follow up appointment with MISA  in 3 weeks     Patient has set self-identified goals and will monitor progress until the next appointment on: 03/25/2021.         JASON Mann [facility]  Behavioral Health Home ([facility])   [facility]  [phone]

## 2021-03-05 ENCOUNTER — ANCILLARY PROCEDURE (OUTPATIENT)
Dept: MAMMOGRAPHY | Facility: CLINIC | Age: 61
End: 2021-03-05
Attending: NURSE PRACTITIONER
Payer: MEDICARE

## 2021-03-05 ENCOUNTER — AMBULATORY - HEALTHEAST (OUTPATIENT)
Dept: SURGERY | Facility: AMBULATORY SURGERY CENTER | Age: 61
End: 2021-03-05

## 2021-03-05 ENCOUNTER — ANCILLARY PROCEDURE (OUTPATIENT)
Dept: CT IMAGING | Facility: CLINIC | Age: 61
End: 2021-03-05
Attending: NURSE PRACTITIONER
Payer: MEDICARE

## 2021-03-05 DIAGNOSIS — R06.02 SHORTNESS OF BREATH: ICD-10-CM

## 2021-03-05 DIAGNOSIS — Z11.59 ENCOUNTER FOR SCREENING FOR OTHER VIRAL DISEASES: ICD-10-CM

## 2021-03-05 DIAGNOSIS — Z86.16 HISTORY OF COVID-19: ICD-10-CM

## 2021-03-05 DIAGNOSIS — Z12.31 VISIT FOR SCREENING MAMMOGRAM: ICD-10-CM

## 2021-03-05 DIAGNOSIS — R07.9 RIGHT-SIDED CHEST PAIN: ICD-10-CM

## 2021-03-05 PROCEDURE — 71275 CT ANGIOGRAPHY CHEST: CPT | Mod: ME | Performed by: RADIOLOGY

## 2021-03-05 PROCEDURE — 77067 SCR MAMMO BI INCL CAD: CPT | Mod: GC | Performed by: RADIOLOGY

## 2021-03-05 PROCEDURE — G1004 CDSM NDSC: HCPCS | Mod: GC | Performed by: RADIOLOGY

## 2021-03-05 PROCEDURE — 77063 BREAST TOMOSYNTHESIS BI: CPT | Mod: GC | Performed by: RADIOLOGY

## 2021-03-05 RX ORDER — IOPAMIDOL 755 MG/ML
83 INJECTION, SOLUTION INTRAVASCULAR ONCE
Status: COMPLETED | OUTPATIENT
Start: 2021-03-05 | End: 2021-03-05

## 2021-03-05 RX ADMIN — IOPAMIDOL 83 ML: 755 INJECTION, SOLUTION INTRAVASCULAR at 15:28

## 2021-03-06 NOTE — RESULT ENCOUNTER NOTE
Please send letter:    Paula,  Your CT results were normal. Please let us know if you have any questions.  Thank you for allowing us to participate in your care.  LEONA Maruqez CNP

## 2021-03-07 ENCOUNTER — AMBULATORY - HEALTHEAST (OUTPATIENT)
Dept: FAMILY MEDICINE | Facility: CLINIC | Age: 61
End: 2021-03-07

## 2021-03-07 DIAGNOSIS — Z11.59 ENCOUNTER FOR SCREENING FOR OTHER VIRAL DISEASES: ICD-10-CM

## 2021-03-07 DIAGNOSIS — Z11.59 ENCOUNTER FOR SCREENING FOR OTHER VIRAL DISEASES: Primary | ICD-10-CM

## 2021-03-08 DIAGNOSIS — Z11.59 ENCOUNTER FOR SCREENING FOR OTHER VIRAL DISEASES: ICD-10-CM

## 2021-03-08 LAB
SARS-COV-2 RNA RESP QL NAA+PROBE: NORMAL
SPECIMEN SOURCE: NORMAL

## 2021-03-08 PROCEDURE — U0003 INFECTIOUS AGENT DETECTION BY NUCLEIC ACID (DNA OR RNA); SEVERE ACUTE RESPIRATORY SYNDROME CORONAVIRUS 2 (SARS-COV-2) (CORONAVIRUS DISEASE [COVID-19]), AMPLIFIED PROBE TECHNIQUE, MAKING USE OF HIGH THROUGHPUT TECHNOLOGIES AS DESCRIBED BY CMS-2020-01-R: HCPCS | Performed by: PHYSICAL MEDICINE & REHABILITATION

## 2021-03-08 PROCEDURE — U0005 INFEC AGEN DETEC AMPLI PROBE: HCPCS | Performed by: PHYSICAL MEDICINE & REHABILITATION

## 2021-03-09 ENCOUNTER — TELEPHONE (OUTPATIENT)
Dept: FAMILY MEDICINE | Facility: CLINIC | Age: 61
End: 2021-03-09

## 2021-03-09 LAB
LABORATORY COMMENT REPORT: NORMAL
SARS-COV-2 RNA RESP QL NAA+PROBE: NEGATIVE
SPECIMEN SOURCE: NORMAL

## 2021-03-09 NOTE — TELEPHONE ENCOUNTER
Paula called in wondering why she was called to schedule another mammogram and US (made for 3/19/21) read her results from MA screening 3/005 warranted further evaluation. Questions/concerns addressed as able.     Fatimah Richards RN, BSN, CMSRN  [facility]

## 2021-03-10 ENCOUNTER — COMMUNICATION - HEALTHEAST (OUTPATIENT)
Dept: SCHEDULING | Facility: CLINIC | Age: 61
End: 2021-03-10

## 2021-03-11 ENCOUNTER — SURGERY - HEALTHEAST (OUTPATIENT)
Dept: SURGERY | Facility: AMBULATORY SURGERY CENTER | Age: 61
End: 2021-03-11

## 2021-03-11 DIAGNOSIS — E03.9 ACQUIRED HYPOTHYROIDISM: Primary | ICD-10-CM

## 2021-03-11 RX ORDER — LEVOTHYROXINE SODIUM 25 UG/1
25 TABLET ORAL DAILY
Qty: 90 TABLET | Refills: 1 | Status: SHIPPED | OUTPATIENT
Start: 2021-03-11

## 2021-03-11 ASSESSMENT — MIFFLIN-ST. JEOR: SCORE: 1834.14

## 2021-03-16 ENCOUNTER — ANCILLARY PROCEDURE (OUTPATIENT)
Dept: MRI IMAGING | Facility: CLINIC | Age: 61
End: 2021-03-16
Attending: NURSE PRACTITIONER
Payer: MEDICARE

## 2021-03-16 DIAGNOSIS — M54.42 CHRONIC MIDLINE LOW BACK PAIN WITH BILATERAL SCIATICA: Chronic | ICD-10-CM

## 2021-03-16 DIAGNOSIS — G89.29 CHRONIC MIDLINE LOW BACK PAIN WITH BILATERAL SCIATICA: Chronic | ICD-10-CM

## 2021-03-16 DIAGNOSIS — M54.41 CHRONIC MIDLINE LOW BACK PAIN WITH BILATERAL SCIATICA: Chronic | ICD-10-CM

## 2021-03-16 PROCEDURE — 72148 MRI LUMBAR SPINE W/O DYE: CPT | Mod: ME | Performed by: RADIOLOGY

## 2021-03-16 PROCEDURE — G1004 CDSM NDSC: HCPCS | Performed by: RADIOLOGY

## 2021-03-16 NOTE — RESULT ENCOUNTER NOTE
Please send letter:    Paula,  Your MRI results are attached. Please follow up with your back specialist. Please let us know if you have any questions.  Thank you for allowing us to participate in your care.  LEONA Marquez CNP

## 2021-03-16 NOTE — LETTER
March 16, 2021      Paula Ho  [address]    [place] [address]        Dear ,    We are writing to inform you of your test results.    Your MRI results are attached. Please follow up with your back specialist. Please let us know if you have any questions.     Thank you for allowing us to participate in your care.     LEONA Marquez CNP/smh     Resulted Orders   MR Lumbar Spine w/o Contrast    Narrative    MR LUMBAR SPINE W/O CONTRAST 3/16/2021 11:44 AM    Provided History: Low back pain, > 6 wks; low back pain with  radiculopathy x4+ years; Chronic midline low back pain with bilateral  sciatica; Chronic midline low back pain with bilateral sciatica;  Chronic midline low back pain with bilateral sciatica    ICD-10: Chronic midline low back pain with bilateral sciatica; Chronic  midline low back pain with bilateral sciatica; Chronic midline low  back pain with bilateral sciatica    Comparison: Lumbar spine radiograph 2/3/2021    Technique: Sagittal T1-weighted, sagittal STIR, 3D volumetric axial  and sagittal reconstructed T2-weighted images of the lumbar spine were  obtained without intravenous contrast.     Findings: There are 5 lumbar-type vertebrae assumed for the purposes  of this dictation.  The tip of the conus medullaris is at L1.  There  is mild grade 1 anterolisthesis of L3 on L4 and of L4 on L5.  There is  multilevel disc height narrowing and less intradiscal T2 hyperintense  signal scattered throughout the lumbar spine.  There are degenerative  changes of the opposing endplates of L5-S1. Scattered foci of T1 and  T2 hyperintense signal in the vertebral bodies likely represent a  benign process such as cavernous hemangiomas or possibly focal fatty  deposition. On a level by level basis:    T12-L1: No spinal canal or neural foraminal stenosis.    L1-2: Circumferential disc bulge.. Mild neural foraminal stenosis  bilaterally. Spinal canal is patent.    L2-3:  Circumferential disc bulge. Mild neural foraminal stenosis  bilaterally. Spinal canal is patent.    L3-4: Mild grade 1 anterolisthesis. Uncovering of the posterior disc.  Mild facet arthropathy. Mild left neural foraminal stenosis. Right  neural foramen and spinal canal are patent.    L4-5: Mild grade 1 anterolisthesis. Uncovering of the posterior disc.  Mild left neural foraminal stenosis. Right neural foramen and spinal  canal are patent.    L5-S1: Circumferential disc bulge with a superimposed central disc  protrusion and annular fissure. Facet arthropathy bilaterally. Mild to  moderate right neural foraminal stenosis and mild left neural  foraminal stenosis. Spinal canal is patent.    Paraspinous tissues are within normal limits.      Impression    Impression: Multilevel lumbar spondylosis, most pronounced at L5-S1  with mild to moderate right neural foraminal stenosis and mild left  neural foraminal stenosis. No evidence for high-grade spinal canal  stenosis at any level.         ANATOLY MOTA MD

## 2021-03-19 ENCOUNTER — ANCILLARY PROCEDURE (OUTPATIENT)
Dept: MAMMOGRAPHY | Facility: CLINIC | Age: 61
End: 2021-03-19
Attending: NURSE PRACTITIONER
Payer: MEDICARE

## 2021-03-19 DIAGNOSIS — R92.8 ABNORMAL MAMMOGRAM: ICD-10-CM

## 2021-03-19 PROCEDURE — 77065 DX MAMMO INCL CAD UNI: CPT | Mod: GC | Performed by: RADIOLOGY

## 2021-03-19 PROCEDURE — G0279 TOMOSYNTHESIS, MAMMO: HCPCS | Mod: GC | Performed by: RADIOLOGY

## 2021-03-23 ENCOUNTER — HOSPITAL ENCOUNTER (OUTPATIENT)
Dept: OCCUPATIONAL THERAPY | Facility: CLINIC | Age: 61
Setting detail: THERAPIES SERIES
End: 2021-03-23
Attending: NURSE PRACTITIONER
Payer: MEDICARE

## 2021-03-23 DIAGNOSIS — M54.42 CHRONIC MIDLINE LOW BACK PAIN WITH BILATERAL SCIATICA: Chronic | ICD-10-CM

## 2021-03-23 DIAGNOSIS — G89.29 CHRONIC MIDLINE LOW BACK PAIN WITH BILATERAL SCIATICA: Chronic | ICD-10-CM

## 2021-03-23 DIAGNOSIS — J44.9 CHRONIC OBSTRUCTIVE PULMONARY DISEASE, UNSPECIFIED COPD TYPE (H): ICD-10-CM

## 2021-03-23 DIAGNOSIS — M54.41 CHRONIC MIDLINE LOW BACK PAIN WITH BILATERAL SCIATICA: Chronic | ICD-10-CM

## 2021-03-23 PROCEDURE — 97542 WHEELCHAIR MNGMENT TRAINING: CPT | Mod: GO | Performed by: OCCUPATIONAL THERAPIST

## 2021-03-23 NOTE — PROGRESS NOTES
"   SEATING AND WHEELED MOBILITY ASSESSMENT  03/23/21 1200   Quick Adds   Quick Adds Certification   General Information    Rehab Discipline OT   Funding Medicare/[employer]   Service Outpatient;Occupational Therapy;Seating/Wheeled Mobility Evaluation   Height 5'6\"   Weight 282   Start Of Care Date 03/23/21   Referring Physician Demetra Meyer   Orders Evaluate And Treat As Indicated;Per Therapist Evaluation   Orders Date 03/02/21   Patient/Caregiver Goals [facility]   [email or web address] to be contacted   Current Community Support Personal Care Attendant;Transportation Service;Emergency Call System  (11 hrs/day PCa)   Patient role/Employment history Disabled   General Information Comments [facility] counseling 4x per week   Fall Risk Screen   Fall screen completed by OT   Have you fallen 2 or more times in the past year? Yes   Have you fallen and had an injury in the past year? No   Is patient a fall risk? Yes   Medical History   Onset Of Illness/injury Or Date Of Surgery 3/2/21  (order)   Medical Diagnosis COPD   Medical History R TKA x6 surgeries   Cardio-Respiratory Status Inhaler   Home Accessibility   Living Environment Apartment/condo   Primary Entrance Level   Community ADL   Transportation Transportation Services   Community Mobility Requirements Medical Appointments;Shopping   Community ADL Comments Medical rides or metro mobility.  Drug store is 3 blocks away   Cognitive/Visual/Hearing Status   Vision Intact   Hearing Intact   ADL Status   Feeding Independent   Grooming/Hygiene Requires Assist   Dressing Requires Assist   Toileting Requires Assist;Uses Equipment  (rts)   Bathing Requires Assist;Uses Equipment  (grab bar, shower chair)   Meal Preparation Requires Assist   Home Management Requires Assist   Balance   Unsupported Sitting Balance Within Functional Limits   Sitting Balance in Chair Within Functional Limits   Standing Balance Uses Upper Extremities for Balance   Ambulation   Ambulation " Ambulatory   Ambulation Assist Independent   Ambulation Equipment 4 Wheeled Walker with Seat   Ambulation Comments 10 seconds for 25 ft wtih 4ww   Transfers   Transfer Assist Independent   Transfer Method Stand Pivot   Sleep/Rest   Sleep Surface/Equipment adjustable bed   Neuromuscular   Pain Yes   Pain Location back and R knee   Sensory Deficits Reported back and knee   Head and Neck   Head and Neck Position Functional   Head Control Good   Upper Extremities   UE ROM WFL   UE Strength 4+/5   Lower Extremities   LE ROM L WFL r quad limited to ~100 degrees flexion   LE Strength R 4+/5 L quad 2+   Foot Positioning Plantar flexed   Education Assessment   Barriers to Learning Physical   Preferred Learning Style Listening;Demonstration   Assessment/Plan   Criteria for Skilled Interventions Met Yes, Treatment Indicated   Treatment Diagnosis impaired participation in MRADLS   Therapy Frequency once   Planned Therapy Interventions Wheelchair Management/Propulsion Training   Planned Therapy Interventions Comments Disucussed need for scooter for community use due to pain and COPD.  Safe trial of go go in clinic.   Risks and benefits of treatment have been explained Yes   Patient/family & other staff in agreement with plan of care Yes   Certification   Certification date from 03/23/21   Certification date to 03/23/21   Adult OT Eval Goals   OT Eval Goals (Adult) 1    OT Goal 1   Goal Identifier scooter   Goal Description Patient to demonstrate a successful clinical trial of the recommended wheelchair   Target Date 03/23/21   Date Met 03/23/21   3 wheeled Go Go Power Operated Vehicle / Scooter -  This device is being requested for this patient with mobility impairments to allow her to be able to complete all of her community mobility in a safe fashion, without risk of injury from falling, and in a reasonable time frame. She demonstrated during that she was able to transfer to/from the requested scooter, operate the tiller  steering system, able to maintain postural stability and position while operating the POV, and operate the on/off mechanism and the speed dial appropriately and safely. They are very willing and physically / cognitively able to use the recommended equipment to assist with community mobility. There is a mobility limitation that cannot be sufficiently and safely resolved by the use of an appropriately fitted cane or walker and they do not have sufficient upper extremity function to self-propel an optimally-configured manual wheelchair long distances during a typical day due to limitations in strength, endurance, range of motion, and coordination. This equipment is reasonable and necessary with reference to accepted standards of medical practice and treatment of this patient's condition and is not being recommended as a convenience item. Without this recommended equipment, she is highly likely to sustain injuries from falls which those costs far exceed the cost of the requested equipment.    Electronically signed by:  [employer]      Occupational Therapist, Assistive   [phone]      fax: [phone]      [email or web address]  [facility] [place] [address]  March 23, 2021    I have read and concur with the above recommendations.    Physician Printed Name __________________________________________    Physician SIgnature  _____________________________________________    Date of SIgnature ______________________________    Physician Phone  ______________________________

## 2021-03-24 ENCOUNTER — VIRTUAL VISIT (OUTPATIENT)
Dept: PSYCHIATRY | Facility: CLINIC | Age: 61
End: 2021-03-24
Payer: MEDICARE

## 2021-03-24 DIAGNOSIS — F33.2 SEVERE EPISODE OF RECURRENT MAJOR DEPRESSIVE DISORDER, WITHOUT PSYCHOTIC FEATURES (H): ICD-10-CM

## 2021-03-24 DIAGNOSIS — F41.1 GAD (GENERALIZED ANXIETY DISORDER): ICD-10-CM

## 2021-03-24 DIAGNOSIS — F98.8 ATTENTION DEFICIT DISORDER, UNSPECIFIED HYPERACTIVITY PRESENCE: ICD-10-CM

## 2021-03-24 PROCEDURE — 99214 OFFICE O/P EST MOD 30 MIN: CPT | Mod: 95 | Performed by: NURSE PRACTITIONER

## 2021-03-24 RX ORDER — CLONIDINE HYDROCHLORIDE 0.1 MG/1
0.1 TABLET ORAL AT BEDTIME
Qty: 30 TABLET | Refills: 1 | Status: SHIPPED | OUTPATIENT
Start: 2021-03-24 | End: 2021-05-05

## 2021-03-24 RX ORDER — ATOMOXETINE 60 MG/1
60 CAPSULE ORAL DAILY
Qty: 30 CAPSULE | Refills: 1 | Status: SHIPPED | OUTPATIENT
Start: 2021-03-24 | End: 2021-05-05 | Stop reason: DRUGHIGH

## 2021-03-24 RX ORDER — FLUOXETINE 40 MG/1
40 CAPSULE ORAL 2 TIMES DAILY
Qty: 60 CAPSULE | Refills: 1 | Status: SHIPPED | OUTPATIENT
Start: 2021-03-24 | End: 2021-05-05

## 2021-03-24 ASSESSMENT — ANXIETY QUESTIONNAIRES
5. BEING SO RESTLESS THAT IT IS HARD TO SIT STILL: NOT AT ALL
3. WORRYING TOO MUCH ABOUT DIFFERENT THINGS: SEVERAL DAYS
2. NOT BEING ABLE TO STOP OR CONTROL WORRYING: NOT AT ALL
7. FEELING AFRAID AS IF SOMETHING AWFUL MIGHT HAPPEN: NOT AT ALL
GAD7 TOTAL SCORE: 7
IF YOU CHECKED OFF ANY PROBLEMS ON THIS QUESTIONNAIRE, HOW DIFFICULT HAVE THESE PROBLEMS MADE IT FOR YOU TO DO YOUR WORK, TAKE CARE OF THINGS AT HOME, OR GET ALONG WITH OTHER PEOPLE: SOMEWHAT DIFFICULT
6. BECOMING EASILY ANNOYED OR IRRITABLE: NEARLY EVERY DAY
1. FEELING NERVOUS, ANXIOUS, OR ON EDGE: SEVERAL DAYS

## 2021-03-24 ASSESSMENT — PATIENT HEALTH QUESTIONNAIRE - PHQ9
5. POOR APPETITE OR OVEREATING: MORE THAN HALF THE DAYS
SUM OF ALL RESPONSES TO PHQ QUESTIONS 1-9: 7

## 2021-03-24 NOTE — PROGRESS NOTES
"Paula is a 60 year old who is being evaluated via a billable telephone visit.      What phone number would you like to be contacted at? [phone]  How would you like to obtain your AVS? Mail a copy          Outpatient Psychiatric Progress Note    Name: Paula Ho   : 1960                    Primary Care Provider: LEONA Marquez CNP   Therapist: Tamar Blakely    PHQ-9 scores:  PHQ-9 SCORE 2021 2021 3/24/2021   PHQ-9 Total Score 19 15 7       LIBRA-7 scores:  LIBRA-7 SCORE 2021 2021 3/24/2021   Total Score 10 13 7       Patient Identification:    Patient is a 60 year old year old, single  White American female  who presents for return visit with me.  Patient is currently unemployed. Patient attended the session alone. Patient prefers to be called: \"Paula\".    Interim History:    I last saw Paula Ho for outpatient psychiatry Return Visit on 2021.     During that appointment, she presented more hopeful today as she gathers equipment to make her life easier in her home.  Chronic pain continues and she is working now with the pain clinic to help give her relief.  She talked about receiving Adderall for attentional issues but instead I increased her atomoxetine to 60 mg daily.  She will continue mirtazapine 15 mg at bedtime, and fluoxetine 40 mg twice daily, and clonidine 0.1 mg at bedtime.  I asked her to take the clonazepam sparingly and she tells me she does not take it every day.  Loneliness and chronic pain make her symptoms worse.  She has been seeing Tamar Blakely for talk therapy to learn coping skills to manage her symptoms.. .     Current medications include: albuterol (PROAIR HFA) 108 (90 Base) MCG/ACT inhaler, Inhale 2 puffs into the lungs every 4 hours as needed for shortness of breath / dyspnea or wheezing  albuterol (PROVENTIL) (2.5 MG/3ML) 0.083% neb solution, NEBULIZE THE CONTENTS OF 1 VIAL EVERY 6 HOURS AS NEEDED.  atomoxetine (STRATTERA) 60 MG " capsule, Take 1 capsule (60 mg) by mouth daily  budesonide-formoterol (SYMBICORT) 160-4.5 MCG/ACT Inhaler, Inhale 2 puffs into the lungs 2 times daily  cetirizine (ZYRTEC) 10 MG tablet, Take 1 tablet (10 mg) by mouth daily  clonazePAM (KLONOPIN) 0.5 MG tablet, Take 0.5 mg by mouth 2 times daily as needed  cloNIDine (CATAPRES) 0.1 MG tablet, Take 1 tablet (0.1 mg) by mouth At Bedtime  clopidogrel (PLAVIX) 75 MG tablet,   diazepam (VALIUM) 5 MG tablet, Take 1 tablet (5 mg) by mouth once as needed (claustrophobia with MRI) Take 30-60 min before procedure.  FLUoxetine (PROZAC) 40 MG capsule, Take 1 capsule (40 mg) by mouth 2 times daily  levothyroxine (SYNTHROID/LEVOTHROID) 25 MCG tablet, Take 1 tablet (25 mcg) by mouth daily  losartan (COZAAR) 50 MG tablet, Take 50 mg by mouth daily  medical cannabis (Patient's own supply), See Admin Instructions (The purpose of this order is to document that the patient reports taking medical cannabis.  This is not a prescription, and is not used to certify that the patient has a qualifying medical condition.)  mirtazapine (REMERON) 15 MG tablet, Take 1 tablet (15 mg) by mouth At Bedtime  Multiple Vitamins-Minerals (MULTIVITAMIN ADULT PO),   NARCAN 4 MG/0.1ML nasal spray, INHALE VIA NASAL ROUTE FOR OVERDOSE AS NEEDED. MAY REPEAT AFTER 2-3 MINUTES  order for DME, Equipment being ordered: Nebulizer  oxyCODONE IR (ROXICODONE) 15 MG tablet, Take 7.5 mg by mouth 2 times daily as needed  pravastatin (PRAVACHOL) 40 MG tablet, Take 40 mg by mouth daily  tiotropium (SPIRIVA HANDIHALER) 18 MCG inhaled capsule, Inhale 1 capsule (18 mcg) into the lungs daily  tiZANidine (ZANAFLEX) 2 MG tablet, TK 2 TO 3 TS PO QHS  topiramate (TOPAMAX) 100 MG tablet, Take 1 tablet (100 mg) by mouth 2 times daily  vitamin D3 (CHOLECALCIFEROL) 2000 units (50 mcg) tablet, Take 1 tablet by mouth daily    No current facility-administered medications on file prior to visit.        The [place] Prescription Monitoring  Program has been reviewed and there are no concerns about diversionary activity for controlled substances at this time.      I was able to review most recent Primary Care Provider, specialty provider, and therapy visit notes that I have access to.     Today, patient reports that she has to do another Rule 25 and the AA class.  This is mandated by the pain clinic.  She has a hard time reading and writing.  The course lasts 5 weeks.  The program through [facility] in [place] ([place]).  She has to miss class due to pain clinic appointments.  She just started buprenorphine and still takes oxycodone.  A recent MRI resulted in neurosurgeon consult pending.  She does not feel safe where she lives and things have been getting stolen from her garage.  She has not been able to get the COVID vaccine.       has a past medical history of Acute respiratory failure (H) (02/01/2020).    Social history updates:    Paula lives alone    Substance use updates:    She denies alcohol use  Tobacco use: Yes Cigarettes  Ready to quit?  No  Nicotine Replacement Therapy tried: none     Vital Signs:   LMP  (LMP Unknown)     Labs:    Most recent laboratory results reviewed and no new labs.     Review of Systems:  10 systems (general, cardiovascular, respiratory, eyes, ENT, endocrine, GI, , M/S, neurological) were reviewed. Most pertinent finding(s) is/are: chronic jointpain, no chest pain, no shortness of breath. The remaining systems are all unremarkable.    Mental Status Examination:  Appearance:  awake, alert  Attitude:  cooperative   Eye Contact:  unable to assess  Gait and Station: No dizziness or falls and Uses walker  Psychomotor Behavior:  unable to assess  Oriented to:  time, person, and place  Attention Span and Concentration:  Normal  Speech:   pressured speech and Speaks: English  Mood:  anxious and depressed  Affect:  intensity is heightened  Associations:  no loose associations  Thought Process:  goal oriented and  tangental  Thought Content:  no evidence of suicidal ideation or homicidal ideation, no auditory hallucinations present and no visual hallucinations present  Recent and Remote Memory:  intact Not formally assessed. No amnesia.  Fund of Knowledge: appropriate  Insight:  fair  Judgment:  fair  Impulse Control:  fair    Suicide Risk Assessment:  Today Paula Ho reports that she is having no thoughts to want to end her life or to harm other people. In addition, there are notable risk factors for self-harm, including single status, anxiety and comorbid medical condition of Chronic pain. However, risk is mitigated by history of seeking help when needed, future oriented, denies suicidal intent or plan and denies homicidal ideation, intent, or plan. Therefore, based on all available evidence including the factors cited above, Paula Ho does not appear to be at imminent risk for self-harm, does not meet criteria for a 72-hr hold, and therefore remains appropriate for ongoing outpatient level of care.  A thorough assessment of risk factors related to suicide and self-harm have been reviewed and are noted above. The patient convincingly denies suicidality on several occasions. Local community safety resources printed and reviewed for patient to use if needed. There was no deceit detected, and the patient presented in a manner that was believable.     DSM5 Diagnosis:  296.32 (F33.1) Major Depressive Disorder, Recurrent Episode, Moderate _ and With mixed features  300.23 (F40.10) Social Anxiety Disorder  300.02 (F41.1) Generalized Anxiety Disorder  780.52 (G47.00) Insomnia Disorder   With ohter medical comorbidity  Recurrent      Medical comorbidities include:   Patient Active Problem List    Diagnosis Date Noted     Infection due to 2019 novel coronavirus 12/09/2020     Priority: Medium     Hyperlipidemia 04/14/2020     Priority: Medium     Benign essential hypertension 04/14/2020     Priority: Medium     MDD  (major depressive disorder), recurrent severe, without psychosis (H) 03/02/2020     Priority: Medium     Chronic obstructive pulmonary disease, unspecified COPD type (H) 02/04/2020     Priority: Medium     No PFTS  In EPIC       Attention deficit hyperactivity disorder (ADHD) 01/31/2020     Priority: Medium     Chronic midline low back pain with bilateral sciatica 01/31/2020     Priority: Medium     Brain aneurysm 12/03/2019     Priority: Medium     Dec 2017  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 Device is MR compatible to 3 BREANN 3/2020       History of subarachnoid hemorrhage 12/22/2017     Priority: Medium     H/O of SAH, Cam 2, Hunt-Thomas 1, from a ruptured 9mm left Pcomm aneurysm with a wide neck and a fetal left PCA arising from the aneurysm neck, s/p successful coil embolization 12/23/2017 by Dr. Otto Hurley       Chronic GERD 01/26/2017     Priority: Medium     Chronic knee pain 12/12/2014     Priority: Medium     Cigarette smoker 07/08/2014     Priority: Medium     Chronic, continuous use of opioids 04/24/2013     Priority: Medium     Managed by pain clinic outside of [place].  UDS + cocaine in December 2019 without confirmation testing at her pain clinic per patient report       Obesity, Class III, BMI 40-49.9 (morbid obesity) (H) 03/27/2013     Priority: Medium     Status post knee surgery 03/27/2013     Priority: Medium     Per patient's recollection:  Circa 2002: right knee arthroscopy (Dr. Pappas)  Circa 2004: right knee partial knee replacement (Iam Ritchie MD)  Circa 2006: right TKA (Ron Ryder MD; [facility])  Circa 2008: right TKA revision due to infection (Ron Ryder MD; [facility])  Circa 2009: right TKA revision due to infection (Ron Ryder MD; [facility])  April 2011: right TKA (Ron Ryder MD; [facility])       LIBRA (generalized anxiety disorder) 09/28/2011     Priority: Medium     Chronic pain  09/28/2011     Priority: Medium     Knee and low back         DJD (degenerative joint disease) 09/28/2011     Priority: Medium     Insomnia 04/13/2011     Priority: Medium     Insomnia  NOS       Tobacco use disorder 07/31/2006     Priority: Medium     Asthma 11/15/2004     Priority: Medium     Asthma  NOS         Assessment:    Paula Ho is not feeling safe in her current living situation.  Items have been consistently stolen from her garage.  She continues to have difficulty sleeping.  She is not happy with weight gain from the mirtazapine and that has been discontinued.  She will continue atomoxetine for her attentional deficits.  Fluoxetine is scheduled to be taken at 40 mg twice daily.  To help her sleep at night clonidine is ordered at 0.1 mg dose she continues receiving behavioral health home care services from a  to help her access community resources.  Paula feels like this service has been helpful.    Medication side effects and alternatives were reviewed. Health promotion activities recommended and reviewed today. All questions addressed. Education and counseling completed regarding risks and benefits of medications and psychotherapy options.    Treatment Plan:        1.  See Tamar Blakely for talk therapy when able to    2.  Discontinue  mirtazapine    3.  Continue atomoxetine     4.  Continue fluoxetine 40 mg twice daily    5.  Take clonazepam sparingly    6.  Continue clonidine 0.1 mg at bedtime    7.  Continue behavior health home care services      Continue all other medications as reviewed per electronic medical record today.     Safety plan reviewed. To the Emergency Department as needed or call after hours crisis line at [phone] or [phone]. [place] Crisis Text [place]. Text [place] to 788762 or Suicide LifeLine Chat: suicidepreventionlifeline.org/chat/    To schedule individual or family therapy, call [facility] at [phone].    Schedule an appointment  with me in 6 weeks or sooner as needed. Call [facility] at [phone] to schedule.    Follow up with primary care provider as planned or for acute medical concerns.    Call the psychiatric nurse line with medication questions or concerns at [phone].    MyChart may be used to communicate with your provider, but this is not intended to be used for emergencies.    Crisis Resources:    National Suicide Prevention Lifeline: 747.514.3445 (TTY: 344.153.7265). Call anytime for help.  (www.suicidepreventionlifeline.org)  National [place] on Mental Illness (www.raudel.org): 489.410.2619 or 047-247-0943.   Mental Health Association (www.mentalhealth.org): 886.808.2487 or 729-760-7876.  [place] Crisis Text [place]: Text [place] to 071464  Suicide LifeLine Chat: suicidepreSpayeeline.org/chat    Administrative Billing:   Time spent with patient was 30 minutes and greater than 50% of time or 20 minutes was spent in counseling and coordination of care regarding above diagnoses and treatment plan.    Patient Status:  Patient will continue to be seen for ongoing consultation and stabilization.    Signed:   VIRGILIO Kidd-BC   Psychiatry

## 2021-03-24 NOTE — PATIENT INSTRUCTIONS
1.  See Tamar Blakely for talk therapy when able to    2.  Discontinue  mirtazapine    3.  Continue atomoxetine     4.  Continue fluoxetine 40 mg twice daily    5.  Take clonazepam sparingly    6.  Continue clonidine 0.1 mg at bedtime    7.  Continue behavior health home care services      Continue all other medications as reviewed per electronic medical record today.     Safety plan reviewed. To the Emergency Department as needed or call after hours crisis line at [phone] or [phone]. [place] Crisis Text [place]. Text [place] to 763988 or Suicide LifeLine Chat: suicideJigsaw.org/chat/    To schedule individual or family therapy, call [facility] at [phone].    Schedule an appointment with me in 6 weeks or sooner as needed. Call [facility] at [phone] to schedule.    Follow up with primary care provider as planned or for acute medical concerns.    Call the psychiatric nurse line with medication questions or concerns at [phone].    Responde Aihart may be used to communicate with your provider, but this is not intended to be used for emergencies.    Crisis Resources:    National Suicide Prevention Lifeline: 742.927.9785 (TTY: 519.426.2747). Call anytime for help.  (www.suicidepreventionlifeline.org)  National [place] on Mental Illness (www.raudel.org): 122.143.8650 or 959-633-0578.   Mental Health Association (www.mentalhealth.org): 123.775.6249 or 928-138-1725.  [place] Crisis Text [place]: Text [place] to 297312  Suicide LifeLine Chat: suicideJigsaw.org/chat

## 2021-03-25 ENCOUNTER — VIRTUAL VISIT (OUTPATIENT)
Dept: FAMILY MEDICINE | Facility: CLINIC | Age: 61
End: 2021-03-25
Payer: MEDICARE

## 2021-03-25 ENCOUNTER — ALLIED HEALTH/NURSE VISIT (OUTPATIENT)
Dept: BEHAVIORAL HEALTH | Facility: CLINIC | Age: 61
End: 2021-03-25
Payer: MEDICARE

## 2021-03-25 ENCOUNTER — TELEPHONE (OUTPATIENT)
Dept: BEHAVIORAL HEALTH | Facility: CLINIC | Age: 61
End: 2021-03-25

## 2021-03-25 DIAGNOSIS — R69 DIAGNOSIS DEFERRED: Primary | ICD-10-CM

## 2021-03-25 DIAGNOSIS — R07.89 CHEST TIGHTNESS: Primary | ICD-10-CM

## 2021-03-25 DIAGNOSIS — J44.9 CHRONIC OBSTRUCTIVE PULMONARY DISEASE, UNSPECIFIED COPD TYPE (H): ICD-10-CM

## 2021-03-25 PROCEDURE — 99441 PR PHYSICIAN TELEPHONE EVALUATION 5-10 MIN: CPT | Mod: 95 | Performed by: NURSE PRACTITIONER

## 2021-03-25 ASSESSMENT — ANXIETY QUESTIONNAIRES: GAD7 TOTAL SCORE: 7

## 2021-03-25 NOTE — Clinical Note
"Anson Escoto - I was able to connect with patient today. Here is what she reports,\"Patient reports she received phone call from [facility] provider at [facility]. Patient reports she talking with them later today to schedule appt.\"    I will continue to monitor progress with this.    Take care,  JASON [facility]  Behavioral Health Home ([facility])   [facility]  [phone]        "

## 2021-03-25 NOTE — PROGRESS NOTES
"Paula is a 60 year old who is being evaluated via a billable telephone visit.      What phone number would you like to be contacted at? [phone]  How would you like to obtain your AVS? Mail a copy    Assessment & Plan     Chest tightness  Severe x3 days. Needs to be seen now. I recommended emergency department. She is in agreement and will go. Discussed calling 911 although it sounds like she will get a ride. Doesn't sound to be in distress on the phone.     Chronic obstructive pulmonary disease, unspecified COPD type (H)  Started workup last year but then COVID hit hard and her testing was postponed. Will re-refer for ongoing issues.   - PULMONARY MEDICINE REFERRAL             Return in about 1 day (around 3/26/2021) for ED now.    LEONA Marquez CNP [facility]    Igor Mitchell is a 60 year old who presents for the following health issues     HPI     Chest Pain  Onset/Duration: 3 days  Description:   Location: middle of breasts  Character: tight  Radiation: none  Duration: constant   Intensity: severe  Progression of Symptoms: worsening  Accompanying Signs & Symptoms:  Shortness of breath: YES-feels like a balloon going to pop at anytime  Sweating: YES, not new problem  Nausea/vomiting: no  Lightheadedness: no  Palpitations: no  Fever/Chills: no  Cough: no           Heartburn: no  History:   Family history of heart disease: no  Tobacco use: YES  Previous similar symptoms: no   Precipitating factors:   Worse with exertion: YES  Worse with deep breaths: YES           Related to eating: no           Better with burping: no  Alleviating factors: breathing  Therapies tried and outcome: none    \"My chest is killing me. It's so tight it feels like it's going to explode.\"  \"Feels like a balloon is blowing up and won't pop\"  Breathing sometimes feels shallow.  Breathing right now is ok. Last night it wasn't ok  Taking spiriva, albuterol, symbicort  Hasn't been smoking because of " lungs  No known exposures to COVID - already had it in Dec    Ongoing breathing issues - has seen cardiology. Was seeing pulmonology but then COVID was bad and testing didn't get completed. She needs to get back in with them. Has had EKG, CTA angio coronary artery and CT chest for PE which have all been unremarkable    Sees back specialist on Tuesday for second opinion  Just had injections on back few weeks ago - lasted 3 days    Review of Systems   Constitutional, HEENT, cardiovascular, pulmonary, gi and gu systems are negative, except as otherwise noted.      Objective           Vitals:  No vitals were obtained today due to virtual visit.    Physical Exam   healthy, alert and no distress  PSYCH: Alert and oriented times 3; coherent speech, normal   rate and volume, able to articulate logical thoughts, able   to abstract reason, no tangential thoughts, no hallucinations   or delusions  Her affect is normal  RESP: No cough, no audible wheezing, able to talk in full sentences  Remainder of exam unable to be completed due to telephone visits                Phone call duration: 8 minutes
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
## 2021-03-29 ENCOUNTER — VIRTUAL VISIT (OUTPATIENT)
Dept: BEHAVIORAL HEALTH | Facility: CLINIC | Age: 61
End: 2021-03-29
Payer: MEDICARE

## 2021-03-29 DIAGNOSIS — F33.2 MDD (MAJOR DEPRESSIVE DISORDER), RECURRENT SEVERE, WITHOUT PSYCHOSIS (H): Primary | ICD-10-CM

## 2021-03-29 DIAGNOSIS — F41.1 GAD (GENERALIZED ANXIETY DISORDER): ICD-10-CM

## 2021-03-29 NOTE — PROGRESS NOTES
Called patient regarding today's scheduled appointment.  She would like to cancel due to not feeling well today.   Rescheduled.   Fang)RASHEED Blakely,[facility]

## 2021-04-01 ENCOUNTER — DOCUMENTATION ONLY (OUTPATIENT)
Dept: FAMILY MEDICINE | Facility: CLINIC | Age: 61
End: 2021-04-01

## 2021-04-01 NOTE — PROGRESS NOTES
Scooter evaluation and order received. I completed paperwork and will fax back via [employer]. Note that I only faxes back the pages that needed my signature. please combine with the rest in clinic (I was sent the entire packet via [employer]). I can bring hard copy of it all next Monday. Please make copy and send for abstraction and then complete.  LEONA Marquez CNP

## 2021-04-08 ENCOUNTER — ALLIED HEALTH/NURSE VISIT (OUTPATIENT)
Dept: BEHAVIORAL HEALTH | Facility: CLINIC | Age: 61
End: 2021-04-08
Payer: MEDICARE

## 2021-04-08 DIAGNOSIS — R69 DIAGNOSIS DEFERRED: Primary | ICD-10-CM
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
## 2021-04-22 ENCOUNTER — ALLIED HEALTH/NURSE VISIT (OUTPATIENT)
Dept: BEHAVIORAL HEALTH | Facility: CLINIC | Age: 61
End: 2021-04-22
Payer: MEDICARE

## 2021-04-22 DIAGNOSIS — R69 DIAGNOSIS DEFERRED: Primary | ICD-10-CM
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
## 2021-05-05 ENCOUNTER — VIRTUAL VISIT (OUTPATIENT)
Dept: PSYCHIATRY | Facility: CLINIC | Age: 61
End: 2021-05-05
Payer: MEDICARE

## 2021-05-05 DIAGNOSIS — F33.2 SEVERE EPISODE OF RECURRENT MAJOR DEPRESSIVE DISORDER, WITHOUT PSYCHOTIC FEATURES (H): Primary | ICD-10-CM

## 2021-05-05 DIAGNOSIS — F98.8 ATTENTION DEFICIT DISORDER, UNSPECIFIED HYPERACTIVITY PRESENCE: ICD-10-CM

## 2021-05-05 DIAGNOSIS — F41.1 GAD (GENERALIZED ANXIETY DISORDER): ICD-10-CM

## 2021-05-05 PROCEDURE — 99214 OFFICE O/P EST MOD 30 MIN: CPT | Mod: 95 | Performed by: NURSE PRACTITIONER

## 2021-05-05 RX ORDER — FLUOXETINE 40 MG/1
40 CAPSULE ORAL 2 TIMES DAILY
Qty: 60 CAPSULE | Refills: 1 | Status: SHIPPED | OUTPATIENT
Start: 2021-05-05 | End: 2021-05-17

## 2021-05-05 RX ORDER — ATOMOXETINE 80 MG/1
80 CAPSULE ORAL DAILY
Qty: 30 CAPSULE | Refills: 1 | Status: SHIPPED | OUTPATIENT
Start: 2021-05-05 | End: 2021-05-17

## 2021-05-05 RX ORDER — CLONIDINE HYDROCHLORIDE 0.1 MG/1
0.1 TABLET ORAL AT BEDTIME
Qty: 30 TABLET | Refills: 1 | Status: SHIPPED | OUTPATIENT
Start: 2021-05-05 | End: 2021-05-17

## 2021-05-05 ASSESSMENT — ANXIETY QUESTIONNAIRES
IF YOU CHECKED OFF ANY PROBLEMS ON THIS QUESTIONNAIRE, HOW DIFFICULT HAVE THESE PROBLEMS MADE IT FOR YOU TO DO YOUR WORK, TAKE CARE OF THINGS AT HOME, OR GET ALONG WITH OTHER PEOPLE: VERY DIFFICULT
1. FEELING NERVOUS, ANXIOUS, OR ON EDGE: SEVERAL DAYS
7. FEELING AFRAID AS IF SOMETHING AWFUL MIGHT HAPPEN: NOT AT ALL
GAD7 TOTAL SCORE: 8
2. NOT BEING ABLE TO STOP OR CONTROL WORRYING: SEVERAL DAYS
5. BEING SO RESTLESS THAT IT IS HARD TO SIT STILL: NOT AT ALL
3. WORRYING TOO MUCH ABOUT DIFFERENT THINGS: NOT AT ALL
6. BECOMING EASILY ANNOYED OR IRRITABLE: NEARLY EVERY DAY

## 2021-05-05 ASSESSMENT — PATIENT HEALTH QUESTIONNAIRE - PHQ9
SUM OF ALL RESPONSES TO PHQ QUESTIONS 1-9: 17
5. POOR APPETITE OR OVEREATING: NEARLY EVERY DAY

## 2021-05-05 NOTE — PATIENT INSTRUCTIONS
1.  See Tamar Blakely for talk therapy     2.  Continue  Behavior health home care services    3.  Increase atomoxetine to 80 mg daily    4.  Continue fluoxetine 40 mg twice daily    5.  Take clonazepam sparingly    6.  Continue clonidine 0.1 mg at bedtime       Continue all other medications as reviewed per electronic medical record today.     Safety plan reviewed. To the Emergency Department as needed or call after hours crisis line at [phone] or [phone]. [place] Crisis Text [place]. Text [place] to [address]2006 or Suicide LifeLine Chat: suicideSnapflow.org/chat/    To schedule individual or family therapy, call [facility] at [phone].    Schedule an appointment with me in June or sooner as needed. Call [facility] at [phone] to schedule.    Follow up with primary care provider as planned or for acute medical concerns.    Call the psychiatric nurse line with medication questions or concerns at [phone].    Scaleformhart may be used to communicate with your provider, but this is not intended to be used for emergencies.    Crisis Resources:    National Suicide Prevention Lifeline: 843.336.9399 (TTY: 759.742.3554). Call anytime for help.  (www.suicidepreventionlifeline.org)  National [place] on Mental Illness (www.raudel.org): 613.157.3159 or 560-435-1212.   Mental Health Association (www.mentalhealth.org): 699.386.1647 or 056-081-8405.  [place] Crisis Text [place]: Text [place] to 000648  Suicide LifeLine Chat: suicideSnapflow.org/chat
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
## 2021-05-06 ENCOUNTER — VIRTUAL VISIT (OUTPATIENT)
Dept: BEHAVIORAL HEALTH | Facility: CLINIC | Age: 61
End: 2021-05-06
Payer: MEDICARE

## 2021-05-06 DIAGNOSIS — F33.2 MDD (MAJOR DEPRESSIVE DISORDER), RECURRENT SEVERE, WITHOUT PSYCHOSIS (H): ICD-10-CM

## 2021-05-06 DIAGNOSIS — F41.1 GAD (GENERALIZED ANXIETY DISORDER): Primary | ICD-10-CM

## 2021-05-06 PROCEDURE — 90832 PSYTX W PT 30 MINUTES: CPT | Mod: 95 | Performed by: SOCIAL WORKER

## 2021-05-06 ASSESSMENT — ANXIETY QUESTIONNAIRES: GAD7 TOTAL SCORE: 8

## 2021-05-06 NOTE — PROGRESS NOTES
"Paula Ho is a 60 year old female who is being evaluated via a telephone visit.      The patient has been notified of the following:     \"We have found that certain health care needs can be provided without the need for a face to face visit.  This service lets us provide the care you need with a short phone conversation.      I will have full access to your [facility] medical record during this entire phone call.   I will be taking notes for your medical record.     Since this is like an office visit, we will bill your insurance company for this service.  Please check with your medical insurance if this type of telephone visit/virtual care is covered.  You may be responsible for the cost of this service if insurance coverage is denied.      There are potential benefits and risks of telephone visits (e.g. limits to patient confidentiality) that differ from in-person visits.?  Confidentiality still applies for telephone services, and nobody will record the visit.  It is important to be in a quiet, private space that is free of distractions (including cell phone or other devices) during the visit.??     If during the course of the call I believe a telephone visit is not appropriate, you will not be charged for this service\"    Consent has been obtained for this service by care team member: yes.    Start time: 2 pm    End time: 230 pm    [facility] Primary Care: Integrated Behavioral Health  May 6, 2021      Behavioral Health Clinician Progress Note    Patient Name: Paula Ho           Service Type:  Phone Visit      Service Location:   Phone call (patient / identified key support person reached)        Session Length: 16 - 37      Attendees: Patient    Visit Activities (Refresh list every visit): [facility] Only    Diagnostic Assessment Date: 8/3/2020 by Tigist Manjarrez NP Collaborative Psychiatry  Treatment Plan Review Date: not completed  See Flowsheets for today's PHQ-9 and LIBRA-7 " results  Previous PHQ-9:   PHQ-9 SCORE 2/23/2021 3/24/2021 5/5/2021   PHQ-9 Total Score 15 7 17     Previous LIBRA-7:   LIBRA-7 SCORE 2/23/2021 3/24/2021 5/5/2021   Total Score 13 7 8       ARNALDO LEVEL:  No flowsheet data found.    DATA  Extended Session (60+ minutes): No  Interactive Complexity: No  Crisis: No  [facility] Patient: Yes, addressed the follow [facility] Core Component(s):                          Health and Wellness Promotion    Treatment Objective(s) Addressed in This Session:  Target Behavior(s): disease management/lifestyle changes decrease symptoms related to chronic pain    Depressed Mood: Increase interest, engagement, and pleasure in doing things  Decrease frequency and intensity of feeling down, depressed, hopeless  Improve quantity and quality of night time sleep / decrease daytime naps  Feel less tired and more energy during the day   Improve diet, appetite, mindful eating, and / or meal planning  Identify negative self-talk and behaviors: challenge core beliefs, myths, and actions  Improve concentration, focus, and mindfulness in daily activities   Feel less fidgety, restless or slow in daily activities / interpersonal interactions  Anxiety: will experience a reduction in anxiety, will develop more effective coping skills to manage anxiety symptoms, will develop healthy cognitive patterns and beliefs and will increase ability to function adaptively  Relationship Problems: will address relationship difficulties in a more adaptive manner  Functional Impairment: will effectively address problems that interfere with adaptive functioning  Psychological distress related to Pain    Current Stressors / Issues:  Patient reports symptoms continue. She continues struggling with pain that affects her depression. Discussed ongoing relationship conflict that leads to increased depression and urges to self-harm.  Focused on DBT skills- emotion regulation and interpersonal effectiveness to decrease intense emotions and set  personal boundaries. Patient will return in three weeks or earlier as needed.     Progress on Treatment Objective(s) / Homework:  Worsening - PREPARATION (Decided to change - considering how); Intervened by negotiating a change plan and determining options / strategies for behavior change, identifying triggers, exploring social supports, and working towards setting a date to begin behavior change    Motivational Interviewing    MI Intervention: Expressed Empathy/Understanding, Supported Autonomy, Collaboration, Evocation, Permission to raise concern or advise, Open-ended questions, Change talk (evoked) and Reframe     Change Talk Expressed by the Patient: Desire to change Need to change Committment to change    Provider Response to Change Talk: E - Evoked more info from patient about behavior change, A - Affirmed patient's thoughts, decisions, or attempts at behavior change, R - Reflected patient's change talk and S - Summarized patient's change talk statements      Care Plan review completed: No    Medication Review:  No changes to current psychiatric medication(s)    Medication Compliance:  Yes    Changes in Health Issues:   Yes: Pain, Associated Psychological Distress    Chemical Use Review:   Substance Use: Chemical use reviewed, no active concerns identified      Tobacco Use: Yes, first time assessed.  Patient reports frequency of use daily. Patient declined discussion at this time    Assessment: Current Emotional / Mental Status (status of significant symptoms):  Risk status (Self / Other harm or suicidal ideation)  Patient has had a history of suicidal ideation: since childhood and suicide attempts: patient reports history although does not want to discuss  Patient denies current fears or concerns for personal safety.  Patient denies current or recent suicidal ideation or behaviors.  Patient denies current or recent homicidal ideation or behaviors.  Patient reports current or recent self injurious behavior or  ideation including has had urges to self-harm - burn her arms as she has is the past. Patient has not given in to urges and has been able to talk to support people about urges.  Patient denies other safety concerns.  A safety and risk management plan has not been developed at this time, however patient was encouraged to call [facility] should there be a change in any of these risk factors.    Appearance:   phone visit   Eye Contact:   phone visit   Psychomotor Behavior: phone visit   Attitude:   Cooperative  Interested Attentive  Orientation:   All  Speech   Rate / Production: Normal    Volume:  Normal   Mood:    Anxious  Depressed  Sad   Affect:    phone visit   Thought Content:  Clear  Rumination   Thought Form:  Coherent  Logical   Insight:    adequate for safety during session    Diagnoses:  1. LIBRA (generalized anxiety disorder)    2. MDD (major depressive disorder), recurrent severe, without psychosis (H)        Collateral Reports Completed:  Routed note to PCP    Plan: (Homework, other):  Patient was  scheduled a follow up appointment with the clinic [facility] in 3 weeks.  She was also given deep Breathing Strategies to practice when experiencing anxiety and depression.  CD Recommendations: No indications of CD issues.  RASHEED Ayala (Mindy),[facility]

## 2021-05-08 NOTE — TELEPHONE ENCOUNTER
Lm for patient, informed her of note below      Gin Torres    [facility]     
Pt called back stated was in the hospital and did not receive message. Informed of message below again. She is aware.      Radha DIAZ    [facility] [place]    
Recommend that the patient is scheduled for an in-person clinic visit when able to. As noted below by Dr. Funk she should continue working with her current pain clinic until then. It would be helpful to have a DEVORAH done and records available to review prior to her appointment.     Makayla Lee MD  [facility]   
Routing to determine if this can be scheduled as a virtual visit.    Jennifer SALDANA    [facility]    
Says she is unable to figure out the technology to link into group with email Says she wants to stay in group. Identifies she is ok with calling into group today.   
We re taking every precaution to prevent the spread of COVID-19. Our top priority is to protect and care for our patients.     We are reviewing all new patients to determine if their visit is considered essential. We are balancing helping patients with chronic pain with the safety of our patients and staff.    In review of this upcoming new visit, the determination is:  This is non-essential- please cancel    -patient is currently at another pain clinic, and needs to work with them for the time being.  -we can facilitate things but sending out a DEVORAH for records from that pain clinic, so we have them for our first visit.  -given information in the chart, please remind that we are not prescribing first visit, and continue to obtain medications from her current clinic until instructed otherwise.        This will be sent to our  staff to adjust schedule as determined by provider.    Mandie Funk MD on 3/19/2020 at 11:17 AM   
n/a
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
## 2021-05-14 ENCOUNTER — ALLIED HEALTH/NURSE VISIT (OUTPATIENT)
Dept: BEHAVIORAL HEALTH | Facility: CLINIC | Age: 61
End: 2021-05-14
Payer: MEDICARE

## 2021-05-14 ENCOUNTER — TRANSFERRED RECORDS (OUTPATIENT)
Dept: HEALTH INFORMATION MANAGEMENT | Facility: CLINIC | Age: 61
End: 2021-05-14

## 2021-05-14 DIAGNOSIS — R69 DIAGNOSIS DEFERRED: Primary | ICD-10-CM

## 2021-05-14 NOTE — Clinical Note
Anson Hollis I was able to meet with this patient today and get an appt scheduled with you for end of July. She may need refills before then. Can someone from your team review and reach out?    Thank you,  JASON Mann [facility]  [facility] [place] ([facility])   [facility]  [phone]

## 2021-05-14 NOTE — PROGRESS NOTES
[facility]  [facility]: [place]        Social Work Care Navigator Note      Patient: Paula Ho  Date: May 14, 2021  Preferred Name: Paula    Previous PHQ-9:   PHQ-9 SCORE 2/23/2021 3/24/2021 5/5/2021   PHQ-9 Total Score 15 7 17     Previous LIBRA-7:   LIBRA-7 SCORE 2/23/2021 3/24/2021 5/5/2021   Total Score 13 7 8     ARNALDO LEVEL:  No flowsheet data found.    Preferred Contact:  Need for : No  Preferred Contact: Cell      Type of Contact Today: Phone call (patient / identified key support person reached)      Data: (subjective / Objective):  Recent ED/IP Admission or Discharge?   04/29/2021 [facility]    Patient Goals:  Goal Areas: Health;Mental Health;Financial and Social Service Benefits;Transportation  Patient stated goals: Patient would like assistance with her physical and mental health for creating a balanced and healthy lifestyle. Patient would like assistance with continued SSDI and social service assistance to aid with county benefits to increase her financial stability. Patient would like assistance with additional transportation services, such as [employer] for reliable transportation to get to her appointments, run errands and get out into the community.         [facility] Core Service Provided:  Comprehensive Care Management: utilized the electronic medical record / patient registry to identify and support patient's health conditions / needs more effectively   Care Transitions: focused on the coordinated and seamless movement of patient between or within different levels of care or settings  Care Coordination: provided care management services/referrals necessary to ensure patient and their identified supports have access to medical, behavioral health, pharmacology and recovery support services.  Ensured that patient's care is integrated across all settings and services.   Individual and Family Support: aimed to help clients reduce barriers to achieving goals, increase  health literacy and knowledge about chronic condition(s), increase self-efficacy skills, and improve health outcomes  Referral to Community and Social Support Services: Provided patient with referrals (see plan section)  Assisted in scheduling an appointment to a referral / service (see plan section)  Accompanied patient to an appointment / service  Followed-up with patient on whether or not they completed a referral    Current Stressors / Issues / Care Plan Objective Addressed Today:  [facility] and patient were able to meet today for Behavioral Health Home ([facility]) monthly check-in via telehealth visit. All required ROIs have been filed with HIM/patient chart.    1. Patient reports she is feeling ok since her recent ED visit. Patient reports she was recommended to follow-up with general surgeon to have her gall bladder removed. Patient reports she has an appt today at 3pm with general surgeon to follow-up and discuss future treatment plan.    1. Patient reports in the last two weeks she has seen her ex and she has told him to stay away from her and she cannot be his only support. Patient reports in the past week she has had peace with this and he has not come around. Patient reports she feels safe in her home. Patient reports her landlord is moving onsite and reports she will feel even more secure when this happens. [facility] provided support/empathy, motivational interviewing and continued to discuss healthy boundaries/relationships.    Patient reports she appreciated meeting with Behavioral Health Clinician therapist and has another appt at the end of May.      2. Patient reports she will be visiting her mother this weekend to celebrate her birthday. Patient reports she continues to have complicated relationship with her mother. [facility] provided support/empathy and coping skills today. [facility] was able to set up appt with Behavioral Health Clinician for additional support.     3. Patient reports she continues to meet with her [facility]  provider from [facility]. Patient reports she graduated from treatment at  [facility]. Patient reports they are wanting her to complete another additional 30-day after care program every week from 6-8:30pm. Patient reports she does not want to do this and is wondering if she is required to do this. [facility] encouraged patient to follow-up with her provider at the pain clinic to see if this continues to be part of her engagement/requirement with her new pain clinic. Patient is in agreement with this and will discuss with provider. [facility] to follow-up with patient at next appt.      4. Patient reports she continues to go to her new pain clinic every Wednesday to check in and complete UAs. Patient reports she believes in the next few weeks she may be able to drop her appointments to every other week. Patient reports having to go there each week can be difficult with transportation.      5. Patient reports she is taking medications as directed, with no missed doses but does need refills. Patient reports mood is up and down with anxiety increased over the past two weeks. Patient reports she has had two panic attacks in the past two weeks. Patient reports she needs to make an appt with her psychiatry provider. [facility] was able to schedule appt and messaged provider about refills/updates.     6. Patient reports she has not received her scooter but it should be arriving in another week or so. Patient reports she is changing her PCAs and reports her landlord offered to be one of her PCAs, as she has two right now. Patient reports she will keep one of her two current PCAs and hopes her landlord will take the other hours she has available for PCA. [facility] to monitor and follow-up with patient.     7. Patient reports she is needing assistance with utilities and would like [facility] to email resources. [facility] emailed resources for [employer] and will follow-up with patient at next visit.    Intervention:  Motivational  Interviewing: Expressed Empathy/Understanding, Supported Autonomy, Collaboration, Evocation, Permission to raise concern or advise, Open-ended questions, Reflections: simple and complex, Rolled with resistance: Emphasized patient autonomy, Simple reflection, Shifted topic to defuse resistance, Reframed sustain talk in the direction of change and Evoked patient agenda, Change talk (evoked) and Reframe   Target Behavior(s): Explored thoughts about taking an anti-depressant, Explored and resolved challenges related to taking anti-depressants as prescribed, Explored thoughts and readiness to participate in individual therapy, Explored and resolved challenges to attending appointments as scheduled, Explored current social supports and reinforced opportunities to increase engagement and Explored current sleep hygeine and patient's perception and knowledge of relationship to mood    Assessment: (Progress on Goals / Homework):  Patient would benefit from continued coordination in reaching their goals set for the Behavioral Health Home ([facility]) program. [facility] reviewed Health Action Plan goals and will continue to monitor progress and work with patient and their care team.      Plan: (Homework, other):  Patient was encouraged to continue to seek condition-related information and education.      Scheduled a Phone follow up appointment with MISA RICE in 3 weeks     Patient has set self-identified goals and will monitor progress until the next appointment on: 06/03/2021.         JASON Mann [facility]  [facility] Home ([facility])   [facility]  [phone]                  Next 5 appointments (look out 90 days)    Jul 28, 2021  9:00 AM  Office Visit with PFT LAB  [facility] ([facility] [address] [place] [address] [phone]

## 2021-05-17 DIAGNOSIS — F41.1 GAD (GENERALIZED ANXIETY DISORDER): ICD-10-CM

## 2021-05-17 DIAGNOSIS — F98.8 ATTENTION DEFICIT DISORDER, UNSPECIFIED HYPERACTIVITY PRESENCE: ICD-10-CM

## 2021-05-17 DIAGNOSIS — F33.2 SEVERE EPISODE OF RECURRENT MAJOR DEPRESSIVE DISORDER, WITHOUT PSYCHOTIC FEATURES (H): ICD-10-CM

## 2021-05-17 RX ORDER — ATOMOXETINE 80 MG/1
80 CAPSULE ORAL DAILY
Qty: 30 CAPSULE | Refills: 1 | Status: SHIPPED | OUTPATIENT
Start: 2021-05-17 | End: 2021-09-20 | Stop reason: DRUGHIGH

## 2021-05-17 RX ORDER — CLONIDINE HYDROCHLORIDE 0.1 MG/1
0.1 TABLET ORAL AT BEDTIME
Qty: 30 TABLET | Refills: 1 | Status: SHIPPED | OUTPATIENT
Start: 2021-05-17 | End: 2021-08-25

## 2021-05-17 RX ORDER — FLUOXETINE 40 MG/1
40 CAPSULE ORAL 2 TIMES DAILY
Qty: 60 CAPSULE | Refills: 1 | Status: SHIPPED | OUTPATIENT
Start: 2021-05-17 | End: 2021-09-20

## 2021-05-20 ENCOUNTER — OFFICE VISIT (OUTPATIENT)
Dept: FAMILY MEDICINE | Facility: CLINIC | Age: 61
End: 2021-05-20
Payer: MEDICARE

## 2021-05-20 VITALS
SYSTOLIC BLOOD PRESSURE: 122 MMHG | HEIGHT: 66 IN | BODY MASS INDEX: 43.55 KG/M2 | HEART RATE: 82 BPM | DIASTOLIC BLOOD PRESSURE: 78 MMHG | OXYGEN SATURATION: 95 % | WEIGHT: 271 LBS | RESPIRATION RATE: 20 BRPM | TEMPERATURE: 98.7 F

## 2021-05-20 DIAGNOSIS — K80.12 CALCULUS OF GALLBLADDER WITH ACUTE ON CHRONIC CHOLECYSTITIS WITHOUT OBSTRUCTION: ICD-10-CM

## 2021-05-20 DIAGNOSIS — I10 BENIGN ESSENTIAL HYPERTENSION: Chronic | ICD-10-CM

## 2021-05-20 DIAGNOSIS — E78.2 MIXED HYPERLIPIDEMIA: Chronic | ICD-10-CM

## 2021-05-20 DIAGNOSIS — G89.29 CHRONIC MIDLINE LOW BACK PAIN WITH BILATERAL SCIATICA: Chronic | ICD-10-CM

## 2021-05-20 DIAGNOSIS — E66.01 OBESITY, CLASS III, BMI 40-49.9 (MORBID OBESITY) (H): Chronic | ICD-10-CM

## 2021-05-20 DIAGNOSIS — Z01.818 PREOP GENERAL PHYSICAL EXAM: Primary | ICD-10-CM

## 2021-05-20 DIAGNOSIS — F17.200 TOBACCO USE DISORDER: Chronic | ICD-10-CM

## 2021-05-20 DIAGNOSIS — J44.9 CHRONIC OBSTRUCTIVE PULMONARY DISEASE, UNSPECIFIED COPD TYPE (H): Chronic | ICD-10-CM

## 2021-05-20 DIAGNOSIS — M54.42 CHRONIC MIDLINE LOW BACK PAIN WITH BILATERAL SCIATICA: Chronic | ICD-10-CM

## 2021-05-20 DIAGNOSIS — M54.41 CHRONIC MIDLINE LOW BACK PAIN WITH BILATERAL SCIATICA: Chronic | ICD-10-CM

## 2021-05-20 DIAGNOSIS — Z11.59 NEED FOR HEPATITIS C SCREENING TEST: ICD-10-CM

## 2021-05-20 DIAGNOSIS — F17.200 NICOTINE DEPENDENCE, UNCOMPLICATED, UNSPECIFIED NICOTINE PRODUCT TYPE: ICD-10-CM

## 2021-05-20 DIAGNOSIS — Z11.4 SCREENING FOR HIV (HUMAN IMMUNODEFICIENCY VIRUS): ICD-10-CM

## 2021-05-20 PROBLEM — E78.5 HYPERLIPIDEMIA: Chronic | Status: ACTIVE | Noted: 2020-04-14

## 2021-05-20 LAB
ALBUMIN SERPL-MCNC: 4 G/DL (ref 3.4–5)
ALP SERPL-CCNC: 109 U/L (ref 40–150)
ALT SERPL W P-5'-P-CCNC: 24 U/L (ref 0–50)
ANION GAP SERPL CALCULATED.3IONS-SCNC: 2 MMOL/L (ref 3–14)
AST SERPL W P-5'-P-CCNC: 12 U/L (ref 0–45)
BILIRUB SERPL-MCNC: 0.2 MG/DL (ref 0.2–1.3)
BUN SERPL-MCNC: 16 MG/DL (ref 7–30)
CALCIUM SERPL-MCNC: 9.1 MG/DL (ref 8.5–10.1)
CHLORIDE SERPL-SCNC: 107 MMOL/L (ref 94–109)
CHOLEST SERPL-MCNC: 206 MG/DL
CO2 SERPL-SCNC: 29 MMOL/L (ref 20–32)
CREAT SERPL-MCNC: 0.65 MG/DL (ref 0.52–1.04)
ERYTHROCYTE [DISTWIDTH] IN BLOOD BY AUTOMATED COUNT: 12.5 % (ref 10–15)
GFR SERPL CREATININE-BSD FRML MDRD: >90 ML/MIN/{1.73_M2}
GLUCOSE SERPL-MCNC: 105 MG/DL (ref 70–99)
HCT VFR BLD AUTO: 43.5 % (ref 35–47)
HCV AB SERPL QL IA: NONREACTIVE
HDLC SERPL-MCNC: 51 MG/DL
HGB BLD-MCNC: 14.1 G/DL (ref 11.7–15.7)
HIV 1+2 AB+HIV1 P24 AG SERPL QL IA: NONREACTIVE
LDLC SERPL CALC-MCNC: 139 MG/DL
MCH RBC QN AUTO: 30.9 PG (ref 26.5–33)
MCHC RBC AUTO-ENTMCNC: 32.4 G/DL (ref 31.5–36.5)
MCV RBC AUTO: 95 FL (ref 78–100)
NONHDLC SERPL-MCNC: 155 MG/DL
PLATELET # BLD AUTO: 310 10E9/L (ref 150–450)
POTASSIUM SERPL-SCNC: 4.2 MMOL/L (ref 3.4–5.3)
PROT SERPL-MCNC: 7.4 G/DL (ref 6.8–8.8)
RBC # BLD AUTO: 4.57 10E12/L (ref 3.8–5.2)
SODIUM SERPL-SCNC: 138 MMOL/L (ref 133–144)
TRIGL SERPL-MCNC: 82 MG/DL
WBC # BLD AUTO: 8.3 10E9/L (ref 4–11)

## 2021-05-20 PROCEDURE — 99214 OFFICE O/P EST MOD 30 MIN: CPT | Performed by: FAMILY MEDICINE

## 2021-05-20 PROCEDURE — 80053 COMPREHEN METABOLIC PANEL: CPT | Performed by: FAMILY MEDICINE

## 2021-05-20 PROCEDURE — 36415 COLL VENOUS BLD VENIPUNCTURE: CPT | Performed by: FAMILY MEDICINE

## 2021-05-20 PROCEDURE — 86803 HEPATITIS C AB TEST: CPT | Performed by: FAMILY MEDICINE

## 2021-05-20 PROCEDURE — 85027 COMPLETE CBC AUTOMATED: CPT | Performed by: FAMILY MEDICINE

## 2021-05-20 PROCEDURE — 80061 LIPID PANEL: CPT | Performed by: FAMILY MEDICINE

## 2021-05-20 PROCEDURE — 87389 HIV-1 AG W/HIV-1&-2 AB AG IA: CPT | Performed by: FAMILY MEDICINE

## 2021-05-20 ASSESSMENT — PAIN SCALES - GENERAL: PAINLEVEL: NO PAIN (0)

## 2021-05-20 ASSESSMENT — MIFFLIN-ST. JEOR: SCORE: 1821

## 2021-05-20 NOTE — PROGRESS NOTES
[address] [address]  Phone: [phone]  Primary Provider: Demetra Meyer  Pre-op Performing Provider: KACEY COREAS      PREOPERATIVE EVALUATION:  Today's date: 5/20/2021    Paula Ho is a 60 year old female who presents for a preoperative evaluation.    Surgical Information:  Surgery/Procedure: Laparoscopic Cholecystectomy  Surgery Location: [facility]   Surgeon: Dr. Robles Whitley  Surgery Date: 5/24/21  Time of Surgery: 10am  Where patient plans to recover: At home with family  Fax number for surgical facility: Note does not need to be faxed, will be available electronically in Epic.    Type of Anesthesia Anticipated: general anesthesia    Assessment & Plan     The proposed surgical procedure is considered INTERMEDIATE risk.    Preop general physical exam  Approved for surgery and anesthesia. Has risk factors but no change in symptoms for years and dyspnea has been stable. Will need close monitoring for sleep apnea symptoms post op.     Screening for HIV (human immunodeficiency virus)  screen  - HIV Antigen Antibody Combo    Need for hepatitis C screening test  Screen   - Hepatitis C Screen Reflex to HCV RNA Quant and Genotype    Nicotine dependence, uncomplicated, unspecified nicotine product type  Smoking 10 cigarettes a day down from 2 packs per day several years ago    Calculus of gallbladder with acute on chronic cholecystitis without obstruction  Recurrent biliary colic in past week. Discussed waiting on surgery for further lung testing and sleep apnea assessment but patient prefers to proceed with surgery at this time. Tolerated her brain procedure last year without problems.   - Comprehensive metabolic panel  - CBC with platelets    Mixed hyperlipidemia  screen  - Lipid panel reflex to direct LDL Fasting    Obesity, Class III, BMI 40-49.9 (morbid obesity) (H)  Weight loss counseling done     Tobacco use  disorder  Smoking cessation counseling done     Chronic obstructive pulmonary disease, unspecified COPD type (H)  Stable and counseled on smoking cessation    Chronic midline low back pain with bilateral sciatica  Care with positioning during anesthesia    Benign essential hypertension  Well controlled on medications       Possible Sleep Apnea: scheduled for sleep assessment but not able to complete prior to proceedure          Risks and Recommendations:  The patient has the following additional risks and recommendations for perioperative complications:   - Consult Hospitalist / IM to assist with post-op medical management   - Morbid obesity (BMI >40)  Obstructive Sleep Apnea:   Possible diagnosis and does not have a CPAP machine    Medication Instructions:  Patient is to take all scheduled medications on the day of surgery  On narcotic pain medictions, stopped Plavix 2 months ago    RECOMMENDATION:  APPROVAL GIVEN to proceed with proposed procedure, without further diagnostic evaluation.    Review of external notes as documented above           No LOS data to display   Time spent doing chart review, history and exam, documentation and further activities per the note        Subjective     HPI related to upcoming procedure: Recurrent biliary colic with gallstones by CT scan on 4- in emergency department.     Preop Questions 5/20/2021   1. Have you ever had a heart attack or stroke? No   2. Have you ever had surgery on your heart or blood vessels, such as a stent placement, a coronary artery bypass, or surgery on an artery in your head, neck, heart, or legs? YES - Brain aneurysm with coils last year with post op follow up   3. Do you have chest pain with activity? YES - sometimes but not very active, normal EKG   4. Do you have a history of  heart failure? No   5. Do you currently have a cold, bronchitis or symptoms of other infection? No   6. Do you have a cough, shortness of breath, or wheezing? YES - some  dyspnea with exertion but doesn't go very far due to back pain   7. Do you or anyone in your family have previous history of blood clots? No   8. Do you or does anyone in your family have a serious bleeding problem such as prolonged bleeding following surgeries or cuts? UNKNOWN - none herself    9. Have you ever had problems with anemia or been told to take iron pills? YES - put on iron pills in the past   10. Have you had any abnormal blood loss such as black, tarry or bloody stools, or abnormal vaginal bleeding? No   11. Have you ever had a blood transfusion? No   12. Are you willing to have a blood transfusion if it is medically needed before, during, or after your surgery? Yes   13. Have you or any of your relatives ever had problems with anesthesia? No   14. Do you have sleep apnea, excessive snoring or daytime drowsiness? UNKNOWN - snores and was scheduled for a sleep apnea test but not done   14a. Do you have a CPAP machine? -   15. Do you have any artifical heart valves or other implanted medical devices like a pacemaker, defibrillator, or continuous glucose monitor? No   16. Do you have artificial joints? YES - Right knee   17. Are you allergic to latex? No   18. Is there any chance that you may be pregnant? No       Health Care Directive:  Patient does not have a Health Care Directive or Living Will: Discussed advance care planning with patient; however, patient declined at this time.    Preoperative Review of :   reviewed - controlled substances reflected in medication list.      Status of Chronic Conditions:  COPD - Patient has a longstanding history of moderate-severe COPD . Patient has been doing well overall noting SHAH and continues on medication regimen consisting of inhalers without adverse reactions or side effects.    HYPERLIPIDEMIA - Patient has a long history of significant Hyperlipidemia requiring medication for treatment with recent good control. Patient reports no problems or side  effects with the medication.     HYPERTENSION - Patient has longstanding history of HTN , currently denies any symptoms referable to elevated blood pressure. Specifically denies chest pain, palpitations, dyspnea, orthopnea, PND or peripheral edema. Blood pressure readings have been in normal range. Current medication regimen is as listed below. Patient denies any side effects of medication.     SLEEP PROBLEM - Patient has a longstanding history of snoring.. Patient has tried OTC medications with limited success.       Review of Systems  Constitutional, neuro, ENT, endocrine, pulmonary, cardiac, gastrointestinal, genitourinary, musculoskeletal, integument and psychiatric systems are negative, except as otherwise noted.    Patient Active Problem List    Diagnosis Date Noted     Infection due to 2019 novel coronavirus 12/09/2020     Priority: Medium     Hyperlipidemia 04/14/2020     Priority: Medium     Benign essential hypertension 04/14/2020     Priority: Medium     MDD (major depressive disorder), recurrent severe, without psychosis (H) 03/02/2020     Priority: Medium     Chronic obstructive pulmonary disease, unspecified COPD type (H) 02/04/2020     Priority: Medium     No PFTS  In EPIC       Attention deficit hyperactivity disorder (ADHD) 01/31/2020     Priority: Medium     Chronic midline low back pain with bilateral sciatica 01/31/2020     Priority: Medium     Brain aneurysm 12/03/2019     Priority: Medium     Dec 2017  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 Device is MR compatible to 3 BREANN 3/2020       History of subarachnoid hemorrhage 12/22/2017     Priority: Medium     H/O of SAH, Cam 2, Hunt-Thomas 1, from a ruptured 9mm left Pcomm aneurysm with a wide neck and a fetal left PCA arising from the aneurysm neck, s/p successful coil embolization 12/23/2017 by Dr. Otto Hurley       Chronic GERD 01/26/2017     Priority: Medium     Chronic knee pain 12/12/2014      Priority: Medium     Cigarette smoker 07/08/2014     Priority: Medium     Chronic, continuous use of opioids 04/24/2013     Priority: Medium     Managed by pain clinic outside of [place].  UDS + cocaine in December 2019 without confirmation testing at her pain clinic per patient report       Obesity, Class III, BMI 40-49.9 (morbid obesity) (H) 03/27/2013     Priority: Medium     Status post knee surgery 03/27/2013     Priority: Medium     Per patient's recollection:  Circa 2002: right knee arthroscopy (Dr. Pappas)  Circa 2004: right knee partial knee replacement (Iam Ritchie MD)  Circa 2006: right TKA (Ron Ryder MD; [facility])  Circa 2008: right TKA revision due to infection (Ron Ryder MD; [facility])  Circa 2009: right TKA revision due to infection (Ron Ryder MD; [facility])  April 2011: right TKA (Ron Ryder MD; [facility])       LIBRA (generalized anxiety disorder) 09/28/2011     Priority: Medium     Chronic pain 09/28/2011     Priority: Medium     Knee and low back         DJD (degenerative joint disease) 09/28/2011     Priority: Medium     Insomnia 04/13/2011     Priority: Medium     Insomnia  NOS       Tobacco use disorder 07/31/2006     Priority: Medium     Asthma 11/15/2004     Priority: Medium     Asthma  NOS        Past Medical History:   Diagnosis Date     Acute respiratory failure (H) 02/01/2020    Diagnosed with influenza A on 2/1 and admitted to ICU at [facility] on bipap. She went home AMA shortly thereafter.      Past Surgical History:   Procedure Laterality Date     APPENDECTOMY       ORTHOPEDIC SURGERY  2002    Circa 2002: right knee arthroscopy (Dr. Pappas)     ORTHOPEDIC SURGERY  2004    Circa 2004: right knee partial knee replacement (Iam Ritchie MD)     ORTHOPEDIC SURGERY  2006    Circa 2006: right TKA (Ron Ryder MD; [facility])     ORTHOPEDIC SURGERY  2008    Circa 2008: right TKA revision due to infection (Ron Ryder MD;  [facility])     ORTHOPEDIC SURGERY  2009    Circa 2009: right TKA revision due to infection (Ron Ryder MD; [facility])     ORTHOPEDIC SURGERY  2011 April 2011: right TKA (Ron Ryder MD; [facility])     TONSILLECTOMY      tonsils     Current Outpatient Medications   Medication Sig Dispense Refill     albuterol (PROAIR HFA) 108 (90 Base) MCG/ACT inhaler Inhale 2 puffs into the lungs every 4 hours as needed for shortness of breath / dyspnea or wheezing 1 Inhaler 3     albuterol (PROVENTIL) (2.5 MG/3ML) 0.083% neb solution NEBULIZE THE CONTENTS OF 1 VIAL EVERY 6 HOURS AS NEEDED. 25 vial 1     atomoxetine (STRATTERA) 80 MG capsule Take 1 capsule (80 mg) by mouth daily 30 capsule 1     budesonide-formoterol (SYMBICORT) 160-4.5 MCG/ACT Inhaler Inhale 2 puffs into the lungs 2 times daily 3 Inhaler 3     cetirizine (ZYRTEC) 10 MG tablet Take 1 tablet (10 mg) by mouth daily 14 tablet 1     clonazePAM (KLONOPIN) 0.5 MG tablet Take 0.5 mg by mouth 2 times daily as needed       cloNIDine (CATAPRES) 0.1 MG tablet Take 1 tablet (0.1 mg) by mouth At Bedtime 30 tablet 1     clopidogrel (PLAVIX) 75 MG tablet        FLUoxetine (PROZAC) 40 MG capsule Take 1 capsule (40 mg) by mouth 2 times daily 60 capsule 1     levothyroxine (SYNTHROID/LEVOTHROID) 25 MCG tablet Take 1 tablet (25 mcg) by mouth daily 90 tablet 1     losartan (COZAAR) 50 MG tablet Take 50 mg by mouth daily       medical cannabis (Patient's own supply) See Admin Instructions (The purpose of this order is to document that the patient reports taking medical cannabis.  This is not a prescription, and is not used to certify that the patient has a qualifying medical condition.)       Multiple Vitamins-Minerals (MULTIVITAMIN ADULT PO)        NARCAN 4 MG/0.1ML nasal spray INHALE VIA NASAL ROUTE FOR OVERDOSE AS NEEDED. MAY REPEAT AFTER 2-3 MINUTES  0     order for DME Equipment being ordered: Nebulizer 1 Units 0     oxyCODONE IR (ROXICODONE) 15  "MG tablet Take 7.5 mg by mouth 2 times daily as needed       pravastatin (PRAVACHOL) 40 MG tablet Take 40 mg by mouth daily       tiotropium (SPIRIVA HANDIHALER) 18 MCG inhaled capsule Inhale 1 capsule (18 mcg) into the lungs daily 90 capsule 3     tiZANidine (ZANAFLEX) 2 MG tablet TK 2 TO 3 TS PO QHS  4     topiramate (TOPAMAX) 100 MG tablet Take 1 tablet (100 mg) by mouth 2 times daily 60 tablet 1     vitamin D3 (CHOLECALCIFEROL) 2000 units (50 mcg) tablet Take 1 tablet by mouth daily  1       Allergies   Allergen Reactions     Compazine [Prochlorperazine]      Droperidol      Lisinopril      Seroquel [Quetiapine]         Social History     Tobacco Use     Smoking status: Current Every Day Smoker     Packs/day: 0.50     Smokeless tobacco: Never Used   Substance Use Topics     Alcohol use: Not Currently       History   Drug Use No     Comment: remote history of recreational cocaine and marijuana use         Objective     /78 (BP Location: Left arm, Patient Position: Chair, Cuff Size: Adult Large)   Pulse 82   Temp 98.7  F (37.1  C) (Oral)   Resp 20   Ht 1.676 m (5' 6\")   Wt 122.9 kg (271 lb)   LMP  (LMP Unknown)   SpO2 95%   BMI 43.74 kg/m      Physical Exam    GENERAL APPEARANCE: healthy, alert and no distress     EYES: EOMI, PERRL     HENT: ear canals and TM's normal and nose and mouth without ulcers or lesions     NECK: no adenopathy, no asymmetry, masses, or scars and thyroid normal to palpation     RESP: lungs clear to auscultation - no rales, rhonchi or wheezes     CV: regular rates and rhythm, normal S1 S2, no S3 or S4 and no murmur, click or rub     ABDOMEN:  soft, nontender, no HSM or masses and bowel sounds normal     MS: extremities normal- no gross deformities noted, no evidence of inflammation in joints, FROM in all extremities.     SKIN: no suspicious lesions or rashes     NEURO: Normal strength and tone, sensory exam grossly normal, mentation intact and speech normal     PSYCH: " mentation appears normal. and affect normal/bright     LYMPHATICS: No cervical adenopathy    Recent Labs   Lab Test 02/23/21  1551 11/11/20  1226 03/04/20  1519 01/27/20  1440   HGB 13.4 14.3 13.4 14.0     --  317 320   INR  --   --  0.97 0.98    138 139 139   POTASSIUM 4.0 4.6 4.0 4.7   CR 0.55 0.63 0.59 0.55        Diagnostics:  Labs pending at this time.  Results will be reviewed when available.   EKG:        [facility]             Test Date:    2021-05-16  Intervals                              Axis            Rate:         78                       P:            52  OR:           168                      QRS:          31  QRSD:         86                       T:            13  QT:           373                                      QTc:          425                                                                 Interpretive Statements  SINUS RHYTHM  NONSPECIFIC T-WAVE ABNORMALITY  Compared to ECG 02/01/2020 00:54:48  No significant changes  Electronically Signed On 5- 20:42:52 CDT by Rose Joyner MD    Revised Cardiac Risk Index (RCRI):  The patient has the following serious cardiovascular risks for perioperative complications:   - Cerebrovascular Disease (TIA or CVA) = 1 point     RCRI Interpretation: 1 point: Class II (low risk - 0.9% complication rate)           Signed Electronically by: Maryam Lea MD  Copy of this evaluation report is provided to requesting physician.

## 2021-05-20 NOTE — PATIENT INSTRUCTIONS
At [facility], we strive to deliver an exceptional experience to you, every time we see you. If you receive a survey, please complete it as we do value your feedback.  If you have MyChart, you can expect to receive results automatically within 24 hours of their completion.  Your provider will send a note interpreting your results as well.   If you do not have MyChart, you should receive your results in about a week by mail.    Your care team:                            Family Medicine Internal Medicine   MD Renan Chambers MD Shantel Branch-Fleming, MD Srinivasa Vaka, MD Katya Belousova, PAPANKAJ Meyer, APRN CNP    Esdras Dobson, MD Pediatrics   Taurus Torres, PAPANKAJ Jaeger, CNP MD Alison Villeda APRN CNP   MD Inocencia Martin MD Deborah Mielke, MD Stacey Faith, APRN [facility]      Clinic hours: Monday - Thursday 7 am-6 pm; Fridays 7 am-5 pm.   Urgent care: Monday - Friday 10 am- 8 pm; Saturday and Sunday 9 am-5 pm.    Clinic: [phone]       [facility]: Monday - Thursday 8 am - 7 pm; Friday 8 am - 6 pm  [facility]: [phone]     Use www.[email or web address].org for 24/7 diagnosis and treatment of dozens of conditions.    Preparing for Your Surgery  Getting started  A nurse will call you to review your health history and instructions. They will give you an arrival time based on your scheduled surgery time.  Please be ready to share the following:    Your doctor's clinic name and phone number    Your medical, surgical and anesthesia history    A list of allergies and sensitivities    A list of medicines, including herbal treatments and over-the-counter drugs    Whether the patient has a legal guardian (ask how to send us the papers in advance)  If you have a child who's having surgery, please ask for a copy of Preparing for Your Child's Surgery.    Preparing for  surgery    Within 30 days of surgery: Have a pre-op exam (sometimes called an H&P, or History and Physical). This can be done at a clinic or pre-operative center.  ? If you're having a , you may not need this exam. Talk to your care team    At your pre-op exam, talk to your care team about all medicines you take. If you need to stop any medicines before surgery, ask when to start taking them again.  ? We do this for your safety. Many medicines can make you bleed too much during surgery. Some change how well surgery (anesthesia) drugs work.    Call your insurance company to let them know you're having surgery. (If you don't have insurance, call [phone].)    Call your clinic if there's any change in your health. This includes signs of a cold or flu (sore throat, runny nose, cough, rash, fever). It also includes a scrape or scratch near the surgery site.    If you have questions on the day of surgery, call your hospital or surgery center.  Eating and drinking guidelines  For your safety: Unless your surgeon tells you otherwise, follow the guidelines below.    Eat and drink as usual until 8 hours before surgery. After that, no food or milk.    Drink clear liquids until 2 hours before surgery. These are liquids you can see through, like water, Gatorade and Propel Water. You may also have black coffee and tea (no cream or milk).    Nothing by mouth within 2 hours of surgery. This includes gum, candy and breath mints.    If you drink, stop drinking alcohol the night before surgery.    If your care team tells you to take medicine on the morning of surgery, it's okay to take it with a sip of water.  Preventing infection    Shower or bathe the night before and morning of your surgery. Follow the instructions your clinic gave you. (If no instructions, use regular soap.)    Don't shave or clip hair near your surgery site. We'll remove the hair if needed.    Don't smoke or vape the morning of surgery. You may chew  nicotine gum up to 2 hours before surgery. A nicotine patch is okay.  ? Note: Some surgeries require you to completely quit smoking and nicotine. Check with your surgeon.    Your care team will make every effort to keep you safe from infection. We will:  ? Clean our hands often with soap and water (or an alcohol-based hand rub).  ? Clean the skin at your surgery site with a special soap that kills germs.  ? Give you a special gown to keep you warm. (Cold raises the risk of infection.)  ? Wear special hair covers, masks, gowns and gloves during surgery.  ? Give antibiotic medicine, if prescribed. Not all surgeries need antibiotics.  What to bring on the day of surgery    Photo ID and insurance card    Copy of your health care directive, if you have one    Glasses and hearing aides (bring cases)  ? You can't wear contacts during surgery    Inhaler and eye drops, if you use them (tell us about these when you arrive)    CPAP machine or breathing device, if you use them    A few personal items, if spending the night    If you have . . .  ? A pacemaker or ICD (cardiac defibrillator): Bring the ID card.  ? An implanted stimulator: Bring the remote control.  ? A legal guardian: Bring a copy of the certified (court-stamped) guardianship papers.  Please remove any jewelry, including body piercings. Leave jewelry and other valuables at home.  If you're going home the day of surgery  Important: If you don't follow the rules below, we must cancel your surgery.     Arrange for someone to drive you home after surgery. You may not drive, take a taxi or take public transportation by yourself (unless you'll have local anesthesia only).    Arrange for a responsible adult to stay with you overnight. If you don't, we may keep you in the hospital overnight, and you may need to pay the costs yourself.  Questions?   If you have any questions for your care team, list them here:  _________________________________________________________________________________________________________________________________________________________________________________________________________________________________________________________________________________________________________________________  For informational purposes only. Not to replace the advice of your health care provider. Copyright   2003, 2019 [facility]. All rights reserved. Clinically reviewed by Laya Reyez MD. [employer] [id number] - REV 4/20.    HOW TO QUIT SMOKING  Smoking is one of the hardest habits to break. About half of all those who have ever smoked have been able to quit, and most of those (about 70%) who still smoke want to quit. Here are some of the best ways to stop smoking.     KEEP TRYING:  It takes most smokers about 8 tries before they are finally able to fully quit. So, the more often you try and fail, the better your chance of quitting the next time! So, don't give up!    GO COLD TURKEY:  Most ex-smokers quit cold turkey. Trying to cut back gradually doesn't seem to work as well, perhaps because it continues the smoking habit. Also, it is possible to fool yourself by inhaling more while smoking fewer cigarettes. This results in the same amount of nicotine in your body!    GET SUPPORT:  Support programs can make an important difference, especially for the heavy smoker. These groups offer lectures, methods to change your behavior and peer support. Call the free national Quitline for more information. 800-QUIT-NOW (896-567-7326). Low-cost or free programs are offered by many hospitals, local chapters of the American Lung Association (443-768-4870) and the American Cancer Society (580-589-6472). Support at home is important too. Non-smokers can help by offering praise and encouragement. If the smoker fails to quit, encourage them to try again!    OVER-THE-COUNTER MEDICINES:  For those who can't quit on their  own, Nicotine Replacement Therapy (NRT) may make quitting much easier. Certain aids such as the nicotine patch, gum and lozenge are available without a prescription. However, it is best to use these under the guidance of your doctor. The skin patch provides a steady supply of nicotine to the body. Nicotine gum and lozenge gives temporary bursts of low levels of nicotine. Both methods take the edge off the craving for cigarettes. WARNING: If you feel symptoms of nicotine overdose, such as nausea, vomiting, dizziness, weakness, or fast heartbeat, stop using these and see your doctor.    PRESCRIPTION MEDICINES:  After evaluating your smoking patterns and prior attempts at quitting, your doctor may offer a prescription medicine such as bupropion (Zyban, Wellbutrin), varenicline (Chantix, Champix), a niocotine inhaler or nasal spray. Each has its unique advantage and side effects which your doctor can review with you.    HEALTH BENEFITS OF QUITTING:  The benefits of quitting start right away and keep improving the longer you go without smokin minutes: blood pressure and pulse return to normal  8 hours: oxygen levels return to normal  2 days: ability to smell and taste begins to improve as damaged nerves start to regrow  2-3 weeks: circulation and lung function improves  1-9 months: decreased cough, congestion and shortness of breath; less tired  1 year: risk of heart attack decreases by half  5 years: risk of lung cancer decreases by half; risk of stroke becomes the same as a non-smoker  For information about how to quit smoking, visit the following links:  National Cancer [place] ,   Clearing the Air, Quit Smoking Today   - an online booklet. http://www.smokefree.gov/pubs/clearing_the_air.pdf  Smokefree.gov http://smokefree.gov/  QuitNet http://www.quitnet.com/    1517-0687 Donnie Bowser, [address], [place] [address]. All rights reserved. This information is not intended as a substitute for  professional medical care. Always follow your healthcare professional's instructions.

## 2021-05-21 NOTE — RESULT ENCOUNTER NOTE
Dear Paula Ho,    Your test results are attached. I am happy to let you know that they are stable.    The blood sugar is normal and you do not have diabetes. The kidneys are healthy. The cholesterol is a little high and your risk of heart disease in the next 10 years is around 8%. Stopping smoking will decrease your risk of heart disease. You may also want to discuss cholesterol medication with your primary care provider. The screen for infection was negative. These are good results for your upcoming surgery.     Please call me if you have any questions about these test results or about your care.    Sincerely,    Maryam Lea MD

## 2021-05-28 NOTE — PROCEDURES
"LUMBAR EPIDURAL STEROID INJECTION TRANSFORAMINAL APPROACH WITH FLUOROSCOPIC GUIDANCE  Performed on: [unfilled]    Pre Procedure Diagnosis:  LBP, Lumbar radiculitis  Post Procedure Diagnosis:  Same  Procedure Performed:  Lumbar Transforaminal Epidural Steroid Injection with Fluoroscopic Guidance  Clinical Scenario:  As per office notes  Anesthesia/Fluids:  As per intra-procedure documentation  Vital Signs:  As per intra-procedure documentation  Level Injected:  L3-L4 L4-L5   Side Injected: Right  CC:        The patient has had other conservative treatment but wishes to pursue spine injections.  Alternative treatments to spine injections were discussed with the patient.  The procedure of  Lumbar transforaminal corticosteroid injection was discussed in detail, using a spine model to demonstrate.  The patient had the opportunity to directly ask the physician questions regarding the procedure and the questions were answered prior to the consent form being presented.  The risks of the procedure, including but not limited to:  Low back pain/soreness, infection, bleeding, damage to surrounding structures, permanent nerve damage/injury, paralysis,  allergic reaction,  worsening of Low back/leg pain or no change in pain. The patient elected to proceed and signed informed consent.      The patient denies any symptoms of an active infection and denies taking antibiotics. The patient denies taking any prescription blood thinning medications. The patient denies any allergies to iodine or iodine contrast.      The patient was placed in a prone position on the fluoroscopy table.  A procedural pause was performed to verify patient identify, site location and side for the procedure.     The lower back was prepped and draped in usual fashion.  After anesthetizing the skin with 1% lidocaine,  5\", 22 gauge needles were introduced at the right  L3 and L4 pedicles under fluoroscopic guidance.  After aspiration was negative for blood or " CSF, 1 mL of Omnipaque-300 was injected at each site.  An epidural flow pattern without vascular uptake was seen at each site.   Subsequently, a solution of 1 mL of 80 mg of depomedrol mixed with 3 mL of 1% lidocaine was drawn up.  2 mL of this solution was injected at each level.       The patient tolerated the procedure well.  After a short period of observation the patient was discharged under their own power.   The patient was instructed to call if there are any side effects to the injection.

## 2021-06-03 ENCOUNTER — ALLIED HEALTH/NURSE VISIT (OUTPATIENT)
Dept: BEHAVIORAL HEALTH | Facility: CLINIC | Age: 61
End: 2021-06-03
Payer: MEDICARE

## 2021-06-03 VITALS
WEIGHT: 250 LBS | BODY MASS INDEX: 40.18 KG/M2 | HEIGHT: 66 IN | HEIGHT: 66 IN | WEIGHT: 250 LBS | BODY MASS INDEX: 40.18 KG/M2

## 2021-06-03 VITALS — BODY MASS INDEX: 43.39 KG/M2 | HEIGHT: 66 IN | WEIGHT: 270 LBS

## 2021-06-03 DIAGNOSIS — R69 DIAGNOSIS DEFERRED: Primary | ICD-10-CM
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
## 2021-06-04 VITALS — WEIGHT: 264 LBS | HEIGHT: 66 IN | BODY MASS INDEX: 42.43 KG/M2

## 2021-06-05 VITALS — HEIGHT: 66 IN | WEIGHT: 275 LBS | BODY MASS INDEX: 44.2 KG/M2

## 2021-06-07 ENCOUNTER — VIRTUAL VISIT (OUTPATIENT)
Dept: BEHAVIORAL HEALTH | Facility: CLINIC | Age: 61
End: 2021-06-07
Payer: MEDICARE

## 2021-06-07 DIAGNOSIS — F41.1 GAD (GENERALIZED ANXIETY DISORDER): ICD-10-CM

## 2021-06-07 DIAGNOSIS — F33.2 MDD (MAJOR DEPRESSIVE DISORDER), RECURRENT SEVERE, WITHOUT PSYCHOSIS (H): Primary | ICD-10-CM

## 2021-06-07 PROCEDURE — 90832 PSYTX W PT 30 MINUTES: CPT | Mod: 95 | Performed by: SOCIAL WORKER
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
## 2021-06-08 ENCOUNTER — TRANSFERRED RECORDS (OUTPATIENT)
Dept: HEALTH INFORMATION MANAGEMENT | Facility: CLINIC | Age: 61
End: 2021-06-08

## 2021-06-16 ENCOUNTER — ALLIED HEALTH/NURSE VISIT (OUTPATIENT)
Dept: BEHAVIORAL HEALTH | Facility: CLINIC | Age: 61
End: 2021-06-16
Payer: MEDICARE

## 2021-06-16 DIAGNOSIS — R69 DIAGNOSIS DEFERRED: Primary | ICD-10-CM

## 2021-06-16 NOTE — PROGRESS NOTES
Behavioral Health Home Services  [facility]: [place]        Social Work Care Navigator Note      Patient: Paula Ho  Date: June 16, 2021  Preferred Name: Paula    Previous PHQ-9:   PHQ-9 SCORE 2/23/2021 3/24/2021 5/5/2021   PHQ-9 Total Score 15 7 17     Previous LIBRA-7:   LIBRA-7 SCORE 2/23/2021 3/24/2021 5/5/2021   Total Score 13 7 8     ARNALDO LEVEL:  No flowsheet data found.    Preferred Contact:  Need for : No  Preferred Contact: Cell      Type of Contact Today: Phone call (patient / identified key support person reached)      Data: (subjective / Objective):  Recent ED/IP Admission or Discharge?   None    Patient Goals:  Goal Areas: Health;Mental Health;Financial and Social Service Benefits;Transportation  Patient stated goals: Patient would like assistance with her physical and mental health for creating a balanced and healthy lifestyle. Patient would like assistance with continued SSDI and social service assistance to aid with county benefits to increase her financial stability. Patient would like assistance with additional transportation services, such as [employer] for reliable transportation to get to her appointments, run errands and get out into the community.         [facility] Core Service Provided:  Comprehensive Care Management: utilized the electronic medical record / patient registry to identify and support patient's health conditions / needs more effectively   Care Transitions: focused on the coordinated and seamless movement of patient between or within different levels of care or settings  Care Coordination: provided care management services/referrals necessary to ensure patient and their identified supports have access to medical, behavioral health, pharmacology and recovery support services.  Ensured that patient's care is integrated across all settings and services.   Individual and Family Support: aimed to help clients reduce barriers to achieving goals, increase health literacy and  knowledge about chronic condition(s), increase self-efficacy skills, and improve health outcomes  Referral to Community and Social Support Services: Provided patient with referrals (see plan section)  Assisted in scheduling an appointment to a referral / service (see plan section)  Coordinated / Confirmed patient's appointment time or referral and transportation plan  Followed-up with patient on whether or not they completed a referral    Current Stressors / Issues / Care Plan Objective Addressed Today:  [facility] and patient were able to meet today for Behavioral Health Home ([facility]) monthly check-in via telehealth visit. All required ROIs have been filed with HIM/patient chart.    1. Patient called today to let [facility] know that her niece, who is having marital issues is staying with her for the past week. Patient reports she would like additional information on how to best support her.     2. Patient and niece were on the phone with [facility] today. Niece reports needing space from spouse and feels safe and supported staying with her aunt today. Patient reports she does suffer depression and anxiety but reports she is not currently suicidal. Patient reports additional supportive network of parents, family and friends. [facility] provided patient and niece National Suicide Prevention Lifeline at 139-660-5961 and encouraged patient/niece that if symptoms worsen or increase to go to ED or call 911. [facility] provided contact information for the [facility] Behavioral Health providers in [place] for niece to connect with if/when needed for additional mental health/therapy support at [phone].    Patient and niece appreciated the resources and support [facility] provided today.    Intervention:  Motivational Interviewing: Expressed Empathy/Understanding, Supported Autonomy, Collaboration, Evocation, Permission to raise concern or advise, Open-ended questions, Reflections: simple and complex, Rolled with resistance: Emphasized patient autonomy,  Simple reflection, Complex reflection, Shifted topic to defuse resistance, Reframed sustain talk in the direction of change and Evoked patient agenda, Change talk (evoked) and Reframe   Target Behavior(s): Explored thoughts and readiness to participate in individual therapy, Explored and resolved challenges to attending appointments as scheduled and Explored current social supports and reinforced opportunities to increase engagement    Assessment: (Progress on Goals / Homework):  Patient would benefit from continued coordination in reaching their goals set for the Behavioral Health Home ([facility]) program. [facility] reviewed Health Action Plan goals and will continue to monitor progress and work with patient and their care team.      Plan: (Homework, other):  Patient was encouraged to continue to seek condition-related information and education.      Scheduled a Phone follow up appointment with SW  in 2 weeks     Patient has set self-identified goals and will monitor progress until the next appointment on: 07/01/2021.         JASON Mann [facility]  Behavioral Health Home ([facility])   [facility]  [phone]  June 16, 2021  10:22 AM                  Next 5 appointments (look out 90 days)    Jul 28, 2021  9:00 AM  Office Visit with PFT LAB  [facility] ([facility]) [address] [address] [place] [id number] [phone]

## 2021-07-01 ENCOUNTER — ALLIED HEALTH/NURSE VISIT (OUTPATIENT)
Dept: BEHAVIORAL HEALTH | Facility: CLINIC | Age: 61
End: 2021-07-01
Payer: MEDICARE

## 2021-07-01 DIAGNOSIS — R69 DIAGNOSIS DEFERRED: Primary | ICD-10-CM
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
## 2021-07-08 ENCOUNTER — TELEPHONE (OUTPATIENT)
Dept: BEHAVIORAL HEALTH | Facility: CLINIC | Age: 61
End: 2021-07-08
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
## 2021-07-12 ENCOUNTER — OFFICE VISIT (OUTPATIENT)
Dept: FAMILY MEDICINE | Facility: CLINIC | Age: 61
End: 2021-07-12
Payer: MEDICARE

## 2021-07-12 ENCOUNTER — ANCILLARY PROCEDURE (OUTPATIENT)
Dept: GENERAL RADIOLOGY | Facility: CLINIC | Age: 61
End: 2021-07-12
Attending: NURSE PRACTITIONER
Payer: MEDICARE

## 2021-07-12 VITALS
BODY MASS INDEX: 43.07 KG/M2 | OXYGEN SATURATION: 95 % | DIASTOLIC BLOOD PRESSURE: 78 MMHG | TEMPERATURE: 97.6 F | WEIGHT: 268 LBS | SYSTOLIC BLOOD PRESSURE: 127 MMHG | HEART RATE: 78 BPM | HEIGHT: 66 IN

## 2021-07-12 DIAGNOSIS — M25.462 SWELLING OF LEFT KNEE JOINT: Primary | ICD-10-CM

## 2021-07-12 DIAGNOSIS — E03.9 ACQUIRED HYPOTHYROIDISM: ICD-10-CM

## 2021-07-12 DIAGNOSIS — M54.32 BILATERAL SCIATICA: ICD-10-CM

## 2021-07-12 DIAGNOSIS — M54.31 BILATERAL SCIATICA: ICD-10-CM

## 2021-07-12 DIAGNOSIS — M25.462 SWELLING OF LEFT KNEE JOINT: ICD-10-CM

## 2021-07-12 LAB — TSH SERPL DL<=0.005 MIU/L-ACNC: 3.49 MU/L (ref 0.4–4)

## 2021-07-12 PROCEDURE — 99214 OFFICE O/P EST MOD 30 MIN: CPT | Performed by: NURSE PRACTITIONER

## 2021-07-12 PROCEDURE — 84443 ASSAY THYROID STIM HORMONE: CPT | Performed by: NURSE PRACTITIONER

## 2021-07-12 PROCEDURE — 73560 X-RAY EXAM OF KNEE 1 OR 2: CPT | Mod: LT | Performed by: RADIOLOGY

## 2021-07-12 PROCEDURE — 36415 COLL VENOUS BLD VENIPUNCTURE: CPT | Performed by: NURSE PRACTITIONER

## 2021-07-12 RX ORDER — METHYLPREDNISOLONE 4 MG
TABLET, DOSE PACK ORAL
Qty: 21 TABLET | Refills: 0 | Status: SHIPPED | OUTPATIENT
Start: 2021-07-12 | End: 2022-02-23

## 2021-07-12 ASSESSMENT — PAIN SCALES - GENERAL: PAINLEVEL: NO PAIN (0)

## 2021-07-12 ASSESSMENT — MIFFLIN-ST. JEOR: SCORE: 1802.39

## 2021-07-12 NOTE — PROGRESS NOTES
"    Assessment & Plan     Swelling of left knee joint  Supportive cares discussed - RICE, OTC pain medications. Will refer to ortho.  - XR Knee Left 1/2 Views; Future    Acquired hypothyroidism  Rechecking - patient states her psychiatrist is requesting.  - TSH with free T4 reflex; Future  - TSH with free T4 reflex    Bilateral sciatica  Supportive cares. No red flags. Trial medrol dose ramesh. On pain medication (oxycodone and Belbuca) through pain clinic. This is acute on chronic.   - methylPREDNISolone (MEDROL DOSEPAK) 4 MG tablet therapy pack; Follow Package Directions (Patient not taking: Reported on 7/15/2021)         See Patient Instructions    Return in about 2 weeks (around 7/26/2021), or if symptoms worsen or fail to improve.     The benefits, risks and potential side effects were discussed in detail. Black box warnings discussed as relevant. All patient questions were answered. The patient was instructed to follow up immediately if any adverse reactions develop.    Return precautions discussed, including when to seek urgent/emergent care.    Patient verbalizes understanding and agrees with plan of care. Patient stable for discharge.      [facility] Marquez [facility] 61 year [facility] who presents for the following health issues     HPI       Pleasant 61 year old female presents with a few concerns today:    1. Left knee swelling x1+ months. Wasn't painful originally but more bothersome, not really painful, as it gets larger. No erythema. No known injury.    2. Bilateral low back pain that radiates through buttock and down to knees x1 month. Pain is \"burning.\" No known injury. No loss of bowel or bladder. No saddle anesthesia. No rash. Pain has become so bothersome she has to lie on stomach to sleep. She recently got a second opinion for her back pain and he said he wouldn't recommend surgery because he couldn't guarantee the pain would resolve. " "Continues to see pain clinic. On oxycodone 15 mg TID and Belbuca 900 mcg BID.     3. Patient states her pain clinic would like her TSH rechecked. Normal BMs. No excessive heat/cold intolerance. No changes in skin. Feeling well on current dose of levothyroxine 25 mcg.    Review of Systems   Constitutional, HEENT, cardiovascular, pulmonary, GI, , musculoskeletal, neuro, skin, endocrine and psych systems are negative, except as otherwise noted.      Objective    /78 (BP Location: Left arm, Patient Position: Chair, Cuff Size: Adult Large)   Pulse 78   Temp 97.6  F (36.4  C) (Tympanic)   Ht 1.676 m (5' 6\")   Wt 121.6 kg (268 lb)   LMP  (LMP Unknown)   SpO2 95%   BMI 43.26 kg/m    Body mass index is 43.26 kg/m .  Physical Exam   GENERAL: healthy, alert and no distress  RESP: lungs clear to auscultation - no rales, rhonchi or wheezes  CV: regular rate and rhythm, normal S1 S2, no S3 or S4, no murmur, click or rub, no peripheral edema and peripheral pulses strong  MS: no gross musculoskeletal defects noted, no edema  NEURO: Normal strength and tone, mentation intact and speech normal  Comprehensive back pain exam:  Tenderness of bilateral low back, Pain limits the following motions: bending, twisting, Lower extremity strength functional and equal on both sides, Lower extremity reflexes within normal limits bilaterally, Lower extremity sensation normal and equal on both sides and Straight leg raise positive on both sides  PSYCH: mentation appears normal, affect normal/bright    Results for orders placed or performed in visit on 07/12/21   XR Knee Left 1/2 Views     Status: None    Narrative    XR KNEE LT 1/2 VW 7/12/2021 12:25 PM     HISTORY: swelling to left knee, chronic knee pain; Swelling of left  knee joint      Impression    IMPRESSION: Left knee medial compartment hemiarthroplasty. Suspected  small joint effusion.    SOURAV ROJO MD         SYSTEM ID:  [id number]   Results for orders placed or " performed in visit on 07/12/21   TSH with free T4 reflex     Status: Normal   Result Value Ref Range    TSH 3.49 0.40 - 4.00 mU/L

## 2021-07-12 NOTE — PATIENT INSTRUCTIONS
"At [facility], we strive to deliver an exceptional experience to you, every time we see you. If you receive a survey, please complete it as we do value your feedback.  If you have MyChart, you can expect to receive results automatically within 24 hours of their completion.  Your provider will send a note interpreting your results as well.   If you do not have MyChart, you should receive your results in about a week by mail.    Your care team:                            Family Medicine Internal Medicine   MD Renan Chambers MD Shantel Branch-Fleming, MD Srinivasa Vaka, MD Katya Belousova, PABunnyC  Demetra Meyer, APRN CNP    Esdras Dobson, MD Pediatrics   Taurus Torres, PABunnyC  Maxine Jaeger, CNP MD Alison Villeda APRN CNP   MD Inocencia Martin MD Deborah Mielke, MD Stacey Faith, APRN [facility]      Clinic hours: Monday - Thursday 7 am-6 pm; Fridays 7 am-5 pm.   Urgent care: Monday - Friday 10 am- 8 pm; Saturday and Sunday 9 am-5 pm.    Clinic: [phone]       [facility]: Monday - Thursday 8 am - 7 pm; Friday 8 am - 6 pm  [facility]: [phone]     Use www.oncare.org for 24/7 diagnosis and treatment of dozens of conditions.    Patient Education     * Sciatica     Sciatica (\"Lumbar Radiculopathy\") causes a pain that spreads from the lower back down into the buttock, hip and leg. Sometimes leg pain can occur without any back pain. Sciatica is due to irritation or pressure on a spinal nerve as it comes out of the spinal canal. This is most often due to pressure on the nerve from a nearby spinal disk (the cartilage cushion between each spinal bone). Other causes include spinal stenosis (narrowing of the spinal canal) and spasm of the piriformis muscle (a muscle in the buttocks that the sciatic nerve passes through).  Sciatica may begin after a sudden twisting/bending force (such as in " a car accident), or sometimes after a simple awkward movement. In either case, muscle spasm is commonly present and can add to the pain.  The diagnosis of sciatica is made from the symptoms and physical exam. Unless you had a physical injury (such as a car accident or fall), X-rays are usually not ordered for the initial evaluation of sciatica because the nerves and disks cannot be seen on an X-ray. Most sciatica (80-90%) gets better with time.  What can I do about my low back pain?  There are three main things you can do to ease low back pain and help it go away.    Stay active! Use positions, movements and exercises. Too much rest can make your symptoms worse.    Use heat or cold packs.    Take medicine as directed.  Using positions, movements and exercises  Research tells us that moving your joints and muscles can help you recover from back pain. Such activity should be simple and gentle.  Use walking to help relieve your discomfort. Try taking a short walk every 3 to 4 hours during the day. Walk for a few minutes inside your home or take longer walks outside, on a treadmill or at a mall. Slowly increase the amount of time you walk. Expect discomfort when you begin, but it should lessen as your back starts to recover.  Finding a position that is comfortable  When your back pain is new, you may find that certain positions will ease your pain. Gently try each of the following positions until you find one that eases your pain. Once you find a position of comfort, use it as often as you like while you recover. Return to your daily routine as soon as possible.    Lie on your back with your legs bent. You can do this by placing a pillow under your knees or lie on the floor and rest your lower legs on the seat of a chair.    Lie on your side with your knees bent and place a pillow between your knees.    Lie on your stomach over pillows.  Using heat or cold packs  Try cold packs or gentle heat to ease your pain. Use  whichever gives you the most relief. Apply the cold pack or heat for 15 minutes at a time, as often as needed.  Taking medicine  If taking over-the-counter medicine:    Take ibuprofen (Advil, Motrin) 600 mg. three times a day as needed for pain.  OR    Take Aleve (naproxen sodium) 220 to 440 mg. two times a day as needed for pain.  If your doctor prescribed medicine, follow the instructions. Stop taking the medicine as soon as you can.  When should I call my doctor?  Your back pain should improve over the first couple of weeks. As it improves, you should be able to return to your normal activities. But call your doctor if:    You have a sudden change in your ability to control?your bladder or bowels.    You begin to feel tingling in your groin or legs.    The pain spreads down your leg and into your foot.    Your toes, feet or leg muscles begin to feel weak.    You feel generally unwell or sick.    Your pain gets worse.  For informational purposes only. Not to replace the advice of your health care provider.  Copyright   2018 [employer]. All rights reserved.

## 2021-07-12 NOTE — LETTER
July 13, 2021      Paula Ho  [address]    [place] [address]        Dear ,    We are writing to inform you of your test results.    Your thyroid lab result was normal. Please let us know if you have any questions.   Thank you for allowing us to participate in your care.       Resulted Orders   TSH with free T4 reflex   Result Value Ref Range    TSH 3.49 0.40 - 4.00 mU/L       If you have any questions or concerns, please call the clinic at the number listed above.       Sincerely,      LEONA Anderson CNP

## 2021-07-12 NOTE — LETTER
July 15, 2021      Paula Ho  [address]    [place] [facility] [address]        Dear ,    We are writing to inform you of your test results.    Your knee x-ray results show a fluid collection. I think we should have you see orthopedics for further evaluation if this doesn't improve on its own. I will place a referral. Please let us know if you have any questions.   Thank you for allowing us to participate in your care.     Resulted Orders   XR Knee Left 1/2 Views    Narrative    XR KNEE LT 1/2 VW 7/12/2021 12:25 PM     HISTORY: swelling to left knee, chronic knee pain; Swelling of left  knee joint      Impression    IMPRESSION: Left knee medial compartment hemiarthroplasty. Suspected  small joint effusion.    SOURAV ROJO MD         SYSTEM ID:  [id number]       If you have any questions or concerns, please call the clinic at the number listed above.       Sincerely,      LEONA Anderson CNP

## 2021-07-13 NOTE — RESULT ENCOUNTER NOTE
Please send letter:    Paula,  Your thyroid lab result was normal. Please let us know if you have any questions.  Thank you for allowing us to participate in your care.  LEONA Marquez CNP

## 2021-07-15 ENCOUNTER — ALLIED HEALTH/NURSE VISIT (OUTPATIENT)
Dept: BEHAVIORAL HEALTH | Facility: CLINIC | Age: 61
End: 2021-07-15
Payer: MEDICARE

## 2021-07-15 ENCOUNTER — VIRTUAL VISIT (OUTPATIENT)
Dept: PSYCHIATRY | Facility: CLINIC | Age: 61
End: 2021-07-15
Payer: MEDICARE

## 2021-07-15 DIAGNOSIS — R69 DIAGNOSIS DEFERRED: Primary | ICD-10-CM

## 2021-07-15 DIAGNOSIS — F33.2 SEVERE EPISODE OF RECURRENT MAJOR DEPRESSIVE DISORDER, WITHOUT PSYCHOTIC FEATURES (H): Primary | ICD-10-CM

## 2021-07-15 DIAGNOSIS — F98.8 ATTENTION DEFICIT DISORDER, UNSPECIFIED HYPERACTIVITY PRESENCE: ICD-10-CM

## 2021-07-15 DIAGNOSIS — F43.10 PTSD (POST-TRAUMATIC STRESS DISORDER): ICD-10-CM

## 2021-07-15 DIAGNOSIS — F41.1 GAD (GENERALIZED ANXIETY DISORDER): ICD-10-CM

## 2021-07-15 PROCEDURE — 99214 OFFICE O/P EST MOD 30 MIN: CPT | Mod: 95 | Performed by: NURSE PRACTITIONER

## 2021-07-15 RX ORDER — ARIPIPRAZOLE 2 MG/1
2 TABLET ORAL DAILY
Qty: 30 TABLET | Refills: 1 | Status: SHIPPED | OUTPATIENT
Start: 2021-07-15 | End: 2021-09-20 | Stop reason: DRUGHIGH

## 2021-07-15 RX ORDER — ATOMOXETINE 60 MG/1
60 CAPSULE ORAL DAILY
Qty: 30 CAPSULE | Refills: 1 | Status: SHIPPED | OUTPATIENT
Start: 2021-07-15 | End: 2021-09-20

## 2021-07-15 ASSESSMENT — ANXIETY QUESTIONNAIRES
1. FEELING NERVOUS, ANXIOUS, OR ON EDGE: NEARLY EVERY DAY
7. FEELING AFRAID AS IF SOMETHING AWFUL MIGHT HAPPEN: NEARLY EVERY DAY
3. WORRYING TOO MUCH ABOUT DIFFERENT THINGS: NEARLY EVERY DAY
2. NOT BEING ABLE TO STOP OR CONTROL WORRYING: NEARLY EVERY DAY
5. BEING SO RESTLESS THAT IT IS HARD TO SIT STILL: SEVERAL DAYS
6. BECOMING EASILY ANNOYED OR IRRITABLE: NEARLY EVERY DAY
GAD7 TOTAL SCORE: 19
4. TROUBLE RELAXING: NEARLY EVERY DAY
IF YOU CHECKED OFF ANY PROBLEMS ON THIS QUESTIONNAIRE, HOW DIFFICULT HAVE THESE PROBLEMS MADE IT FOR YOU TO DO YOUR WORK, TAKE CARE OF THINGS AT HOME, OR GET ALONG WITH OTHER PEOPLE: EXTREMELY DIFFICULT

## 2021-07-15 ASSESSMENT — PATIENT HEALTH QUESTIONNAIRE - PHQ9: SUM OF ALL RESPONSES TO PHQ QUESTIONS 1-9: 15
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
## 2021-07-15 NOTE — RESULT ENCOUNTER NOTE
Please send letter:    Paula,  Your knee x-ray results show a fluid collection. I think we should have you see orthopedics for further evaluation if this doesn't improve on its own. I will place a referral. Please let us know if you have any questions.  Thank you for allowing us to participate in your care.  LEONA Marquez CNP

## 2021-07-15 NOTE — PATIENT INSTRUCTIONS
1.  See Tamar Blakely for talk therapy     2.  Continue  Behavior health home care services    3.  Decrease atomoxetine to 60 mg daily    4.  Continue fluoxetine 40 mg twice daily    5.  Take clonazepam sparingly  6.  Continue clonidine 0.1 mg at bedtime  7.  Add Abilify 2 mg daily       Continue all other medications as reviewed per electronic medical record today.     Safety plan reviewed. To the Emergency Department as needed or call after hours crisis line at [phone] or [phone]. [place] Crisis Text Line. Text [place] to 595023 or Suicide LifeLine Chat: suicideTank Top TV.org/chat/    To schedule individual or family therapy, call [facility] at [phone].    Schedule an appointment with me in September or sooner as needed. Call [facility] at [phone] to schedule.    Follow up with primary care provider as planned or for acute medical concerns.    Call the psychiatric nurse line with medication questions or concerns at [phone].    [employer] may be used to communicate with your provider, but this is not intended to be used for emergencies.    Crisis Resources:    National Suicide Prevention Lifeline: 344.753.4041 (TTY: 544.376.8535). Call anytime for help.  (www.suicidepreventionlifeline.org)  National [place] on Mental Illness (www.raudel.org): 786.325.5786 or 967-101-3165.   Mental Health Association (www.mentalhealth.org): 182.224.5826 or 951-551-4528.  [place] Crisis Text Line: Text [place] to 211318  Suicide LifeLine Chat: suicideTank Top TV.org/chat
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
## 2021-07-16 ASSESSMENT — ANXIETY QUESTIONNAIRES: GAD7 TOTAL SCORE: 19

## 2021-07-16 NOTE — PROGRESS NOTES
________________________________________  Medications Phoned  to Pharmacy [] yes [x]no  Name of Pharmacist:  List Medications, including dose, quantity and instructions    Medications ordered this visit were e-scribed.  Verified by order class [x] yes  [] no   Abilify 2 mg; Strattera 60 mg   Medication changes or discontinuations were communicated to patient's pharmacy: [] yes  [x] no    Dictation completed at time of chart check: [] yes  [x] no    I have checked the documentation for today s encounters and the above information has been reviewed and completed.    e

## 2021-07-19 NOTE — PROGRESS NOTES
SUBJECTIVE:   Paula Ho is a 61 year old female with a history of left unicompartmental knee arthroplasty in 2011/2012 who is seen in consultation at the request of Demetra Meyer for evaluation of left knee pain.  Duration: 2 weeks of pain. Had noticed an lump x 6 weeks that has been growing.  This is painful now, anterolateral.    Present symptoms: pain to touch lump. No pain walking or bending.  She says her low back pain is so bad that she may not notice pains in the knee.      Treatments tried to this point: none    Previous knee issues:   right unicompartmental knee arthroplasty   Revised to RIGHT TOTAL KNEE ARTHROPLASTY, and then staged revision for infection.     Past Medical History:   Past Medical History:   Diagnosis Date     Acute respiratory failure (H) 02/01/2020    Diagnosed with influenza A on 2/1 and admitted to ICU at [facility] on bipap. She went home AMA shortly thereafter.      Anxiety      Asthma      Depression      Hypertension      Past Surgical History:   Past Surgical History:   Procedure Laterality Date     APPENDECTOMY       APPENDECTOMY       CEREBRAL ANEURYSM REPAIR       JOINT REPLACEMENT       ORTHOPEDIC SURGERY  2002    Circa 2002: right knee arthroscopy (Dr. Pappas)     ORTHOPEDIC SURGERY  2004    Circa 2004: right knee partial knee replacement (Iam Ritchie MD)     ORTHOPEDIC SURGERY  2006    Circa 2006: right TKA (Ron Ryder MD; [facility])     ORTHOPEDIC SURGERY  2008    Circa 2008: right TKA revision due to infection (Ron Ryder MD; [facility])     ORTHOPEDIC SURGERY  2009    Circa 2009: right TKA revision due to infection (Ron Ryder MD; [facility])     ORTHOPEDIC SURGERY  2011 April 2011: right TKA (Ron Ryder MD; [facility])     REPLACEMENT TOTAL KNEE Right      TONSILLECTOMY      tonsils     TONSILLECTOMY       Family History:   Family History   Problem Relation Age of Onset     Depression Mother      Substance Abuse  Father      Social History:   Social History     Tobacco Use     Smoking status: Current Every Day Smoker     Packs/day: 0.50     Smokeless tobacco: Never Used   Substance Use Topics     Alcohol use: Not Currently       Review of Systems:  Constitutional:  NEGATIVE for fever, chills, change in weight  Integumentary/Skin:  NEGATIVE for worrisome rashes, moles or lesions  Eyes:  NEGATIVE for vision changes or irritation  ENT/Mouth:  NEGATIVE for ear, mouth and throat problems  Resp:  NEGATIVE for significant cough or SOB  Breast:  NEGATIVE for masses, tenderness or discharge  CV:  NEGATIVE for chest pain, palpitations or peripheral edema  GI:  NEGATIVE for nausea, abdominal pain, heartburn, or change in bowel habits  :  Negative   Musculoskeletal:  See HPI above  Neuro:  NEGATIVE for weakness, dizziness or paresthesias  Endocrine:  NEGATIVE for temperature intolerance, skin/hair changes  Heme/allergy/immune:  NEGATIVE for bleeding problems  Psychiatric:  NEGATIVE for changes in mood or affect      OBJECTIVE:  Physical Exam:  /79 (BP Location: Right arm, Patient Position: Sitting, Cuff Size: Adult Regular)   Pulse 86   LMP  (LMP Unknown)   SpO2 95%   General Appearance: healthy, alert and no distress   Skin: no suspicious lesions or rashes  Neuro: Normal strength and tone, mentation intact and speech normal  Vascular: good pulses, and cappillary refill   Lymph: no lymphadenopathy   Psych:  mentation appears normal and affect normal/bright  Resp: no increased work of breathing     Left Knee Exam:  Gait: walks with normal gait  Alignment: normal   Patellofemoral joint: moderate crepitations in the patellofemoral joint.  Effusion: none  ROM: good painfree range of motion   Tender: anterior-lateral knee, where there is a 4 cm diameter mass anterolaterally. Compressible.  Ligaments:   Lachman's: stable   Anterior/Posterior drawer: stable,   Varus/Valgus stress: stable to varus and valgus stress  McMurrays:  negative    X-rays:  Obtained 7/12/21, reviewed in the office with the patient today:    Left knee medial compartment hemiarthroplasty. Suspected  small joint effusion.  No assymmetric poly wear.      ASSESSMENT:   Ganglion cyst of left knee.    PLAN:   Aspiration:   Procedure Note:  The risks, benefits and potential complications of aspiration were discussed with the patient (including but not limited to, bleeding, infection, pain, scar, damage to adjacent structures, failure to relieve symptoms) . Questions were addressed and answered.The patient elected to proceed. Written, informed consent was obtained. The correct procedural site was identified and confirmed. A Left Knee cyst aspiration was performed using 4mL of local anesthetic after sterile prep to the correct procedural site.  Approximately 5 cc of bloody gelatinous fluid resulted/extruded from the needle insertion site.  I impaled the mass a few times, no additional fluid came up into the syringe.  I manipulated the area manually to try to extrude more fluid, no more came out.   Then injected the cyst area with 80mg of Depomedrol and 4cc of local anesthetic after sterile prep.Sterile bandaid applied. This was tolerated well by the patient. No apparent complications.     Return to clinic or call: 3 weeks if no improvement.  We would do an MRI at that point to prepare for ganglion cyst excsion.    SUSANA Lam MD  Dept. Orthopedic Surgery  [facility]

## 2021-07-27 ENCOUNTER — VIRTUAL VISIT (OUTPATIENT)
Dept: BEHAVIORAL HEALTH | Facility: CLINIC | Age: 61
End: 2021-07-27
Payer: MEDICARE

## 2021-07-27 ENCOUNTER — OFFICE VISIT (OUTPATIENT)
Dept: ORTHOPEDICS | Facility: CLINIC | Age: 61
End: 2021-07-27
Attending: NURSE PRACTITIONER
Payer: MEDICARE

## 2021-07-27 VITALS — OXYGEN SATURATION: 95 % | SYSTOLIC BLOOD PRESSURE: 127 MMHG | HEART RATE: 86 BPM | DIASTOLIC BLOOD PRESSURE: 79 MMHG

## 2021-07-27 DIAGNOSIS — F33.2 MDD (MAJOR DEPRESSIVE DISORDER), RECURRENT SEVERE, WITHOUT PSYCHOSIS (H): Primary | ICD-10-CM

## 2021-07-27 DIAGNOSIS — Z96.652 HISTORY OF PROSTHETIC UNICOMPARTMENTAL ARTHROPLASTY OF LEFT KNEE: ICD-10-CM

## 2021-07-27 DIAGNOSIS — M25.462 SWOLLEN L KNEE: ICD-10-CM

## 2021-07-27 DIAGNOSIS — F41.1 GAD (GENERALIZED ANXIETY DISORDER): ICD-10-CM

## 2021-07-27 DIAGNOSIS — M67.462 GANGLION OF LEFT KNEE: Primary | ICD-10-CM

## 2021-07-27 DIAGNOSIS — M25.462 SWELLING OF LEFT KNEE JOINT: ICD-10-CM

## 2021-07-27 DIAGNOSIS — M25.562 LEFT KNEE PAIN, UNSPECIFIED CHRONICITY: ICD-10-CM

## 2021-07-27 PROCEDURE — 20612 ASPIRATE/INJ GANGLION CYST: CPT | Mod: LT | Performed by: ORTHOPAEDIC SURGERY

## 2021-07-27 PROCEDURE — 99213 OFFICE O/P EST LOW 20 MIN: CPT | Mod: 25 | Performed by: ORTHOPAEDIC SURGERY

## 2021-07-27 PROCEDURE — 90832 PSYTX W PT 30 MINUTES: CPT | Mod: 95 | Performed by: SOCIAL WORKER

## 2021-07-27 RX ORDER — LIDOCAINE HYDROCHLORIDE 10 MG/ML
8 INJECTION, SOLUTION EPIDURAL; INFILTRATION; INTRACAUDAL; PERINEURAL
Status: SHIPPED | OUTPATIENT
Start: 2021-07-27

## 2021-07-27 RX ORDER — METHYLPREDNISOLONE ACETATE 80 MG/ML
80 INJECTION, SUSPENSION INTRA-ARTICULAR; INTRALESIONAL; INTRAMUSCULAR; SOFT TISSUE
Status: DISCONTINUED | OUTPATIENT
Start: 2021-07-27 | End: 2021-09-20

## 2021-07-27 RX ADMIN — METHYLPREDNISOLONE ACETATE 80 MG: 80 INJECTION, SUSPENSION INTRA-ARTICULAR; INTRALESIONAL; INTRAMUSCULAR; SOFT TISSUE at 11:23

## 2021-07-27 RX ADMIN — LIDOCAINE HYDROCHLORIDE 8 ML: 10 INJECTION, SOLUTION EPIDURAL; INFILTRATION; INTRACAUDAL; PERINEURAL at 11:23

## 2021-07-27 ASSESSMENT — PAIN SCALES - GENERAL: PAINLEVEL: MODERATE PAIN (4)
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
## 2021-07-27 NOTE — PROGRESS NOTES
Large Joint Injection/Arthocentesis: L knee joint    Date/Time: 7/27/2021 11:23 AM  Performed by: Greg Lam MD  Authorized by: Greg Lam MD     Indications:  Pain and osteoarthritis  Needle Size:  22 G  Guidance: landmark guided    Approach:  Anterolateral  Location:  Knee      Medications:  80 mg methylPREDNISolone 80 MG/ML; 8 mL lidocaine (PF) 1 %  Outcome:  Tolerated well, no immediate complications  Procedure discussed: discussed risks, benefits, and alternatives    Consent Given by:  Patient  Timeout: timeout called immediately prior to procedure    Prep: patient was prepped and draped in usual sterile fashion        Aspiration and injection /procedure.

## 2021-07-27 NOTE — LETTER
7/27/2021         RE: Paula Ho  [address]  [address]  [facility] [address]        Dear Colleague,    Thank you for referring your patient, Paula Ho, to the [facility]. Please see a copy of my visit note below.    SUBJECTIVE:   Paula Ho is a 61 year old female with a history of left unicompartmental knee arthroplasty in 2011/2012 who is seen in consultation at the request of Demetra Meyer for evaluation of left knee pain.  Duration: 2 weeks of pain. Had noticed an lump x 6 weeks that has been growing.  This is painful now, anterolateral.    Present symptoms: pain to touch lump. No pain walking or bending.  She says her low back pain is so bad that she may not notice pains in the knee.      Treatments tried to this point: none    Previous knee issues:   right unicompartmental knee arthroplasty   Revised to RIGHT TOTAL KNEE ARTHROPLASTY, and then staged revision for infection.     Past Medical History:   Past Medical History:   Diagnosis Date     Acute respiratory failure (H) 02/01/2020    Diagnosed with influenza A on 2/1 and admitted to ICU at [facility] on bipap. She went home AMA shortly thereafter.      Anxiety      Asthma      Depression      Hypertension      Past Surgical History:   Past Surgical History:   Procedure Laterality Date     APPENDECTOMY       APPENDECTOMY       CEREBRAL ANEURYSM REPAIR       JOINT REPLACEMENT       ORTHOPEDIC SURGERY  2002    Circa 2002: right knee arthroscopy (Dr. Pappas)     ORTHOPEDIC SURGERY  2004    Circa 2004: right knee partial knee replacement (Iam Ritchie MD)     ORTHOPEDIC SURGERY  2006    Circa 2006: right TKA (Ron Ryder MD; [facility])     ORTHOPEDIC SURGERY  2008    Circa 2008: right TKA revision due to infection (Ron Ryder MD; [facility])     ORTHOPEDIC SURGERY  2009    Circa 2009: right TKA revision due to infection (Ron Ryder MD; [facility])     ORTHOPEDIC SURGERY   2011 April 2011: right TKA (Ron Ryder MD; [facility])     REPLACEMENT TOTAL KNEE Right      TONSILLECTOMY      tonsils     TONSILLECTOMY       Family History:   Family History   Problem Relation Age of Onset     Depression Mother      Substance Abuse Father      Social History:   Social History     Tobacco Use     Smoking status: Current Every Day Smoker     Packs/day: 0.50     Smokeless tobacco: Never Used   Substance Use Topics     Alcohol use: Not Currently       Review of Systems:  Constitutional:  NEGATIVE for fever, chills, change in weight  Integumentary/Skin:  NEGATIVE for worrisome rashes, moles or lesions  Eyes:  NEGATIVE for vision changes or irritation  ENT/Mouth:  NEGATIVE for ear, mouth and throat problems  Resp:  NEGATIVE for significant cough or SOB  Breast:  NEGATIVE for masses, tenderness or discharge  CV:  NEGATIVE for chest pain, palpitations or peripheral edema  GI:  NEGATIVE for nausea, abdominal pain, heartburn, or change in bowel habits  :  Negative   Musculoskeletal:  See HPI above  Neuro:  NEGATIVE for weakness, dizziness or paresthesias  Endocrine:  NEGATIVE for temperature intolerance, skin/hair changes  Heme/allergy/immune:  NEGATIVE for bleeding problems  Psychiatric:  NEGATIVE for changes in mood or affect      OBJECTIVE:  Physical Exam:  /79 (BP Location: Right arm, Patient Position: Sitting, Cuff Size: Adult Regular)   Pulse 86   LMP  (LMP Unknown)   SpO2 95%   General Appearance: healthy, alert and no distress   Skin: no suspicious lesions or rashes  Neuro: Normal strength and tone, mentation intact and speech normal  Vascular: good pulses, and cappillary refill   Lymph: no lymphadenopathy   Psych:  mentation appears normal and affect normal/bright  Resp: no increased work of breathing     Left Knee Exam:  Gait: walks with normal gait  Alignment: normal   Patellofemoral joint: moderate crepitations in the patellofemoral joint.  Effusion: none  ROM: good  painfree range of motion   Tender: anterior-lateral knee, where there is a 4 cm diameter mass anterolaterally. Compressible.  Ligaments:   Lachman's: stable   Anterior/Posterior drawer: stable,   Varus/Valgus stress: stable to varus and valgus stress  McMurrays: negative    X-rays:  Obtained 7/12/21, reviewed in the office with the patient today:    Left knee medial compartment hemiarthroplasty. Suspected  small joint effusion.  No assymmetric poly wear.      ASSESSMENT:   Ganglion cyst of left knee.    PLAN:   Aspiration:   Procedure Note:  The risks, benefits and potential complications of aspiration were discussed with the patient (including but not limited to, bleeding, infection, pain, scar, damage to adjacent structures, failure to relieve symptoms) . Questions were addressed and answered.The patient elected to proceed. Written, informed consent was obtained. The correct procedural site was identified and confirmed. A Left Knee cyst aspiration was performed using 4mL of local anesthetic after sterile prep to the correct procedural site.  Approximately 5 cc of bloody gelatinous fluid resulted/extruded from the needle insertion site.  I impaled the mass a few times, no additional fluid came up into the syringe.  I manipulated the area manually to try to extrude more fluid, no more came out.   Then injected the cyst area with 80mg of Depomedrol and 4cc of local anesthetic after sterile prep.Sterile bandaid applied. This was tolerated well by the patient. No apparent complications.     Return to clinic or call: 3 weeks if no improvement.  We would do an MRI at that point to prepare for ganglion cyst excsion.    SUSANA Lam MD  Dept. Orthopedic Surgery  [facility]     Large Joint Injection/Arthocentesis: L knee joint    Date/Time: 7/27/2021 11:23 AM  Performed by: Grge Lam MD  Authorized by: Greg Lam MD     Indications:  Pain and osteoarthritis  Needle Size:  22  G  Guidance: landmark guided    Approach:  Anterolateral  Location:  Knee      Medications:  80 mg methylPREDNISolone 80 MG/ML; 8 mL lidocaine (PF) 1 %  Outcome:  Tolerated well, no immediate complications  Procedure discussed: discussed risks, benefits, and alternatives    Consent Given by:  Patient  Timeout: timeout called immediately prior to procedure    Prep: patient was prepped and draped in usual sterile fashion        Aspiration and injection /procedure.      Again, thank you for allowing me to participate in the care of your patient.        Sincerely,        Greg Lam MD

## 2021-07-28 ENCOUNTER — TELEPHONE (OUTPATIENT)
Dept: ORTHOPEDICS | Facility: CLINIC | Age: 61
End: 2021-07-28

## 2021-07-28 DIAGNOSIS — M25.562 LEFT KNEE PAIN, UNSPECIFIED CHRONICITY: ICD-10-CM

## 2021-07-28 DIAGNOSIS — Z96.652 HISTORY OF PROSTHETIC UNICOMPARTMENTAL ARTHROPLASTY OF LEFT KNEE: ICD-10-CM

## 2021-07-28 DIAGNOSIS — M67.462 GANGLION OF LEFT KNEE: Primary | ICD-10-CM

## 2021-07-28 NOTE — TELEPHONE ENCOUNTER
"Patient called in to clinic at [place] regarding her left knee pain.  Was seen by Dr. Lam yesterday and had aspiration of ganglion cyst of left knee performed.  Patient complaining that her knee is now more swollen than it was yesterday and is having more pain today than has had in the past.  Patient notes she can barely see her knee cap, mass seems even bigger now and increased swelling.  Patient noted that provider tried to drain fluid from cyst and fluid was bloody and jelly-like.  Has some bruising around area from needle aspiration.  Denies any redness, red streaks, warmth to touch. Denies a fever. No nausea or vomiting, no weakness. Is able to ambulate, though notes she has been laying in bed today due to pain. Has been elevating knee in bed.  Notes she is in a lot of pain today, took one of her Oxycodone 15 mg tabs just prior to calling office.  Patient is asking for provider to order MRI of knee now, does not want to wait another few weeks. Wants to find out what is going on now, \" between my back and my knee, I can't take all this pain anymore\".    Patient would like a call back ASAP with further instructions or with order for MRI.      Routing to Ortho team to please review and advise.      Catina Perdomo RN  [facility]      "

## 2021-07-28 NOTE — TELEPHONE ENCOUNTER
Advised patient Dr Lam will order this L knee MRI but wants her to wait X 1 week to ensure clear detail from test. Advised she continue to use Ice and add ace wrap to knee with elevation as needed. She states she is taking her pain management medication as directed.  No other questions.  Discussed need for ED/UC is worsening sings and symptoms discussed with this call. P: RTC vs MyChart for results.   Frederick AREVALO

## 2021-07-29 ENCOUNTER — ALLIED HEALTH/NURSE VISIT (OUTPATIENT)
Dept: BEHAVIORAL HEALTH | Facility: CLINIC | Age: 61
End: 2021-07-29
Payer: MEDICARE

## 2021-07-29 DIAGNOSIS — R69 DIAGNOSIS DEFERRED: Primary | ICD-10-CM
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
## 2021-08-10 ENCOUNTER — VIRTUAL VISIT (OUTPATIENT)
Dept: FAMILY MEDICINE | Facility: CLINIC | Age: 61
End: 2021-08-10
Payer: MEDICARE

## 2021-08-10 DIAGNOSIS — A49.01 INFECTION DUE TO STAPHYLOCOCCUS AUREUS: Primary | ICD-10-CM

## 2021-08-10 LAB
MRSA DNA SPEC QL NAA+PROBE: NEGATIVE
SA TARGET DNA: NEGATIVE

## 2021-08-10 PROCEDURE — 99442 PR PHYSICIAN TELEPHONE EVALUATION 11-20 MIN: CPT | Mod: 95 | Performed by: INTERNAL MEDICINE

## 2021-08-10 PROCEDURE — 87640 STAPH A DNA AMP PROBE: CPT | Mod: 59 | Performed by: INTERNAL MEDICINE

## 2021-08-10 PROCEDURE — 87641 MR-STAPH DNA AMP PROBE: CPT | Performed by: INTERNAL MEDICINE

## 2021-08-10 RX ORDER — DOXYCYCLINE 100 MG/1
100 CAPSULE ORAL 2 TIMES DAILY
Qty: 28 CAPSULE | Refills: 5 | Status: SHIPPED | OUTPATIENT
Start: 2021-08-10 | End: 2021-08-24

## 2021-08-10 RX ORDER — MUPIROCIN 20 MG/G
OINTMENT TOPICAL 2 TIMES DAILY
Qty: 30 G | Refills: 5 | Status: SHIPPED | OUTPATIENT
Start: 2021-08-10 | End: 2022-09-26 | Stop reason: DRUGHIGH

## 2021-08-10 NOTE — PROGRESS NOTES
Paula is a 61 year old who is being evaluated via a billable telephone visit.      What phone number would you like to be contacted at? [phone]  How would you like to obtain your AVS?     [facility] Internal Medicine Progress Note           Assessment and Plan:       ICD-10-CM    1. Infection due to Staphylococcus aureus  A49.01 doxycycline monohydrate (MONODOX) 100 MG capsule     Methicillin Resistant Staph Aureus PCR     mupirocin (BACTROBAN) 2 % external ointment   Comment: Treat empirically for MRSA due to history.            Interval History:     Paula is a 61 year old who presents for the following health issues     Rash  Onset/Duration: 2-3 days  Description  Location: 2 on chin, right leg and has something on the back of her neck and head  Character: round, red. Feels like something is biting her from the inside.  Itching: moderate  Intensity:  moderate  Progression of Symptoms:  same  Accompanying signs and symptoms:   Fever: no  Body aches or joint pain: no  Sore throat symptoms: no  Recent cold symptoms: no  History:           Previous episodes of similar rash: None  New exposures:  None  Recent travel: no  Exposure to similar rash: no  Precipitating or alleviating factors:   Therapies tried and outcome: none    I was telling the nurse that I have these red little bumps.  Started 2 - 3 days ago.  Started on one leg, later on my chin.  Thought it was bed bugs, but my sheets are clean.  Checked dogs for fleas.  Now, it's at the back of my head, by my hairline.  I haven't been around somebody.  Around my brother, whose dog has some sort of infection, such as losing spots in fur.  They gave the dog some antibiotics.  History of MRSA last March 2020.  Occurred after hip replacement (multiple times), have to carry antibiotic bag and had space in my right hip.                Significant Problems:   Patient Active Problem List   Diagnosis     Chronic knee pain     LIBRA (generalized anxiety  disorder)     Brain aneurysm     Chronic, continuous use of opioids     Asthma     Chronic GERD     Chronic pain     Cigarette smoker     DJD (degenerative joint disease)     Insomnia     Obesity, Class III, BMI 40-49.9 (morbid obesity) (H)     History of subarachnoid hemorrhage     Status post knee surgery     Tobacco use disorder     Chronic obstructive pulmonary disease, unspecified COPD type (H)     MDD (major depressive disorder), recurrent severe, without psychosis (H)     Attention deficit hyperactivity disorder (ADHD)     Chronic midline low back pain with bilateral sciatica     Hyperlipidemia     Benign essential hypertension     Infection due to 2019 novel coronavirus              Review of Systems:   CONSTITUTIONAL: NEGATIVE for fever, chills, change in weight  INTEGUMENTARY/SKIN: As above.  EYES: NEGATIVE for vision changes or irritation  ENT/MOUTH: NEGATIVE for ear, mouth and throat problems  RESP: NEGATIVE for significant cough or SOB  CV: NEGATIVE for chest pain, palpitations or peripheral edema  GI: NEGATIVE for nausea, abdominal pain, heartburn, or change in bowel habits  : NEGATIVE for frequency, dysuria, or hematuria  MUSCULOSKELETAL: NEGATIVE for significant arthralgias or myalgia  NEURO: NEGATIVE for weakness, dizziness or paresthesias  ENDOCRINE: NEGATIVE for temperature intolerance, skin/hair changes  HEME: NEGATIVE for bleeding problems  PSYCHIATRIC: NEGATIVE for changes in mood or affect            Physical Exam:   Vital signs not obtained due to nature of visit.    Remainder of exam not performed due to nature of visit.          Data:   Epic reviewed.     Disposition: Follow-up in 4 weeks.      Renan Dickerson MD  Internal Medicine  [facility] Team    Phone call duration: 11 minutes  Start: 10:08 AM  End: 10:19 AM

## 2021-08-10 NOTE — LETTER
August 10, 2021      Paula Ho  [address]    [place] [address]        Dear ,    We are writing to inform you of your test results.    Your test for MRSA is negative. Continue Doxycycline as prescribed. For any questions, you may call my office at [phone].      Resulted Orders   Methicillin Resistant Staph Aureus PCR   Result Value Ref Range    MRSA Target DNA Negative Negative    SA Target DNA Negative Negative    Narrative    The [employer]  Xpert SA Nasal Complete assay performed in the [employer]  Dx System is a qualitative in vitro diagnostic test designed for rapid detection of Staphylococcus aureus (SA) and methicillin-resistant Staphylococcus aureus (MRSA) from nasal swabs in patients at risk for nasal colonization. The test utilizes automated real-time polymerase chain reaction (PCR) to detect MRSA/SA DNA. The Xpert SA Nasal Complete assay is intended to aid in the prevention and control of MRSA/SA infections in healthcare settings. The assay is not intended to diagnose, guide or monitor treatment for MRSA/SA infections, or provide results of susceptibility to methicillin. A negative result does not preclude MRSA/SA nasal colonization.        If you have any questions or concerns, please call the clinic at the number listed above.       Sincerely,      Renan Dickerson MD

## 2021-08-11 ENCOUNTER — TELEPHONE (OUTPATIENT)
Dept: BEHAVIORAL HEALTH | Facility: CLINIC | Age: 61
End: 2021-08-11

## 2021-08-11 NOTE — TELEPHONE ENCOUNTER
Behavioral Health Home Services  [facility]: [place]        Social Work Care Navigator Note      Patient: Paula Ho  Date: August 11, 2021  Preferred Name: Paula    Previous PHQ-9:   PHQ-9 SCORE 3/24/2021 5/5/2021 7/15/2021   PHQ-9 Total Score 7 17 15     Previous LIBRA-7:   LIBRA-7 SCORE 3/24/2021 5/5/2021 7/15/2021   Total Score 7 8 19     ARNALDO LEVEL:  No flowsheet data found.    Preferred Contact:  Need for : No  Preferred Contact: Cell      Type of Contact Today: Phone call (not reached/unavailable)      Data: (subjective / Objective):  Attempted to reach patient for [facility] [place] ([facility]) Health Action Plan update, but was unsuccessful, unable to leave message.  Plan to attempt again.      JASON Mann [facility]  [facility] [place] ([facility])   [facility]  [phone]  August 11, 2021  11:11 AM

## 2021-08-24 ENCOUNTER — TRANSFERRED RECORDS (OUTPATIENT)
Dept: HEALTH INFORMATION MANAGEMENT | Facility: CLINIC | Age: 61
End: 2021-08-24

## 2021-08-24 LAB
CREATININE (EXTERNAL): 0.7 MG/DL (ref 0.57–1.11)
GFR ESTIMATED (EXTERNAL): >60 ML/MIN/1.73M2
GFR ESTIMATED (IF AFRICAN AMERICAN) (EXTERNAL): >60 ML/MIN/1.73M2
GLUCOSE (EXTERNAL): 104 MG/DL (ref 65–100)
POTASSIUM (EXTERNAL): 4.5 MMOL/L (ref 3.5–5)

## 2021-08-25 ENCOUNTER — TELEPHONE (OUTPATIENT)
Dept: BEHAVIORAL HEALTH | Facility: CLINIC | Age: 61
End: 2021-08-25

## 2021-08-25 ENCOUNTER — ALLIED HEALTH/NURSE VISIT (OUTPATIENT)
Dept: BEHAVIORAL HEALTH | Facility: CLINIC | Age: 61
End: 2021-08-25
Payer: MEDICARE

## 2021-08-25 ENCOUNTER — TELEPHONE (OUTPATIENT)
Dept: PSYCHIATRY | Facility: CLINIC | Age: 61
End: 2021-08-25

## 2021-08-25 DIAGNOSIS — R69 DIAGNOSIS DEFERRED: Primary | ICD-10-CM

## 2021-08-25 DIAGNOSIS — F41.1 GAD (GENERALIZED ANXIETY DISORDER): ICD-10-CM

## 2021-08-25 RX ORDER — CLONIDINE HYDROCHLORIDE 0.1 MG/1
0.1 TABLET ORAL 2 TIMES DAILY
Qty: 60 TABLET | Refills: 1 | Status: SHIPPED | OUTPATIENT
Start: 2021-08-25 | End: 2021-09-20

## 2021-08-25 ASSESSMENT — ANXIETY QUESTIONNAIRES
3. WORRYING TOO MUCH ABOUT DIFFERENT THINGS: NEARLY EVERY DAY
GAD7 TOTAL SCORE: 21
1. FEELING NERVOUS, ANXIOUS, OR ON EDGE: NEARLY EVERY DAY
2. NOT BEING ABLE TO STOP OR CONTROL WORRYING: NEARLY EVERY DAY
6. BECOMING EASILY ANNOYED OR IRRITABLE: NEARLY EVERY DAY
7. FEELING AFRAID AS IF SOMETHING AWFUL MIGHT HAPPEN: NEARLY EVERY DAY
5. BEING SO RESTLESS THAT IT IS HARD TO SIT STILL: NEARLY EVERY DAY
4. TROUBLE RELAXING: NEARLY EVERY DAY
IF YOU CHECKED OFF ANY PROBLEMS ON THIS QUESTIONNAIRE, HOW DIFFICULT HAVE THESE PROBLEMS MADE IT FOR YOU TO DO YOUR WORK, TAKE CARE OF THINGS AT HOME, OR GET ALONG WITH OTHER PEOPLE: EXTREMELY DIFFICULT

## 2021-08-25 ASSESSMENT — PATIENT HEALTH QUESTIONNAIRE - PHQ9: SUM OF ALL RESPONSES TO PHQ QUESTIONS 1-9: 26

## 2021-08-25 NOTE — Clinical Note
FYI - Patient goal update for Behavioral Health Home ([facility]) program - no action needed.
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
## 2021-08-25 NOTE — LETTER
"Behavioral Health Home ([facility]): Health Action Plan  [facility]: [place]    Well and Beyond      Name: Paula Ho  Preferred Name: Paula  : 1960  MRN: [id number]    2021    [facility]  [address] [place] [address]    Dear Paula,    I have enclosed the Health Action Plan (HAP) that we completed together that includes your goals for Behavioral Health Home ([facility]) Services. As you continue being enrolled in Behavioral Health Home ([facility]) services, I wanted to give you some information so you know what to expect moving forward.    What to Expect on a Monthly Basis: It is a requirement that I make contact with you on a monthly basis (by phone or meeting with you in clinic) to check in on how things are going and if you need any assistance. Please feel free to reach out to your [facility] team between these contacts if you need assistance or have any questions.    What to Expect every Six Months: Every six months, it is a requirement that we complete a Health Action Plan (HAP) review. During this in-clinic appointment, we will monitor our progress towards existing goals and set new goals for the next 6 month time period.    What kinds of support can [facility] offer now that I am enrolled?   - Housing Coordination  - Transportation Resources  - Financial Resources  - Coordination with the [facility] for Benefits ([place], [place] benefits, etc)  - Disability Eligibility and Benefits  - Employment and Education Coordination  - Disability Related Information and Education Resources  - Referrals for mental health services, chemical dependency assessment/treatment, etc     If you or someone you know is experiencing a mental health crisis and you need help, the following crisis hotlines are available to help.    If you are in immediate danger, call 911.  Suicide Prevention Lifeline: 7-268-061-TALK (9740)  Crisis Text Line Service: Text \"[place]\" to 995514.    [facility]  [facility] " [facility] Emergency Department - Behavioral Emergency Center  [address] [place] [address]  [phone] [facility] [employer] [facility] Crisis Response Services (Adults & Children)  [phone]   [facility] - Acute Psychiatric Services (APS)  Assessment & Referral: [phone]  Suicide Hotline: 167.183.7837 [facility] Crisis Team  [phone]   [facility] Adult Mental Health Services   [phone]  Referrals: [phone]  Crisis: [phone] [facility] - Emergency Department  [address] [place] [address]  [phone]   [place] [employer]  1-068-LkpkZvl (1-937.598.3739) [facility] Mental Health and Resource Crisis Line  [phone]   [facility] - Community Outreach for Psychiatric Emergencies  [phone] [facility] Crisis Services - [facility] Adult Mental Health Services - Crisis Services (24/7)  Information & Referrals: [phone]  Crisis Line: [phone]   [facility] Emergency Center (24/7)  [phone] TDD Crisis Help Line Serving [facility]  [phone]  or [facility]  to [phone]    [facility]  Mental Health  [address] [place] [address]  [phone]  [address] [place] [address]  [phone]  web: co.[facility].[place].  Mental-Health    [facility]  Mental Health  [address] [place] [address]  [phone]  web: [facility]  Family Services  [address], [place] [address]  [phone]  web: [facility] Family [facility]  Family Services  [address] [place] [address]  [phone]  web: co.[facility].[place]./adult mental health    [facility] Behavioral Health Unit  [address], [facility] [address], [place] [address]  [phone]  [address]  [place] [address]  [phone]  Toll Free: [phone]  web: co.[email or web address].[facility] and human services     Please let me know if you have any questions or if there is anything that we can assist with. I can be reached by phone, [employer] message, or by email. I look forward to continuing to work with you!    Sincerely,          JASON Mann [facility]  Behavioral Health [place] ([facility])   [phone]  [email or web address]

## 2021-08-25 NOTE — LETTER
Behavioral Health Home ([facility]): Health Action Plan  [facility]: [place]    Well and Beyond      Name: Paula Ho  Preferred Name: Paula  : 1960  MRN: [id number]      My Goals  Goal Areas: Health;Mental Health;Financial and Social Service Benefits  Patient stated goals: Paula would like assistance with her physical and mental health for creating a balanced and healthy lifestyle. Paula would like assistance with continued SSDI and social service assistance to aid with [facility] benefits to increase her financial stability.     Strengths related to each goal: Paula states her strengths are the support of her dogs, good advocate for herself, support of good friends, and care for others. Patient states she has a big heart.    Services and Supports Needed: The [facility] Team will provide monthly contact with patient in order to monitor progress towards goals.    Activities / Actions of Team to support goal(s): [facility] team will locate appropriate resources that align with patient's goals and promote health and wellness.    Activities / Actions of Patient / Parent / Guardian to support goal(s): It is a requirement that patient's primary care physician is through the [facility] system and that they are on Medical Assistance/Medicaid. If either of these were to change or if patient needs any type of assistance, they are to reach out to their Behavioral Health Home ([facility]) team.        Recommended Referral  Tobacco cessation referrals made?: No  Mental Health / Chemical Dependency Referrals: Yes  Substance Use Referrals: Not Applicable  Mental Health Referrals: [facility] Referrals: [facility] Financial Services;[facility] ;Other (see comments)          My Team Members and Their Contact Information  Patient Care Team       Relationship Specialty Notifications Demetra Vazquez APRN CNP PCP - General Nurse Practitioner  19     Phone: [phone] Fax: [phone]         [address] [address]  [place] [facility] [address]    Demetra Meyer APRN CNP Assigned PCP   9/15/19     Phone: [phone] Fax: [phone]         [address] [place] [facility] [address]    Tigist Manjarrez NP Nurse Practitioner Nurse Practitioner Admissions 8/3/20     Psychiatry    Phone: [phone] Fax: [phone]         [address] [place] [address]    Joselyn Winter Therapist  Admissions 8/3/20     Therapist    Phone: [phone] Fax: [phone]          [facility] PSYCHIATRY  [address]  [facility] [address]    Domonique Harris LSW  Clinic Admissions 8/3/20     [facility] [phone]    Bree Batista MD Assigned Pulmonology Provider   10/23/20     Phone: [phone] Fax: [phone]         [address] [place] [address]    Tigist Manjarrez NP Assigned Behavioral Health Provider   11/15/20     Phone: [phone] Fax: [phone]         [address] [place] [address]    Ace Forte MD Assigned Heart and Vascular Provider   11/22/20     Phone: [phone] Fax: [phone]         [address] [place] [address] [address] [facility] [address]    [facility] ([facility] ([place])  12/23/20     Phone: [phone]         Dane Irizarry MD Anesthesiologist Anesthesiology  1/21/21     Phone: [phone] Pager: [phone] Fax: [phone]        [address] [facility] [address]    Greg Lam MD Assigned Musculoskeletal Provider   8/1/21     Phone: [phone] Fax: [phone] [address] [facility] [place] [place]              [place] [address]          My Wellness Plan  Safety Concerns: None Reported / Observed  Recommendations / Plan for safety concerns: A safety and risk management plan has not been developed at this time, however patient was encouraged to call [facility] / 911 should there be a change in any of these risk factors.  Crisis Plan (emergencies / when urgent support needed): Patient  states she feels comfortable contacting her PCA Sharla or calling 911 in a crisis or emergency situation.          Paula Ho co-developed the Health Action Plan with the State mental health facility Team and received a copy of this document.  Date Health Action Plan Completed/Updated: 08/25/21

## 2021-08-26 ASSESSMENT — ANXIETY QUESTIONNAIRES: GAD7 TOTAL SCORE: 21

## 2021-08-29 DIAGNOSIS — J44.9 CHRONIC OBSTRUCTIVE PULMONARY DISEASE, UNSPECIFIED COPD TYPE (H): ICD-10-CM

## 2021-08-31 RX ORDER — BUDESONIDE AND FORMOTEROL FUMARATE DIHYDRATE 160; 4.5 UG/1; UG/1
2 AEROSOL RESPIRATORY (INHALATION) 2 TIMES DAILY
Qty: 30.6 G | Refills: 3 | Status: SHIPPED | OUTPATIENT
Start: 2021-08-31 | End: 2021-11-01

## 2021-08-31 NOTE — TELEPHONE ENCOUNTER
There is not a current, normal ACT on file in the last 6 months.  RN unable to refill medication.    Raisa Rodriguez BSN, RN

## 2021-09-07 ENCOUNTER — TELEPHONE (OUTPATIENT)
Dept: BEHAVIORAL HEALTH | Facility: CLINIC | Age: 61
End: 2021-09-07

## 2021-09-07 NOTE — TELEPHONE ENCOUNTER
Behavioral Health Home Services  [facility] Clinic: [place]        Social Work Care Navigator Note      Patient: Paula Ho  Date: September 7, 2021  Preferred Name: Paula    Previous PHQ-9:   PHQ-9 SCORE 5/5/2021 7/15/2021 8/25/2021   PHQ-9 Total Score 17 15 26     Previous LIBRA-7:   LIBRA-7 SCORE 5/5/2021 7/15/2021 8/25/2021   Total Score 8 19 21     ARNALDO LEVEL:  No flowsheet data found.    Preferred Contact:  Need for : No  Preferred Contact: Cell      Type of Contact Today: Phone call (not reached/unavailable)      Data: (subjective / Objective):  Attempted to reach patient for Behavioral Health [place] ([facility]) monthly check-in, but was unsuccessful, left message.  Plan to attempt again.     JASON Mann LGSW Behavioral Health Home ([facility])   [facility]  [phone]  September 7, 2021  2:14 PM            Next 5 appointments (look out 90 days)    Oct 07, 2021  3:15 PM  Office Visit with PFT LAB  [facility] ([facility]) [address] [address] [place] [id number] [phone]

## 2021-09-22 ENCOUNTER — VIRTUAL VISIT (OUTPATIENT)
Dept: BEHAVIORAL HEALTH | Facility: CLINIC | Age: 61
End: 2021-09-22
Payer: MEDICARE

## 2021-09-22 ENCOUNTER — TELEPHONE (OUTPATIENT)
Dept: BEHAVIORAL HEALTH | Facility: CLINIC | Age: 61
End: 2021-09-22

## 2021-09-22 DIAGNOSIS — R69 DIAGNOSIS DEFERRED: Primary | ICD-10-CM

## 2021-09-22 NOTE — Clinical Note
Anson Mccrary,    I was able to talk to this patient today. She is wondering if there might be a message therapy option for her for her increased back pain through PT/OT.  Do you know if this is available and if so, could you placed order?    Thank you,  JASON Mann [facility]  Behavioral Health [place] ([facility])   [facility]  [phone]  September 22, 2021  1:08 PM

## 2021-09-22 NOTE — PROGRESS NOTES
Behavioral Health Home Services  [facility]: [place]        Social Work Care Navigator Note      Patient: Paula Ho  Date: September 22, 2021  Preferred Name: Paula    Previous PHQ-9:   PHQ-9 SCORE 7/15/2021 8/25/2021 9/20/2021   PHQ-9 Total Score 15 26 14     Previous LIBRA-7:   LIBRA-7 SCORE 7/15/2021 8/25/2021 9/20/2021   Total Score 19 21 8     ARNALDO LEVEL:  No flowsheet data found.    Preferred Contact:  Need for : No  Preferred Contact: Cell      Type of Contact Today: Phone call (patient / identified key support person reached)      Data: (subjective / Objective):  Recent ED/IP Admission or Discharge?   None    Patient Goals:  Goal Areas: Health;Mental Health;Financial and Social Service Benefits  Patient stated goals: Paula would like assistance with her physical and mental health for creating a balanced and healthy lifestyle. Paula would like assistance with continued SSDI and social service assistance to aid with [employer] benefits to increase her financial stability.         [facility] Core Service Provided:  Comprehensive Care Management: utilized the electronic medical record / patient registry to identify and support patient's health conditions / needs more effectively   Care Transitions: focused on the coordinated and seamless movement of patient between or within different levels of care or settings  Care Coordination: provided care management services/referrals necessary to ensure patient and their identified supports have access to medical, behavioral health, pharmacology and recovery support services.  Ensured that patient's care is integrated across all settings and services.   Individual and Family Support: aimed to help clients reduce barriers to achieving goals, increase health literacy and knowledge about chronic condition(s), increase self-efficacy skills, and improve health outcomes  Referral to Community and Social Support Services: Provided patient with referrals (see plan  "section)  Assisted in scheduling an appointment to a referral / service (see plan section)  Coordinated / Confirmed patient's appointment time or referral and transportation plan  Followed-up with patient on whether or not they completed a referral    Current Stressors / Issues / Care Plan Objective Addressed Today:  SWCC and patient were able to meet today for Behavioral Health Home ([facility]) monthly check-in via telehealth visit. All required ROIs have been filed with HIM/patient chart.    1. Patient reports she is struggling with stressors and migraine headaches due to moving from her home into a new duplex. Patient reports she has been \"living out of suitcases.\" Patient reports new landlord is having difficulty getting old tenants out of living space. Patient reports she is not sure what she wants to do at this point. Patient reports her landlord has been nice about letting her stay for a bit in her old apartment but is unsure how much longer she will be able to do this.    Patient and CC processed feelings/emotions about this today. [facility] and patient discussed going to [facility] from 6-8pm for [employer] WORKSHOP. Patient reports interest and would like to attend. Patient reports she would like [facility] to register her today for seminar. [facility] provided patient with email information for seminar today. [facility] let patient know there would be a [facility]-Certified Housing Counselor In-person at the [facility] Office for the seminar [facility] and perhaps they may be able to answer questions or have additional resources for her to access.    2. Patient reports increased back pain due to packing and getting ready to move. Patient is wondering about message therapy through  Health PT. [facility] messaged provider to see if this is something patient could get a referral placed.     3. Patient reports she missed her eye doctor appt and needs to re-schedule. [facility] was able to reschedule appt for patient " today.    Intervention:  Motivational Interviewing: Expressed Empathy/Understanding, Supported Autonomy, Collaboration, Evocation, Permission to raise concern or advise, Open-ended questions, Reflections: simple and complex, Rolled with resistance: Emphasized patient autonomy, Simple reflection, Complex reflection, Amplified reflection (weaker or stronger meaning), Shifted topic to defuse resistance, Reframed sustain talk in the direction of change and Evoked patient agenda, Change talk (evoked) and Reframe   Target Behavior(s): Explored thoughts about taking an anti-depressant, Explored and resolved challenges related to taking anti-depressants as prescribed, Explored thoughts and readiness to participate in individual therapy, Explored and resolved challenges to attending appointments as scheduled and Explored current social supports and reinforced opportunities to increase engagement    Assessment: (Progress on Goals / Homework):  Patient would benefit from continued coordination in reaching their goals set for the Behavioral Health Home ([facility]) program. SW reviewed Health Action Plan goals and will continue to monitor progress and work with patient and their care team.      Plan: (Homework, other):  Patient was encouraged to continue to seek condition-related information and education.      Scheduled a Phone follow up appointment with MISA  in 2 weeks     Patient has set self-identified goals and will monitor progress until the next appointment on: 10/01/2021.         JASON Mann [facility]  Behavioral Health Home ([facility])   [facility]  [phone]  September 22, 2021  1:07 PM                  Next 5 appointments (look out 90 days)    Oct 07, 2021  3:15 PM  Office Visit with PFT LAB  [facility] ([facility]) [id number] [place] [id number] [phone]

## 2021-09-22 NOTE — TELEPHONE ENCOUNTER
Behavioral Health Home Services  [facility]: [place]        Social Work Care Navigator Note      Patient: Paula Ho  Date: September 22, 2021  Preferred Name: Paula    Previous PHQ-9:   PHQ-9 SCORE 7/15/2021 8/25/2021 9/20/2021   PHQ-9 Total Score 15 26 14     Previous LIBRA-7:   LIBRA-7 SCORE 7/15/2021 8/25/2021 9/20/2021   Total Score 19 21 8     ARNALDO LEVEL:  No flowsheet data found.    Preferred Contact:  Need for : No  Preferred Contact: Cell      Type of Contact Today: Phone call (not reached/unavailable)      Data: (subjective / Objective):  [facility] called patient yesterday to schedule Behavioral Health Home ([facility]) monthly check-in. Patient reported she had a migraine and asked to schedule appt for tomorrow in the am. [facility] attempted to reach patient today, but was unsuccessful, left message.  Plan to attempt again.     JASON Mann [facility]  Behavioral Health [place] ([facility])   [facility]  [phone]  September 22, 2021  10:09 AM          Next 5 appointments (look out 90 days)    Oct 07, 2021  3:15 PM  Office Visit with PFT LAB  [facility] ([facility]) [address] [place] [id number] [phone]          
4

## 2021-09-23 DIAGNOSIS — G89.29 CHRONIC MIDLINE LOW BACK PAIN WITH BILATERAL SCIATICA: Primary | ICD-10-CM

## 2021-09-23 DIAGNOSIS — M54.41 CHRONIC MIDLINE LOW BACK PAIN WITH BILATERAL SCIATICA: Primary | ICD-10-CM

## 2021-09-23 DIAGNOSIS — M54.42 CHRONIC MIDLINE LOW BACK PAIN WITH BILATERAL SCIATICA: Primary | ICD-10-CM

## 2021-10-01 ENCOUNTER — VIRTUAL VISIT (OUTPATIENT)
Dept: BEHAVIORAL HEALTH | Facility: CLINIC | Age: 61
End: 2021-10-01
Payer: MEDICARE

## 2021-10-01 DIAGNOSIS — R69 DIAGNOSIS DEFERRED: Primary | ICD-10-CM
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
## 2021-10-01 NOTE — Clinical Note
Anson Mccrary and Demetra Boyle - patient update - FYI no action needed.    Tamar DIAZ - patient update - scheduled appt with you for Monday the 11th.

## 2021-10-07 ENCOUNTER — OFFICE VISIT (OUTPATIENT)
Dept: NURSING | Facility: CLINIC | Age: 61
End: 2021-10-07
Payer: MEDICARE

## 2021-10-07 VITALS — HEIGHT: 65 IN | HEART RATE: 80 BPM | WEIGHT: 262.9 LBS | OXYGEN SATURATION: 97 % | BODY MASS INDEX: 43.8 KG/M2

## 2021-10-07 DIAGNOSIS — J44.9 CHRONIC OBSTRUCTIVE PULMONARY DISEASE, UNSPECIFIED COPD TYPE (H): ICD-10-CM

## 2021-10-07 LAB
DLCOUNC-%PRED-PRE: 110 %
DLCOUNC-PRE: 23.03 ML/MIN/MMHG
DLCOUNC-PRED: 20.92 ML/MIN/MMHG
ERV-%PRED-PRE: 76 %
ERV-PRE: 0.18 L
ERV-PRED: 0.23 L
EXPTIME-PRE: 8.32 SEC
FEF2575-%PRED-POST: 85 %
FEF2575-%PRED-PRE: 76 %
FEF2575-POST: 1.99 L/SEC
FEF2575-PRE: 1.77 L/SEC
FEF2575-PRED: 2.32 L/SEC
FEFMAX-%PRED-PRE: 78 %
FEFMAX-PRE: 5.06 L/SEC
FEFMAX-PRED: 6.45 L/SEC
FEV1-%PRED-PRE: 95 %
FEV1-PRE: 2.46 L
FEV1FEV6-PRE: 71 %
FEV1FEV6-PRED: 81 %
FEV1FVC-PRE: 71 %
FEV1FVC-PRED: 79 %
FEV1SVC-PRE: 76 %
FEV1SVC-PRED: 76 %
FIFMAX-PRE: 3.79 L/SEC
FRCPLETH-%PRED-PRE: 133 %
FRCPLETH-PRE: 3.68 L
FRCPLETH-PRED: 2.76 L
FVC-%PRED-PRE: 105 %
FVC-PRE: 3.47 L
FVC-PRED: 3.28 L
IC-%PRED-PRE: 96 %
IC-PRE: 3.05 L
IC-PRED: 3.16 L
RVPLETH-%PRED-PRE: 179 %
RVPLETH-PRE: 3.5 L
RVPLETH-PRED: 1.95 L
TLCPLETH-%PRED-PRE: 131 %
TLCPLETH-PRE: 6.73 L
TLCPLETH-PRED: 5.11 L
VA-%PRED-PRE: 103 %
VA-PRE: 5.16 L
VC-%PRED-PRE: 95 %
VC-PRE: 3.23 L
VC-PRED: 3.4 L

## 2021-10-07 PROCEDURE — 94726 PLETHYSMOGRAPHY LUNG VOLUMES: CPT | Performed by: INTERNAL MEDICINE

## 2021-10-07 PROCEDURE — 94729 DIFFUSING CAPACITY: CPT | Performed by: INTERNAL MEDICINE

## 2021-10-07 PROCEDURE — 94060 EVALUATION OF WHEEZING: CPT | Performed by: INTERNAL MEDICINE

## 2021-10-07 ASSESSMENT — MIFFLIN-ST. JEOR: SCORE: 1763.39

## 2021-10-11 ENCOUNTER — VIRTUAL VISIT (OUTPATIENT)
Dept: BEHAVIORAL HEALTH | Facility: CLINIC | Age: 61
End: 2021-10-11
Payer: MEDICARE

## 2021-10-11 DIAGNOSIS — F41.1 GAD (GENERALIZED ANXIETY DISORDER): ICD-10-CM

## 2021-10-11 DIAGNOSIS — F33.2 MDD (MAJOR DEPRESSIVE DISORDER), RECURRENT SEVERE, WITHOUT PSYCHOSIS (H): Primary | ICD-10-CM

## 2021-10-11 PROCEDURE — 90832 PSYTX W PT 30 MINUTES: CPT | Mod: 95 | Performed by: SOCIAL WORKER

## 2021-10-11 NOTE — PROGRESS NOTES
"Paula Ho is a 60 year old female who is being evaluated via a telephone visit.      The patient has been notified of the following:     \"We have found that certain health care needs can be provided without the need for a face to face visit.  This service lets us provide the care you need with a short phone conversation.      I will have full access to your [place] medical record during this entire phone call.   I will be taking notes for your medical record.     Since this is like an office visit, we will bill your insurance company for this service.  Please check with your medical insurance if this type of telephone visit/virtual care is covered.  You may be responsible for the cost of this service if insurance coverage is denied.      There are potential benefits and risks of telephone visits (e.g. limits to patient confidentiality) that differ from in-person visits.?  Confidentiality still applies for telephone services, and nobody will record the visit.  It is important to be in a quiet, private space that is free of distractions (including cell phone or other devices) during the visit.??     If during the course of the call I believe a telephone visit is not appropriate, you will not be charged for this service\"    Consent has been obtained for this service by care team member: yes.    Start time: 430 pm    End time: 5 pm    [facility] Primary Care: Integrated Behavioral Health  October 11 , 2021      Behavioral Health Clinician Progress Note    Patient Name: Paula Ho           Service Type:  Phone Visit      Service Location:   Phone call (patient / identified key support person reached)        Session Length: 16 - 37      Attendees: Patient    Visit Activities (Refresh list every visit): [facility] Only    Diagnostic Assessment Date: 8/3/2020 by Tigist Manjarrez NP Collaborative Psychiatry  Treatment Plan Review Date: not completed  See Flowsheets for today's PHQ-9 and LIBRA-7 " results  Previous PHQ-9:   PHQ-9 SCORE 7/15/2021 8/25/2021 9/20/2021   PHQ-9 Total Score 15 26 14     Previous LIBRA-7:   LIBRA-7 SCORE 7/15/2021 8/25/2021 9/20/2021   Total Score 19 21 8       ARNALDO LEVEL:  No flowsheet data found.    DATA  Extended Session (60+ minutes): No  Interactive Complexity: No  Crisis: No  [facility] Patient: Yes, addressed the follow [facility] Core Component(s):                          Health and Wellness Promotion    Treatment Objective(s) Addressed in This Session:  Target Behavior(s): disease management/lifestyle changes decrease symptoms related to chronic pain    Depressed Mood: Increase interest, engagement, and pleasure in doing things  Decrease frequency and intensity of feeling down, depressed, hopeless  Improve quantity and quality of night time sleep / decrease daytime naps  Feel less tired and more energy during the day   Improve diet, appetite, mindful eating, and / or meal planning  Identify negative self-talk and behaviors: challenge core beliefs, myths, and actions  Improve concentration, focus, and mindfulness in daily activities   Feel less fidgety, restless or slow in daily activities / interpersonal interactions  Anxiety: will experience a reduction in anxiety, will develop more effective coping skills to manage anxiety symptoms, will develop healthy cognitive patterns and beliefs and will increase ability to function adaptively  Relationship Problems: will address relationship difficulties in a more adaptive manner  Functional Impairment: will effectively address problems that interfere with adaptive functioning  Psychological distress related to Pain    Current Stressors / Issues:  Patient reports symptoms have increased due to issues with not being able to move to new apartment. She also reports having cocaine in her system due to being around her ex.  Discussed setting boundaries. Patient reports she will consider this.   Patient is also struggling with her landlord. Patient is also  concerned she may have COVID. Discussed getting checked and deciding on what she can control right now.   Focused on  DBT distress tolerance skills.  Patient will return in one week.   Progress on Treatment Objective(s) / Homework:  Worsening - PREPARATION (Decided to change - considering how); Intervened by negotiating a change plan and determining options / strategies for behavior change, identifying triggers, exploring social supports, and working towards setting a date to begin behavior change due to increased stressors    Motivational Interviewing    MI Intervention: Expressed Empathy/Understanding, Supported Autonomy, Collaboration, Evocation, Permission to raise concern or advise, Open-ended questions, Change talk (evoked) and Reframe     Change Talk Expressed by the Patient: Desire to change Need to change Committment to change    Provider Response to Change Talk: E - Evoked more info from patient about behavior change, A - Affirmed patient's thoughts, decisions, or attempts at behavior change, R - Reflected patient's change talk and S - Summarized patient's change talk statements      Care Plan review completed: No    Medication Review:  No changes to current psychiatric medication(s)    Medication Compliance:  Yes    Changes in Health Issues:   Yes: Pain, Associated Psychological Distress    Chemical Use Review:   Substance Use: Chemical use reviewed, no active concerns identified      Tobacco Use: Yes, first time assessed.  Patient reports frequency of use daily. Patient declined discussion at this time    Assessment: Current Emotional / Mental Status (status of significant symptoms):  Risk status (Self / Other harm or suicidal ideation)  Patient has had a history of suicidal ideation: since childhood and suicide attempts: patient reports history although does not want to discuss  Patient denies current fears or concerns for personal safety.  Patient denies current or recent suicidal ideation or  behaviors.  Patient denies current or recent homicidal ideation or behaviors.  Patient reports current or recent self injurious behavior or ideation including has had urges to self-harm - burn her arms as she has is the past. Patient has not given in to urges and has been able to talk to support people about urges.  Patient denies other safety concerns.  A safety and risk management plan has not been developed at this time, however patient was encouraged to call [place] [phone] should there be a change in any of these risk factors.    Appearance:   phone visit   Eye Contact:   phone visit   Psychomotor Behavior: phone visit   Attitude:   Cooperative  Interested Attentive  Orientation:   All  Speech   Rate / Production: Normal    Volume:  Normal   Mood:    Anxious  Normal  Affect:    phone visit   Thought Content:  Clear  Rumination   Thought Form:  Coherent  Logical   Insight:    Good     Diagnoses:  1. MDD (major depressive disorder), recurrent severe, without psychosis (H)    2. LIBRA (generalized anxiety disorder)        Collateral Reports Completed:  Routed note to PCP    Plan: (Homework, other):  Patient was  scheduled a follow up appointment with the clinic [facility] in 1 week.  She was also given deep Breathing Strategies to practice when experiencing anxiety and depression.  CD Recommendations: No indications of CD issues.  RASHEED Ayala (Mindy),[facility]

## 2021-10-15 ENCOUNTER — VIRTUAL VISIT (OUTPATIENT)
Dept: BEHAVIORAL HEALTH | Facility: CLINIC | Age: 61
End: 2021-10-15
Payer: MEDICARE

## 2021-10-15 DIAGNOSIS — R69 DIAGNOSIS DEFERRED: Primary | ICD-10-CM

## 2021-10-15 NOTE — PROGRESS NOTES
Behavioral Health Home Services  [facility]: [place]        Social Work Care Navigator Note      Patient: Paula Ho  Date: October 15, 2021  Preferred Name: Paula    Previous PHQ-9:   PHQ-9 SCORE 7/15/2021 8/25/2021 9/20/2021   PHQ-9 Total Score 15 26 14     Previous LIBRA-7:   LIBRA-7 SCORE 7/15/2021 8/25/2021 9/20/2021   Total Score 19 21 8     ARNALDO LEVEL:  No flowsheet data found.    Preferred Contact:  Need for : No  Preferred Contact: Cell      Type of Contact Today: Phone call (patient / identified key support person reached)      Data: (subjective / Objective):  Recent ED/IP Admission or Discharge?   None    Patient Goals:  Goal Areas: Health;Mental Health;Financial and Social Service Benefits  Patient stated goals: Paula would like assistance with her physical and mental health for creating a balanced and healthy lifestyle. Paula would like assistance with continued SSDI and social service assistance to aid with [place] [employer] benefits to increase her financial stability.         [facility] Core Service Provided:  Comprehensive Care Management: utilized the electronic medical record / patient registry to identify and support patient's health conditions / needs more effectively   Care Transitions: focused on the coordinated and seamless movement of patient between or within different levels of care or settings  Care Coordination: provided care management services/referrals necessary to ensure patient and their identified supports have access to medical, behavioral health, pharmacology and recovery support services.  Ensured that patient's care is integrated across all settings and services.   Individual and Family Support: aimed to help clients reduce barriers to achieving goals, increase health literacy and knowledge about chronic condition(s), increase self-efficacy skills, and improve health outcomes  Referral to Community and Social Support Services: Provided patient with referrals (see plan  section)  Assisted in scheduling an appointment to a referral / service (see plan section)  Coordinated / Confirmed patient's appointment time or referral and transportation plan  Followed-up with patient on whether or not they completed a referral    Current Stressors / Issues / Care Plan Objective Addressed Today:  [facility] and patient were able to meet today for Behavioral Health Home ([facility]) monthly check-in via telehealth visit. All required ROIs have been filed with HIM/patient chart.    1. Patient reports UA came up dirty again after re-testing. Patient reports she is very upset by this. Patient reports due to such low levels the pain clinic will continue to provide services but will continue to monitor. Patient reports relief with this.     2. Patient reports she has tried the [place] and [facility] CAP agency to help with Bridging referral but no one has called her back. [facility] provided patient with several additional agencies that may be able to help with referral to Bridging program for new bed: [facility] -  [facility] Abhinav Estrada [phone]   [facility] Michael Rivera [phone]  [facility] Radha Sogn [phone]    3. Patient reports she continues to live in her current apartment. Patient reports the duplex she wants to move into has an eviction court date on Oct. 26th. Patient reports she should have more information about her moving status after this date. [facility] to follow-up with patient at next appt.     4. Patient reports increased back pain due to packing and getting ready to move. Patient reports she did not get a call from PT services for her message therapy referral. Patient reports she will be going to clinic today to schedule appt to help with back pain. [facility] to follow-up with patient at next visit.    5. Patient reports she has eye doctor and pulmonologist appt in November. [facility] to monitor and  follow-up with patient.      Intervention:  Motivational Interviewing: Expressed Empathy/Understanding, Supported Autonomy, Collaboration, Evocation, Permission to raise concern or advise, Open-ended questions, Reflections: simple and complex, Rolled with resistance: Emphasized patient autonomy, Simple reflection, Complex reflection, Amplified reflection (weaker or stronger meaning), Shifted topic to defuse resistance, Reframed sustain talk in the direction of change and Evoked patient agenda, Change talk (evoked) and Reframe   Target Behavior(s): Explored thoughts about taking an anti-depressant, Explored and resolved challenges related to taking anti-depressants as prescribed, Explored thoughts and readiness to participate in individual therapy, Explored and resolved challenges to attending appointments as scheduled, Explored current social supports and reinforced opportunities to increase engagement and Explored patient's perception of how alcohol and / or drugs influences mood    Assessment: (Progress on Goals / Homework):  Patient would benefit from continued coordination in reaching their goals set for the Behavioral Health Home ([facility]) program. [facility] reviewed Health Action Plan goals and will continue to monitor progress and work with patient and their care team.      Plan: (Homework, other):  Patient was encouraged to continue to seek condition-related information and education.      Scheduled a Phone follow up appointment with MISA RICE in 3 weeks     Patient has set self-identified goals and will monitor progress until the next appointment on: 11/05/2021.         Domonique Harris [facility]  Behavioral Health Home ([facility])   [facility]  [phone]  October 15, 2021

## 2021-10-18 ENCOUNTER — VIRTUAL VISIT (OUTPATIENT)
Dept: BEHAVIORAL HEALTH | Facility: CLINIC | Age: 61
End: 2021-10-18
Payer: MEDICARE

## 2021-10-18 DIAGNOSIS — F41.1 GAD (GENERALIZED ANXIETY DISORDER): ICD-10-CM

## 2021-10-18 DIAGNOSIS — F33.2 MDD (MAJOR DEPRESSIVE DISORDER), RECURRENT SEVERE, WITHOUT PSYCHOSIS (H): Primary | ICD-10-CM

## 2021-10-18 PROCEDURE — 90832 PSYTX W PT 30 MINUTES: CPT | Mod: 95 | Performed by: SOCIAL WORKER

## 2021-10-18 NOTE — PROGRESS NOTES
"Paula Ho is a 60 year old female who is being evaluated via a telephone visit.      The patient has been notified of the following:     \"We have found that certain health care needs can be provided without the need for a face to face visit.  This service lets us provide the care you need with a short phone conversation.      I will have full access to your [place] medical record during this entire phone call.   I will be taking notes for your medical record.     Since this is like an office visit, we will bill your insurance company for this service.  Please check with your medical insurance if this type of telephone visit/virtual care is covered.  You may be responsible for the cost of this service if insurance coverage is denied.      There are potential benefits and risks of telephone visits (e.g. limits to patient confidentiality) that differ from in-person visits.?  Confidentiality still applies for telephone services, and nobody will record the visit.  It is important to be in a quiet, private space that is free of distractions (including cell phone or other devices) during the visit.??     If during the course of the call I believe a telephone visit is not appropriate, you will not be charged for this service\"    Consent has been obtained for this service by care team member: yes.    Start [id number] pm    End time: 1 pm    [facility] Primary Care: Integrated Behavioral Health  October 18 , 2021      Behavioral Health Clinician Progress Note    Patient Name: Paula Ho           Service Type:  Phone Visit      Service Location:   Phone call (patient / identified key support person reached)        Session Length: 16 - 37      Attendees: Patient    Visit Activities (Refresh list every visit): [facility] Only    Diagnostic Assessment Date: 8/3/2020 by Tigist Manjarrez NP Collaborative Psychiatry  Treatment Plan Review Date: not completed  See Flowsheets for today's PHQ-9 and LIBRA-7 " "results  Previous PHQ-9:   PHQ-9 SCORE 7/15/2021 8/25/2021 9/20/2021   PHQ-9 Total Score 15 26 14     Previous LIBRA-7:   LIBRA-7 SCORE 7/15/2021 8/25/2021 9/20/2021   Total Score 19 21 8       ARNALDO LEVEL:  No flowsheet data found.    DATA  Extended Session (60+ minutes): No  Interactive Complexity: No  Crisis: No  [facility] Patient: Yes, addressed the follow [facility] Core Component(s):                          Health and Wellness Promotion    Treatment Objective(s) Addressed in This Session:  Target Behavior(s): disease management/lifestyle changes decrease symptoms related to chronic pain    Depressed Mood: Increase interest, engagement, and pleasure in doing things  Decrease frequency and intensity of feeling down, depressed, hopeless  Improve quantity and quality of night time sleep / decrease daytime naps  Feel less tired and more energy during the day   Improve diet, appetite, mindful eating, and / or meal planning  Identify negative self-talk and behaviors: challenge core beliefs, myths, and actions  Improve concentration, focus, and mindfulness in daily activities   Feel less fidgety, restless or slow in daily activities / interpersonal interactions  Anxiety: will experience a reduction in anxiety, will develop more effective coping skills to manage anxiety symptoms, will develop healthy cognitive patterns and beliefs and will increase ability to function adaptively  Relationship Problems: will address relationship difficulties in a more adaptive manner  Functional Impairment: will effectively address problems that interfere with adaptive functioning  Psychological distress related to Pain    Current Stressors / Issues:  Patient reports increased distress due to going to the pain clinic and having cocaine in her system. She does not \"know where this came from\". She will provide another UA this week and see what happens. Patient is fearful of losing providers and RX for her pain management. She also continues to be in limbo " regarding her moving situation. She is in [place], [place] at this time with her niece.  Discussed using distraction to help manage her distress. Patient would another appointment this week. Scheduled  10/20/2021.   Progress on Treatment Objective(s) / Homework:  Worsening - PREPARATION (Decided to change - considering how); Intervened by negotiating a change plan and determining options / strategies for behavior change, identifying triggers, exploring social supports, and working towards setting a date to begin behavior change due to increased stressors    Motivational Interviewing    MI Intervention: Expressed Empathy/Understanding, Supported Autonomy, Collaboration, Evocation, Permission to raise concern or advise, Open-ended questions, Change talk (evoked) and Reframe     Change Talk Expressed by the Patient: Desire to change Need to change Committment to change    Provider Response to Change Talk: E - Evoked more info from patient about behavior change, A - Affirmed patient's thoughts, decisions, or attempts at behavior change, R - Reflected patient's change talk and S - Summarized patient's change talk statements      Care Plan review completed: No    Medication Review:  No changes to current psychiatric medication(s)    Medication Compliance:  Yes    Changes in Health Issues:   Yes: Pain, Associated Psychological Distress    Chemical Use Review:   Substance Use: Chemical use reviewed, no active concerns identified       Tobacco Use: No change in amount of tobacco use since last session.  Patient declined discussion at this time    Assessment: Current Emotional / Mental Status (status of significant symptoms):  Risk status (Self / Other harm or suicidal ideation)  Patient has had a history of suicidal ideation: since childhood and suicide attempts: patient reports history although does not want to discuss  Patient denies current fears or concerns for personal safety.  Patient denies current or recent suicidal  ideation or behaviors.  Patient denies current or recent homicidal ideation or behaviors.  Patient reports current or recent self injurious behavior or ideation including has had urges to self-harm - burn her arms as she has is the past. Patient has not given in to urges and has been able to talk to support people about urges.  Patient denies other safety concerns.  A safety and risk management plan has not been developed at this time, however patient was encouraged to call [place] [phone] should there be a change in any of these risk factors.    Appearance:   phone visit   Eye Contact:   phone visit   Psychomotor Behavior: phone visit   Attitude:   Cooperative  Interested Attentive  Orientation:   All  Speech   Rate / Production: Normal    Volume:  Normal   Mood:    Anxious  Normal Panicked  Affect:    phone visit  sounded tearful at times  Thought Content:  Clear  Rumination   Thought Form:  Coherent  Logical   Insight:    Good     Diagnoses:  1. MDD (major depressive disorder), recurrent severe, without psychosis (H)    2. LIBRA (generalized anxiety disorder)        Collateral Reports Completed:  Routed note to PCP    Plan: (Homework, other):  Patient was  scheduled a follow up appointment with the clinic [facility] later this week.  She was also given deep Breathing Strategies to practice when experiencing anxiety and depression.  CD Recommendations: No indications of CD issues.  Ayala (Mindy),[place],[facility]

## 2021-10-20 ENCOUNTER — VIRTUAL VISIT (OUTPATIENT)
Dept: BEHAVIORAL HEALTH | Facility: CLINIC | Age: 61
End: 2021-10-20
Payer: MEDICARE

## 2021-10-20 DIAGNOSIS — F41.1 GAD (GENERALIZED ANXIETY DISORDER): ICD-10-CM

## 2021-10-20 DIAGNOSIS — F33.2 MDD (MAJOR DEPRESSIVE DISORDER), RECURRENT SEVERE, WITHOUT PSYCHOSIS (H): Primary | ICD-10-CM

## 2021-10-20 PROCEDURE — 90832 PSYTX W PT 30 MINUTES: CPT | Mod: 95 | Performed by: SOCIAL WORKER

## 2021-10-20 NOTE — PROGRESS NOTES
"Paula Ho is a 60 year old female who is being evaluated via a telephone visit.      The patient has been notified of the following:     \"We have found that certain health care needs can be provided without the need for a face to face visit.  This service lets us provide the care you need with a short phone conversation.      I will have full access to your [place] medical record during this entire phone call.   I will be taking notes for your medical record.     Since this is like an office visit, we will bill your insurance company for this service.  Please check with your medical insurance if this type of telephone visit/virtual care is covered.  You may be responsible for the cost of this service if insurance coverage is denied.      There are potential benefits and risks of telephone visits (e.g. limits to patient confidentiality) that differ from in-person visits.?  Confidentiality still applies for telephone services, and nobody will record the visit.  It is important to be in a quiet, private space that is free of distractions (including cell phone or other devices) during the visit.??     If during the course of the call I believe a telephone visit is not appropriate, you will not be charged for this service\"    Consent has been obtained for this service by care team member: yes.    Start time  3 pm     End time: 330 pm    [facility] Primary Care: Integrated Behavioral Health  October 20 , 2021      Behavioral Health Clinician Progress Note    Patient Name: Paula Ho           Service Type:  Phone Visit      Service Location:   Phone call (patient / identified key support person reached)        Session Length: 16 - 37      Attendees: Patient    Visit Activities (Refresh list every visit): [facility] Only    Diagnostic Assessment Date: 8/3/2020 by Tigist Manjarrez NP Collaborative Psychiatry  Treatment Plan Review Date: 1/20/2022  See Flowsheets for today's PHQ-9 and LIBRA-7 " "results  Previous PHQ-9:   PHQ-9 SCORE 7/15/2021 8/25/2021 9/20/2021   PHQ-9 Total Score 15 26 14     Previous LIBRA-7:   LIBRA-7 SCORE 7/15/2021 8/25/2021 9/20/2021   Total Score 19 21 8       ARNALDO LEVEL:  No flowsheet data found.    DATA  Extended Session (60+ minutes): No  Interactive Complexity: No  Crisis: No  [facility] Patient: Yes, addressed the follow [facility] Core Component(s):                          Health and Wellness Promotion    Treatment Objective(s) Addressed in This Session:  Target Behavior(s): disease management/lifestyle changes decrease symptoms related to chronic pain    Depressed Mood: Increase interest, engagement, and pleasure in doing things  Decrease frequency and intensity of feeling down, depressed, hopeless  Improve quantity and quality of night time sleep / decrease daytime naps  Feel less tired and more energy during the day   Improve diet, appetite, mindful eating, and / or meal planning  Identify negative self-talk and behaviors: challenge core beliefs, myths, and actions  Improve concentration, focus, and mindfulness in daily activities   Feel less fidgety, restless or slow in daily activities / interpersonal interactions  Anxiety: will experience a reduction in anxiety, will develop more effective coping skills to manage anxiety symptoms, will develop healthy cognitive patterns and beliefs and will increase ability to function adaptively  Relationship Problems: will address relationship difficulties in a more adaptive manner  Functional Impairment: will effectively address problems that interfere with adaptive functioning  Psychological distress related to Pain    Current Stressors / Issues:  Patient reports distress continues regarding her \"dirty UA\" and knowing if she can move or not.osielo regarding her moving situation. Discussed using distress tolerance skills  to help manage her distress. Patient will return in one week.     Progress on Treatment Objective(s) / Homework:  No improvement - " CONTEMPLATION (Considering change and yet undecided); Intervened by assessing the negative and positive thinking (ambivalence) about behavior change due to increased stressors    Motivational Interviewing    MI Intervention: Expressed Empathy/Understanding, Supported Autonomy, Collaboration, Evocation, Permission to raise concern or advise, Open-ended questions, Change talk (evoked) and Reframe     Change Talk Expressed by the Patient: Desire to change Need to change Committment to change    Provider Response to Change Talk: E - Evoked more info from patient about behavior change, A - Affirmed patient's thoughts, decisions, or attempts at behavior change, R - Reflected patient's change talk and S - Summarized patient's change talk statements      Care Plan review completed: Yes    Medication Review:  No changes to current psychiatric medication(s)    Medication Compliance:  Yes    Changes in Health Issues:   Yes: Pain, Associated Psychological Distress    Chemical Use Review:   Substance Use: Chemical use reviewed, no active concerns identified  Patient reports using being approved for medical cannabis - uses her own supply.      Tobacco Use: No change in amount of tobacco use since last session.  Patient declined discussion at this time    Assessment: Current Emotional / Mental Status (status of significant symptoms):  Risk status (Self / Other harm or suicidal ideation)  Patient has had a history of suicidal ideation: since childhood and suicide attempts: patient reports history although does not want to discuss  Patient denies current fears or concerns for personal safety.  Patient denies current or recent suicidal ideation or behaviors.  Patient denies current or recent homicidal ideation or behaviors.  Patient reports current or recent self injurious behavior or ideation including has had urges to self-harm - burn her arms as she has is the past. Patient has not given in to urges and has been able to talk to  support people about urges.  Patient denies other safety concerns.  A safety and risk management plan has not been developed at this time, however patient was encouraged to call [place] [phone] should there be a change in any of these risk factors.    Appearance:   phone visit   Eye Contact:   phone visit   Psychomotor Behavior: phone visit   Attitude:   Cooperative  Interested Attentive  Orientation:   All  Speech   Rate / Production: Normal    Volume:  Normal   Mood:    Anxious  Normal  Affect:    phone visit    Thought Content:  Clear  Rumination   Thought Form:  Coherent  Logical   Insight:    Good     Diagnoses:  1. MDD (major depressive disorder), recurrent severe, without psychosis (H)    2. LIBRA (generalized anxiety disorder)        Collateral Reports Completed:  Routed note to PCP    Plan: (Homework, other):  Patient was  scheduled a follow up appointment with the clinic [facility] in 1 week.  She was also given deep Breathing Strategies to practice when experiencing anxiety and depression.  CD Recommendations: No indications of CD issues.  Fagn)Reddy,RASHEED,[facility]      ______________________________________________________________________    Integrated Primary Care Behavioral Health Treatment Plan    Patient's Name: Paula Ho  YOB: 1960    Date: October 20, 2021    DSM-V Diagnoses: 296.33 (F33.2) Major Depressive Disorder, Recurrent Episode, Severe _ and With melancholic features or 300.02 (F41.1) Generalized Anxiety Disorder  Psychosocial / Contextual Factors: long history of symptoms  WHODAS: see DA    Referral / Collaboration:  The following referral(s) was/were discussed but client declines follow up at this time. continue to assess as needed.    Anticipated number of session or this episode of care: 3-12      MeasurableTreatment Goal(s) related to diagnosis / functional impairment(s)  Goal 1: Patient will decrease depression and anxiety as indicated by PHQ9 and LIBRA 7 scores  and self-report    I will know I've met my goal when I feel good about myself .      Objective #A (Patient Action)    Patient will Decrease frequency and intensity of feeling down, depressed, hopeless  Identify negative self-talk and behaviors: challenge core beliefs, myths, and actions  Decrease thoughts that you'd be better off dead or of suicide / self-harm  Status: New - Date: 10/20/284995     Intervention(s)  [facility] will make referrals to outpatient therapist  teach emotional regulation skills. to manage painful emotions that lead to self harm behaviors.    Objective #B  Patient will identify 3 fears / thoughts that contribute to feeling anxious  Status: New - Date: 10/20/2021     Intervention(s)  [facility] will make referrals to outpatient therapist  teach distraction skills. to include relaxation exercises - to use when experiencing increased worry thoughts.    Patient has reviewed and agreed to the above plan.    Written by  RASHEED Ayala (Mindy),[facility]

## 2021-10-22 ENCOUNTER — OFFICE VISIT (OUTPATIENT)
Dept: FAMILY MEDICINE | Facility: CLINIC | Age: 61
End: 2021-10-22
Payer: MEDICARE

## 2021-10-22 VITALS
DIASTOLIC BLOOD PRESSURE: 85 MMHG | HEIGHT: 65 IN | WEIGHT: 261.8 LBS | TEMPERATURE: 98.4 F | SYSTOLIC BLOOD PRESSURE: 138 MMHG | HEART RATE: 79 BPM | BODY MASS INDEX: 43.62 KG/M2 | OXYGEN SATURATION: 96 %

## 2021-10-22 DIAGNOSIS — F33.2 MDD (MAJOR DEPRESSIVE DISORDER), RECURRENT SEVERE, WITHOUT PSYCHOSIS (H): Chronic | ICD-10-CM

## 2021-10-22 DIAGNOSIS — J44.9 CHRONIC OBSTRUCTIVE PULMONARY DISEASE, UNSPECIFIED COPD TYPE (H): ICD-10-CM

## 2021-10-22 DIAGNOSIS — L98.9 SCALP LESION: Primary | ICD-10-CM

## 2021-10-22 DIAGNOSIS — E66.01 MORBID OBESITY (H): ICD-10-CM

## 2021-10-22 DIAGNOSIS — L98.9 CHANGING SKIN LESION: ICD-10-CM

## 2021-10-22 PROCEDURE — 99214 OFFICE O/P EST MOD 30 MIN: CPT | Performed by: PREVENTIVE MEDICINE

## 2021-10-22 RX ORDER — MUPIROCIN 20 MG/G
OINTMENT TOPICAL 3 TIMES DAILY
Qty: 15 G | Refills: 0 | Status: SHIPPED | OUTPATIENT
Start: 2021-10-22 | End: 2024-05-28

## 2021-10-22 ASSESSMENT — PAIN SCALES - GENERAL: PAINLEVEL: NO PAIN (0)

## 2021-10-22 ASSESSMENT — MIFFLIN-ST. JEOR: SCORE: 1758.4

## 2021-10-22 NOTE — PROGRESS NOTES
"  Assessment & Plan     Scalp lesion  -avoid picking the lesion   - mupirocin (BACTROBAN) 2 % external ointment  Dispense: 15 g; Refill: 0  - Adult Dermatology Referral    Changing skin lesion  -differentials include inflamed seborrheic keratosis, squamous cell carcinoma  - Adult Dermatology Referral  -referral to DERM provided as lesion has been increasing in size    Chronic obstructive pulmonary disease, unspecified COPD type (H)  -stable    Morbid obesity (H)  Wt Readings from Last 2 Encounters:   10/22/21 118.8 kg (261 lb 12.8 oz)   10/07/21 119.3 kg (262 lb 14.4 oz)       MDD (major depressive disorder), recurrent severe, without psychosis (H)  -per Psychiatry       Prescription drug management  25 minutes spent on the date of the encounter doing chart review, history and exam, documentation and further activities per the note       BMI:   Estimated body mass index is 43.57 kg/m  as calculated from the following:    Height as of this encounter: 1.651 m (5' 5\").    Weight as of this encounter: 118.8 kg (261 lb 12.8 oz).       Return in about 1 week (around 10/29/2021) if symptoms worsen or fail to improve.    Joie Nobles MD MPH  [facility] is a 61 year old who presents for the following health issues :    HPI       Scalp lesion:  -noted a week ago  -used comb and felt a lump  -some pain, burning sensation   -noticed blood a couple of days ago  -lice medication used on it  -no fleas in the house, checked since she has dogs  -no injury to the area    Spot on the right anterior thigh:  -2 months  -getting bigger  -painful  -No trauma  -no bleeding or drainage  -no medication used on it so far  -unclear history of skin cancer     COPD: no new breathing changes      Review of Systems   Constitutional, HEENT, cardiovascular, pulmonary, gi and gu systems are negative, except as otherwise noted.      Objective    /85 (BP Location: Left arm, Patient Position: " "Sitting, Cuff Size: Adult Regular)   Pulse 79   Temp 98.4  F (36.9  C) (Tympanic)   Ht 1.651 m (5' 5\")   Wt 118.8 kg (261 lb 12.8 oz)   LMP  (LMP Unknown)   SpO2 96%   BMI 43.57 kg/m    Body mass index is 43.57 kg/m .  Physical Exam   GENERAL APPEARANCE: healthy, alert and no distress  EYES: Eyes grossly normal to inspection and conjunctivae and sclerae normal  RESP: lungs clear to auscultation - no rales, rhonchi or wheezes  CV: regular rates and rhythm, normal S1 S2  SKIN:0.5 X 0.5 cms raised slightly keratotic lesion on the right anterior thing, mild erythema around the lesion.   SCALP: erythematous slightly crusty lesion on the crown of the scalp, circular, hairs of different sizes noted.   NEURO: Normal strength and tone, mentation intact and speech normal  PSYCH: mentation appears normal      No results found for this or any previous visit (from the past 24 hour(s)).            "

## 2021-10-28 ENCOUNTER — VIRTUAL VISIT (OUTPATIENT)
Dept: BEHAVIORAL HEALTH | Facility: CLINIC | Age: 61
End: 2021-10-28
Payer: MEDICARE

## 2021-10-28 DIAGNOSIS — F41.1 GAD (GENERALIZED ANXIETY DISORDER): ICD-10-CM

## 2021-10-28 DIAGNOSIS — F33.2 MDD (MAJOR DEPRESSIVE DISORDER), RECURRENT SEVERE, WITHOUT PSYCHOSIS (H): Primary | ICD-10-CM

## 2021-10-28 NOTE — PROGRESS NOTES
Start time 905 am   End time: 915 pm   Called patient regarding today's appointment.   She reports she may be moving into apartment in November  And she had a clean UA at pain clinic. Patient asked that this appt  End early. Scheduled another appointment.  Ayala (Mindy),[place],[facility]

## 2021-11-01 ENCOUNTER — OFFICE VISIT (OUTPATIENT)
Dept: PULMONOLOGY | Facility: CLINIC | Age: 61
End: 2021-11-01
Payer: MEDICARE

## 2021-11-01 VITALS
DIASTOLIC BLOOD PRESSURE: 87 MMHG | HEART RATE: 79 BPM | WEIGHT: 261 LBS | OXYGEN SATURATION: 95 % | RESPIRATION RATE: 17 BRPM | SYSTOLIC BLOOD PRESSURE: 140 MMHG | TEMPERATURE: 97.3 F | HEIGHT: 65 IN | BODY MASS INDEX: 43.49 KG/M2

## 2021-11-01 DIAGNOSIS — R05.9 COUGH: ICD-10-CM

## 2021-11-01 DIAGNOSIS — J44.9 CHRONIC OBSTRUCTIVE PULMONARY DISEASE, UNSPECIFIED COPD TYPE (H): ICD-10-CM

## 2021-11-01 DIAGNOSIS — Z87.891 PERSONAL HISTORY OF TOBACCO USE: Primary | ICD-10-CM

## 2021-11-01 DIAGNOSIS — F33.2 SEVERE EPISODE OF RECURRENT MAJOR DEPRESSIVE DISORDER, WITHOUT PSYCHOTIC FEATURES (H): ICD-10-CM

## 2021-11-01 DIAGNOSIS — F17.200 TOBACCO DEPENDENCE: Primary | ICD-10-CM

## 2021-11-01 PROCEDURE — 99204 OFFICE O/P NEW MOD 45 MIN: CPT | Performed by: INTERNAL MEDICINE

## 2021-11-01 PROCEDURE — G0296 VISIT TO DETERM LDCT ELIG: HCPCS | Performed by: INTERNAL MEDICINE

## 2021-11-01 RX ORDER — ALBUTEROL SULFATE 0.83 MG/ML
SOLUTION RESPIRATORY (INHALATION)
Qty: 90 ML | Refills: 11 | Status: SHIPPED | OUTPATIENT
Start: 2021-11-01 | End: 2023-07-24

## 2021-11-01 RX ORDER — TIOTROPIUM BROMIDE 18 UG/1
18 CAPSULE ORAL; RESPIRATORY (INHALATION) DAILY
Qty: 30 CAPSULE | Refills: 11 | Status: SHIPPED | OUTPATIENT
Start: 2021-11-01 | End: 2022-05-04

## 2021-11-01 RX ORDER — BUDESONIDE AND FORMOTEROL FUMARATE DIHYDRATE 160; 4.5 UG/1; UG/1
2 AEROSOL RESPIRATORY (INHALATION) 2 TIMES DAILY
Qty: 30.6 G | Refills: 3 | Status: SHIPPED | OUTPATIENT
Start: 2021-11-01 | End: 2022-05-04

## 2021-11-01 RX ORDER — ALBUTEROL SULFATE 90 UG/1
2 AEROSOL, METERED RESPIRATORY (INHALATION) EVERY 4 HOURS PRN
Qty: 8.5 G | Refills: 11 | Status: SHIPPED | OUTPATIENT
Start: 2021-11-01 | End: 2022-05-04

## 2021-11-01 RX ORDER — BUPROPION HYDROCHLORIDE 150 MG/1
150 TABLET, FILM COATED, EXTENDED RELEASE ORAL 2 TIMES DAILY
Qty: 60 TABLET | Refills: 1 | Status: SHIPPED | OUTPATIENT
Start: 2021-11-01 | End: 2021-12-29

## 2021-11-01 ASSESSMENT — PAIN SCALES - GENERAL: PAINLEVEL: SEVERE PAIN (7)

## 2021-11-01 ASSESSMENT — MIFFLIN-ST. JEOR: SCORE: 1754.77
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
## 2021-11-01 NOTE — PROGRESS NOTES
"Paula Ho's goals for this visit include: Consult  She requests these members of her care team be copied on today's visit information: PCP    PCP: Demetra Meyer    Referring Provider:  No referring provider defined for this encounter.    BP (!) 140/87   Pulse 79   Temp 97.3  F (36.3  C)   Resp 17   Ht 1.651 m (5' 5\")   Wt 118.4 kg (261 lb)   LMP  (LMP Unknown)   SpO2 95%   BMI 43.43 kg/m      Do you need any medication refills at today's visit? HAKAN Jang LPN  Pulmonary Medicine:  [facility]  Phone: [phone] Fax: [phone]      "

## 2021-11-01 NOTE — PATIENT INSTRUCTIONS

## 2021-11-04 ENCOUNTER — VIRTUAL VISIT (OUTPATIENT)
Dept: BEHAVIORAL HEALTH | Facility: CLINIC | Age: 61
End: 2021-11-04
Payer: MEDICARE

## 2021-11-04 DIAGNOSIS — F33.2 MDD (MAJOR DEPRESSIVE DISORDER), RECURRENT SEVERE, WITHOUT PSYCHOSIS (H): Primary | ICD-10-CM

## 2021-11-04 DIAGNOSIS — F41.1 GAD (GENERALIZED ANXIETY DISORDER): ICD-10-CM

## 2021-11-04 PROCEDURE — 90832 PSYTX W PT 30 MINUTES: CPT | Mod: 95 | Performed by: SOCIAL WORKER

## 2021-11-08 ENCOUNTER — VIRTUAL VISIT (OUTPATIENT)
Dept: BEHAVIORAL HEALTH | Facility: CLINIC | Age: 61
End: 2021-11-08
Payer: MEDICARE

## 2021-11-08 DIAGNOSIS — R69 DIAGNOSIS DEFERRED: Primary | ICD-10-CM
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
## 2021-11-08 NOTE — Clinical Note
Tigist Yanes and Tamar,    Erika - I know you have upcoming appts with patient. - FYI    Patient is having difficulty managing back pain symptoms, which is affecting her mental health/cd concerns. Please see attached.    Thank you,  Domonique Harris LICSW Behavior[facility] [place] ([facility])   [facility]  [phone]  November 8, 2021  11:59 AM

## 2021-11-09 ENCOUNTER — VIRTUAL VISIT (OUTPATIENT)
Dept: BEHAVIORAL HEALTH | Facility: CLINIC | Age: 61
End: 2021-11-09
Payer: MEDICARE

## 2021-11-09 DIAGNOSIS — F41.1 GAD (GENERALIZED ANXIETY DISORDER): ICD-10-CM

## 2021-11-09 DIAGNOSIS — F33.2 MDD (MAJOR DEPRESSIVE DISORDER), RECURRENT SEVERE, WITHOUT PSYCHOSIS (H): Primary | ICD-10-CM

## 2021-11-09 PROCEDURE — 90832 PSYTX W PT 30 MINUTES: CPT | Mod: 95 | Performed by: SOCIAL WORKER

## 2021-11-09 NOTE — Clinical Note
Paula Chen reports she missed call for your appointment and tried to call back and was unable connect. She is concerned about getting another appointment soon.  Thanks! Mindyf

## 2021-11-09 NOTE — PROGRESS NOTES
"Paula Ho is a 60 year old female who is being evaluated via a telephone visit.      The patient has been notified of the following:     \"We have found that certain health care needs can be provided without the need for a face to face visit.  This service lets us provide the care you need with a short phone conversation.      I will have full access to your [place] medical record during this entire phone call.   I will be taking notes for your medical record.     Since this is like an office visit, we will bill your insurance company for this service.  Please check with your medical insurance if this type of telephone visit/virtual care is covered.  You may be responsible for the cost of this service if insurance coverage is denied.      There are potential benefits and risks of telephone visits (e.g. limits to patient confidentiality) that differ from in-person visits.?  Confidentiality still applies for telephone services, and nobody will record the visit.  It is important to be in a quiet, private space that is free of distractions (including cell phone or other devices) during the visit.??     If during the course of the call I believe a telephone visit is not appropriate, you will not be charged for this service\"    Consent has been obtained for this service by care team member: yes.    Start time  4 pm     End time:425 pm    [facility] Primary Care: Integrated Behavioral Health  November 9, 2021      Behavioral Health Clinician Progress Note    Patient Name: Paula Ho           Service Type:  Phone Visit      Service Location:   Phone call (patient / identified key support person reached)        Session Length: 16 - 37      Attendees: Patient    Visit Activities (Refresh list every visit): [facility] Only    Diagnostic Assessment Date: 8/3/2020 by Tigist Manjarrez NP Collaborative Psychiatry  Treatment Plan Review Date: 1/20/2022  See Flowsheets for today's PHQ-9 and LIBRA-7 " results  Previous PHQ-9:   PHQ-9 SCORE 7/15/2021 8/25/2021 9/20/2021   PHQ-9 Total Score 15 26 14     Previous LIBRA-7:   LIBRA-7 SCORE 7/15/2021 8/25/2021 9/20/2021   Total Score 19 21 8       ARNALDO LEVEL:  No flowsheet data found.    DATA  Extended Session (60+ minutes): No  Interactive Complexity: No  Crisis: No  [facility] Patient: Yes, addressed the follow [facility] Core Component(s):                          Health and Wellness Promotion    Treatment Objective(s) Addressed in This Session:  Target Behavior(s): disease management/lifestyle changes decrease symptoms related to chronic pain    Depressed Mood: Increase interest, engagement, and pleasure in doing things  Decrease frequency and intensity of feeling down, depressed, hopeless  Improve quantity and quality of night time sleep / decrease daytime naps  Feel less tired and more energy during the day   Improve diet, appetite, mindful eating, and / or meal planning  Identify negative self-talk and behaviors: challenge core beliefs, myths, and actions  Improve concentration, focus, and mindfulness in daily activities   Feel less fidgety, restless or slow in daily activities / interpersonal interactions  Anxiety: will experience a reduction in anxiety, will develop more effective coping skills to manage anxiety symptoms, will develop healthy cognitive patterns and beliefs and will increase ability to function adaptively  Relationship Problems: will address relationship difficulties in a more adaptive manner  Functional Impairment: will effectively address problems that interfere with adaptive functioning  Psychological distress related to Pain    Current Stressors / Issues:  Patient reports distress continues regardingher moving situation. Discussed using distress tolerance skills  to help manage her distress. Patient will return in one week.     Progress on Treatment Objective(s) / Homework:  No improvement - CONTEMPLATION (Considering change and yet undecided); Intervened by  assessing the negative and positive thinking (ambivalence) about behavior change due to increased stressors    Motivational Interviewing    MI Intervention: Expressed Empathy/Understanding, Supported Autonomy, Collaboration, Evocation, Permission to raise concern or advise, Open-ended questions, Change talk (evoked) and Reframe     Change Talk Expressed by the Patient: Desire to change Need to change Committment to change    Provider Response to Change Talk: E - Evoked more info from patient about behavior change, A - Affirmed patient's thoughts, decisions, or attempts at behavior change, R - Reflected patient's change talk and S - Summarized patient's change talk statements      Care Plan review completed: Yes    Medication Review:  No changes to current psychiatric medication(s)    Medication Compliance:  Yes    Changes in Health Issues:   Yes: Pain, Associated Psychological Distress    Chemical Use Review:   Substance Use: Chemical use reviewed, no active concerns identified  Patient reports using being approved for medical cannabis - uses her own supply.      Tobacco Use: No change in amount of tobacco use since last session.  Patient declined discussion at this time    Assessment: Current Emotional / Mental Status (status of significant symptoms):  Risk status (Self / Other harm or suicidal ideation)  Patient has had a history of suicidal ideation: since childhood and suicide attempts: patient reports history although does not want to discuss  Patient denies current fears or concerns for personal safety.  Patient denies current or recent suicidal ideation or behaviors.  Patient denies current or recent homicidal ideation or behaviors.  Patient reports current or recent self injurious behavior or ideation including has had urges to self-harm - burn her arms as she has is the past. Patient has not given in to urges and has been able to talk to support people about urges.  Patient denies other safety concerns.  A  safety and risk management plan has not been developed at this time, however patient was encouraged to call [place] [phone] should there be a change in any of these risk factors.    Appearance:   phone visit   Eye Contact:   phone visit   Psychomotor Behavior: phone visit   Attitude:   Cooperative  Interested Attentive  Orientation:   All  Speech   Rate / Production: Normal    Volume:  Normal   Mood:    Anxious  Normal  Affect:    phone visit    Thought Content:  Clear  Rumination   Thought Form:  Coherent  Logical   Insight:    Good     Diagnoses:  1. MDD (major depressive disorder), recurrent severe, without psychosis (H)    2. LIBRA (generalized anxiety disorder)        Collateral Reports Completed:  Routed note to PCP    Plan: (Homework, other):  Patient was  scheduled a follow up appointment with the clinic [facility] in 1 week.  She was also given deep Breathing Strategies to practice when experiencing anxiety and depression.  CD Recommendations: No indications of CD issues.  Ayala (Mindy),RASHEED,[facility]      ______________________________________________________________________    Integrated Primary Care Behavioral Health Treatment Plan    Patient's Name: Paula Ho  YOB: 1960    Date: October 20, 2021    DSM-V Diagnoses: 296.33 (F33.2) Major Depressive Disorder, Recurrent Episode, Severe _ and With melancholic features or 300.02 (F41.1) Generalized Anxiety Disorder  Psychosocial / Contextual Factors: long history of symptoms  WHODAS: see DA    Referral / Collaboration:  The following referral(s) was/were discussed but client declines follow up at this time. continue to assess as needed.    Anticipated number of session or this episode of care: 3-12      MeasurableTreatment Goal(s) related to diagnosis / functional impairment(s)  Goal 1: Patient will decrease depression and anxiety as indicated by PHQ9 and LIBRA 7 scores and self-report    I will know I've met my goal when I feel good about  myself .      Objective #A (Patient Action)    Patient will Decrease frequency and intensity of feeling down, depressed, hopeless  Identify negative self-talk and behaviors: challenge core beliefs, myths, and actions  Decrease thoughts that you'd be better off dead or of suicide / self-harm  Status: New - Date: 10/20/776486     Intervention(s)  [facility] will make referrals to outpatient therapist  teach emotional regulation skills. to manage painful emotions that lead to self harm behaviors.    Objective #B  Patient will identify 3 fears / thoughts that contribute to feeling anxious  Status: New - Date: 10/20/2021     Intervention(s)  [facility] will make referrals to outpatient therapist  teach distraction skills. to include relaxation exercises - to use when experiencing increased worry thoughts.    Patient has reviewed and agreed to the above plan.    Written by  RASHEED Ayala (Mindy),[facility]

## 2021-11-23 ENCOUNTER — VIRTUAL VISIT (OUTPATIENT)
Dept: BEHAVIORAL HEALTH | Facility: CLINIC | Age: 61
End: 2021-11-23
Payer: MEDICARE

## 2021-11-23 DIAGNOSIS — R69 DIAGNOSIS DEFERRED: Primary | ICD-10-CM

## 2021-11-23 NOTE — PROGRESS NOTES
Behavioral Health Home Services  [facility]: [place]        Social Work Care Navigator Note      Patient: Paula Ho  Date: November 23, 2021  Preferred Name: Paula    Previous PHQ-9:   PHQ-9 SCORE 7/15/2021 8/25/2021 9/20/2021   PHQ-9 Total Score 15 26 14     Previous LIBRA-7:   LIBRA-7 SCORE 7/15/2021 8/25/2021 9/20/2021   Total Score 19 21 8     ARNALDO LEVEL:  No flowsheet data found.    Preferred Contact:  Need for : No  Preferred Contact: Cell      Type of Contact Today: Phone call (patient / identified key support person reached)      Data: (subjective / Objective):  Recent ED/IP Admission or Discharge?   None    Patient Goals:  Goal Areas: Health; Mental Health; Financial and Social Service Benefits  Patient stated goals: Paula would like assistance with her physical and mental health for creating a balanced and healthy lifestyle. Paula would like assistance with continued SSDI and social service assistance to aid with [employer] benefits to increase her financial stability.         [facility] Core Service Provided:  Comprehensive Care Management: utilized the electronic medical record / patient registry to identify and support patient's health conditions / needs more effectively   Care Transitions: focused on the coordinated and seamless movement of patient between or within different levels of care or settings  Care Coordination: provided care management services/referrals necessary to ensure patient and their identified supports have access to medical, behavioral health, pharmacology and recovery support services.  Ensured that patient's care is integrated across all settings and services.   Individual and Family Support: aimed to help clients reduce barriers to achieving goals, increase health literacy and knowledge about chronic condition(s), increase self-efficacy skills, and improve health outcomes  Referral to Community and Social Support Services: Provided patient with referrals (see plan  section)  Assisted in scheduling an appointment to a referral / service (see plan section)  Coordinated / Confirmed patient's appointment time or referral and transportation plan  Followed-up with patient on whether or not they completed a referral    Current Stressors / Issues / Care Plan Objective Addressed Today:  SWCC and patient were able to meet today for Behavioral Health Home ([facility]) monthly check-in via telehealth visit. All required ROIs have been filed with HIM/patient chart.    1. Patient reports she still has not moved into new [facility]. Patient reports  did sign off on eviction notice but tenant was not given proper notice and did not move out of residence yesterday.     Patient reports she is meeting with [facility] worker today to help with housing and how to proceed with new landlord. Patient reports she will be calling [employer] to file report and gather recommendations for responsibility of landlord to the patient after signing rental contract. Patient reports she appreciates the support of her current landlord, although they have rented her apartment for the first of Dec. SWCC and patient processed her feelings/emotions about this today. SWCC provided support/empathy, coping strategies and motivational interviewing.    2. Patient reports back pain is better the past several days.    3. Patient reports she will be having Thanksgiving with a good friend and his son. Patient reports she is happy to not be alone on Thanksgiving.    Intervention:  Motivational Interviewing: Expressed Empathy/Understanding, Supported Autonomy, Collaboration, Evocation, Permission to raise concern or advise, Open-ended questions, Reflections: simple and complex, Rolled with resistance: Emphasized patient autonomy, Simple reflection, Complex reflection, Amplified reflection (weaker or stronger meaning), Shifted topic to defuse resistance, Reframed sustain talk in the direction of change and Evoked patient agenda, Change talk  (evoked) and Reframe   Target Behavior(s): Explored thoughts about taking an anti-depressant, Explored and resolved challenges related to taking anti-depressants as prescribed, Explored thoughts and readiness to participate in individual therapy, Explored and resolved challenges to attending appointments as scheduled and Explored current social supports and reinforced opportunities to increase engagement    Assessment: (Progress on Goals / Homework):  Patient would benefit from continued coordination in reaching their goals set for the Behavioral Health Home ([facility]) program. SWCC reviewed Health Action Plan goals and will continue to monitor progress and work with patient and their care team.      Plan: (Homework, other):  Patient was encouraged to continue to seek condition-related information and education.      Scheduled a Phone follow up appointment with MISA RICE in 1 week     Patient has set self-identified goals and will monitor progress until the next appointment on: 11/30/2021.         Domonique Harris [facility]  Behavioral Health Home ([facility])   [facility]  [phone]  November 23, 2021

## 2021-11-29 NOTE — PROGRESS NOTES
"Paula Ho is a 60 year old female who is being evaluated via a telephone visit.      The patient has been notified of the following:     \"We have found that certain health care needs can be provided without the need for a face to face visit.  This service lets us provide the care you need with a short phone conversation.      I will have full access to your [place] medical record during this entire phone call.   I will be taking notes for your medical record.     Since this is like an office visit, we will bill your insurance company for this service.  Please check with your medical insurance if this type of telephone visit/virtual care is covered.  You may be responsible for the cost of this service if insurance coverage is denied.      There are potential benefits and risks of telephone visits (e.g. limits to patient confidentiality) that differ from in-person visits.?  Confidentiality still applies for telephone services, and nobody will record the visit.  It is important to be in a quiet, private space that is free of distractions (including cell phone or other devices) during the visit.??     If during the course of the call I believe a telephone visit is not appropriate, you will not be charged for this service\"    Consent has been obtained for this service by care team member: yes.    Start time 1230 pm    End time: 1255 pm    [facility] Primary Care: Integrated Behavioral Health  November 4 , 2021      Behavioral Health Clinician Progress Note    Patient Name: Paula Ho           Service Type:  Phone Visit      Service Location:   Phone call (patient / identified key support person reached)        Session Length: 16 - 37      Attendees: Patient    Visit Activities (Refresh list every visit): [facility] Only    Diagnostic Assessment Date: 8/3/2020 by Tigist Manjarrez NP Collaborative Psychiatry  Treatment Plan Review Date: 1/20/2022  See Flowsheets for today's PHQ-9 and LIBRA-7 " results  Previous PHQ-9:   PHQ-9 SCORE 7/15/2021 8/25/2021 9/20/2021   PHQ-9 Total Score 15 26 14     Previous LIBRA-7:   LIBRA-7 SCORE 7/15/2021 8/25/2021 9/20/2021   Total Score 19 21 8       ARNALDO LEVEL:  No flowsheet data found.    DATA  Extended Session (60+ minutes): No  Interactive Complexity: No  Crisis: No  [facility] Patient: Yes, addressed the follow [facility] Core Component(s):                          Health and Wellness Promotion    Treatment Objective(s) Addressed in This Session:  Target Behavior(s): disease management/lifestyle changes decrease symptoms related to chronic pain    Depressed Mood: Increase interest, engagement, and pleasure in doing things  Decrease frequency and intensity of feeling down, depressed, hopeless  Improve quantity and quality of night time sleep / decrease daytime naps  Feel less tired and more energy during the day   Improve diet, appetite, mindful eating, and / or meal planning  Identify negative self-talk and behaviors: challenge core beliefs, myths, and actions  Improve concentration, focus, and mindfulness in daily activities   Feel less fidgety, restless or slow in daily activities / interpersonal interactions  Anxiety: will experience a reduction in anxiety, will develop more effective coping skills to manage anxiety symptoms, will develop healthy cognitive patterns and beliefs and will increase ability to function adaptively  Relationship Problems: will address relationship difficulties in a more adaptive manner  Functional Impairment: will effectively address problems that interfere with adaptive functioning  Psychological distress related to Pain    Current Stressors / Issues:  Patient reports distress continues regarding increased pain and waiting to move.  Discussed using distress tolerance skills  to help manage her distress. Patient will return in one week.     Progress on Treatment Objective(s) / Homework:  No improvement - CONTEMPLATION (Considering change and yet  undecided); Intervened by assessing the negative and positive thinking (ambivalence) about behavior change due to increased stressors    Motivational Interviewing    MI Intervention: Expressed Empathy/Understanding, Supported Autonomy, Collaboration, Evocation, Permission to raise concern or advise, Open-ended questions, Change talk (evoked) and Reframe     Change Talk Expressed by the Patient: Desire to change Need to change Committment to change    Provider Response to Change Talk: E - Evoked more info from patient about behavior change, A - Affirmed patient's thoughts, decisions, or attempts at behavior change, R - Reflected patient's change talk and S - Summarized patient's change talk statements      Care Plan review completed: Yes    Medication Review:  No changes to current psychiatric medication(s)    Medication Compliance:  Yes    Changes in Health Issues:   Yes: Pain, Associated Psychological Distress    Chemical Use Review:   Substance Use: Chemical use reviewed, no active concerns identified  Patient reports using being approved for medical cannabis - uses her own supply.      Tobacco Use: No change in amount of tobacco use since last session.  Patient declined discussion at this time    Assessment: Current Emotional / Mental Status (status of significant symptoms):  Risk status (Self / Other harm or suicidal ideation)  Patient has had a history of suicidal ideation: since childhood and suicide attempts: patient reports history although does not want to discuss  Patient denies current fears or concerns for personal safety.  Patient denies current or recent suicidal ideation or behaviors.  Patient denies current or recent homicidal ideation or behaviors.  Patient reports current or recent self injurious behavior or ideation including has had urges to self-harm - burn her arms as she has is the past. Patient has not given in to urges and has been able to talk to support people about urges.  Patient denies  other safety concerns.  A safety and risk management plan has not been developed at this time, however patient was encouraged to call [place] [phone] should there be a change in any of these risk factors.    Appearance:   phone visit   Eye Contact:   phone visit   Psychomotor Behavior: phone visit   Attitude:   Cooperative  Interested Attentive  Orientation:   All  Speech   Rate / Production: Normal    Volume:  Normal   Mood:    Anxious  Normal  Affect:    phone visit    Thought Content:  Clear  Rumination   Thought Form:  Coherent  Logical   Insight:    Good     Diagnoses:  1. MDD (major depressive disorder), recurrent severe, without psychosis (H)    2. LIBRA (generalized anxiety disorder)        Collateral Reports Completed:  Communicated with: PCP, psychiatry and [facility]     Plan: (Homework, other):  Patient was  scheduled a follow up appointment with the clinic [facility] in 1 week.  She was also given deep Breathing Strategies to practice when experiencing anxiety and depression.  CD Recommendations: No indications of CD issues.  Fang)Reddy,DALTON,[facility]      ______________________________________________________________________    Integrated Primary Care Behavioral Health Treatment Plan    Patient's Name: Paula Ho  YOB: 1960    Date: October 20, 2021    DSM-V Diagnoses: 296.33 (F33.2) Major Depressive Disorder, Recurrent Episode, Severe _ and With melancholic features or 300.02 (F41.1) Generalized Anxiety Disorder  Psychosocial / Contextual Factors: long history of symptoms  WHODAS: see DA    Referral / Collaboration:  The following referral(s) was/were discussed but client declines follow up at this time. continue to assess as needed.    Anticipated number of session or this episode of care: 3-12      MeasurableTreatment Goal(s) related to diagnosis / functional impairment(s)  Goal 1: Patient will decrease depression and anxiety as indicated by PHQ9 and LIBRA 7 scores and  self-report    I will know I've met my goal when I feel good about myself .      Objective #A (Patient Action)    Patient will Decrease frequency and intensity of feeling down, depressed, hopeless  Identify negative self-talk and behaviors: challenge core beliefs, myths, and actions  Decrease thoughts that you'd be better off dead or of suicide / self-harm  Status: New - Date: 10/20/810402     Intervention(s)  [facility] will make referrals to outpatient therapist  teach emotional regulation skills. to manage painful emotions that lead to self harm behaviors.    Objective #B  Patient will identify 3 fears / thoughts that contribute to feeling anxious  Status: New - Date: 10/20/2021     Intervention(s)  [facility] will make referrals to outpatient therapist  teach distraction skills. to include relaxation exercises - to use when experiencing increased worry thoughts.    Patient has reviewed and agreed to the above plan.    Written by  RASHEED Ayala (Mindy),[facility]

## 2021-11-30 ENCOUNTER — VIRTUAL VISIT (OUTPATIENT)
Dept: BEHAVIORAL HEALTH | Facility: CLINIC | Age: 61
End: 2021-11-30
Payer: MEDICARE

## 2021-11-30 DIAGNOSIS — R69 DIAGNOSIS DEFERRED: Primary | ICD-10-CM
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
## 2021-11-30 NOTE — Clinical Note
Hello,    Patient update - no action needed.    Thank you,  Domonique Harris LICSW Behavioral Health [place] ([facility])   [facility]  [phone]  November 30, 2021  12:15 PM

## 2021-12-01 ENCOUNTER — TELEPHONE (OUTPATIENT)
Dept: BEHAVIORAL HEALTH | Facility: CLINIC | Age: 61
End: 2021-12-01
Payer: MEDICARE
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
## 2021-12-03 ENCOUNTER — VIRTUAL VISIT (OUTPATIENT)
Dept: BEHAVIORAL HEALTH | Facility: CLINIC | Age: 61
End: 2021-12-03
Payer: MEDICARE

## 2021-12-03 DIAGNOSIS — R69 DIAGNOSIS DEFERRED: Primary | ICD-10-CM
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
## 2021-12-07 ENCOUNTER — TELEPHONE (OUTPATIENT)
Dept: BEHAVIORAL HEALTH | Facility: CLINIC | Age: 61
End: 2021-12-07
Payer: MEDICARE

## 2021-12-09 ENCOUNTER — VIRTUAL VISIT (OUTPATIENT)
Dept: BEHAVIORAL HEALTH | Facility: CLINIC | Age: 61
End: 2021-12-09
Payer: MEDICARE

## 2021-12-09 DIAGNOSIS — F33.2 MDD (MAJOR DEPRESSIVE DISORDER), RECURRENT SEVERE, WITHOUT PSYCHOSIS (H): Primary | ICD-10-CM

## 2021-12-09 DIAGNOSIS — F41.1 GAD (GENERALIZED ANXIETY DISORDER): ICD-10-CM

## 2021-12-09 NOTE — PROGRESS NOTES
Called patient regarding scheduled appointment.   She reported she did not feel like talking today and   Wanted to reschedule. Patient reports she does not know  Where she is moving at this time.  Encouraged her to reschedule   Missed appt with PCP and [facility] .   Ayala (Mindy),[facility]

## 2021-12-20 ENCOUNTER — VIRTUAL VISIT (OUTPATIENT)
Dept: BEHAVIORAL HEALTH | Facility: CLINIC | Age: 61
End: 2021-12-20
Payer: MEDICARE

## 2021-12-20 DIAGNOSIS — F41.1 GAD (GENERALIZED ANXIETY DISORDER): ICD-10-CM

## 2021-12-20 DIAGNOSIS — F33.2 MDD (MAJOR DEPRESSIVE DISORDER), RECURRENT SEVERE, WITHOUT PSYCHOSIS (H): Primary | ICD-10-CM

## 2021-12-20 NOTE — PROGRESS NOTES
Checked in with patient. She is happy   She will be moving Feb 1st and is also tearful   As she is experiencing increased physical  pain and is more depressed.   Patient was informed of this writer's resignation from [employer].   Offered to make referral for [facility] therapy. Patient is not sure at this time.  Will call patient in about 10 days.   Fang)Reddy,[facility]
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
## 2021-12-29 ENCOUNTER — VIRTUAL VISIT (OUTPATIENT)
Dept: BEHAVIORAL HEALTH | Facility: CLINIC | Age: 61
End: 2021-12-29
Payer: MEDICARE

## 2021-12-29 ENCOUNTER — VIRTUAL VISIT (OUTPATIENT)
Dept: PSYCHIATRY | Facility: CLINIC | Age: 61
End: 2021-12-29
Payer: MEDICARE

## 2021-12-29 DIAGNOSIS — F33.2 MDD (MAJOR DEPRESSIVE DISORDER), RECURRENT SEVERE, WITHOUT PSYCHOSIS (H): Primary | ICD-10-CM

## 2021-12-29 DIAGNOSIS — F98.8 ATTENTION DEFICIT DISORDER, UNSPECIFIED HYPERACTIVITY PRESENCE: ICD-10-CM

## 2021-12-29 DIAGNOSIS — F17.200 TOBACCO DEPENDENCE: ICD-10-CM

## 2021-12-29 DIAGNOSIS — F41.1 GAD (GENERALIZED ANXIETY DISORDER): ICD-10-CM

## 2021-12-29 DIAGNOSIS — F33.2 SEVERE EPISODE OF RECURRENT MAJOR DEPRESSIVE DISORDER, WITHOUT PSYCHOTIC FEATURES (H): ICD-10-CM

## 2021-12-29 PROCEDURE — 99214 OFFICE O/P EST MOD 30 MIN: CPT | Mod: 95 | Performed by: NURSE PRACTITIONER

## 2021-12-29 PROCEDURE — 90833 PSYTX W PT W E/M 30 MIN: CPT | Mod: 95 | Performed by: SOCIAL WORKER

## 2021-12-29 RX ORDER — MIRTAZAPINE 15 MG/1
15 TABLET, FILM COATED ORAL AT BEDTIME
Qty: 30 TABLET | Refills: 1 | Status: SHIPPED | OUTPATIENT
Start: 2021-12-29 | End: 2022-05-12

## 2021-12-29 RX ORDER — ATOMOXETINE 60 MG/1
60 CAPSULE ORAL DAILY
Qty: 30 CAPSULE | Refills: 1 | Status: SHIPPED | OUTPATIENT
Start: 2021-12-29 | End: 2022-05-12

## 2021-12-29 RX ORDER — CLONIDINE HYDROCHLORIDE 0.1 MG/1
0.1 TABLET ORAL 2 TIMES DAILY
Qty: 60 TABLET | Refills: 1 | Status: SHIPPED | OUTPATIENT
Start: 2021-12-29 | End: 2022-05-12

## 2021-12-29 RX ORDER — ARIPIPRAZOLE 5 MG/1
5 TABLET ORAL DAILY
Qty: 30 TABLET | Refills: 3 | Status: SHIPPED | OUTPATIENT
Start: 2021-12-29 | End: 2022-05-12

## 2021-12-29 RX ORDER — FLUOXETINE 40 MG/1
40 CAPSULE ORAL 2 TIMES DAILY
Qty: 60 CAPSULE | Refills: 1 | Status: SHIPPED | OUTPATIENT
Start: 2021-12-29 | End: 2022-05-12

## 2021-12-29 RX ORDER — BUPROPION HYDROCHLORIDE 150 MG/1
150 TABLET, FILM COATED, EXTENDED RELEASE ORAL 2 TIMES DAILY
Qty: 60 TABLET | Refills: 1 | Status: SHIPPED | OUTPATIENT
Start: 2021-12-29 | End: 2022-05-12

## 2021-12-29 ASSESSMENT — PATIENT HEALTH QUESTIONNAIRE - PHQ9
SUM OF ALL RESPONSES TO PHQ QUESTIONS 1-9: 15
5. POOR APPETITE OR OVEREATING: NEARLY EVERY DAY

## 2021-12-29 ASSESSMENT — ANXIETY QUESTIONNAIRES
1. FEELING NERVOUS, ANXIOUS, OR ON EDGE: NEARLY EVERY DAY
IF YOU CHECKED OFF ANY PROBLEMS ON THIS QUESTIONNAIRE, HOW DIFFICULT HAVE THESE PROBLEMS MADE IT FOR YOU TO DO YOUR WORK, TAKE CARE OF THINGS AT HOME, OR GET ALONG WITH OTHER PEOPLE: EXTREMELY DIFFICULT
6. BECOMING EASILY ANNOYED OR IRRITABLE: NEARLY EVERY DAY
GAD7 TOTAL SCORE: 15
7. FEELING AFRAID AS IF SOMETHING AWFUL MIGHT HAPPEN: NOT AT ALL
2. NOT BEING ABLE TO STOP OR CONTROL WORRYING: NEARLY EVERY DAY
3. WORRYING TOO MUCH ABOUT DIFFERENT THINGS: NEARLY EVERY DAY
5. BEING SO RESTLESS THAT IT IS HARD TO SIT STILL: NOT AT ALL

## 2021-12-29 NOTE — PROGRESS NOTES
"Paula is a 61 year old who is being evaluated via a billable telephone visit.      What phone number would you like to be contacted at? [phone]   How would you like to obtain your AVS? Mail a copy  Phone call duration: 30 minutes      Outpatient Psychiatric Progress Note    Name: Paula Ho   : 1960                    Primary Care Provider: LEONA Marquez CNP   Therapist: Tamar Blakely     PHQ-9 scores:  PHQ-9 SCORE 2021   PHQ-9 Total Score 26 14 15       LIBRA-7 scores:  LIBRA-7 SCORE 2021   Total Score 21 8 15       Patient Identification:    Patient is a 61 year old year old, single  White American female  who presents for return visit with me.  Patient is currently unemployed. Patient attended the session alone. Patient prefers to be called: \"Paula\".    Current medications include: albuterol (PROAIR HFA) 108 (90 Base) MCG/ACT inhaler, Inhale 2 puffs into the lungs every 4 hours as needed for shortness of breath / dyspnea or wheezing  albuterol (PROVENTIL) (2.5 MG/3ML) 0.083% neb solution, NEBULIZE THE CONTENTS OF 1 VIAL EVERY 6 HOURS AS NEEDED.  ARIPiprazole (ABILIFY) 5 MG tablet, Take 1 tablet (5 mg) by mouth daily  atomoxetine (STRATTERA) 60 MG capsule, Take 1 capsule (60 mg) by mouth daily  budesonide-formoterol (SYMBICORT) 160-4.5 MCG/ACT Inhaler, Inhale 2 puffs into the lungs 2 times daily  buPROPion (ZYBAN) 150 MG 12 hr tablet, Take 1 tablet (150 mg) by mouth 2 times daily  cetirizine (ZYRTEC) 10 MG tablet, Take 1 tablet (10 mg) by mouth daily  clonazePAM (KLONOPIN) 0.5 MG tablet, Take 0.5 mg by mouth 2 times daily as needed  cloNIDine (CATAPRES) 0.1 MG tablet, Take 1 tablet (0.1 mg) by mouth 2 times daily  FLUoxetine (PROZAC) 40 MG capsule, Take 1 capsule (40 mg) by mouth 2 times daily  levothyroxine (SYNTHROID/LEVOTHROID) 25 MCG tablet, Take 1 tablet (25 mcg) by mouth daily  losartan (COZAAR) 50 MG tablet, Take 50 mg by mouth " daily  medical cannabis (Patient's own supply), See Admin Instructions (The purpose of this order is to document that the patient reports taking medical cannabis.  This is not a prescription, and is not used to certify that the patient has a qualifying medical condition.)  methylPREDNISolone (MEDROL DOSEPAK) 4 MG tablet therapy pack, Follow Package Directions  Multiple Vitamins-Minerals (MULTIVITAMIN ADULT PO),   mupirocin (BACTROBAN) 2 % external ointment, Apply topically 3 times daily  mupirocin (BACTROBAN) 2 % external ointment, Apply topically 2 times daily  NARCAN 4 MG/0.1ML nasal spray, INHALE VIA NASAL ROUTE FOR OVERDOSE AS NEEDED. MAY REPEAT AFTER 2-3 MINUTES  order for DME, Equipment being ordered: Nebulizer  oxyCODONE IR (ROXICODONE) 15 MG tablet, Take 7.5 mg by mouth 2 times daily as needed  pravastatin (PRAVACHOL) 40 MG tablet, Take 40 mg by mouth daily  tiotropium (SPIRIVA HANDIHALER) 18 MCG inhaled capsule, Inhale 1 capsule (18 mcg) into the lungs daily  tiotropium (SPIRIVA) 18 MCG inhaled capsule, Inhale 1 capsule (18 mcg) into the lungs daily  tiZANidine (ZANAFLEX) 2 MG tablet, TK 2 TO 3 TS PO QHS  topiramate (TOPAMAX) 100 MG tablet, Take 1 tablet (100 mg) by mouth 2 times daily  vitamin D3 (CHOLECALCIFEROL) 2000 units (50 mcg) tablet, Take 1 tablet by mouth daily    lidocaine (PF) (XYLOCAINE) 1 % injection 8 mL         The [place] Prescription Monitoring Program has been reviewed and there are no concerns about diversionary activity for controlled substances at this time.      I was able to review most recent Primary Care Provider, specialty provider, and therapy visit notes that I have access to.     Today, patient reports that she is starting to move so that she will be in a safer place by February 1.  She does not know if  Her medication is working as she has been crying often.  Her Adderall makes her focus.  This was prescribed by Dr. Loja who is no longer practicing psychiatry. She  invested $30,000 with him and then lost it all.  Paula remains depressed but is not having thoughts to end her life.  She worries about what the future will bring.     has a past medical history of Acute respiratory failure (H) (02/01/2020), Anxiety, Asthma, Depression, and Hypertension.    Social history updates:    No changes reported in [facility] social history    Substance use updates:    No alcohol use reported  Tobacco use: Yes Cigarettes  Ready to quit?  No  Nicotine Replacement Therapy tried: None    Vital Signs:   LMP  (LMP Unknown)     Labs:    Most recent laboratory results reviewed and no new labs.     Mental Status Examination:  Appearance:  awake, alert and mild distress  Attitude:  cooperative   Eye Contact:  Unable to assess  Gait and Station: No assistive Devices used and No dizziness or falls  Psychomotor Behavior:  Unable to assess  Oriented to:  time, person, and place  Attention Span and Concentration:  Normal  Speech:   clear, coherent and Speaks: English  Mood:  anxious and depressed  Affect:  mood congruent  Associations:  no loose associations  Thought Process:  goal oriented  Thought Content:  no evidence of suicidal ideation or homicidal ideation, no auditory hallucinations present and no visual hallucinations present  Recent and Remote Memory:  intact Not formally assessed. No amnesia.  Fund of Knowledge: appropriate  Insight:  good  Judgment:  intact  Impulse Control:  intact    Suicide Risk Assessment:  Today Paula Ho reports that she is having no thoughts to want to end her life or to harm other people. In addition, there are notable risk factors for self-harm, including single status, anxiety, withdrawing and mood change. However, risk is mitigated by commitment to family, history of seeking help when needed, future oriented, denies suicidal intent or plan and denies homicidal ideation, intent, or plan. Therefore, based on all available evidence including the factors cited above,  Paula Ho does not appear to be at imminent risk for self-harm, does not meet criteria for a 72-hr hold, and therefore remains appropriate for ongoing outpatient level of care.  A thorough assessment of risk factors related to suicide and self-harm have been reviewed and are noted above. The patient convincingly denies suicidality on several occasions. Local community safety resources printed and reviewed for patient to use if needed. There was no deceit detected, and the patient presented in a manner that was believable.     DSM5 Diagnosis:  296.32 (F33.1) Major Depressive Disorder, Recurrent Episode, Moderate _ and With melancholic features  300.02 (F41.1) Generalized Anxiety Disorder  309.81 (F43.10) Posttraumatic Stress Disorder (includes Posttraumatic Stress Disorder for Children 6 Years and Younger)  Without dissociative symptoms  780.52 (G47.00) Insomnia Disorder   With non-sleep disorder mental comorbidity  Episodic      Medical comorbidities include:   Patient Active Problem List    Diagnosis Date Noted     Infection due to 2019 novel coronavirus 12/09/2020     Priority: Medium     Hyperlipidemia 04/14/2020     Priority: Medium     Benign essential hypertension 04/14/2020     Priority: Medium     MDD (major depressive disorder), recurrent severe, without psychosis (H) 03/02/2020     Priority: Medium     Chronic obstructive pulmonary disease, unspecified COPD type (H) 02/04/2020     Priority: Medium     No PFTS  In EPIC       Attention deficit hyperactivity disorder (ADHD) 01/31/2020     Priority: Medium     Chronic midline low back pain with bilateral sciatica 01/31/2020     Priority: Medium     Brain aneurysm 12/03/2019     Priority: Medium     Dec 2017  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 Device is MR compatible to 3 BREANN 3/2020       History of subarachnoid hemorrhage 12/22/2017     Priority: Medium     H/O of SAH, Cam 2, Hunt-Thomas 1, from a ruptured  9mm left Pcomm aneurysm with a wide neck and a fetal left PCA arising from the aneurysm neck, s/p successful coil embolization 12/23/2017 by Dr. Otto Hurley       Chronic GERD 01/26/2017     Priority: Medium     Chronic knee pain 12/12/2014     Priority: Medium     Cigarette smoker 07/08/2014     Priority: Medium     Chronic, continuous use of opioids 04/24/2013     Priority: Medium     Managed by pain clinic outside of [place].  UDS + cocaine in December 2019 without confirmation testing at her pain clinic per patient report       Morbid obesity (H) 03/27/2013     Priority: Medium     Status post knee surgery 03/27/2013     Priority: Medium     Per patient's recollection:  Circa 2002: right knee arthroscopy (Dr. Pappas)  Circa 2004: right knee partial knee replacement (Iam Ritchie MD)  Circa 2006: right TKA (Ron Ryder MD; [facility])  Circa 2008: right TKA revision due to infection (Ron Ryder MD; [facility])  Circa 2009: right TKA revision due to infection (Ron Ryder MD; [facility])  April 2011: right TKA (Ron Ryder MD; [facility])       LIBRA (generalized anxiety disorder) 09/28/2011     Priority: Medium     Chronic pain 09/28/2011     Priority: Medium     Knee and low back         DJD (degenerative joint disease) 09/28/2011     Priority: Medium     Insomnia 04/13/2011     Priority: Medium     Insomnia  NOS       Tobacco use disorder 07/31/2006     Priority: Medium     Asthma 11/15/2004     Priority: Medium     Asthma  NOS         Assessment:    Paula Ho remains depressed and anxious.  She is planning on moving to a safer residence at the beginning of February.  She remains hypervigilant and fearful related to past trauma experiences.  She remains lonely and isolated.  Behavior health home care services are in place to provide her with community support..  Chronic pain issues keep her awake at night.  The atomoxetine has been helpful in helping her stay  focused and organized as she plans for her move.  Eventually she would like to be employed.    Medication side effects and alternatives were reviewed. Health promotion activities recommended and reviewed today. All questions addressed. Education and counseling completed regarding risks and benefits of medications and psychotherapy options.    Treatment Plan:        Atomoxetine 60 mg daily    Mirtazapine 15 mg at bedtime    Clonidine 0.1 mg twice daily    Wellbutrin 150 mg twice daily for smoking cessation    Fluoxetine 40 mg daily    Continue with behavior health home care services to access community supports and financial resources        Continue all other medications as reviewed per electronic medical record today.     Safety plan reviewed. To the Emergency Department as needed or call after hours crisis line at [phone] or [phone]. [place] Crisis Text Line. Text [place] to 087815 or Suicide LifeLine Chat: [employer]/chat/    To schedule individual or family therapy, call [facility] at [phone].    Schedule an appointment with me in 6 weeks or sooner as needed. Call [facility] at [phone] to schedule.    Follow up with primary care provider as planned or for acute medical concerns.    Call the psychiatric nurse line with medication questions or concerns at [phone].    [employer]plans[employer] may be used to communicate with your provider, but this is not intended to be used for emergencies.    Crisis Resources:    National Suicide Prevention Lifeline: 553.762.2671 (TTY: 147.865.2906). Call anytime for help.  (www.suicidepreventionlifeline.org)  [place] on [place]ness (www.raudel.org): 599.164.8945 or 759-037-1418.   Mental Health Association (www.mentalhealth.org): 271.968.3480 or 187-387-5944.  [place] Crisis Text Line: Text [place] to 382947  Suicide LifeLine Chat: [email or web address]/chat    Administrative Billing:   Time spent with  patient was 30 minutes and greater than 50% of time or 20 minutes was spent in counseling and coordination of care regarding above diagnoses and treatment plan.    Patient Status:  Patient will continue to be seen for ongoing consultation and stabilization.    Signed:   VIRGILIO Kidd-BC   Psychiatry

## 2021-12-30 DIAGNOSIS — F33.2 SEVERE EPISODE OF RECURRENT MAJOR DEPRESSIVE DISORDER, WITHOUT PSYCHOTIC FEATURES (H): ICD-10-CM

## 2021-12-30 RX ORDER — ARIPIPRAZOLE 5 MG/1
5 TABLET ORAL DAILY
Qty: 30 TABLET | Refills: 3 | OUTPATIENT
Start: 2021-12-30

## 2021-12-30 ASSESSMENT — ANXIETY QUESTIONNAIRES: GAD7 TOTAL SCORE: 15

## 2021-12-30 NOTE — TELEPHONE ENCOUNTER
Medication requested: ARIPiprazole (ABILIFY) 5 MG tablet Date last ordered: 12/29/21 Qty: 30 Refills: 3    Deny-Refill too soon. Filled yesterday 12/29/21  with 3 refills.

## 2022-01-04 ENCOUNTER — VIRTUAL VISIT (OUTPATIENT)
Dept: BEHAVIORAL HEALTH | Facility: CLINIC | Age: 62
End: 2022-01-04
Payer: MEDICARE

## 2022-01-04 DIAGNOSIS — R69 DIAGNOSIS DEFERRED: Primary | ICD-10-CM

## 2022-01-04 NOTE — PROGRESS NOTES
Behavioral Health Home Services  [facility]: [place]        Social Work Care Navigator Note      Patient: Paula Ho  Date: January 4, 2022  Preferred Name: Paula    Previous PHQ-9:   PHQ-9 SCORE 8/25/2021 9/20/2021 12/29/2021   PHQ-9 Total Score 26 14 15     Previous LIBRA-7:   LIBRA-7 SCORE 8/25/2021 9/20/2021 12/29/2021   Total Score 21 8 15     ARNALDO LEVEL:  No flowsheet data found.    Preferred Contact:  Need for : No  Preferred Contact: Cell      Type of Contact Today: Phone call (patient / identified key support person reached)      Data: (subjective / Objective):  Recent ED/IP Admission or Discharge?   None    Patient Goals:  Goal Areas: Health; Mental Health; Financial and Social Service Benefits  Patient stated goals: Paula would like assistance with her physical and mental health for creating a balanced and healthy lifestyle. Paula would like assistance with continued SSDI and social service assistance to aid with [employer] benefits to increase her financial stability.         [facility] Core Service Provided:  Comprehensive Care Management: utilized the electronic medical record / patient registry to identify and support patient's health conditions / needs more effectively   Care Transitions: focused on the coordinated and seamless movement of patient between or within different levels of care or settings  Care Coordination: provided care management services/referrals necessary to ensure patient and their identified supports have access to medical, behavioral health, pharmacology and recovery support services.  Ensured that patient's care is integrated across all settings and services.   Individual and Family Support: aimed to help clients reduce barriers to achieving goals, increase health literacy and knowledge about chronic condition(s), increase self-efficacy skills, and improve health outcomes  Referral to Community and Social Support Services: Provided patient with referrals (see plan  section)  Assisted in scheduling an appointment to a referral / service (see plan section)  Coordinated / Confirmed patient's appointment time or referral and transportation plan  Followed-up with patient on whether or not they completed a referral    Current Stressors / Issues / Care Plan Objective Addressed Today:  SW[facility] and patient were able to meet today for Behavioral Health Home ([facility]) monthly check-in via telehealth visit. All required ROIs have been filed with HIM/patient chart.    1. Patient reports she has Covid symptoms and is going in to get tested. CC advised patient that if symptoms were to worsen or increase to go to ED. Patient reports understanding of this today.    2. Patient reports she will be able to move into new duplex Feb. 1st. Patient reports her landlord and [employer] are working on the paperwork. Patient reports she has been able to move some of her belongings over to her new housing. Patient reports relief at being able to finally secure new housing. Patient reports she cannot wait to move from current housing. [facility] provided support/empathy, coping strategies and motivational interviewing today.    3. Patient reports her current therapist is leaving [facility]. Patient reports she has upcoming appt with new provider in April.    Intervention:  Motivational Interviewing: Expressed Empathy/Understanding, Supported Autonomy, Collaboration, Evocation, Permission to raise concern or advise, Open-ended questions, Reflections: simple and complex, Rolled with resistance: Emphasized patient autonomy, Simple reflection, Complex reflection, Amplified reflection (weaker or stronger meaning), Shifted topic to defuse resistance, Reframed sustain talk in the direction of change and Evoked patient agenda, Change talk (evoked) and Reframe   Target Behavior(s): Explored thoughts about taking an anti-depressant, Explored and resolved challenges related to taking anti-depressants as prescribed, Explored thoughts and  readiness to participate in individual therapy, Explored and resolved challenges to attending appointments as scheduled and Explored current social supports and reinforced opportunities to increase engagement    Assessment: (Progress on Goals / Homework):  Patient would benefit from continued coordination in reaching their goals set for the Behavioral Health Home ([facility]) program. SWCC reviewed Health Action Plan goals and will continue to monitor progress and work with patient and their care team.      Plan: (Homework, other):  Patient was encouraged to continue to seek condition-related information and education.      Scheduled a Phone follow up appointment with [facility] CC in 1 week     Patient has set self-identified goals and will monitor progress until the next appointment on: 01/14/2022.    Domonique Harris [facility]  Behavioral Health Home ([facility])   [facility]  [phone]  January 4, 2022  1:31 PM

## 2022-01-05 ENCOUNTER — NURSE TRIAGE (OUTPATIENT)
Dept: NURSING | Facility: CLINIC | Age: 62
End: 2022-01-05
Payer: MEDICARE

## 2022-01-05 ENCOUNTER — VIRTUAL VISIT (OUTPATIENT)
Dept: FAMILY MEDICINE | Facility: CLINIC | Age: 62
End: 2022-01-05
Payer: MEDICARE

## 2022-01-05 DIAGNOSIS — J44.1 COPD EXACERBATION (H): Primary | ICD-10-CM

## 2022-01-05 PROCEDURE — 99214 OFFICE O/P EST MOD 30 MIN: CPT | Mod: 95 | Performed by: FAMILY MEDICINE

## 2022-01-05 RX ORDER — PREDNISONE 20 MG/1
20 TABLET ORAL 2 TIMES DAILY
Qty: 10 TABLET | Refills: 0 | Status: SHIPPED | OUTPATIENT
Start: 2022-01-05 | End: 2022-02-23

## 2022-01-05 RX ORDER — CODEINE PHOSPHATE/GUAIFENESIN 10-100MG/5
5 LIQUID (ML) ORAL EVERY 4 HOURS PRN
Qty: 236 ML | Refills: 1 | Status: SHIPPED | OUTPATIENT
Start: 2022-01-05 | End: 2022-03-15

## 2022-01-05 RX ORDER — AZITHROMYCIN 250 MG/1
TABLET, FILM COATED ORAL
Qty: 6 TABLET | Refills: 0 | Status: SHIPPED | OUTPATIENT
Start: 2022-01-05 | End: 2022-01-10

## 2022-01-05 NOTE — PROGRESS NOTES
Paula is a 61 year old who is being evaluated via a billable video visit.      How would you like to obtain your AVS? Mail a copy  If the video visit is dropped, the invitation should be resent by: Text to cell phone: [phone]  Will anyone else be joining your video visit? No      Video Start Time: 2:45 PM    Concern for COVID-19  About how many days ago did these symptoms start? X 3 days  Is this your first visit for this illness? Yes  In the 14 days before your symptoms started, have you had close contact with someone with COVID-19 (Coronavirus)? I do not know.  Do you have a fever or chills? Yes, I felt feverish or had chills  Are you having new or worsening difficulty breathing? No  Do you have new or worsening cough? Yes, I am coughing up mucus.  - green phleghmHave you had any new or unexplained body aches? YES    Have you experienced any of the following NEW symptoms?    Headache: No    Sore throat: No    Loss of taste or smell: YES    Chest pain: No    Diarrhea: No    Rash: No  What treatments have you tried? advil  Who do you live with?   Are you, or a household member, a healthcare worker or a ? No  Do you live in a nursing home, group home, or shelter? No  Do you have a way to get food/medications if quarantined? Yes, I have a friend or family member who can help me.    -------------------------    Assessment/Plan:    Paula Ho is a 61 year old female presenting for:    COPD exacerbation (H)  Given patient's symptoms she seems to be have a classic COPD exacerbation.  Prednisone and azithromycin sent to the pharmacy.  Cough syrup with codeine sent as well.  I do think it would be reasonable to have her rechecked for COVID-19 with a PCR test.  That swab was placed as well as influenza swab.  If she begins getting fevers she will let me know at which point we might want to expedite influenza swab.  Otherwise she will get these done in the next couple days and I will let her  know the results.  She will quarantine until she gets a negative Covid test.  - predniSONE (DELTASONE) 20 MG tablet  Dispense: 10 tablet; Refill: 0  - azithromycin (ZITHROMAX) 250 MG tablet  Dispense: 6 tablet; Refill: 0  - guaiFENesin-codeine (GUAIFENESIN AC) 100-10 MG/5ML syrup  Dispense: 236 mL; Refill: 1  - Symptomatic; Yes; 1/2/2022 COVID-19 Virus (Coronavirus) by PCR  - Influenza A/B antigen        There are no discontinued medications.        Chief Complaint:  Covid Concern          Subjective:   Paula Ho is a pleasant 61 year old female being evaluated via video visit today for the following concern/s:     Cough: Patient has a past medical history significant for COPD.  About 3 to 4 days ago she began having a runny nose and a cough.  This is worsened.  She states that she is wheezing.  She is occasionally using her albuterol which is transiently helpful.    She states that she does feel short of breath.  She having a difficult time sleeping due to the coughing.  She is coughing up green/yellow sputum.    She did to COVID-19 test at home which were negative.  She does state that she has loss of taste and smell but this happens when she has sinus infections per her report.    She states that when she is sitting still her breathing is okay but when she tries to move around she does get a bit short of breath.  No chest pain other than rib pain with coughing.    She is using her Spiriva as prescribed.    12 point review of systems completed and negative except for what has been described above    History   Smoking Status     Current Every Day Smoker     Packs/day: 0.25   Smokeless Tobacco     Never Used         Current Outpatient Medications:      albuterol (PROAIR HFA) 108 (90 Base) MCG/ACT inhaler, Inhale 2 puffs into the lungs every 4 hours as needed for shortness of breath / dyspnea or wheezing, Disp: 8.5 g, Rfl: 11     albuterol (PROVENTIL) (2.5 MG/3ML) 0.083% neb solution, NEBULIZE THE CONTENTS OF  1 VIAL EVERY 6 HOURS AS NEEDED., Disp: 90 mL, Rfl: 11     ARIPiprazole (ABILIFY) 5 MG tablet, Take 1 tablet (5 mg) by mouth daily, Disp: 30 tablet, Rfl: 3     atomoxetine (STRATTERA) 60 MG capsule, Take 1 capsule (60 mg) by mouth daily, Disp: 30 capsule, Rfl: 1     azithromycin (ZITHROMAX) 250 MG tablet, Take 2 tablets (500 mg) by mouth daily for 1 day, THEN 1 tablet (250 mg) daily for 4 days., Disp: 6 tablet, Rfl: 0     budesonide-formoterol (SYMBICORT) 160-4.5 MCG/ACT Inhaler, Inhale 2 puffs into the lungs 2 times daily, Disp: 30.6 g, Rfl: 3     buPROPion (ZYBAN) 150 MG 12 hr tablet, Take 1 tablet (150 mg) by mouth 2 times daily, Disp: 60 tablet, Rfl: 1     cetirizine (ZYRTEC) 10 MG tablet, Take 1 tablet (10 mg) by mouth daily, Disp: 14 tablet, Rfl: 1     cloNIDine (CATAPRES) 0.1 MG tablet, Take 1 tablet (0.1 mg) by mouth 2 times daily, Disp: 60 tablet, Rfl: 1     FLUoxetine (PROZAC) 40 MG capsule, Take 1 capsule (40 mg) by mouth 2 times daily, Disp: 60 capsule, Rfl: 1     guaiFENesin-codeine (GUAIFENESIN AC) 100-10 MG/5ML syrup, Take 5 mLs by mouth every 4 hours as needed for congestion, Disp: 236 mL, Rfl: 1     levothyroxine (SYNTHROID/LEVOTHROID) 25 MCG tablet, Take 1 tablet (25 mcg) by mouth daily, Disp: 90 tablet, Rfl: 1     losartan (COZAAR) 50 MG tablet, Take 50 mg by mouth daily, Disp: , Rfl:      methylPREDNISolone (MEDROL DOSEPAK) 4 MG tablet therapy pack, Follow Package Directions, Disp: 21 tablet, Rfl: 0     mirtazapine (REMERON) 15 MG tablet, Take 1 tablet (15 mg) by mouth At Bedtime, Disp: 30 tablet, Rfl: 1     Multiple Vitamins-Minerals (MULTIVITAMIN ADULT PO), , Disp: , Rfl:      mupirocin (BACTROBAN) 2 % external ointment, Apply topically 3 times daily, Disp: 15 g, Rfl: 0     mupirocin (BACTROBAN) 2 % external ointment, Apply topically 2 times daily, Disp: 30 g, Rfl: 5     order for DME, Equipment being ordered: Nebulizer, Disp: 1 Units, Rfl: 0     oxyCODONE IR (ROXICODONE) 15 MG tablet, Take 7.5  "mg by mouth 2 times daily as needed, Disp: , Rfl:      pravastatin (PRAVACHOL) 40 MG tablet, Take 40 mg by mouth daily, Disp: , Rfl:      predniSONE (DELTASONE) 20 MG tablet, Take 1 tablet (20 mg) by mouth 2 times daily, Disp: 10 tablet, Rfl: 0     tiotropium (SPIRIVA HANDIHALER) 18 MCG inhaled capsule, Inhale 1 capsule (18 mcg) into the lungs daily, Disp: 90 capsule, Rfl: 3     tiotropium (SPIRIVA) 18 MCG inhaled capsule, Inhale 1 capsule (18 mcg) into the lungs daily, Disp: 30 capsule, Rfl: 11     tiZANidine (ZANAFLEX) 2 MG tablet, TK 2 TO 3 TS PO QHS, Disp: , Rfl: 4     topiramate (TOPAMAX) 100 MG tablet, Take 1 tablet (100 mg) by mouth 2 times daily, Disp: 60 tablet, Rfl: 1     vitamin D3 (CHOLECALCIFEROL) 2000 units (50 mcg) tablet, Take 1 tablet by mouth daily, Disp: , Rfl: 1     medical cannabis (Patient's own supply), See Admin Instructions (The purpose of this order is to document that the patient reports taking medical cannabis.  This is not a prescription, and is not used to certify that the patient has a qualifying medical condition.), Disp: , Rfl:      NARCAN 4 MG/0.1ML nasal spray, INHALE VIA NASAL ROUTE FOR OVERDOSE AS NEEDED. MAY REPEAT AFTER 2-3 MINUTES, Disp: , Rfl: 0    Current Facility-Administered Medications:      lidocaine (PF) (XYLOCAINE) 1 % injection 8 mL, 8 mL, , , Greg Lam MD, 8 mL at 07/27/21 1123        Objective:  No vitals were done due to the nature of this visit  Vitals - Patient Reported 7/15/2021   Height (Patient Reported) 5' 6\"   Weight (Patient Reported) 264 lb   BMI (Based on Pt Reported Ht/Wt) 42.61 kg/m2               General: No acute distress  Psych: Appropriate affect  HEENT: moist mucous membranes  Pulmonary: Breathing comfortably, speaking in complete sentences  Extremities: warm and well perfused with no edema  Skin: warm and dry with no rash         This note has been dictated and transcribed using voice recognition software.   Any errors in transcription " are unintentional and inherent to the software.        Video-Visit Details    Type of service:  Video Visit    Video End Time:305    Originating Location (pt. Location): Home    Distant Location (provider location):  [facility]     Platform used for Video Visit: Jose

## 2022-01-05 NOTE — TELEPHONE ENCOUNTER
Neg covid test 4 days ago    Now cough no taste or smell, green phlegm    Asking if pcp will call her in some cough medication.    No pain or pressure in chest today    Uses pharmacy [facility] [address] [facility]    Triage wants her to have virtual visit with provider as her chest hurts.  She agrees and was transferred to scheduling.    Thelma Alberto RN  [facility] Nurse Advisor        Reason for Disposition    Chest pain or pressure    Additional Information    Negative: SEVERE difficulty breathing (e.g., struggling for each breath, speaks in single words)    Negative: Difficult to awaken or acting confused (e.g., disoriented, slurred speech)    Negative: Bluish (or gray) lips or face now    Negative: Shock suspected (e.g., cold/pale/clammy skin, too weak to stand, low BP, rapid pulse)    Negative: Sounds like a life-threatening emergency to the triager    Negative: [1] COVID-19 exposure AND [2] no symptoms    Negative: COVID-19 vaccine reaction suspected (e.g., fever, headache, muscle aches) occurring 1 to 3 days after getting vaccine    Negative: COVID-19 vaccine, questions about    Negative: [1] Lives with someone known to have influenza (flu test positive) AND [2] flu-like symptoms (e.g., cough, runny nose, sore throat, SOB; with or without fever)    Negative: [1] Adult with possible COVID-19 symptoms AND [2] triager concerned about severity of symptoms or other causes    Negative: COVID-19 and breastfeeding, questions about    Negative: SEVERE or constant chest pain or pressure (Exception: mild central chest pain, present only when coughing)    Negative: MODERATE difficulty breathing (e.g., speaks in phrases, SOB even at rest, pulse 100-120)    Negative: [1] Headache AND [2] stiff neck (can't touch chin to chest)    Negative: MILD difficulty breathing (e.g., minimal/no SOB at rest, SOB with walking, pulse <100)    Protocols used: CORONAVIRUS (COVID-19) DIAGNOSED OR [id number]  8.25.2021

## 2022-01-09 ENCOUNTER — LAB (OUTPATIENT)
Dept: URGENT CARE | Facility: URGENT CARE | Age: 62
End: 2022-01-09
Attending: FAMILY MEDICINE
Payer: MEDICARE

## 2022-01-09 DIAGNOSIS — J44.1 COPD EXACERBATION (H): ICD-10-CM

## 2022-01-09 LAB
FLUAV AG SPEC QL IA: NEGATIVE
FLUBV AG SPEC QL IA: NEGATIVE

## 2022-01-09 PROCEDURE — U0005 INFEC AGEN DETEC AMPLI PROBE: HCPCS

## 2022-01-09 PROCEDURE — U0003 INFECTIOUS AGENT DETECTION BY NUCLEIC ACID (DNA OR RNA); SEVERE ACUTE RESPIRATORY SYNDROME CORONAVIRUS 2 (SARS-COV-2) (CORONAVIRUS DISEASE [COVID-19]), AMPLIFIED PROBE TECHNIQUE, MAKING USE OF HIGH THROUGHPUT TECHNOLOGIES AS DESCRIBED BY CMS-2020-01-R: HCPCS

## 2022-01-09 PROCEDURE — 87804 INFLUENZA ASSAY W/OPTIC: CPT | Performed by: FAMILY MEDICINE

## 2022-01-10 LAB — SARS-COV-2 RNA RESP QL NAA+PROBE: NEGATIVE

## 2022-01-13 ENCOUNTER — VIRTUAL VISIT (OUTPATIENT)
Dept: BEHAVIORAL HEALTH | Facility: CLINIC | Age: 62
End: 2022-01-13
Payer: MEDICARE

## 2022-01-13 DIAGNOSIS — R69 DIAGNOSIS DEFERRED: Primary | ICD-10-CM

## 2022-01-13 NOTE — PROGRESS NOTES
Behavioral Health Home Services  [facility]: [place]        Social Work Care Navigator Note      Patient: Paula Ho  Date: January 13, 2022  Preferred Name: Paula    Previous PHQ-9:   PHQ-9 SCORE 8/25/2021 9/20/2021 12/29/2021   PHQ-9 Total Score 26 14 15     Previous LIBRA-7:   LIBRA-7 SCORE 8/25/2021 9/20/2021 12/29/2021   Total Score 21 8 15     ARNALDO LEVEL:  No flowsheet data found.    Preferred Contact:  Need for : No  Preferred Contact: Cell      Type of Contact Today: Phone call (patient / identified key support person reached)      Data: (subjective / Objective):  Recent ED/IP Admission or Discharge?   None    Patient Goals:  Goal Areas: Health; Mental Health; Financial and Social Service Benefits  Patient stated goals: Paula would like assistance with her physical and mental health for creating a balanced and healthy lifestyle. Paula would like assistance with continued SSDI and social service assistance to aid with [place] benefits to increase her financial stability.         [facility] Core Service Provided:  Comprehensive Care Management: utilized the electronic medical record / patient registry to identify and support patient's health conditions / needs more effectively   Care Transitions: focused on the coordinated and seamless movement of patient between or within different levels of care or settings  Care Coordination: provided care management services/referrals necessary to ensure patient and their identified supports have access to medical, behavioral health, pharmacology and recovery support services.  Ensured that patient's care is integrated across all settings and services.   Individual and Family Support: aimed to help clients reduce barriers to achieving goals, increase health literacy and knowledge about chronic condition(s), increase self-efficacy skills, and improve health outcomes  Referral to Community and Social Support Services: Provided patient with referrals (see plan  section)  Assisted in scheduling an appointment to a referral / service (see plan section)  Coordinated / Confirmed patient's appointment time or referral and transportation plan  Followed-up with patient on whether or not they completed a referral    Current Stressors / Issues / Care Plan Objective Addressed Today:  SWCC and patient were able to meet today for Behavioral Health Home ([facility]) monthly check-in via telehealth visit. All required ROIs have been filed with HIM/patient chart.    1. Patient reports her mom is in ICU in the hospital. Patient reports this is due to heart attack and blocked arteries that needed emergent attention. SWCC and patient processed her feelings/emotions today with her mom being air lifted to hospital, surgery and now in ICU.  CC provided support/empathy, coping strategies and motivational interviewing today.    2. Patient reports she is feeling better since COPD flare. Patient reports she was able to test negative for Covid and  medications to treat COPD.    3. Patient reports she is waiting for section 8 to approve new duplex. Patient is hoping to move in by the middle of Feb.Patient reports current housing is secure and she continues to move items over to duplex.    4. Patient reports mood has been stable and she is feeling less anxiety. Patient reports she feels like medications are at a therapeutic level. Patient reports she is taking medications as directed and does not have any missed doses in the past 7-days. Patient reports before meeting with psychiatric provider, due to stressors and not feeling well, she had been missing medication doses. [facility] messaged scheduling to reach out to patient to schedule an upcoming appt with provider today.    5. Patient reports her current therapist is leaving [facility]. Patient reports she has upcoming appt with new provider in April.     6. Patient reports she will be contacting [employer], as they have a program for $9.99/mo for section 8.  [facility] provided patient contact number at [phone] today.    Intervention:  Motivational Interviewing: Expressed Empathy/Understanding, Supported Autonomy, Collaboration, Evocation, Permission to raise concern or advise, Open-ended questions, Reflections: simple and complex, Rolled with resistance: Emphasized patient autonomy, Simple reflection, Complex reflection, Amplified reflection (weaker or stronger meaning), Shifted topic to defuse resistance, Reframed sustain talk in the direction of change and Evoked patient agenda, Change talk (evoked) and Reframe   Target Behavior(s): Explored thoughts about taking an anti-depressant, Explored and resolved challenges related to taking anti-depressants as prescribed, Explored thoughts and readiness to participate in individual therapy, Explored and resolved challenges to attending appointments as scheduled and Explored current social supports and reinforced opportunities to increase engagement    Assessment: (Progress on Goals / Homework):  Patient would benefit from continued coordination in reaching their goals set for the Behavioral Health Home ([facility]) program. CC reviewed Health Action Plan goals and will continue to monitor progress and work with patient and their care team.      Plan: (Homework, other):  Patient was encouraged to continue to seek condition-related information and education.      Scheduled a Phone follow up appointment with MISA  in 4 weeks     Patient has set self-identified goals and will monitor progress until the next appointment on: 02/17/2022.        Domonique Harris [facility]  Behavioral Health Home ([facility])   [facility]  [phone]  January 13, 2022  2:25 PM

## 2022-02-21 ENCOUNTER — VIRTUAL VISIT (OUTPATIENT)
Dept: BEHAVIORAL HEALTH | Facility: CLINIC | Age: 62
End: 2022-02-21
Payer: MEDICARE

## 2022-02-21 DIAGNOSIS — R69 DIAGNOSIS DEFERRED: Primary | ICD-10-CM

## 2022-02-21 ASSESSMENT — ANXIETY QUESTIONNAIRES
3. WORRYING TOO MUCH ABOUT DIFFERENT THINGS: MORE THAN HALF THE DAYS
2. NOT BEING ABLE TO STOP OR CONTROL WORRYING: NEARLY EVERY DAY
1. FEELING NERVOUS, ANXIOUS, OR ON EDGE: MORE THAN HALF THE DAYS
GAD7 TOTAL SCORE: 12
6. BECOMING EASILY ANNOYED OR IRRITABLE: NEARLY EVERY DAY
7. FEELING AFRAID AS IF SOMETHING AWFUL MIGHT HAPPEN: MORE THAN HALF THE DAYS
5. BEING SO RESTLESS THAT IT IS HARD TO SIT STILL: NOT AT ALL
4. TROUBLE RELAXING: NOT AT ALL
IF YOU CHECKED OFF ANY PROBLEMS ON THIS QUESTIONNAIRE, HOW DIFFICULT HAVE THESE PROBLEMS MADE IT FOR YOU TO DO YOUR WORK, TAKE CARE OF THINGS AT HOME, OR GET ALONG WITH OTHER PEOPLE: VERY DIFFICULT

## 2022-02-21 ASSESSMENT — PATIENT HEALTH QUESTIONNAIRE - PHQ9: SUM OF ALL RESPONSES TO PHQ QUESTIONS 1-9: 15

## 2022-02-21 NOTE — LETTER
"Behavioral Health Home ([facility]): Health Action Plan  No data recorded  [facility]      Name: Paula Ho  Preferred Name: Paula  : 1960  MRN: [id number]    2022    [facility]  [address] [place] [address]    Dear Paula,    I have enclosed the Health Action Plan (HAP) that we completed together that includes your goals for Behavioral Health Home ([facility]) Services. As you continue being enrolled in Behavioral Health Home ([facility]) services, I wanted to give you some information so you know what to expect moving forward.    What to Expect on a Monthly Basis: It is a requirement that I make contact with you on a monthly basis (by phone or meeting with you in clinic) to check in on how things are going and if you need any assistance. Please feel free to reach out to your [facility] team between these contacts if you need assistance or have any questions.    What to Expect every Six Months: Every six months, it is a requirement that we complete a Health Action Plan (HAP) review. During this in-clinic appointment, we will monitor our progress towards existing goals and set new goals for the next 6 month time period.    What kinds of support can [facility] offer now that I am enrolled?   - Housing Coordination  - Transportation Resources  - Financial Resources  - Coordination with the [facility] for Benefits (MA, SNAP benefits, etc)  - Disability Eligibility and Benefits  - Employment and Education Coordination  - Disability Related Information and Education Resources  - Referrals for mental health services, chemical dependency assessment/treatment, etc     If you or someone you know is experiencing a mental health crisis and you need help, the following crisis hotlines are available to help.    If you are in immediate danger, call 911.  Suicide Prevention Lifeline: 4-349-420-MDFF (4026)  Crisis Text Line Service: Text \"[place]\" to 753021.    [facility]  [facility] " Emergency Department - Behavioral Emergency Center  [address] [place] [address]  [phone] [facility] - [employer] [facility] Crisis Response Services (Adults & Children)  [phone]   [facility] - Acute Psychiatric Services (APS)  Assessment & Referral: [phone]  Suicide Hotline: 800.468.6526 [facility] Crisis Team  [phone]   [facility] Adult Mental Health Services   [phone]  Referrals: [phone]  Crisis: [phone] [facility] - Emergency Department  [address] [place] [address]  [phone]   [place] [employer]  5-195-TqlgLmh ([phone]) [facility] Mental Health and Resource Crisis Line  [phone]   [facility] - Community Outreach for Psychiatric Emergencies  [phone] [facility] Crisis Services - [facility] Adult Mental Health Services - Crisis Services (24/7)  Information & Referrals: [phone]  Crisis Line: [phone]   [facility] Emergency Center (24/7)  [phone] TDD Crisis Help Line Serving [facility]  [phone]  or [facility]  to 299655    [facility]  Mental Health  [address] [place] [address]  [phone]  [address] [place] [address]  [phone]  web: [email or web address].  Mental-Health    [facility]  Mental Health  [address] [place] [address]  [phone]  web: [facility] Family Services    [facility]  Family Services  [address], [place] [address]  [phone]  web: [facility] Family [facility]  Family Services  [address] [place] [address]  [phone]  web: [email or web address].[place]./adult mental health    [facility] and Human Services Department  [address] [facility] [address], [place] [address]  [phone]  [address],  [place] [address]  [phone]  Toll Free: [phone]  web: [email or web address] and human services     Please let me know if you have any questions or if there is anything that we can assist with. I can be reached by phone, [employer] message, or by email. I look forward to continuing to work with you!    Sincerely,          Domonique Harris, [facility]  Behavioral Health Home ([facility])   [phone]  [email or web address]

## 2022-02-21 NOTE — Clinical Note
Hello,    I was able to meet with this patient today to complete an update of her goals for the next 6-month with the Behavioral Health Home ([facility]) program. Please see attached.    Thank you,  Domonique Harris [facility]  Behavioral Health [place] ([facility])   [facility]  [phone]  February 21, 2022  2:42 PM

## 2022-02-21 NOTE — LETTER
Behavioral Health Home ([facility]): Health Action Plan  No data recorded  [facility]      Name: Paula Ho  Preferred Name: Paula  : 1960  MRN: [id number]      My Goals  Goal Areas: Health; Mental Health; Financial and Social Service Benefits    Patient stated goals: Paula would like assistance with her physical and mental health for creating a balanced and healthy lifestyle. Paula would like assistance with continued SSDI and social service assistance to aid with [facility] benefits to increase her financial stability.     Strengths related to each goal: Paula states her strengths are the support of her dogs, good advocate for herself, support of good friends, and care for others. Patient states she has a big heart.    Services and Supports Needed: The [facility] Team will provide monthly contact with patient in order to monitor progress towards goals.    Activities / Actions of Team to support goal(s): [facility] team will locate appropriate resources that align with patient's goals and promote health and wellness.    Activities / Actions of Patient / Parent / Guardian to support goal(s): It is a requirement that patient's primary care physician is through the [facility] system and that they are on Medical Assistance/Medicaid. If either of these were to change or if patient needs any type of assistance, they are to reach out to their Behavioral Health Home ([facility]) team.        Recommended Referral  Tobacco cessation referrals made?: No  Mental Health / Chemical Dependency Referrals: Yes  Substance Use Referrals: Not Applicable  Mental Health Referrals: Critical access hospital Referrals: [facility]; [facility] ; Other (see comments)  Dental Referral: Yes        My Team Members and Their Contact Information  Patient Care Team       Relationship Specialty Notifications Demetra Vazquez APRN CNP PCP - General Nurse Practitioner  19     Phone: [phone] Fax: [phone]          [address] [place] [address]    Demetra Meyer APRN CNP Assigned PCP   9/15/19     Phone: [phone] Fax: [phone]         [address] [place] [address]    Tigist Manjarrez NP Nurse Practitioner Nurse Practitioner Admissions 8/3/20     Psychiatry    Phone: [phone] Fax: [phone]         [address] [place] [address]    Joselyn Winter Therapist  Admissions 8/3/20     Therapist    Phone: [phone] Fax: [phone]          [facility] PSYCHIATRY  [address]  [facility] [address]    Tigist Manjarrez NP Assigned Behavioral Health Provider   11/15/20     Phone: [phone] Fax: [phone]         [address] [place] [address]    Ace Forte MD Assigned Heart and Vascular Provider   11/22/20     Phone: [phone] Fax: [phone]         [address] [place] [address] [address] [facility] [address]    [facility] [place] ([facility] ([place])  12/23/20     Phone: [phone]         Dane Irizarry MD Anesthesiologist Anesthesiology  1/21/21     Phone: [phone] Pager: [phone] Fax: [phone]        [address] [facility] [address]    Greg Lam MD Assigned Musculoskeletal Provider   8/1/21     Phone: [phone] Fax: [phone]         [address] [place] [address]    Chanell Joya MD MD Dermatology  10/25/21     Phone: [phone] Fax: [phone]         [address] [facility] [address]    Joie Nobles MD Referring Physician Family Medicine  10/25/21     referring to Dermatology    Phone: [phone] Fax: [phone]         [address] [place] [address]    Man Goode MD Assigned Pulmonology Provider   11/7/21     Phone: [phone] Fax: [phone]         [address] [facility] [address]    Paty Dial, BSW  Clinic Admissions 2/17/22     [facility] Case Management - Enrolled 02/17/2021 -[employer]  Barrie Cazares - direct extension [phone].          My Wellness Plan  Crisis Plan (emergencies / when urgent support needed): Patient states she feels comfortable contacting her PCA Sharla or calling 911 in a crisis or emergency situation.  A safety and risk management plan has not been developed at this time, however patient was encouraged to call [facility] Crisis / 911 should there be a change in any of these risk factors.            Paula Ho co-developed the Health Action Plan with the [facility] Team and received a copy of this document.  Date Health Action Plan Completed/Updated: 02/21/22
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
## 2022-02-22 ASSESSMENT — ANXIETY QUESTIONNAIRES: GAD7 TOTAL SCORE: 12

## 2022-02-23 ENCOUNTER — OFFICE VISIT (OUTPATIENT)
Dept: OPTOMETRY | Facility: CLINIC | Age: 62
End: 2022-02-23
Payer: MEDICARE

## 2022-02-23 DIAGNOSIS — H04.123 DRY EYE SYNDROME OF BOTH EYES: ICD-10-CM

## 2022-02-23 DIAGNOSIS — H01.02B SQUAMOUS BLEPHARITIS OF UPPER AND LOWER EYELIDS OF BOTH EYES: ICD-10-CM

## 2022-02-23 DIAGNOSIS — H52.03 HYPEROPIA, BILATERAL: ICD-10-CM

## 2022-02-23 DIAGNOSIS — H52.4 PRESBYOPIA: ICD-10-CM

## 2022-02-23 DIAGNOSIS — H25.13 AGE-RELATED NUCLEAR CATARACT OF BOTH EYES: Primary | ICD-10-CM

## 2022-02-23 DIAGNOSIS — H01.02A SQUAMOUS BLEPHARITIS OF UPPER AND LOWER EYELIDS OF BOTH EYES: ICD-10-CM

## 2022-02-23 PROCEDURE — 92004 COMPRE OPH EXAM NEW PT 1/>: CPT | Performed by: OPTOMETRIST

## 2022-02-23 PROCEDURE — 92015 DETERMINE REFRACTIVE STATE: CPT | Mod: GY | Performed by: OPTOMETRIST

## 2022-02-23 ASSESSMENT — CUP TO DISC RATIO
OD_RATIO: 0.2
OS_RATIO: 0.2

## 2022-02-23 ASSESSMENT — VISUAL ACUITY
METHOD: SNELLEN - LINEAR
OS_SC: 20/25
OD_SC+: -3
OD_SC: 20/25
OD_SC: 20/70
OS_SC+: -2
OS_SC: 20/50

## 2022-02-23 ASSESSMENT — REFRACTION_MANIFEST
OD_CYLINDER: SPHERE
OD_SPHERE: +0.50
OS_SPHERE: +1.25
OS_ADD: +2.50
OS_CYLINDER: SPHERE
OD_ADD: +2.50

## 2022-02-23 ASSESSMENT — EXTERNAL EXAM - RIGHT EYE: OD_EXAM: NORMAL

## 2022-02-23 ASSESSMENT — KERATOMETRY
OD_AXISANGLE2_DEGREES: 155
OD_K2POWER_DIOPTERS: 44.00
OS_AXISANGLE_DEGREES: 138
OS_K2POWER_DIOPTERS: 44.25
OD_AXISANGLE_DEGREES: 065
OD_K1POWER_DIOPTERS: 43.50
OS_AXISANGLE2_DEGREES: 048
OS_K1POWER_DIOPTERS: 43.50

## 2022-02-23 ASSESSMENT — REFRACTION_WEARINGRX: SPECS_TYPE: OTC READERS DIDNT BRING

## 2022-02-23 ASSESSMENT — TONOMETRY
IOP_METHOD: TONOPEN
OS_IOP_MMHG: 10
OD_IOP_MMHG: 15

## 2022-02-23 ASSESSMENT — CONF VISUAL FIELD
OS_NORMAL: 1
OD_NORMAL: 1

## 2022-02-23 ASSESSMENT — SLIT LAMP EXAM - LIDS
COMMENTS: DERMATOCHALASIS, BLEPHARITIS
COMMENTS: DERMATOCHALASIS, BLEPHARITIS

## 2022-02-23 ASSESSMENT — EXTERNAL EXAM - LEFT EYE: OS_EXAM: NORMAL

## 2022-02-23 NOTE — LETTER
2/23/2022         RE: Paula Ho  [address]  [facility] [address]        Dear Colleague,    Thank you for referring your patient, Paula oH, to the [facility]. Please see a copy of my visit note below.    Chief Complaint   Patient presents with     Annual Eye Exam      Accompanied by self  Last Eye Exam: 10 years  Dilated Previously: Yes    What are you currently using to see?  otc readers       Distance Vision Acuity: Satisfied with vision    Near Vision Acuity: Not satisfied     Eye Comfort: good but dry  Do you use eye drops? : No  Occupation or Hobbies: disabled     Luana Bolton Optometric Assistant, A.B.O.C.          Medical, surgical and family histories reviewed and updated 2/23/2022.       OBJECTIVE: See Ophthalmology exam    ASSESSMENT:    ICD-10-CM    1. Age-related nuclear cataract of both eyes  H25.13 EYE EXAM (SIMPLE-NONBILLABLE)   2. Dry eye syndrome of both eyes  H04.123 EYE EXAM (SIMPLE-NONBILLABLE)   3. Squamous blepharitis of upper and lower eyelids of both eyes  H01.02A EYE EXAM (SIMPLE-NONBILLABLE)    H01.02B    4. Presbyopia  H52.4 REFRACTION   5. Hyperopia, bilateral  H52.03 REFRACTION      PLAN:     Patient Instructions   You have the start of mild cataracts.  You may notice some blurred vision or glare with night driving.  It is important that you wear good sunglasses to protect your eyes from the ultraviolet light from the sun.  I recommend that you return in 1 year for an eye exam unless there are any sudden changes in your vision.       Heat to the eyes daily for 10-15 minutes nightly with warm washcloth or reusable gel masks from the pharmacy or  Polwire heat masks can be purchased at [employer].    Refresh tears 1 drop 2-4x daily or other artificial tear.    Ocusoft lid scrubs at night.     Eyeglass prescription given.    Return in 1 year for a complete eye exam or sooner if needed.    Agustin Mcconnell, OD           Again, thank you for  allowing me to participate in the care of your patient.        Sincerely,        Agustin Mcconnell, OD

## 2022-02-23 NOTE — PATIENT INSTRUCTIONS
You have the start of mild cataracts.  You may notice some blurred vision or glare with night driving.  It is important that you wear good sunglasses to protect your eyes from the ultraviolet light from the sun.  I recommend that you return in 1 year for an eye exam unless there are any sudden changes in your vision.       Heat to the eyes daily for 10-15 minutes nightly with warm washcloth or reusable gel masks from the pharmacy or  One Diary heat masks can be purchased at [employer].    Refresh tears 1 drop 2-4x daily or other artificial tear.    Ocusoft lid scrubs at night.     Eyeglass prescription given.    Return in 1 year for a complete eye exam or sooner if needed.    Agustin Mcconnell, OD    The affects of the dilating drops last for 4- 6 hours.  You will be more sensitive to light and vision will be blurry up close.  Do not drive if you do not feel comfortable.  Mydriatic sunglasses were given if needed.      Optometry Providers       Clinic Locations                                 Telephone Number   Dr. Sharla Gomez    [place]   [facility] [phone]     [facility] Hours:                [facility] Hours:       [facility] Hours:   [address]   [address]     [address] [place] [address]   [place] [address]    [place] [address]  Phone: [phone]                    Phone: [phone]     Phone: [phone]                      Monday 8:00-6:00                          Monday 8:00-6:00                          Monday 8:00-6:00              Tuesday 8:00-6:00                          Tuesday 8:00-6:00                          Tuesday 8:00-6:00              Wednesday 8:00-6:00                  Wednesday 8:00-6:00                   Wednesday 8:00-6:00      Thursday 8:00-6:00                        Thursday 8:00-6:00                         Thursday  8:00-6:00            Friday 8:00-5:00                              Friday 8:00-5:00                              Friday 8:00-5:00    [facility] Hours:   [address] [place] [address]  [phone]    Monday 9:00-6:00  Tuesday 9:00-6:00  Wednesday 9:00-6:00  Thursday 9:00-6:00  Friday 9:00-5:00  Please log on to [email or web address] to order your contact lenses.  The link is found on the Eye Care and Vision Services page.  As always, Thank you for trusting us with your health care needs!

## 2022-02-23 NOTE — PROGRESS NOTES
Chief Complaint   Patient presents with     Annual Eye Exam      Accompanied by self  Last Eye Exam: 10 years  Dilated Previously: Yes    What are you currently using to see?  otc readers       Distance Vision Acuity: Satisfied with vision    Near Vision Acuity: Not satisfied     Eye Comfort: good but dry  Do you use eye drops? : No  Occupation or Hobbies: disabled     Luana Bolton Optometric Assistant, A.B.O.C.          Medical, surgical and family histories reviewed and updated 2/23/2022.       OBJECTIVE: See Ophthalmology exam    ASSESSMENT:    ICD-10-CM    1. Age-related nuclear cataract of both eyes  H25.13 EYE EXAM (SIMPLE-NONBILLABLE)   2. Dry eye syndrome of both eyes  H04.123 EYE EXAM (SIMPLE-NONBILLABLE)   3. Squamous blepharitis of upper and lower eyelids of both eyes  H01.02A EYE EXAM (SIMPLE-NONBILLABLE)    H01.02B    4. Presbyopia  H52.4 REFRACTION   5. Hyperopia, bilateral  H52.03 REFRACTION      PLAN:     Patient Instructions   You have the start of mild cataracts.  You may notice some blurred vision or glare with night driving.  It is important that you wear good sunglasses to protect your eyes from the ultraviolet light from the sun.  I recommend that you return in 1 year for an eye exam unless there are any sudden changes in your vision.       Heat to the eyes daily for 10-15 minutes nightly with warm washcloth or reusable gel masks from the pharmacy or  Bug Music heat masks can be purchased at [employer].    Refresh tears 1 drop 2-4x daily or other artificial tear.    Ocusoft lid scrubs at night.     Eyeglass prescription given.    Return in 1 year for a complete eye exam or sooner if needed.    Agustin Mcconnell, OD

## 2022-03-07 ENCOUNTER — TELEPHONE (OUTPATIENT)
Dept: FAMILY MEDICINE | Facility: CLINIC | Age: 62
End: 2022-03-07
Payer: COMMERCIAL

## 2022-03-07 NOTE — TELEPHONE ENCOUNTER
PT HAD TO CANCEL APPOINTMENT FOR TOMORROW D/T ANOTHER APPOINTMENT . PT WAS HOPING TO BE WORKED INTO SCHEDULE BEFORE NEXT AVAILABLE OF 4/12 . PT STATED ANY DAY BUT THURSDAYS WORK. OK TO LEAVE DETAILED MESSAGE ON PHONE IF NO ANSWER

## 2022-03-14 ENCOUNTER — VIRTUAL VISIT (OUTPATIENT)
Dept: BEHAVIORAL HEALTH | Facility: CLINIC | Age: 62
End: 2022-03-14
Payer: COMMERCIAL

## 2022-03-14 DIAGNOSIS — R69 DIAGNOSIS DEFERRED: Primary | ICD-10-CM

## 2022-03-14 NOTE — PROGRESS NOTES
[facility]  No data recorded      Social Work Care Navigator Note      Patient: Paula Ho  Date: March 14, 2022  Preferred Name: Paula    Previous PHQ-9:   PHQ-9 SCORE 9/20/2021 12/29/2021 2/21/2022   PHQ-9 Total Score 14 15 15     Previous LIBRA-7:   LIBRA-7 SCORE 9/20/2021 12/29/2021 2/21/2022   Total Score 8 15 12     ARNALDO LEVEL:  No flowsheet data found.    Preferred Contact:  No data recorded    Type of Contact Today: Phone call (patient / identified key support person reached)      Data: (subjective / Objective):  Recent ED/IP Admission or Discharge?   None    Patient Goals:  Goal Areas: Health; Mental Health; Financial and Social Service Benefits  Patient stated goals: Paula would like assistance with her physical and mental health for creating a balanced and healthy lifestyle. Paula would like assistance with continued SSDI and social service assistance to aid with [employer] benefits to increase her financial stability.         [facility] Core Service Provided:  Comprehensive Care Management: utilized the electronic medical record / patient registry to identify and support patient's health conditions / needs more effectively   Care Transitions: focused on the coordinated and seamless movement of patient between or within different levels of care or settings  Care Coordination: provided care management services/referrals necessary to ensure patient and their identified supports have access to medical, behavioral health, pharmacology and recovery support services.  Ensured that patient's care is integrated across all settings and services.   Individual and Family Support: aimed to help clients reduce barriers to achieving goals, increase health literacy and knowledge about chronic condition(s), increase self-efficacy skills, and improve health outcomes    Current Stressors / Issues / Care Plan Objective Addressed Today:  CC and patient were able to meet today for Behavioral Health Home ([facility])  "monthly check-in via telehealth visit. All required ROIs have been filed with HIM/patient chart.    1. Patient reports she's been in a lot of pain today and is laying in bed. She is still okay to check in with [facility] today.     2. Patient reports she loves her new place, but she's been having hard time with the couple stairs she has-- about 5 steps between levels. Patient reports that it feels like a nerve is being pinched in her lower back. Her back has been bothering her, so she is going to contact the surgeon. They received the MRI's back, but she isn't sure what they are going to do now. She said that the pain medications aren't helping anymore and isn't able to get higher dose but it's the amount she's getting isn't enough. The past four days have been \"hell\", she's cried every day because it's been so awful. She also reports that she needs to be more active, but this also causes a lot of pain.     Patient also hasn't been sleeping well because of the pain as well because she has to readjust a lot. Patient has the contact info for her neuro surgeon and will call to set up an appointment. Patient wanted to get moved first before setting up the surgery, so now she'll discuss the back surgery as this is her main priority now.      She also needs to call [employer] to check on her aneurysms, because she's due for her once a year check up.     3. Patient reports that she had to take a step back from her niece and her mom recently. She was helping her niece with a recent break up, so she decided that she needed to take a step back for her own mental health. Her niece recently has her own place, so she was helping with this and now she's in more pain because of it.     4. Patient reports no update for her dentures, but she is still planning to get a second opinion and has the contact information for [facility] and [facility]. [facility] will continue to check in.     5. Patient reports she hasn't followed up regarding her knee with " orthopedics. She reports that her back pain is her main focus right now and will follow up in the future with her knee appointment. [facility] will continue to monitor.     6. Patient reports she was able to get an eye exam, and now needs to get her new glasses.     7. Patient hasn't reached out to [facility] yet about the [employer] worker, so she doesn't currently have someone. She reports she didn't like the new worker and the tone of her voice when first meeting with her. Patient still feels comfortable calling to discuss options, and [facility] will check in next time.     8. Patient reports that she's trying to slow down with her smoking because her chest has been bothering her a lot recently. She is down to less than .5 pack per day. She saw pulmonologist a couple months ago, but will continue to work on lowering her usage. She reports she isn't able to quit all together, even though she knows she shouldn't be smoking at all, so she will keep working on this.     9. Patient reports that she had her annual appointment about a week ago for PCA services, and everything is remaining the same. She is happy to hear it didn't change when she moved counties.    10. Patient reports that she didn't receive an update from [facility], but knows that they are done with this assistance now. Patient reports that she is good with [employer] right now and is happy to be living in her new place through her [place] [employer] voucher.     Intervention:  Motivational Interviewing: Expressed Empathy/Understanding, Supported Autonomy, Collaboration, Evocation, Permission to raise concern or advise, Open-ended questions and Reflections: simple and complex   Target Behavior(s): Explored thoughts about taking an anti-depressant, Explored and resolved challenges related to taking anti-depressants as prescribed, Explored thoughts and readiness to participate in individual therapy, Explored and resolved challenges to attending appointments as scheduled, Explored current  exercise activities and thoughts about how this influences mood, Explored current sleep hygeine and patient's perception and knowledge of relationship to mood and Explored patient's perception of how alcohol and / or drugs influences mood    Assessment: (Progress on Goals / Homework):  Patient would benefit from continued coordination in reaching their goals set for the Behavioral Health Home ([facility]) program. CC reviewed Health Action Plan goals and will continue to monitor progress and work with patient and their care team.      Plan: (Homework, other):  Patient was encouraged to continue to seek condition-related information and education.      Scheduled a Phone follow up appointment with MISA  in 2 weeks     Patient has set self-identified goals and will monitor progress until the next appointment on: 3/29/22.      DENI Humphries  [facility] ([facility])   [facility]  [phone]      Next 5 appointments (look out 90 days)    Mar 15, 2022  2:20 PM  (Arrive by 2:00 PM)  Provider Visit with LEONA Anderson CNP  [facility] ([facility] ) [address] [facility] [address]  [phone]   May 04, 2022  1:00 PM  Return Visit with Man Goode MD  [facility] ([facility]) [address] [address] [place] [id number]  [phone]

## 2022-03-15 ENCOUNTER — OFFICE VISIT (OUTPATIENT)
Dept: FAMILY MEDICINE | Facility: CLINIC | Age: 62
End: 2022-03-15
Payer: COMMERCIAL

## 2022-03-15 VITALS
OXYGEN SATURATION: 96 % | WEIGHT: 282.13 LBS | TEMPERATURE: 97.6 F | BODY MASS INDEX: 46.95 KG/M2 | SYSTOLIC BLOOD PRESSURE: 141 MMHG | HEART RATE: 61 BPM | DIASTOLIC BLOOD PRESSURE: 71 MMHG | RESPIRATION RATE: 16 BRPM

## 2022-03-15 DIAGNOSIS — F33.2 MDD (MAJOR DEPRESSIVE DISORDER), RECURRENT SEVERE, WITHOUT PSYCHOSIS (H): Chronic | ICD-10-CM

## 2022-03-15 DIAGNOSIS — I10 BENIGN ESSENTIAL HYPERTENSION: Chronic | ICD-10-CM

## 2022-03-15 DIAGNOSIS — E78.2 MIXED HYPERLIPIDEMIA: Chronic | ICD-10-CM

## 2022-03-15 DIAGNOSIS — E03.9 HYPOTHYROIDISM, UNSPECIFIED TYPE: Primary | ICD-10-CM

## 2022-03-15 DIAGNOSIS — G89.29 CHRONIC MIDLINE LOW BACK PAIN WITH BILATERAL SCIATICA: Chronic | ICD-10-CM

## 2022-03-15 DIAGNOSIS — M54.41 CHRONIC MIDLINE LOW BACK PAIN WITH BILATERAL SCIATICA: Chronic | ICD-10-CM

## 2022-03-15 DIAGNOSIS — M54.42 CHRONIC MIDLINE LOW BACK PAIN WITH BILATERAL SCIATICA: Chronic | ICD-10-CM

## 2022-03-15 LAB
ALBUMIN SERPL-MCNC: 3.8 G/DL (ref 3.4–5)
ALP SERPL-CCNC: 98 U/L (ref 40–150)
ALT SERPL W P-5'-P-CCNC: 32 U/L (ref 0–50)
ANION GAP SERPL CALCULATED.3IONS-SCNC: 9 MMOL/L (ref 3–14)
AST SERPL W P-5'-P-CCNC: 16 U/L (ref 0–45)
BILIRUB SERPL-MCNC: 0.4 MG/DL (ref 0.2–1.3)
BUN SERPL-MCNC: 9 MG/DL (ref 7–30)
CALCIUM SERPL-MCNC: 9.4 MG/DL (ref 8.5–10.1)
CHLORIDE BLD-SCNC: 104 MMOL/L (ref 94–109)
CO2 SERPL-SCNC: 25 MMOL/L (ref 20–32)
CREAT SERPL-MCNC: 0.51 MG/DL (ref 0.52–1.04)
GFR SERPL CREATININE-BSD FRML MDRD: >90 ML/MIN/1.73M2
GLUCOSE BLD-MCNC: 110 MG/DL (ref 70–99)
LDLC SERPL CALC-MCNC: 128 MG/DL
POTASSIUM BLD-SCNC: 4.1 MMOL/L (ref 3.4–5.3)
PROT SERPL-MCNC: 7.2 G/DL (ref 6.8–8.8)
SODIUM SERPL-SCNC: 138 MMOL/L (ref 133–144)
TSH SERPL DL<=0.005 MIU/L-ACNC: 3.71 MU/L (ref 0.4–4)

## 2022-03-15 PROCEDURE — 99214 OFFICE O/P EST MOD 30 MIN: CPT | Performed by: NURSE PRACTITIONER

## 2022-03-15 PROCEDURE — 84443 ASSAY THYROID STIM HORMONE: CPT | Performed by: NURSE PRACTITIONER

## 2022-03-15 PROCEDURE — 80053 COMPREHEN METABOLIC PANEL: CPT | Performed by: NURSE PRACTITIONER

## 2022-03-15 PROCEDURE — 96127 BRIEF EMOTIONAL/BEHAV ASSMT: CPT | Performed by: NURSE PRACTITIONER

## 2022-03-15 PROCEDURE — 36415 COLL VENOUS BLD VENIPUNCTURE: CPT | Performed by: NURSE PRACTITIONER

## 2022-03-15 PROCEDURE — 83721 ASSAY OF BLOOD LIPOPROTEIN: CPT | Performed by: NURSE PRACTITIONER

## 2022-03-15 RX ORDER — BUPRENORPHINE AND NALOXONE 8; 2 MG/1; MG/1
1 FILM, SOLUBLE BUCCAL; SUBLINGUAL DAILY
COMMUNITY

## 2022-03-15 ASSESSMENT — ANXIETY QUESTIONNAIRES
3. WORRYING TOO MUCH ABOUT DIFFERENT THINGS: MORE THAN HALF THE DAYS
7. FEELING AFRAID AS IF SOMETHING AWFUL MIGHT HAPPEN: NOT AT ALL
7. FEELING AFRAID AS IF SOMETHING AWFUL MIGHT HAPPEN: NOT AT ALL
GAD7 TOTAL SCORE: 7
GAD7 TOTAL SCORE: 7
2. NOT BEING ABLE TO STOP OR CONTROL WORRYING: SEVERAL DAYS
6. BECOMING EASILY ANNOYED OR IRRITABLE: NEARLY EVERY DAY
GAD7 TOTAL SCORE: 7
5. BEING SO RESTLESS THAT IT IS HARD TO SIT STILL: NOT AT ALL
1. FEELING NERVOUS, ANXIOUS, OR ON EDGE: SEVERAL DAYS
4. TROUBLE RELAXING: NOT AT ALL

## 2022-03-15 ASSESSMENT — PATIENT HEALTH QUESTIONNAIRE - PHQ9
SUM OF ALL RESPONSES TO PHQ QUESTIONS 1-9: 14
SUM OF ALL RESPONSES TO PHQ QUESTIONS 1-9: 14
10. IF YOU CHECKED OFF ANY PROBLEMS, HOW DIFFICULT HAVE THESE PROBLEMS MADE IT FOR YOU TO DO YOUR WORK, TAKE CARE OF THINGS AT HOME, OR GET ALONG WITH OTHER PEOPLE: VERY DIFFICULT

## 2022-03-15 NOTE — LETTER
March 16, 2022      Paula Ho  [address]   [place] [address]        Dear MsMyah,    We are writing to inform you of your test results.    Your lab results show normal kidney, liver, thyroid and electrolytes. Your bad cholesterol is high. Please be sure to take the pravastatin. Let me know if you need a refill. Please let us know if you have any questions.   Thank you for allowing us to participate in your care.    Resulted Orders   LDL cholesterol direct   Result Value Ref Range    LDL Cholesterol Direct 128 (H) <100 mg/dL      Comment:      Age 0-19 years:  Desirable: 0-110 mg/dL   Borderline high: 110-129 mg/dL   High: >= 130 mg/dL    Age 20 years and older:  Desirable: <100mg/dL  Above desirable: 100-129 mg/dL   Borderline high: 130-159 mg/dL   High: 160-189 mg/dL   Very high: >= 190 mg/dL   Comprehensive metabolic panel (BMP + Alb, Alk Phos, ALT, AST, Total. Bili, TP)   Result Value Ref Range    Sodium 138 133 - 144 mmol/L    Potassium 4.1 3.4 - 5.3 mmol/L    Chloride 104 94 - 109 mmol/L    Carbon Dioxide (CO2) 25 20 - 32 mmol/L    Anion Gap 9 3 - 14 mmol/L    Urea Nitrogen 9 7 - 30 mg/dL    Creatinine 0.51 (L) 0.52 - 1.04 mg/dL    Calcium 9.4 8.5 - 10.1 mg/dL    Glucose 110 (H) 70 - 99 mg/dL    Alkaline Phosphatase 98 40 - 150 U/L    AST 16 0 - 45 U/L    ALT 32 0 - 50 U/L    Protein Total 7.2 6.8 - 8.8 g/dL    Albumin 3.8 3.4 - 5.0 g/dL    Bilirubin Total 0.4 0.2 - 1.3 mg/dL    GFR Estimate >90 >60 mL/min/1.73m2      Comment:      Effective December 21, 2021 eGFRcr in adults is calculated using the 2021 CKD-EPI creatinine equation which includes age and gender (Samantha et al., NEJ, DOI: 10.1056/FFDKtk2173363)   TSH with free T4 reflex   Result Value Ref Range    TSH 3.71 0.40 - 4.00 mU/L       If you have any questions or concerns, please call the clinic at the number listed above.       Sincerely,      LEONA Anderson CNP

## 2022-03-15 NOTE — PROGRESS NOTES
"  Assessment & Plan     Hypothyroidism, unspecified type  Hasn't been taking meds. Rechecking today.  - TSH with free T4 reflex; Future    Benign essential hypertension  Above goal today but didn't take meds. Due for labs.  - Comprehensive metabolic panel (BMP + Alb, Alk Phos, ALT, AST, Total. Bili, TP); Future    Mixed hyperlipidemia  Due for labs. Unclear if taking pravastatin.  - Comprehensive metabolic panel (BMP + Alb, Alk Phos, ALT, AST, Total. Bili, TP); Future  - LDL cholesterol direct; Future    Chronic midline low back pain with bilateral sciatica  Has follow up planned with surgeon to discuss next steps. No red flags.    MDD (major depressive disorder), recurrent severe, without psychosis (H)  Follows with psychiatry. States mental health is good right now.         Tobacco Cessation:   reports that she has been smoking. She has been smoking about 0.25 packs per day. She has never used smokeless tobacco.      BMI:   Estimated body mass index is 46.95 kg/m  as calculated from the following:    Height as of 11/1/21: 1.651 m (5' 5\").    Weight as of this encounter: 128 kg (282 lb 2 oz).   Weight management plan: Discussed healthy diet and exercise guidelines    See Patient Instructions    Return in about 3 months (around 6/15/2022).     The benefits, risks and potential side effects were discussed in detail. Black box warnings discussed as relevant. All patient questions were answered. The patient was instructed to follow up immediately if any adverse reactions develop.    Return precautions discussed, including when to seek urgent/emergent care.    Patient verbalizes understanding and agrees with plan of care. Patient stable for discharge.      LEONA Marquez [facility] MARC Mitchell is a 61 year old who presents for the following health issues     History of Present Illness       Back Pain:  She presents for follow up of back pain. Patient's back pain is a " chronic problem.  Location of back pain:  Right lower back, left lower back, right side of neck, right shoulder, right buttock, left buttock and right hip  Description of back pain: burning and sharp  Back pain spreads: right buttocks, left buttocks, right shoulder and right side of neck    Since patient first noticed back pain, pain is: gradually worsening  Does back pain interfere with her job:  Yes      COPD:  She presents for follow up of COPD.  Overall, COPD symptoms are stable since last visit. She has more than usual fatigue or shortness of breath with exertion and same as usual shortness of breath at rest.  She sometimes coughs and does not have change in sputum. No recent fever. She can walk less than 1 block without stopping to rest. She can not walk a flight of stairs without resting. The patient has had no ED, urgent care, or hospital admissions because of COPD since the last visit.     Mental Health Follow-up:  Patient presents to follow-up on Depression & Anxiety.Patient's depression since last visit has been:  Better  The patient is having other symptoms associated with depression.  Patient's anxiety since last visit has been:  Medium  The patient is having other symptoms associated with anxiety.  Any significant life events: housing concerns and health concerns  Patient is feeling anxious or having panic attacks.  Patient has no concerns about alcohol or drug use.       Today's PHQ-9         PHQ-9 Total Score: 14  PHQ-9 Q9 Thoughts of better off dead/self-harm past 2 weeks :   (P) Not at all    How difficult have these problems made it for you to do your work, take care of things at home, or get along with other people: Very difficult    Today's LIBRA-7 Score: 7    She eats 0-1 servings of fruits and vegetables daily.She consumes 5 sweetened beverage(s) daily.She exercises with enough effort to increase her heart rate 9 or less minutes per day.  She exercises with enough effort to increase her heart  "rate 3 or less days per week. She is missing 1 dose(s) of medications per week.       back pain for years now it is getting worse.   Pain radiates to both legs  Doesn't want to get out of bed due to pain  Gained weight because can't move around  Follows with pain clinic - taking oxycodone, gabapentin, suboxone  Got injection but didn't help  Has follow up planned with surgeon to talk about next steps    Has a PCA    Didn't take BP medication this morning  Mental health is \"Ok\" right now. No thoughts of harm            Review of Systems   Constitutional, HEENT, cardiovascular, pulmonary, GI, , musculoskeletal, neuro, skin, endocrine and psych systems are negative, except as otherwise noted.      Objective    BP (!) 141/71 (BP Location: Left arm, Patient Position: Sitting, Cuff Size: Adult Large)   Pulse 61   Temp 97.6  F (36.4  C) (Tympanic)   Resp 16   Wt 128 kg (282 lb 2 oz)   LMP  (LMP Unknown)   SpO2 96%   BMI 46.95 kg/m    Body mass index is 46.95 kg/m .  Physical Exam   GENERAL: healthy, alert and no distress  NECK: no adenopathy, no asymmetry, masses, or scars and thyroid normal to palpation  RESP: lungs clear to auscultation - no rales, rhonchi or wheezes  CV: regular rate and rhythm, normal S1 S2, no S3 or S4, no murmur, click or rub, no peripheral edema and peripheral pulses strong  MS: no gross musculoskeletal defects noted, no edema  PSYCH: mentation appears normal, affect normal/bright                "

## 2022-03-15 NOTE — PATIENT INSTRUCTIONS
Atomoxetine 60 mg daily    Mirtazapine 15 mg at bedtime    Clonidine 0.1 mg twice daily    Wellbutrin 150 mg twice daily for smoking cessation    Fluoxetine 40 mg daily    Continue with behavior health home care services to access community supports and financial resources        Continue all other medications as reviewed per electronic medical record today.     Safety plan reviewed. To the Emergency Department as needed or call after hours crisis line at [phone] or [phone]. [place] Crisis Text Line. Text [place] to 251452 or Suicide LifeLine Chat: suicideAmpulse.org/chat/    To schedule individual or family therapy, call [facility] at [phone].    Schedule an appointment with me in 6 weeks or sooner as needed. Call [facility] at [phone] to schedule.    Follow up with primary care provider as planned or for acute medical concerns.    Call the psychiatric nurse line with medication questions or concerns at [phone].    [employer]hart may be used to communicate with your provider, but this is not intended to be used for emergencies.    Crisis Resources:    National Suicide Prevention Lifeline: 416.209.5965 (TTY: 458.547.3225). Call anytime for help.  (www.suicidepreventionlifeline.org)  National [place] [employer] Mental Illness (www.raudel.org): 254.495.2520 or 326-364-0910.   Mental Health Association (www.mentalhealth.org): 864.724.5687 or 972-871-1900.  [place] Crisis Text Line: Text [place] to 344489  Suicide LifeLine Chat: suicideAmpulse.org/chat

## 2022-03-16 ASSESSMENT — PATIENT HEALTH QUESTIONNAIRE - PHQ9: SUM OF ALL RESPONSES TO PHQ QUESTIONS 1-9: 14

## 2022-03-16 ASSESSMENT — ANXIETY QUESTIONNAIRES: GAD7 TOTAL SCORE: 7

## 2022-03-23 ENCOUNTER — VIRTUAL VISIT (OUTPATIENT)
Dept: BEHAVIORAL HEALTH | Facility: CLINIC | Age: 62
End: 2022-03-23
Payer: COMMERCIAL

## 2022-03-23 ENCOUNTER — TELEPHONE (OUTPATIENT)
Dept: PSYCHIATRY | Facility: CLINIC | Age: 62
End: 2022-03-23
Payer: COMMERCIAL

## 2022-03-23 DIAGNOSIS — F33.2 MDD (MAJOR DEPRESSIVE DISORDER), RECURRENT SEVERE, WITHOUT PSYCHOSIS (H): Primary | ICD-10-CM

## 2022-03-23 NOTE — TELEPHONE ENCOUNTER
----- Message from Tigist Manjarrez NP sent at 3/23/2022  1:51 PM CDT -----  Would you see if  Paula would like to decrease the strattera dose for now and then talk about the adderall at next visit?  Looking for decreased tiredness.  Tigist

## 2022-03-29 ENCOUNTER — VIRTUAL VISIT (OUTPATIENT)
Dept: BEHAVIORAL HEALTH | Facility: CLINIC | Age: 62
End: 2022-03-29
Payer: COMMERCIAL

## 2022-03-29 DIAGNOSIS — R69 DIAGNOSIS DEFERRED: Primary | ICD-10-CM

## 2022-03-29 NOTE — PROGRESS NOTES
[facility]  No data recorded      Social Work Care Navigator Note      Patient: Paula Ho  Date: March 29, 2022  Preferred Name: Paula    Previous PHQ-9:   PHQ-9 SCORE 12/29/2021 2/21/2022 3/15/2022   PHQ-9 Total Score MyChart - - 14 (Moderate depression)   PHQ-9 Total Score 15 15 14     Previous LIBRA-7:   LIBRA-7 SCORE 12/29/2021 2/21/2022 3/15/2022   Total Score - - 7 (mild anxiety)   Total Score 15 12 7     ARNALDO LEVEL:  No flowsheet data found.    Preferred Contact:  No data recorded    Type of Contact Today: Phone call (patient / identified key support person reached)      Data: (subjective / Objective):  Recent ED/IP Admission or Discharge?   None    Patient Goals:  Goal Areas: Health; Mental Health; Financial and Social Service Benefits  Patient stated goals: Paula would like assistance with her physical and mental health for creating a balanced and healthy lifestyle. Paula would like assistance with continued SSDI and social service assistance to aid with [employer] benefits to increase her financial stability.         [facility] Core Service Provided:  Comprehensive Care Management: utilized the electronic medical record / patient registry to identify and support patient's health conditions / needs more effectively   Care Transitions: focused on the coordinated and seamless movement of patient between or within different levels of care or settings  Care Coordination: provided care management services/referrals necessary to ensure patient and their identified supports have access to medical, behavioral health, pharmacology and recovery support services.  Ensured that patient's care is integrated across all settings and services.   Individual and Family Support: aimed to help clients reduce barriers to achieving goals, increase health literacy and knowledge about chronic condition(s), increase self-efficacy skills, and improve health outcomes    Current Stressors / Issues / Care Plan Objective  Addressed Today:  CC and patient were able to meet today for [facility] [place] ([facility]) monthly check-in via telehealth visit. All required ROIs have been filed with HIM/patient chart.    1. Patient reports she's in a lot of pain today and she reached out to her pain doctor today because of how bad it is. The nurse said there isn't anything they can do until she calls on Monday. Patient knows they aren't going to increase her medication, but she reports she's never abused it either. She first started on 80mg of oxycodone, and she was the one who initiated the lower dosages because she was trying to stop taking this medication. Now she would like to go back up to 30mg, or even at least 20mg. They told her to take the oxycodone one hour and then alternate with suboxone the next, and she doesn't want to take this. [facility] encouraged her to ask a lot of questions on Monday to ensure she understands why they won't increase her medication. Patient reports the pain shoots down her legs and goes up to her shoulders and neck too. They suggested she go to the hospital since she was in so much pain, but she's already done this previously and they couldn't do anything for her then. Patient reports she is depressed, frustrated, and irritated regarding the pain.     2. Patient reports that she's been laying in bed because of the pain and will get up at 3pm for the day. She will then get ready for the day and half of the day is gone, so she isn't able to focus on projects. She is going to request to start taking aderall again from her doctor. Patient doesn't see Tig[facility] until May and would like to talk to her sooner, but hasn't been able to get a sooner appointment.    3. Patient reported that she was referred to therapy, but doesn't know if/when she has an appointment. [facility] will look into this and get her set up with a [facility] to bridge if it won't be for a while. Update: Patient does have a scheduled therapy appointment on  4/27.    4. Patient reports that she is waiting to get the bills for her new place, so she knows when her bills are due and to budget out. She still loves her new place, but she doesn't use the upstairs of her place much which is related to her pain and not being able to do the stairs. She is also struggling to take care of her dogs, but she wouldn't get rid of them because they provide her comfort and she will still continue providing what they need.     5. Patient reports she forgot to schedule an appointment for the aneurysm check up at [facility]. She is going to try to call them today to schedule.     6. Patient doesn't have any new updates about the dentures, but she is still planning on getting a second opinion. She doesn't have the contact information for [facility] and [facility], so [facility] will email it to her again. Her dentures don't fit well, so she is hoping to get help with this since [facility] hasn't been able to adjust them to fit enough.     7. Patient reports she has the same orthopedic doctor for both her knee and back issues. She sees him on 4/26 and her back is her main issue to be addressed first. She has sleeping issues because of the pain as well.     8. Patient hasn't called [facility] regarding the [facility] worker yet, so she is going to call them tomorrow. She thinks this will be helpful for her and misses her old worker, but the recent one just didn't work out. She will talk to the supervisor about getting a new worker.     9. Patient reports that her smoking has been good because it's been too cold outside and she isn't able to smoke in her house. She also often shares her cigarettes with others, so she doesn't smoke them all. She smokes a little less than half a pack.     10. Patient reports that she went to get her frames, but she hasn't gotten them back yet now. She was able to get a ride from her PCA and he helped her pick them out.     Intervention:  Motivational Interviewing: Expressed  Empathy/Understanding, Supported Autonomy, Collaboration, Evocation, Permission to raise concern or advise, Open-ended questions and Reflections: simple and complex   Target Behavior(s): Explored thoughts about taking an anti-depressant, Explored and resolved challenges related to taking anti-depressants as prescribed, Explored thoughts and readiness to participate in individual therapy, Explored and resolved challenges to attending appointments as scheduled, Explored current social supports and reinforced opportunities to increase engagement, Explored current sleep hygeine and patient's perception and knowledge of relationship to mood and Explored patient's perception of how alcohol and / or drugs influences mood    Assessment: (Progress on Goals / Homework):  Patient would benefit from continued coordination in reaching their goals set for the Behavioral Health Home ([facility]) program. [facility] reviewed Health Action Plan goals and will continue to monitor progress and work with patient and their care team.      Plan: (Homework, other):  Patient was encouraged to continue to seek condition-related information and education.      Scheduled a Phone follow up appointment with SW  in 3 weeks     Patient has set self-identified goals and will monitor progress until the next appointment on: 4/12/22.        DENI Humphries  Behavioral Health Home ([facility])   [facility]  [phone]          Next 5 appointments (look out 90 days)    May 04, 2022  1:00 PM  Return Visit with Man Goode MD  [facility] ([facility]) [address] [place] [id number]  [phone]

## 2022-04-12 ENCOUNTER — VIRTUAL VISIT (OUTPATIENT)
Dept: BEHAVIORAL HEALTH | Facility: CLINIC | Age: 62
End: 2022-04-12
Payer: COMMERCIAL

## 2022-04-12 DIAGNOSIS — R69 DIAGNOSIS DEFERRED: Primary | ICD-10-CM

## 2022-04-12 NOTE — PROGRESS NOTES
[facility]  No data recorded      Social Work Care Navigator Note      Patient: Paula Ho  Date: April 12, 2022  Preferred Name: Paula    Previous PHQ-9:   PHQ-9 SCORE 12/29/2021 2/21/2022 3/15/2022   PHQ-9 Total Score MyChart - - 14 (Moderate depression)   PHQ-9 Total Score 15 15 14     Previous LIBRA-7:   LIBRA-7 SCORE 12/29/2021 2/21/2022 3/15/2022   Total Score - - 7 (mild anxiety)   Total Score 15 12 7     ARNALDO LEVEL:  No flowsheet data found.    Preferred Contact:  No data recorded    Type of Contact Today: Phone call (patient / identified key support person reached)      Data: (subjective / Objective):  Recent ED/IP Admission or Discharge?   None    Patient Goals:  Goal Areas: Health; Mental Health; Financial and Social Service Benefits  Patient stated goals: Paula would like assistance with her physical and mental health for creating a balanced and healthy lifestyle. Paula would like assistance with continued SSDI and social service assistance to aid with [employer] benefits to increase her financial stability.         [facility] Core Service Provided:  Comprehensive Care Management: utilized the electronic medical record / patient registry to identify and support patient's health conditions / needs more effectively   Care Transitions: focused on the coordinated and seamless movement of patient between or within different levels of care or settings  Care Coordination: provided care management services/referrals necessary to ensure patient and their identified supports have access to medical, behavioral health, pharmacology and recovery support services.  Ensured that patient's care is integrated across all settings and services.   Individual and Family Support: aimed to help clients reduce barriers to achieving goals, increase health literacy and knowledge about chronic condition(s), increase self-efficacy skills, and improve health outcomes    Current Stressors / Issues / Care Plan Objective  Addressed Today:  [facility] and patient were able to meet today for Behavioral Health Home ([facility]) monthly check-in via telehealth visit. All required ROIs have been filed with HIM/patient chart.    1. Patient's back has been really bad the past couple of days. She is waiting for the pain clinic to call her because they are going to try to get her an appointment today. She sees the surgeon on the 26th, with Perri, so she will see find out what they are going to do with her pain.    2. Patient reports that she's been getting bills from [employer]. She received calls stating that she is now with [facility] from [employer] and the phone number is [phone]. [facility] checked [place]-its and she is still with [facility]. [facility] gave her the billing department phone number, so she can follow up about the bills. She will also call [facility] to confirm whether she is still with them for insurance.     3. Patient and [facility] talked about patient having upcoming appointment with new therapist. She reported she did see that she has an upcoming appointment, but she is nervous about it being a male therapist. She stated that she will try and see how she feels after meeting him. She reports she hasn't been comfortable with males in the past and feels closed off, but has an open mind about it. [facility] will follow up at next check in, and offered that she can be referred to another provider if she is not comfortable with him.     4. Patient reports that she just paid her light bill, which was $85, and her heat bill was $120, which will go down soon. She thinks she'll be okay with the new bills at her new place. She will be interested to see how the next couple months are.     5. Patient reports she was able to call [facility] about the [employer] worker. The main person is going to call her back and she hasn't heard back yet. [facility] will follow up at next check in on progress.     6. Patient reports that she's still about a little less than half a pack a day  for cigarettes. She is okay with this right now, as long as she doesn't start smoking more.     7. Patient reports that one of her dogs broke her leg while jumping down from the couch. She had to bring her to the emergency vet on a Saturday, and it costed her $800. Then they were talking about surgery, which would be too expensive, so she isn't able to afford it. They were able to put a cast on her leg and they have to get it changed every couple weeks. She is on antibiotics for two weeks, and has some medication too, so they are going to see what happens in a couple weeks. Her leg is really weak, but she is hoping that she is able to strengthen her leg again.     8. [facility] emailed patient the dental resources that patient requested again that were previously sent by the past [facility]: [facility] - Phone [phone], [facility] - [phone].    Intervention:  Motivational Interviewing: Expressed Empathy/Understanding, Supported Autonomy, Collaboration, Evocation, Permission to raise concern or advise, Open-ended questions and Reflections: simple and complex   Target Behavior(s): Explored thoughts about taking an anti-depressant, Explored and resolved challenges related to taking anti-depressants as prescribed, Explored thoughts and readiness to participate in individual therapy, Explored and resolved challenges to attending appointments as scheduled, Explored current sleep hygeine and patient's perception and knowledge of relationship to mood and Explored patient's perception of how alcohol and / or drugs influences mood    Assessment: (Progress on Goals / Homework):  Patient would benefit from continued coordination in reaching their goals set for the Behavioral Health Home ([facility]) program. [facility] reviewed Health Action Plan goals and will continue to monitor progress and work with patient and their care team.    Plan: (Homework, other):  Patient was encouraged to continue to seek condition-related  information and education.      Scheduled a Phone follow up appointment with MISA RICE in 4 weeks     Patient has set self-identified goals and will monitor progress until the next appointment on: 5/10/22.      DENI Humphries  Behavioral Health Home ([facility])   [facility]  [phone]      Next 5 appointments (look out 90 days)    May 04, 2022  1:00 PM  Return Visit with Man Goode MD  [facility] ([facility]) [address] [place] [id number]  [phone]   May 09, 2022  4:00 PM  (Arrive by 3:40 PM)  Welcome to Medicare Visit with LEONA Anderson CNP  [facility] ([facility] ) [address] [facility] [address]  [phone]

## 2022-04-25 ENCOUNTER — TELEPHONE (OUTPATIENT)
Dept: BEHAVIORAL HEALTH | Facility: CLINIC | Age: 62
End: 2022-04-25
Payer: COMMERCIAL

## 2022-04-25 NOTE — TELEPHONE ENCOUNTER
Left VM reminder for pt about their video appt that will be connect via email: [email or web address] (per request on pt's appt note) for Wednesday 4/27/22 at 11 am.

## 2022-05-04 ENCOUNTER — ANCILLARY PROCEDURE (OUTPATIENT)
Dept: CT IMAGING | Facility: CLINIC | Age: 62
End: 2022-05-04
Payer: COMMERCIAL

## 2022-05-04 ENCOUNTER — OFFICE VISIT (OUTPATIENT)
Dept: PULMONOLOGY | Facility: CLINIC | Age: 62
End: 2022-05-04
Payer: COMMERCIAL

## 2022-05-04 VITALS
OXYGEN SATURATION: 94 % | SYSTOLIC BLOOD PRESSURE: 125 MMHG | HEART RATE: 72 BPM | BODY MASS INDEX: 46.95 KG/M2 | DIASTOLIC BLOOD PRESSURE: 77 MMHG | RESPIRATION RATE: 16 BRPM | HEIGHT: 65 IN

## 2022-05-04 DIAGNOSIS — Z87.891 PERSONAL HISTORY OF TOBACCO USE: ICD-10-CM

## 2022-05-04 DIAGNOSIS — J44.9 CHRONIC OBSTRUCTIVE PULMONARY DISEASE, UNSPECIFIED COPD TYPE (H): ICD-10-CM

## 2022-05-04 DIAGNOSIS — Z87.891 PERSONAL HISTORY OF TOBACCO USE: Primary | ICD-10-CM

## 2022-05-04 PROCEDURE — G0296 VISIT TO DETERM LDCT ELIG: HCPCS | Performed by: INTERNAL MEDICINE

## 2022-05-04 PROCEDURE — 71271 CT THORAX LUNG CANCER SCR C-: CPT | Mod: GC | Performed by: RADIOLOGY

## 2022-05-04 PROCEDURE — 99215 OFFICE O/P EST HI 40 MIN: CPT | Mod: 25 | Performed by: INTERNAL MEDICINE

## 2022-05-04 RX ORDER — BUDESONIDE AND FORMOTEROL FUMARATE DIHYDRATE 160; 4.5 UG/1; UG/1
2 AEROSOL RESPIRATORY (INHALATION) 2 TIMES DAILY
Qty: 30.6 G | Refills: 11 | Status: SHIPPED | OUTPATIENT
Start: 2022-05-04 | End: 2023-07-24

## 2022-05-04 RX ORDER — ALBUTEROL SULFATE 90 UG/1
2 AEROSOL, METERED RESPIRATORY (INHALATION) EVERY 4 HOURS PRN
Qty: 8.5 G | Refills: 11 | Status: SHIPPED | OUTPATIENT
Start: 2022-05-04 | End: 2023-07-24

## 2022-05-04 RX ORDER — TIOTROPIUM BROMIDE 18 UG/1
18 CAPSULE ORAL; RESPIRATORY (INHALATION) DAILY
Qty: 30 CAPSULE | Refills: 11 | Status: SHIPPED | OUTPATIENT
Start: 2022-05-04 | End: 2023-07-17

## 2022-05-04 ASSESSMENT — PAIN SCALES - GENERAL: PAINLEVEL: MODERATE PAIN (5)

## 2022-05-04 NOTE — PROGRESS NOTES
"Paula Ho's goals for this visit include: Return  She requests these members of her care team be copied on today's visit information: PCP    PCP: Demetra Meyer    Referring Provider:  No referring provider defined for this encounter.    /77   Pulse 72   Resp 16   Ht 1.651 m (5' 5\")   LMP  (LMP Unknown)   SpO2 94%   BMI 46.95 kg/m      Do you need any medication refills at today's visit? N    Stephanie Jang LPN  Pulmonary Medicine:  [facility]  Phone: [phone] Fax: [phone]      "
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
## 2022-05-04 NOTE — PATIENT INSTRUCTIONS
Lung Cancer Screening   Frequently Asked Questions  If you are at high-risk for lung cancer, getting screened with low-dose computed tomography (LDCT) every year can help save your life. This handout offers answers to some of the most common questions about lung cancer screening. If you have other questions, please call [phone] ([phone]).     What is it?  Lung cancer screening uses special X-ray technology to create an image of your lung tissue. The exam is quick and easy and takes less than 10 seconds. We don t give you any medicine or use any needles. You can eat before and after the exam. You don t need to change your clothes as long as the clothing on your chest doesn t contain metal. But, you do need to be able to hold your breath for at least 6 seconds during the exam.    What is the goal of lung cancer screening?  The goal of lung cancer screening is to save lives. Many times, lung cancer is not found until a person starts having physical symptoms. Lung cancer screening can help detect lung cancer in the earliest stages when it may be easier to treat.    Who should be screened for lung cancer?  We suggest lung cancer screening for anyone who is at high-risk for lung cancer. You are in the high-risk group if you:      are between the ages of 55 and 79, and    have smoked at least 1 pack of cigarettes a day for 20 or more years, and    still smoke or have quit within the past 15 years.    However, if you have a new cough or shortness of breath, you should talk to your doctor before being screened.    Why does it matter if I have symptoms?  Certain symptoms can be a sign that you have a condition in your lungs that should be checked and treated by your doctor. These symptoms include fever, chest pain, a new or changing cough, shortness of breath that you have never felt before, coughing up blood or unexplained weight loss. Having any of these symptoms can greatly affect the results of lung  cancer screening.       Should all smokers get an LDCT lung cancer screening exam?  It depends. Lung cancer screening is for a very specific group of men and women who have a history of heavy smoking over a long period of time (see  Who should be screened for lung cancer  above).  I am in the high-risk group, but have been diagnosed with cancer in the past. Is LDCT lung cancer screening right for me?  In some cases, you should not have LDCT lung screening, such as when your doctor is already following your cancer with CT scan studies. Your doctor will help you decide if LDCT lung screening is right for you.  Do I need to have a screening exam every year?  Yes. If you are in the high-risk group described earlier, you should get an LDCT lung cancer screening exam every year until you are 79, or are no longer willing or able to undergo screening and possible procedures to diagnose and treat lung cancer.  How effective is LDCT at preventing death from lung cancer?  Studies have shown that LDCT lung cancer screening can lower the risk of death from lung cancer by 20 percent in people who are at high-risk.  What are the risks?  There are some risks and limitations of LDCT lung cancer screening. We want to make sure you understand the risks and benefits, so please let us know if you have any questions. Your doctor may want to talk with you more about these risks.    Radiation exposure: As with any exam that uses radiation, there is a very small increased risk of cancer. The amount of radiation in LDCT is small--about the same amount a person would get from a mammogram. Your doctor orders the exam when he or she feels the potential benefits outweigh the risks.    False negatives: No test is perfect, including LDCT. It is possible that you may have a medical condition, including lung cancer, that is not found during your exam. This is called a false negative result.    False positives and more testing: LDCT very often finds  something in the lung that could be cancer, but in fact is not. This is called a false positive result. False positive tests often cause anxiety. To make sure these findings are not cancer, you may need to have more tests. These tests will be done only if you give us permission. Sometimes patients need a treatment that can have side effects, such as a biopsy. For more information on false positives, see  What can I expect from the results?     Findings not related to lung cancer: Your LDCT exam also takes pictures of areas of your body next to your lungs. In a very small number of cases, the CT scan will show an abnormal finding in one of these areas, such as your kidneys, adrenal glands, liver or thyroid. This finding may not be serious, but you may need more tests. Your doctor can help you decide what other tests you may need, if any.  What can I expect from the results?  About 1 out of 4 LDCT exams will find something that may need more tests. Most of the time, these findings are lung nodules. Lung nodules are very small collections of tissue in the lung. These nodules are very common, and the vast majority--more than 97 percent--are not cancer (benign). Most are normal lymph nodes or small areas of scarring from past infections.  But, if a small lung nodule is found to be cancer, the cancer can be cured more than 90 percent of the time. To know if the nodule is cancer, we may need to get more images before your next yearly screening exam. If the nodule has suspicious features (for example, it is large, has an odd shape or grows over time), we will refer you to a specialist for further testing.  Will my doctor also get the results?  Yes. Your doctor will get a copy of your results.  Is it okay to keep smoking now that there s a cancer screening exam?  No. Tobacco is one of the strongest cancer-causing agents. It causes not only lung cancer, but other cancers and cardiovascular (heart) diseases as well. The damage  caused by smoking builds over time. This means that the longer you smoke, the higher your risk of disease. While it is never too late to quit, the sooner you quit, the better.  Where can I find help to quit smoking?  The best way to prevent lung cancer is to stop smoking. If you have already quit smoking, congratulations and keep it up! For help on quitting smoking, please call [employer] at 1-034-QUITNOW (1-952.607.9296) or the American [employer] Society at 1-735.880.5441 to find local resources near you.  One-on-one health coaching:  If you d prefer to work individually with a health care provider on tobacco cessation, we offer:      Medication Therapy Management:  Our specially trained pharmacists work closely with you and your doctor to help you quit smoking.  Call [phone] or [phone] (toll free).

## 2022-05-05 ENCOUNTER — TELEPHONE (OUTPATIENT)
Dept: FAMILY MEDICINE | Facility: CLINIC | Age: 62
End: 2022-05-05
Payer: COMMERCIAL

## 2022-05-05 DIAGNOSIS — Z87.891 PERSONAL HISTORY OF TOBACCO USE: Primary | ICD-10-CM

## 2022-05-05 NOTE — PROGRESS NOTES
Lung Cancer Screening Shared Decision Making Visit     Paula Ho, a 61 year old female, is eligible for lung cancer screening    History   Smoking Status     Current Every Day Smoker     Packs/day: 0.25   Smokeless Tobacco     Never Used       I have discussed with patient the risks and benefits of screening for lung cancer with low-dose CT.     The risks include:    radiation exposure: one low dose chest CT has as much ionizing radiation as about 15 chest x-rays, or 6 months of background radiation living in [place]      false positives: most findings/nodules are NOT cancer, but some might still require additional diagnostic evaluation, including biopsy    over-diagnosis: some slow growing cancers that might never have been clinically significant will be detected and treated unnecessarily     The benefit of early detection of lung cancer is contingent upon adherence to annual screening or more frequent follow up if indicated.     Furthermore, to benefit from screening, Paula must be willing and able to undergo diagnostic procedures, if indicated. Although no specific guide is available for determining severity of comorbidities, it is reasonable to withhold screening in patients who have greater mortality risk from other diseases.     We did discuss that the best way to prevent lung cancer is to not smoke.    Some patients may value a numeric estimation of lung cancer risk when evaluating if lung cancer screening is right for them, here is one calculator:    ShouldIScreen

## 2022-05-05 NOTE — PATIENT INSTRUCTIONS
Lung Cancer Screening   Frequently Asked Questions  If you are at high-risk for lung cancer, getting screened with low-dose computed tomography (LDCT) every year can help save your life. This handout offers answers to some of the most common questions about lung cancer screening. If you have other questions, please call [phone] ([phone]).     What is it?  Lung cancer screening uses special X-ray technology to create an image of your lung tissue. The exam is quick and easy and takes less than 10 seconds. We don t give you any medicine or use any needles. You can eat before and after the exam. You don t need to change your clothes as long as the clothing on your chest doesn t contain metal. But, you do need to be able to hold your breath for at least 6 seconds during the exam.    What is the goal of lung cancer screening?  The goal of lung cancer screening is to save lives. Many times, lung cancer is not found until a person starts having physical symptoms. Lung cancer screening can help detect lung cancer in the earliest stages when it may be easier to treat.    Who should be screened for lung cancer?  We suggest lung cancer screening for anyone who is at high-risk for lung cancer. You are in the high-risk group if you:      are between the ages of 55 and 79, and    have smoked at least 1 pack of cigarettes a day for 20 or more years, and    still smoke or have quit within the past 15 years.    However, if you have a new cough or shortness of breath, you should talk to your doctor before being screened.    Why does it matter if I have symptoms?  Certain symptoms can be a sign that you have a condition in your lungs that should be checked and treated by your doctor. These symptoms include fever, chest pain, a new or changing cough, shortness of breath that you have never felt before, coughing up blood or unexplained weight loss. Having any of these symptoms can greatly affect the results of lung  cancer screening.       Should all smokers get an LDCT lung cancer screening exam?  It depends. Lung cancer screening is for a very specific group of men and women who have a history of heavy smoking over a long period of time (see  Who should be screened for lung cancer  above).  I am in the high-risk group, but have been diagnosed with cancer in the past. Is LDCT lung cancer screening right for me?  In some cases, you should not have LDCT lung screening, such as when your doctor is already following your cancer with CT scan studies. Your doctor will help you decide if LDCT lung screening is right for you.  Do I need to have a screening exam every year?  Yes. If you are in the high-risk group described earlier, you should get an LDCT lung cancer screening exam every year until you are 79, or are no longer willing or able to undergo screening and possible procedures to diagnose and treat lung cancer.  How effective is LDCT at preventing death from lung cancer?  Studies have shown that LDCT lung cancer screening can lower the risk of death from lung cancer by 20 percent in people who are at high-risk.  What are the risks?  There are some risks and limitations of LDCT lung cancer screening. We want to make sure you understand the risks and benefits, so please let us know if you have any questions. Your doctor may want to talk with you more about these risks.    Radiation exposure: As with any exam that uses radiation, there is a very small increased risk of cancer. The amount of radiation in LDCT is small--about the same amount a person would get from a mammogram. Your doctor orders the exam when he or she feels the potential benefits outweigh the risks.    False negatives: No test is perfect, including LDCT. It is possible that you may have a medical condition, including lung cancer, that is not found during your exam. This is called a false negative result.    False positives and more testing: LDCT very often finds  something in the lung that could be cancer, but in fact is not. This is called a false positive result. False positive tests often cause anxiety. To make sure these findings are not cancer, you may need to have more tests. These tests will be done only if you give us permission. Sometimes patients need a treatment that can have side effects, such as a biopsy. For more information on false positives, see  What can I expect from the results?     Findings not related to lung cancer: Your LDCT exam also takes pictures of areas of your body next to your lungs. In a very small number of cases, the CT scan will show an abnormal finding in one of these areas, such as your kidneys, adrenal glands, liver or thyroid. This finding may not be serious, but you may need more tests. Your doctor can help you decide what other tests you may need, if any.  What can I expect from the results?  About 1 out of 4 LDCT exams will find something that may need more tests. Most of the time, these findings are lung nodules. Lung nodules are very small collections of tissue in the lung. These nodules are very common, and the vast majority--more than 97 percent--are not cancer (benign). Most are normal lymph nodes or small areas of scarring from past infections.  But, if a small lung nodule is found to be cancer, the cancer can be cured more than 90 percent of the time. To know if the nodule is cancer, we may need to get more images before your next yearly screening exam. If the nodule has suspicious features (for example, it is large, has an odd shape or grows over time), we will refer you to a specialist for further testing.  Will my doctor also get the results?  Yes. Your doctor will get a copy of your results.  Is it okay to keep smoking now that there s a cancer screening exam?  No. Tobacco is one of the strongest cancer-causing agents. It causes not only lung cancer, but other cancers and cardiovascular (heart) diseases as well. The damage  caused by smoking builds over time. This means that the longer you smoke, the higher your risk of disease. While it is never too late to quit, the sooner you quit, the better.  Where can I find help to quit smoking?  The best way to prevent lung cancer is to stop smoking. If you have already quit smoking, congratulations and keep it up! For help on quitting smoking, please call [employer] at 2-894-QUITNOW (1-519.305.2256) or the American [employer] Society at 1-620.265.9161 to find local resources near you.  One-on-one health coaching:  If you d prefer to work individually with a health care provider on tobacco cessation, we offer:      Medication Therapy Management:  Our specially trained pharmacists work closely with you and your doctor to help you quit smoking.  Call [phone] or [phone] (toll free).

## 2022-05-05 NOTE — TELEPHONE ENCOUNTER
"Patient calling to see if PCP can call her today, 5/5/22. She states she thought she had an appointment with Demetra today but it is not until 5/9/22.     Patient states she does not wish to talk with nurse about symptoms. She states she would like to discuss this with her provider.     Writer notes provider does not have availability for visits today. Patient states she would like to have provider call her.     Writer attempting to triage for symptoms. Patient states she had a visit with pulmonology yesterday and would like to discuss \"health concerns\". She states she has concerns about her lungs. Writer asking if patient is having difficulty breathing, patient denies. Writer asking if patient is having any pain. Patient states she has \"severe back pain\" and states she \"just wants to speak with provider\".    Routing to provider to review and advise.    Nicki Herring RN  [facility]      "

## 2022-05-05 NOTE — TELEPHONE ENCOUNTER
Unfortunately I'm not able to call today or tomorrow. She can either speak with RN about the issue or I can try to address on Monday.

## 2022-05-06 NOTE — TELEPHONE ENCOUNTER
Writer contacted patient regarding provider message below. Patient was relayed provider message and verbalized understanding. Patient stated that she will wait for appointment on Monday 5/9/22.    No further questions or concerns at this time.     Dee Dee Peng RN, BSN  [facility]

## 2022-05-10 ENCOUNTER — VIRTUAL VISIT (OUTPATIENT)
Dept: BEHAVIORAL HEALTH | Facility: CLINIC | Age: 62
End: 2022-05-10
Payer: COMMERCIAL

## 2022-05-10 DIAGNOSIS — R69 DIAGNOSIS DEFERRED: Primary | ICD-10-CM

## 2022-05-10 NOTE — PROGRESS NOTES
Behavioral Health Home Services  No data recorded      Social Work Care Navigator Note      Patient: Paula Ho  Date: May 10, 2022  Preferred Name: Paula    Previous PHQ-9:   PHQ-9 SCORE 12/29/2021 2/21/2022 3/15/2022   PHQ-9 Total Score MyChart - - 14 (Moderate depression)   PHQ-9 Total Score 15 15 14     Previous LIBRA-7:   LIBRA-7 SCORE 12/29/2021 2/21/2022 3/15/2022   Total Score - - 7 (mild anxiety)   Total Score 15 12 7     ARNALDO LEVEL:  No flowsheet data found.    Preferred Contact:  No data recorded    Type of Contact Today: Phone call (patient / identified key support person reached)      Data: (subjective / Objective):  Recent ED/IP Admission or Discharge?   None    Patient Goals:  Goal Areas: Health; Mental Health; Financial and Social Service Benefits  Patient stated goals: aPula would like assistance with her physical and mental health for creating a balanced and healthy lifestyle. Paula would like assistance with continued SSDI and social service assistance to aid with [employer] benefits to increase her financial stability.     [facility] Core Service Provided:  Comprehensive Care Management: utilized the electronic medical record / patient registry to identify and support patient's health conditions / needs more effectively   Care Transitions: focused on the coordinated and seamless movement of patient between or within different levels of care or settings  Care Coordination: provided care management services/referrals necessary to ensure patient and their identified supports have access to medical, behavioral health, pharmacology and recovery support services.  Ensured that patient's care is integrated across all settings and services.   Individual and Family Support: aimed to help clients reduce barriers to achieving goals, increase health literacy and knowledge about chronic condition(s), increase self-efficacy skills, and improve health outcomes    Current Stressors / Issues / Care Plan Objective Addressed  Today:  [facility] and patient were able to meet today for Behavioral Health Home ([facility]) monthly check-in via telehealth visit. All required ROIs have been filed with HIM/patient chart.    1. Patient reports that she goes back to the surgeon on 6/7 so they can pick the surgery date.     2. Patient reports that she had the therapy appointment, but he never got through to her. He left a message and she said that it was hard to understand him, so he isn't sure it's going to work with him. [facility] gave her the [facility] scheduling line to reschedule with another long term therapist, and specify that she would like a female provider.    3. Patient reported that she needs to talk to the pain clinic about her medication. She is currently on bupropion and she no longer wants to take this because it's not helping as much as her previous medications. She's been taking it for two months and doesn't like it because it's not as effective. Patient is needing pain pills a lot sooner than she should be needing them. She doesn't seem them again until 5/30, so she will talk to them then.     4. Patient saw the PCP yesterday and she left before she was seen because she thought she had an earlier appointment and the ride had to leave. Patient is sad that she wasn't able to get the appointment completed and will work on getting this rescheduled. She reports that she wasn't able to get an appointment rescheduled for awhile and [facility] explained that they might be out this far, so she might need to schedule it out that far and get it scheduled at least.    5. Patient reports that depression has been really high recently with everything going on. She reported she thinks it's linked to her back pain as well since her pain has been so bad. Patient has hardly been able to do any physical activity and almost can't walk by the evening.    6. Patient recently went to the hospital because her head was hurting really bad. She was given antibiotics, and they  suggested she see her PCP if it continues and doesn't get better. Patient doesn't think it's helping. They told her it was a skin condition, and the antibiotics helped a little, but it didn't help much. [facility] will send a message about the antibiotics not helping and suggested that she talk to her PCP about this as well.     7. Patient reports that she went for her pulmonary function testing and did a CT scan and it looked good. Smoking could be better but good.     8. Patient was able to figure out her billing issue with [employer]. [facility] checked mn-its again and it's still showing [employer] [facility]. She has a card for "[employer] and it's been working for her providers that have billed. Patient will look into this further if she has more issues with bills.     9. Patient reports that she's left messages and no one has called her back regarding the [employer] worker. [facility] will call them and try connecting with them as well, and patient gave [facility] verbal consent. She also reported that she used to be with [facility], but they switched her to the [place] location.     10. Patient reports that she received a life alert, and she doesn't need to pay for it because it's through [employer].     11. Patient reports that she hasn't followed up about the dental resources. She has been using her current dentures to try to use them and uses the glue but they are still too big. [facility] will continue to follow up at future appointments.     Intervention:  Motivational Interviewing: Expressed Empathy/Understanding, Supported Autonomy, Collaboration, Evocation, Permission to raise concern or advise, Open-ended questions and Reflections: simple and complex   Target Behavior(s): Explored thoughts about taking an anti-depressant, Explored and resolved challenges related to taking anti-depressants as prescribed, Explored thoughts and readiness to participate in individual therapy, Explored and resolved challenges to attending appointments as  scheduled, Explored current social supports and reinforced opportunities to increase engagement and Explored patient's perception of how alcohol and / or drugs influences mood    Assessment: (Progress on Goals / Homework):  Patient would benefit from continued coordination in reaching their goals set for the Behavioral Health Home ([facility]) program. [facility] reviewed Health Action Plan goals and will continue to monitor progress and work with patient and their care team.    Plan: (Homework, other):  Patient was encouraged to continue to seek condition-related information and education.      Scheduled a Phone follow up appointment with MISA RICE in 4 weeks     Patient has set self-identified goals and will monitor progress until the next appointment on: 6/7/22.      DENI Humphries  Behavioral Health Home ([facility])   [facility]  [phone]

## 2022-05-10 NOTE — Clinical Note
Anson Mccrary,   I recently talked with this patient and she told me about her recent hospitalization. She went in because her head was hurting and it sounds like she had some kind of skin condition. They gave her antibiotics and was advised to follow up with her PCP if it wasn't helping, and she said it doesn't seem to be helping much. She had an appointment scheduled with you earlier this week, but she thought it was scheduled for an earlier time and only had a ride for that time so she had to cancel. I advised her to try to reschedule that as soon as possible as well. Do you think she could scheduled a virtual consult, or could wait until she gets an appointment and can discuss her head issue as well?   Thanks for your help!  Yesi Dial, DENI Behavioral Health Home ([facility])  [facility] [phone]

## 2022-05-12 ENCOUNTER — VIRTUAL VISIT (OUTPATIENT)
Dept: BEHAVIORAL HEALTH | Facility: CLINIC | Age: 62
End: 2022-05-12
Payer: COMMERCIAL

## 2022-05-12 ENCOUNTER — VIRTUAL VISIT (OUTPATIENT)
Dept: PSYCHIATRY | Facility: CLINIC | Age: 62
End: 2022-05-12
Payer: COMMERCIAL

## 2022-05-12 DIAGNOSIS — F90.0 ATTENTION DEFICIT HYPERACTIVITY DISORDER (ADHD), PREDOMINANTLY INATTENTIVE TYPE: ICD-10-CM

## 2022-05-12 DIAGNOSIS — F33.2 MDD (MAJOR DEPRESSIVE DISORDER), RECURRENT SEVERE, WITHOUT PSYCHOSIS (H): Primary | ICD-10-CM

## 2022-05-12 DIAGNOSIS — F41.1 GAD (GENERALIZED ANXIETY DISORDER): ICD-10-CM

## 2022-05-12 DIAGNOSIS — F33.2 SEVERE EPISODE OF RECURRENT MAJOR DEPRESSIVE DISORDER, WITHOUT PSYCHOTIC FEATURES (H): Primary | ICD-10-CM

## 2022-05-12 PROCEDURE — 99214 OFFICE O/P EST MOD 30 MIN: CPT | Mod: 95 | Performed by: NURSE PRACTITIONER

## 2022-05-12 PROCEDURE — 90832 PSYTX W PT 30 MINUTES: CPT | Mod: 95 | Performed by: COUNSELOR

## 2022-05-12 RX ORDER — FLUOXETINE 40 MG/1
40 CAPSULE ORAL 2 TIMES DAILY
Qty: 60 CAPSULE | Refills: 1 | Status: SHIPPED | OUTPATIENT
Start: 2022-05-12 | End: 2022-07-05

## 2022-05-12 RX ORDER — MIRTAZAPINE 15 MG/1
15 TABLET, FILM COATED ORAL AT BEDTIME
Qty: 30 TABLET | Refills: 1 | Status: SHIPPED | OUTPATIENT
Start: 2022-05-12 | End: 2022-07-05

## 2022-05-12 RX ORDER — ARIPIPRAZOLE 5 MG/1
5 TABLET ORAL DAILY
Qty: 30 TABLET | Refills: 3 | Status: SHIPPED | OUTPATIENT
Start: 2022-05-12 | End: 2022-09-19

## 2022-05-12 RX ORDER — DEXTROAMPHETAMINE SACCHARATE, AMPHETAMINE ASPARTATE, DEXTROAMPHETAMINE SULFATE AND AMPHETAMINE SULFATE 2.5; 2.5; 2.5; 2.5 MG/1; MG/1; MG/1; MG/1
10 TABLET ORAL 2 TIMES DAILY
Qty: 30 TABLET | Refills: 0 | Status: SHIPPED | OUTPATIENT
Start: 2022-05-12 | End: 2022-05-24 | Stop reason: DRUGHIGH

## 2022-05-12 NOTE — PROGRESS NOTES
"[facility] Collaborative Care Psychiatry Service   May 12, 2022      Behavioral Health Clinician Progress Note    Patient Name: Paula Ho           Service Type:  Individual      Service Location:   Phone call (patient / identified key support person reached)     Session Start Time: 1042am  Session End Time: 1102am      Session Length: 16 - 37      Attendees: Patient     Service Modality:  Phone Visit:      Provider verified identity through the following two step process.  Patient provided:  Patient  and Patient is known previously to provider    The patient has been notified of the following:      \"We have found that certain health care needs can be provided without the need for a face to face visit.  This service lets us provide the care you need with a phone conversation.       I will have full access to your [facility] medical record during this entire phone call.   I will be taking notes for your medical record.      Since this is like an office visit, we will bill your insurance company for this service.       There are potential benefits and risks of telephone visits (e.g. limits to patient confidentiality) that differ from in-person visits.?Confidentiality still applies for telephone services, and nobody will record the visit.  It is important to be in a quiet, private space that is free of distractions (including cell phone or other devices) during the visit.??      If during the course of the call I believe a telephone visit is not appropriate, you will not be charged for this service\"     Consent has been obtained for this service by care team member: Yes     Visit Activities (Refresh list every visit): [facility] Only    Diagnostic Assessment Date: in the process of gathering information to complete DA  Treatment Plan Review Date:   See Flowsheets for today's PHQ-9 and LIBRA-7 results  Previous PHQ-9:   PHQ-9 SCORE 2021 2022 3/15/2022   PHQ-9 Total Score MyChart - - 14 " "(Moderate depression)   PHQ-9 Total Score 15 15 14     Previous LIBRA-7:   LIBRA-7 SCORE 12/29/2021 2/21/2022 3/15/2022   Total Score - - 7 (mild anxiety)   Total Score 15 12 7       ARNALDO LEVEL:  No flowsheet data found.    DATA  Extended Session (60+ minutes): No  Interactive Complexity: No  Crisis: No  [facility] Patient: No    Treatment Objective(s) Addressed in This Session:  Target Behavior(s): energy, in bed    Depressed Mood: Increase interest, engagement, and pleasure in doing things  Decrease frequency and intensity of feeling down, depressed, hopeless  Feel less tired and more energy during the day     Current Stressors / Issues:  MH update: Pt says that they are not feeling well. Pt hasn't felt like themselves. Pt is struggling to stay focused and will start projects and not finish them. Pt doesn't abuse medications. They don't want to go anywhere. June 7th, will talk with a surgeon about doing back surgery. Pt is at a level 8 for pain. Pt is short tempered and irritable. A lot of people are noticing that they don't want to go anywhere or do anything. Pt has a variety of creams to help their back and they don't do much.      Pt explains that someone took pt off the Adderall and Lorazepam and then had them just on the Lorazepam and that did not help.     Pt while taking the Adderral they were able to go outside, do more, and made their appointments.     Mood: \"bad\"  Appetite: pt has gained a lot of weight  Sleep: up and down due to pain, went to bed at 1, slept until 4 and then been up and down 4 times this morning so far   Self-harm: Pt denies  Substance Use: denies alcohol use   Caffeine: coffee rarely, drinking water  Therapist: pt has not seen anyone since Tamar DE LA ROSA left, did get a call and missed him to meet with someone else, gave resources to connect with therapist  Interventions:   Medication Questions/Requests: hoping to get the Adderall today, pt is not wanting to try another medication or others  Pt was on 20 " mg      Progress on Treatment Objective(s) / Homework:  No improvement - ACTION (Actively working towards change); Intervened by reinforcing change plan / affirming steps taken    Also provided psychoeducation about behavioral health condition, symptoms, and treatment options    Care Plan review completed: No    Medication Review:  No changes to current psychiatric medication(s)    Medication Compliance:  Yes    Changes in Health Issues:   None reported    Chemical Use Review:   Substance Use:      Tobacco Use:     Assessment: Current Emotional / Mental Status (status of significant symptoms):  Risk status (Self / Other harm or suicidal ideation)  Patient has had a history of self-injurious behavior: burning in the past, pt denies any self-harm or suicidal ideation recently  Patient denies current fears or concerns for personal safety.  Patient denies current or recent suicidal ideation or behaviors.  Patient denies current or recent homicidal ideation or behaviors.  Patient denies current or recent self injurious behavior or ideation.  Patient denies other safety concerns.  A safety and risk management plan has not been developed at this time, however patient was encouraged to call [place] [phone] should there be a change in any of these risk factors.    Appearance:   Unable to assess-telephone visit  Eye Contact:   Unable to assess-telephone visit  Psychomotor Behavior: Unable to assess-telephone visit  Attitude:   Cooperative   Orientation:   All  Speech   Rate / Production: Normal    Volume:  Soft   Mood:    Depressed  Normal  Affect:    Unable to assess-telephone visit  Thought Content:  Clear   Thought Form:  Coherent  Logical   Insight:    Good     Diagnoses:  1. MDD (major depressive disorder), recurrent severe, without psychosis (H)        Collateral Reports Completed:  Communicated with:   Communicated with [facility]    Plan: (Homework, other):  Patient was given  information about behavioral services and encouraged to schedule a follow up appointment with the clinic [facility] in conjunction with [facility] provider [facility].  She was also given information about mental health symptoms and treatment options .  CD Recommendations: No indications of CD issues.    JASON Moore, [facility] [place] May 12, 2022

## 2022-05-12 NOTE — PROGRESS NOTES
"Paula is a 61 year old who is being evaluated via a billable telephone visit.      What phone number would you like to be contacted at? [phone]  How would you like to obtain your AVS? Janett Quintero  Phone call duration: 25 minutes             Outpatient Psychiatric Progress Note    Name: Paula Ho   : 1960                    Primary Care Provider: LEONA Marquez CNP   Therapist: Yes    PHQ-9 scores:  PHQ-9 SCORE 2021 2022 3/15/2022   PHQ-9 Total Score Janett - - 14 (Moderate depression)   PHQ-9 Total Score 15 15 14       LIBRA-7 scores:  LIBRA-7 SCORE 2021 2022 3/15/2022   Total Score - - 7 (mild anxiety)   Total Score 15 12 7       Patient Identification:    Patient is a 61 year old year old, single  White Not  or  female  who presents for return visit with me.  Patient is currently unemployed. Patient attended the session alone. Patient prefers to be called: \" Paula\".    Current medications include: albuterol (PROAIR HFA) 108 (90 Base) MCG/ACT inhaler, Inhale 2 puffs into the lungs every 4 hours as needed for shortness of breath / dyspnea or wheezing  albuterol (PROVENTIL) (2.5 MG/3ML) 0.083% neb solution, NEBULIZE THE CONTENTS OF 1 VIAL EVERY 6 HOURS AS NEEDED.  ARIPiprazole (ABILIFY) 5 MG tablet, Take 1 tablet (5 mg) by mouth daily  atomoxetine (STRATTERA) 60 MG capsule, Take 1 capsule (60 mg) by mouth daily  budesonide-formoterol (SYMBICORT) 160-4.5 MCG/ACT Inhaler, Inhale 2 puffs into the lungs 2 times daily  buprenorphine HCl-naloxone HCl (SUBOXONE) 8-2 MG per film, Place 1 Film under the tongue daily  buPROPion (ZYBAN) 150 MG 12 hr tablet, Take 1 tablet (150 mg) by mouth 2 times daily  cetirizine (ZYRTEC) 10 MG tablet, Take 1 tablet (10 mg) by mouth daily  cloNIDine (CATAPRES) 0.1 MG tablet, Take 1 tablet (0.1 mg) by mouth 2 times daily  FLUoxetine (PROZAC) 40 MG capsule, Take 1 capsule (40 mg) by mouth 2 times daily  levothyroxine " (SYNTHROID/LEVOTHROID) 25 MCG tablet, Take 1 tablet (25 mcg) by mouth daily  losartan (COZAAR) 50 MG tablet, Take 50 mg by mouth daily  medical cannabis (Patient's own supply), See Admin Instructions (The purpose of this order is to document that the patient reports taking medical cannabis.  This is not a prescription, and is not used to certify that the patient has a qualifying medical condition.)  mirtazapine (REMERON) 15 MG tablet, Take 1 tablet (15 mg) by mouth At Bedtime  Multiple Vitamins-Minerals (MULTIVITAMIN ADULT PO),   mupirocin (BACTROBAN) 2 % external ointment, Apply topically 3 times daily  mupirocin (BACTROBAN) 2 % external ointment, Apply topically 2 times daily  NARCAN 4 MG/0.1ML nasal spray, INHALE VIA NASAL ROUTE FOR OVERDOSE AS NEEDED. MAY REPEAT AFTER 2-3 MINUTES  order for DME, Equipment being ordered: Nebulizer  oxyCODONE IR (ROXICODONE) 15 MG tablet, Take 15 mg by mouth 3 times daily + 5 mg x1 daily  pravastatin (PRAVACHOL) 40 MG tablet, Take 40 mg by mouth daily  tiotropium (SPIRIVA HANDIHALER) 18 MCG inhaled capsule, Inhale 1 capsule (18 mcg) into the lungs daily  tiotropium (SPIRIVA) 18 MCG inhaled capsule, Inhale 1 capsule (18 mcg) into the lungs daily  tiZANidine (ZANAFLEX) 2 MG tablet, TK 2 TO 3 TS PO QHS  topiramate (TOPAMAX) 100 MG tablet, Take 1 tablet (100 mg) by mouth 2 times daily  vitamin D3 (CHOLECALCIFEROL) 2000 units (50 mcg) tablet, Take 1 tablet by mouth daily    lidocaine (PF) (XYLOCAINE) 1 % injection 8 mL         The [place] Prescription Monitoring Program has been reviewed and there are no concerns about diversionary activity for controlled substances at this time.      I was able to review most recent Primary Care Provider, specialty provider, and therapy visit notes that I have access to.     Today, patient reports that she is getting irritated.  She starts things and does not finish things.  She does not like taking the buprenorphine.  It increases her pain.  She  cries daily  From pain I n her  Back.  She wishes that God would take her.  However, she has no plan to act on ending her life.  Paula is insistent on getting medication to help with concentration and focus.      [facility] update: Pt says that they are not feeling well. Pt hasn't felt like themselves. Pt is struggling to stay focused and will start projects and not finish them. Pt doesn't abuse medications. They don't want to go anywhere. June 7th, will talk with a surgeon about doing back surgery. Pt is at a level 8 for pain. Pt is short tempered and irritable. A lot of people are noticing that they don't want to go anywhere or do anything. Pt has a variety of creams to help their back and they don't do much.         has a past medical history of Acute respiratory failure (H) (02/01/2020), Anxiety, Asthma, Depression, and Hypertension.    Social history updates:    Paula lives alone.  Chronic pain limits her abilities to leave her place of residence.  Financial stressors are present.    Substance use updates:    She denies use of alcohol  Tobacco use: Yes Cigarettes  Ready to quit?  No  Nicotine Replacement Therapy tried: None    Vital Signs:   LMP  (LMP Unknown)     Labs:    Most recent laboratory results reviewed and no new labs.     Mental Status Examination:  Appearance: awake, alert and mild distress  Attitude: cooperative  Eye Contact:  Unable to assess  Gait and Station: No dizziness or falls  Psychomotor Behavior:  Unable to assess  Oriented to:  time, person, and place  Attention Span and Concentration:  Fair  Speech:   clear, coherent and Speaks: English  Mood:  anxious and depressed  Affect:  intensity is heightened  Associations:  no loose associations  Thought Process:  tangental  Thought Content:  passive suicidal ideation present, no auditory hallucinations present and no visual hallucinations present  Recent and Remote Memory:  intact Not formally assessed. No amnesia.  Fund of Knowledge:  appropriate  Insight:  good  Judgment:  intact  Impulse Control:  intact    Suicide Risk Assessment:  Today Paula Ho reports no thoughts to harm themself or others. In addition, there are notable risk factors for self-harm, including single status, anxiety and comorbid medical condition of Chronic pain. However, risk is mitigated by commitment to family, history of seeking help when needed, future oriented, denies suicidal intent or plan and denies homicidal ideation, intent, or plan. Therefore, based on all available evidence including the factors cited above, Paula Ho does not appear to be at imminent risk for self-harm, does not meet criteria for a 72-hr hold, and therefore remains appropriate for ongoing outpatient level of care.  A thorough assessment of risk factors related to suicide and self-harm have been reviewed and are noted above. The patient convincingly denies suicidality on several occasions. Local community safety resources printed and reviewed for patient to use if needed. There was no deceit detected, and the patient presented in a manner that was believable.     DSM5 Diagnosis:  Attention-Deficit/Hyperactivity Disorder  314.01 (F90.8) Other Specified Attention-Deficit / Hyperactiity Disorder  296.32 (F33.1) Major Depressive Disorder, Recurrent Episode, Moderate _ and With mixed features  300.02 (F41.1) Generalized Anxiety Disorder    Medical comorbidities include:   Patient Active Problem List    Diagnosis Date Noted     Infection due to 2019 novel coronavirus 12/09/2020     Priority: Medium     Hyperlipidemia 04/14/2020     Priority: Medium     Benign essential hypertension 04/14/2020     Priority: Medium     MDD (major depressive disorder), recurrent severe, without psychosis (H) 03/02/2020     Priority: Medium     Chronic obstructive pulmonary disease, unspecified COPD type (H) 02/04/2020     Priority: Medium     No PFTS  In EPIC       Attention deficit hyperactivity disorder  (ADHD) 01/31/2020     Priority: Medium     Chronic midline low back pain with bilateral sciatica 01/31/2020     Priority: Medium     Brain aneurysm 12/03/2019     Priority: Medium     Dec 2017  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 Device is MR compatible to 3 BREANN 3/2020       History of subarachnoid hemorrhage 12/22/2017     Priority: Medium     H/O of SAH, Cam 2, Hunt-Thomas 1, from a ruptured 9mm left Pcomm aneurysm with a wide neck and a fetal left PCA arising from the aneurysm neck, s/p successful coil embolization 12/23/2017 by Dr. Otto Hurley       Chronic GERD 01/26/2017     Priority: Medium     Chronic knee pain 12/12/2014     Priority: Medium     Cigarette smoker 07/08/2014     Priority: Medium     Chronic, continuous use of opioids 04/24/2013     Priority: Medium     Managed by pain clinic outside of [place].  UDS + cocaine in December 2019 without confirmation testing at her pain clinic per patient report       Morbid obesity (H) 03/27/2013     Priority: Medium     Status post knee surgery 03/27/2013     Priority: Medium     Per patient's recollection:  Circa 2002: right knee arthroscopy (Dr. Pappas)  Circa 2004: right knee partial knee replacement (Iam Ritchie MD)  Circa 2006: right TKA (Ron Ryder MD; [facility])  Circa 2008: right TKA revision due to infection (Ron Ryder MD; [facility])  Circa 2009: right TKA revision due to infection (Ron Ryder MD; [facility])  April 2011: right TKA (Ron Ryder MD; [facility])       LIBRA (generalized anxiety disorder) 09/28/2011     Priority: Medium     Chronic pain 09/28/2011     Priority: Medium     Knee and low back         DJD (degenerative joint disease) 09/28/2011     Priority: Medium     Insomnia 04/13/2011     Priority: Medium     Insomnia  NOS       Tobacco use disorder 07/31/2006     Priority: Medium     Asthma 11/15/2004     Priority: Medium     Asthma  NOS          Assessment:    Paula Ho is audibly upset.  She is very frustrated of not being able to attend to tasks around her house.  She has been isolating as it has been difficult for her to interact with other people without getting upset..  She does not want to take the Strattera, bupropion, or clonidine as they are ineffective in managing her symptoms of poor concentration, anxiety, and sleep.  She will take mirtazapine 15 mg at bedtime and fluoxetine 10 mg daily for depression and to help her rest at night.  Adderall has been added at 10 mg twice daily temporarily, to see if it is effective in managing her symptoms of poor concentration.  Abilify 5 mg daily is ordered for mood regulation.    Medication side effects and alternatives were reviewed. Health promotion activities recommended and reviewed today. All questions addressed. Education and counseling completed regarding risks and benefits of medications and psychotherapy options.    Treatment Plan:        1.  Strattera discontinued    2.  Bupropion discontinued    3.  Clonidine discontinued    4.  Mirtazapine 15 mg at bedtime    5.  Adderall 10 mg twice daily    6.  Fluoxetine 10 mg daily        Continue all other medications as reviewed per electronic medical record today.     Safety plan reviewed. To the Emergency Department as needed or call after hours crisis line at [phone] or [phone]. [place] Crisis Text Line. Text [place] to 801021 or Suicide LifeLine Chat: suicidepreventionlifeline.org/chat/    To schedule individual or family therapy, call [facility] at [phone]    Schedule an appointment with me in 4 weeks or sooner as needed. Call [facility] at [phone] to schedule.    Follow up with primary care provider as planned or for acute medical concerns.    Call the psychiatric nurse line with medication questions or concerns at [phone]    MyChart may be used to communicate with your provider, but  this is not intended to be used for emergencies.    Crisis Resources:    National Suicide Prevention Lifeline: 148.754.6566 (TTY: 880.536.2835). Call anytime for help.  (www.suicidepreventionlifeline.org)  National [place] on Mental Illness (www.raudel.org): 104.134.8232 or 997-267-8855.   Mental Health Association (www.mentalhealth.org): 506.697.8280 or 904-876-0923.  [place] Crisis Text Line: Text [place] to 733810  Suicide LifeLine Chat: suicideMint Solutions.org/chat    Administrative Billing:   Time spent with patient includes counseling and coordination of care regarding above diagnoses and treatment plan.    Patient Status:  Patient will continue to be seen for ongoing consultation and stabilization.    Signed:   VIRGILIO Kidd-BC   Psychiatry

## 2022-05-16 ENCOUNTER — TELEPHONE (OUTPATIENT)
Dept: PULMONOLOGY | Facility: CLINIC | Age: 62
End: 2022-05-16
Payer: COMMERCIAL

## 2022-05-16 NOTE — TELEPHONE ENCOUNTER
Prior Authorization Retail Medication Request    Medication/Dose: budesonide-formoterol (SYMBICORT) 160-4.5 MCG/ACT Inhaler    ICD code (if different than what is on RX):    Previously Tried and Failed:    Rationale:      Insurance Name:    Insurance ID:        Pharmacy Information (if different than what is on RX)  Name:    Phone:

## 2022-05-16 NOTE — TELEPHONE ENCOUNTER
Prior Authorization Not Needed per Insurance    Medication: budesonide-formoterol (SYMBICORT) 160-4.5 MCG/ACT Inhaler  Insurance Company: [employer] Part D - Phone [phone] Fax [phone]  Expected CoPay:      Pharmacy Filling the Rx: [id number] - [place], [place] - [address]  Pharmacy Notified: Yes  Patient Notified: Yes    Brand Symbicort is covered under plan. Called [facility]'s pharmacy and confirmed with pharmacist they have paid claim and patient already picked up.  Closing encounter.

## 2022-05-27 ENCOUNTER — TELEPHONE (OUTPATIENT)
Dept: PSYCHIATRY | Facility: CLINIC | Age: 62
End: 2022-05-27
Payer: COMMERCIAL

## 2022-05-27 NOTE — TELEPHONE ENCOUNTER
"Received fax from [facility] in [place] regarding instructions for BP before next fill.    amphetamine-dextroamphetamine (ADDERALL) 15 MG tablet 60 tablet 0 5/24/2022     Provider directions:    Notes to Pharmacy: Dose increase.  Please check blood pressure and pulse before dispensing.  If blood pressure is greater than 160/90 with a pulse greater than 100 do not dispense    From the pharmacy:    \"Just an FYI Paula Vic [birth year] on Adderall we did her BP and it was 157/98 so just barely under your goal we did give it to her. Her pulse was 65 thanks.\"    Routing to provider for review.     Maranda Chamberlain RN on 5/27/2022 at 1:23 PM   "

## 2022-06-07 ENCOUNTER — VIRTUAL VISIT (OUTPATIENT)
Dept: BEHAVIORAL HEALTH | Facility: CLINIC | Age: 62
End: 2022-06-07
Payer: COMMERCIAL

## 2022-06-07 DIAGNOSIS — R69 DIAGNOSIS DEFERRED: Primary | ICD-10-CM

## 2022-06-07 NOTE — PROGRESS NOTES
[facility]  No data recorded      Social Work Care Navigator Note      Patient: Paula Ho  Date: June 7, 2022  Preferred Name: Paula    Previous PHQ-9:   PHQ-9 SCORE 12/29/2021 2/21/2022 3/15/2022   PHQ-9 Total Score MyChart - - 14 (Moderate depression)   PHQ-9 Total Score 15 15 14     Previous LIBRA-7:   LIBRA-7 SCORE 12/29/2021 2/21/2022 3/15/2022   Total Score - - 7 (mild anxiety)   Total Score 15 12 7     ARNALDO LEVEL:  No flowsheet data found.    Preferred Contact:  No data recorded    Type of Contact Today: Phone call (patient / identified key support person reached)      Data: (subjective / Objective):  Recent ED/IP Admission or Discharge?   None    Patient Goals:  Goal Areas: Health; Mental Health; Financial and Social Service Benefits  Patient stated goals: Paula would like assistance with her physical and mental health for creating a balanced and healthy lifestyle. Paula would like assistance with continued SSDI and social service assistance to aid with DIIME benefits to increase her financial stability.     [facility] Core Service Provided:  Comprehensive Care Management: utilized the electronic medical record / patient registry to identify and support patient's health conditions / needs more effectively   Care Transitions: focused on the coordinated and seamless movement of patient between or within different levels of care or settings  Care Coordination: provided care management services/referrals necessary to ensure patient and their identified supports have access to medical, behavioral health, pharmacology and recovery support services.  Ensured that patient's care is integrated across all settings and services.   Individual and Family Support: aimed to help clients reduce barriers to achieving goals, increase health literacy and knowledge about chronic condition(s), increase self-efficacy skills, and improve health outcomes    Current Stressors / Issues / Care Plan Objective Addressed  Today:  [facility] and patient were able to meet today for Behavioral Health Home ([facility]) monthly check-in via telehealth visit. All required ROIs have been filed with HIM/patient chart.    1. Patient reports that she just got back from the surgeon appointments. Patient reports that she was told that they can do the surgery, but it is worse than they thought and the surgery will be about 5-6 hours longs. Patient was also told that she needs to stop smoking before doing the surgery, and she has to quit smoking completely. He told her that she would be worse than now if she went against his instructions and quit smoking. Patient was also told that her recovery time would be about two months.      Patient reports that the pain is being caused from her disks being displaced and the surgery would help. Patient reports that she knows that surgery is best for her, but she is also scared about it and knows that it's not going to be an easy surgery.     2. Patient reports that she had a bad birthday, and she reports being heartbroken. Patient's reports that the person she was seeing was at her birthday, and he left and she didn't see him for 5 days after that. She reports that she thinks he's a narcissist, so she reports not being involved with anyone that will talk like that. Patient reports that she still had a good time with the other people in her life, and she was able to have a special night still but it was hard to deal with.     3. Patient reports that she is going to visit her mom at the end of the week. Her mom likes to look into things, and she is going to look into this surgery to see if it's a good idea as well.     4. Patient still hasn't heard anything from [facility], so [facility] will reach out again for her to see where they are with getting her a new worker. She reports she was working with [employer] and their phone number is [phone] or the other number is ending in [phone].    5. Patient reports that she didn't  call to scheduled for therapy, but she is going to reach out to [facility] and see if she's able to continue working with her at her new agency. She has a hard time opening up to people, so she would prefer this option if possible. Patient will call [facility] scheduling if this is not an option for her.     6.Patient reports that her scooter isn't working, so she is working to figure out where she got the scooter from so she can get the battery replaced. [facility] offered to assist if needed and check old notes to see if she received assistance from previous [facility] to get the scooter.     7. Patient reports that she is now with [facility], so when she gets reassessed for PCA services, her hours might decrease with the [facility]. She currently receives 11 hours a day, and she might be able to qualify for a waiver if her hours decrease. Patient and [facility] will work on getting her referred for a [facility] waiver if this is the case.     8. Patient reports that she was put back on suboxone for her pain management because the other medication wasn't helping. Patient reports that this medication does help her, so she is happy her providers agreed to this.     9. Patient reports that she still needs to make an appointment with her primary doctor, and she also needs to get some blood work done for her psychiatrist. Patient was put back on Adderall from [facility], so she said this has been helpful for her to get tasks accomplished. Patient reports that this has helped with her depression as well.     10. Patient reports the antibiotics did seem to end up helping, so she has no concerns at this time with the skin condition.     11. Patient reports that the dental information is still being postponed for now as she works on other priorities, so [facility] will continue to check in in the future about this.     Intervention:  Motivational Interviewing: Expressed Empathy/Understanding, Supported Autonomy, Collaboration, Evocation, Permission to raise concern  or advise, Open-ended questions and Reflections: simple and complex   Target Behavior(s): Explored thoughts about taking an anti-depressant, Explored and resolved challenges related to taking anti-depressants as prescribed, Explored thoughts and readiness to participate in individual therapy, Explored and resolved challenges to attending appointments as scheduled and Explored current social supports and reinforced opportunities to increase engagement    Assessment: (Progress on Goals / Homework):  Patient would benefit from continued coordination in reaching their goals set for the Behavioral Health Home ([facility]) program. [facility] reviewed Health Action Plan goals and will continue to monitor progress and work with patient and their care team.    Plan: (Homework, other):  Patient was encouraged to continue to seek condition-related information and education.      Scheduled a Phone follow up appointment with MISA RICE in 4 weeks     Patient has set self-identified goals and will monitor progress until the next appointment on: 7/5/22.      DENI Humphries  Behavioral Health Home ([facility])   [facility]  [phone]

## 2022-06-09 ENCOUNTER — LAB (OUTPATIENT)
Dept: LAB | Facility: CLINIC | Age: 62
End: 2022-06-09
Payer: COMMERCIAL

## 2022-06-09 ENCOUNTER — APPOINTMENT (OUTPATIENT)
Dept: OPTOMETRY | Facility: CLINIC | Age: 62
End: 2022-06-09
Payer: COMMERCIAL

## 2022-06-09 DIAGNOSIS — Z51.81 ENCOUNTER FOR THERAPEUTIC DRUG MONITORING: ICD-10-CM

## 2022-06-09 LAB
AMPHETAMINES UR QL SCN: ABNORMAL
AMPHETAMINES UR QL: DETECTED
BARBITURATES UR QL SCN: NOT DETECTED
BARBITURATES UR QL: ABNORMAL
BENZODIAZ UR QL SCN: NOT DETECTED
BENZODIAZ UR QL: ABNORMAL
BUPRENORPHINE UR QL: DETECTED
CANNABINOIDS UR QL SCN: ABNORMAL
CANNABINOIDS UR QL: NOT DETECTED
COCAINE UR QL SCN: NOT DETECTED
COCAINE UR QL: ABNORMAL
D-METHAMPHET UR QL: NOT DETECTED
METHADONE UR QL SCN: NOT DETECTED
OPIATES UR QL SCN: ABNORMAL
OPIATES UR QL SCN: NOT DETECTED
OXYCODONE UR QL SCN: DETECTED
PCP UR QL SCN: ABNORMAL
PCP UR QL SCN: NOT DETECTED
PROPOXYPH UR QL: NOT DETECTED
TRICYCLICS UR QL SCN: NOT DETECTED

## 2022-06-09 PROCEDURE — 80307 DRUG TEST PRSMV CHEM ANLYZR: CPT

## 2022-06-09 PROCEDURE — 92341 FIT SPECTACLES BIFOCAL: CPT | Performed by: OPTOMETRIST

## 2022-06-30 ENCOUNTER — OFFICE VISIT (OUTPATIENT)
Dept: FAMILY MEDICINE | Facility: CLINIC | Age: 62
End: 2022-06-30
Payer: COMMERCIAL

## 2022-06-30 VITALS
BODY MASS INDEX: 45.92 KG/M2 | OXYGEN SATURATION: 96 % | HEART RATE: 69 BPM | DIASTOLIC BLOOD PRESSURE: 89 MMHG | SYSTOLIC BLOOD PRESSURE: 126 MMHG | WEIGHT: 275.6 LBS | RESPIRATION RATE: 18 BRPM | TEMPERATURE: 97.6 F | HEIGHT: 65 IN

## 2022-06-30 DIAGNOSIS — M54.50 CHRONIC MIDLINE LOW BACK PAIN WITHOUT SCIATICA: Primary | ICD-10-CM

## 2022-06-30 DIAGNOSIS — F11.20 OPIOID DEPENDENCE WITH CURRENT USE (H): ICD-10-CM

## 2022-06-30 DIAGNOSIS — G89.29 CHRONIC MIDLINE LOW BACK PAIN WITHOUT SCIATICA: Primary | ICD-10-CM

## 2022-06-30 DIAGNOSIS — F17.200 NICOTINE DEPENDENCE, UNCOMPLICATED, UNSPECIFIED NICOTINE PRODUCT TYPE: ICD-10-CM

## 2022-06-30 DIAGNOSIS — E66.01 MORBID OBESITY (H): ICD-10-CM

## 2022-06-30 PROCEDURE — 99214 OFFICE O/P EST MOD 30 MIN: CPT | Performed by: NURSE PRACTITIONER

## 2022-06-30 RX ORDER — NICOTINE 21 MG/24HR
1 PATCH, TRANSDERMAL 24 HOURS TRANSDERMAL EVERY 24 HOURS
Qty: 42 PATCH | Refills: 0 | Status: SHIPPED | OUTPATIENT
Start: 2022-06-30 | End: 2022-08-11

## 2022-06-30 ASSESSMENT — ANXIETY QUESTIONNAIRES
4. TROUBLE RELAXING: SEVERAL DAYS
5. BEING SO RESTLESS THAT IT IS HARD TO SIT STILL: NOT AT ALL
GAD7 TOTAL SCORE: 7
7. FEELING AFRAID AS IF SOMETHING AWFUL MIGHT HAPPEN: NOT AT ALL
7. FEELING AFRAID AS IF SOMETHING AWFUL MIGHT HAPPEN: NOT AT ALL
8. IF YOU CHECKED OFF ANY PROBLEMS, HOW DIFFICULT HAVE THESE MADE IT FOR YOU TO DO YOUR WORK, TAKE CARE OF THINGS AT HOME, OR GET ALONG WITH OTHER PEOPLE?: VERY DIFFICULT
GAD7 TOTAL SCORE: 7
2. NOT BEING ABLE TO STOP OR CONTROL WORRYING: SEVERAL DAYS
GAD7 TOTAL SCORE: 7
6. BECOMING EASILY ANNOYED OR IRRITABLE: MORE THAN HALF THE DAYS
3. WORRYING TOO MUCH ABOUT DIFFERENT THINGS: MORE THAN HALF THE DAYS
1. FEELING NERVOUS, ANXIOUS, OR ON EDGE: SEVERAL DAYS

## 2022-06-30 ASSESSMENT — PATIENT HEALTH QUESTIONNAIRE - PHQ9
10. IF YOU CHECKED OFF ANY PROBLEMS, HOW DIFFICULT HAVE THESE PROBLEMS MADE IT FOR YOU TO DO YOUR WORK, TAKE CARE OF THINGS AT HOME, OR GET ALONG WITH OTHER PEOPLE: VERY DIFFICULT
SUM OF ALL RESPONSES TO PHQ QUESTIONS 1-9: 11
SUM OF ALL RESPONSES TO PHQ QUESTIONS 1-9: 11

## 2022-06-30 ASSESSMENT — PAIN SCALES - GENERAL: PAINLEVEL: EXTREME PAIN (8)

## 2022-06-30 NOTE — PROGRESS NOTES
Assessment & Plan     Chronic midline low back pain without sciatica  Patient requesting pool therapy referral. No red flags - no loss of bowel/bladder. No saddle anesthesia. Following with spine specialist and hopes to have surgery once she quits smoking.  - Physical Therapy Referral; Future    Nicotine dependence, uncomplicated, unspecified nicotine product type  Will start the below for now. She will discuss with her psychiatrist adding Libia back at her appointment next week. I also sent note to her psychiatrist to ask to discuss during visit.  - nicotine (NICODERM CQ) 21 MG/24HR 24 hr patch; Place 1 patch onto the skin every 24 hours for 42 days    Opioid dependence with current use (H)  Follows with pain clinic.    Morbid obesity (H)  Diet. Back pain is limiting factor in exercise. She requested pool therapy referral today.         Tobacco Cessation:   reports that she has been smoking. She has been smoking about 0.25 packs per day. She has never used smokeless tobacco.  Tobacco Cessation Action Plan: Pharmacotherapies : Nicotine patch    See Patient Instructions    Return in about 2 weeks (around 7/14/2022) for 2 Week Follow Up Phone Call.     The benefits, risks and potential side effects were discussed in detail. Black box warnings discussed as relevant. All patient questions were answered. The patient was instructed to follow up immediately if any adverse reactions develop.    Return precautions discussed, including when to seek urgent/emergent care.    Patient verbalizes understanding and agrees with plan of care. Patient stable for discharge.      LEONA ALANIZ CNP  [facility]    Igor Mitchell is a 62 year old, presenting for the following health issues:  Back Pain      History of Present Illness       Back Pain:  She presents for follow up of back pain. Patient's back pain is a chronic problem.  Location of back pain:  Right buttock, left buttock, right hip  and left hip  Description of back pain: burning, dull ache and sharp  Back pain spreads: right thigh and left thigh    Since patient first noticed back pain, pain is: rapidly worsening  Does back pain interfere with her job:  Yes      Mental Health Follow-up:  Patient presents to follow-up on Depression & Anxiety.Patient's depression since last visit has been:  Medium  The patient is not having other symptoms associated with depression.  Patient's anxiety since last visit has been:  Bad  The patient is not having other symptoms associated with anxiety.  Any significant life events: relationship concerns, job concerns, financial concerns and health concerns  Patient is feeling anxious or having panic attacks.  Patient has no concerns about alcohol or drug use.    She eats 0-1 servings of fruits and vegetables daily.She consumes 3 sweetened beverage(s) daily.She exercises with enough effort to increase her heart rate 9 or less minutes per day.  She exercises with enough effort to increase her heart rate 3 or less days per week. She is missing 3 dose(s) of medications per week.    Today's PHQ-9         PHQ-9 Total Score: 11    PHQ-9 Q9 Thoughts of better off dead/self-harm past 2 weeks :   Not at all    How difficult have these problems made it for you to do your work, take care of things at home, or get along with other people: Very difficult  Today's LIBRA-7 Score: 7     Pleasant 62 year old female presents to discuss smoking cessation. She saw her back specialist yesterday who ordered new MRI and recommends surgery. Paula reports that she cannot have surgery unless she's off nicotine.  Spine surgeon is Dr. Gomez. 5 hour surgery  Chantix made her very depressed  Smoking a little more than 1/2 ppd. Smokes when stressed.    Insurance not covering symbicort  Taking spiriva and albuterol        Review of Systems   Constitutional, HEENT, cardiovascular, pulmonary, gi and gu systems are negative, except as otherwise noted.    "   Objective    /89   Pulse 69   Temp 97.6  F (36.4  C) (Tympanic)   Resp 18   Ht 1.651 m (5' 5\")   Wt 125 kg (275 lb 9.6 oz)   LMP  (LMP Unknown)   SpO2 96%   BMI 45.86 kg/m    Body mass index is 45.86 kg/m .  Physical Exam   GENERAL: healthy, alert and no distress  RESP: lungs clear to auscultation - no rales, rhonchi or wheezes  CV: regular rate and rhythm, normal S1 S2, no S3 or S4, no murmur, click or rub, no peripheral edema and peripheral pulses strong  MS: no gross musculoskeletal defects noted, no edema  PSYCH: mentation appears normal, affect normal/bright             Answers for HPI/ROS submitted by the patient on 6/30/2022  If you checked off any problems, how difficult have these problems made it for you to do your work, take care of things at home, or get along with other people?: Very difficult  PHQ9 TOTAL SCORE: 11  LIBRA 7 TOTAL SCORE: 7                .  ..  "

## 2022-06-30 NOTE — Clinical Note
Hello, Are you able to help with tobacco cessation as well? She needs to quit nicotine before she can have back surgery. I started patches and she will discuss Zyban with her psychiatrist on Tuesday. She had bad depression with chantix. Not sure what else to offer her. If you're able to help, I will place MTM referral. Thanks, Demetra

## 2022-06-30 NOTE — PATIENT INSTRUCTIONS
At [facility], we strive to deliver an exceptional experience to you, every time we see you. If you receive a survey, please complete it as we do value your feedback.  If you have MyChart, you can expect to receive results automatically within 24 hours of their completion.  Your provider will send a note interpreting your results as well.   If you do not have MyChart, you should receive your results in about a week by mail.    Your care team:                            Family Medicine Internal Medicine   MD Renan Chambers MD Shantel Branch-Fleming, MD Srinivasa Vaka, MD Katya Belousova, STEPH LouisHillLEONA Stern CNP, MD Pediatrics   Taurus Torres, MD Dorie Sethi MD Amelia Massimini APRN CNP   Stacey Faith APRN RIGOBERTO Herrera MD             Clinic hours: Monday - Thursday 7 am-6 pm; Fridays 7 am-5 pm.   Urgent care: Monday - Friday 10 am- 8 pm; Saturday and Sunday 9 am-5 pm.    Clinic: [phone]       [facility]: Monday - Thursday 8 am - 7 pm; Friday 8 am - 6 pm  [facility]: [phone]     Nicotine Patch 21 mg  Uses  For quitting smoking.  Instructions  DO NOT take this medicine by mouth.  Avoid placing the patch near the breast.  Remove the patch after 24 hours.  Keep the medicine at room temperature. Avoid heat and direct light.  This patch should not be cut.  Wash your hands before and after handling this medicine.  Remove old patch before applying new one. Change the location of the new patch.  If you have vivid dreams or trouble sleeping, you may remove the patch before going to sleep.  Ask your doctor or pharmacist about locations on your body where this patch can be used.  Remove the plastic liner that protects the sticky side of the patch before applying to the skin.  Be sure the area of skin is clean and dry before putting on a new  patch.  Apply the patch to a clean, dry, hairless area.  Press the patch firmly for a few seconds to make sure it stays in place.  After removing the patch, fold it together and discard it out of reach of children and pets.  Please ask your doctor or pharmacist how you can safely dispose of used patches.  If the skin under the patch becomes irritated, remove the patch. Do not apply a new patch to the area until the skin feels better.  To avoid irritating your skin, use a different location for a new patch.  Apply the patch only to normal looking skin. Avoid areas of the skin that are red, have scrapes, or damaged.  If the patch falls off, apply a new a patch on a different location of the body.  Please tell your doctor and pharmacist about all the medicines you take. Include both prescription and over-the-counter medicines. Also tell them about any vitamins, herbal medicines, or anything else you take for your health.  If you need to stop this medicine, your doctor may wish to gradually reduce the dosage before stopping.  Do not use more than 1 patch at any one time.  Cautions  Tell your doctor and pharmacist if you ever had an allergic reaction to a medicine. Symptoms of an allergic reaction can include trouble breathing, skin rash, itching, swelling, or severe dizziness.  Do not use the medication any more than instructed.  Avoid smoking while on this medicine. Smoking may increase your risk for stroke, heart attack, blood clots, high blood pressure, and other diseases of the heart and blood vessels.  Tell the doctor or pharmacist if you are pregnant, planning to be pregnant, or breastfeeding.  Ask your pharmacist if this medicine can interact with any of your other medicines. Be sure to tell them about all the medicines you take.  Please tell all your doctors and dentists that you are on this medicine before they provide care.  Side Effects  The following is a list of some common side effects from this medicine.  Please speak with your doctor about what you should do if you experience these or other side effects.  skin irritation where medicine is applied  If you have any of the following side effects, you may be getting too much medicine. Please contact your doctor to let them know about these side effects.  diarrhea  dizziness  nausea  rapid heartbeat  vomiting  A few people may have an allergic reactions to this medicine. Symptoms can include difficulty breathing, skin rash, itching, swelling, or severe dizziness. If you notice any of these symptoms, seek medical help quickly.  Extra  Please speak with your doctor, nurse, or pharmacist if you have any questions about this medicine.  [email or web address]  IMPORTANT NOTE: This document tells you briefly how to take your medicine, but it does not tell you all there is to know about it.Your doctor or pharmacist may give you other documents about your medicine. Please talk to them if you have any questions.Always follow their advice. There is a more complete description of this medicine available in English.Scan this code on your smartphone or tablet or use the web address below. You can also ask your pharmacist for a printout. If you have any questions, please ask your pharmacist.     2021 TAPQUAD.        Nicotine Patch    Dosing:    >10 cigarettes per day Dose   Weeks 1-6 Use one 21 mg patch per day.   Weeks 7-8 Use one 14 mg patch per day.   Weeks 9-10 Use one 7 mg patch per day   <10 cigarettes per day  Weeks 1-6 Use one 14 mg patch per day   Weeks 7-8 Use one 7 mg patch per day       How to use the Nicotine Patch:  Apply a new patch to non-hairy, clean, dry skin on the upper body or upper outer arm.  Remove backing from patch and press on skin.  Hold for 10 seconds.  Apply patch around the same time every day.  Wash your hands after applying the patch.  Remove the patch at the end of the day before you go to bed.  Apply a new patch  the next morning.  Do not apply the patch to an area where you have worn a patch in the last week.   This will help prevent or reduce skin irritation.    Some Tips:  Do not smoke while you are using the nicotine patch!  Do not cut the nicotine patch -it will be ineffective.  Remove the patch prior to receiving an MRI since the patch may contain some metal.  Do not put the patch on irritated, cut, or burned skin.  If the patch falls off and cannot be reapplied, put on a new patch.  Follow -up with your health care professional if you have any questions and also to help taper your nicotine patch dose.    Side Effects:  Some people experience some skin irritation where the patch was placed.  Moving around the site where you are putting the patch each day should help.  If you experience any other troublesome or unusual side effects, call your health care professional.

## 2022-07-05 ENCOUNTER — VIRTUAL VISIT (OUTPATIENT)
Dept: BEHAVIORAL HEALTH | Facility: CLINIC | Age: 62
End: 2022-07-05
Payer: COMMERCIAL

## 2022-07-05 ENCOUNTER — VIRTUAL VISIT (OUTPATIENT)
Dept: PSYCHIATRY | Facility: CLINIC | Age: 62
End: 2022-07-05
Payer: COMMERCIAL

## 2022-07-05 DIAGNOSIS — R69 DIAGNOSIS DEFERRED: Primary | ICD-10-CM

## 2022-07-05 DIAGNOSIS — F33.1 MODERATE EPISODE OF RECURRENT MAJOR DEPRESSIVE DISORDER (H): Primary | ICD-10-CM

## 2022-07-05 DIAGNOSIS — F41.1 GAD (GENERALIZED ANXIETY DISORDER): ICD-10-CM

## 2022-07-05 DIAGNOSIS — F90.0 ATTENTION DEFICIT HYPERACTIVITY DISORDER (ADHD), PREDOMINANTLY INATTENTIVE TYPE: ICD-10-CM

## 2022-07-05 DIAGNOSIS — F33.2 MDD (MAJOR DEPRESSIVE DISORDER), RECURRENT SEVERE, WITHOUT PSYCHOSIS (H): Primary | ICD-10-CM

## 2022-07-05 PROCEDURE — 99214 OFFICE O/P EST MOD 30 MIN: CPT | Mod: 95 | Performed by: NURSE PRACTITIONER

## 2022-07-05 PROCEDURE — 90832 PSYTX W PT 30 MINUTES: CPT | Mod: 95 | Performed by: COUNSELOR

## 2022-07-05 RX ORDER — DEXTROAMPHETAMINE SACCHARATE, AMPHETAMINE ASPARTATE, DEXTROAMPHETAMINE SULFATE AND AMPHETAMINE SULFATE 3.75; 3.75; 3.75; 3.75 MG/1; MG/1; MG/1; MG/1
15 TABLET ORAL 2 TIMES DAILY
Qty: 60 TABLET | Refills: 0 | Status: SHIPPED | OUTPATIENT
Start: 2022-07-11 | End: 2022-08-05

## 2022-07-05 RX ORDER — GABAPENTIN 600 MG/1
600 TABLET ORAL 3 TIMES DAILY
Qty: 60 TABLET | Refills: 1 | COMMUNITY
Start: 2022-07-05 | End: 2022-09-26

## 2022-07-05 RX ORDER — BUPROPION HYDROCHLORIDE 100 MG/1
100 TABLET, EXTENDED RELEASE ORAL 2 TIMES DAILY
Qty: 60 TABLET | Refills: 1 | Status: SHIPPED | OUTPATIENT
Start: 2022-07-05 | End: 2022-09-19

## 2022-07-05 RX ORDER — FLUOXETINE 40 MG/1
40 CAPSULE ORAL 2 TIMES DAILY
Qty: 60 CAPSULE | Refills: 1 | Status: SHIPPED | OUTPATIENT
Start: 2022-07-05 | End: 2022-11-10

## 2022-07-05 RX ORDER — MIRTAZAPINE 15 MG/1
15 TABLET, FILM COATED ORAL AT BEDTIME
Qty: 30 TABLET | Refills: 1 | Status: SHIPPED | OUTPATIENT
Start: 2022-07-05 | End: 2022-11-04

## 2022-07-05 NOTE — PROGRESS NOTES
[facility]  No data recorded      Social Work Care Navigator Note      Patient: Palua Ho  Date: July 5, 2022  Preferred Name: Paula    Previous PHQ-9:   PHQ-9 SCORE 2/21/2022 3/15/2022 6/30/2022   PHQ-9 Total Score MyChart - 14 (Moderate depression) 11 (Moderate depression)   PHQ-9 Total Score 15 14 11     Previous LIBRA-7:   LIBRA-7 SCORE 2/21/2022 3/15/2022 6/30/2022   Total Score - 7 (mild anxiety) 7 (mild anxiety)   Total Score 12 7 7     ARNALDO LEVEL:  No flowsheet data found.    Preferred Contact:  No data recorded    Type of Contact Today: Phone call (patient / identified key support person reached)      Data: (subjective / Objective):  Recent ED/IP Admission or Discharge?   None    Patient Goals:  Goal Areas: Health; Mental Health; Financial and Social Service Benefits  Patient stated goals: Paula would like assistance with her physical and mental health for creating a balanced and healthy lifestyle. Paula would like assistance with continued SSDI and social service assistance to aid with [employer] benefits to increase her financial stability.     [facility] Core Service Provided:  Comprehensive Care Management: utilized the electronic medical record / patient registry to identify and support patient's health conditions / needs more effectively   Care Transitions: focused on the coordinated and seamless movement of patient between or within different levels of care or settings  Care Coordination: provided care management services/referrals necessary to ensure patient and their identified supports have access to medical, behavioral health, pharmacology and recovery support services.  Ensured that patient's care is integrated across all settings and services.   Individual and Family Support: aimed to help clients reduce barriers to achieving goals, increase health literacy and knowledge about chronic condition(s), increase self-efficacy skills, and improve health outcomes    Current Stressors /  Issues / Care Plan Objective Addressed Today:  [facility] and patient were able to meet today for Behavioral Health Home ([facility]) monthly check-in via telehealth visit. All required ROIs have been filed with HIM/patient chart.    1. Patient reported she's been a lot better in the past month. Her psychiatrist did start her back on the Adderall, and she's been feeling better with it. Patient is grateful that she was able to start on it again because it's been more helpful.     2. [facility] reported to patient that [facility] is going to reach out to her get an an appointment set up with a new [facility] worker. [facility] reported patient can call back by the end of the week if they haven't called her yet.     3. Patient reported that she has to quit smoking, and she's having a really hard time with it. She reported that Kassy, her psychiatrist, is going to prescribe a medication for her to help her with quitting smoking, and well as continuing to use the patch. She did the MRI and cat scan for her back, and she met with the surgeon as well. However, he reported that he will not touch her stomach until she is done with nicotine because he cannot do the surgery if she continues to smoke with this type of surgery. Patient reported that she wants to get this surgery because her back pain is really preventing her from living her life. She currently smokes half a pack a day, but it depends on the day and her mood.     4. Patient is going to [facility] for physical therapy right now because she has to complete this for insurance before getting approved for surgery. She also signed up for the membership through there and uses their pool.     5. Patient reported that she hasn't found anything out regarding her scooter, but she was thinking about looking on the battery for a number to call. [facility] suggested she look on the scooter to see if there's a sticker on there for the company. She then found that [employer]'s sticker was on there, so she's going  to call them. Patient reported she fell on the 4th of July, and she fell off her hammock. She wasn't moving good yesterday, but she's feeling better today.    6. Patient reported she is still interested in meeting with Tamar for therapy, but she wanted to get through all of her tests for surgery first. Patient is going to call [facility] to see if Tamar is available for individual therapy. [facility] gave her the [facility] therapy scheduling number again in case she isn't able to meet with Tamar, and [facility] encouraged her to request a female provider if she does call.    Intervention:  Motivational Interviewing: Expressed Empathy/Understanding, Supported Autonomy, Collaboration, Evocation, Permission to raise concern or advise, Open-ended questions and Reflections: simple and complex   Target Behavior(s): Explored thoughts about taking an anti-depressant, Explored and resolved challenges related to taking anti-depressants as prescribed, Explored thoughts and readiness to participate in individual therapy, Explored and resolved challenges to attending appointments as scheduled, Explored current social supports and reinforced opportunities to increase engagement and Explored patient's perception of how alcohol and / or drugs influences mood    Assessment: (Progress on Goals / Homework):  Patient would benefit from continued coordination in reaching their goals set for the Behavioral Health Home ([facility]) program. [facility] reviewed Health Action Plan goals and will continue to monitor progress and work with patient and their care team.    Plan: (Homework, other):  Patient was encouraged to continue to seek condition-related information and education.      Scheduled a Phone follow up appointment with [facility] in 4 weeks     Patient has set self-identified goals and will monitor progress until the next appointment on: 8/1/222.      DENI Humphries  Behavioral Health Home ([facility])   [facility]  [facility]  [phone]

## 2022-07-05 NOTE — PROGRESS NOTES
"  [facility] Collaborative Care Psychiatry Service   2022      Behavioral Health Clinician Progress Note    Patient Name: Paula Ho           Service Type:  Phone Visit      Service Location:   Phone call (patient / identified key support person reached)     Session Start Time: 1252pm  Session End Time: 124pm      Session Length: 16 - 37      Attendees: Patient     Service Modality:  Phone Visit:      Provider verified identity through the following two step process.  Patient provided:  Patient  and Patient is known previously to provider    The patient has been notified of the following:      \"We have found that certain health care needs can be provided without the need for a face to face visit.  This service lets us provide the care you need with a phone conversation.       I will have full access to your [facility] medical record during this entire phone call.   I will be taking notes for your medical record.      Since this is like an office visit, we will bill your insurance company for this service.       There are potential benefits and risks of telephone visits (e.g. limits to patient confidentiality) that differ from in-person visits.?Confidentiality still applies for telephone services, and nobody will record the visit.  It is important to be in a quiet, private space that is free of distractions (including cell phone or other devices) during the visit.??      If during the course of the call I believe a telephone visit is not appropriate, you will not be charged for this service\"     Consent has been obtained for this service by care team member: Yes     Visit Activities (Refresh list every visit): [facility] Only    Diagnostic Assessment Date: in the process of gathering information to complete DA update, planned to update today. Pt needed to talk about recent stressors, panic attacks and anxiety about upcoming surgery so these took priority.    Treatment Plan Review Date: N/A. " Will be developed next meeting since pt needed to talk about stressors   See Flowsheets for today's PHQ-9 and LIBRA-7 results  Previous PHQ-9:   PHQ-9 SCORE 2/21/2022 3/15/2022 6/30/2022   PHQ-9 Total Score MyChart - 14 (Moderate depression) 11 (Moderate depression)   PHQ-9 Total Score 15 14 11     Previous LIBRA-7:   LIBRA-7 SCORE 2/21/2022 3/15/2022 6/30/2022   Total Score - 7 (mild anxiety) 7 (mild anxiety)   Total Score 12 7 7       ARNALDO LEVEL:  No flowsheet data found.    DATA  Extended Session (60+ minutes): No  Interactive Complexity: No  Crisis: No  [facility] Patient: No    Treatment Objective(s) Addressed in This Session:  Target Behavior(s): energy, worry, racing thoguhts, panic attack near water    Depressed Mood: Increase interest, engagement, and pleasure in doing things  Decrease frequency and intensity of feeling down, depressed, hopeless  Feel less tired and more energy during the day     Current Stressors / Issues:    update: Pt says that they are feeling more uplifted. Pt states that they did their MRIs done and pt has to quit smoking or they won't do surgery. Pt says that the surgeon wants pt to have no nicotine in their system. Pt met with PCP and is using patches to quit smoking. Pt is hoping that pt would have gotten a lower dose of the patch, not the highest one. Pt is having panic attacks around water and they love water.   Stressors: the back surgery   Side Effects: none    Appetite: unremarkable  Sleep: is still up and down    Impulsive spending/spending sprees: none  Suicidality: Pt denies   Self-harm: Pt denies  Substance Use: using patches for tobacco   Caffeine: coffee a lot later   Therapist: reached out to scheduling and was maybe suppose to call, tried to get an AHRMs worker   Interventions: [facility] did reach out to pt coordinator to note that pt is wanting to know the status with a therapist and AHRMS worker   Medication Questions/Requests: were going to give pt medication that Tigist prescribed  before and wanted to see if it would interact with other medications       Progress on Treatment Objective(s) / Homework:  Minimal progress - ACTION (Actively working towards change); Intervened by reinforcing change plan / affirming steps taken    Also provided psychoeducation about behavioral health condition, symptoms, and treatment options    Care Plan review completed: No    Review of Symptoms per patient report:  Depression: Change in sleep, Feelings of hopelessness and Feeling sad, down, or depressed  Kelli:  No Symptoms  Psychosis: No Symptoms  Anxiety: Excessive worry  Panic:  Palpitations, Tremors, Shortness of breath, Tingling, Numbness, Sense of impending doom and Hot or cold flashes, pt says they had a panic attack 2 days ago and went to wash their face and couldn't breath, had another one recently  Walked by the park and it triggered them to be by the water and pt's legs were trembling- pt states they like water    Post Traumatic Stress Disorder:  Experienced traumatic event yes   Eating Disorder: No Symptoms  ADD / ADHD:  [facility] unable to ask   Conduct Disorder: No symptoms  Autism Spectrum Disorder: No symptoms  Obsessive Compulsive Disorder: No Symptoms     Medication Review:  No changes to current psychiatric medication(s)    Medication Compliance:  Yes    Changes in Health Issues:   None reported    Chemical Use Review:   Substance Use: No use concerns identified.     Tobacco Use: Pt is using patches to quit to be able to have surgery at surgeon's request.    Assessment: Current Emotional / Mental Status (status of significant symptoms):  Risk status (Self / Other harm or suicidal ideation)  Patient has had a history of self-injurious behavior: burning in the past, pt denies any self-harm or suicidal ideation recently  Patient denies current fears or concerns for personal safety.  Patient denies current or recent suicidal ideation or behaviors.  Patient denies current or recent homicidal ideation or  behaviors.  Patient denies current or recent self injurious behavior or ideation.  Patient denies other safety concerns.  A safety and risk management plan has not been developed at this time, however patient was encouraged to call [place] [phone] should there be a change in any of these risk factors.    Appearance:   Unable to assess-telephone visit  Eye Contact:   Unable to assess-telephone visit  Psychomotor Behavior: Unable to assess-telephone visit  Attitude:   Cooperative   Orientation:   All  Speech   Rate / Production: Normal    Volume:  Normal   Mood:    Anxious  Depressed  Normal  Affect:    Unable to assess-telephone visit  Thought Content:  Clear   Thought Form:  Coherent  Logical   Insight:    Good     Diagnoses:  1. MDD (major depressive disorder), recurrent severe, without psychosis (H)    2. LIBRA (generalized anxiety disorder)        Collateral Reports Completed:  Communicated with:   Communicated with STEPH KiddBC   Chaitanya [facility]    Plan: (Homework, other):  Patient was given information about behavioral services and encouraged to schedule a follow up appointment with the clinic [facility] in conjunction with [facility] provider Tigist.  She was also given information about mental health symptoms and treatment options .  CD Recommendations: No indications of CD issues.    JASON Moore, [facility] July 5, 2022

## 2022-07-05 NOTE — PROGRESS NOTES
"Paula is a 62 year old who is being evaluated via a billable telephone visit.      What phone number would you like to be contacted at? [phone]  How would you like to obtain your AVS?    Declines AVS    Phone call duration: 30 minutes             Outpatient Psychiatric Progress Note    Name: Paula Ho   : 1960                    Primary Care Provider: LEONA ALANIZ CNP   Therapist: Yes    PHQ-9 scores:  PHQ-9 SCORE 2022 3/15/2022 2022   PHQ-9 Total Score MyChart - 14 (Moderate depression) 11 (Moderate depression)   PHQ-9 Total Score 15 14 11       LIBRA-7 scores:  LIBRA-7 SCORE 2022 3/15/2022 2022   Total Score - 7 (mild anxiety) 7 (mild anxiety)   Total Score 12 7 7       Patient Identification:    Patient is a 62 year old year old, single  White Not  or  female  who presents for return visit with me.  Patient is currently unemployed. Patient attended the session alone. Patient prefers to be called: \" Paula\".    Current medications include: albuterol (PROAIR HFA) 108 (90 Base) MCG/ACT inhaler, Inhale 2 puffs into the lungs every 4 hours as needed for shortness of breath / dyspnea or wheezing  albuterol (PROVENTIL) (2.5 MG/3ML) 0.083% neb solution, NEBULIZE THE CONTENTS OF 1 VIAL EVERY 6 HOURS AS NEEDED.  amphetamine-dextroamphetamine (ADDERALL) 15 MG tablet, Take 1 tablet (15 mg) by mouth 2 times daily  ARIPiprazole (ABILIFY) 5 MG tablet, Take 1 tablet (5 mg) by mouth daily  budesonide-formoterol (SYMBICORT) 160-4.5 MCG/ACT Inhaler, Inhale 2 puffs into the lungs 2 times daily  buprenorphine HCl-naloxone HCl (SUBOXONE) 8-2 MG per film, Place 1 Film under the tongue daily  cetirizine (ZYRTEC) 10 MG tablet, Take 1 tablet (10 mg) by mouth daily  FLUoxetine (PROZAC) 40 MG capsule, Take 1 capsule (40 mg) by mouth 2 times daily  levothyroxine (SYNTHROID/LEVOTHROID) 25 MCG tablet, Take 1 tablet (25 mcg) by mouth daily  losartan (COZAAR) 50 MG tablet, Take 50 mg by " mouth daily  medical cannabis (Patient's own supply), See Admin Instructions (The purpose of this order is to document that the patient reports taking medical cannabis.  This is not a prescription, and is not used to certify that the patient has a qualifying medical condition.)  mirtazapine (REMERON) 15 MG tablet, Take 1 tablet (15 mg) by mouth At Bedtime  Multiple Vitamins-Minerals (MULTIVITAMIN ADULT PO),   mupirocin (BACTROBAN) 2 % external ointment, Apply topically 3 times daily  mupirocin (BACTROBAN) 2 % external ointment, Apply topically 2 times daily  NARCAN 4 MG/0.1ML nasal spray, INHALE VIA NASAL ROUTE FOR OVERDOSE AS NEEDED. MAY REPEAT AFTER 2-3 MINUTES  nicotine (NICODERM CQ) 21 MG/24HR 24 hr patch, Place 1 patch onto the skin every 24 hours for 42 days  order for DME, Equipment being ordered: Nebulizer  oxyCODONE IR (ROXICODONE) 15 MG tablet, Take 15 mg by mouth 3 times daily + 5 mg x1 daily  pravastatin (PRAVACHOL) 40 MG tablet, Take 40 mg by mouth daily  tiotropium (SPIRIVA) 18 MCG inhaled capsule, Inhale 1 capsule (18 mcg) into the lungs daily  tiZANidine (ZANAFLEX) 2 MG tablet, TK 2 TO 3 TS PO QHS  topiramate (TOPAMAX) 100 MG tablet, Take 1 tablet (100 mg) by mouth 2 times daily  vitamin D3 (CHOLECALCIFEROL) 2000 units (50 mcg) tablet, Take 1 tablet by mouth daily    lidocaine (PF) (XYLOCAINE) 1 % injection 8 mL         The [place] Prescription Monitoring Program has been reviewed and there are no concerns about diversionary activity for controlled substances at this time.      I was able to review most recent Primary Care Provider, specialty provider, and therapy visit notes that I have access to.     Today, patient reports that recent testing on her back reveals that she needs to have surgery on it.  She was informed by her medical providers that she will need to stop smoking before she can have surgery.  Today Paula asked about restarting the Wellbutrin.  Related to her back, she would like to  get a second opinion before going through with the surgery as she is very anxious about it.  Since being on her medications she is better able to get out of bed and is more hopeful about the future.     has a past medical history of Acute respiratory failure (H) (02/01/2020), Anxiety, Asthma, Depression, and Hypertension.    Social history updates:    Paula lives alone but has children for support.  She is unemployed.    Substance use updates:    No alcohol use reported  Tobacco use: Yes Cigarettes  Ready to quit?  Yes in order to have back surgery nicotine Replacement Therapy tried: Zyban     Vital Signs:   LMP  (LMP Unknown)     Labs:    Most recent laboratory results reviewed and no new labs.     Mental Status Examination:  Appearance: awake, alert and mild distress  Attitude: cooperative  Eye Contact:  Unable to assess  Gait and Station: No dizziness or falls  Psychomotor Behavior:  Unable to assess  Oriented to:  time, person, and place  Attention Span and Concentration:  Normal  Speech:   clear, coherent and Speaks: English  Mood:  anxious and depressed  Affect:  mood congruent  Associations:  no loose associations  Thought Process:  goal oriented  Thought Content:  no evidence of suicidal ideation or homicidal ideation, no auditory hallucinations present and no visual hallucinations present  Recent and Remote Memory:  intact Not formally assessed. No amnesia.  Fund of Knowledge: appropriate  Insight:  good  Judgment:  intact  Impulse Control:  intact    Suicide Risk Assessment:  Today Paula Ho reports no thoughts to harm themself or others. In addition, there are notable risk factors for self-harm, including single status, anxiety and comorbid medical condition of Chronic pain. However, risk is mitigated by commitment to family, history of seeking help when needed, future oriented, denies suicidal intent or plan and denies homicidal ideation, intent, or plan. Therefore, based on all available evidence  including the factors cited above, Paula Ho does not appear to be at imminent risk for self-harm, does not meet criteria for a 72-hr hold, and therefore remains appropriate for ongoing outpatient level of care.  A thorough assessment of risk factors related to suicide and self-harm have been reviewed and are noted above. The patient convincingly denies suicidality on several occasions. Local community safety resources printed and reviewed for patient to use if needed. There was no deceit detected, and the patient presented in a manner that was believable.     DSM5 Diagnosis:  Attention-Deficit/Hyperactivity Disorder  314.00 (F90.0) Predominantly inattentive presentation  296.32 (F33.1) Major Depressive Disorder, Recurrent Episode, Moderate _ and With mixed features  300.02 (F41.1) Generalized Anxiety Disorder    Medical comorbidities include:   Patient Active Problem List    Diagnosis Date Noted     Opioid dependence with current use (H) 06/30/2022     Priority: Medium     Infection due to 2019 novel coronavirus 12/09/2020     Priority: Medium     Hyperlipidemia 04/14/2020     Priority: Medium     Benign essential hypertension 04/14/2020     Priority: Medium     MDD (major depressive disorder), recurrent severe, without psychosis (H) 03/02/2020     Priority: Medium     Chronic obstructive pulmonary disease, unspecified COPD type (H) 02/04/2020     Priority: Medium     No PFTS  In EPIC       Attention deficit hyperactivity disorder (ADHD) 01/31/2020     Priority: Medium     Chronic midline low back pain with bilateral sciatica 01/31/2020     Priority: Medium     Brain aneurysm 12/03/2019     Priority: Medium     Dec 2017  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 Device is MR compatible to 3 BREANN 3/2020       History of subarachnoid hemorrhage 12/22/2017     Priority: Medium     H/O of SAH, Cam 2, Hunt-Thomas 1, from a ruptured 9mm left Pcomm aneurysm with a wide neck  and a fetal left PCA arising from the aneurysm neck, s/p successful coil embolization 12/23/2017 by Dr. Otto Hurley       Chronic GERD 01/26/2017     Priority: Medium     Chronic knee pain 12/12/2014     Priority: Medium     Cigarette smoker 07/08/2014     Priority: Medium     Chronic, continuous use of opioids 04/24/2013     Priority: Medium     Managed by pain clinic outside of [facility].  UDS + cocaine in December 2019 without confirmation testing at her pain clinic per patient report       Morbid obesity (H) 03/27/2013     Priority: Medium     Status post knee surgery 03/27/2013     Priority: Medium     Per patient's recollection:  Circa 2002: right knee arthroscopy (Dr. Pappas)  Circa 2004: right knee partial knee replacement (Iam Ritchie MD)  Circa 2006: right TKA (Ron Ryder MD; [facility])  Circa 2008: right TKA revision due to infection (Ron Ryder MD; [facility])  Circa 2009: right TKA revision due to infection (Ron Ryder MD; [facility])  April 2011: right TKA (Ron Ryder MD; [facility])       LIBRA (generalized anxiety disorder) 09/28/2011     Priority: Medium     Chronic pain 09/28/2011     Priority: Medium     Knee and low back         DJD (degenerative joint disease) 09/28/2011     Priority: Medium     Insomnia 04/13/2011     Priority: Medium     Insomnia  NOS       Tobacco use disorder 07/31/2006     Priority: Medium     Asthma 11/15/2004     Priority: Medium     Asthma  NOS         Assessment:    Paula Ho reports chronic pain plagues her.  She was evaluated and determined to be needing back surgery.  However she does need to stop smoking before doing that.  Wellbutrin was started.  Since medication changes, she tells me she is more hopeful for the future and is better able to get out of bed in the morning.  She is more focused and motivated to do things around the house..  Adderall will continue at 15 mg twice daily.  Gabapentin is ordered for  pain and for breakthrough anxiety symptoms.  She will continue Abilify 5 mg daily as a mild mood stabilizer and depression medication adjunct to her fluoxetine.  Mirtazapine is taken at bedtime to help her sleep.    Medication side effects and alternatives were reviewed. Health promotion activities recommended and reviewed today. All questions addressed. Education and counseling completed regarding risks and benefits of medications and psychotherapy options.    Treatment Plan:        1.  Fluoxetine 40 mg twice daily    2.  Mirtazapine 15 mg at bedtime    3.  Adderall 15 mg twice daily    4.  Wellbutrin  mg twice daily    5.  Gabapentin 600 mg 3 times daily    6.  Abilify 5 mg daily    7.  Talk therapy, when able to, for processing life stressors        Continue all other medications as reviewed per electronic medical record today.     Safety plan reviewed. To the Emergency Department as needed or call after hours crisis line at [phone] or [phone]. [place] Crisis Text Line. Text [place] to 812392 or Suicide LifeLine Chat: suicidepreventionlifeline.org/chat/    To schedule individual or family therapy, call [facility] at [phone]    Schedule an appointment with me in 6 weeks or sooner as needed. Call [facility] at [phone] to schedule.    Follow up with primary care provider as planned or for acute medical concerns.    Call the psychiatric nurse line with medication questions or concerns at [phone]    MyChart may be used to communicate with your provider, but this is not intended to be used for emergencies.    Crisis Resources:    National Suicide Prevention Lifeline: 383.169.1042 (TTY: 698.580.1309). Call anytime for help.  (www.suicidepreventionlifeline.org)  National [place] on Mental Illness (www.raudel.org): 501.513.8846 or 827-008-0552.   Mental Health Association (www.mentalhealth.org): 190.316.9621 or 151-920-3386.  [place] Crisis Text Line: Text [place]  to 317870  Suicide LifeLine Chat: suicidepreventionlifeline.org/chat    Administrative Billing:   Time spent with patient includes counseling and coordination of care regarding above diagnoses and treatment plan.    Patient Status:  Patient will continue to be seen for ongoing consultation and stabilization.    Signed:   VIRGILIO Kidd-BC   Psychiatry

## 2022-07-20 ENCOUNTER — VIRTUAL VISIT (OUTPATIENT)
Dept: FAMILY MEDICINE | Facility: CLINIC | Age: 62
End: 2022-07-20
Payer: COMMERCIAL

## 2022-07-20 DIAGNOSIS — F17.200 TOBACCO USE DISORDER: Chronic | ICD-10-CM

## 2022-07-20 DIAGNOSIS — J44.1 CHRONIC OBSTRUCTIVE PULMONARY DISEASE WITH ACUTE EXACERBATION (H): Primary | ICD-10-CM

## 2022-07-20 PROCEDURE — 99214 OFFICE O/P EST MOD 30 MIN: CPT | Mod: 95 | Performed by: NURSE PRACTITIONER

## 2022-07-20 RX ORDER — PREDNISONE 10 MG/1
TABLET ORAL
Qty: 20 TABLET | Refills: 0 | Status: SHIPPED | OUTPATIENT
Start: 2022-07-20 | End: 2023-01-04

## 2022-07-20 ASSESSMENT — ASTHMA QUESTIONNAIRES: ACT_TOTALSCORE: 14

## 2022-07-20 NOTE — PROGRESS NOTES
Paula is a 62 year old who is being evaluated via a billable telephone visit.      What phone number would you like to be contacted at? [phone]  How would you like to obtain your AVS? Mail a copy    Assessment & Plan     Chronic obstructive pulmonary disease with acute exacerbation (H)  Start the below. Continue inhalers. If worsening or other concerning symptoms such as chest pain, etc go to the emergency department.  - predniSONE (DELTASONE) 10 MG tablet; Take 40 mg daily x2 days then 30 mg daily x2 days then 20 mg daily x2 days then 10 mg x2    Tobacco use disorder  Using nicotine patch and bupropion. States cravings are present but better. She has only been taking bupropion daily - I advised her to take BID as prescribed.                 See Patient Instructions    Return in about 1 week (around 7/27/2022), or if symptoms worsen or fail to improve.     The benefits, risks and potential side effects were discussed in detail. Black box warnings discussed as relevant. All patient questions were answered. The patient was instructed to follow up immediately if any adverse reactions develop.    Return precautions discussed, including when to seek urgent/emergent care.    Patient verbalizes understanding and agrees with plan of care.       LEONA ALANIZ [facility]   Paula is a 62 year old, presenting for the following health issues:  Asthma and Physical Therapy (Pt states she has still not heard in regards to PT referral. )      HPI     Asthma Follow-Up    Was ACT completed today?    Yes    ACT Total Scores 7/20/2022   ACT TOTAL SCORE (Goal Greater than or Equal to 20) 14   In the past 12 months, how many times did you visit the emergency room for your asthma without being admitted to the hospital? 0   In the past 12 months, how many times were you hospitalized overnight because of your asthma? 0         How many days per week do you miss taking your asthma  controller medication?  0    Please describe any recent triggers for your asthma: Patient is unaware of triggers    Have you had any Emergency Room Visits, Urgent Care Visits, or Hospital Admissions since your last office visit?  No      How many servings of fruits and vegetables do you eat daily?  2-3    On average, how many sweetened beverages do you drink each day (Examples: soda, juice, sweet tea, etc.  Do NOT count diet or artificially sweetened beverages)?   3    How many days per week do you exercise enough to make your heart beat faster? 3 or less    How many minutes a day do you exercise enough to make your heart beat faster? 9 or less    How many days per week do you miss taking your medication? 0    Chest tightness with humidity  Harder to breathe  No chest pain  covid negative  Dry cough that is sometimes productive  No fever  Using symbicort and albuterol inhaler few times per day  Using nicotine patch. Also started wellbutrin - taking just once daily - didn't realize should be BID    Review of Systems   Constitutional, HEENT, cardiovascular, pulmonary, gi and gu systems are negative, except as otherwise noted.      Objective           Vitals:  No vitals were obtained today due to virtual visit.    Physical Exam   healthy, alert and no distress  PSYCH: Alert and oriented times 3; coherent speech, normal   rate and volume, able to articulate logical thoughts, able   to abstract reason, no tangential thoughts, no hallucinations   or delusions  Her affect is normal  RESP: No cough, no audible wheezing, able to talk in full sentences  Remainder of exam unable to be completed due to telephone visits                Phone call duration: 11 minutes    .  ..

## 2022-07-20 NOTE — PATIENT INSTRUCTIONS
At [facility], we strive to deliver an exceptional experience to you, every time we see you. If you receive a survey, please complete it as we do value your feedback.  If you have MyChart, you can expect to receive results automatically within 24 hours of their completion.  Your provider will send a note interpreting your results as well.   If you do not have MyChart, you should receive your results in about a week by mail.    Your care team:                            Family Medicine Internal Medicine   MD Renan Chambers MD Shantel Branch-Fleming, MD Srinivasa Vaka, MD Katya Belousova, LEONA Tabares CNP, MD ([facility]   Taurus Torres, MD Dorie Sethi MD Amelia Massimini APRN CNP Kim Thein, APRN CNP Bethany Templen, MD             Clinic hours: Monday - Thursday 7 am-6 pm; Fridays 7 am-5 pm.   Urgent care: Monday - Friday 10 am- 8 pm; Saturday and Sunday 9 am-5 pm.    Clinic: [phone]       [facility]: Monday - Thursday 8 am - 7 pm; Friday 8 am - 6 pm  [facility]: [phone]

## 2022-07-20 NOTE — PATIENT INSTRUCTIONS
1.  Strattera discontinued  2.  Bupropion discontinued  3.  Clonidine discontinued  4.  Mirtazapine 15 mg at bedtime  5.  Adderall 10 mg twice daily  6.  Fluoxetine 10 mg daily    Continue all other medications as reviewed per electronic medical record today.   Safety plan reviewed. To the Emergency Department as needed or call after hours crisis line at [phone] or [phone]. [place] Crisis Text Line. Text [place] to 410653 or Suicide LifeLine Chat: suicideGlobal CIO.org/chat/  To schedule individual or family therapy, call [facility] at [phone]  Schedule an appointment with me in 4 weeks or sooner as needed. Call [facility] at [phone] to schedule.  Follow up with primary care provider as planned or for acute medical concerns.  Call the psychiatric nurse line with medication questions or concerns at [phone]  MyChart may be used to communicate with your provider, but this is not intended to be used for emergencies.    Crisis Resources:    National Suicide Prevention Lifeline: 145.651.9206 (TTY: 351.191.7286). Call anytime for help.  (www.suicidepreventionlifeline.org)  National [place] [employer] on Mental Illness (www.raudel.org): 698.429.5945 or 681-399-7837.   Mental Health Association (www.mentalhealth.org): 428.633.2139 or 142-252-1345.  [place] Crisis Text Line: Text [place] to 885452  Suicide LifeLine Chat: suicideGlobal CIO.org/chat

## 2022-08-01 ENCOUNTER — VIRTUAL VISIT (OUTPATIENT)
Dept: BEHAVIORAL HEALTH | Facility: CLINIC | Age: 62
End: 2022-08-01
Payer: COMMERCIAL

## 2022-08-01 DIAGNOSIS — R69 DIAGNOSIS DEFERRED: Primary | ICD-10-CM

## 2022-08-01 ASSESSMENT — ANXIETY QUESTIONNAIRES
1. FEELING NERVOUS, ANXIOUS, OR ON EDGE: SEVERAL DAYS
4. TROUBLE RELAXING: NOT AT ALL
IF YOU CHECKED OFF ANY PROBLEMS ON THIS QUESTIONNAIRE, HOW DIFFICULT HAVE THESE PROBLEMS MADE IT FOR YOU TO DO YOUR WORK, TAKE CARE OF THINGS AT HOME, OR GET ALONG WITH OTHER PEOPLE: SOMEWHAT DIFFICULT
2. NOT BEING ABLE TO STOP OR CONTROL WORRYING: SEVERAL DAYS
GAD7 TOTAL SCORE: 12
7. FEELING AFRAID AS IF SOMETHING AWFUL MIGHT HAPPEN: SEVERAL DAYS
5. BEING SO RESTLESS THAT IT IS HARD TO SIT STILL: NEARLY EVERY DAY
GAD7 TOTAL SCORE: 12
3. WORRYING TOO MUCH ABOUT DIFFERENT THINGS: NEARLY EVERY DAY
6. BECOMING EASILY ANNOYED OR IRRITABLE: NEARLY EVERY DAY

## 2022-08-01 ASSESSMENT — PATIENT HEALTH QUESTIONNAIRE - PHQ9: SUM OF ALL RESPONSES TO PHQ QUESTIONS 1-9: 11

## 2022-08-01 NOTE — LETTER
Behavioral Health Home ([facility]): Health Action Plan  [facility] Clinic: [place]    [facility]      Name: Paula Ho  Preferred Name: Paula  : 1960  MRN: [id number]      My Goals  Goal Areas: Health; Mental Health; Future HAP Goal area; Chemical Health    Patient stated goals:   Health: Paula would like to continue to meet with her providers, and take her medications as prescribed. Paula would also like assistance with improving her back pain, and receive physical therapy, as well as possibly surgery.    Chemical Health: Paula would like assistance from her providers to quit smoking and will work on reducing her use, so she can be approved for back surgery.    Mental Health: Paula would like to find a long term therapy provider through [place], and continue to receive assistance with [facility] for monthly check ins. She would also like to start working with an [facility] worker again.    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 She would like to receive home delivered meals if she does open.    Strengths related to each goal: Paula states her strengths are the support of her dogs, good advocate for herself, support of good friends, and care for others. Patient states she has a big heart.    Services and Supports Needed: The [facility] Team will provide monthly contact with patient in order to monitor progress towards goals.    Activities / Actions of Team to support goal(s): [facility] team will locate appropriate resources that align with patient's goals and promote health and wellness.    Activities / Actions of Patient / Parent / Guardian to support goal(s): It is a requirement that patient's primary care physician is through the [facility] and that they are on Medical Assistance/Medicaid. If either of these were to change or if patient needs any type of assistance, they are to reach out to their  Behavioral Health Home (H) team.      Recommended Referral  Tobacco cessation referrals made?: No  Mental Health / Chemical Dependency Referrals: Yes  Substance Use Referrals: Not Applicable  Mental Health Referrals: [facility]  Community Health Referrals: [facility]; [facility] ; Other (see comments)      My Team Members and Their Contact Information  Patient Care Team       Relationship Specialty Notifications Start End    Demetra Bedolla APRN CNP PCP - General Nurse Practitioner  12/5/19     Phone: [phone] Fax: [phone]         [address] [place] [address]    Demetra Bedolla APRN CNP Assigned PCP   9/15/19     Phone: [phone] Fax: [phone]         [address] [place] [address]    Tigist Manjarrez NP Nurse Practitioner Nurse Practitioner Admissions 8/3/20     Psychiatry    Phone: [phone] Fax: [phone]         [address] [place] [address]    Tigist Manjarrez NP Assigned Behavioral Health Provider   11/15/20     Phone: [phone] Fax: [phone]         [address] [place] [address]    [facility] ([facility] ([place])  12/23/20     Phone: [phone]         Dane Irizarry MD Anesthesiologist Anesthesiology  1/21/21     Phone: [phone] Pager: [phone] Fax: [phone]        [address] [facility] [address]    Greg Lam MD Assigned Musculoskeletal Provider   8/1/21     Phone: [phone] Fax: [phone]         [address] [address]    Chanell Joya MD MD Dermatology  10/25/21     Joie Nobles MD Referring Physician Family Medicine  10/25/21     referring to Dermatology    Phone: [phone] Fax: [phone]         [address] [place] [address]    Man Goode MD Assigned Pulmonology Provider   11/7/21     Phone: [phone] Fax: [phone] [address] [address]     Paty Dial, BSW  Clinic Admissions 2/17/22     [facility] Case Management - Enrolled 02/17/2021 -[employer] [facility] - direct extension [phone].    Agustin Mcconnell OD Assigned Surgical Provider   2/27/22     Phone: [phone] Fax: [phone]         [facility] [address] [facility] [address]              My Wellness Plan  Safety Concerns: Suicide Ideation    Recommendations / Plan for safety concerns: Patient will follow her safety plan that was co-developed with [facility] Isela Kirk on 9/26/22, and she also feels comfortable reaching out to her clinic or [facility] crisis, as well as the suicide hotline.     Crisis Plan (emergencies / when urgent support needed): Patient states she feels comfortable contacting her PCA Sharla, utilizing [facility] crisis/suicide hotline, or go to the EmPATH unit in case immediate assistance is needed.     Paula Ho co-developed the Health Action Plan with the [facility] Team and received a copy of this document.  Date Health Action Plan Completed/Updated: 8/1/2022

## 2022-08-01 NOTE — Clinical Note
Anson Escoto and ALETHA Weiss completed this patients 6 month check in/goal update and we completed the questionnaire updates. Patient has reported increased depression and today said she has had thoughts of being dead, but reported no thoughts of harming herself. I know you have a check in with her on 8/18, so just wanted to give you a head's up. She is working on trying to get scheduled with a new long term therapist because her previous one was a male, and she didn't feel comfortable meeting with him, so hopefully she will have made this appointment by your visit with her. Let me know if you have any other questions for me!   Thanks, Yesi Dial, DENI [facility] ([facility])  [facility] [phone]

## 2022-08-01 NOTE — Clinical Note
Here's the updated recommendation section. Let me know if this is good to send for now until the plan is completed! Thanks

## 2022-08-01 NOTE — PROGRESS NOTES
Behavioral Health Home Services  No data recorded      Social Work Care Navigator Note      Patient: Paula Ho  Date: August 1, 2022  Preferred Name: Paula    Previous PHQ-9:   PHQ-9 SCORE 2/21/2022 3/15/2022 6/30/2022   PHQ-9 Total Score MyChart - 14 (Moderate depression) 11 (Moderate depression)   PHQ-9 Total Score 15 14 11     Previous LIBRA-7:   LIBRA-7 SCORE 2/21/2022 3/15/2022 6/30/2022   Total Score - 7 (mild anxiety) 7 (mild anxiety)   Total Score 12 7 7     ARNALDO LEVEL:  No flowsheet data found.    Preferred Contact:  No data recorded    Type of Contact Today: Phone call (patient / identified key support person reached)      Data: (subjective / Objective):    Patient came in to complete the comprehensive wellness assessment for Behavioral Health Home Services.  See [facility] Flowsheets for details on the assessment.  See [facility], Behavioral Health Home for a copy of the patient's care plan. See flowsheets for updated PHQ-9 and LIBRA-7.    1. Patient reported that the [facility] program reached out to her and they said that it would be about two weeks before she's assigned to someone. It's been about a week so far.     2. Patient reported that she used the number on her scooter, but the number didn't work. [facility] called [facility] medical with patient to get her connected for scooter assistance. Patient and [facility] worker talked about patient needing to get connected with insurance care coordinator with [facility] for coverage insurance first and get reconnected with [facility] at that point.     3. Patient reported she's not been doing well with quitting smoking. Patient does have the patches to use to help her. The insurance company also now reported they want her to complete physical therapy before she can be approved for back surgery.     4. Patient reported that she can't use [facility] anymore for therapy because she reached out and was told her insurance isn't covered through their office. Patient will call the [facility] scheduling line to get  scheduled and she will share her preferences. Patient reported that she's was feeling very depressed last Friday, and she feels that this is also altered by her pain as well. Patient also reported sadness with not being as close with her friends because she can't physically do as much.      CC and patient completed safety check in following patient's PHQ-9 and LIBRA-7 questionnaires. Patient reported that she's having thoughts of being dead, but she doesn't have any plans to hurt herself. Patient confirmed again that she feels comfortable calling the [facility] scheduling line to get scheduled with a therapist. Patient also has a check in on 8/28 with [facility] Isela before her psychiatry appointment with [facility]. [facility] routed message to Isela and [facility].      5. Patient reported that she's very tired today, and she didn't sleep well last night. She thinks this is from her pain, and now she hasn't been able to fall asleep for a nap either.     Updated Goals:   -Health: Patient would like to continue to meet with her providers, and take her medications as prescribed. Patient would also like assistance with improving her back pain, and receive physical therapy, as well as possibly surgery.  -Chemical Health: Patient would like assistance from her providers to quit smoking and will work on reducing her use, so she can be approved for back surgery.   -Mental Health: Patient would like to find a long term therapy provider through [place], and continue to receive assistance with [facility] for monthly check ins. Patient would also like to start working with an [facility] worker again.   -Future goals: Patient would like to open to the CADI waiver in the future depending on her reassessment (February) for PCA services and if her hours decrease. Patient would like to receive home delivered meals if she does open.     [facility] will mail patient the HAP letter once approved by the [facility].   Update 9/28/22: Safety plan was updated with [facility] Isela  Reagan on 9/26/22, and HAP letter is waiting approval with updated safety notes.     Patient and [facility] will meet for next check in on 9/5. Patient would like to meet over the phone and isn't interested in video visit at this time.     DENI Humphries  Behavioral Health Home ([facility])   [facility]  [phone]

## 2022-08-04 DIAGNOSIS — F90.0 ATTENTION DEFICIT HYPERACTIVITY DISORDER (ADHD), PREDOMINANTLY INATTENTIVE TYPE: ICD-10-CM

## 2022-08-04 NOTE — TELEPHONE ENCOUNTER
Reason for call:  Medication   If this is a refill request, has the caller requested the refill from the pharmacy already? Yes  Will the patient be using a [facility]? No     Name of the pharmacy and phone number for the current request:  [id number] - [place], [place] - [address] (Ph: [phone])    Name of the medication requested: amphetamine-dextroamphetamine (ADDERALL) 15 MG tablet    Other request: pt donovan Manjarrez    Phone number to reach patient:  Cell number on file:    Telephone Information:   Mobile [phone]       Best Time:  anytime    Can we leave a detailed message on this number?  YES    Travel screening: Not Applicable

## 2022-08-04 NOTE — TELEPHONE ENCOUNTER
Date of Last Office Visit: 7/5/2022  Date of Next Office Visit: 8/18/2022  No shows since last visit: none  Cancellations since last visit: none    Medication requested: Adderall 15 mg tablet Date last ordered: 7/11/2022 Qty: 60 Refills: 0     Review of [place] ?: yes      Lapse in medication adherence greater than 5 days?: no  If yes, call patient and gather details: no  Medication refill request verified as identical to current order?: yes  Result of Last DAM, VPA, Li+ Level, CBC, or Carbamazepine Level (at or since last visit): N/A    Last visit treatment plan: Prescriptions  Total: 112  Private Pay: 1  Showing 1-15 of 112 Items View   15 Items     of 8   Filled  Drug  QTY  Days  Prescriber     07/26/2022 Oxycodone Hcl (Ir) 5 Mg Tablet   15.00 15 [facility]    07/26/2022 Buprenorphine-Nalox 8-2mg Film   60.00 30 [facility]    07/26/2022 Oxycodone Hcl (Ir) 15 Mg Tab   45.00 15 [facility]    07/12/2022 Dextroamp-Amphetamin 15 Mg Tab   60.00 30 [facility]    07/12/2022 Oxycodone Hcl (Ir) 15 Mg Tab   42.00 14 [facility]         []Medication refilled per  Medication Refill in Ambulatory Care  policy.  [x]Medication unable to be refilled by RN due to criteria not met as indicated below:    []Eligibility - not seen in the last year   []Supervision - no future appointment   []Compliance - no shows, cancellations or lapse in therapy   []Verification - order discrepancy   [x]Controlled medication   [x]Medication not included in policy   []90-day supply request   []Other

## 2022-08-05 RX ORDER — DEXTROAMPHETAMINE SACCHARATE, AMPHETAMINE ASPARTATE, DEXTROAMPHETAMINE SULFATE AND AMPHETAMINE SULFATE 3.75; 3.75; 3.75; 3.75 MG/1; MG/1; MG/1; MG/1
15 TABLET ORAL 2 TIMES DAILY
Qty: 60 TABLET | Refills: 0 | Status: SHIPPED | OUTPATIENT
Start: 2022-08-05 | End: 2022-09-12

## 2022-09-06 ENCOUNTER — VIRTUAL VISIT (OUTPATIENT)
Dept: BEHAVIORAL HEALTH | Facility: CLINIC | Age: 62
End: 2022-09-06
Payer: COMMERCIAL

## 2022-09-06 DIAGNOSIS — R69 DIAGNOSIS DEFERRED: Primary | ICD-10-CM

## 2022-09-06 NOTE — Clinical Note
Omero Weiss,  I see this patient was able to get rescheduled with you and [facility] on 9/26. Could you please create a safety plan with her at this visit? She shared again this check in that she is feeling very depressed and wishing she was dead at times. I talked with her about EmPATH, all noted in number 7 on my note. Let me know if you have any questions or concerns for me.   Thank you!  DENI [facility] ([facility])  [facility] [phone]

## 2022-09-06 NOTE — PROGRESS NOTES
Behavioral Health Home Services  [facility] Clinic: [place]        Social Work Care Navigator Note      Patient: Paula Ho  Date: September 6, 2022  Preferred Name: Paula    Previous PHQ-9:   PHQ-9 SCORE 3/15/2022 6/30/2022 8/1/2022   PHQ-9 Total Score MyChart 14 (Moderate depression) 11 (Moderate depression) -   PHQ-9 Total Score 14 11 11     Previous LIBRA-7:   LIBRA-7 SCORE 3/15/2022 6/30/2022 8/1/2022   Total Score 7 (mild anxiety) 7 (mild anxiety) -   Total Score 7 7 12     ARNALDO LEVEL:  No flowsheet data found.    Preferred Contact:  Need for : No  Preferred Contact: Cell      Type of Contact Today: Phone call (patient / identified key support person reached)      Data: (subjective / Objective):  Recent ED/IP Admission or Discharge?   None    Patient Goals:  Goal Areas: Health; Mental Health; Future HAP Goal area; Chemical Health  Patient stated goals: -Health: Paula would like to continue to meet with her providers, and take her medications as prescribed. Paula would also like assistance with improving her back pain, and receive physical therapy, as well as possibly surgery.  -Chemical Health: Paula would like assistance from her providers to quit smoking and will work on reducing her use, so she can be approved for back surgery.   -Mental Health: Paula would like to find a long term therapy provider through [place], and continue to receive assistance with [facility] for monthly check ins. She would also like to start working with an [facility] worker again.   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 She would like to receive home delivered meals if she does open.    [facility] Core Service Provided:  Comprehensive Care Management: utilized the electronic medical record / patient registry to identify and support patient's health conditions / needs more effectively   Care Transitions: focused on the  coordinated and seamless movement of patient between or within different levels of care or settings  Care Coordination: provided care management services/referrals necessary to ensure patient and their identified supports have access to medical, behavioral health, pharmacology and recovery support services.  Ensured that patient's care is integrated across all settings and services.   Individual and Family Support: aimed to help clients reduce barriers to achieving goals, increase health literacy and knowledge about chronic condition(s), increase self-efficacy skills, and improve health outcomes    Current Stressors / Issues / Care Plan Objective Addressed Today:  [facility] and patient were able to meet today for Behavioral Health Home ([facility]) monthly check-in via telehealth visit. All required ROIs have been filed with HIM/patient chart.    1. Patient reported she didn't do anything over the weekend and she just laid in bed because her back pain was so bad. She isn't sure what she did, but it was hurting more than normal. Patient reported that she did double up on her pain medication yesterday, and she reported that she has been trying to take less medication so ease off of it typically when she has better days.     2. Patient reported that she is waiting for a call back regarding her car because she doesn't know what happened. She reported her car just started without the keys being in the ignition. She tried turning it off and it didn't turn off. She left it and then she tried to turn it off again and it did. Patient reported that she tried to bring it in yesterday for the appointment today, and she struggled to get it start again. She then was able to get it to the shop, but now she's waiting to hear back.     3. Patient reported that her smoking isn't going well because she's been too stressed lately. [facility] offered to send patient the information for tobacco cessation. She reported that she would appreciate this  because she has to quit smoking and she doesn't know how she's going to do it. [facility] mailed her the quit partner information sheet.     4. Patient reported that one of her friends is going to be staying with her for a little bit, and she asked if patient would like to go to a gym with her. Patient reported that she may be interested in this, but sometimes she can't even move because of the pain.     5. Patient reported that she hasn't gotten her scooter fixed because she will have to pay out of pocket for the battery. She doesn't have the CADI waiver at this time, and she just has PCA services. It also won't be covered through her insurance. She may be opened to the CADI waiver in the future.     6. Patient reported that she thinks she's gaining weight, even though her weight hasn't changed. She is talking with her friend about doing Keto, but she knows that she can't afford fresh food. She will look into options.     7. Patient reported that she has an appointment scheduled for the [facility] and psychiatrist on 9/26. Routed message to let them know about the video visit. [facility] also let them know that patient reported that she was wishing she was dead and she was very depressed. [facility] and patient talked about the EmPATH unit because she doesn't feel comfortable going to the emergency room, and she said she would feel comfortable going there. She also reported that she has the suicide hotline number in her phone.     8. Patient reported that she hasn't heard from [facility]'s yet for [facility]. She will need to call them again to follow up.     Intervention:  Motivational Interviewing: Expressed Empathy/Understanding, Supported Autonomy, Collaboration, Evocation, Permission to raise concern or advise, Open-ended questions and Reflections: simple and complex   Target Behavior(s): Explored thoughts about taking an anti-depressant, Explored and resolved challenges related to taking anti-depressants as prescribed, Explored thoughts and  readiness to participate in individual therapy, Explored and resolved challenges to attending appointments as scheduled, Explored current social supports and reinforced opportunities to increase engagement and Explored patient's perception of how alcohol and / or drugs influences mood    Assessment: (Progress on Goals / Homework):  Patient would benefit from continued coordination in reaching their goals set for the Behavioral Health Home ([facility]) program. [facility] reviewed Health Action Plan goals and will continue to monitor progress and work with patient and their care team.    Plan: (Homework, other):  Patient was encouraged to continue to seek condition-related information and education.      Scheduled a Phone follow up appointment with MISA RICE in 4 weeks     Patient has set self-identified goals and will monitor progress until the next appointment on: 10/3/22.      DENI Humphries  Behavioral Health Home ([facility])   [facility]  [phone]

## 2022-09-12 ENCOUNTER — TELEPHONE (OUTPATIENT)
Dept: PSYCHIATRY | Facility: CLINIC | Age: 62
End: 2022-09-12

## 2022-09-12 DIAGNOSIS — F90.0 ATTENTION DEFICIT HYPERACTIVITY DISORDER (ADHD), PREDOMINANTLY INATTENTIVE TYPE: ICD-10-CM

## 2022-09-12 RX ORDER — DEXTROAMPHETAMINE SACCHARATE, AMPHETAMINE ASPARTATE, DEXTROAMPHETAMINE SULFATE AND AMPHETAMINE SULFATE 3.75; 3.75; 3.75; 3.75 MG/1; MG/1; MG/1; MG/1
15 TABLET ORAL 2 TIMES DAILY
Qty: 60 TABLET | Refills: 0 | Status: SHIPPED | OUTPATIENT
Start: 2022-09-12 | End: 2022-10-13

## 2022-09-12 NOTE — TELEPHONE ENCOUNTER
9/12/22:Reason for call:  Medication   If this is a refill request, has the caller requested the refill from the pharmacy already? Yes  Will the patient be using a [facility]? No  Name of the pharmacy and phone number for the current request: [facility] [phone]    Name of the medication requested:Adderall    Other request: N/A    Phone number to reach patient:  Cell number on file:    Telephone Information:   Mobile [phone]       Best Time:  ASAP    Can we leave a detailed message on this number?  YES    Travel screening: Not Applicable

## 2022-09-12 NOTE — TELEPHONE ENCOUNTER
Date of Last Office Visit: 7/5/2022  Date of Next Office Visit: 9/262022  No shows since last visit: 1  Cancellations since last visit: none    Medication requested: Adderall 15 mg tablet Date last ordered: 8/5/2022 Qty: 60 Refills: 0     [place] of [place] ?: yes  Medication last filled date: 8/8/2022 Qty filled: 60  Other [place] substance on [place] ?: yes  If yes, is this a new medication?: no  If yes, name of medication: no and date filled: no    Lapse in medication adherence greater than 5 days?: no  If yes, call patient and gather details: no  Medication refill request verified as identical to current order?: yes  Result of Last DAM, VPA, Li+ Level, CBC, or Carbamazepine Level (at or since last visit): see chart    Last visit treatment plan: no notes recorded     []Medication refilled per  Medication Refill in Ambulatory Care  policy.  [x]Medication unable to be refilled by RN due to criteria not met as indicated below:    []Eligibility - not seen in the last year   []Supervision - no future appointment   []Compliance - no shows, cancellations or lapse in therapy   []Verification - order discrepancy   [x]Controlled medication   [x]Medication not included in policy   []90-day supply request   []Other

## 2022-09-26 ENCOUNTER — VIRTUAL VISIT (OUTPATIENT)
Dept: PSYCHIATRY | Facility: CLINIC | Age: 62
End: 2022-09-26
Payer: COMMERCIAL

## 2022-09-26 ENCOUNTER — VIRTUAL VISIT (OUTPATIENT)
Dept: BEHAVIORAL HEALTH | Facility: CLINIC | Age: 62
End: 2022-09-26
Payer: COMMERCIAL

## 2022-09-26 DIAGNOSIS — F41.1 GAD (GENERALIZED ANXIETY DISORDER): ICD-10-CM

## 2022-09-26 DIAGNOSIS — F33.2 MDD (MAJOR DEPRESSIVE DISORDER), RECURRENT SEVERE, WITHOUT PSYCHOSIS (H): Primary | ICD-10-CM

## 2022-09-26 DIAGNOSIS — F33.1 MODERATE EPISODE OF RECURRENT MAJOR DEPRESSIVE DISORDER (H): Primary | ICD-10-CM

## 2022-09-26 DIAGNOSIS — F90.0 ATTENTION DEFICIT HYPERACTIVITY DISORDER (ADHD), PREDOMINANTLY INATTENTIVE TYPE: ICD-10-CM

## 2022-09-26 PROCEDURE — 90832 PSYTX W PT 30 MINUTES: CPT | Mod: 95 | Performed by: COUNSELOR

## 2022-09-26 PROCEDURE — 99214 OFFICE O/P EST MOD 30 MIN: CPT | Mod: 95 | Performed by: NURSE PRACTITIONER

## 2022-09-26 RX ORDER — GABAPENTIN 600 MG/1
600 TABLET ORAL DAILY
Qty: 60 TABLET | Refills: 1 | COMMUNITY
Start: 2022-09-26 | End: 2023-05-01

## 2022-09-26 RX ORDER — ARIPIPRAZOLE 5 MG/1
5 TABLET ORAL DAILY
Qty: 30 TABLET | Refills: 3 | Status: SHIPPED | OUTPATIENT
Start: 2022-09-26 | End: 2023-05-01

## 2022-09-26 NOTE — PROGRESS NOTES
"  [facility]   [facility]      Behavioral Health Clinician Progress Note    Patient Name: Paula Ho           Service Type:  Phone Visit      Service Location:   Phone call (patient / identified key support person reached)     Session Start Time: 225pm  Session End Time: 3pm      Session Length: 16 - 37      Attendees: Patient     Service Modality:  Phone Visit:      Provider verified identity through the following two step process.  Patient provided:  Patient  and Patient is known previously to provider    The patient has been notified of the following:      \"We have found that certain health care needs can be provided without the need for a face to face visit.  This service lets us provide the care you need with a phone conversation.       I will have full access to your [facility] medical record during this entire phone call.   I will be taking notes for your medical record.      Since this is like an office visit, we will bill your insurance company for this service.       There are potential benefits and risks of telephone visits (e.g. limits to patient confidentiality) that differ from in-person visits.?Confidentiality still applies for telephone services, and nobody will record the visit.  It is important to be in a quiet, private space that is free of distractions (including cell phone or other devices) during the visit.??      If during the course of the call I believe a telephone visit is not appropriate, you will not be charged for this service\"     Consent has been obtained for this service by care team member: Yes     Visit Activities (Refresh list every visit): [facility] Only    Diagnostic Assessment Date: at next meeting, pt needed to discuss recent stressors     Treatment Plan Review Date: Will be developed next meeting since pt needed to talk about concerns with family  See Flowsheets for today's PHQ-9 and LIBRA-7 " results  Previous PHQ-9:   PHQ-9 SCORE 3/15/2022 6/30/2022 8/1/2022   PHQ-9 Total Score MyChart 14 (Moderate depression) 11 (Moderate depression) -   PHQ-9 Total Score 14 11 11     Previous LIBRA-7:   LIBRA-7 SCORE 3/15/2022 6/30/2022 8/1/2022   Total Score 7 (mild anxiety) 7 (mild anxiety) -   Total Score 7 7 12       ARNALDO LEVEL:  No flowsheet data found.    DATA  Extended Session (60+ minutes): No  Interactive Complexity: No  Crisis: No  [facility] Patient: No    Treatment Objective(s) Addressed in This Session:  Target Behavior(s): panic attacks, worry, stress, caring, coping with everything     Depressed Mood: Increase interest, engagement, and pleasure in doing things  Decrease frequency and intensity of feeling down, depressed, hopeless  Improve quantity and quality of night time sleep / decrease daytime naps  Feel less tired and more energy during the day   Anxiety: will experience a reduction in anxiety, will develop more effective coping skills to manage anxiety symptoms and will develop healthy cognitive patterns and beliefs    Current Stressors / Issues:    update: Pt says that things have been a roller coaster ride. Pt reports that the Adderall is helping. Pt says that they have had a few panic attacks. Pt went under the tunnel on 94 and pt felt anxious and had one. Pt's brother has health issues and he went to the ED today. Pt reports that someone asked them if they are taking their depression medications and pt says that they are taking them and felt like it was making a difference and aren't sure after this question. Pt has a bill and are not happy with their insurance since they were told they wouldn't have a bill.     Side Effects: one med causes itching and pt isn't sure if it is the medication or something they ate and will get a rush and start itching    Appetite: unremarkable, cook 1 meal   Sleep: up and down, wake up at 4am each morning, if take 1 renetta are okay if take 2 it puts them to sleep      Suicidality: haven't had any lately   Self-harm: Pt denies   Substance Use: hard time to quit smoking, decreased amount    Caffeine: cup of coffee a day  Therapist: Pt hasn't heard back about a [facility] worker even though a super told them they should her back soon, talks with Paty though pt said the last meeting went long, and they don't mind talking with her   Interventions: encourage pt to reach out for support as needed, [facility] will mail pt a copy of programs for older people to increase socialization with Bingo, reading and writing poetry  Medication Questions/Requests: review medications      Pt states that they will try to write things down that are bothering them that they need to talk about for their meeting with [facility] and [facility].     Progress on Treatment Objective(s) / Homework:  No improvement - ACTION (Actively working towards change); Intervened by reinforcing change plan / affirming steps taken    Also provided psychoeducation about behavioral health condition, symptoms, and treatment options    Care Plan review completed: No      Medication Review:  No changes to current psychiatric medication(s)    Medication Compliance:  Yes    Changes in Health Issues:   None reported    Chemical Use Review:   Substance Use: No use concerns identified.     Tobacco Use: Pt has decreased amount smoked and is having a hard time quitting.     Assessment: Current Emotional / Mental Status (status of significant symptoms):  Risk status (Self / Other harm or suicidal ideation)  Patient has had a history of self-injurious behavior: burning in the past, pt denies any self-harm or suicidal ideation recently  Patient denies current fears or concerns for personal safety.  Patient denies current or recent suicidal ideation or behaviors.  Patient denies current or recent homicidal ideation or behaviors.  Patient denies current or recent self injurious behavior or ideation.  Patient denies other safety concerns.  A safety and  "risk management plan has not been developed at this time, however patient was encouraged to call [facility] should there be a change in any of these risk factors.    Appearance:   Unable to assess-telephone visit  Eye Contact:   Unable to assess-telephone visit  Psychomotor Behavior: Unable to assess-telephone visit  Attitude:   Cooperative  Interested Friend[place]  Orientation:   All  Speech   Rate / Production: Normal    Volume:  Normal   Mood:    Anxious  Depressed  Normal  Affect:    Unable to assess-telephone visit  Thought Content:  Clear   Thought Form:  Coherent  Logical   Insight:    Good     Diagnoses:  1. MDD (major depressive disorder), recurrent severe, without psychosis (H)    2. LIBRA (generalized anxiety disorder)        Collateral Reports Completed:  Communicated with:   [facility]    Plan: (Homework, other):  Patient was given information about behavioral services and encouraged to schedule a follow up appointment with the clinic [facility] in conjunction with [facility] provider [facility].  She was also given information about mental health symptoms and treatment options .  [facility] will mail a copy of programs for older adults to attend by phone to increase socializing. CD Recommendations: No indications of CD issues.    Isela Kirk, JASON, [facility] September 26, 2022          Integrated Behavioral Health Services    Patient's Name: Paula Ho  September 26, 2022    SAFETY PLAN:  Step 1: Warning signs / cues (Thoughts, images, mood, situation, behavior) that a crisis may be developing:    Thoughts: \"I can't do this anymore\", \"I just want this to end\" and \"Nothing makes it better\"    Images:     Thinking Processes: racing thoughts, worry about others and other people's problems     Mood: worsening depression, hopelessness and helplessness    Behaviors: not wanting to get out of bed    Situations: stress, worry about others, worry about rent " "and bills     Step 2: Coping strategies - Things I can do to take my mind off of my problems without contacting another person (relaxation technique, physical activity):    Distress Tolerance Strategies:  play with my pet     Physical Activities: gardening    Focus on helpful thoughts:  \"I will get through this\"    Step 3: People and social settings that provide distraction:   People I thought were reliable are not and I will call the clinic first.     my yard     Step 4: Remind myself of people and things that are important to me and worth living for:  Dogs and my mom         Step 5: When I am in crisis, I can ask these people to help me use my safety plan:   I will call the clinic first since if I call others it only makes it worse. Go to [facility], alternative to the ED and is more patient friendly and comfortable.     Step 6: Making the environment safe:     N/A    Step 7: Professionals or agencies I can contact during a crisis:    Suicide Prevention Lifeline: 9-950-447-APDV (9010) or  980    Crisis Text Line Service: Text   [place]  to 484022.    Local Crisis Services: [phone]) [phone]    Call 911 or go to my nearest emergency department.   I helped develop this safety plan and agree to use it when needed.  I have been given a copy of this plan.      Patient signature: _______________________________________________________________  Today s date:  September 26, 2022    Adapted from Safety Plan Template 2008 Carol Thomas and Mike Whitten is reprinted with the express permission of the authors.  No portion of the Safety Plan Template may be reproduced without the express, written permission.  You can contact the authors at [email or web address] or [email or web address].  "

## 2022-09-26 NOTE — PROGRESS NOTES
"Paula is a 62 year old who is being evaluated via a billable telephone visit.      What phone number would you like to be contacted at? [phone]  How would you like to obtain your AVS? Mail a copy   April GANN    Phone call duration: 25 minutes             Outpatient Psychiatric Progress Note    Name: Paula Ho   : 1960                    Primary Care Provider: LEONA ALANIZ CNP   Therapist: Yes    PHQ-9 scores:  PHQ-9 SCORE 3/15/2022 2022 2022   PHQ-9 Total Score MyChart 14 (Moderate depression) 11 (Moderate depression) -   PHQ-9 Total Score 14 11 11       LIBRA-7 scores:  LIBRA-7 SCORE 3/15/2022 2022 2022   Total Score 7 (mild anxiety) 7 (mild anxiety) -   Total Score 7 7 12       Patient Identification:    Patient is a 62 year old year old, single  White Not  or  female  who presents for return visit with me.  Patient is currently unemployed. Patient attended the session alone. Patient prefers to be called: \" Paula\".    Current medications include: albuterol (PROAIR HFA) 108 (90 Base) MCG/ACT inhaler, Inhale 2 puffs into the lungs every 4 hours as needed for shortness of breath / dyspnea or wheezing  albuterol (PROVENTIL) (2.5 MG/3ML) 0.083% neb solution, NEBULIZE THE CONTENTS OF 1 VIAL EVERY 6 HOURS AS NEEDED.  amphetamine-dextroamphetamine (ADDERALL) 15 MG tablet, Take 1 tablet (15 mg) by mouth 2 times daily  ARIPiprazole (ABILIFY) 5 MG tablet, Take 1 tablet (5 mg) by mouth daily  buprenorphine HCl-naloxone HCl (SUBOXONE) 8-2 MG per film, Place 1 Film under the tongue daily  buPROPion (WELLBUTRIN SR) 100 MG 12 hr tablet, Take 1 tablet (100 mg) by mouth 2 times daily  cetirizine (ZYRTEC) 10 MG tablet, Take 1 tablet (10 mg) by mouth daily  FLUoxetine (PROZAC) 40 MG capsule, Take 1 capsule (40 mg) by mouth 2 times daily  gabapentin (NEURONTIN) 600 MG tablet, Take 1 tablet (600 mg) by mouth 3 times daily  levothyroxine (SYNTHROID/LEVOTHROID) 25 MCG tablet, " Take 1 tablet (25 mcg) by mouth daily  losartan (COZAAR) 50 MG tablet, Take 50 mg by mouth daily  medical cannabis (Patient's own supply), See Admin Instructions (The purpose of this order is to document that the patient reports taking medical cannabis.  This is not a prescription, and is not used to certify that the patient has a qualifying medical condition.)  mirtazapine (REMERON) 15 MG tablet, Take 1 tablet (15 mg) by mouth At Bedtime  Multiple Vitamins-Minerals (MULTIVITAMIN ADULT PO),   mupirocin (BACTROBAN) 2 % external ointment, Apply topically 3 times daily  mupirocin (BACTROBAN) 2 % external ointment, Apply topically 2 times daily  NARCAN 4 MG/0.1ML nasal spray, INHALE VIA NASAL ROUTE FOR OVERDOSE AS NEEDED. MAY REPEAT AFTER 2-3 MINUTES  order for DME, Equipment being ordered: Nebulizer  oxyCODONE IR (ROXICODONE) 15 MG tablet, Take 15 mg by mouth 3 times daily + 5 mg x1 daily  pravastatin (PRAVACHOL) 40 MG tablet, Take 40 mg by mouth daily  predniSONE (DELTASONE) 10 MG tablet, Take 40 mg daily x2 days then 30 mg daily x2 days then 20 mg daily x2 days then 10 mg x2  tiotropium (SPIRIVA) 18 MCG inhaled capsule, Inhale 1 capsule (18 mcg) into the lungs daily  tiZANidine (ZANAFLEX) 2 MG tablet, TK 2 TO 3 TS PO QHS  topiramate (TOPAMAX) 100 MG tablet, Take 1 tablet (100 mg) by mouth 2 times daily  vitamin D3 (CHOLECALCIFEROL) 2000 units (50 mcg) tablet, Take 1 tablet by mouth daily  budesonide-formoterol (SYMBICORT) 160-4.5 MCG/ACT Inhaler, Inhale 2 puffs into the lungs 2 times daily    lidocaine (PF) (XYLOCAINE) 1 % injection 8 mL         The [place] Prescription Monitoring Program has been reviewed and there are no concerns about diversionary activity for controlled substances at this time.      I was able to review most recent Primary Care Provider, specialty provider, and therapy visit notes that I have access to.     Today, patient reports her brother is in the hospital.  Since being on the ADHD  medication, she informs me that she is getting more things done around 1 and is staying focused and organized to complete projects and interact with other people better.  She denies any suicide thinking.  Occasionally she has difficulty sleeping.       has a past medical history of Acute respiratory failure (H) (02/01/2020), Anxiety, Asthma, Depression, and Hypertension.    Social history updates:    Paula lives by herself.  She identifies family members as supportive to her needs.    Substance use updates:    No alcohol use reported  Tobacco use: Yes Cigarettes  Ready to quit?  No  Nicotine Replacement Therapy tried: None    Vital Signs:   LMP  (LMP Unknown)     Labs:    Most recent laboratory results reviewed and no new labs.     Mental Status Examination:  Appearance: awake, alert and mild distress  Attitude: cooperative  Eye Contact:  Unable to assess  Gait and Station: No dizziness or falls  Psychomotor Behavior:  Unable to assess  Oriented to:  time, person, and place  Attention Span and Concentration:  Fair  Speech:   clear, coherent and Speaks: English  Mood:  anxious and depressed  Affect:  intensity is heightened  Associations:  no loose associations  Thought Process:  goal oriented  Thought Content:  no evidence of suicidal ideation or homicidal ideation, no auditory hallucinations present and no visual hallucinations present  Recent and Remote Memory:  intact Not formally assessed. No amnesia.  Fund of Knowledge: appropriate  Insight:  good  Judgment:  intact  Impulse Control:  intact    Suicide Risk Assessment:  Today Paula Ho reports no thoughts to harm themself or others. In addition, there are notable risk factors for self-harm, including single status, anxiety and mood change. However, risk is mitigated by commitment to family, history of seeking help when needed, future oriented, denies suicidal intent or plan and denies homicidal ideation, intent, or plan. Therefore, based on all available  evidence including the factors cited above, Paula Ho does not appear to be at imminent risk for self-harm, does not meet criteria for a 72-hr hold, and therefore remains appropriate for ongoing outpatient level of care.  A thorough assessment of risk factors related to suicide and self-harm have been reviewed and are noted above. The patient convincingly denies suicidality on several occasions. Local community safety resources printed and reviewed for patient to use if needed. There was no deceit detected, and the patient presented in a manner that was believable.     DSM5 Diagnosis:  Attention-Deficit/Hyperactivity Disorder  314.00 (F90.0) Predominantly inattentive presentation  296.32 (F33.1) Major Depressive Disorder, Recurrent Episode, Moderate _ and With mixed features  300.02 (F41.1) Generalized Anxiety Disorder    Medical comorbidities include:   Patient Active Problem List    Diagnosis Date Noted     Opioid dependence with current use (H) 06/30/2022     Priority: Medium     Infection due to 2019 novel coronavirus 12/09/2020     Priority: Medium     Hyperlipidemia 04/14/2020     Priority: Medium     Benign essential hypertension 04/14/2020     Priority: Medium     MDD (major depressive disorder), recurrent severe, without psychosis (H) 03/02/2020     Priority: Medium     Chronic obstructive pulmonary disease, unspecified COPD type (H) 02/04/2020     Priority: Medium     No PFTS  In EPIC       Attention deficit hyperactivity disorder (ADHD) 01/31/2020     Priority: Medium     Chronic midline low back pain with bilateral sciatica 01/31/2020     Priority: Medium     Brain aneurysm 12/03/2019     Priority: Medium     Dec 2017  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 Device is MR compatible to 3 BREANN 3/2020       History of subarachnoid hemorrhage 12/22/2017     Priority: Medium     H/O of SAH, Cam 2, Hunt-Thomas 1, from a ruptured 9mm left Pcomm aneurysm with a  wide neck and a fetal left PCA arising from the aneurysm neck, s/p successful coil embolization 12/23/2017 by Dr. Otto Hurley       Chronic GERD 01/26/2017     Priority: Medium     Chronic knee pain 12/12/2014     Priority: Medium     Cigarette smoker 07/08/2014     Priority: Medium     Chronic, continuous use of opioids 04/24/2013     Priority: Medium     Managed by pain clinic outside of [place].  UDS + cocaine in December 2019 without confirmation testing at her pain clinic per patient report       Morbid obesity (H) 03/27/2013     Priority: Medium     Status post knee surgery 03/27/2013     Priority: Medium     Per patient's recollection:  Circa 2002: right knee arthroscopy (Dr. Pappas)  Circa 2004: right knee partial knee replacement (Iam Ritchie MD)  Circa 2006: right TKA (Ron Ryder MD; [facility])  Circa 2008: right TKA revision due to infection (Ron Ryder MD; [facility])  Circa 2009: right TKA revision due to infection (Ron Ryder MD; [facility])  April 2011: right TKA (Ron Ryder MD; [facility])       LIBRA (generalized anxiety disorder) 09/28/2011     Priority: Medium     Chronic pain 09/28/2011     Priority: Medium     Knee and low back         DJD (degenerative joint disease) 09/28/2011     Priority: Medium     Insomnia 04/13/2011     Priority: Medium     Insomnia  NOS       Tobacco use disorder 07/31/2006     Priority: Medium     Asthma 11/15/2004     Priority: Medium     Asthma  NOS         Assessment:    Paula Ho reports in today to inform me that her ADHD medication is helpful in getting her to initiate and complete projects around the house.  She notices that her abilities to interact with other people is going better.  Anxiety continues as she worries about getting back into the work world.  However, she is depressed, given that she has chronic back pain issues and is unable to be as mobile as she once was..  Adderall will continue at 15 mg  twice daily.  Mirtazapine, Wellbutrin, and fluoxetine in combination, are helpful in controlling her depression and anxiety symptoms so as to avoid anger outbursts and periods of irritability.  Gabapentin is ordered at bedtime to help with pain management and anxiety.  Abilify as an adjunct medication to help manage mood dysregulation and help the depression medications work better.  Talk therapy would be beneficial to her as she does have a trauma history.    Medication side effects and alternatives were reviewed. Health promotion activities recommended and reviewed today. All questions addressed. Education and counseling completed regarding risks and benefits of medications and psychotherapy options.    Treatment Plan:        1.  Fluoxetine 40 mg twice daily    2.  Abilify 5 mg daily    3.  Gabapentin 600 mg at bedtime    4.  Wellbutrin  mg twice daily    5.  Mirtazapine 15 mg at bedtime    6.  Adderall 15 mg twice daily-monitor blood pressure and pulse and notify me if pulse is greater than 90 and or blood pressure greater than 160/90    7.  Talk therapy, when able to, to learn coping skills to manage life stressors        Continue all other medications as reviewed per electronic medical record today.     Safety plan reviewed. To the Emergency Department as needed or call after hours crisis line at [phone] or [phone]. [place] Crisis Text Line. Text [place] to 505277 or Suicide LifeLine Chat: suicidepreventionlifeline.org/chat/    To schedule individual or family therapy, call [facility] at [phone]    Schedule an appointment with me in 6 weeks or sooner as needed. Call [facility] at [phone] to schedule.    Follow up with primary care provider as planned or for acute medical concerns.    Call the psychiatric nurse line with medication questions or concerns at [phone]    MyChart may be used to communicate with your provider, but this is not intended to be  used for emergencies.    Crisis Resources:    National Suicide Prevention Lifeline: 105.955.8194 (TTY: 314.228.2917). Call anytime for help.  (www.suicidepreventionlifeline.org)  National [place] [employer]ness (www.raudel.org): 870.233.6512 or 472-490-5977.   Mental Health Association (www.mentalhealth.org): 782.357.7839 or 712-873-4028.  [place] Crisis Text Line: Text [place] to 987191  Suicide LifeLine Chat: suicideFriendshipprline.org/chat    Administrative Billing:   Time spent with patient includes counseling and coordination of care regarding above diagnoses and treatment plan.    Patient Status:  Patient will continue to be seen for ongoing consultation and stabilization.    Signed:   VIRGILIO Kidd-BC   Psychiatry

## 2022-09-30 NOTE — PATIENT INSTRUCTIONS
1.  Fluoxetine 40 mg twice daily  2.  Mirtazapine 15 mg at bedtime  3.  Adderall 15 mg twice daily  4.  Wellbutrin  mg twice daily  5.  Gabapentin 600 mg 3 times daily  6.  Abilify 5 mg daily  7.  Talk therapy, when able to, for processing life stressors    Continue all other medications as reviewed per electronic medical record today.   Safety plan reviewed. To the Emergency Department as needed or call after hours crisis line at [phone] or [phone]. [place] Crisis Text [place]. Text [place] to 209379 or Suicide LifeLine Chat: suicideSymbioCellTech.org/chat/  To schedule individual or family therapy, call [facility] at [phone]  Schedule an appointment with me in 6 weeks or sooner as needed. Call [facility] at [phone] to schedule.  Follow up with primary care provider as planned or for acute medical concerns.  Call the psychiatric nurse line with medication questions or concerns at [phone]  MyChart may be used to communicate with your provider, but this is not intended to be used for emergencies.    Crisis Resources:    National Suicide Prevention Lifeline: 248.185.9104 (TTY: 625.351.6448). Call anytime for help.  (www.suicidepreventionlifeline.org)  National [place] on Mental Illness (www.raudel.org): 676.346.8510 or 639-857-4682.   Mental Health Association (www.mentalhealth.org): 910.919.9620 or 629-396-5761.  [place] Crisis Text [place]: Text [place] to 919377  Suicide LifeLine Chat: suicideSymbioCellTech.org/chat

## 2022-10-04 ENCOUNTER — VIRTUAL VISIT (OUTPATIENT)
Dept: BEHAVIORAL HEALTH | Facility: CLINIC | Age: 62
End: 2022-10-04
Payer: MEDICARE

## 2022-10-04 DIAGNOSIS — R69 DIAGNOSIS DEFERRED: Primary | ICD-10-CM

## 2022-10-04 NOTE — PROGRESS NOTES
Behavioral Health Home Services  [facility]: [place]        Social Work Care Navigator Note      Patient: Paula Ho  Date: October 4, 2022  Preferred Name: Paula    Previous PHQ-9:   PHQ-9 SCORE 3/15/2022 6/30/2022 8/1/2022   PHQ-9 Total Score MyChart 14 (Moderate depression) 11 (Moderate depression) -   PHQ-9 Total Score 14 11 11     Previous LIBRA-7:   LIBRA-7 SCORE 3/15/2022 6/30/2022 8/1/2022   Total Score 7 (mild anxiety) 7 (mild anxiety) -   Total Score 7 7 12     ARNALDO LEVEL:  No flowsheet data found.    Preferred Contact:  Need for : No  Preferred Contact: Cell      Type of Contact Today: Phone call (patient / identified key support person reached)      Data: (subjective / Objective):  Recent ED/IP Admission or Discharge?   None    Patient Goals:  Goal Areas: Health; Mental Health; Future HAP Goal area; Chemical Health  Patient stated goals: -Health: Paula would like to continue to meet with her providers, and take her medications as prescribed. Paula would also like assistance with improving her back pain, and receive physical therapy, as well as possibly surgery.  -Chemical Health: Paula would like assistance from her providers to quit smoking and will work on reducing her use, so she can be approved for back surgery.   -Mental Health: Paula would like to find a long term therapy provider through [place], and continue to receive assistance with [facility] for monthly check ins. She would also like to start working with an [facility] worker again.   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 She would like to receive home delivered meals if she does open.    [facility] Core Service Provided:  Comprehensive Care Management: utilized the electronic medical record / patient registry to identify and support patient's health conditions / needs more effectively   Care Transitions: focused on the  coordinated and seamless movement of patient between or within different levels of care or settings  Care Coordination: provided care management services/referrals necessary to ensure patient and their identified supports have access to medical, behavioral health, pharmacology and recovery support services.  Ensured that patient's care is integrated across all settings and services.   Individual and Family Support: aimed to help clients reduce barriers to achieving goals, increase health literacy and knowledge about chronic condition(s), increase self-efficacy skills, and improve health outcomes    Current Stressors / Issues / Care Plan Objective Addressed Today:  [facility] and patient were able to meet today for [facility] monthly check-in via telehealth visit. All required ROIs have been filed with HIM/patient chart.    1. Patient reported that she reported she's been very busy since the last time [facility] has talked to her. She reported that her brother was found unconscious at his house and he ended up needing surgery on his hip. Patient was watching his service dog while he was in the hospital, and they agreed that he would pay her $150 a week. Now that he needed to pay her, he was refusing to pay and said he didn't have any money. He was paying $30/day to a [employer], but he's complaining about paying her less than that.     2. Patient reported that she's having issues with her insurance. One of her insurances was supposed to end on 9/30, and it was supposed to switch to [employer]. She was told that she needs to call Medicare because this is a medicare issue. She had both [employer] and [employer], but now [employer] ended 9/30. She was told she can get back on it, but it won't be until 11/1.     3. Patient reported that she hasn't heard from the [facility] workers at [facility]. She called them back once and then never tried calling them again. [facility] offered to call with patient at end of check in, and patient reported that  she can call them on her own. [facility] gave her the number for the [place] location and can assist in the future if needed. Patient said she will call then again before next check in.     4. Patient has an appointment with her new long term therapist through [facility] on 10/10 and 10/18.    5. Patient reported that she did get her car fixed, she had to get the alternator fixed. Her warranty covered it so she was able to get it fixed.     6. Patient reported that she didn't received the quit partner flyer in the mail. [facility] will email it to her. She reported that it has been up and down with her stressors.     7. Patient reported that she hasn't started moving until about 1-2pm in the day, and then she doesn't feel motivated or like she has time to do anything.     Intervention:  Motivational Interviewing: Expressed Empathy/Understanding, Supported Autonomy, Collaboration, Evocation, Permission to raise concern or advise, Open-ended questions and Reflections: simple and complex   Target Behavior(s): Explored thoughts about taking an anti-depressant, Explored and resolved challenges related to taking anti-depressants as prescribed, Explored thoughts and readiness to participate in individual therapy, Explored and resolved challenges to attending appointments as scheduled, Explored current social supports and reinforced opportunities to increase engagement and Explored patient's perception of how alcohol and / or drugs influences mood    Assessment: (Progress on Goals / Homework):  Patient would benefit from continued coordination in reaching their goals set for the [facility] program. [facility] reviewed Health Action Plan goals and will continue to monitor progress and work with patient and their care team.    Plan: (Homework, other):  Patient was encouraged to continue to seek condition-related information and education.      Scheduled a Phone follow up appointment with [facility] in 4 weeks     Patient has set  self-identified goals and will monitor progress until the next appointment on: 10/8/22.      DENI Humphries  Behavioral Health Home ([facility])   [facility]  [phone]

## 2022-10-10 ENCOUNTER — VIRTUAL VISIT (OUTPATIENT)
Dept: PSYCHOLOGY | Facility: CLINIC | Age: 62
End: 2022-10-10
Payer: MEDICARE

## 2022-10-10 DIAGNOSIS — F33.1 MODERATE EPISODE OF RECURRENT MAJOR DEPRESSIVE DISORDER (H): Primary | ICD-10-CM

## 2022-10-10 PROCEDURE — 90834 PSYTX W PT 45 MINUTES: CPT | Mod: 95

## 2022-10-10 NOTE — PROGRESS NOTES
"      [facility]   Mental Health & Addiction Services     Progress Note - Initial Visit    Patient  Name:  Paula Ho Date: 10/10/22         Service Type: Individual     Visit Start Time: 11:00  Visit End Time: 11:47AM    Visit #: 1    Attendees: Client attended alone    Service Modality:  Video Visit:      Provider verified identity through the following two step process.  Patient provided:  Patient  and Patient address    Telemedicine Visit: The patient's condition can be safely assessed and treated via synchronous audio and visual telemedicine encounter.      Reason for Telemedicine Visit: Patient has requested telehealth visit    Originating Site (Patient Location): Patient's home    Distant Site (Provider Location): [facility] [place]    Consent:  The patient/guardian has verbally consented to: the potential risks and benefits of telemedicine (video visit) versus in person care; bill my insurance or make self-payment for services provided; and responsibility for payment of non-covered services.     Patient would like the video invitation sent by:  Text to cell phone: [phone]    Mode of Communication:  Video Conference via [facility]    As the provider I attest to compliance with applicable laws and regulations related to telemedicine.       DATA:   Interactive Complexity: No   Crisis: No     Presenting Concerns/  Current Stressors:   Patient's presenting concern is her \"lack of motivation\" with activities she would like to do. She also reports that she would like to address her goal of being able to quit smoking cigarettes. She reports in order to get a needed sugery for her back, she has been told she will need to stop smoking. The patient is also concerned about past being able to properly grieve her 's passing. Provider was morena le to gather all information need for the DA. The process for gathering information for the DA will " "continue in the next session. Safety was assessed for the client during this session, and there is no concern at this. The DA process will continue during the next session.       ASSESSMENT:  Mental Status Assessment:  Appearance:   Appropriate   Eye Contact:   Good   Psychomotor Behavior: Normal   Attitude:   Cooperative  Interested  Orientation:   All  Speech   Rate / Production: Normal/ Responsive   Volume:  Normal   Mood:    Normal Sad   Affect:    Appropriate   Thought Content:  Clear   Thought Form:  Coherent   Insight:    Good       Safety Issues and Plan for Safety and Risk Management:     [place] Suicide Severity Rating Scale (Lifetime/Recent)  [place] Suicide Severity Rating (Lifetime/Recent) 10/10/2019 2/14/2020   Wish to be Dead (Lifetime) Yes No   Comments Often due to pain and loss. \"I hate to be alone.\" -   Non-Specific Active Suicidal Thoughts (Lifetime) Yes Yes   Non-Specific Active Suicidal Thought Description (Lifetime) Often due to pain and loss. \"I hate to be alone.\" Did attempt in the past, 15 years ago. -   Most Severe Ideation Rating (Lifetime) 4 3   Most Severe Ideation Description (Lifetime)  Did attempt in the past, 15 years ago. Not fully assessed since Paula needed to leave the session early. -   Frequency (Lifetime) - 3   Duration (Lifetime) - 3   Controllability (Lifetime) - 3   Protective Factors  (Lifetime) - 4   Reasons for Ideation (Lifetime) - 4   RETIRED: 1. Wish to be Dead (Recent) Yes -   RETIRED: Wish to be Dead Description (Recent) Often due to pain and loss. \"I hate to be alone.\" No plan or intention. -   RETIRED: 2. Non-Specific Active Suicidal Thoughts (Recent) Yes -   Non-Specific Active Suicidal Thought Description (Recent) Often due to pain and loss. \"I hate to be alone.\" No plan or intention. -   3. Active Suicidal Ideation with any Methods (Not Plan) Without Intent to Act (Lifetime) Yes Yes   RETIRE: Active Suicidal Ideation with any Methods (Not Plan) Description " "(Lifetime) Often due to pain and loss. \"I hate to be alone.\" Did attempt in the past, 15 years ago. -   RETIRED: 3. Active Suicidal Ideation with any Methods (Not Plan) Without Intent to Act (Recent) Yes -   RETIRED: Active Suicidal Ideation with any Methods (Not Plan) Description (Recent) Often due to pain and loss. \"I hate to be alone.\" No plan or intention. -   RETIRE: 4. Active Suicidal Ideation with Some Intent to Act, Without Specific Plan (Lifetime) Yes Yes   RETIRE: Active Suicidal Ideation with Some Intent to Act, Without Specific Plan Description (Lifetime) Did attempt in the past, 15 years ago. -   4. Active Suicidal Ideation with Some Intent to Act, Without Specific Plan (Recent) No No   RETIRE: 5. Active Suicidal Ideation with Specific Plan and Intent (Lifetime) Yes -   Active Suicidal Ideation with Specific Plan and Intent Description (Lifetime) Did attempt in the past, 15 years ago. -   RETIRED: 5. Active Suicidal Ideation with Specific Plan and Intent (Recent) No -   Most Severe Ideation Rating (Past Month) 2 1   Most Severe Ideation Description (Past Month) Regular thoughts of being dead due to chronic pain and loss. -   Frequency (Past Month) 3 2   Duration (Past Month) 1 -   Controllability (Past Month) 2 3   Protective Factors (Past Month) NA 2   Reasons for Ideation (Past Month) - 4   Actual Attempt (Lifetime) - Yes   Actual Attempt Description (Lifetime) - (No Data)   Comments - OD 25 years ago was hospitalized none since   Has subject engaged in non-suicidal self-injurious behavior? (Past 3 Months) - Yes   Interrupted Attempts (Lifetime) - (No Data)   Comments - burning arms in oven since marriage in 2014.   Most Recent Attempt Date - (No Data)   Comments - 25 years ago   Most Recent Attempt Actual Lethality Code NA 1   Most Lethal Attempt Actual Lethality Code NA -   Initial/First Attempt Actual Lethality Code NA -     Patient denies current fears or concerns for personal safety.  Patient " denies current or recent suicidal ideation or behaviors.  Patient denies current or recent homicidal ideation or behaviors.  Patient denies current or recent self injurious behavior or ideation.  Patient denies other safety concerns.  Recommended that patient call 911 or go to the local ED should there be a change in any of these risk factors.  Patient reports there are NA.     Diagnostic Criteria:  Major Depressive Disorder   - Depressed mood. Note: In children and adolescents, can be irritable mood.     - Diminished interest or pleasure in all, or almost all, activities.    - Significant weight gainincrease in appetite.    - Fatigue or loss of energy.    - Diminished ability to think or concentrate, or indecisiveness.   B) The symptoms cause clinically significant distress or impairment in social, occupational, or other important areas of functioning      DSM5 Diagnoses: (Sustained by DSM5 Criteria Listed Above)  Diagnoses: 296.32 (F33.1) Major Depressive Disorder, Recurrent Episode, Moderate _  Psychosocial & Contextual Factors:Patient is also experiencing physical health concerns.   WHODAS 2.0 (12 item):   WHODAS 2.0 Total Score 2/14/2020 7/15/2020   Total Score 46 36     Intervention:   CBT- patient was educated on techniques to be used to increase her motivation  in regards to activities she would like to begin.   Collateral Reports Completed:  Not Applicable      PLAN: (Homework, other):  1. Provider will continue Diagnostic Assessment.  Patient was given the following to do until next session:  Provider recommended utilizing [employer] for the patient to begin to address subjects that she wants to learn more about. Provider will continue the DA process during the next session.     2. Provider recommended the following referrals: NA.      3.  Suicide Risk and Safety Concerns were assessed for Paula Ho.    Patient meets the following risk assessment and triage: Patient has no current safety concerns or  suicidal ideations. The provider and the patient will cover the safety plan and [place] Suicide Risk Assment during the next session.       BECKY MANRIQUEZ  October 10, 2022    This note has been reviewed and I agree with the plan of care. This note is co-signed by JASON Bang, [facility], Supervisor, on: October 12, 2022

## 2022-10-12 ENCOUNTER — TELEPHONE (OUTPATIENT)
Dept: BEHAVIORAL HEALTH | Facility: CLINIC | Age: 62
End: 2022-10-12

## 2022-10-12 NOTE — TELEPHONE ENCOUNTER
Reason for call:  Medication   If this is a refill request, has the caller requested the refill from the pharmacy already? Yes  Will the patient be using a [facility]? No  Name of the pharmacy and phone number for the current request: [facility]    Name of the medication requested: Adderall    Other request: Patient still has a few left.    Phone number to reach patient:  Home number on file [phone] (home)    Best Time:  ASAP    Can we leave a detailed message on this number?  YES    Travel screening: Not Applicable

## 2022-10-13 DIAGNOSIS — F90.0 ATTENTION DEFICIT HYPERACTIVITY DISORDER (ADHD), PREDOMINANTLY INATTENTIVE TYPE: ICD-10-CM

## 2022-10-13 RX ORDER — DEXTROAMPHETAMINE SACCHARATE, AMPHETAMINE ASPARTATE, DEXTROAMPHETAMINE SULFATE AND AMPHETAMINE SULFATE 3.75; 3.75; 3.75; 3.75 MG/1; MG/1; MG/1; MG/1
15 TABLET ORAL 2 TIMES DAILY
Qty: 60 TABLET | Refills: 0 | Status: SHIPPED | OUTPATIENT
Start: 2022-10-13 | End: 2022-11-10

## 2022-10-13 NOTE — TELEPHONE ENCOUNTER
Date of Last Office Visit: 9/26/2022-no notes for this.  Date of Next Office Visit: 11/10/2022  No shows since last visit: none  Cancellations since last visit: none    Medication requested: Adderall 15 mg tablet Date last ordered: 9/12/2022 Qty: 60 Refills: 0     [place] of [place] ?: yes  Medication last filled date: 9/12/2022 Qty filled: 60  Other controlled substance on [place] ?: yes  If yes, is this a new medication?: no  If yes, name of medication: no and date filled: no    Lapse in medication adherence greater than 5 days?: no  If yes, call patient and gather details: no  Medication refill request verified as identical to current order?: yes  Result of Last DAM, VPA, Li+ Level, CBC, or Carbamazepine Level (at or since last visit): see chart fo labs    Last visit treatment plan: Treatment Plan:          1.  Fluoxetine 40 mg twice daily    2.  Mirtazapine 15 mg at bedtime    3.  Adderall 15 mg twice daily    4.  Wellbutrin  mg twice daily    5.  Gabapentin 600 mg 3 times daily    6.  Abilify 5 mg daily    7.  Talk therapy, when able to, for processing life stressors         Continue all other medications as reviewed per electronic medical record today.     Safety plan reviewed. To the Emergency Department as needed or call after hours crisis line at [phone] or [phone]. [place] Crisis Text Line. Text [place] to 610297 or Suicide LifeLine Chat: suicidepreventionlifeline.org/chat/    To schedule individual or family therapy, call [facility] at [phone]    Schedule an appointment with me in 6 weeks or sooner as needed. Call [facility] at [phone] to schedule.    Follow up with primary care provider as planned or for acute medical concerns.    Call the psychiatric nurse line with medication questions or concerns at [phone]  MyChart may be used to communicate with your provider, but this is not intended to be used for emergencies.    []Medication refilled per   Medication Refill in Ambulatory Care  policy.  [x]Medication unable to be refilled by RN due to criteria not met as indicated below:    []Eligibility - not seen in the last year   []Supervision - no future appointment   []Compliance - no shows, cancellations or lapse in therapy   []Verification - order discrepancy   [x]Controlled medication   [x]Medication not included in policy   []90-day supply request   []Other

## 2022-10-18 ENCOUNTER — TELEPHONE (OUTPATIENT)
Dept: PSYCHOLOGY | Facility: CLINIC | Age: 62
End: 2022-10-18

## 2022-10-18 NOTE — TELEPHONE ENCOUNTER
Patient did not arrive for the [facility] visit as of 12:50PM. Provider called patient and left two messages to call my office number, to reschedule an appointment with me. I also let the patient know they can call [facility] as well to reschedule. Provider will reach out to patient on Wednesday,  10/19 to check in with the patient.

## 2022-10-24 ENCOUNTER — VIRTUAL VISIT (OUTPATIENT)
Dept: PSYCHOLOGY | Facility: CLINIC | Age: 62
End: 2022-10-24
Payer: MEDICARE

## 2022-10-24 DIAGNOSIS — F33.1 MODERATE EPISODE OF RECURRENT MAJOR DEPRESSIVE DISORDER (H): Primary | ICD-10-CM

## 2022-10-24 PROCEDURE — 90834 PSYTX W PT 45 MINUTES: CPT | Mod: 95

## 2022-10-24 NOTE — PATIENT INSTRUCTIONS
1.  Fluoxetine 40 mg twice daily  2.  Abilify 5 mg daily  3.  Gabapentin 600 mg at bedtime  4.  Wellbutrin  mg twice daily  5.  Mirtazapine 15 mg at bedtime  6.  Adderall 15 mg twice daily-monitor blood pressure and pulse and notify me if pulse is greater than 90 and or blood pressure greater than 160/90  7.  Talk therapy, when able to, to learn coping skills to manage life stressors    Continue all other medications as reviewed per electronic medical record today.   Safety plan reviewed. To the Emergency Department as needed or call after hours crisis line at [phone] or [phone]. [place] Crisis Text [place]. Text [place] to 898794 or Suicide LifeLine Chat: [employer].org/chat/  To schedule individual or family therapy, call [facility] at [phone]  Schedule an appointment with me in 6 weeks or sooner as needed. Call [facility] at [phone] to schedule.  Follow up with primary care provider as planned or for acute medical concerns.  Call the psychiatric nurse line with medication questions or concerns at [phone]  MyChart may be used to communicate with your provider, but this is not intended to be used for emergencies.    Crisis Resources:    National Suicide Prevention Lifeline: 439.728.1680 (TTY: 786.431.5665). Call anytime for help.  (www.suicidepreventionlifeline.org)  National [place] on Mental Illness (www.raudel.org): 318.745.8318 or 236-295-1171.   Mental Health Association (www.mentalhealth.org): 780.744.8951 or 458-947-8460.  [place] Crisis Text [place]: Text [place] to 984633  Suicide LifeLine Chat: [employer].org/chat

## 2022-10-25 NOTE — PROGRESS NOTES
"      [facility]   Mental Health & Addiction Services     Progress Note - Initial Visit    Patient  Name:  Paula Ho Date: 10/25/22         Service Type: Individual     Visit Start Time: 11:09  Visit End Time: 11:58AM    Visit #: 1    Attendees: Client attended alone    Service Modality:  Video Visit:      Provider verified identity through the following two step process.  Patient provided:  Patient  and Patient address    Telemedicine Visit: The patient's condition can be safely assessed and treated via synchronous audio and visual telemedicine encounter.      Reason for Telemedicine Visit: Patient has requested telehealth visit    Originating Site (Patient Location): Patient's home    Distant Site (Provider Location): [facility] [place]    Consent:  The patient/guardian has verbally consented to: the potential risks and benefits of telemedicine (video visit) versus in person care; bill my insurance or make self-payment for services provided; and responsibility for payment of non-covered services.     Patient would like the video invitation sent by:  Text to cell phone: [phone]    Mode of Communication:  Video Conference via [employer]    As the provider I attest to compliance with applicable laws and regulations related to telemedicine.       DATA:   Interactive Complexity: No   Crisis: No     Presenting Concerns/  Current Stressors:   Patient's presenting concern is her \"lack of motivation\" with activities she would like to do. She also reports that she would like to address her goal of being able to quit smoking cigarettes. She reports in order to get a needed sugery for her back, she has been told she will need to stop smoking. She reports that she has decreProvider was morena le to gather all information need for the DA. The process for gathering information for the DA will continue in the next session. Safety was assessed for the client " "during this session, and there is no concern at this. The DA process will continue during the next session.       ASSESSMENT:  Mental Status Assessment:  Appearance:   Appropriate   Eye Contact:   Good   Psychomotor Behavior: Normal   Attitude:   Cooperative  Interested  Orientation:   All  Speech   Rate / Production: Normal/ Responsive   Volume:  Normal   Mood:    Normal Sad   Affect:    Appropriate   Thought Content:  Clear   Thought Form:  Coherent   Insight:    Good       Safety Issues and Plan for Safety and Risk Management:     [place] Suicide Severity Rating Scale (Lifetime/Recent)  [place] Suicide Severity Rating (Lifetime/Recent) 10/10/2019 2/14/2020   Wish to be Dead (Lifetime) Yes No   Comments Often due to pain and loss. \"I hate to be alone.\" -   Non-Specific Active Suicidal Thoughts (Lifetime) Yes Yes   Non-Specific Active Suicidal Thought Description (Lifetime) Often due to pain and loss. \"I hate to be alone.\" Did attempt in the past, 15 years ago. -   Most Severe Ideation Rating (Lifetime) 4 3   Most Severe Ideation Description (Lifetime)  Did attempt in the past, 15 years ago. Not fully assessed since Paula needed to leave the session early. -   Frequency (Lifetime) - 3   Duration (Lifetime) - 3   Controllability (Lifetime) - 3   Protective Factors  (Lifetime) - 4   Reasons for Ideation (Lifetime) - 4   RETIRED: 1. Wish to be Dead (Recent) Yes -   RETIRED: Wish to be Dead Description (Recent) Often due to pain and loss. \"I hate to be alone.\" No plan or intention. -   RETIRED: 2. Non-Specific Active Suicidal Thoughts (Recent) Yes -   Non-Specific Active Suicidal Thought Description (Recent) Often due to pain and loss. \"I hate to be alone.\" No plan or intention. -   3. Active Suicidal Ideation with any Methods (Not Plan) Without Intent to Act (Lifetime) Yes Yes   RETIRE: Active Suicidal Ideation with any Methods (Not Plan) Description (Lifetime) Often due to pain and loss. \"I hate to be alone.\" Did " "attempt in the past, 15 years ago. -   RETIRED: 3. Active Suicidal Ideation with any Methods (Not Plan) Without Intent to Act (Recent) Yes -   RETIRED: Active Suicidal Ideation with any Methods (Not Plan) Description (Recent) Often due to pain and loss. \"I hate to be alone.\" No plan or intention. -   RETIRE: 4. Active Suicidal Ideation with Some Intent to Act, Without Specific Plan (Lifetime) Yes Yes   RETIRE: Active Suicidal Ideation with Some Intent to Act, Without Specific Plan Description (Lifetime) Did attempt in the past, 15 years ago. -   4. Active Suicidal Ideation with Some Intent to Act, Without Specific Plan (Recent) No No   RETIRE: 5. Active Suicidal Ideation with Specific Plan and Intent (Lifetime) Yes -   RETIRE: Active Suicidal Ideation with Specific Plan and Intent Description (Lifetime) Did attempt in the past, 15 years ago. -   RETIRED: 5. Active Suicidal Ideation with Specific Plan and Intent (Recent) No -   Most Severe Ideation Rating (Past Month) 2 1   Most Severe Ideation Description (Past Month) Regular thoughts of being dead due to chronic pain and loss. -   Frequency (Past Month) 3 2   Duration (Past Month) 1 -   Controllability (Past Month) 2 3   Protective Factors (Past Month) NA 2   Reasons for Ideation (Past Month) - 4   Actual Attempt (Lifetime) - Yes   Actual Attempt Description (Lifetime) - (No Data)   Comments - OD 25 years ago was hospitalized none since   Has subject engaged in non-suicidal self-injurious behavior? (Past 3 Months) - Yes   Interrupted Attempts (Lifetime) - (No Data)   Comments - burning arms in oven since marriage in 2014.   Most Recent Attempt Date - (No Data)   Comments - 25 years ago   Most Recent Attempt Actual Lethality Code NA 1   Most Lethal Attempt Actual Lethality Code NA -   Initial/First Attempt Actual Lethality Code NA -     Patient denies current fears or concerns for personal safety.  Patient denies current or recent suicidal ideation or " behaviors.  Patient denies current or recent homicidal ideation or behaviors.  Patient denies current or recent self injurious behavior or ideation.  Patient denies other safety concerns.  Recommended that patient call 911 or go to the local ED should there be a change in any of these risk factors.  Patient reports there are NA.     Diagnostic Criteria:  Major Depressive Disorder   - Depressed mood. Note: In children and adolescents, can be irritable mood.     - Diminished interest or pleasure in all, or almost all, activities.    - Significant weight gainincrease in appetite.    - Fatigue or loss of energy.    - Diminished ability to think or concentrate, or indecisiveness.   B) The symptoms cause clinically significant distress or impairment in social, occupational, or other important areas of functioning      DSM5 Diagnoses: (Sustained by DSM5 Criteria Listed Above)  Diagnoses: 296.32 (F33.1) Major Depressive Disorder, Recurrent Episode, Moderate _  Psychosocial & Contextual Factors:Patient is also experiencing physical health concerns.   WHODAS 2.0 (12 item):   WHODAS 2.0 Total Score 2/14/2020 7/15/2020   Total Score 46 36     Intervention:   CBT- patient was educated on techniques to be used to increase her motivation  in regards to activities she would like to begin.   Collateral Reports Completed:  Not Applicable      PLAN: (Homework, other):  1. Provider will continue Diagnostic Assessment.  Patient was given the following to do until next session:  Provider encouraged patient medication compliance. The provider also encouraged the decrease of the patient's tobacco use.  Provider will continue the DA process during the next session.     2. Provider recommended the following referrals: NA.      3.  Suicide Risk and Safety Concerns were assessed for Paula Ho.    Patient meets the following risk assessment and triage: Patient has no current safety concerns or suicidal ideations. The provider and the  patient will cover the safety plan and [place] Suicide Risk Assment during the next session.       BECKY MANRIQUEZ  October 25, 2022    This note has been reviewed and I agree with the plan of care. This note is co-signed by JASON Bang, LICSW, Supervisor, on: October 26, 2022

## 2022-10-31 ENCOUNTER — VIRTUAL VISIT (OUTPATIENT)
Dept: PSYCHOLOGY | Facility: CLINIC | Age: 62
End: 2022-10-31
Payer: MEDICARE

## 2022-10-31 DIAGNOSIS — F33.1 MODERATE EPISODE OF RECURRENT MAJOR DEPRESSIVE DISORDER (H): Primary | ICD-10-CM

## 2022-10-31 PROCEDURE — 90791 PSYCH DIAGNOSTIC EVALUATION: CPT | Mod: 95

## 2022-10-31 ASSESSMENT — COLUMBIA-SUICIDE SEVERITY RATING SCALE - C-SSRS
REASONS FOR IDEATION LIFETIME: MOSTLY TO END OR STOP THE PAIN (YOU COULDN'T GO ON LIVING WITH THE PAIN OR HOW YOU WERE FEELING)
ATTEMPT LIFETIME: YES
1. IN THE PAST MONTH, HAVE YOU WISHED YOU WERE DEAD OR WISHED YOU COULD GO TO SLEEP AND NOT WAKE UP?: NO
TOTAL  NUMBER OF INTERRUPTED ATTEMPTS LIFETIME: NO
2. HAVE YOU ACTUALLY HAD ANY THOUGHTS OF KILLING YOURSELF?: YES
2. HAVE YOU ACTUALLY HAD ANY THOUGHTS OF KILLING YOURSELF?: NO
LETHALITY/MEDICAL DAMAGE CODE MOST RECENT ACTUAL ATTEMPT: MODERATE PHYSICAL DAMAGE, MEDICAL ATTENTION NEEDED
6. HAVE YOU EVER DONE ANYTHING, STARTED TO DO ANYTHING, OR PREPARED TO DO ANYTHING TO END YOUR LIFE?: YES
1. HAVE YOU WISHED YOU WERE DEAD OR WISHED YOU COULD GO TO SLEEP AND NOT WAKE UP?: YES
LETHALITY/MEDICAL DAMAGE CODE MOST LETHAL POTENTIAL ATTEMPT: BEHAVIOR LIKELY TO RESULT IN INJURY BUT NOT LIKELY TO CAUSE DEATH
LETHALITY/MEDICAL DAMAGE CODE MOST LETHAL ACTUAL ATTEMPT: MODERATE PHYSICAL DAMAGE, MEDICAL ATTENTION NEEDED
5. HAVE YOU STARTED TO WORK OUT OR WORKED OUT THE DETAILS OF HOW TO KILL YOURSELF? DO YOU INTEND TO CARRY OUT THIS PLAN?: NO
TOTAL  NUMBER OF PREPARATORY ACTS LIFETIME: 1
ATTEMPT PAST THREE MONTHS: NO
3. HAVE YOU BEEN THINKING ABOUT HOW YOU MIGHT KILL YOURSELF?: YES
LETHALITY/MEDICAL DAMAGE CODE MOST RECENT POTENTIAL ATTEMPT: BEHAVIOR LIKELY TO RESULT IN INJURY BUT NOT LIKELY TO CAUSE DEATH
4. HAVE YOU HAD THESE THOUGHTS AND HAD SOME INTENTION OF ACTING ON THEM?: NO
TOTAL  NUMBER OF ACTUAL ATTEMPTS LIFETIME: 1

## 2022-10-31 NOTE — PROGRESS NOTES
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 31, 2022    This note has been reviewed and I agree with the plan of care. This note is co-signed by JASON Bang, LICSW, Supervisor, on: November 8, 2022

## 2022-11-02 RX ORDER — MIRTAZAPINE 15 MG/1
15 TABLET, FILM COATED ORAL AT BEDTIME
Qty: 30 TABLET | Refills: 1 | Status: CANCELLED | OUTPATIENT
Start: 2022-11-02

## 2022-11-02 NOTE — TELEPHONE ENCOUNTER
Date of Last Office Visit: 9/26/2022  Date of Next Office Visit: 11/10/2022  No shows since last visit: none  Cancellations since last visit: none    Medication requested: Mirtazapine 15 mg tablet Date last ordered: 7/5/2022 Qty: 30 Refills: 1     Review of [place] ?: n/a      Lapse in medication adherence greater than 5 days?: no  If yes, call patient and gather details: no  Medication refill request verified as identical to current order?: yes  Result of Last DAM, VPA, Li+ Level, CBC, or Carbamazepine Level (at or since last visit): N/A    Last visit treatment plan: Treatment Plan:          1.  Fluoxetine 40 mg twice daily    2.  Abilify 5 mg daily    3.  Gabapentin 600 mg at bedtime    4.  Wellbutrin  mg twice daily    5.  Mirtazapine 15 mg at bedtime    6.  Adderall 15 mg twice daily-monitor blood pressure and pulse and notify me if pulse is greater than 90 and or blood pressure greater than 160/90    7.  Talk therapy, when able to, to learn coping skills to manage life stressors         Continue all other medications as reviewed per electronic medical record today.     Safety plan reviewed. To the Emergency Department as needed or call after hours crisis line at [phone] or [phone]. [place] Crisis Text Line. Text [place] to 459040 or Suicide LifeLine Chat: suicidepreventionlifeline.org/chat/    To schedule individual or family therapy, call [facility] at [phone]    Schedule an appointment with me in 6 weeks or sooner as needed. Call [facility] at [phone] to schedule.    Follow up with primary care provider as planned or for acute medical concerns.    Call the psychiatric nurse line with medication questions or concerns at [phone]  MyChart may be used to communicate with your provider, but this is not intended to be used for emergencies    []Medication refilled per  Medication Refill in Ambulatory Care  policy.  [x]Medication unable to be refilled by RN  due to criteria not met as indicated below:    []Eligibility - not seen in the last year   []Supervision - no future appointment   []Compliance - no shows, cancellations or lapse in therapy   []Verification - order discrepancy   []Controlled medication   [x]Medication not included in policy   []90-day supply request   []Other

## 2022-11-04 DIAGNOSIS — G47.09 OTHER INSOMNIA: Primary | ICD-10-CM

## 2022-11-04 RX ORDER — MIRTAZAPINE 15 MG/1
15 TABLET, FILM COATED ORAL AT BEDTIME
Qty: 7 TABLET | Refills: 0 | Status: SHIPPED | OUTPATIENT
Start: 2022-11-04 | End: 2022-11-10

## 2022-11-07 ENCOUNTER — VIRTUAL VISIT (OUTPATIENT)
Dept: PSYCHOLOGY | Facility: CLINIC | Age: 62
End: 2022-11-07
Payer: MEDICARE

## 2022-11-07 DIAGNOSIS — F33.1 MODERATE EPISODE OF RECURRENT MAJOR DEPRESSIVE DISORDER (H): Primary | ICD-10-CM

## 2022-11-07 PROCEDURE — 90834 PSYTX W PT 45 MINUTES: CPT | Mod: 95

## 2022-11-07 NOTE — PROGRESS NOTES
[facility]                                     Progress Note    Patient Name: Paula Ho  Date: 22         Service Type: Individual    Session Start Time: 11:08AM Session End Time: 11:58AM      Session Length: 50 Minutes     Session #: 4    Attendees: Client attended alone    Service Modality:  Video Visit:      Provider verified identity through the following two step process.  Patient provided:  Patient  and Patient is known previously to provider    Telemedicine Visit: The patient's condition can be safely assessed and treated via synchronous audio and visual telemedicine encounter.      Reason for Telemedicine Visit: Patient convenience (e.g. access to timely appointments / distance to available provider)    Originating Site (Patient Location): Patient's home    Distant Site (Provider Location): [facility]    Consent:  The patient/guardian has verbally consented to: the potential risks and benefits of telemedicine (video visit) versus in person care; bill my insurance or make self-payment for services provided; and responsibility for payment of non-covered services.     Patient would like the video invitation sent by:  [phone]    Mode of Communication:  Video Conference via [facility]    Distant Location (Provider):  On-site    As the provider I attest to compliance with applicable laws and regulations related to telemedicine.    DATA  Interactive Complexity: No  Crisis: No        Progress Since Last Session (Related to Symptoms / Goals / Homework):   Symptoms: No change Patient reports continued feelings towards her partner and her self esteem.    Homework: Partially completed      Episode of Care Goals: Minimal progress - CONTEMPLATION (Considering change and yet undecided); Intervened by assessing the negative and positive thinking (ambivalence) about behavior change     Current / Ongoing Stressors and  Concerns:   Patient states that she recently went to the pain clinic, and was told her UA would need to come back clean four times before they would adjust her medication. She reports that she will be going to the pain clinic every week to do a UA to make sure her UA is clear of any substances. She continues to have concerns with her partner, and states her self esteem worsened while in the relationship. She also continues to report pain in her back.      Treatment Objective(s) Addressed in This Session:   Increase interest, engagement, and pleasure in doing things  Decrease frequency and intensity of feeling down, depressed, hopeless  Identify negative self-talk and behaviors: challenge core beliefs, myths, and actions       Intervention:   CBT: Patient was taught about the connection between her behaviors, thoughts, and feelings.          ASSESSMENT: Current Emotional / Mental Status (status of significant symptoms):   Risk status (Self / Other harm or suicidal ideation)   Patient denies current fears or concerns for personal safety.   Patient denies current or recent suicidal ideation or behaviors.   Patient denies current or recent homicidal ideation or behaviors.   Patient denies current or recent self injurious behavior or ideation.   Patient denies other safety concerns.   Patient reports there has been no change in risk factors since their last session.     Patient reports there has been no change in protective factors since their last session.     There are no firearms in the house.     Appearance:   Appropriate    Eye Contact:   Fair    Psychomotor Behavior: Normal    Attitude:   Cooperative    Orientation:   All   Speech    Rate / Production: Normal     Volume:  Normal    Mood:    Normal   Affect:    Appropriate    Thought Content:  Clear    Thought Form:  Coherent  Logical    Insight:    Good      Medication Review:   No changes to current psychiatric medication(s)     Medication  Compliance:   Yes     Changes in Health Issues:   None reported     Chemical Use Review:   Substance Use: Chemical use reviewed, no active concerns identified      Tobacco Use: Yes, no change.  Patient reports frequency of use of cigarettes. Contemplation  Patient assessed present costs and future losses as a result of smoking  Provided encouragement to quit   Provided support and affirmation for steps taken towards quiting     Diagnosis:    Moderate Episode of Recurrent Major Depressive Disorder (F33.1)  Collateral Reports Completed:   Not Applicable    PLAN: (Patient Tasks / Therapist Tasks / Other)  Patient was encouraged to list 3 positive things about herself. Patient was also encouraged to reach out to her doctor concerning her weight gain and back pain.         BECKY MANRIQUEZ                                                       This note has been reviewed and I agree with the plan of care. This note is co-signed by JASON Bang, [facility], Supervisor, on: November 8, 2022  ______________________________________________________________________    Individual Treatment Plan    Patient's Name: Paula Ho  YOB: 1960    Date of Creation: 11/7/22  Date Treatment Plan Last Reviewed/Revised: 11/7/22     DSM5 Diagnoses: 296.32 (F33.1) Major Depressive Disorder, Recurrent Episode, Moderate _  Psychosocial / Contextual Factors: Patient has physical medical concerns that impact her current depressive symptoms.  PROMIS (reviewed every 90 days):     Referral / Collaboration:  Referral to another professional/service is not indicated at this time..    Anticipated number of session for this episode of care: 9-12 sessions  Anticipation frequency of session: Weekly  Anticipated Duration of each session: 38-52 minutes  Treatment plan will be reviewed in 90 days or when goals have been changed.       MeasurableTreatment Goal(s) related to diagnosis / functional impairment(s)  Goal 1: Patient will  develop healthy thinking patterns and beliefs about self, others, and the world.       Objective #A (Patient Action)    Patient will Identify negative self-talk and behaviors: challenge core beliefs, myths, and actions.  Status: New - Date: 11/7/22     Intervention(s)  Therapist will teach how to identify negative self-talk and behaviors.    Objective #B  Patient will Improve concentration, focus, and mindfulness in daily activities .  Status: New - Date: 11/7/22     Intervention(s)  Therapist will teach how to improve concentration, focus, and mindfulness..      Goal 2: Patient will alleviate depressive symptoms in order to return to a previous level of functioning. As evidenced by a PHQ-9 score of 9 or less.       Objective # A  Status: New - Date: 11/7/22     Patient will lidentify at least 4 adaptive coping statements to counteract negative thoughts    Intervention(s)  Therapist will teach adaptive coping statements to conteract negative thoughts.    Objective #B  Patient will Increase interest, engagement, and pleasure in doing things.    Status: New - Date: 11/7/22     Intervention(s)  Therapist will assign homework related to increasing engagement and interest in doing things.        Patient has reviewed and agreed to the above plan.      BECKY MANRIQUEZ  November 7, 2022    This note has been reviewed and I agree with the plan of care. This note is co-signed by JASON Bang, [facility], Supervisor, on: November 8, 2022

## 2022-11-08 ENCOUNTER — VIRTUAL VISIT (OUTPATIENT)
Dept: BEHAVIORAL HEALTH | Facility: CLINIC | Age: 62
End: 2022-11-08
Payer: MEDICARE

## 2022-11-08 DIAGNOSIS — R69 DIAGNOSIS DEFERRED: Primary | ICD-10-CM
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
## 2022-11-10 ENCOUNTER — VIRTUAL VISIT (OUTPATIENT)
Dept: PSYCHIATRY | Facility: CLINIC | Age: 62
End: 2022-11-10
Payer: MEDICARE

## 2022-11-10 DIAGNOSIS — F51.04 PSYCHOPHYSIOLOGICAL INSOMNIA: ICD-10-CM

## 2022-11-10 DIAGNOSIS — F90.0 ATTENTION DEFICIT HYPERACTIVITY DISORDER (ADHD), PREDOMINANTLY INATTENTIVE TYPE: ICD-10-CM

## 2022-11-10 DIAGNOSIS — F33.1 MODERATE EPISODE OF RECURRENT MAJOR DEPRESSIVE DISORDER (H): Primary | ICD-10-CM

## 2022-11-10 DIAGNOSIS — F41.1 GAD (GENERALIZED ANXIETY DISORDER): ICD-10-CM

## 2022-11-10 PROCEDURE — 99214 OFFICE O/P EST MOD 30 MIN: CPT | Mod: 95 | Performed by: NURSE PRACTITIONER

## 2022-11-10 RX ORDER — FLUOXETINE 40 MG/1
40 CAPSULE ORAL 2 TIMES DAILY
Qty: 60 CAPSULE | Refills: 1 | Status: SHIPPED | OUTPATIENT
Start: 2022-11-10 | End: 2023-05-01

## 2022-11-10 RX ORDER — DEXTROAMPHETAMINE SACCHARATE, AMPHETAMINE ASPARTATE, DEXTROAMPHETAMINE SULFATE AND AMPHETAMINE SULFATE 3.75; 3.75; 3.75; 3.75 MG/1; MG/1; MG/1; MG/1
15 TABLET ORAL 2 TIMES DAILY
Qty: 60 TABLET | Refills: 0 | Status: SHIPPED | OUTPATIENT
Start: 2022-11-10 | End: 2022-12-27

## 2022-11-10 RX ORDER — MIRTAZAPINE 15 MG/1
15 TABLET, FILM COATED ORAL AT BEDTIME
Qty: 30 TABLET | Refills: 1 | Status: SHIPPED | OUTPATIENT
Start: 2022-11-10 | End: 2023-05-01 | Stop reason: DRUGHIGH

## 2022-11-10 ASSESSMENT — ANXIETY QUESTIONNAIRES
7. FEELING AFRAID AS IF SOMETHING AWFUL MIGHT HAPPEN: NOT AT ALL
2. NOT BEING ABLE TO STOP OR CONTROL WORRYING: SEVERAL DAYS
5. BEING SO RESTLESS THAT IT IS HARD TO SIT STILL: NOT AT ALL
GAD7 TOTAL SCORE: 9
4. TROUBLE RELAXING: NEARLY EVERY DAY
GAD7 TOTAL SCORE: 9
6. BECOMING EASILY ANNOYED OR IRRITABLE: NEARLY EVERY DAY
IF YOU CHECKED OFF ANY PROBLEMS ON THIS QUESTIONNAIRE, HOW DIFFICULT HAVE THESE PROBLEMS MADE IT FOR YOU TO DO YOUR WORK, TAKE CARE OF THINGS AT HOME, OR GET ALONG WITH OTHER PEOPLE: SOMEWHAT DIFFICULT
3. WORRYING TOO MUCH ABOUT DIFFERENT THINGS: SEVERAL DAYS
1. FEELING NERVOUS, ANXIOUS, OR ON EDGE: SEVERAL DAYS

## 2022-11-10 NOTE — PROGRESS NOTES
"Paula is a 62 year old who is being evaluated via a billable telephone visit.      What phone number would you like to be contacted at? [phone]  How would you like to obtain your AVS? Mail a copy  Phone call duration: 25 minutes             Outpatient Psychiatric Progress Note    Name: Paula Ho   : 1960                    Primary Care Provider: LEONA ALANIZ CNP   Therapist: Yes    PHQ-9 scores:  PHQ-9 SCORE 3/15/2022 2022 2022   PHQ-9 Total Score MyChart 14 (Moderate depression) 11 (Moderate depression) -   PHQ-9 Total Score 14 11 11       LIBRA-7 scores:  LIBRA-7 SCORE 2022 2022 11/10/2022   Total Score 7 (mild anxiety) - -   Total Score 7 12 9       Patient Identification:    Patient is a 62 year old year old, single  White Not  or  female  who presents for return visit with me.  Patient is currently disabled. Patient attended the session alone. Patient prefers to be called: \" Paula\".    Current medications include: albuterol (PROAIR HFA) 108 (90 Base) MCG/ACT inhaler, Inhale 2 puffs into the lungs every 4 hours as needed for shortness of breath / dyspnea or wheezing  albuterol (PROVENTIL) (2.5 MG/3ML) 0.083% neb solution, NEBULIZE THE CONTENTS OF 1 VIAL EVERY 6 HOURS AS NEEDED.  amphetamine-dextroamphetamine (ADDERALL) 15 MG tablet, Take 1 tablet (15 mg) by mouth 2 times daily  ARIPiprazole (ABILIFY) 5 MG tablet, Take 1 tablet (5 mg) by mouth daily  buprenorphine HCl-naloxone HCl (SUBOXONE) 8-2 MG per film, Place 1 Film under the tongue daily  buPROPion (WELLBUTRIN SR) 100 MG 12 hr tablet, Take 1 tablet (100 mg) by mouth 2 times daily  cetirizine (ZYRTEC) 10 MG tablet, Take 1 tablet (10 mg) by mouth daily  FLUoxetine (PROZAC) 40 MG capsule, Take 1 capsule (40 mg) by mouth 2 times daily  gabapentin (NEURONTIN) 600 MG tablet, Take 1 tablet (600 mg) by mouth daily At bedtime  levothyroxine (SYNTHROID/LEVOTHROID) 25 MCG tablet, Take 1 tablet (25 mcg) by " mouth daily  losartan (COZAAR) 50 MG tablet, Take 50 mg by mouth daily  medical cannabis (Patient's own supply), See Admin Instructions (The purpose of this order is to document that the patient reports taking medical cannabis.  This is not a prescription, and is not used to certify that the patient has a qualifying medical condition.)  mirtazapine (REMERON) 15 MG tablet, Take 1 tablet (15 mg) by mouth At Bedtime  Multiple Vitamins-Minerals (MULTIVITAMIN ADULT PO),   mupirocin (BACTROBAN) 2 % external ointment, Apply topically 3 times daily  NARCAN 4 MG/0.1ML nasal spray, INHALE VIA NASAL ROUTE FOR OVERDOSE AS NEEDED. MAY REPEAT AFTER 2-3 MINUTES  order for DME, Equipment being ordered: Nebulizer  oxyCODONE IR (ROXICODONE) 15 MG tablet, Take 15 mg by mouth 3 times daily + 5 mg x1 daily  pravastatin (PRAVACHOL) 40 MG tablet, Take 40 mg by mouth daily  tiotropium (SPIRIVA) 18 MCG inhaled capsule, Inhale 1 capsule (18 mcg) into the lungs daily  tiZANidine (ZANAFLEX) 2 MG tablet, TK 2 TO 3 TS PO QHS  topiramate (TOPAMAX) 100 MG tablet, Take 1 tablet (100 mg) by mouth 2 times daily  vitamin D3 (CHOLECALCIFEROL) 2000 units (50 mcg) tablet, Take 1 tablet by mouth daily  budesonide-formoterol (SYMBICORT) 160-4.5 MCG/ACT Inhaler, Inhale 2 puffs into the lungs 2 times daily  predniSONE (DELTASONE) 10 MG tablet, Take 40 mg daily x2 days then 30 mg daily x2 days then 20 mg daily x2 days then 10 mg x2 (Patient not taking: Reported on 11/10/2022)    lidocaine (PF) (XYLOCAINE) 1 % injection 8 mL         The [place] Prescription Monitoring Program has been reviewed and there are no concerns about diversionary activity for controlled substances at this time.      I was able to review most recent Primary Care Provider, specialty provider, and therapy visit notes that I have access to.     Today, patient reports  that the pain clinic found a trace of cocaine in her system.  She went back and the UA was clean.  She was told to come  back every two weeks but she told them that she was coming back weekly for UAs.  She has been upset about this.  She had been irritable and depressed over this.  She has more pain in the morning. She needs to stop smoking before she can have surgery.  She started Wellbutrin but has not notice any difference. Her pain wakes up her up at night.  She has to take medication then.  She thinks that her hips are going out.  She tries not to use her walker.  She feels, that since starting the Adderall, she has been more organized and motivated.     has a past medical history of Acute respiratory failure (H) (02/01/2020), Anxiety, Asthma, Depression, and Hypertension.    Social history updates:    She isolates to her home.  Her activity is limited due to her back pain.      Substance use updates:    No alcohol use reported  Tobacco use: Yes Cigarettes  Ready to quit?  Yes started Wellbutrin nicotine Replacement Therapy tried: Zyban     Vital Signs:   LMP  (LMP Unknown)     Labs:    Most recent laboratory results reviewed and no new labs.     Mental Status Examination:  Appearance: awake, alert  Attitude: cooperative  Eye Contact:  Unable to assess  Gait and Station: No dizziness or falls, Uses walker and Not all the time  Psychomotor Behavior:  Unable to assess  Oriented to:  time, person, and place  Attention Span and Concentration:  Normal  Speech:   clear, coherent and Speaks: English  Mood:  anxious, depressed and better  Affect:  intensity is heightened  Associations:  no loose associations  Thought Process:  goal oriented  Thought Content:  no evidence of suicidal ideation or homicidal ideation, no auditory hallucinations present and no visual hallucinations present  Recent and Remote Memory:  intact Not formally assessed. No amnesia.  Fund of Knowledge: appropriate  Insight:  good  Judgment:  intact  Impulse Control:  intact    Suicide Risk Assessment:  Today Paula Ho reports no thoughts to harm themself or  others. In addition, there are notable risk factors for self-harm, including single status, anxiety and comorbid medical condition of Chronic pain. However, risk is mitigated by commitment to family, history of seeking help when needed, future oriented, denies suicidal intent or plan and denies homicidal ideation, intent, or plan. Therefore, based on all available evidence including the factors cited above, Paula Ho does not appear to be at imminent risk for self-harm, does not meet criteria for a 72-hr hold, and therefore remains appropriate for ongoing outpatient level of care.  A thorough assessment of risk factors related to suicide and self-harm have been reviewed and are noted above. The patient convincingly denies suicidality on several occasions. Local community safety resources printed and reviewed for patient to use if needed. There was no deceit detected, and the patient presented in a manner that was believable.     DSM5 Diagnosis:  Attention-Deficit/Hyperactivity Disorder  314.01 (F90.2) Combined presentation  296.32 (F33.1) Major Depressive Disorder, Recurrent Episode, Moderate _ and With mixed features  300.02 (F41.1) Generalized Anxiety Disorder  780.52 (G47.00) Insomnia Disorder   With other medical comorbidity  Recurrent      Medical comorbidities include:   Patient Active Problem List    Diagnosis Date Noted     Opioid dependence with current use (H) 06/30/2022     Priority: Medium     Infection due to 2019 novel coronavirus 12/09/2020     Priority: Medium     Hyperlipidemia 04/14/2020     Priority: Medium     Benign essential hypertension 04/14/2020     Priority: Medium     MDD (major depressive disorder), recurrent severe, without psychosis (H) 03/02/2020     Priority: Medium     Chronic obstructive pulmonary disease, unspecified COPD type (H) 02/04/2020     Priority: Medium     No PFTS  In EPIC       Attention deficit hyperactivity disorder (ADHD) 01/31/2020     Priority: Medium      Chronic midline low back pain with bilateral sciatica 01/31/2020     Priority: Medium     Brain aneurysm 12/03/2019     Priority: Medium     Dec 2017  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 Device is MR compatible to 3 BREANN 3/2020       History of subarachnoid hemorrhage 12/22/2017     Priority: Medium     H/O of SAH, Cam 2, Hunt-Thomas 1, from a ruptured 9mm left Pcomm aneurysm with a wide neck and a fetal left PCA arising from the aneurysm neck, s/p successful coil embolization 12/23/2017 by Dr. Otto Hurley       Chronic GERD 01/26/2017     Priority: Medium     Chronic knee pain 12/12/2014     Priority: Medium     Cigarette smoker 07/08/2014     Priority: Medium     Chronic, continuous use of opioids 04/24/2013     Priority: Medium     Managed by pain clinic outside of [place].  UDS + cocaine in December 2019 without confirmation testing at her pain clinic per patient report       Morbid obesity (H) 03/27/2013     Priority: Medium     Status post knee surgery 03/27/2013     Priority: Medium     Per patient's recollection:  Circa 2002: right knee arthroscopy (Dr. Pappas)  Circa 2004: right knee partial knee replacement (Iam Ritchie MD)  Circa 2006: right TKA (Ron Ryder MD; [facility])  Circa 2008: right TKA revision due to infection (Ron Ryder MD; [facility])  Circa 2009: right TKA revision due to infection (Ron Ryder MD; [facility])  April 2011: right TKA (Ron Ryder MD; [facility])       LIBRA (generalized anxiety disorder) 09/28/2011     Priority: Medium     Chronic pain 09/28/2011     Priority: Medium     Knee and low back         DJD (degenerative joint disease) 09/28/2011     Priority: Medium     Insomnia 04/13/2011     Priority: Medium     Insomnia  NOS       Tobacco use disorder 07/31/2006     Priority: Medium     Asthma 11/15/2004     Priority: Medium     Asthma  NOS         Assessment:    Paula Ho  reported in today in mild distress.  She had a questionable UA at her pain clinic and has been working with them to get this situation under control.  Her sleep disruption is attributed to her chronic pain.  She is trying to stop smoking so that she can have surgery.  Talk therapy is continuing as she learns skills to process life stress.  Gabapentin is available as needed for anxiety and she requested no refills today.  For mood stabilization she will continue with Abilify 5 mg daily.  Wellbutrin is continuing for smoking cessation.  She is tolerating the fluoxetine and mirtazapine well in controlling depression symptoms.  She maintains that taking Adderall daily has helped her to be able to initiate projects and finish them around her house as well as decrease distraction and improve focus and concentration..    Medication side effects and alternatives were reviewed. Health promotion activities recommended and reviewed today. All questions addressed. Education and counseling completed regarding risks and benefits of medications and psychotherapy options.    Treatment Plan:        1.  Mirtazapine 15 mg at bedtime    2.  Adderall 15 mg twice daily    3.  Fluoxetine 40 mg twice daily    4.  Gabapentin as needed for anxiety and pain-no refills requested today    5.  Abilify 5 mg daily    6.  Wellbutrin  mg twice daily    7.  Continue talk therapy to learn coping skills for managing life stressors        Continue all other medications as reviewed per electronic medical record today.     Safety plan reviewed. To the Emergency Department as needed or call after hours crisis line at [phone] or [phone]. [place] Crisis Text [place]. Text [place] to 233889 or Suicide LifeLine Chat: suicidepreventionlifeline.org/chat/    To schedule individual or family therapy, call [facility] at [phone]    Schedule an appointment with me in 6 weeks or sooner as needed. Call [facility] at  [phone] to schedule.    Follow up with primary care provider as planned or for acute medical concerns.    Call the psychiatric nurse line with medication questions or concerns at [phone]    MyChart may be used to communicate with your provider, but this is not intended to be used for emergencies.    Crisis Resources:    National Suicide Prevention Lifeline: 307.471.5518 (TTY: 153.406.5049). Call anytime for help.  (www.suicidepreventionlifeline.org)  National [place] on Mental Illness (www.raudel.org): 320.966.9486 or 298-315-5002.   Mental Health Association (www.mentalhealth.org): 523.584.8229 or 533-754-9493.  [place] Crisis Text [place]: Text [place] to 576854  Suicide LifeLine Chat: suicidepreventionlifeline.org/chat    Administrative Billing:   Time spent with patient includes counseling and coordination of care regarding above diagnoses and treatment plan.    Patient Status:  Patient will continue to be seen for ongoing consultation and stabilization.    Signed:   VIRGILIO Kidd-BC   Psychiatry

## 2022-11-10 NOTE — PATIENT INSTRUCTIONS
1.  Mirtazapine 15 mg at bedtime  2.  Adderall 15 mg twice daily  3.  Fluoxetine 40 mg twice daily  4.  Gabapentin as needed for anxiety and pain-no refills requested today  5.  Abilify 5 mg daily  6.  Wellbutrin  mg twice daily  7.  Continue talk therapy to learn coping skills for managing life stressors    Continue all other medications as reviewed per electronic medical record today.   Safety plan reviewed. To the Emergency Department as needed or call after hours crisis line at [phone] or [phone]. [place] Crisis Text [place]. Text [place] to 536761 or Suicide LifeLine Chat: suicideYouku.org/chat/  To schedule individual or family therapy, call [facility] at [phone]  Schedule an appointment with me in 6 weeks or sooner as needed. Call [facility] at [phone] to schedule.  Follow up with primary care provider as planned or for acute medical concerns.  Call the psychiatric nurse line with medication questions or concerns at [phone]  [facility] In[facility] may be used to communicate with your provider, but this is not intended to be used for emergencies.    Crisis Resources:    National Suicide Prevention Lifeline: 895.925.7554 (TTY: 258.954.1151). Call anytime for help.  (www.suicidepreventionlifeline.org)  National [place] on Mental Illness (www.raudel.org): 959.545.2542 or 667-339-8327.   Mental Health Association (www.mentalhealth.org): 186.575.8834 or 820-358-8724.  [place] Crisis Text [place]: Text [place] to 173780  Suicide LifeLine Chat: suicideYouku.org/chat

## 2022-11-17 ENCOUNTER — TELEPHONE (OUTPATIENT)
Dept: PSYCHOLOGY | Facility: CLINIC | Age: 62
End: 2022-11-17

## 2022-11-17 NOTE — TELEPHONE ENCOUNTER
Patient did not arrive for the [facility] visit as of 2:55PM. Provider called patient and left two messages to call my office number, to reschedule an appointment with me. I also let the patient know they can call [facility] as well to reschedule. Provider will reach out to patient on Friday, 11/17 to check in with the patient.

## 2022-11-21 ENCOUNTER — VIRTUAL VISIT (OUTPATIENT)
Dept: PSYCHOLOGY | Facility: CLINIC | Age: 62
End: 2022-11-21
Payer: MEDICARE

## 2022-11-21 DIAGNOSIS — F33.1 MODERATE EPISODE OF RECURRENT MAJOR DEPRESSIVE DISORDER (H): Primary | ICD-10-CM

## 2022-11-21 PROCEDURE — 90834 PSYTX W PT 45 MINUTES: CPT | Mod: 95

## 2022-11-23 NOTE — PROGRESS NOTES
[facility]                                     Progress Note    Patient Name: Paula Ho  Date: 22         Service Type: Individual    Session Start Time: 11:12 AM Session End Time: 11:58AM      Session Length: 46 Minutes     Session #: 5    Attendees: Client attended alone    Service Modality:  Video Visit:      Provider verified identity through the following two step process.  Patient provided:  Patient  and Patient is known previously to provider    Telemedicine Visit: The patient's condition can be safely assessed and treated via synchronous audio and visual telemedicine encounter.      Reason for Telemedicine Visit: Patient convenience (e.g. access to timely appointments / distance to available provider)    Originating Site (Patient Location): Patient's home    Distant Site (Provider Location): [facility] [place]    Consent:  The patient/guardian has verbally consented to: the potential risks and benefits of telemedicine (video visit) versus in person care; bill my insurance or make self-payment for services provided; and responsibility for payment of non-covered services.     Patient would like the video invitation sent by:  [phone]    Mode of Communication:  Video Conference via [facility]    Distant Location (Provider):  On-site    As the provider I attest to compliance with applicable laws and regulations related to telemedicine.    DATA  Interactive Complexity: No  Crisis: No        Progress Since Last Session (Related to Symptoms / Goals / Homework):   Symptoms: No change Patient reports continued feelings towards her partner and her self esteem.    Homework: Partially completed      Episode of Care Goals: Minimal progress - CONTEMPLATION (Considering change and yet undecided); Intervened by assessing the negative and positive thinking (ambivalence) about behavior change     Current / Ongoing Stressors and  Concerns:   Patient states that she has visited the pain clinic, and her UA has been come back clean each time.  She continues to have concerns with her partner, and states her self esteem worsened while in the relationship. She reports a recently lengthy argument with her significant other where she felt belittled. Patient's significant other is in town and this has caused issues for both her and her partner.  She reports a concern for being alone if she was to leave her boyfriend. Provider assessed for safety for the client, and the client reported that she is safe and it not a concern. She also continues to report pain in her back.      Treatment Objective(s) Addressed in This Session:   Increase interest, engagement, and pleasure in doing things  Decrease frequency and intensity of feeling down, depressed, hopeless  Identify negative self-talk and behaviors: challenge core beliefs, myths, and actions  Patient's strengths and goals, and life before she was with her partner     Intervention:   CBT: Patient was taught about the connection between her behaviors, thoughts, and feelings.     MI: Provide affirmations, show empathy, listen for strengths and past success.       ASSESSMENT: Current Emotional / Mental Status (status of significant symptoms):   Risk status (Self / Other harm or suicidal ideation)   Patient denies current fears or concerns for personal safety.   Patient denies current or recent suicidal ideation or behaviors.   Patient denies current or recent homicidal ideation or behaviors.   Patient denies current or recent self injurious behavior or ideation.   Patient denies other safety concerns.   Patient reports there has been no change in risk factors since their last session.     Patient reports there has been no change in protective factors since their last session.     There are no firearms in the house.     Appearance:   Appropriate    Eye Contact:   Poor   Psychomotor Behavior: Normal     Attitude:   Cooperative    Orientation:   All   Speech    Rate / Production: Normal     Volume:  Normal    Mood:    Irritable  Sad    Affect:    Appropriate    Thought Content:  Clear    Thought Form:  Coherent  Logical    Insight:    Good      Medication Review:   No changes to current psychiatric medication(s)     Medication Compliance:   Yes     Changes in Health Issues:   None reported     Chemical Use Review:   Substance Use: Chemical use reviewed, no active concerns identified      Tobacco Use: Yes, no change.  Patient reports frequency of use of cigarettes. Contemplation  Patient assessed present costs and future losses as a result of smoking  Provided encouragement to quit   Provided support and affirmation for steps taken towards quiting     Diagnosis:    Moderate Episode of Recurrent Major Depressive Disorder (F33.1)  Collateral Reports Completed:   Not Applicable    PLAN: (Patient Tasks / Therapist Tasks / Other)  Patient was encouraged to list 3 positive things about herself. Patient was also encouraged to reach out to her doctor concerning her weight gain and back pain. Patient was also encouraged to think abut her strengths in being alone and her ability to possibly do so.         BECKY MANRIQUEZ    This note has been reviewed and I agree with the plan of care. This note is co-signed by JASON Bang, [facility], Supervisor, on: November 26, 2022  ______________________________________________________________________    Individual Treatment Plan    Patient's Name: Paula oH  YOB: 1960    Date of Creation: 11/7/22  Date Treatment Plan Last Reviewed/Revised: 11/7/22     DSM5 Diagnoses: 296.32 (F33.1) Major Depressive Disorder, Recurrent Episode, Moderate _  Psychosocial / Contextual Factors: Patient has physical medical concerns that impact her current depressive symptoms.  PROMIS (reviewed every 90 days):     Referral / Collaboration:  Referral to another  professional/service is not indicated at this time..    Anticipated number of session for this episode of care: 9-12 sessions  Anticipation frequency of session: Weekly  Anticipated Duration of each session: 38-52 minutes  Treatment plan will be reviewed in 90 days or when goals have been changed.       MeasurableTreatment Goal(s) related to diagnosis / functional impairment(s)  Goal 1: Patient will develop healthy thinking patterns and beliefs about self, others, and the world.       Objective #A (Patient Action)    Patient will Identify negative self-talk and behaviors: challenge core beliefs, myths, and actions.  Status: New - Date: 11/7/22     Intervention(s)  Therapist will teach how to identify negative self-talk and behaviors.    Objective #B  Patient will Improve concentration, focus, and mindfulness in daily activities .  Status: New - Date: 11/7/22     Intervention(s)  Therapist will teach how to improve concentration, focus, and mindfulness..      Goal 2: Patient will alleviate depressive symptoms in order to return to a previous level of functioning. As evidenced by a PHQ-9 score of 9 or less.       Objective # A  Status: New - Date: 11/7/22     Patient will lidentify at least 4 adaptive coping statements to counteract negative thoughts    Intervention(s)  Therapist will teach adaptive coping statements to conteract negative thoughts.    Objective #B  Patient will Increase interest, engagement, and pleasure in doing things.    Status: New - Date: 11/7/22     Intervention(s)  Therapist will assign homework related to increasing engagement and interest in doing things.        Patient has reviewed and agreed to the above plan.      BECKY MANRIQUEZ  November 7, 2022    This note has been reviewed and I agree with the plan of care. This note is co-signed by JASON Bang, [facility], Supervisor, on: November 8, 2022

## 2022-12-06 ENCOUNTER — VIRTUAL VISIT (OUTPATIENT)
Dept: BEHAVIORAL HEALTH | Facility: CLINIC | Age: 62
End: 2022-12-06
Payer: MEDICARE

## 2022-12-06 ENCOUNTER — VIRTUAL VISIT (OUTPATIENT)
Dept: PSYCHOLOGY | Facility: CLINIC | Age: 62
End: 2022-12-06
Payer: MEDICARE

## 2022-12-06 DIAGNOSIS — F33.1 MODERATE EPISODE OF RECURRENT MAJOR DEPRESSIVE DISORDER (H): Primary | ICD-10-CM

## 2022-12-06 DIAGNOSIS — R69 DIAGNOSIS DEFERRED: Primary | ICD-10-CM

## 2022-12-06 PROCEDURE — 90834 PSYTX W PT 45 MINUTES: CPT | Mod: 95

## 2022-12-06 NOTE — PROGRESS NOTES
Behavioral Health Home Services  [facility]: [place]        Social Work Care Navigator Note      Patient: Paula Ho  Date: December 6, 2022  Preferred Name: Paula    Previous PHQ-9:   PHQ-9 SCORE 3/15/2022 6/30/2022 8/1/2022   PHQ-9 Total Score MyChart 14 (Moderate depression) 11 (Moderate depression) -   PHQ-9 Total Score 14 11 11     Previous LIBRA-7:   LIBRA-7 SCORE 6/30/2022 8/1/2022 11/10/2022   Total Score 7 (mild anxiety) - -   Total Score 7 12 9     ARNALDO LEVEL:  No flowsheet data found.    Preferred Contact:  Need for : No  Preferred Contact: Cell      Type of Contact Today: Phone call (patient / identified key support person reached)      Data: (subjective / Objective):  Recent ED/IP Admission or Discharge?   None    Patient Goals:  Goal Areas: Health; Mental Health; Future HAP Goal area; Chemical Health  Patient stated goals: -Health: Paula would like to continue to meet with her providers, and take her medications as prescribed. Paula would also like assistance with improving her back pain, and receive physical therapy, as well as possibly surgery.  -Chemical Health: Paula would like assistance from her providers to quit smoking and will work on reducing her use, so she can be approved for back surgery.   -Mental Health: Paula would like to find a long term therapy provider through [facility], and continue to receive assistance with [facility] for monthly check ins. She would also like to start working with an [facility] worker again.   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 She would like to receive home delivered meals if she does open.    [facility] Core Service Provided:  Comprehensive Care Management: utilized the electronic medical record / patient registry to identify and support patient's health conditions / needs more effectively   Care Transitions: focused on the coordinated and  seamless movement of patient between or within different levels of care or settings  Care Coordination: provided care management services/referrals necessary to ensure patient and their identified supports have access to medical, behavioral health, pharmacology and recovery support services.  Ensured that patient's care is integrated across all settings and services.   Individual and Family Support: aimed to help clients reduce barriers to achieving goals, increase health literacy and knowledge about chronic condition(s), increase self-efficacy skills, and improve health outcomes    Current Stressors / Issues / Care Plan Objective Addressed Today:  SWCC and patient were able to meet today for Behavioral Health Home ([facility]) monthly check-in via telehealth visit. All required ROIs have been filed with HIM/patient chart.    1. Patient reported that she's been feeling so bad recently. She has been crying non-stop for two days as well. Patient reported that her brother fell again and broke his femur, so she went to get his dog and has had him for two weeks. She called her brother to come get the dog because she doesn't want to lose her housing over it. She reported that she got rid of the dog and gave him up to his niece's . Patient is feeling very awful because he's a service dog and she is worried that her brother won't ever speak to her anymore. She is now not able to get her brother to answer, so he doesn't know yet that his dog was given up. Patient is very sad about the situation and she's been physically sick from it as well.     2. Patient reported they did the evaluation for [facility], and now she's waiting to hear more information from them. She was also told she may need to meet with a male provider and she's not sure of how she feels about this.     3. Patient reported that she hadn't been taking the Remeron medication, but she is now taking them again so she's hoping that should help with her mood and  depression. She reported that her depression was very bad and her family/friends were wondering why her depression was so bad, but she thinks it was medication issue. She reported that this time of year is really hard for her because she has lost so many people at this time of year. She also reported that she normally likes to decorate for [place], but she hasn't decorated this year yet.     4. Patient reported that she started physical therapy for her back on 12/15.     5. Patient reported that she's trying to get little things accomplished, and she's going to work on cleaning the upstairs of her house tomorrow. She was able to get to the bank today as well.     6. Patient reported that she met recently with the pain doctor, and she is seeing a new provider now because her provider changed to a new location. She reported that she explained to the new provider what was happening with the small trace of cocaine, and the provider didn't agree with the multiple UA tests. She is now back to once a month testing, and she can get her medications once a month. She reported that she has always been consistent with her medications.     7. Patient reported that she's still struggling with her smoking, and reports it's been awful. She was doing well until she started feeling more depressed recently. [facility] encouraged her to get back to where she was and validated her struggles with quitting.     Intervention:  Motivational Interviewing: Expressed Empathy/Understanding, Supported Autonomy, Collaboration, Evocation, Permission to raise concern or advise, Open-ended questions and Reflections: simple and complex   Target Behavior(s): Explored thoughts about taking an anti-depressant, Explored and resolved challenges related to taking anti-depressants as prescribed, Explored thoughts and readiness to participate in individual therapy, Explored and resolved challenges to attending appointments as scheduled and Explored current  social supports and reinforced opportunities to increase engagement    Assessment: (Progress on Goals / Homework):  Patient would benefit from continued coordination in reaching their goals set for the Behavioral Health Home ([facility]) program. SWCC reviewed Health Action Plan goals and will continue to monitor progress and work with patient and their care team.    Plan: (Homework, other):  Patient was encouraged to continue to seek condition-related information and education.      Scheduled a Phone follow up appointment with SW CC in 3 weeks     Patient has set self-identified goals and will monitor progress until the next appointment on: 12/27/22.       DENI Humphries  Behavioral Health Home ([facility])   [facility]  [phone]

## 2022-12-08 NOTE — PROGRESS NOTES
[facility]                                     Progress Note    Patient Name: Paula Ho  Date: 22         Service Type: Individual    Session Start Time: 10:08 AM Session End Time: 10:48 AM      Session Length: 40 Minutes     Session #: 6    Attendees: Client attended alone    Service Modality:  Video Visit:      Provider verified identity through the following two step process.  Patient provided:  Patient  and Patient is known previously to provider    Telemedicine Visit: The patient's condition can be safely assessed and treated via synchronous audio and visual telemedicine encounter.      Reason for Telemedicine Visit: Patient convenience (e.g. access to timely appointments / distance to available provider)    Originating Site (Patient Location): Patient's home    Distant Site (Provider Location): Provider Remote Setting- Home Office    Consent:  The patient/guardian has verbally consented to: the potential risks and benefits of telemedicine (video visit) versus in person care; bill my insurance or make self-payment for services provided; and responsibility for payment of non-covered services.     Patient would like the video invitation sent by:  [phone]    Mode of Communication:  Video Conference via [facility]    Distant Location (Provider):  Off-site    As the provider I attest to compliance with applicable laws and regulations related to telemedicine.    DATA  Interactive Complexity: No  Crisis: No        Progress Since Last Session (Related to Symptoms / Goals / Homework):   Symptoms: Worsening Due to patient's recent decision with her brother's dog    Homework: Partially completed      Episode of Care Goals: Minimal progress - CONTEMPLATION (Considering change and yet undecided); Intervened by assessing the negative and positive thinking (ambivalence) about behavior change     Current / Ongoing Stressors and Concerns:   Patient reports her brother has recently broken  his femur and she made the decision to take care of his dog. The patient states her landlord would not allow her to have more dogs than she currently has in her living space. She states due to her brother's refusal to speak to her she had to make the decision to re-house his dog to a previous family member. She reports the decision is permanent and herself and her brother will not be able to get the dog back after he is released from the hospital. She reports being very stressed, depressed, and concerned about his reaction once he is to find out this news. Patient states that she has visited the pain clinic, and her UA has been come back clean each time.       Treatment Objective(s) Addressed in This Session:   Increase interest, engagement, and pleasure in doing things  Decrease frequency and intensity of feeling down, depressed, hopeless  Identify negative self-talk and behaviors: challenge core beliefs, myths, and actions  The patient's attempt to smoke a cigarette during the session.      Intervention:   CBT: Patient was educated on the importance of mindfulness, deep breathing, and self care when her anxiety is increased.   Motivational Interviewing    MI Intervention: Expressed Empathy/Understanding, Supported Autonomy, Collaboration, Evocation, Permission to raise concern or advise, Open-ended questions and Reframe     Change Talk Expressed by the Patient: Reasons to change    Provider Response to Change Talk: E - Evoked more info from patient about behavior change, A - Affirmed patient's thoughts, decisions, or attempts at behavior change and R - Reflected patient's change talk     Provider also reminded the patient of boundaries in session, and the disapproval of smoking cigarettes during any sessions.      ASSESSMENT: Current Emotional / Mental Status (status of significant symptoms):   Risk status (Self / Other harm or suicidal ideation)   Patient denies current fears or concerns for personal  safety.   Patient denies current or recent suicidal ideation or behaviors.   Patient denies current or recent homicidal ideation or behaviors.   Patient denies current or recent self injurious behavior or ideation.   Patient denies other safety concerns.   Patient reports there has been no change in risk factors since their last session.     Patient reports there has been no change in protective factors since their last session.     There are no firearms in the house.     Appearance:   Disheveled    Eye Contact:   Poor   Psychomotor Behavior: Normal    Attitude:   Cooperative  Attentive   Orientation:   All   Speech    Rate / Production: Normal     Volume:  Normal    Mood:    Depressed  Irritable  Sad    Affect:    Tearful Worrisome    Thought Content:  Clear    Thought Form:  Coherent  Logical    Insight:    Good      Medication Review:   No changes to current psychiatric medication(s)     Medication Compliance:   Yes     Changes in Health Issues:   None reported     Chemical Use Review:   Substance Use: Chemical use reviewed, no active concerns identified      Tobacco Use: Yes, no change.  Patient reports frequency of use of cigarettes. Contemplation  Patient assessed present costs and future losses as a result of smoking  Provided encouragement to quit   Provided support and affirmation for steps taken towards quiting     Diagnosis:    Moderate Episode of Recurrent Major Depressive Disorder (F33.1)  Collateral Reports Completed:   Not Applicable    PLAN: (Patient Tasks / Therapist Tasks / Other)  Patient was encouraged to try and reach out to her brother or other family members who may aid her in telling him the news of his dog. Patient was also encouraged to try and complete any tasks that are important for her wellbeing in order to improve her overall motivation.       BECKY MANRIQUEZ    This note has been reviewed and I agree with the plan of care. This note is co-signed by JASON Bang, LICSW,  Supervisor, on: December 8, 2022  ______________________________________________________________________    Individual Treatment Plan    Patient's Name: Paula Ho  YOB: 1960    Date of Creation: 11/7/22  Date Treatment Plan Last Reviewed/Revised: 11/7/22     DSM5 Diagnoses: 296.32 (F33.1) Major Depressive Disorder, Recurrent Episode, Moderate _  Psychosocial / Contextual Factors: Patient has physical medical concerns that impact her current depressive symptoms.  PROMIS (reviewed every 90 days):     Referral / Collaboration:  Referral to another professional/service is not indicated at this time..    Anticipated number of session for this episode of care: 9-12 sessions  Anticipation frequency of session: Weekly  Anticipated Duration of each session: 38-52 minutes  Treatment plan will be reviewed in 90 days or when goals have been changed.       MeasurableTreatment Goal(s) related to diagnosis / functional impairment(s)  Goal 1: Patient will develop healthy thinking patterns and beliefs about self, others, and the world.       Objective #A (Patient Action)    Patient will Identify negative self-talk and behaviors: challenge core beliefs, myths, and actions.  Status: New - Date: 11/7/22     Intervention(s)  Therapist will teach how to identify negative self-talk and behaviors.    Objective #B  Patient will Improve concentration, focus, and mindfulness in daily activities .  Status: New - Date: 11/7/22     Intervention(s)  Therapist will teach how to improve concentration, focus, and mindfulness..      Goal 2: Patient will alleviate depressive symptoms in order to return to a previous level of functioning. As evidenced by a PHQ-9 score of 9 or less.       Objective # A  Status: New - Date: 11/7/22     Patient will lidentify at least 4 adaptive coping statements to counteract negative thoughts    Intervention(s)  Therapist will teach adaptive coping statements to conteract negative  thoughts.    Objective #B  Patient will Increase interest, engagement, and pleasure in doing things.    Status: New - Date: 11/7/22     Intervention(s)  Therapist will assign homework related to increasing engagement and interest in doing things.        Patient has reviewed and agreed to the above plan.      BECKY MANRIQUEZ  November 7, 2022    This note has been reviewed and I agree with the plan of care. This note is co-signed by JASON Bang, LICSW, Supervisor, on: November 8, 2022

## 2022-12-19 ENCOUNTER — VIRTUAL VISIT (OUTPATIENT)
Dept: PSYCHOLOGY | Facility: CLINIC | Age: 62
End: 2022-12-19
Payer: MEDICARE

## 2022-12-19 DIAGNOSIS — F33.1 MODERATE EPISODE OF RECURRENT MAJOR DEPRESSIVE DISORDER (H): Primary | ICD-10-CM

## 2022-12-19 PROCEDURE — 90834 PSYTX W PT 45 MINUTES: CPT | Mod: 95

## 2022-12-19 ASSESSMENT — PATIENT HEALTH QUESTIONNAIRE - PHQ9
SUM OF ALL RESPONSES TO PHQ9 QUESTIONS 1 & 2: 4
7. TROUBLE CONCENTRATING ON THINGS, SUCH AS READING THE NEWSPAPER OR WATCHING TELEVISION: NOT AT ALL
3. TROUBLE FALLING OR STAYING ASLEEP OR SLEEPING TOO MUCH: SEVERAL DAYS
2. FEELING DOWN, DEPRESSED OR HOPELESS: SEVERAL DAYS
SUM OF ALL RESPONSES TO PHQ QUESTIONS 1-9: 8
4. FEELING TIRED OR HAVING LITTLE ENERGY: SEVERAL DAYS
9. THOUGHTS THAT YOU WOULD BE BETTER OFF DEAD, OR OF HURTING YOURSELF: NOT AT ALL
1. LITTLE INTEREST OR PLEASURE IN DOING THINGS: NEARLY EVERY DAY
6. FEELING BAD ABOUT YOURSELF - OR THAT YOU ARE A FAILURE OR HAVE LET YOURSELF OR YOUR FAMILY DOWN: SEVERAL DAYS
5. POOR APPETITE OR OVEREATING: NOT AT ALL
8. MOVING OR SPEAKING SO SLOWLY THAT OTHER PEOPLE COULD HAVE NOTICED. OR THE OPPOSITE, BEING SO FIGETY OR RESTLESS THAT YOU HAVE BEEN MOVING AROUND A LOT MORE THAN USUAL: SEVERAL DAYS
10. IF YOU CHECKED OFF ANY PROBLEMS, HOW DIFFICULT HAVE THESE PROBLEMS MADE IT FOR YOU TO DO YOUR WORK, TAKE CARE OF THINGS AT HOME, OR GET ALONG WITH OTHER PEOPLE: SOMEWHAT DIFFICULT

## 2022-12-20 NOTE — PROGRESS NOTES
[facility]                                     Progress Note    Patient Name: Paula Ho  Date: 22         Service Type: Individual    Session Start Time: 10:16 AM Session End Time: 10:58 AM      Session Length: 42 Minutes     Session #: 7    Attendees: Client attended alone    Service Modality:  Video Visit:      Provider verified identity through the following two step process.  Patient provided:  Patient  and Patient is known previously to provider    Telemedicine Visit: The patient's condition can be safely assessed and treated via synchronous audio and visual telemedicine encounter.      Reason for Telemedicine Visit: Patient convenience (e.g. access to timely appointments / distance to available provider)    Originating Site (Patient Location): Patient's home    Distant Site (Provider Location): [facility] [place]    Consent:  The patient/guardian has verbally consented to: the potential risks and benefits of telemedicine (video visit) versus in person care; bill my insurance or make self-payment for services provided; and responsibility for payment of non-covered services.     Patient would like the video invitation sent by:  [phone]    Mode of Communication:  Video Conference via [facility]    Distant Location (Provider):  On-site    As the provider I attest to compliance with applicable laws and regulations related to telemedicine.    DATA  Interactive Complexity: No  Crisis: No        Progress Since Last Session (Related to Symptoms / Goals / Homework):   Symptoms: No change Patient continues to feel down    Homework: Partially completed      Episode of Care Goals: Minimal progress - CONTEMPLATION (Considering change and yet undecided); Intervened by assessing the negative and positive thinking (ambivalence) about behavior change     Current / Ongoing Stressors and Concerns:   Patient reports her brother has recently broken  his femur and she made the decision to take care of his dog. The patient states her landlord would not allow her to have more dogs than she currently has in her living space. Patient states her brother is aware of his dogs rehousing, and she is feeling a bit better regarding the situation.  Patient states that she has visited the pain clinic, and her UA has been come back clean each time. Patient reports to recently getting a cold, and feeling unwell. She reports an increase in sleep due to feeling unwell.  Paula reports to continued stress in relation to her relationship with her current partner. She reports to looking into physical therapy for her back pain.      Treatment Objective(s) Addressed in This Session:   Increase interest, engagement, and pleasure in doing things  Decrease frequency and intensity of feeling down, depressed, hopeless  Identify negative self-talk and behaviors: challenge core beliefs, myths, and actions     Intervention:   CBT: Patient was educated on the importance of mindfulness, deep breathing, and self care when her anxiety is increased. Provider encouraged the exploration of physical therapy in order to alleviate back pain.   Motivational Interviewing    MI Intervention: Expressed Empathy/Understanding, Supported Autonomy, Collaboration, Evocation, Permission to raise concern or advise, Open-ended questions and Reframe     Change Talk Expressed by the Patient: Reasons to change    Provider Response to Change Talk: E - Evoked more info from patient about behavior change, A - Affirmed patient's thoughts, decisions, or attempts at behavior change and R - Reflected patient's change talk       ASSESSMENT: Current Emotional / Mental Status (status of significant symptoms):   Risk status (Self / Other harm or suicidal ideation)   Patient denies current fears or concerns for personal safety.   Patient denies current or recent suicidal ideation or behaviors.   Patient denies current or recent  homicidal ideation or behaviors.   Patient denies current or recent self injurious behavior or ideation.   Patient denies other safety concerns.   Patient reports there has been no change in risk factors since their last session.     Patient reports there has been no change in protective factors since their last session.     There are no firearms in the house.     Appearance:   Disheveled    Eye Contact:   Poor   Psychomotor Behavior: Normal    Attitude:   Cooperative  Attentive   Orientation:   All   Speech    Rate / Production: Normal     Volume:  Normal    Mood:    Depressed  Irritable  Sad    Affect:    Tearful Worrisome    Thought Content:  Clear    Thought Form:  Coherent  Logical    Insight:    Good      Medication Review:   No changes to current psychiatric medication(s)     Medication Compliance:   Yes     Changes in Health Issues:   None reported     Chemical Use Review:   Substance Use: Chemical use reviewed, no active concerns identified      Tobacco Use: Yes, no change.  Patient reports frequency of use of cigarettes. Contemplation  Patient assessed present costs and future losses as a result of smoking  Provided encouragement to quit   Provided support and affirmation for steps taken towards quiting     Diagnosis:    Moderate Episode of Recurrent Major Depressive Disorder (F33.1)  Collateral Reports Completed:   Not Applicable    PLAN: (Patient Tasks / Therapist Tasks / Other)  Patient was encouraged to reach out to [facility] for rehabilitation to alleviate current back pain. Patient was also encouraged to try and complete any tasks that are important for her wellbeing in order to improve her overall motivation.       BECKY MANRIQUEZ    This note has been reviewed and I agree with the plan of care. This note is co-signed by JASON Bang, LICSW, Supervisor, on: December 21, 2022  ______________________________________________________________________    Individual Treatment Plan    Patient's  Name: Paula Ho  YOB: 1960    Date of Creation: 11/7/22  Date Treatment Plan Last Reviewed/Revised: 11/7/22     DSM5 Diagnoses: 296.32 (F33.1) Major Depressive Disorder, Recurrent Episode, Moderate _  Psychosocial / Contextual Factors: Patient has physical medical concerns that impact her current depressive symptoms.  PROMIS (reviewed every 90 days):     Referral / Collaboration:  Referral to another professional/service is not indicated at this time..    Anticipated number of session for this episode of care: 9-12 sessions  Anticipation frequency of session: Weekly  Anticipated Duration of each session: 38-52 minutes  Treatment plan will be reviewed in 90 days or when goals have been changed.       MeasurableTreatment Goal(s) related to diagnosis / functional impairment(s)  Goal 1: Patient will develop healthy thinking patterns and beliefs about self, others, and the world.       Objective #A (Patient Action)    Patient will Identify negative self-talk and behaviors: challenge core beliefs, myths, and actions.  Status: New - Date: 11/7/22     Intervention(s)  Therapist will teach how to identify negative self-talk and behaviors.    Objective #B  Patient will Improve concentration, focus, and mindfulness in daily activities .  Status: New - Date: 11/7/22     Intervention(s)  Therapist will teach how to improve concentration, focus, and mindfulness..      Goal 2: Patient will alleviate depressive symptoms in order to return to a previous level of functioning. As evidenced by a PHQ-9 score of 9 or less.       Objective # A  Status: New - Date: 11/7/22     Patient will lidentify at least 4 adaptive coping statements to counteract negative thoughts    Intervention(s)  Therapist will teach adaptive coping statements to conteract negative thoughts.    Objective #B  Patient will Increase interest, engagement, and pleasure in doing things.    Status: New - Date: 11/7/22     Intervention(s)  Therapist  will assign homework related to increasing engagement and interest in doing things.        Patient has reviewed and agreed to the above plan.      BECKY MANRIQUEZ  November 7, 2022    This note has been reviewed and I agree with the plan of care. This note is co-signed by JASON Bang, LICSW, Supervisor, on: November 8, 2022

## 2022-12-29 ENCOUNTER — VIRTUAL VISIT (OUTPATIENT)
Dept: BEHAVIORAL HEALTH | Facility: CLINIC | Age: 62
End: 2022-12-29
Payer: MEDICARE

## 2022-12-29 DIAGNOSIS — R69 DIAGNOSIS DEFERRED: Primary | ICD-10-CM

## 2022-12-29 DIAGNOSIS — F90.0 ATTENTION DEFICIT HYPERACTIVITY DISORDER (ADHD), PREDOMINANTLY INATTENTIVE TYPE: ICD-10-CM

## 2022-12-29 RX ORDER — DEXTROAMPHETAMINE SACCHARATE, AMPHETAMINE ASPARTATE, DEXTROAMPHETAMINE SULFATE AND AMPHETAMINE SULFATE 3.75; 3.75; 3.75; 3.75 MG/1; MG/1; MG/1; MG/1
15 TABLET ORAL 2 TIMES DAILY
Qty: 60 TABLET | Refills: 0 | Status: SHIPPED | OUTPATIENT
Start: 2023-01-10 | End: 2023-03-14

## 2022-12-29 NOTE — Clinical Note
Omero Escoto,  I met with patient today and she stated her Adderall refill was only for once a day, but she's been taking twice a day for the past couple months. Could you verify if she's supposed to continue with the twice daily prescription, and if so, can she get the updated prescription?   Thanks!  [facility] ([facility])  [facility] [phone]

## 2022-12-29 NOTE — PROGRESS NOTES
Behavioral Health Home Services  [facility]: [place]        Social Work Care Navigator Note      Patient: Paula Ho  Date: December 29, 2022  Preferred Name: Paula    Previous PHQ-9:   PHQ-9 SCORE 3/15/2022 6/30/2022 8/1/2022   PHQ-9 Total Score MyChart 14 (Moderate depression) 11 (Moderate depression) -   PHQ-9 Total Score 14 11 11     Previous LIBRA-7:   LIBRA-7 SCORE 6/30/2022 8/1/2022 11/10/2022   Total Score 7 (mild anxiety) - -   Total Score 7 12 9     ARNALDO LEVEL:  No flowsheet data found.    Preferred Contact:  Need for : No  Preferred Contact: Cell      Type of Contact Today: Phone call (patient / identified key support person reached)      Data: (subjective / Objective):  Recent ED/IP Admission or Discharge?   None    Patient Goals:  Goal Areas: Health; Mental Health; Future HAP Goal area; Chemical Health  Patient stated goals: -Health: Paula would like to continue to meet with her providers, and take her medications as prescribed. Paula would also like assistance with improving her back pain, and receive physical therapy, as well as possibly surgery.  -Chemical Health: Paula would like assistance from her providers to quit smoking and will work on reducing her use, so she can be approved for back surgery.   -Mental Health: Paula would like to find a long term therapy provider through [facility], and continue to receive assistance with [facility] for monthly check ins. She would also like to start working with an [facility] worker again.   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 She would like to receive home delivered meals if she does open.    [facility] Core Service Provided:  Comprehensive Care Management: utilized the electronic medical record / patient registry to identify and support patient's health conditions / needs more effectively   Care Transitions: focused on the coordinated and  seamless movement of patient between or within different levels of care or settings  Care Coordination: provided care management services/referrals necessary to ensure patient and their identified supports have access to medical, behavioral health, pharmacology and recovery support services.  Ensured that patient's care is integrated across all settings and services.   Individual and Family Support: aimed to help clients reduce barriers to achieving goals, increase health literacy and knowledge about chronic condition(s), increase self-efficacy skills, and improve health outcomes    Current Stressors / Issues / Care Plan Objective Addressed Today:  [facility] and patient were able to meet today for Behavioral Health [place] ([facility]) monthly check-in via telehealth visit. All required ROIs have been filed with HIM/patient chart.    1. Patient reported that she's been in a lot of pain, and she thinks she has a cold right now. She was able to get to her pain clinic appointment yesterday as well, so she has her medications and doesn't have an appointment until next month. She reported her back pain is higher today, and she doesn't know why it's been hurting more recently.     2. Patient reported that she didn't do anything for the holidays. She reported that she received three different calls about three people passing away that she knew, and one of them being her aunt.     3. Patient reported she completed the initial appointment with the [facility] agency, and they haven't called her since. [facility] will call [place] to figure out where she's at in the process.     4. Patient started her pool therapy, and she goes back on the 4th for her next appointment. She was supposed to go tomorrow, but she is watching a family member's kid so she isn't able to go.     5. Patient reports that her mom is bothering her to come visit, and she doesn't want to because she has things she wants to do around here. She was hoping about going to do something  for New Year's.     6. Patient reported that she has been feeling better since she started taking her Remeron again. She reported she's feeling excellent. She noticed that [facility] switched her Adderall to one per day instead of two per day, so she has to reach out and figure out why there was a change.     7. Patient reported that she still has cleaning projects on her list to complete.     8. Patient reported that her smoking has been okay. She reports that it was really hard after she heard of the people passing, but it slowed down again.     9. Patient reported that she's been okay with her new therapist, and she'll continue to work with her weekly.     10. Patient reached out to her landlord and her county worker because she noticed they raised her social security, but they raised her rent and lowered her food stamps because of the income increase. She is meeting with her landlord later this afternoon to discuss this.      She also reported that she likes living at her place, but the stairs are hard for her to do. She will eventually think about moving somewhere else, but she doesn't want to at this time.     11. Patient reported she's been having issues with sleeping and she can't sleep lately. She woke up early this morning and she worked on cleaning out her closet.     12. Patient reports that she received an Adderall refill but it's only for once a day, when she's been taking them twice a day. She requested that [facility] reach out to [facility] to ask about this change, so [facility] routed message to [facility]. [facility] also reviewed [facility]'s most recent visit and found that the treatment plan still lists the medication for twice daily.     Update: [facility] notified [facility] that patient's prescription is updated and can be picked up January 10th. [facility] called patient and left a message giving her the update.     Intervention:  Motivational Interviewing: Expressed Empathy/Understanding, Supported Autonomy, Collaboration, Evocation,  Permission to raise concern or advise, Open-ended questions and Reflections: simple and complex   Target Behavior(s): Explored thoughts about taking an anti-depressant, Explored and resolved challenges related to taking anti-depressants as prescribed, Explored thoughts and readiness to participate in individual therapy, Explored and resolved challenges to attending appointments as scheduled, Explored current social supports and reinforced opportunities to increase engagement and Explored patient's perception of how alcohol and / or drugs influences mood    Assessment: (Progress on Goals / Homework):  Patient would benefit from continued coordination in reaching their goals set for the Behavioral Health Home ([facility]) program. [facility] reviewed Health Action Plan goals and will continue to monitor progress and work with patient and their care team.    Plan: (Homework, other):  Patient was encouraged to continue to seek condition-related information and education.      Scheduled a Phone follow up appointment with MISA RICE in 2 weeks     Patient has set self-identified goals and will monitor progress until the next appointment on: 1/12/23.      DENI Humphries  Behavioral Health Home ([facility])   [facility]  [phone]

## 2023-01-02 ENCOUNTER — TELEPHONE (OUTPATIENT)
Dept: PSYCHOLOGY | Facility: CLINIC | Age: 63
End: 2023-01-02

## 2023-01-02 NOTE — TELEPHONE ENCOUNTER
Patient did not arrive for the [place] visit as of 11:22 AM. Provider called patient and left two messages to call my office number, to reschedule an appointment with me. I also let the patient know they can call [facility] as well to reschedule. Provider will reach out to patient on Tuesday,  1/3 to check in with the patient.

## 2023-01-04 ENCOUNTER — VIRTUAL VISIT (OUTPATIENT)
Dept: FAMILY MEDICINE | Facility: CLINIC | Age: 63
End: 2023-01-04
Payer: MEDICARE

## 2023-01-04 DIAGNOSIS — J22 LOWER RESPIRATORY TRACT INFECTION: Primary | ICD-10-CM

## 2023-01-04 DIAGNOSIS — F17.210 CIGARETTE SMOKER: ICD-10-CM

## 2023-01-04 DIAGNOSIS — J44.9 CHRONIC OBSTRUCTIVE PULMONARY DISEASE, UNSPECIFIED COPD TYPE (H): Chronic | ICD-10-CM

## 2023-01-04 PROCEDURE — 99213 OFFICE O/P EST LOW 20 MIN: CPT | Mod: 95 | Performed by: NURSE PRACTITIONER

## 2023-01-04 RX ORDER — BENZONATATE 100 MG/1
100-200 CAPSULE ORAL 3 TIMES DAILY PRN
Qty: 20 CAPSULE | Refills: 0 | Status: SHIPPED | OUTPATIENT
Start: 2023-01-04 | End: 2023-03-20

## 2023-01-04 RX ORDER — DOXYCYCLINE 100 MG/1
100 CAPSULE ORAL 2 TIMES DAILY
Qty: 14 CAPSULE | Refills: 0 | Status: SHIPPED | OUTPATIENT
Start: 2023-01-04 | End: 2023-01-11

## 2023-01-04 RX ORDER — PREDNISONE 20 MG/1
40 TABLET ORAL DAILY
Qty: 10 TABLET | Refills: 0 | Status: SHIPPED | OUTPATIENT
Start: 2023-01-04 | End: 2023-01-09

## 2023-01-04 NOTE — PROGRESS NOTES
"Paula is a 62 year old who is being evaluated via a billable video visit.      How would you like to obtain your AVS? Mail a copy  If the video visit is dropped, the invitation should be resent by: Text to cell phone: [phone]  Will anyone else be joining your video visit? No    Assessment & Plan     Lower respiratory tract infection  Treat with the below. If not improving recommend clinic visit for evaluation and possible imaging.  - predniSONE (DELTASONE) 20 MG tablet; Take 2 tablets (40 mg) by mouth daily for 5 days  - doxycycline hyclate (VIBRAMYCIN) 100 MG capsule; Take 1 capsule (100 mg) by mouth 2 times daily for 7 days  - benzonatate (TESSALON) 100 MG capsule; Take 1-2 capsules (100-200 mg) by mouth 3 times daily as needed for cough    Chronic obstructive pulmonary disease, unspecified COPD type (H)  As above.  - predniSONE (DELTASONE) 20 MG tablet; Take 2 tablets (40 mg) by mouth daily for 5 days  - doxycycline hyclate (VIBRAMYCIN) 100 MG capsule; Take 1 capsule (100 mg) by mouth 2 times daily for 7 days  - benzonatate (TESSALON) 100 MG capsule; Take 1-2 capsules (100-200 mg) by mouth 3 times daily as needed for cough    Cigarette smoker  Complete cessation advised.               BMI:   Estimated body mass index is 45.86 kg/m  as calculated from the following:    Height as of 6/30/22: 1.651 m (5' 5\").    Weight as of 6/30/22: 125 kg (275 lb 9.6 oz).       See Patient Instructions    No follow-ups on file.     The benefits, risks and potential side effects were discussed in detail. Black box warnings discussed as relevant. All patient questions were answered. The patient was instructed to follow up immediately if any adverse reactions develop.    Return precautions discussed, including when to seek urgent/emergent care.    Patient verbalizes understanding and agrees with plan of care. Patient stable for discharge.      LEONA ALANIZ CNP  [facility]    Subjective "   Paula is a 62 year old, presenting for the following health issues:  Cough    HPI     Acute Illness  Acute illness concerns: Cough - chest and rib pain secondary to cough per pt  Onset/Duration: 3 days  Symptoms:  Fever: YES- 101 yesterday  Chills/Sweats: YES  Headache (location?): No  Sinus Pressure: No  Conjunctivitis:  No  Ear Pain: no  Rhinorrhea: YES  Congestion: YES  Sore Throat: No  Cough: YES-non-productive  Wheeze: YES  Decreased Appetite: YES  Nausea: YES  Vomiting: YES - 1 episode  Diarrhea: No  Dysuria/Freq.: YES  Dysuria or Hematuria: No  Fatigue/Achiness: YES- both  Sick/Strep Exposure: No  Therapies tried and outcome: Nyquil, Robitussin, cough drops with some relief; requesting new prescriptions for cough medication and antibiotics    Home covid test negative  No flu shot  Wheezing - using inhaler and neb  Up coughing all last night  Cough is dry but feels like it should be productive  Continues to smoke 1/4 ppd  Ribs hurt from coughing. No shortness of breath    Review of Systems   Constitutional, HEENT, cardiovascular, pulmonary, GI, , musculoskeletal, neuro, skin, endocrine and psych systems are negative, except as otherwise noted.      Objective    Vitals - Patient Reported  Temperature (Patient Reported): 101  F (38.3  C)      Vitals:  No vitals were obtained today due to virtual visit.    Physical Exam   GENERAL: Healthy, alert and no distress  EYES: Eyes grossly normal to inspection.  No discharge or erythema, or obvious scleral/conjunctival abnormalities.  RESP: No audible wheeze, cough, or visible cyanosis.  No visible retractions or increased work of breathing.    SKIN: Visible skin clear. No significant rash, abnormal pigmentation or lesions.  NEURO: Cranial nerves grossly intact.  Mentation and speech appropriate for age.  PSYCH: Mentation appears normal, affect normal/bright, judgement and insight intact, normal speech and appearance well-groomed.                Video-Visit  Details    Type of service:  Video Visit   Video Start Time: 11:35 am  Video End Time:11:42 am    Originating Location (pt. Location): Home  Distant Location (provider location):  On-site  Platform used for Video Visit: Shanita

## 2023-01-04 NOTE — PATIENT INSTRUCTIONS
At [facility], we strive to deliver an exceptional experience to you, every time we see you. If you receive a survey, please complete it as we do value your feedback.  If you have MyChart, you can expect to receive results automatically within 24 hours of their completion.  Your provider will send a note interpreting your results as well.   If you do not have MyChart, you should receive your results in about a week by mail.    Your care team:                            Family Medicine Internal Medicine   MD Renan Chambers MD Shantel Branch-Fleming, MD Srinivasa Vaka, MD Katya Belousova, PALEONA Figueroa CNP, MD ([facility]   Taurus Torres, MD Dorie Sethi MD Amelia Massimini APRN RIGOBERTO Faith APRN MD Alireza Kong MD          Clinic hours: Monday - Thursday 7 am-6 pm; Fridays 7 am-5 pm.   Urgent care: Monday - Friday 10 am- 8 pm; Saturday and Sunday 9 am-5 pm.    Clinic: [phone]       [facility]: Monday - Thursday 8 am - 7 pm; Friday 8 am - 6 pm  [facility]: [phone]

## 2023-01-12 ENCOUNTER — VIRTUAL VISIT (OUTPATIENT)
Dept: BEHAVIORAL HEALTH | Facility: CLINIC | Age: 63
End: 2023-01-12
Payer: MEDICARE

## 2023-01-12 DIAGNOSIS — R69 DIAGNOSIS DEFERRED: Primary | ICD-10-CM

## 2023-01-12 PROCEDURE — 99207 PR NO BILLABLE SERVICE THIS VISIT: CPT

## 2023-01-12 NOTE — PROGRESS NOTES
Behavioral Health Home Services  [facility]: [place]        Social Work Care Navigator Note      Patient: Paula Ho  Date: January 12, 2023  Preferred Name: Paula    Previous PHQ-9:   PHQ-9 SCORE 3/15/2022 6/30/2022 8/1/2022   PHQ-9 Total Score MyChart 14 (Moderate depression) 11 (Moderate depression) -   PHQ-9 Total Score 14 11 11     Previous LIBRA-7:   LIBRA-7 SCORE 6/30/2022 8/1/2022 11/10/2022   Total Score 7 (mild anxiety) - -   Total Score 7 12 9     ARNALDO LEVEL:  No flowsheet data found.    Preferred Contact:  Need for : No  Preferred Contact: Cell      Type of Contact Today: Phone call (patient / identified key support person reached)      Data: (subjective / Objective):  Recent ED/IP Admission or Discharge?   None    Patient Goals:  Goal Areas: Health; Mental Health; Future HAP Goal area; Chemical Health  Patient stated goals: -Health: Paula would like to continue to meet with her providers, and take her medications as prescribed. Paula would also like assistance with improving her back pain, and receive physical therapy, as well as possibly surgery.  -Chemical Health: Paula would like assistance from her providers to quit smoking and will work on reducing her use, so she can be approved for back surgery.   -Mental Health: Paula would like to find a long term therapy provider through [facility], and continue to receive assistance with [facility] for monthly check ins. She would also like to start working with an [facility] worker again.   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 She would like to receive home delivered meals if she does open.    [facility] Core Service Provided:  Comprehensive Care Management: utilized the electronic medical record / patient registry to identify and support patient's health conditions / needs more effectively   Care Transitions: focused on the coordinated and  seamless movement of patient between or within different levels of care or settings  Care Coordination: provided care management services/referrals necessary to ensure patient and their identified supports have access to medical, behavioral health, pharmacology and recovery support services.  Ensured that patient's care is integrated across all settings and services.   Individual and Family Support: aimed to help clients reduce barriers to achieving goals, increase health literacy and knowledge about chronic condition(s), increase self-efficacy skills, and improve health outcomes    Current Stressors / Issues / Care Plan Objective Addressed Today:  [facility] and patient were able to meet today for Behavioral Health [place] ([facility]) monthly check-in via telehealth visit. All required ROIs have been filed with HIM/patient chart.    1. Patient reported that she went to urgent care a couple days ago and found out she had pneumonia. She is now on some antibiotics, and she's feeling a little better now.    2. Patient reports her ex that she still talks to, just got his fingers cut off at work. She's been helping him all week, and she's really upset about it because his fingers are in bad shape. She has been trying to support him, but she's also upset as well.     3. Patient and [facility] discussed the [facility] services, and CC received a message from [facility] reporting patient is on the waitlist and she may not receive a worker for 6-8 weeks. Patient reported she was okay waiting for now, and they will continue to check in if she would like to try working with another agency if she has to wait too long.     4. Patient reported that she wants to start going somewhere and exercising again. She reported having one pass through [facility], so she's going to call and find out more information. She also is continuing with pool therapy, and she was there about a couple weeks ago. Her next appointment is on Tuesday.     5. Patient reported that she  was struggling to fall asleep last night and she didn't end up falling asleep until around 5am, and then she slept until 9am. She normally is sleeping by around 11pm.     6. Patient reports she has her assessment coming up for PCA in February, and she's a little nervous about it since [place] new [place] since the last time she was assessed.     7. Patient reports that her Adderall medication is all correct now and she was able to  the correct dose refill. She will work on getting scheduled with her psychiatrist, and she has the phone number     8. Patient reports that her smoking hasn't been good recently still.  She reported that since she'd had pneumonia, she hasn't been smoking the cigarette fully in one sitting and she has about half a pack a day.     9. Patient reported that she has to pay the increase since her social security increased. She needed to get a letter from her landlord to send to the county, so they don't lower her food stamps due to her rent increasing. They were going to lower her food stamps because of the increased income, but they just needed the proof of the increased rent.      Intervention:  Motivational Interviewing: Expressed Empathy/Understanding, Supported Autonomy, Collaboration, Evocation, Permission to raise concern or advise, Open-ended questions and Reflections: simple and complex   Target Behavior(s): Explored thoughts about taking an anti-depressant, Explored and resolved challenges related to taking anti-depressants as prescribed, Explored thoughts and readiness to participate in individual therapy, Explored and resolved challenges to attending appointments as scheduled, Explored current social supports and reinforced opportunities to increase engagement and Explored patient's perception of how alcohol and / or drugs influences mood    Assessment: (Progress on Goals / Homework):  Patient would benefit from continued coordination in reaching their goals set for the  Behavioral Health Home (B[facility]) program. [facility] reviewed Health Action Plan goals and will continue to monitor progress and work with patient and their care team.    Plan: (Homework, other):  Patient was encouraged to continue to seek condition-related information and education.      Scheduled a Phone follow up appointment with MISA RICE in 3 weeks     Patient has set self-identified goals and will monitor progress until the next appointment on: 2/2/23.      DENI Humphries  Behavioral Health Home ([facility])   [facility]  [phone]

## 2023-01-16 ENCOUNTER — VIRTUAL VISIT (OUTPATIENT)
Dept: PSYCHOLOGY | Facility: CLINIC | Age: 63
End: 2023-01-16
Payer: MEDICARE

## 2023-01-16 DIAGNOSIS — F33.1 MODERATE EPISODE OF RECURRENT MAJOR DEPRESSIVE DISORDER (H): Primary | ICD-10-CM

## 2023-01-16 PROCEDURE — 90834 PSYTX W PT 45 MINUTES: CPT | Mod: 95

## 2023-01-17 NOTE — PROGRESS NOTES
"    [facility]                                     Progress Note    Patient Name: Paula Ho  Date: 1/16/23         Service Type: Individual    Session Start Time: 2:42 PM Session End Time: 3:28 PM      Session Length: 46 Minutes     Session #: 8    Attendees: Client attended alone    Service Modality:  Phone Visit:      Provider verified identity through the following two step process.  Patient provided:  Patient address and Patient is known previously to provider    Originating Site (Patient Location): Patient's home   Distant Site (Provider Location): On Site- [facility]    The patient has been notified of the following:      \"We have found that certain health care needs can be provided without the need for a face to face visit.  This service lets us provide the care you need with a phone conversation.       I will have full access to your [facility] medical record during this entire phone call.   I will be taking notes for your medical record.      Since this is like an office visit, we will bill your insurance company for this service.       There are potential benefits and risks of telephone visits (e.g. limits to patient confidentiality) that differ from in-person visits.?Confidentiality still applies for telephone services, and nobody will record the visit.  It is important to be in a quiet, private space that is free of distractions (including cell phone or other devices) during the visit.??      If during the course of the call I believe a telephone visit is not appropriate, you will not be charged for this service\"     Consent has been obtained for this service by care team member: Yes     DATA  Interactive Complexity: No  Crisis: No        Progress Since Last Session (Related to Symptoms / Goals / Homework):   Symptoms: Improving Patient reports improvement in mood, despite recent changes.    Homework: Partially completed      Episode of Care " Goals: Minimal progress - CONTEMPLATION (Considering change and yet undecided); Intervened by assessing the negative and positive thinking (ambivalence) about behavior change     Current / Ongoing Stressors and Concerns:   Patient reports her partner recently got into a work related accident and injured his fingers. She reports frustration with how her partner was treated at the hospital, and the lack of care he received for his injuries. Patient also recently was treated for pneumonia and has been recovering. Paula reports to continued stress in relation to her relationship with her current partner and feeling disrespected. She reports to beginning pool therapy in the upcoming week, and feeling interested in what it will look like. Patient continues to be concerned about weight gain she believes is due to her medication. She states has plans to begin exercising and signing up for classes at a gym near her. Patient's currents stressors also involve her niece moving in to live with her for upcoming months. She reports feeling frustrated with the possible change in her physical space due to her niece moving in.      Treatment Objective(s) Addressed in This Session:   Increase interest, engagement, and pleasure in doing things  Decrease frequency and intensity of feeling down, depressed, hopeless  Identify negative self-talk and behaviors: challenge core beliefs, myths, and actions     Intervention:   CBT: Patient was educated on the importance of mindfulness, deep breathing, and self care when her anxiety is increased.     Motivational Interviewing    MI Intervention: Expressed Empathy/Understanding, Supported Autonomy, Collaboration, Evocation, Permission to raise concern or advise, Open-ended questions and Reframe     Change Talk Expressed by the Patient: Reasons to change Need to change    Provider Response to Change Talk: E - Evoked more info from patient about behavior change, A - Affirmed patient's thoughts,  decisions, or attempts at behavior change and R - Reflected patient's change talk       ASSESSMENT: Current Emotional / Mental Status (status of significant symptoms):   Risk status (Self / Other harm or suicidal ideation)   Patient denies current fears or concerns for personal safety.   Patient denies current or recent suicidal ideation or behaviors.   Patient denies current or recent homicidal ideation or behaviors.   Patient denies current or recent self injurious behavior or ideation.   Patient denies other safety concerns.   Patient reports there has been no change in risk factors since their last session.     Patient reports there has been no change in protective factors since their last session.     There are no firearms in the house.     Appearance:   NA    Eye Contact:   NA    Psychomotor Behavior: Normal    Attitude:   Cooperative  Attentive   Orientation:   All   Speech    Rate / Production: Normal     Volume:  Normal    Mood:    Normal Agitated   Affect:    Appropriate    Thought Content:  Clear    Thought Form:  Coherent  Logical    Insight:    Good      Medication Review:   No changes to current psychiatric medication(s)     Medication Compliance:   Yes     Changes in Health Issues:   Yes: pneumonia     Chemical Use Review:   Substance Use: Chemical use reviewed, no active concerns identified      Tobacco Use: Yes, no change.  Patient reports frequency of use of cigarettes. Contemplation  Patient assessed present costs and future losses as a result of smoking  Provided encouragement to quit   Provided support and affirmation for steps taken towards quiting     Diagnosis:    Moderate Episode of Recurrent Major Depressive Disorder (F33.1)  Collateral Reports Completed:   Not Applicable    PLAN: (Patient Tasks / Therapist Tasks / Other)  Patient was encouraged to speak with her niece about her expectations for her when she moves in. Patient was also encouraged to try and complete any tasks that are  important for her wellbeing in order to improve her overall motivation.       BECKY MANRIQUEZ    This note has been reviewed and I agree with the plan of care. This note is co-signed by JASON Bang, [facility], Supervisor, on: January 18, 2023  ______________________________________________________________________    Individual Treatment Plan    Patient's Name: Paula Ho  YOB: 1960    Date of Creation: 11/7/22  Date Treatment Plan Last Reviewed/Revised: 11/7/22     DSM5 Diagnoses: 296.32 (F33.1) Major Depressive Disorder, Recurrent Episode, Moderate _  Psychosocial / Contextual Factors: Patient has physical medical concerns that impact her current depressive symptoms.  PROMIS (reviewed every 90 days):     Referral / Collaboration:  Referral to another professional/service is not indicated at this time..    Anticipated number of session for this episode of care: 9-12 sessions  Anticipation frequency of session: Weekly  Anticipated Duration of each session: 38-52 minutes  Treatment plan will be reviewed in 90 days or when goals have been changed.       MeasurableTreatment Goal(s) related to diagnosis / functional impairment(s)  Goal 1: Patient will develop healthy thinking patterns and beliefs about self, others, and the world.       Objective #A (Patient Action)    Patient will Identify negative self-talk and behaviors: challenge core beliefs, myths, and actions.  Status: New - Date: 11/7/22     Intervention(s)  Therapist will teach how to identify negative self-talk and behaviors.    Objective #B  Patient will Improve concentration, focus, and mindfulness in daily activities .  Status: New - Date: 11/7/22     Intervention(s)  Therapist will teach how to improve concentration, focus, and mindfulness..      Goal 2: Patient will alleviate depressive symptoms in order to return to a previous level of functioning. As evidenced by a PHQ-9 score of 9 or less.       Objective # A  Status: New  - Date: 11/7/22     Patient will lidentify at least 4 adaptive coping statements to counteract negative thoughts    Intervention(s)  Therapist will teach adaptive coping statements to conteract negative thoughts.    Objective #B  Patient will Increase interest, engagement, and pleasure in doing things.    Status: New - Date: 11/7/22     Intervention(s)  Therapist will assign homework related to increasing engagement and interest in doing things.        Patient has reviewed and agreed to the above plan.      BECKY MANRIQUEZ  November 7, 2022    This note has been reviewed and I agree with the plan of care. This note is co-signed by JASON Bang, LICSW, Supervisor, on: November 8, 2022

## 2023-01-18 ENCOUNTER — OFFICE VISIT (OUTPATIENT)
Dept: FAMILY MEDICINE | Facility: CLINIC | Age: 63
End: 2023-01-18
Payer: MEDICARE

## 2023-01-18 ENCOUNTER — ANCILLARY PROCEDURE (OUTPATIENT)
Dept: CT IMAGING | Facility: CLINIC | Age: 63
End: 2023-01-18
Attending: NURSE PRACTITIONER
Payer: MEDICARE

## 2023-01-18 ENCOUNTER — TELEPHONE (OUTPATIENT)
Dept: FAMILY MEDICINE | Facility: CLINIC | Age: 63
End: 2023-01-18

## 2023-01-18 VITALS
TEMPERATURE: 98.2 F | BODY MASS INDEX: 44.68 KG/M2 | WEIGHT: 268.2 LBS | HEART RATE: 73 BPM | HEIGHT: 65 IN | DIASTOLIC BLOOD PRESSURE: 78 MMHG | SYSTOLIC BLOOD PRESSURE: 122 MMHG | OXYGEN SATURATION: 95 %

## 2023-01-18 DIAGNOSIS — J18.9 COMMUNITY ACQUIRED PNEUMONIA, UNSPECIFIED LATERALITY: ICD-10-CM

## 2023-01-18 DIAGNOSIS — R07.1 CHEST PAIN ON BREATHING: ICD-10-CM

## 2023-01-18 DIAGNOSIS — R07.89 CHEST TIGHTNESS: ICD-10-CM

## 2023-01-18 DIAGNOSIS — R07.89 CHEST TIGHTNESS: Primary | ICD-10-CM

## 2023-01-18 DIAGNOSIS — F17.200 TOBACCO USE DISORDER: Chronic | ICD-10-CM

## 2023-01-18 DIAGNOSIS — E03.9 HYPOTHYROIDISM, UNSPECIFIED TYPE: ICD-10-CM

## 2023-01-18 LAB
BASOPHILS # BLD AUTO: 0 10E3/UL (ref 0–0.2)
BASOPHILS NFR BLD AUTO: 0 %
EOSINOPHIL # BLD AUTO: 0.3 10E3/UL (ref 0–0.7)
EOSINOPHIL NFR BLD AUTO: 3 %
ERYTHROCYTE [DISTWIDTH] IN BLOOD BY AUTOMATED COUNT: 13 % (ref 10–15)
HCT VFR BLD AUTO: 43.8 % (ref 35–47)
HGB BLD-MCNC: 14.2 G/DL (ref 11.7–15.7)
IMM GRANULOCYTES # BLD: 0 10E3/UL
IMM GRANULOCYTES NFR BLD: 0 %
LYMPHOCYTES # BLD AUTO: 3.8 10E3/UL (ref 0.8–5.3)
LYMPHOCYTES NFR BLD AUTO: 40 %
MCH RBC QN AUTO: 30.7 PG (ref 26.5–33)
MCHC RBC AUTO-ENTMCNC: 32.4 G/DL (ref 31.5–36.5)
MCV RBC AUTO: 95 FL (ref 78–100)
MONOCYTES # BLD AUTO: 0.6 10E3/UL (ref 0–1.3)
MONOCYTES NFR BLD AUTO: 7 %
NEUTROPHILS # BLD AUTO: 4.6 10E3/UL (ref 1.6–8.3)
NEUTROPHILS NFR BLD AUTO: 49 %
PLATELET # BLD AUTO: 310 10E3/UL (ref 150–450)
RBC # BLD AUTO: 4.63 10E6/UL (ref 3.8–5.2)
TROPONIN T SERPL HS-MCNC: 18 NG/L
WBC # BLD AUTO: 9.4 10E3/UL (ref 4–11)

## 2023-01-18 PROCEDURE — 36415 COLL VENOUS BLD VENIPUNCTURE: CPT | Performed by: NURSE PRACTITIONER

## 2023-01-18 PROCEDURE — 80050 GENERAL HEALTH PANEL: CPT | Performed by: NURSE PRACTITIONER

## 2023-01-18 PROCEDURE — 84484 ASSAY OF TROPONIN QUANT: CPT | Performed by: NURSE PRACTITIONER

## 2023-01-18 PROCEDURE — 99215 OFFICE O/P EST HI 40 MIN: CPT | Performed by: NURSE PRACTITIONER

## 2023-01-18 PROCEDURE — 93000 ELECTROCARDIOGRAM COMPLETE: CPT | Performed by: NURSE PRACTITIONER

## 2023-01-18 PROCEDURE — 71275 CT ANGIOGRAPHY CHEST: CPT | Mod: TC | Performed by: RADIOLOGY

## 2023-01-18 PROCEDURE — G1010 CDSM STANSON: HCPCS | Performed by: RADIOLOGY

## 2023-01-18 RX ORDER — IOPAMIDOL 755 MG/ML
200 INJECTION, SOLUTION INTRAVASCULAR ONCE
Status: COMPLETED | OUTPATIENT
Start: 2023-01-18 | End: 2023-01-18

## 2023-01-18 RX ADMIN — IOPAMIDOL 82 ML: 755 INJECTION, SOLUTION INTRAVASCULAR at 14:14

## 2023-01-18 RX ADMIN — Medication 100 ML: at 14:14

## 2023-01-18 ASSESSMENT — PAIN SCALES - GENERAL: PAINLEVEL: EXTREME PAIN (9)

## 2023-01-18 ASSESSMENT — ANXIETY QUESTIONNAIRES
7. FEELING AFRAID AS IF SOMETHING AWFUL MIGHT HAPPEN: SEVERAL DAYS
GAD7 TOTAL SCORE: 9
2. NOT BEING ABLE TO STOP OR CONTROL WORRYING: MORE THAN HALF THE DAYS
5. BEING SO RESTLESS THAT IT IS HARD TO SIT STILL: SEVERAL DAYS
IF YOU CHECKED OFF ANY PROBLEMS ON THIS QUESTIONNAIRE, HOW DIFFICULT HAVE THESE PROBLEMS MADE IT FOR YOU TO DO YOUR WORK, TAKE CARE OF THINGS AT HOME, OR GET ALONG WITH OTHER PEOPLE: VERY DIFFICULT
GAD7 TOTAL SCORE: 9
8. IF YOU CHECKED OFF ANY PROBLEMS, HOW DIFFICULT HAVE THESE MADE IT FOR YOU TO DO YOUR WORK, TAKE CARE OF THINGS AT HOME, OR GET ALONG WITH OTHER PEOPLE?: VERY DIFFICULT
3. WORRYING TOO MUCH ABOUT DIFFERENT THINGS: MORE THAN HALF THE DAYS
GAD7 TOTAL SCORE: 9
1. FEELING NERVOUS, ANXIOUS, OR ON EDGE: SEVERAL DAYS
7. FEELING AFRAID AS IF SOMETHING AWFUL MIGHT HAPPEN: SEVERAL DAYS
4. TROUBLE RELAXING: NOT AT ALL
6. BECOMING EASILY ANNOYED OR IRRITABLE: MORE THAN HALF THE DAYS

## 2023-01-18 NOTE — TELEPHONE ENCOUNTER
I called Paula to let her know results of CT scan. She reports pain is present intermittently, worse with palpation and certain movements now. Discussed very low threshold to go to emergency department if worsening, shortness of breath, etc. (she adamantly declined emergency department during our visit today)    I will determine next steps for adrenal nodule and call her in the AM

## 2023-01-18 NOTE — PROGRESS NOTES
Assessment & Plan     I recommended that she be seen in the emergency department but she is adamant that she is not going. Discussed limitations of outpatient workup and she verbalizes understanding. She is very clear she will not go to the emergency department today for this issue therefore the decision was made to work up as best we can outpatient. I explained if labs return abnormal, I will again recommend emergency department.    Chest tightness  EKG unremarkable. Will get labs and CT to rule out pulmonary embolism. I did also do troponin - I again explained that if this is elevated she must go to the emergency department for further evaluation.   - EKG 12-lead complete w/read - Clinics  - Comprehensive metabolic panel (BMP + Alb, Alk Phos, ALT, AST, Total. Bili, TP); Future  - CBC with platelets and differential; Future  - Troponin T, High Sensitivity; Future  - CT Chest Pulmonary Embolism w Contrast; Future  - Troponin T, High Sensitivity  - CBC with platelets and differential  - Comprehensive metabolic panel (BMP + Alb, Alk Phos, ALT, AST, Total. Bili, TP)    Chest pain on breathing  As above.  - Comprehensive metabolic panel (BMP + Alb, Alk Phos, ALT, AST, Total. Bili, TP); Future  - CBC with platelets and differential; Future  - Troponin T, High Sensitivity; Future  - CT Chest Pulmonary Embolism w Contrast; Future  - Troponin T, High Sensitivity  - CBC with platelets and differential  - Comprehensive metabolic panel (BMP + Alb, Alk Phos, ALT, AST, Total. Bili, TP)    Community acquired pneumonia, unspecified laterality  Resolved.  - Comprehensive metabolic panel (BMP + Alb, Alk Phos, ALT, AST, Total. Bili, TP); Future  - CBC with platelets and differential; Future  - CBC with platelets and differential  - Comprehensive metabolic panel (BMP + Alb, Alk Phos, ALT, AST, Total. Bili, TP)    Tobacco use disorder  Encouraged complete cessation.    Hypothyroidism, unspecified type  - TSH with free T4 reflex;  "Future  - TSH with free T4 reflex               MED REC REQUIRED  Post Medication Reconciliation Status:  Discharge medications reconciled, continue medications without change    BMI:   Estimated body mass index is 44.63 kg/m  as calculated from the following:    Height as of this encounter: 1.651 m (5' 5\").    Weight as of this encounter: 121.7 kg (268 lb 3.2 oz).       See Patient Instructions    Return in about 2 days (around 1/20/2023), or if symptoms worsen or fail to improve.    Return precautions discussed, including when to seek urgent/emergent care.    Patient verbalizes understanding and agrees with plan of care.     LEONA ALANIZ CNP  M [facility] IFEOMA Mitchell is a 62 year old, presenting for the following health issues:  Hospital F/U (Chest pain)      HPI     ED/UC Followup:    Facility:  [facility]  Date of visit: 01/6/2023  Reason for visit: chest pain  Current Status: Pt still having pain in chest    Chest Pain  Onset/Duration: 01/6/2023  Description:   Location: entire chest  Character: Feel pressure  Radiation: None  Duration: constant   Intensity: severe  Progression of Symptoms: worsening  Accompanying Signs & Symptoms:  Shortness of breath: YES  Sweating: YES  Nausea/vomiting: No  Lightheadedness: No  Palpitations: No  Fever/Chills: No  Cough: YES           Heartburn: No  History:   Family history of heart disease: No  Tobacco use: YES  Previous similar symptoms: no   Precipitating factors:   Worse with exertion: YES  Worse with deep breaths: YES           Related to eating: No           Better with burping: No  Alleviating factors: severe  Therapies tried and outcome: severe        Pleasant 62 year old female presents for follow up. She was seen early January for chest and rib pain secondary to cough that started a few days prior to the visit. She was started on doxycycline and prednisone. She did not improve so went to urgent care 2 days later. " "She was treated for community acquired pneumonia. Gets \"twings\" of pain that move around on her chest on both the right and left sides. The pain does not radiate. Worse with deep breathing. A little short of breath but not currently. Cough at night is bad -wheezing. Right now feels clear. She denies leg swelling.  No abdominal pain, nausea or vomiting.          From her  visit:    Dejan Shah MD - 01/06/2023 1:00 PM CST  Formatting of this note is different from the original.  [facility]    SUBJECTIVE: Paula Ho is a 62 y.o.female who presents to urgent care for cough. Patient reports that symptoms started 3-4 days ago. In addition to her cough she is had body aches, chills, nausea, wheezing. She reports that she does have COPD and does have inhalers at home including albuterol. She was seen 2 days ago via [facility] medicine at [place] and prescribe prednisone burst and doxycycline in addition to benzonatate. She reports that she is had no improvement with these medications.    Denies fever, chills, nausea, vomiting, abdominal pain        Past Medical History:   Patient Active Problem List   Diagnosis   Asthma ([facility])   Pain in joint, lower leg   Major depressive disorder, recurrent episode ([facility])   Drug dependence, continuous abuse ([facility])   Tobacco use disorder ([facility])   Insomnia       Adverse Drug Reactions: Compazine [phenothiazines], Droperidol, Other, Prochlorperazine, Review contrast media, and Review food intolerance     Medications: ALBUterol sulfate HFA, FLUoxetine, Vitamin D, albuterol 2.5 mg/3 mL (0.083%), amoxicillin-clavulanate, amphetamine-dextroamphetamine, budesonide-formoterol, clonazePAM, clopidogrel, levothyroxine, losartan, oxyCODONE, oxyCODONE HCl, pravastatin, predniSONE, tiotropium, and topiramate     Family History: Reviewed    Social History:   Social History     Tobacco Use   Smoking status: Every Day   Packs/day: 0.50   Types: Cigarettes   Smokeless tobacco: Not on " file   Tobacco comments:   Smoking History Packs/day:   Substance Use Topics   Alcohol use: Yes   Comment: Alcoholic Drinks/day: Amount:1-2 drinks; Freq:=< Monthly;   Drug use: Not on file       Review of Systems: All systems were reviewed and found to be negative except as noted above.     OBJECTIVE:   Vital Signs: BP (!) 147/97 (BP Location: Left Arm, BP Cuff Size: Regular)  Pulse 80  Temp 37.1  C (98.8  F) (Oral)  Resp 20  SpO2 98%   General: Alert, Oriented, NAD  Head: Normocephalic. Eyes: External exams normal, conjunctiva and scleara clear Ear: Tympanic membranes clear bilaterally, intact, external canals clear Nose: Patent, without discharge or deformity.   Lymph no cervical lymphadenopathy  Cardiovascular: RRR without murmurs, rubs, or gallops.   Respiratory: Non labored, diffuse in expiratory wheezing throughout no rales or rhonchi  Skin: No rashes on exposed skin.  Musculoskeletal: Gait is appropriate. Moves all extremities without difficulties.  Neurological: Normal coordination. No tremor or fasciculation  Psych: Affect is normal, patient is appropriate, grooming is appropriate.    Labs:   No results found for any visits on 01/06/23.    Imaging:   XR Chest 2 Views    Result Date: 1/6/2023  COMPARISON: 04/10/2007 FINDINGS: 2 views obtained. Hyperinflation of the bilateral lungs. Right lung is clear. Mild reticular opacity at the left lung base posteriorly, reflecting atelectasis versus consolidation. Prominence of the pulmonary vascular interstitium, which seen with pulmonary vascular congestion. No pulmonary edema. No pneumothorax or drainable pleural effusion. Heart size normal. No acute fracture. Degenerative changes of the thoracic spine.     ASSESSMENT:   1. Chest pain, unspecified type   2. Community acquired pneumonia, unspecified laterality       PLAN:   Medications Prescribed this Visit     Disp Refills Start End   amoxicillin-clavulanate (AUGMENTIN) 875-125 mg per tablet 14 Tablet 0 1/6/2023  "1/13/2023   Take 1 Tablet by mouth two times a day for 7 days. Take with food or milk.   Oral         Discussed with patient presence of consolidation of the left lung base consistent with pneumonia. Given her medical history of COPD will add Augmentin to current regimen of doxycycline. Advised to continue her inhaler as needed. She is hemodynamically stable, I do feel we can treat in the outpatient setting. Advised if she has any increased shortness a breath, chest pain to seek immediate medical attention in the ER. Follow up with PCP 1 week.    This note is created with voice recognition software, it was proofread however, occasionally homonyms may occur.     Electronically signed by Dejan Shah MD at 01/06/2023 1:27 PM CST      Review of Systems   Constitutional, HEENT, cardiovascular, pulmonary, GI, , musculoskeletal, neuro, skin, endocrine and psych systems are negative, except as otherwise noted.      Objective    /78 (BP Location: Left arm, Patient Position: Sitting, Cuff Size: Adult Large)   Pulse 73   Temp 98.2  F (36.8  C) (Oral)   Ht 1.651 m (5' 5\")   Wt 121.7 kg (268 lb 3.2 oz)   LMP  (LMP Unknown)   SpO2 95%   BMI 44.63 kg/m    Body mass index is 44.63 kg/m .  Physical Exam   GENERAL: healthy, alert and no distress  EYES: Eyes grossly normal to inspection, PERRL and conjunctivae and sclerae normal  HENT: ear canals and TM's normal, nose and mouth without ulcers or lesions  NECK: supple, no adenopathy  RESP: lungs clear to auscultation - no rales, rhonchi or wheezes  CV: regular rate and rhythm, normal S1 S2, no S3 or S4, no murmur, click or rub, no peripheral edema   ABDOMEN: obese abdomen  MS: no gross musculoskeletal defects noted, no edema  PSYCH: mentation appears normal, affect normal/bright    Results for orders placed or performed in visit on 01/18/23   CT Chest Pulmonary Embolism w Contrast     Status: None    Narrative    CT CHEST PULMONARY EMBOLISM WITH CONTRAST January 18, " 2023 2:27 PM    CLINICAL HISTORY: Chest tightness. Chest pain on breathing.    TECHNIQUE: CT angiogram chest during arterial phase injection IV  contrast. 2D and 3D MIP reconstructions were performed by the CT  technologist. Dose reduction techniques were used.  CONTRAST: 82 mL Isovue-370.    COMPARISON: CT chest 5/4/2022.    FINDINGS:  ANGIOGRAM CHEST: Pulmonary arteries limited in assessment given  limited opacification. However, no central pulmonary embolism can be  seen. The small branch vessels cannot be fully assessed. Thoracic  aorta is negative for dissection. No CT evidence of right heart  strain.    LUNGS AND PLEURA: No effusions. No acute airspace disease. Lingular  atelectasis. Stable subpleural right upper lobe nodule measuring 0.5  cm series 8 image 35. Previously noted right apical nodule is not  currently seen. Small stable right fissural nodule that is 0.3 cm  series 8 image 133. There are other stable small nodules. No new  airspace disease.    MEDIASTINUM/AXILLAE: Normal.    CORONARY ARTERY CALCIFICATION: None.    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 Status post cholecystectomy. No acute upper abdominal  abnormality. There is enlargement of the left adrenal measuring 2.1  cm. This is limited in view on the prior exam but appears to be mildly  more prominent, previously 1.5 cm series 6 image 308.    MUSCULOSKELETAL: Normal.      Impression    IMPRESSION:  1.  Limited assessment for pulmonary embolism but no central large  pulmonary emboli can be seen. The smaller branch vessels cannot be  fully assessed. If there is further concern, VQ scan or repeat CT  could be performed.  2.  No acute abnormality is seen.  3.  Larger indeterminate nodule at the left adrenal. Recommend adrenal  protocol CT or MRI for further workup.  4.  A few stable tiny nodules compared to 5/4/2022.    Recommendations for one or multiple incidental lung nodules < 6mm :    Low risk  patients: No routine follow-up.    High risk patients: Optional follow-up CT at 12 months; if  unchanged, no further follow-up.    *Low Risk: Minimal or absent history of smoking or other known risk  factors.  *Nonsolid (ground glass) or partly solid nodules may require longer  follow-up to exclude indolent adenocarcinoma.  *Recommendations based on Guidelines for the Management of Incidental  Pulmonary Nodules Detected at CT: From the Fleischner Society 2017,  Radiology 2017.    RAÚL SHARMA MD         SYSTEM ID:  [id number]   Results for orders placed or performed in visit on 01/18/23   Troponin T, High Sensitivity     Status: Abnormal   Result Value Ref Range    Troponin T, High Sensitivity 18 (H) <=14 ng/L   CBC with platelets and differential     Status: None   Result Value Ref Range    WBC Count 9.4 4.0 - 11.0 10e3/uL    RBC Count 4.63 3.80 - 5.20 10e6/uL    Hemoglobin 14.2 11.7 - 15.7 g/dL    Hematocrit 43.8 35.0 - 47.0 %    MCV 95 78 - 100 fL    MCH 30.7 26.5 - 33.0 pg    MCHC 32.4 31.5 - 36.5 g/dL    RDW 13.0 10.0 - 15.0 %    Platelet Count 310 150 - 450 10e3/uL    % Neutrophils 49 %    % Lymphocytes 40 %    % Monocytes 7 %    % Eosinophils 3 %    % Basophils 0 %    % Immature Granulocytes 0 %    Absolute Neutrophils 4.6 1.6 - 8.3 10e3/uL    Absolute Lymphocytes 3.8 0.8 - 5.3 10e3/uL    Absolute Monocytes 0.6 0.0 - 1.3 10e3/uL    Absolute Eosinophils 0.3 0.0 - 0.7 10e3/uL    Absolute Basophils 0.0 0.0 - 0.2 10e3/uL    Absolute Immature Granulocytes 0.0 <=0.4 10e3/uL   CBC with platelets and differential     Status: None    Narrative    The following orders were created for panel order CBC with platelets and differential.  Procedure                               Abnormality         Status                     ---------                               -----------         ------                     CBC with platelets and d...[[id number]]                      Final result                 Please view results for these  tests on the individual orders.       EKG: sinus rhythm without ectopy. Similar to previous EKG from 2/9/2021.            Answers for HPI/ROS submitted by the patient on 1/18/2023  If you checked off any problems, how difficult have these problems made it for you to do your work, take care of things at home, or get along with other people?: Very difficult  PHQ9 TOTAL SCORE: 11  LIBRA 7 TOTAL SCORE: 9

## 2023-01-18 NOTE — PATIENT INSTRUCTIONS
To schedule your imaging exam at any of the [facility] imaging locations, please call [phone]

## 2023-01-19 LAB
ALBUMIN SERPL-MCNC: 3.8 G/DL (ref 3.4–5)
ALP SERPL-CCNC: 113 U/L (ref 40–150)
ALT SERPL W P-5'-P-CCNC: 25 U/L (ref 0–50)
ANION GAP SERPL CALCULATED.3IONS-SCNC: 8 MMOL/L (ref 3–14)
AST SERPL W P-5'-P-CCNC: 11 U/L (ref 0–45)
BILIRUB SERPL-MCNC: 0.3 MG/DL (ref 0.2–1.3)
BUN SERPL-MCNC: 11 MG/DL (ref 7–30)
CALCIUM SERPL-MCNC: 9.4 MG/DL (ref 8.5–10.1)
CHLORIDE BLD-SCNC: 105 MMOL/L (ref 94–109)
CO2 SERPL-SCNC: 27 MMOL/L (ref 20–32)
CREAT SERPL-MCNC: 0.55 MG/DL (ref 0.52–1.04)
GFR SERPL CREATININE-BSD FRML MDRD: >90 ML/MIN/1.73M2
GLUCOSE BLD-MCNC: 103 MG/DL (ref 70–99)
POTASSIUM BLD-SCNC: 4.8 MMOL/L (ref 3.4–5.3)
PROT SERPL-MCNC: 7.1 G/DL (ref 6.8–8.8)
SODIUM SERPL-SCNC: 140 MMOL/L (ref 133–144)
TSH SERPL DL<=0.005 MIU/L-ACNC: 3.53 MU/L (ref 0.4–4)

## 2023-01-19 NOTE — TELEPHONE ENCOUNTER
Discussed elevated troponin with patient and advised she got to the emergency department now  She denies any chest pain or shortness of breath overnight  She is very reluctant to go to the emergency department. Risks of not going in for further evaluation discussed. She voices understanding. She reports she'll think about heading over in a bit

## 2023-01-20 ENCOUNTER — TELEPHONE (OUTPATIENT)
Dept: FAMILY MEDICINE | Facility: CLINIC | Age: 63
End: 2023-01-20
Payer: MEDICARE

## 2023-01-20 ENCOUNTER — TELEPHONE (OUTPATIENT)
Dept: ENDOCRINOLOGY | Facility: CLINIC | Age: 63
End: 2023-01-20

## 2023-01-20 DIAGNOSIS — D17.71 ANGIOLIPOMA OF RIGHT KIDNEY: Primary | ICD-10-CM

## 2023-01-20 DIAGNOSIS — E27.9 ADRENAL NODULE (H): ICD-10-CM

## 2023-01-20 NOTE — PROGRESS NOTES
Please see telephone encounter from today, 1/20/23.     RN relayed message below to patient and provided phone numbers to call to schedule.    AREN HarrisN, RN

## 2023-01-20 NOTE — PROGRESS NOTES
For angiomyolipoma of kidney and adrenal nodule:  Please let patient know I have ordered referrals for urology and endocrinology  She needs to have MRI of her adrenals   Please give her scheduling info  Thank you  (patient knows we'll be calling with this info - she was in the emergency department yesterday for different issue so deferred to today)

## 2023-01-20 NOTE — TELEPHONE ENCOUNTER
RN calling patient to relay message from provider below.     RN provided phone numbers for patient to call to schedule with each.     Patient denies further questions at this time.     AREN HarrisN, RN    For angiomyolipoma of kidney and adrenal nodule:  Please let patient know I have ordered referrals for urology and endocrinology  She needs to have MRI of her adrenals   Please give her scheduling info  Thank you  (patient knows we'll be calling with this info - she was in the emergency department yesterday for different issue so deferred to today)

## 2023-01-20 NOTE — TELEPHONE ENCOUNTER
[facility] Call Center    Phone Message    May a detailed message be left on voicemail: yes     Reason for Call: Other: Patient is being referred to be seen for Adrenal nodule. Ref by Demetra Bedolla. Patient is scheduled on 7/17/23 at 12:30pm with Dr Ordoñez. Sending encounter to clinic for review. Please call patient to schedule if sooner opening is needed     Action Taken: Message routed to:  Adult Clinics: Endocrinology p [phone]    Travel Screening: Not Applicable

## 2023-01-20 NOTE — CONFIDENTIAL NOTE
Endocrinology referral received as follows:  Associated Diagnoses    Adrenal nodule (H) - left [E27.8]             Referring provider:    LEONA Rangel CNP    Labs:    N/A      Imagin/18/23 CT Chest      Currently scheduled Endocrine consult:     23 with Dr. Ordoñez.           Will send referral to Dr. Randolph to triage for sooner appointment.         Kamilah Victor RN  Endocrine Care Coordinator  [facility]

## 2023-01-23 ENCOUNTER — TELEPHONE (OUTPATIENT)
Dept: PSYCHOLOGY | Facility: CLINIC | Age: 63
End: 2023-01-23
Payer: MEDICARE

## 2023-01-23 NOTE — TELEPHONE ENCOUNTER
Patient did not arrive for the [facility] visit as of 2:50 PM. Provider called patient and left two messages to call my office number, to reschedule an appointment with me. I also let the patient know they can call [facility] as well to reschedule. Provider will reach out to patient on Tuesday,  1/24 to check in with the patient.

## 2023-01-25 ENCOUNTER — VIRTUAL VISIT (OUTPATIENT)
Dept: PSYCHOLOGY | Facility: CLINIC | Age: 63
End: 2023-01-25
Payer: MEDICARE

## 2023-01-25 DIAGNOSIS — F33.1 MODERATE EPISODE OF RECURRENT MAJOR DEPRESSIVE DISORDER (H): Primary | ICD-10-CM

## 2023-01-25 PROCEDURE — 90834 PSYTX W PT 45 MINUTES: CPT | Mod: 95

## 2023-01-25 NOTE — PROGRESS NOTES
"    [facility] Counseling                                     Progress Note    Patient Name: Paula Ho  Date: 1/25/23         Service Type: Individual    Session Start Time: 2:45 PM Session End Time: 3:25 PM      Session Length: 40 Minutes     Session #: 9    Attendees: Client attended alone    Service Modality:  Phone Visit:      Provider verified identity through the following two step process.  Patient provided:  Patient address and Patient is known previously to provider    Originating Site (Patient Location): Patient's home   Distant Site (Provider Location): On Site- [facility]    The patient has been notified of the following:      \"We have found that certain health care needs can be provided without the need for a face to face visit.  This service lets us provide the care you need with a phone conversation.       I will have full access to your [facility] medical record during this entire phone call.   I will be taking notes for your medical record.      Since this is like an office visit, we will bill your insurance company for this service.       There are potential benefits and risks of telephone visits (e.g. limits to patient confidentiality) that differ from in-person visits.?Confidentiality still applies for telephone services, and nobody will record the visit.  It is important to be in a quiet, private space that is free of distractions (including cell phone or other devices) during the visit.??      If during the course of the call I believe a telephone visit is not appropriate, you will not be charged for this service\"     Consent has been obtained for this service by care team member: Yes     DATA  Interactive Complexity: No  Crisis: No        Progress Since Last Session (Related to Symptoms / Goals / Homework):   Symptoms: Improving Patient reports improvement in mood, despite recent changes.    Homework: Partially completed      Episode of Care " Goals: Minimal progress - CONTEMPLATION (Considering change and yet undecided); Intervened by assessing the negative and positive thinking (ambivalence) about behavior change     Current / Ongoing Stressors and Concerns:   Patient continues to report recovering for pneumonia. Due to lab results receeived by her primary doctor, she was recommended to go the ER due to heart concerns. Furthermore, through scans the patient reports something was found on her kidney.  Paula continues to report to stress in relation to her relationship with her current partner and feeling disrespected. She states she decided to leave her partner due to possible substance use. She repots being sad and feeling bad she is no longer with her partner. Patient reports thoughts of self harm due to separation. She reports beginning pool therapy, and enjoying the experience. Patient states plans to begin exercising and signing up for classes at a gym near her.      Treatment Objective(s) Addressed in This Session:   Increase interest, engagement, and pleasure in doing things  Decrease frequency and intensity of feeling down, depressed, hopeless  Identify negative self-talk and behaviors: challenge core beliefs, myths, and actions  Grief   Thoughts of self harm and Safety Plan     Intervention:   CBT: Patient was educated on the importance of mindfulness, deep breathing, and self care when her anxiety is increased. Provider discussed safety plan and thoughts of self harm with the client.     Motivational Interviewing    MI Intervention: Expressed Empathy/Understanding, Supported Autonomy, Collaboration, Evocation, Permission to raise concern or advise, Open-ended questions and Reframe     Change Talk Expressed by the Patient: Reasons to change Need to change Taking steps    Provider Response to Change Talk: E - Evoked more info from patient about behavior change, A - Affirmed patient's thoughts, decisions, or attempts at behavior change and R -  Reflected patient's change talk       ASSESSMENT: Current Emotional / Mental Status (status of significant symptoms):   Risk status (Self / Other harm or suicidal ideation)   Patient denies current fears or concerns for personal safety.   Patient denies current or recent suicidal ideation or behaviors.   Patient denies current or recent homicidal ideation or behaviors.   Patient reports current or recent self injurious behavior or ideation including burning her arms.   Patient denies other safety concerns.   Patient reports there has been a change in risk factors since their last session.  seperation from her partner and recent health challenges   Patient reports there has been no change in protective factors since their last session.     There are no firearms in the house.     Appearance:   NA    Eye Contact:   NA    Psychomotor Behavior: Normal    Attitude:   Cooperative  Attentive   Orientation:   All   Speech    Rate / Production: Normal     Volume:  Normal    Mood:    Normal Sad  Agitated   Affect:    Appropriate    Thought Content:  Clear    Thought Form:  Coherent  Logical    Insight:    Good      Medication Review:   No changes to current psychiatric medication(s)     Medication Compliance:   Yes     Changes in Health Issues:   Yes: pneumonia     Chemical Use Review:   Substance Use: Chemical use reviewed, no active concerns identified      Tobacco Use: Yes, no change.  Patient reports frequency of use of cigarettes. Contemplation  Patient assessed present costs and future losses as a result of smoking  Provided encouragement to quit   Provided support and affirmation for steps taken towards quiting     Diagnosis:  Moderate Episode of Recurrent Major Depressive Disorder (F33.1)    Collateral Reports Completed:   Not Applicable    PLAN: (Patient Tasks / Therapist Tasks / Other)  Patient was also encouraged to try and complete any tasks that are important for her wellbeing in order to improve her overall  motivation. Patient was also encouraged to utilize her safety plan for any thoughts of self harm.       BECKY MANRIQUEZ    This note has been reviewed and I agree with the plan of care. This note is co-signed by JASON Bang, [facility], Supervisor, on: January 30, 2023  ______________________________________________________________________    Individual Treatment Plan    Patient's Name: Paula Ho  YOB: 1960    Date of Creation: 11/7/22  Date Treatment Plan Last Reviewed/Revised: 11/7/22     DSM5 Diagnoses: 296.32 (F33.1) Major Depressive Disorder, Recurrent Episode, Moderate _  Psychosocial / Contextual Factors: Patient has physical medical concerns that impact her current depressive symptoms.  PROMIS (reviewed every 90 days):     Referral / Collaboration:  Referral to another professional/service is not indicated at this time..    Anticipated number of session for this episode of care: 9-12 sessions  Anticipation frequency of session: Weekly  Anticipated Duration of each session: 38-52 minutes  Treatment plan will be reviewed in 90 days or when goals have been changed.       MeasurableTreatment Goal(s) related to diagnosis / functional impairment(s)  Goal 1: Patient will develop healthy thinking patterns and beliefs about self, others, and the world.       Objective #A (Patient Action)    Patient will Identify negative self-talk and behaviors: challenge core beliefs, myths, and actions.  Status: New - Date: 11/7/22     Intervention(s)  Therapist will teach how to identify negative self-talk and behaviors.    Objective #B  Patient will Improve concentration, focus, and mindfulness in daily activities .  Status: New - Date: 11/7/22     Intervention(s)  Therapist will teach how to improve concentration, focus, and mindfulness..      Goal 2: Patient will alleviate depressive symptoms in order to return to a previous level of functioning. As evidenced by a PHQ-9 score of 9 or less.        Objective # A  Status: New - Date: 11/7/22     Patient will lidentify at least 4 adaptive coping statements to counteract negative thoughts    Intervention(s)  Therapist will teach adaptive coping statements to conteract negative thoughts.    Objective #B  Patient will Increase interest, engagement, and pleasure in doing things.    Status: New - Date: 11/7/22     Intervention(s)  Therapist will assign homework related to increasing engagement and interest in doing things.        Patient has reviewed and agreed to the above plan.      BECKY MANRIQUEZ  November 7, 2022    This note has been reviewed and I agree with the plan of care. This note is co-signed by JASON Bang, [facility], Supervisor, on: November 8, 2022

## 2023-01-26 NOTE — TELEPHONE ENCOUNTER
Action 1.26.23 sv    Action Taken Image request sent to   [facility]- MRI brain- 7/29/22       Action 1.27.23 sv    Action Taken Called pt to located where  MRI brain- 7/29/22 was done, patient didn't       Action 2.24.23 sv   Action Taken Called pt 2x to located where  MRI brain- 7/29/22 was done, patient didn't                RECORDS RECEIVED FROM: internal /ce   DATE RECEIVED: 2.28.23   NOTES (FOR ALL VISITS) STATUS DETAILS   OFFICE NOTES from referring provider internal  Demetra Bedolla     MEDICATION LIST internal     IMAGING        MRI (BRAIN) ce [facility]- 7.29.22   XR (Chest) ce - 1.6.23   CT (HEAD/NECK/CHEST/ABDOMEN) internal /ce Internal 1/18/23, 5.4.22, 3.5.21       LABS     DIABETES: HBGA1C, CREATININE, FASTING LIPIDS, MICROALBUMIN URINE, POTASSIUM, TSH, T4    THYROID: TSH, T4, CBC, THYRODLONULIN, TOTAL T3, FREE T4, CALCITONIN, CEA internal /ce

## 2023-01-26 NOTE — TELEPHONE ENCOUNTER
To schedulers : please schedule with  open new/EMANI  On non -call week, such as Dr Ordoñez 2/7, 2/21, 2/28, Multiple choices with Dr Martinez. This could be a virtual visit.      Faiza Randolph MD  Endocrine triage

## 2023-02-01 ENCOUNTER — VIRTUAL VISIT (OUTPATIENT)
Dept: PSYCHOLOGY | Facility: CLINIC | Age: 63
End: 2023-02-01
Payer: MEDICARE

## 2023-02-01 DIAGNOSIS — F33.1 MODERATE EPISODE OF RECURRENT MAJOR DEPRESSIVE DISORDER (H): Primary | ICD-10-CM

## 2023-02-01 PROCEDURE — 90834 PSYTX W PT 45 MINUTES: CPT | Mod: 95

## 2023-02-01 NOTE — PROGRESS NOTES
"    [facility] Counseling                                     Progress Note    Patient Name: Paula Ho  Date: 1/25/23         Service Type: Individual    Session Start Time: 12:37 PM Session End Time: 1:23 PM      Session Length: 46 Minutes     Session #: 10    Attendees: Client attended alone    Service Modality:  Phone Visit:      Provider verified identity through the following two step process.  Patient provided:  Patient address and Patient is known previously to provider    Originating Site (Patient Location): Patient's home   Distant Site (Provider Location): Provider's Home Office     The patient has been notified of the following:      \"We have found that certain health care needs can be provided without the need for a face to face visit.  This service lets us provide the care you need with a phone conversation.       I will have full access to your [facility] medical record during this entire phone call.   I will be taking notes for your medical record.      Since this is like an office visit, we will bill your insurance company for this service.       There are potential benefits and risks of telephone visits (e.g. limits to patient confidentiality) that differ from in-person visits.?Confidentiality still applies for telephone services, and nobody will record the visit.  It is important to be in a quiet, private space that is free of distractions (including cell phone or other devices) during the visit.??      If during the course of the call I believe a telephone visit is not appropriate, you will not be charged for this service\"     Consent has been obtained for this service by care team member: Yes     DATA  Interactive Complexity: No  Crisis: No        Progress Since Last Session (Related to Symptoms / Goals / Homework):   Symptoms: Worsening Patient reports a decrease in mood since she decided to seperate from her partner    Homework: Partially completed      Episode of Care " Goals: Minimal progress - CONTEMPLATION (Considering change and yet undecided); Intervened by assessing the negative and positive thinking (ambivalence) about behavior change     Current / Ongoing Stressors and Concerns:   Patient continues to report recovering for pneumonia.  Paula reports she has been crying every day since her last session due to her recent separation from her partner.  She reports feeling depressed and down about having to be alone.  Patient also reports missing her pool therapy session due to not being able to leave the house because of depression.  Paula also reports a current stressor in relation to her niece and her grand niece possibly moving in with her.  She worries her niece will not clean up after herself and will keep her house in disarray.  Patient also reports stress in relation to her brother suing her because of the situation with his dog.  Patient also reports concerns about possibly working with another therapist because she reports not getting along with this therapist.     Treatment Objective(s) Addressed in This Session:   Increase interest, engagement, and pleasure in doing things  Decrease frequency and intensity of feeling down, depressed, hopeless  Identify negative self-talk and behaviors: challenge core beliefs, myths, and actions  Grief      Intervention:   CBT: Patient was educated on the importance of mindfulness, deep breathing, and self care when her anxiety is increased. Provider and patient discussed writing a letter to herself and her previous partner in regards to her feelings, thoughts, emotions.     Motivational Interviewing    MI Intervention: Expressed Empathy/Understanding, Supported Autonomy, Collaboration, Evocation, Permission to raise concern or advise, Open-ended questions and Reframe     Change Talk Expressed by the Patient: Reasons to change Need to change Taking steps    Provider Response to Change Talk: E - Evoked more info from patient about  behavior change, A - Affirmed patient's thoughts, decisions, or attempts at behavior change and R - Reflected patient's change talk       ASSESSMENT: Current Emotional / Mental Status (status of significant symptoms):   Risk status (Self / Other harm or suicidal ideation)   Patient denies current fears or concerns for personal safety.   Patient denies current or recent suicidal ideation or behaviors.   Patient denies current or recent homicidal ideation or behaviors.   Patient denies current or recent self injurious behavior or ideation.   Patient denies other safety concerns.   Patient reports there has been no change in risk factors since their last session.     Patient reports there has been no change in protective factors since their last session.     There are no firearms in the house.     Appearance:   NA    Eye Contact:   NA    Psychomotor Behavior: Normal    Attitude:   Cooperative  Attentive   Orientation:   All   Speech    Rate / Production: Normal     Volume:  Normal    Mood:    Normal Sad  Agitated   Affect:    Appropriate    Thought Content:  Clear    Thought Form:  Coherent  Logical    Insight:    Good      Medication Review:   No changes to current psychiatric medication(s)     Medication Compliance:   Yes     Changes in Health Issues:   Yes: pneumonia     Chemical Use Review:   Substance Use: Chemical use reviewed, no active concerns identified      Tobacco Use: Yes, no change.  Patient reports frequency of use of cigarettes. Contemplation  Patient assessed present costs and future losses as a result of smoking  Provided encouragement to quit   Provided support and affirmation for steps taken towards quiting     Diagnosis:  Moderate Episode of Recurrent Major Depressive Disorder (F33.1)    Collateral Reports Completed:   Not Applicable    PLAN: (Patient Tasks / Therapist Tasks / Other)  Patient was encouraged to try and complete any tasks that are important for her wellbeing in order to improve her  overall motivation.  Patient was also encouraged to speak with her niece in regards to setting boundaries if she is to move in with her.  Patient was also encouraged to write a letter to herself and her ex partner to process thoughts, feelings,and emotions she may have of the situations.  Patient was also encouraged to call Jared to set up an appointment for her pool therapy.    BECKY MANRIQUEZ    This note has been reviewed and I agree with the plan of care. This note is co-signed by JASON Bang, [facility], Supervisor, on: February 1, 2023  ______________________________________________________________________    Individual Treatment Plan    Patient's Name: Paula Ho  YOB: 1960    Date of Creation: 11/7/22  Date Treatment Plan Last Reviewed/Revised: 11/7/22     DSM5 Diagnoses: 296.32 (F33.1) Major Depressive Disorder, Recurrent Episode, Moderate _  Psychosocial / Contextual Factors: Patient has physical medical concerns that impact her current depressive symptoms.  PROMIS (reviewed every 90 days):     Referral / Collaboration:  Referral to another professional/service is not indicated at this time..    Anticipated number of session for this episode of care: 9-12 sessions  Anticipation frequency of session: Weekly  Anticipated Duration of each session: 38-52 minutes  Treatment plan will be reviewed in 90 days or when goals have been changed.       MeasurableTreatment Goal(s) related to diagnosis / functional impairment(s)  Goal 1: Patient will develop healthy thinking patterns and beliefs about self, others, and the world.       Objective #A (Patient Action)    Patient will Identify negative self-talk and behaviors: challenge core beliefs, myths, and actions.  Status: New - Date: 11/7/22     Intervention(s)  Therapist will teach how to identify negative self-talk and behaviors.    Objective #B  Patient will Improve concentration, focus, and mindfulness in daily activities  .  Status: New - Date: 11/7/22     Intervention(s)  Therapist will teach how to improve concentration, focus, and mindfulness..      Goal 2: Patient will alleviate depressive symptoms in order to return to a previous level of functioning. As evidenced by a PHQ-9 score of 9 or less.       Objective # A  Status: New - Date: 11/7/22     Patient will lidentify at least 4 adaptive coping statements to counteract negative thoughts    Intervention(s)  Therapist will teach adaptive coping statements to conteract negative thoughts.    Objective #B  Patient will Increase interest, engagement, and pleasure in doing things.    Status: New - Date: 11/7/22     Intervention(s)  Therapist will assign homework related to increasing engagement and interest in doing things.        Patient has reviewed and agreed to the above plan.      BECKY MANRIQUEZ  November 7, 2022    This note has been reviewed and I agree with the plan of care. This note is co-signed by JASON Bang, [facility], Supervisor, on: November 8, 2022

## 2023-02-02 ENCOUNTER — TELEPHONE (OUTPATIENT)
Dept: PSYCHOLOGY | Facility: CLINIC | Age: 63
End: 2023-02-02
Payer: MEDICARE

## 2023-02-02 ENCOUNTER — VIRTUAL VISIT (OUTPATIENT)
Dept: BEHAVIORAL HEALTH | Facility: CLINIC | Age: 63
End: 2023-02-02
Payer: MEDICARE

## 2023-02-02 DIAGNOSIS — F33.2 SEVERE EPISODE OF RECURRENT MAJOR DEPRESSIVE DISORDER, WITHOUT PSYCHOTIC FEATURES (H): Primary | ICD-10-CM

## 2023-02-02 DIAGNOSIS — R69 DIAGNOSIS DEFERRED: Primary | ICD-10-CM

## 2023-02-02 PROCEDURE — 90837 PSYTX W PT 60 MINUTES: CPT | Performed by: SOCIAL WORKER

## 2023-02-02 NOTE — PROGRESS NOTES
Behavioral Health Home Services  [facility]: [place]        Social Work Care Navigator Note      Patient: Paula Ho  Date: February 2, 2023  Preferred Name: Paula    Previous PHQ-9:   PHQ-9 SCORE 6/30/2022 8/1/2022 1/18/2023   PHQ-9 Total Score MyChart 11 (Moderate depression) - 11 (Moderate depression)   PHQ-9 Total Score 11 11 11     Previous LIBRA-7:   LIBRA-7 SCORE 8/1/2022 11/10/2022 1/18/2023   Total Score - - 9 (mild anxiety)   Total Score 12 9 9     ARNALDO LEVEL:  No flowsheet data found.    Preferred Contact:  Need for : No  Preferred Contact: Cell      Type of Contact Today: Phone call (patient / identified key support person reached)      Data: (subjective / Objective):  Recent ED/IP Admission or Discharge?   None    Patient Goals:  Goal Areas: Health; Mental Health; Future HAP Goal area; Chemical Health  Patient stated goals: -Health: Paula would like to continue to meet with her providers, and take her medications as prescribed. Paula would also like assistance with improving her back pain, and receive physical therapy, as well as possibly surgery.  -Chemical Health: Paula would like assistance from her providers to quit smoking and will work on reducing her use, so she can be approved for back surgery.   -Mental Health: Paula would like to find a long term therapy provider through [place], and continue to receive assistance with [facility] for monthly check ins. She would also like to start working with an [facility] worker again.   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 She would like to receive home delivered meals if she does open.    [facility] Core Service Provided:  Comprehensive Care Management: utilized the electronic medical record / patient registry to identify and support patient's health conditions / needs more effectively   Care Transitions: focused on the coordinated and  "seamless movement of patient between or within different levels of care or settings  Care Coordination: provided care management services/referrals necessary to ensure patient and their identified supports have access to medical, behavioral health, pharmacology and recovery support services.  Ensured that patient's care is integrated across all settings and services.   Individual and Family Support: aimed to help clients reduce barriers to achieving goals, increase health literacy and knowledge about chronic condition(s), increase self-efficacy skills, and improve health outcomes    Current Stressors / Issues / Care Plan Objective Addressed Today:  [facility] called patient for scheduled visit and patient answered and was crying. She reported she didn't feel like talking right now, and she'd like to reschedule visit. [facility] rescheduled patient for Monday, 2/6.     [facility] asked patient if she was feeling safe or needed any immediate assistance at this time. Patient was very upset and reported that she'll go to the hospital if she needs any immediate assistance, but she didn't need any help right now. She reported that she needs some time to think to herself and process through her thoughts.     [facility] asked patient if she has the crisis lines, and patient reported that she called them last week and they are \"worthless\". Patient also reported that she doesn't want to meet with her current therapist anymore because she doesn't feel she is helping. She reported that she didn't like how the therapist would only answer with \"mhmm\", but she reported not feeling like she was helping with her concerns. CC reported that patient doesn't need to meet with her any longer if she's not comfortable and finding a therapist that is the right fit for her is part of the process. [facility] offered to scheduled patient with a [facility] to check in today or another day for additional support, but patient declined.     Patient reported that she's \"damned if " "she does, damned if she doesn't\" and her \"dogs are the only thing keeping me alive right now\". Patient reported that it's been a rough day today, but she reported that she'll be okay and she won't hurt herself.     [facility] asked again if she wanted to briefly talk about anything that's upsetting her right now, and she reported that she doesn't want to talk right now. She reported she's had some recent appointments and they found a mass on one of her kidneys and there's also something going on with her heart. She reported that she's in pain all of the time.    [facility] didn't feel comfortable hanging up yet with patient until a [facility] was able to further assess safety, due to patient's history of SI concerns and since patient didn't feel the crisis lines helped her last week. Patient reported again that she didn't want to talk right now, but she agreed to having a C come on the phone briefly before hanging up.     [facility] Dorie joined the call and talked with patient about concerns, and she reported again that she didn't want to talk about anything right now. She reported that she's depressed and she needed to figure it out within her own mind. She reported that she's \"not going to do anything to hurt myself\". Patient agreed to have [facility] Dorie call her at 1pm to complete check in, and she also will call Dorie if she has any other concerns before that time.     Intervention:  Motivational Interviewing: Expressed Empathy/Understanding, Supported Autonomy, Collaboration, Evocation, Permission to raise concern or advise, Open-ended questions and Reflections: simple and complex   Target Behavior(s): Explored thoughts about taking an anti-depressant, Explored and resolved challenges related to taking anti-depressants as prescribed, Explored thoughts and readiness to participate in individual therapy, Explored and resolved challenges to attending appointments as scheduled and Explored current social supports and reinforced opportunities " to increase engagement    Assessment: (Progress on Goals / Homework):  Patient would benefit from continued coordination in reaching their goals set for the Behavioral Health Home ([facility]) program. [facility] reviewed Health Action Plan goals and will continue to monitor progress and work with patient and their care team.    Plan: (Homework, other):  Patient was encouraged to continue to seek condition-related information and education.      Scheduled a Phone follow up appointment with SW CC in 1 week     Patient has set self-identified goals and will monitor progress until the next appointment on: 2/6/23.      DENI Humphries  Behavioral Health Home ([facility])   [facility]  [phone]
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
## 2023-02-06 ENCOUNTER — VIRTUAL VISIT (OUTPATIENT)
Dept: PSYCHOLOGY | Facility: CLINIC | Age: 63
End: 2023-02-06
Payer: MEDICARE

## 2023-02-06 ENCOUNTER — VIRTUAL VISIT (OUTPATIENT)
Dept: BEHAVIORAL HEALTH | Facility: CLINIC | Age: 63
End: 2023-02-06
Payer: MEDICARE

## 2023-02-06 DIAGNOSIS — R69 DIAGNOSIS DEFERRED: Primary | ICD-10-CM

## 2023-02-06 DIAGNOSIS — F33.2 SEVERE EPISODE OF RECURRENT MAJOR DEPRESSIVE DISORDER, WITHOUT PSYCHOTIC FEATURES (H): Primary | ICD-10-CM

## 2023-02-06 PROCEDURE — 90834 PSYTX W PT 45 MINUTES: CPT | Mod: 95

## 2023-02-06 ASSESSMENT — ANXIETY QUESTIONNAIRES
GAD7 TOTAL SCORE: 10
3. WORRYING TOO MUCH ABOUT DIFFERENT THINGS: NEARLY EVERY DAY
5. BEING SO RESTLESS THAT IT IS HARD TO SIT STILL: SEVERAL DAYS
IF YOU CHECKED OFF ANY PROBLEMS ON THIS QUESTIONNAIRE, HOW DIFFICULT HAVE THESE PROBLEMS MADE IT FOR YOU TO DO YOUR WORK, TAKE CARE OF THINGS AT HOME, OR GET ALONG WITH OTHER PEOPLE: EXTREMELY DIFFICULT
1. FEELING NERVOUS, ANXIOUS, OR ON EDGE: SEVERAL DAYS
2. NOT BEING ABLE TO STOP OR CONTROL WORRYING: NEARLY EVERY DAY
6. BECOMING EASILY ANNOYED OR IRRITABLE: SEVERAL DAYS
4. TROUBLE RELAXING: NOT AT ALL
7. FEELING AFRAID AS IF SOMETHING AWFUL MIGHT HAPPEN: SEVERAL DAYS
GAD7 TOTAL SCORE: 10

## 2023-02-06 ASSESSMENT — PATIENT HEALTH QUESTIONNAIRE - PHQ9: SUM OF ALL RESPONSES TO PHQ QUESTIONS 1-9: 21

## 2023-02-06 NOTE — PROGRESS NOTES
Behavioral Health Home Services  [facility]: [place]        Social Work Care Navigator Note      Patient: Paula Ho  Date: February 6, 2023  Preferred Name: Paula    Previous PHQ-9:   PHQ-9 SCORE 6/30/2022 8/1/2022 1/18/2023   PHQ-9 Total Score MyChart 11 (Moderate depression) - 11 (Moderate depression)   PHQ-9 Total Score 11 11 11     Previous LIBRA-7:   LIBRA-7 SCORE 8/1/2022 11/10/2022 1/18/2023   Total Score - - 9 (mild anxiety)   Total Score 12 9 9     ARNALDO LEVEL:  No flowsheet data found.    Preferred Contact:  Need for : No  Preferred Contact: Cell      Type of Contact Today: Phone call (patient / identified key support person reached)      Data: (subjective / Objective):    Patient came in to complete the comprehensive wellness assessment for Behavioral Health Home Services.  See [facility] Flowsheets for details on the assessment.  See [facility], Behavioral Health Home for a copy of the patient's care plan.    1. Patient reported that she wasn't upset with     2. Patient reported that her pain has been really bad and she's currently rocking right now to help with the pain.     3. Patient reports that she's not sure about her mood right now, she reported that she feels numb. She reported that she will not hurt herself, but she wonders why she is still alive. She reported she wonders about waking up one day and not having to cry.     4. Patient reported that they have some new concerns about her heart, and she's trying not to get stressed about this. She reports she goes and gets checked every year for her past aneurysms.     5. Patient reports she hasn't heard from [facility] yet about the [facility] services, so no updates at this time.     6. Patient reported that she hasn't made one of her appointments for pool therapy because of her depression right now. She is trying to get herself to go, but she's struggling to get herself up and to go to the appointment. Patient is going to ask the place where she does  "pool therapy about the one pass because she would go exercising there as well.     7. Patient reported that she's still be struggling with sleeping. She tried sleeping last night and couldn't sleep until 3am and woke up at 8am.     8. Patient reports she hasn't received a call for her PCA reassessment yet, so she's waiting to hear about this. She is still nervous about this and [facility] talked with patient about reporting how she is on her worst days, and being honest about how much assistance she needs on those days. She reported she's also going to consider opening to the CADI waiver if her PCA hours do decrease.     9. Patient reported that her smoking has been \"awful\". She's done with the antibiotics from her pneumonia, but she's still getting chest pains every once in awhile. She still knows that she can't receive back surgery until she stops smoking. She is still receiving patches and lozenges to help with quitting.     10. Patient is going to work on getting scheduled with [facility] to review her medications again. She isn't sure if anything needs to change, but she would like to discuss this with her since it's been a couple months.       Update Goals:   Health: Patient would like to continue to meet with her providers, and take her medications as prescribed. Paula would also like assistance with improving her back pain, and receive pool therapy, as well as possibly surgery.  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   Chemical Health: Patient would like assistance from her providers to quit smoking and will work on reducing her use, so she can be approved for back surgery.   Mental Health: Patient would like to find a long term therapy provider through [place], and continue to receive assistance with [facility] for monthly check ins. She would also like to start working with an ARM worker again.  She reported that she wants to build herself back to " feel like somebody and take it day by day.   Future goals: Patient would like to open to the CADI waiver in the future depending on her reassessment (February) for PCA services and if her hours decrease. She would like to receive home delivered meals if she does open.    [facility] will mail HAP letter once approved by [facility].       DENI Humphries  [facility]al Health [facility] ([facility])   [facility]  [phone]

## 2023-02-06 NOTE — LETTER
Behavioral Health Home ([facility]): Health Action Plan  [facility]: [place]    [facility]      Name: Paula Ho  Preferred Name: Paula  : 1960  MRN: [id number]      My Goals  Goal Areas: Health; Mental Health; Future HAP Goal area; Chemical Health    Patient stated goals:   Health: Paula would like to continue to meet with her providers, and take her medications as prescribed.   -Paula would also like assistance with improving her back pain, and receive pool therapy, as well as possibly surgery.    -Paula would like to get a gym membership so she can get more exercise at the place she does pool therapy.       Chemical Health: Paula would like assistance from her providers to quit smoking and will work on reducing her use, so she can be approved for back surgery.       Mental Health: Paula would like to find a long term therapy provider through [place], and continue to receive assistance with [facility] for monthly check ins.   -She would also like to start working with an [employer] worker again.    -She reported that she wants to build herself back to feel like somebody and take it day by day.       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 She would like to receive home delivered meals if she does open.    Strengths related to each goal: Paula states her strengths are the support of her dogs, good advocate for herself, support of good friends, and care for others. Patient states she has a big heart.    Services and Supports Needed: The [facility] Team will provide monthly contact with patient in order to monitor progress towards goals.    Activities / Actions of Team to support goal(s): [facility] team will locate appropriate resources that align with patient's goals and promote health and wellness.    Activities / Actions of Patient / Parent / Guardian to support goal(s): It is a requirement that patient's  primary care physician is through the [facility] system and that they are on Medical Assistance/Medicaid. If either of these were to change or if patient needs any type of assistance, they are to reach out to their Behavioral Health Home ([facility]) team.      Recommended Referral  Tobacco cessation referrals made?: No  Mental Health / Chemical Dependency Referrals: Yes  Substance Use Referrals: Not Applicable  Mental Health Referrals: [facility] Referrals: [facility]; [facility] ; Other (see comments)      My Team Members and Their Contact Information  Patient Care Team         Relationship Specialty Notifications Start End    Demetra Bedolla APRN CNP PCP - General Nurse Practitioner  12/5/19     Referred Pt to urology    Phone: [phone] Fax: [phone]         [address] [place] [address]    Demetra Bedolla APRN CNP Assigned PCP   9/15/19     Phone: [phone] Fax: [phone]         [address] [place] [address]    Tigist Manjarrez NP Nurse Practitioner Nurse Practitioner Admissions 8/3/20     Psychiatry    Phone: [phone] Fax: [phone]         [address] [place] [address]    Tigist Manjarrez NP Assigned Behavioral Health Provider   11/15/20     Phone: [phone] Fax: [phone]         [address] [place] [address]    [facility]  12/23/20     Phone: [phone]         Dane Irizarry MD Anesthesiologist Anesthesiology  1/21/21     Phone: [phone] Pager: [phone] Fax: [phone] [address] [address]    Chanell Joya MD MD Dermatology  10/25/21     Joie Nobles MD Referring Physician Family Medicine  10/25/21     referring to Dermatology    Phone: [phone] Fax: [phone]         [address] [place] [address]    Man Goode MD Assigned Pulmonology Provider   11/7/21     Phone:  [phone] Fax: [phone]         [address] [place] [address]    Paty Dial BSW  Clinic Admissions 2/17/22     [facility] Case Management - Enrolled 02/17/2021 -[employer] - direct extension [phone].    Agustin Mcconnell OD Assigned Surgical Provider   2/27/22     Phone: [phone] Fax: [phone]         [facility] [address] [facility] [address]    Nathalia Ordoñez MD MD Endocrinology, Diabetes, and Metabolism  1/20/23     Phone: [phone] Fax: [phone]         [address] [place] [address]    Raul Marinelli MD MD Urology  1/20/23     Phone: [phone] Fax: [phone] [address] [place] [address]    Zoila Martinez MD Assigned Endocrinology Provider   3/4/23     Phone: [phone] Fax: [phone]         [address] [place] [address]            My Wellness Plan  Safety Concerns: Suicide Ideation    Recommendations / Plan for safety concerns: Patient will follow her safety plan that was co-developed with [facility] Isela Kirk on 9/26/22, and she also feels comfortable reaching out to her clinic or [facility] crisis, as well as the suicide hotline.    Crisis Plan (emergencies / when urgent support needed): Patient states she feels comfortable contacting her PCA Sharla, utilizing [facility] crisis/suicide hotline, or go to the [facility] unit in case immediate assistance is needed.      Paula Ho co-developed the Health Action Plan with the [facility] Team and received a copy of this document.  Date Health Action Plan Completed/Updated: 02/06/23

## 2023-02-07 ENCOUNTER — TELEPHONE (OUTPATIENT)
Dept: BEHAVIORAL HEALTH | Facility: CLINIC | Age: 63
End: 2023-02-07
Payer: MEDICARE

## 2023-02-07 NOTE — PROGRESS NOTES
"    [facility]                                     Progress Note    Patient Name: Paula Ho  Date: 23         Service Type: Individual    Session Start Time: 2:40 PM Session End Time: 3:20 PM      Session Length: 40 Minutes     Session #: 11    Attendees: Client attended alone    Service Modality:  Phone Visit:      Provider verified identity through the following two step process.  Patient provided:  Patient  and Patient is known previously to provider    Telephone Visit: The patient's condition can be safely assessed and treated via synchronous audio telemedicine encounter.      Reason for Audio Telemedicine Visit: Patient has requested telehealth visit    Originating Site (Patient Location): Patient's home    Distant Site (Provider Location): [facility] [place]    Consent:  The patient/guardian has verbally consented to:     1. The potential risks and benefits of telemedicine (telephone visit) versus in person care;    The patient has been notified of the following:      \"We have found that certain health care needs can be provided without the need for a face to face visit.  This service lets us provide the care you need with a phone conversation.       I will have full access to your [facility] medical record during this entire phone call.   I will be taking notes for your medical record.      Since this is like an office visit, we will bill your insurance company for this service.       There are potential benefits and risks of telephone visits (e.g. limits to patient confidentiality) that differ from in-person visits.?Confidentiality still applies for telephone services, and nobody will record the visit.  It is important to be in a quiet, private space that is free of distractions (including cell phone or other devices) during the visit.??      If during the course of the call I believe a telephone visit is not appropriate, " "you will not be charged for this service\"     Consent has been obtained for this service by care team member: Yes     DATA  Interactive Complexity: No  Crisis: No        Progress Since Last Session (Related to Symptoms / Goals / Homework):   Symptoms: No change Patient reports continued stress about not being with her partner.    Homework: Did not complete      Episode of Care Goals: Minimal progress - CONTEMPLATION (Considering change and yet undecided); Intervened by assessing the negative and positive thinking (ambivalence) about behavior change     Current / Ongoing Stressors and Concerns:    Paula reports continuing to set boundaries and not letting her ex-boyfriend back in her home.  She reports this has been difficult due to his refusal to collect his belongings.  Patient also believes her previous partner has been using substances, and does not want to be with him due to the possible substance use.  Patient also reports feeling hurt by her recent separation from her partner and has has trouble emotionally processing the situation.  Patient continues to wonder if her previous partner was a narcissist.  Patient also reports stress in regards to her niece living with her by the beginning of April.  She reports feeling comforted by this fact that she will have others living with her in her home.     Treatment Objective(s) Addressed in This Session:   Increase interest, engagement, and pleasure in doing things  Decrease frequency and intensity of feeling down, depressed, hopeless  Identify negative self-talk and behaviors: challenge core beliefs, myths, and actions  Grief and the grieving process   Setting boundaries     Intervention:   CBT: Patient was educated on the importance of mindfulness, deep breathing, and self care when her anxiety is increased. Provider and patient discussed writing a letter to herself and her previous partner in regards to her feelings, thoughts, emotions.  Provider also provided " psychoeducation around narcissistic personality disorder and narcissistic traits.    Motivational Interviewing    MI Intervention: Expressed Empathy/Understanding, Supported Autonomy, Collaboration, Evocation, Permission to raise concern or advise, Open-ended questions and Reframe     Change Talk Expressed by the Patient: Reasons to change Need to change Taking steps    Provider Response to Change Talk: E - Evoked more info from patient about behavior change, A - Affirmed patient's thoughts, decisions, or attempts at behavior change and R - Reflected patient's change talk       ASSESSMENT: Current Emotional / Mental Status (status of significant symptoms):   Risk status (Self / Other harm or suicidal ideation)   Patient denies current fears or concerns for personal safety.   Patient denies current or recent suicidal ideation or behaviors.   Patient denies current or recent homicidal ideation or behaviors.   Patient denies current or recent self injurious behavior or ideation.   Patient denies other safety concerns.   Patient reports there has been no change in risk factors since their last session.     Patient reports there has been no change in protective factors since their last session.     There are no firearms in the house.     Appearance:   NA    Eye Contact:   NA    Psychomotor Behavior: Normal    Attitude:   Cooperative  Attentive   Orientation:   All   Speech    Rate / Production: Normal     Volume:  Normal    Mood:    Normal Sad  Agitated   Affect:    Appropriate    Thought Content:  Clear    Thought Form:  Coherent  Logical    Insight:    Good      Medication Review:   No changes to current psychiatric medication(s)     Medication Compliance:   Yes     Changes in Health Issues:   None reported     Chemical Use Review:   Substance Use: Chemical use reviewed, no active concerns identified      Tobacco Use: Yes, no change.  Patient reports frequency of use of cigarettes. Contemplation  Patient assessed  present costs and future losses as a result of smoking  Provided encouragement to quit   Provided support and affirmation for steps taken towards quiting     Diagnosis:  Moderate Episode of Recurrent Major Depressive Disorder (F33.1)    Collateral Reports Completed:   Not Applicable    PLAN: (Patient Tasks / Therapist Tasks / Other)  Patient was encouraged to try and complete any tasks that are important for her wellbeing in order to improve her overall motivation. Patient was also encouraged to write a letter to herself and her ex partner to process thoughts, feelings,and emotions she may have of the situations.    BECKY MANRIQUEZ    This note has been reviewed and I agree with the plan of care. This note is co-signed by JASON Bang, [facility], Supervisor, on: February 7 2023  ______________________________________________________________________    Individual Treatment Plan    Patient's Name: Paula Ho  YOB: 1960    Date of Creation: 11/7/22  Date Treatment Plan Last Reviewed/Revised: 11/7/22     DSM5 Diagnoses: 296.32 (F33.1) Major Depressive Disorder, Recurrent Episode, Moderate _  Psychosocial / Contextual Factors: Patient has physical medical concerns that impact her current depressive symptoms.  PROMIS (reviewed every 90 days):     Referral / Collaboration:  Referral to another professional/service is not indicated at this time..    Anticipated number of session for this episode of care: 9-12 sessions  Anticipation frequency of session: Weekly  Anticipated Duration of each session: 38-52 minutes  Treatment plan will be reviewed in 90 days or when goals have been changed.       MeasurableTreatment Goal(s) related to diagnosis / functional impairment(s)  Goal 1: Patient will develop healthy thinking patterns and beliefs about self, others, and the world.       Objective #A (Patient Action)    Patient will Identify negative self-talk and behaviors: challenge core beliefs, myths,  and actions.  Status: New - Date: 11/7/22     Intervention(s)  Therapist will teach how to identify negative self-talk and behaviors.    Objective #B  Patient will Improve concentration, focus, and mindfulness in daily activities .  Status: New - Date: 11/7/22     Intervention(s)  Therapist will teach how to improve concentration, focus, and mindfulness..      Goal 2: Patient will alleviate depressive symptoms in order to return to a previous level of functioning. As evidenced by a PHQ-9 score of 9 or less.       Objective # A  Status: New - Date: 11/7/22     Patient will lidentify at least 4 adaptive coping statements to counteract negative thoughts    Intervention(s)  Therapist will teach adaptive coping statements to conteract negative thoughts.    Objective #B  Patient will Increase interest, engagement, and pleasure in doing things.    Status: New - Date: 11/7/22     Intervention(s)  Therapist will assign homework related to increasing engagement and interest in doing things.        Patient has reviewed and agreed to the above plan.      BECKY MANRIQUEZ  November 7, 2022    This note has been reviewed and I agree with the plan of care. This note is co-signed by JASON Bang, [facility], Supervisor, on: November 8, 2022

## 2023-02-07 NOTE — TELEPHONE ENCOUNTER
[facility] called patient to check in about going to pool therapy as discussed yesterday at her [facility] check in. She reported that she's dressed and ready to go, and she reported needing to go because she's in more pain today than she was yesterday. She thanked [facility] for checking in.     Paty Dial, LSW [facility] ([facility])   [facility]  [phone]

## 2023-02-14 ENCOUNTER — TELEPHONE (OUTPATIENT)
Dept: PSYCHOLOGY | Facility: CLINIC | Age: 63
End: 2023-02-14
Payer: MEDICARE

## 2023-02-14 NOTE — TELEPHONE ENCOUNTER
Patient did not arrive for the [facility] visit as of 11:22Am. Provider called patient and left two messages to call my office number, to reschedule an appointment with me. I also let the patient know they can call [facility] as well to reschedule. Provider will reach out to patient on Wednesday, 02/15 to check in with the patient.

## 2023-02-20 ENCOUNTER — TELEPHONE (OUTPATIENT)
Dept: PSYCHOLOGY | Facility: CLINIC | Age: 63
End: 2023-02-20
Payer: MEDICARE

## 2023-02-20 NOTE — TELEPHONE ENCOUNTER
Patient did not arrive for the [facility] visit as of 02:50 PM. Provider called patient and left two messages to call my office number, to reschedule an appointment with me. I also let the patient know they can call [facility] as well to reschedule. Provider will reach out to patient on Tuesday, 02/21 to check in with the patient.

## 2023-02-21 ENCOUNTER — TELEPHONE (OUTPATIENT)
Dept: FAMILY MEDICINE | Facility: CLINIC | Age: 63
End: 2023-02-21

## 2023-02-21 ENCOUNTER — ANCILLARY PROCEDURE (OUTPATIENT)
Dept: MRI IMAGING | Facility: CLINIC | Age: 63
End: 2023-02-21
Attending: NURSE PRACTITIONER
Payer: MEDICARE

## 2023-02-21 DIAGNOSIS — E27.9 ADRENAL NODULE (H): ICD-10-CM

## 2023-02-21 DIAGNOSIS — E27.9 ADRENAL NODULE (H): Primary | ICD-10-CM

## 2023-02-21 PROCEDURE — 74181 MRI ABDOMEN W/O CONTRAST: CPT | Mod: 52 | Performed by: RADIOLOGY

## 2023-02-21 PROCEDURE — G1010 CDSM STANSON: HCPCS | Performed by: RADIOLOGY

## 2023-02-21 NOTE — TELEPHONE ENCOUNTER
Patient calling stating she just left the  [facility] where she was scheduled for her adrenal MRI. She said that because she got claustrophobic, they need to reschedule her somewhere else but she is not sure where.     Patient said that the [facility] staff said they would be reaching out to tell the provider all of this and where patient needs to reschedule to.     Raisa YOUNGERN, RN

## 2023-02-23 NOTE — TELEPHONE ENCOUNTER
RN spoke with patient. Pt reports she will need the IV sedation.   Pt confirms that she has a  already.   RN informed pt that will send message to Demetra Bedolla to further advise. Did inform patient that she will most likely need a pre-op for IV sedation.     Pt verbalized understanding and agrees with plan.     Stacey Calzada RN    [facility]

## 2023-02-23 NOTE — TELEPHONE ENCOUNTER
Called and spoke with patient. Patient states someone called her yesterday and helped her reschedule her MRI for March 11th. Appears this was scheduled [facility].    Patient states when she had MRI done before, was only able to be in machine for about 15 minutes before she started feeling really claustrophobic and her back started hurting again. Pt states they told her she would have another 30 minutes at least. Pt states theres no way she could have done that.     Patient states she is agreeable to upwright MRI with [facility], but is not sure of her insurance coverage with them.     Patient wants provider to advise what she thinks is best for pt. Pt states if she does laying down MRI, needs to have sedation.   Thelma Leach RN    [facility]- Primary Care

## 2023-02-23 NOTE — TELEPHONE ENCOUNTER
Noted. Is she requesting open MRI or sedated MRI?    It looks like [facility] may have an uprgith MRI - would that work for her?

## 2023-02-23 NOTE — TELEPHONE ENCOUNTER
Sounds like patient will need sedation.   Does she think she can do with oral sedation or will she need to be further sedated with IV?  She will need a  for either option and likely a pre-op

## 2023-02-24 ENCOUNTER — TELEPHONE (OUTPATIENT)
Dept: FAMILY MEDICINE | Facility: CLINIC | Age: 63
End: 2023-02-24
Payer: MEDICARE

## 2023-02-24 NOTE — TELEPHONE ENCOUNTER
Patient has an MRI scheduled for 3/11/23 and is claustrophobic. She is requesting something for this sent to pended pharmacy. Patient has used something in the past but cannot remember the name of the medication.    This RN sees that diazepam was ordered in 2021 for claustrophobia.  Patient said no need to call her back if medication will be sent in.    Routing to provider to advise.  Raisa YOUNGERN, RN

## 2023-02-24 NOTE — TELEPHONE ENCOUNTER
Called and left a voicemail message that order has been placed and to return our call to schedule an appt.  Liliya Tran MA  [facility]  2nd Floor  Primary Care

## 2023-02-24 NOTE — TELEPHONE ENCOUNTER
New order for MRI with sedation placed  Please assist patient to schedule pre-op with me before then. Ok to take same day. Needs 40 min please

## 2023-02-24 NOTE — TELEPHONE ENCOUNTER
Is she able to tolerate MRI with oral medication alone?  If so, I will send in. Otherwise she will need conscious sedation and pre-op. Please let me know. I re-ordered the MRI already with sedation so if not needed I will switch  Thank you!!

## 2023-02-24 NOTE — TELEPHONE ENCOUNTER
Called and spoke to the patient and gave her Demetra Bedolla's message. Patient understands and will schedule the MRI and will call us back to schedule the pre op physical.  Liliya Tran [facility]  2nd Floor  Primary Care

## 2023-02-27 ENCOUNTER — VIRTUAL VISIT (OUTPATIENT)
Dept: PSYCHOLOGY | Facility: CLINIC | Age: 63
End: 2023-02-27
Payer: MEDICARE

## 2023-02-27 DIAGNOSIS — F33.1 MODERATE EPISODE OF RECURRENT MAJOR DEPRESSIVE DISORDER (H): Primary | ICD-10-CM

## 2023-02-27 PROCEDURE — 90832 PSYTX W PT 30 MINUTES: CPT | Mod: 95

## 2023-02-27 NOTE — TELEPHONE ENCOUNTER
I believe she has appt tomorrow with endocrinology. I recommend discussing with them about timing as this is the reason she's seeing them. Let me know if we need to work her in for pre-op

## 2023-02-27 NOTE — TELEPHONE ENCOUNTER
RN calling patient to ask questions per provider note below.     Patient states she is not sure if she can tolerate an MRI with oral medications as she has never tried it.     She states if she needs conscious sedation she cannot have the MRI until April. She is asking how soon the MRI needs to be done.     AREN HarrisN, RN  [facility]

## 2023-02-27 NOTE — TELEPHONE ENCOUNTER
This writer attempted to contact patient on 02/27/23      Reason for call message and left message.      If patient calls back:   Registered Nurse called.      Doris Chen RN  [facility]

## 2023-02-28 ENCOUNTER — VIRTUAL VISIT (OUTPATIENT)
Dept: ENDOCRINOLOGY | Facility: CLINIC | Age: 63
End: 2023-02-28
Payer: MEDICARE

## 2023-02-28 ENCOUNTER — PRE VISIT (OUTPATIENT)
Dept: ENDOCRINOLOGY | Facility: CLINIC | Age: 63
End: 2023-02-28

## 2023-02-28 DIAGNOSIS — E27.9 ADRENAL NODULE (H): ICD-10-CM

## 2023-02-28 PROCEDURE — 99204 OFFICE O/P NEW MOD 45 MIN: CPT | Mod: VID | Performed by: STUDENT IN AN ORGANIZED HEALTH CARE EDUCATION/TRAINING PROGRAM

## 2023-02-28 RX ORDER — DEXAMETHASONE 1 MG
TABLET ORAL
Qty: 1 TABLET | Refills: 0 | Status: SHIPPED | OUTPATIENT
Start: 2023-02-28 | End: 2023-03-03
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
## 2023-02-28 NOTE — TELEPHONE ENCOUNTER
This writer attempted to contact patient on 02/28/23      Reason for call provider message and left message.      If patient calls back:   Registered Nurse called. Route to BK RN line, relay provider message below      LUAN Franks, RN  [facility]
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
## 2023-02-28 NOTE — NURSING NOTE
Is the patient currently in the state of [place]? YES    Visit mode:VIDEO    If the visit is dropped, the patient can be reconnected by: VIDEO VISIT: Text to cell phone: [phone]    Will anyone else be joining the visit? NO      How would you like to obtain your AVS? Mail a copy    Are changes needed to the allergy or medication list? NO    Reason for visit: Jr Donahue, AMALIA

## 2023-03-01 ENCOUNTER — VIRTUAL VISIT (OUTPATIENT)
Dept: BEHAVIORAL HEALTH | Facility: CLINIC | Age: 63
End: 2023-03-01
Payer: MEDICARE

## 2023-03-01 DIAGNOSIS — R69 DIAGNOSIS DEFERRED: Primary | ICD-10-CM

## 2023-03-01 PROCEDURE — 99207 PR NO BILLABLE SERVICE THIS VISIT: CPT

## 2023-03-01 NOTE — PROGRESS NOTES
"Behavioral Health Home Services  [facility]: [place]        Social Work Care Navigator Note      Patient: Paula Ho  Date: March 1, 2023  Preferred Name: Paula    Previous PHQ-9:   PHQ-9 SCORE 8/1/2022 1/18/2023 2/6/2023   PHQ-9 Total Score MyChart - 11 (Moderate depression) -   PHQ-9 Total Score 11 11 21     Previous LIBRA-7:   LIBRA-7 SCORE 11/10/2022 1/18/2023 2/6/2023   Total Score - 9 (mild anxiety) -   Total Score 9 9 10     ARNALDO LEVEL:  No flowsheet data found.    Preferred Contact:  Need for : No  Preferred Contact: Cell      Type of Contact Today: Phone call (patient / identified key support person reached)    Service Modality: Phone Visit:      Provider verified identity through the following two step process.  Patient provided:  Patient is known previously to provider    Telephone Visit: The patient's condition can be safely assessed and treated via synchronous audio telemedicine encounter.      Reason for Audio Telemedicine Visit: Services only offered telehealth    Originating Site (Patient Location): Patient's home    Distant Site (Provider Location): Provider Remote Setting- Home Office    Consent:  The patient/guardian has verbally consented to:     1. The potential risks and benefits of telemedicine (telephone visit) versus in person care;    The patient has been notified of the following:      \"We have found that certain health care needs can be provided without the need for a face to face visit.  This service lets us provide the care you need with a phone conversation.       I will have full access to your [facility] medical record during this entire phone call.   I will be taking notes for your medical record.      Since this is like an office visit, we will bill your insurance company for this service.       There are potential benefits and risks of telephone visits (e.g. limits to patient confidentiality) that differ from in-person visits.?Confidentiality still applies for " "telephone services, and nobody will record the visit.  It is important to be in a quiet, private space that is free of distractions (including cell phone or other devices) during the visit.??      If during the course of the call I believe a telephone visit is not appropriate, you will not be charged for this service\"     Consent has been obtained for this service by care team member: Yes       Data: (subjective / Objective):  Recent ED/IP Admission or Discharge?   None    Patient Goals:  Goal Areas: Health; Mental Health; Future HAP Goal area; Chemical Health  Patient stated goals: -Health: Paula would like to continue to meet with her providers, and take her medications as prescribed. Paula would also like assistance with improving her back pain, and receive physical therapy, as well as possibly surgery.  -Chemical Health: Paula would like assistance from her providers to quit smoking and will work on reducing her use, so she can be approved for back surgery.   -Mental Health: Paula would like to find a long term therapy provider through [employer], and continue to receive assistance with [facility] for monthly check ins. She would also like to start working with an [facility] worker again.   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 She would like to receive home delivered meals if she does open.    [facility] Core Service Provided:  Comprehensive Care Management: utilized the electronic medical record / patient registry to identify and support patient's health conditions / needs more effectively   Care Transitions: focused on the coordinated and seamless movement of patient between or within different levels of care or settings  Care Coordination: provided care management services/referrals necessary to ensure patient and their identified supports have access to medical, behavioral health, pharmacology and " "recovery support services.  Ensured that patient's care is integrated across all settings and services.   Individual and Family Support: aimed to help clients reduce barriers to achieving goals, increase health literacy and knowledge about chronic condition(s), increase self-efficacy skills, and improve health outcomes    Current Stressors / Issues / Care Plan Objective Addressed Today:  SWCC and patient were able to meet today for Behavioral Health Home ([facility]) monthly check-in via telehealth visit. All required ROIs have been filed with HIM/patient chart.    1. Patient reported that she's been an \"emotional wreck\" the last couple of weeks. She's been having a hard time sleeping at night. She reported that her anxiety and pain are keeping her up at night. Patient reported that she feels so depressed all of the time and she cries a lot, so she doesn't know if her anti-depressant medication is helping. She is still planning to make an appointment with her psychiatrist, [facility], so she'll call to get this scheduled to review her medications.     2. Patient reported her back is really hurting, but it always is in the mornings.     3. Patient reported that she needs to go out and shovel some of the snow today, even though she knows she shouldn't. She reported that she doesn't have someone that could help her with this.     4. Patient was reassessed this morning for her PCA hours with [facility]. Her PCA hours are going to be the same at 11 hours per day. They discussed the CADI waiver again, but she's not going to open it because her hours would be cut to fit within the CADI budget.     5. Patient reported that she hasn't been eating much and hasn't had an appetite. She will eat when her PCA is there and eating with her, otherwise she won't eat or make anything on her own.     6. Patient reports she has an appointment for a CT scan tomorrow for her heart and her lungs.     7. She also had an appointment yesterday at the " [facility] for her kidney, and she has a follow up medication she needs to take tonight and needs to draw blood tomorrow at [place]. She also needs to get either an MRI or CT scan to get a more thorough look at her kidney. She reported they want to see if it's cancerous or benign. She was also told that she looks a little swollen around her stomach.     8. Patient reported she heard from [place] and she has her first appointment on 3/10. She was told that this is her new [employer] worker and they'll meet for an hour to get to know each other first and then start services.     9. Patient reported that she thinks she wants someone to help with housing. She is thinking about possibly moving into a place that's smaller once her lease is up this year. She reports he has pros/cons list for the place. She reported that she's allowed her three dogs there, but she would also like to downsize.     10. Patient reported that she's still struggling with the person that she was seeing, and she processed through her feelings of him. [facility] encouraged patient to continue to discuss this with her therapist.      11. Patient reported that her niece is moving in at the end of the month, and she's not sure how she's feeling about it. She has mixed emotions about her, but she's hoping it goes okay.     12. Patient reports that she's still doing pool therapy, and she has to reschedule her appointment for this week because of her appointments tomorrow.     13. Patient reported that her smoking hasn't been good recently. She reported that she's been so stressed out that it's been hard for her to stop.     Intervention:  Motivational Interviewing: Expressed Empathy/Understanding, Supported Autonomy, Collaboration, Evocation, Permission to raise concern or advise, Open-ended questions and Reflections: simple and complex   Target Behavior(s): Explored thoughts about taking an anti-depressant, Explored and resolved challenges related to taking  anti-depressants as prescribed, Explored thoughts and readiness to participate in individual therapy, Explored and resolved challenges to attending appointments as scheduled, Explored current social supports and reinforced opportunities to increase engagement and Explored current sleep hygeine and patient's perception and knowledge of relationship to mood    Assessment: (Progress on Goals / Homework):  Patient would benefit from continued coordination in reaching their goals set for the Behavioral Health Home ([facility]) program. SWCC reviewed Health Action Plan goals and will continue to monitor progress and work with patient and their care team.    Plan: (Homework, other):  Patient was encouraged to continue to seek condition-related information and education.      Scheduled a Phone follow up appointment with MISA  in 4 weeks     Patient has set self-identified goals and will monitor progress until the next appointment on: 4/5/23.       DENI Humphries  Behavioral Health Home ([facility])   [facility]  [phone]

## 2023-03-01 NOTE — TELEPHONE ENCOUNTER
Pt called back.   State that she had her visit with endocrinology yesterday. At the visit the provider said that pt can do either a MRI or a CT of kidney. State that since pt has claustrophobia, a CT would be better.     Pt wants to know what provider prefers.      Routing to provider to review and advise.      Doris Chen RN  [facility]

## 2023-03-02 ENCOUNTER — ANCILLARY PROCEDURE (OUTPATIENT)
Dept: CT IMAGING | Facility: CLINIC | Age: 63
End: 2023-03-02
Attending: INTERNAL MEDICINE
Payer: MEDICARE

## 2023-03-02 ENCOUNTER — ANCILLARY PROCEDURE (OUTPATIENT)
Dept: CT IMAGING | Facility: CLINIC | Age: 63
End: 2023-03-02
Attending: STUDENT IN AN ORGANIZED HEALTH CARE EDUCATION/TRAINING PROGRAM
Payer: MEDICARE

## 2023-03-02 DIAGNOSIS — Z87.891 PERSONAL HISTORY OF TOBACCO USE: ICD-10-CM

## 2023-03-02 DIAGNOSIS — E27.9 ADRENAL NODULE (H): ICD-10-CM

## 2023-03-02 PROCEDURE — G1010 CDSM STANSON: HCPCS | Performed by: RADIOLOGY

## 2023-03-02 PROCEDURE — 71271 CT THORAX LUNG CANCER SCR C-: CPT | Mod: TC | Performed by: RADIOLOGY

## 2023-03-02 PROCEDURE — 74150 CT ABDOMEN W/O CONTRAST: CPT | Mod: TC | Performed by: RADIOLOGY
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
## 2023-03-03 ENCOUNTER — TELEPHONE (OUTPATIENT)
Dept: ENDOCRINOLOGY | Facility: CLINIC | Age: 63
End: 2023-03-03
Payer: MEDICARE

## 2023-03-03 RX ORDER — DEXAMETHASONE 1 MG
TABLET ORAL
Qty: 1 TABLET | Refills: 0 | Status: SHIPPED | OUTPATIENT
Start: 2023-03-03 | End: 2023-03-14

## 2023-03-03 NOTE — TELEPHONE ENCOUNTER
[facility] Call Center    Phone Message    May a detailed message be left on voicemail: yes     Reason for Call: Other: Nurse at the [facility] requesting call back. They stated the pt is there to have labs done ordered by Dr. Martinez, pt took medication for the labs but there are no orders in the system.

## 2023-03-03 NOTE — TELEPHONE ENCOUNTER
LVM with updated instructions from Dr Martinez.   Viviane Ambrosio, RN on 3/3/2023 at 1:51 PM         RE  (Newest Message First)   Zoila Martinez MD  You 31 minutes ago (1:17 PM)     CUMMINS  I placed another order for both decadron and cortisol.     Also someone canceled my CT A/P not sure who and why.     Thank you for your help,   Zoila.          RE    Pt calling because she came in this morning to get lab work done. Pt is currently in the [employer] parking lot waiting.     Pt saw endocrinology on 2/28 and was told to take Decadron at 11pm and then do blood work the next morning at 8am.  Pt state that she took the medication last night and when she came in for the lab today, there are no orders to get it done.     Pt is upset because there is no lab orders and she took the medication already. States that the endocrinology provider stressed the importance of getting labs early in the morning after taking the medication.     Called endocrinology clinic regarding lab. State that they were unable to get a hold of an RN and message to be sent to them.       Call pt back with endocrinology advisement.        Thank you,  [facility] RN Team

## 2023-03-06 ENCOUNTER — TELEPHONE (OUTPATIENT)
Dept: ENDOCRINOLOGY | Facility: CLINIC | Age: 63
End: 2023-03-06
Payer: MEDICARE

## 2023-03-06 NOTE — TELEPHONE ENCOUNTER
[facility] with review and recommendation from Dr Martinez and clinic number to call with questions.   Viviane Ambrosio, RN on 3/6/2023 at 8:46 AM     RE    ----- Message from Zoila Martinez MD sent at 3/3/2023  4:18 PM CST -----  Could you please tell patient that her Ct scan shows a benign looking adrenal nodule.  Thank you,  Zoila Martinez MD  Endocrinology, Diabetes and Metabolism  [facility]

## 2023-03-14 ENCOUNTER — TELEPHONE (OUTPATIENT)
Dept: ENDOCRINOLOGY | Facility: CLINIC | Age: 63
End: 2023-03-14

## 2023-03-14 DIAGNOSIS — E27.9 ADRENAL NODULE: ICD-10-CM

## 2023-03-14 RX ORDER — DEXAMETHASONE 1 MG
TABLET ORAL
Qty: 1 TABLET | Refills: 0 | Status: SHIPPED | OUTPATIENT
Start: 2023-03-14

## 2023-03-14 NOTE — TELEPHONE ENCOUNTER
Spoke w/ Pt: Needs help scheduling lab draw at [facility] at 0800AM.   Needs new dexamethasone R.     RE    [facility] Call Center    Phone Message    May a detailed message be left on voicemail: yes     Reason for Call: Other: Patient called in and stated that she took a medication before a blood test but when she went to the lab they stated she didn't have any orders for this.  Please reach out to patient to disscuss so that we can send her another medication for her testing.      Action Taken: Other: Endo    Travel Screening: Not Applicable

## 2023-03-15 DIAGNOSIS — Z87.891 PERSONAL HISTORY OF TOBACCO USE: Primary | ICD-10-CM

## 2023-03-20 ENCOUNTER — VIRTUAL VISIT (OUTPATIENT)
Dept: FAMILY MEDICINE | Facility: CLINIC | Age: 63
End: 2023-03-20
Payer: MEDICARE

## 2023-03-20 DIAGNOSIS — R23.3 EASY BRUISING: Primary | ICD-10-CM

## 2023-03-20 DIAGNOSIS — Z12.31 ENCOUNTER FOR SCREENING MAMMOGRAM FOR MALIGNANT NEOPLASM OF BREAST: ICD-10-CM

## 2023-03-20 PROCEDURE — 99213 OFFICE O/P EST LOW 20 MIN: CPT | Mod: VID | Performed by: NURSE PRACTITIONER

## 2023-03-20 ASSESSMENT — ANXIETY QUESTIONNAIRES
7. FEELING AFRAID AS IF SOMETHING AWFUL MIGHT HAPPEN: SEVERAL DAYS
GAD7 TOTAL SCORE: 13
8. IF YOU CHECKED OFF ANY PROBLEMS, HOW DIFFICULT HAVE THESE MADE IT FOR YOU TO DO YOUR WORK, TAKE CARE OF THINGS AT HOME, OR GET ALONG WITH OTHER PEOPLE?: SOMEWHAT DIFFICULT
4. TROUBLE RELAXING: SEVERAL DAYS
GAD7 TOTAL SCORE: 13
6. BECOMING EASILY ANNOYED OR IRRITABLE: NEARLY EVERY DAY
7. FEELING AFRAID AS IF SOMETHING AWFUL MIGHT HAPPEN: SEVERAL DAYS
5. BEING SO RESTLESS THAT IT IS HARD TO SIT STILL: NOT AT ALL
IF YOU CHECKED OFF ANY PROBLEMS ON THIS QUESTIONNAIRE, HOW DIFFICULT HAVE THESE PROBLEMS MADE IT FOR YOU TO DO YOUR WORK, TAKE CARE OF THINGS AT HOME, OR GET ALONG WITH OTHER PEOPLE: SOMEWHAT DIFFICULT
1. FEELING NERVOUS, ANXIOUS, OR ON EDGE: MORE THAN HALF THE DAYS
GAD7 TOTAL SCORE: 13
3. WORRYING TOO MUCH ABOUT DIFFERENT THINGS: NEARLY EVERY DAY
2. NOT BEING ABLE TO STOP OR CONTROL WORRYING: NEARLY EVERY DAY

## 2023-03-20 ASSESSMENT — PATIENT HEALTH QUESTIONNAIRE - PHQ9
SUM OF ALL RESPONSES TO PHQ QUESTIONS 1-9: 5
10. IF YOU CHECKED OFF ANY PROBLEMS, HOW DIFFICULT HAVE THESE PROBLEMS MADE IT FOR YOU TO DO YOUR WORK, TAKE CARE OF THINGS AT HOME, OR GET ALONG WITH OTHER PEOPLE: SOMEWHAT DIFFICULT
SUM OF ALL RESPONSES TO PHQ QUESTIONS 1-9: 5

## 2023-03-20 NOTE — PROGRESS NOTES
"Paula is a 62 year old who is being evaluated via a billable video visit.      How would you like to obtain your AVS? Mail a copy  If the video visit is dropped, the invitation should be resent by: Text to cell phone: [phone]  Will anyone else be joining your video visit? No          Assessment & Plan     Easy bruising  Recommend labs. UC/ED precautions discussed.  - Partial thromboplastin time; Future  - INR; Future  - Comprehensive metabolic panel (BMP + Alb, Alk Phos, ALT, AST, Total. Bili, TP); Future  - CBC with platelets and differential; Future      Encounter for screening mammogram for malignant neoplasm of breast  - *MA Screening Digital Bilateral; Future         BMI:   Estimated body mass index is 44.63 kg/m  as calculated from the following:    Height as of 1/18/23: 1.651 m (5' 5\").    Weight as of 1/18/23: 121.7 kg (268 lb 3.2 oz).     Return precautions discussed, including when to seek urgent/emergent care.    Patient verbalizes understanding and agrees with plan of care.     LEONA ALANIZ CNP  [facility]    Igor Mitchell is a 62 year old, presenting for the following health issues:  Bleeding/Bruising      History of Present Illness       Reason for visit:  Random Bruising  Symptom onset:  1-2 weeks ago  Symptoms include:  Huge bruising on thighs and calfs that come randomly  Symptom intensity:  Mild  Symptom progression:  Improving  Had these symptoms before:  No  What makes it worse:  None  What makes it better:  None     Today's PHQ-9         PHQ-9 Total Score: 5    PHQ-9 Q9 Thoughts of better off dead/self-harm past 2 weeks :   Not at all    How difficult have these problems made it for you to do your work, take care of things at home, or get along with other people: Somewhat difficult  Today's LIBRA-7 Score: 13       No bruising to abdominal  No known injury  No herbals  No drug/etoh  No cp/sob  No abdominal pain, nausea or vomiting  No fevers  No " recent cold symptoms     Review of Systems   Constitutional, HEENT, cardiovascular, pulmonary, GI, , musculoskeletal, neuro, skin, endocrine and psych systems are negative, except as otherwise noted.      Objective           Vitals:  No vitals were obtained today due to virtual visit.    Physical Exam   GENERAL: Healthy, alert and no distress  EYES: Eyes grossly normal to inspection.  No discharge or erythema, or obvious scleral/conjunctival abnormalities.  RESP: No audible wheeze, cough, or visible cyanosis.  No visible retractions or increased work of breathing.    SKIN: Visible skin clear. No significant rash, abnormal pigmentation or lesions.  NEURO: Cranial nerves grossly intact.  Mentation and speech appropriate for age.  PSYCH: Mentation appears normal, affect normal/bright, judgement and insight intact, normal speech and appearance well-groomed.                Video-Visit Details    Type of service:  Video Visit   Video Start Time: 5:21 pm  Video End Time:5:30 pm    Originating Location (pt. Location): Home    Distant Location (provider location):  On-site  Platform used for Video Visit: Shanita

## 2023-03-24 ENCOUNTER — TELEPHONE (OUTPATIENT)
Dept: FAMILY MEDICINE | Facility: CLINIC | Age: 63
End: 2023-03-24
Payer: MEDICARE

## 2023-03-24 NOTE — TELEPHONE ENCOUNTER
Patient calling because she has been waiting for someone to call her to set up a lab appointment. She also requests an antibiotic be called in for herself. Complaining of cough, body aches. Home covid test is negative. This RN advised urgent care and gave her the hours and location of [facility] urgent care.  She agrees that she will go today.     Warm transferred patient to make lab appointment.   Raisa YOUNGERN, RN

## 2023-03-29 ENCOUNTER — VIRTUAL VISIT (OUTPATIENT)
Dept: PSYCHOLOGY | Facility: CLINIC | Age: 63
End: 2023-03-29
Payer: MEDICARE

## 2023-03-29 DIAGNOSIS — F33.1 MODERATE EPISODE OF RECURRENT MAJOR DEPRESSIVE DISORDER (H): Primary | ICD-10-CM

## 2023-03-29 PROCEDURE — 90834 PSYTX W PT 45 MINUTES: CPT | Mod: 95

## 2023-04-02 ENCOUNTER — LAB (OUTPATIENT)
Dept: LAB | Facility: CLINIC | Age: 63
End: 2023-04-02
Payer: MEDICARE

## 2023-04-02 DIAGNOSIS — R23.3 EASY BRUISING: ICD-10-CM

## 2023-04-02 DIAGNOSIS — R73.9 ELEVATED BLOOD SUGAR: ICD-10-CM

## 2023-04-02 LAB
BASOPHILS # BLD AUTO: 0 10E3/UL (ref 0–0.2)
BASOPHILS NFR BLD AUTO: 0 %
EOSINOPHIL # BLD AUTO: 0.4 10E3/UL (ref 0–0.7)
EOSINOPHIL NFR BLD AUTO: 4 %
ERYTHROCYTE [DISTWIDTH] IN BLOOD BY AUTOMATED COUNT: 13.7 % (ref 10–15)
HCT VFR BLD AUTO: 40.9 % (ref 35–47)
HGB BLD-MCNC: 13.3 G/DL (ref 11.7–15.7)
IMM GRANULOCYTES # BLD: 0 10E3/UL
IMM GRANULOCYTES NFR BLD: 0 %
LYMPHOCYTES # BLD AUTO: 4.1 10E3/UL (ref 0.8–5.3)
LYMPHOCYTES NFR BLD AUTO: 45 %
MCH RBC QN AUTO: 30.9 PG (ref 26.5–33)
MCHC RBC AUTO-ENTMCNC: 32.5 G/DL (ref 31.5–36.5)
MCV RBC AUTO: 95 FL (ref 78–100)
MONOCYTES # BLD AUTO: 0.6 10E3/UL (ref 0–1.3)
MONOCYTES NFR BLD AUTO: 7 %
NEUTROPHILS # BLD AUTO: 4 10E3/UL (ref 1.6–8.3)
NEUTROPHILS NFR BLD AUTO: 44 %
PLATELET # BLD AUTO: 138 10E3/UL (ref 150–450)
RBC # BLD AUTO: 4.3 10E6/UL (ref 3.8–5.2)
WBC # BLD AUTO: 9.1 10E3/UL (ref 4–11)

## 2023-04-02 PROCEDURE — 80053 COMPREHEN METABOLIC PANEL: CPT

## 2023-04-02 PROCEDURE — 85610 PROTHROMBIN TIME: CPT

## 2023-04-02 PROCEDURE — 85025 COMPLETE CBC W/AUTO DIFF WBC: CPT

## 2023-04-02 PROCEDURE — 85730 THROMBOPLASTIN TIME PARTIAL: CPT

## 2023-04-02 PROCEDURE — 36415 COLL VENOUS BLD VENIPUNCTURE: CPT

## 2023-04-02 PROCEDURE — 83036 HEMOGLOBIN GLYCOSYLATED A1C: CPT

## 2023-04-03 ENCOUNTER — LAB (OUTPATIENT)
Dept: LAB | Facility: CLINIC | Age: 63
End: 2023-04-03
Payer: MEDICARE

## 2023-04-03 ENCOUNTER — PATIENT OUTREACH (OUTPATIENT)
Dept: ONCOLOGY | Facility: CLINIC | Age: 63
End: 2023-04-03

## 2023-04-03 DIAGNOSIS — R23.3 EASY BRUISING: Primary | ICD-10-CM

## 2023-04-03 DIAGNOSIS — E27.9 ADRENAL NODULE (H): ICD-10-CM

## 2023-04-03 DIAGNOSIS — R73.9 ELEVATED BLOOD SUGAR: Primary | ICD-10-CM

## 2023-04-03 DIAGNOSIS — D69.6 PLATELETS DECREASED (H): ICD-10-CM

## 2023-04-03 LAB
ALBUMIN SERPL-MCNC: 3.9 G/DL (ref 3.4–5)
ALP SERPL-CCNC: 123 U/L (ref 40–150)
ALT SERPL W P-5'-P-CCNC: 18 U/L (ref 0–50)
ANION GAP SERPL CALCULATED.3IONS-SCNC: 3 MMOL/L (ref 3–14)
APTT PPP: 27 SECONDS (ref 22–38)
AST SERPL W P-5'-P-CCNC: 10 U/L (ref 0–45)
BILIRUB SERPL-MCNC: 0.2 MG/DL (ref 0.2–1.3)
BUN SERPL-MCNC: 13 MG/DL (ref 7–30)
CALCIUM SERPL-MCNC: 9 MG/DL (ref 8.5–10.1)
CHLORIDE BLD-SCNC: 109 MMOL/L (ref 94–109)
CO2 SERPL-SCNC: 27 MMOL/L (ref 20–32)
CORTIS SERPL-MCNC: 0.9 UG/DL
CREAT SERPL-MCNC: 0.57 MG/DL (ref 0.52–1.04)
GFR SERPL CREATININE-BSD FRML MDRD: >90 ML/MIN/1.73M2
GLUCOSE BLD-MCNC: 117 MG/DL (ref 70–99)
HBA1C MFR BLD: 5.9 % (ref 0–5.6)
INR PPP: 0.94 (ref 0.85–1.15)
POTASSIUM BLD-SCNC: 4.3 MMOL/L (ref 3.4–5.3)
PROT SERPL-MCNC: 6.6 G/DL (ref 6.8–8.8)
SODIUM SERPL-SCNC: 139 MMOL/L (ref 133–144)

## 2023-04-03 PROCEDURE — 36415 COLL VENOUS BLD VENIPUNCTURE: CPT

## 2023-04-03 PROCEDURE — 82533 TOTAL CORTISOL: CPT
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
## 2023-04-03 NOTE — PROGRESS NOTES
[facility]: Cancer Care                                                                   Hem/Onc  Referral reviewed:    Referred By:  Demetra Bedolla APRN CNP   [address]   [place] [address]   Phone: [phone]   Fax: [phone]   Diagnoses: Easy bruising   Platelets decreased (H)   Order: Adult Oncology/Hematology  Referral       ASSESSMENT      Clinical History (per Nurse review of records provided):    62 year old female patient with low platelet count and easy bruising.     Lives:  [address]  [place] [address]    Insurance:  Payor: MEDICARE / Plan: MEDICARE / Product Type: Medicare /     PCP:   Demetra Bedolla    Records Location: [facility]     Pertinent labs -- BOOKMARKED    Referring provider note(s)-- BOOKMARKED    INTERVENTION(S)                                                      Oncology Nurse Navigator called patient to introduce the role or the nurse navigator and to inform them that a referral for [facility] Hematology/Oncology department has been received for dx of low platelet count and easy bruising from [facility]. Identity verified. Patient confirms they are aware of the referral and ready to schedule. Patient transferred to NPS to schedule.     Preferred [facility] Hem/Onc clinic location:  Location of patient's choice.     Records team will contact pt directly if ROIs needed for records, imaging, slides    Patient voiced understanding of above instructions and information and denied further questions and was provided interim callback phone number(s).  PLAN                                                      Hem/Onc consult:   Patient transferred to NPS to schedule appointment at the location of choice.     Ariane Alegre RN, BSN  Hematology/Oncology New Patient Nurse Navigator   [facility]  [phone]

## 2023-04-04 ENCOUNTER — TELEPHONE (OUTPATIENT)
Dept: ENDOCRINOLOGY | Facility: CLINIC | Age: 63
End: 2023-04-04

## 2023-04-04 ENCOUNTER — OFFICE VISIT (OUTPATIENT)
Dept: UROLOGY | Facility: CLINIC | Age: 63
End: 2023-04-04
Attending: NURSE PRACTITIONER
Payer: MEDICARE

## 2023-04-04 DIAGNOSIS — D17.71 ANGIOLIPOMA OF RIGHT KIDNEY: ICD-10-CM

## 2023-04-04 PROCEDURE — 99203 OFFICE O/P NEW LOW 30 MIN: CPT | Performed by: UROLOGY

## 2023-04-04 NOTE — TELEPHONE ENCOUNTER
Msg sent via viavoo.   Viviane Ambrosio RN on 4/4/2023 at 1:00 PM     ----- Message from Zoila Martinez MD sent at 4/3/2023  4:58 PM CDT -----  Could you please let her know that she passed the dexamethasone suppression test. This lab result is normal which means no concern for excess cortisol secretion from the adrenal gland.    Thank you.  Zoila Martinez MD  Endocrinology, Diabetes and Metabolism  [facility]
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
## 2023-04-04 NOTE — PROGRESS NOTES
Urology Consult History and Physical  [facility]   Name: Paula Ho    MRN: [id number]   YOB: 1960       We were asked to see Paula Ho at the request of Demetra Bedolla CNP for evaluation and treatment of Right AML.        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 She went home AMA shortly thereafter.      Anxiety      Asthma      Depression      Hypertension             Past Surgical History:     Past Surgical History:   Procedure Laterality Date     APPENDECTOMY       APPENDECTOMY       CEREBRAL ANEURYSM REPAIR       JOINT REPLACEMENT       ORTHOPEDIC SURGERY  2002    Circa 2002: right knee arthroscopy (Dr. Pappas)     ORTHOPEDIC SURGERY  2004    Circa 2004: right knee partial knee replacement (Iam Ritchie MD)     ORTHOPEDIC SURGERY  2006    Circa 2006: right TKA (Ron Ryder MD; [facility])     ORTHOPEDIC SURGERY  2008    Circa 2008: right TKA revision due to infection (Ron Ryder MD; [facility])     ORTHOPEDIC SURGERY  2009    Circa 2009: right TKA revision due to infection (Ron Ryder MD; [facility])     ORTHOPEDIC SURGERY  2011 April 2011: right TKA (Ron Ryder MD; [facility])     REPLACEMENT TOTAL KNEE Right      TONSILLECTOMY      tonsils     TONSILLECTOMY              Social History:     Social History     Tobacco Use     Smoking status: Every  Day     Packs/day: 0.25     Types: Cigarettes     Smokeless tobacco: Never     Tobacco comments:     Patient has been trying patches and they have not been working.   Vaping Use     Vaping status: Never Used   Substance Use Topics     Alcohol use: Not Currently       History   Smoking Status     Every Day     Packs/day: 0.25   Smokeless Tobacco     Never     Comment: Patient has been trying patches and they have not been working.            Family History:     Family History   Problem Relation Age of Onset     Depression Mother      Substance Abuse Father               Allergies:     Allergies   Allergen Reactions     Compazine [Prochlorperazine]      Droperidol      Lisinopril      Morphine      Other reaction(s): hives     Pravastatin      Other reaction(s): Edema     Seroquel [Quetiapine]             Medications:     Current Outpatient Medications   Medication Sig     albuterol (PROAIR HFA) 108 (90 Base) MCG/ACT inhaler Inhale 2 puffs into the lungs every 4 hours as needed for shortness of breath / dyspnea or wheezing     albuterol (PROVENTIL) (2.5 MG/3ML) 0.083% neb solution NEBULIZE THE CONTENTS OF 1 VIAL EVERY 6 HOURS AS NEEDED.     amphetamine-dextroamphetamine (ADDERALL) 15 MG tablet Take 1 tablet (15 mg) by mouth 2 times daily     ARIPiprazole (ABILIFY) 5 MG tablet Take 1 tablet (5 mg) by mouth daily     budesonide-formoterol (SYMBICORT) 160-4.5 MCG/ACT Inhaler Inhale 2 puffs into the lungs 2 times daily     buprenorphine HCl-naloxone HCl (SUBOXONE) 8-2 MG per film Place 1 Film under the tongue daily     buPROPion (WELLBUTRIN SR) 100 MG 12 hr tablet Take 1 tablet (100 mg) by mouth 2 times daily     cetirizine (ZYRTEC) 10 MG tablet Take 1 tablet (10 mg) by mouth daily     dexamethasone (DECADRON) 1 MG tablet Take 1 mg at 11 pm and get a morning blood draw at 8 am for cortisol.     FLUoxetine (PROZAC) 40 MG capsule Take 1 capsule (40 mg) by mouth 2 times daily     gabapentin (NEURONTIN) 600 MG tablet Take 1  tablet (600 mg) by mouth daily At bedtime     levothyroxine (SYNTHROID/LEVOTHROID) 25 MCG tablet Take 1 tablet (25 mcg) by mouth daily     medical cannabis (Patient's own supply) See Admin Instructions (The purpose of this order is to document that the patient reports taking medical cannabis.  This is not a prescription, and is not used to certify that the patient has a qualifying medical condition.)     mirtazapine (REMERON) 15 MG tablet Take 1 tablet (15 mg) by mouth At Bedtime     Multiple Vitamins-Minerals (MULTIVITAMIN ADULT PO)      mupirocin (BACTROBAN) 2 % external ointment Apply topically 3 times daily     NARCAN 4 MG/0.1ML nasal spray INHALE VIA NASAL ROUTE FOR OVERDOSE AS NEEDED. MAY REPEAT AFTER 2-3 MINUTES     order for DME Equipment being ordered: Nebulizer     oxyCODONE IR (ROXICODONE) 15 MG tablet Take 15 mg by mouth 3 times daily + 5 mg x1 daily     tiotropium (SPIRIVA) 18 MCG inhaled capsule Inhale 1 capsule (18 mcg) into the lungs daily     tiZANidine (ZANAFLEX) 2 MG tablet TK 2 TO 3 TS PO QHS     topiramate (TOPAMAX) 100 MG tablet Take 1 tablet (100 mg) by mouth 2 times daily     vitamin D3 (CHOLECALCIFEROL) 2000 units (50 mcg) tablet Take 1 tablet by mouth daily     Current Facility-Administered Medications   Medication     lidocaine (PF) (XYLOCAINE) 1 % injection 8 mL             Review of Systems:     Skin: negative  Eyes: negative  Ears/Nose/Throat: negative  Respiratory: No shortness of breath, dyspnea on exertion, cough, or hemoptysis  Cardiovascular: negative  Gastrointestinal: negative  Genitourinary: as above  Musculoskeletal: negative  Neurologic: negative  Psychiatric: negative  Hematologic/Lymphatic/Immunologic: negative  Endocrine: negative          Physical Exam:   No data found.  There is no height or weight on file to calculate BMI.     General: age-appropriate appearing female in NAD  HEENT: Head AT/NC, EOMI, CN Grossly intact  Lungs: no respiratory distress, or pursed lip  breathing  Heart: No obvious jugular venous distension present  Back: no bony midline tenderness, no CVAT bilaterally.  Abdomen: soft, obesely-distended, non-tender. No organomegaly  Lymph: no palpable inguinal lymphadenopathy.  LE: no edema.   Musculoskeltal: extremities normal, no peripheral edema  Skin: no suspicious lesions or rashes  Neuro: Alert, oriented, speech and mentation normal;  moving all 4 extremities equally.  Psych: affect and mood normal          Data:   All laboratory data reviewed:    Lab Results   Component Value Date    CR 0.57 04/02/2023    CR 0.65 05/20/2021        IMAGING:  All pertinent imaging reviewed:    All imaging studies reviewed by me.  I personally reviewed these imaging films.  A formal report from radiology will follow.    CT ABD/PEL 3/2/2023:  FINDINGS:   LOWER CHEST: Normal.     HEPATOBILIARY: Cholecystectomy.     PANCREAS: Normal.     SPLEEN: Normal.     ADRENAL GLANDS: Mild low dense enlargement of the left adrenal gland  measuring 6 Hounsfield units in density, compatible with benign  adenoma. This measures approximately 2 x 1 cm.     KIDNEYS/BLADDER: Stable 2.2 cm right renal angiomyolipoma.                                                                      IMPRESSION:   1.  Stable benign thickening or adenoma left adrenal gland. No  additional follow-up needed.  2.  Stable benign right renal angiomyolipoma.          Impression and Plan:   Impression:   62-year-old female with a stable right 2.2 cm AML      Plan:   Right renal AML  - I reviewed her imaging and reviewed the images personally.  I reviewed her most recent CT abdomen pelvis from 3/2/2023 as well as her CT chest studies dating back to 2019.  I reviewed these images personally and of the right renal AML has remained stable in size since her CT chest in 2019  - We discussed that given the relative small size at 2.2 cm and stability since 2019 there is no need for intervention or further dedicated surveillance of  the AML  - We discussed that this AML would not cause any low back pain which she does have chronically  - She may follow-up with urology on a as needed basis     Thank you for the kind consultation.    Time spent: 20 minutes spent on the date of the encounter doing chart review, history and exam, documentation and further activities as noted above.    Raul Marinelli MD   Urology  [facility]  [facility] Phone: [phone]  [facility] Phone: [phone]

## 2023-04-04 NOTE — Clinical Note
Hi Ms. Bedolla,  I saw Paula today in consultation for her right renal AML.  Looking back at her CT scans this was visible dating back to 2019 and appears stable in size.  Given that it is fairly small and has been stable over the last several years there is no further need for dedicated surveillance imaging of this AML.  She may follow-up with me on a as needed basis.  Thanks,  Raul Marinelli M.D. Cell: [phone]

## 2023-04-05 ENCOUNTER — DOCUMENTATION ONLY (OUTPATIENT)
Dept: LAB | Facility: CLINIC | Age: 63
End: 2023-04-05
Payer: MEDICARE

## 2023-04-05 ENCOUNTER — VIRTUAL VISIT (OUTPATIENT)
Dept: PSYCHOLOGY | Facility: CLINIC | Age: 63
End: 2023-04-05
Payer: MEDICARE

## 2023-04-05 DIAGNOSIS — F33.1 MODERATE EPISODE OF RECURRENT MAJOR DEPRESSIVE DISORDER (H): Primary | ICD-10-CM

## 2023-04-05 PROCEDURE — 90834 PSYTX W PT 45 MINUTES: CPT | Mod: 95

## 2023-04-07 NOTE — PROGRESS NOTES
"    [facility]                                     Progress Note    Patient Name: Paula Ho  Date: 23         Service Type: Individual    Session Start Time: 3:30 PM Session End Time: 04:10 PM      Session Length: 40 Minutes     Session #: 14    Attendees: Client attended alone    Service Modality:  Phone Visit:      Provider verified identity through the following two step process.  Patient provided:  Patient  and Patient is known previously to provider    Telephone Visit: The patient's condition can be safely assessed and treated via synchronous audio telemedicine encounter.      Reason for Audio Telemedicine Visit: Patient has requested telehealth visit    Originating Site (Patient Location): Patient's home    Distant Site (Provider Location): Provider Remote Setting- Home Office    Consent:  The patient/guardian has verbally consented to:     1. The potential risks and benefits of telemedicine (telephone visit) versus in person care;    The patient has been notified of the following:      \"We have found that certain health care needs can be provided without the need for a face to face visit.  This service lets us provide the care you need with a phone conversation.       I will have full access to your [facility] medical record during this entire phone call.   I will be taking notes for your medical record.      Since this is like an office visit, we will bill your insurance company for this service.       There are potential benefits and risks of telephone visits (e.g. limits to patient confidentiality) that differ from in-person visits.?Confidentiality still applies for telephone services, and nobody will record the visit.  It is important to be in a quiet, private space that is free of distractions (including cell phone or other devices) during the visit.??      If during the course of the call I believe a telephone visit is not appropriate, you will not be charged for " "this service\"     Consent has been obtained for this service by care team member: Yes     DATA  Interactive Complexity: No  Crisis: No        Progress Since Last Session (Related to Symptoms / Goals / Homework):   Symptoms: No change Patient reports continued stress     Homework: Did not complete      Episode of Care Goals: Minimal progress - CONTEMPLATION (Considering change and yet undecided); Intervened by assessing the negative and positive thinking (ambivalence) about behavior change     Current / Ongoing Stressors and Concerns:    Paula reports she is continued stress in regards to her niece recently moving in. She continues to state difficulties with her expectations being met with her niece.  She continues to report stress in regards to the boundaries she has had with her previous partner. She reports noticing her self esteem has decreased due to her previous relationship. Paula reports concern about test results for a recent appointment for her kidney.     Treatment Objective(s) Addressed in This Session:   Increase interest, engagement, and pleasure in doing things  Decrease frequency and intensity of feeling down, depressed, hopeless  Identify negative self-talk and behaviors: challenge core beliefs, myths, and actions  Grief and the grieving process   Boundaries  Healthy Relationships      Intervention:   CBT: Patient was educated on the importance of mindfulness, deep breathing, and self care when her anxiety is increased.  Provider also spoke with patient about communicating her expectations with her niece.   Motivational Interviewing    MI Intervention: Expressed Empathy/Understanding, Supported Autonomy, Collaboration, Evocation, Permission to raise concern or advise, Open-ended questions and Reframe     Change Talk Expressed by the Patient: Reasons to change Need to change Taking steps    Provider Response to Change Talk: E - Evoked more info from patient about behavior change, A - Affirmed " patient's thoughts, decisions, or attempts at behavior change and R - Reflected patient's change talk       ASSESSMENT: Current Emotional / Mental Status (status of significant symptoms):   Risk status (Self / Other harm or suicidal ideation)   Patient denies current fears or concerns for personal safety.   Patient denies current or recent suicidal ideation or behaviors.   Patient denies current or recent homicidal ideation or behaviors.   Patient denies current or recent self injurious behavior or ideation.   Patient denies other safety concerns.   Patient reports there has been no change in risk factors since their last session.     Patient reports there has been no change in protective factors since their last session.     There are no firearms in the house.     Appearance:   NA    Eye Contact:   NA    Psychomotor Behavior: Normal    Attitude:   Cooperative  Attentive   Orientation:   All   Speech    Rate / Production: Normal     Volume:  Normal    Mood:    Normal Sad  Agitated   Affect:    Appropriate    Thought Content:  Clear    Thought Form:  Coherent  Logical    Insight:    Good      Medication Review:   No changes to current psychiatric medication(s)     Medication Compliance:   Yes     Changes in Health Issues:   Yes: pain and being sick     Chemical Use Review:   Substance Use: Chemical use reviewed, no active concerns identified      Tobacco Use: No change in amount of tobacco use since last session.  Provider did not address in session    Diagnosis:  Moderate Episode of Recurrent Major Depressive Disorder (F33.1)    Collateral Reports Completed:   Not Applicable    PLAN: (Patient Tasks / Therapist Tasks / Other)  Provider encouraged patient to work on mindfulness activities to aid in managing her anxiety and stress. Provided encourage the patient to communicate with her niece again her expectations with her niece.    BECKY MANRIQUEZ    This note has been reviewed and I agree with the plan of care.  This note is co-signed by JASON Bang, [facility], Supervisor, on: April 7, 2023  ______________________________________________________________________    Individual Treatment Plan    Patient's Name: Paula Ho  YOB: 1960    Date of Creation: 11/7/22  Date Treatment Plan Last Reviewed/Revised: 11/7/22     DSM5 Diagnoses: 296.32 (F33.1) Major Depressive Disorder, Recurrent Episode, Moderate _  Psychosocial / Contextual Factors: Patient has physical medical concerns that impact her current depressive symptoms.  PROMIS (reviewed every 90 days):     Referral / Collaboration:  Referral to another professional/service is not indicated at this time..    Anticipated number of session for this episode of care: 9-12 sessions  Anticipation frequency of session: Weekly  Anticipated Duration of each session: 38-52 minutes  Treatment plan will be reviewed in 90 days or when goals have been changed.       MeasurableTreatment Goal(s) related to diagnosis / functional impairment(s)  Goal 1: Patient will develop healthy thinking patterns and beliefs about self, others, and the world.       Objective #A (Patient Action)    Patient will Identify negative self-talk and behaviors: challenge core beliefs, myths, and actions.  Status: New - Date: 11/7/22     Intervention(s)  Therapist will teach how to identify negative self-talk and behaviors.    Objective #B  Patient will Improve concentration, focus, and mindfulness in daily activities .  Status: New - Date: 11/7/22     Intervention(s)  Therapist will teach how to improve concentration, focus, and mindfulness..      Goal 2: Patient will alleviate depressive symptoms in order to return to a previous level of functioning. As evidenced by a PHQ-9 score of 9 or less.       Objective # A  Status: New - Date: 11/7/22     Patient will lidentify at least 4 adaptive coping statements to counteract negative thoughts    Intervention(s)  Therapist will teach adaptive  coping statements to conteract negative thoughts.    Objective #B  Patient will Increase interest, engagement, and pleasure in doing things.    Status: New - Date: 11/7/22     Intervention(s)  Therapist will assign homework related to increasing engagement and interest in doing things.        Patient has reviewed and agreed to the above plan.      BECKY MANRIQUEZ  November 7, 2022    This note has been reviewed and I agree with the plan of care. This note is co-signed by JASON Bang, LICSW, Supervisor, on: November 8, 2022

## 2023-04-07 NOTE — PROGRESS NOTES
"This writer attempted to contact patient on 04/06/23      Reason for call upcoming appointment scheduled for lab and left message.      If patient calls back:   Patient has upcoming appointment scheduled with lab on 4/10 with appointment note \"Labs per [place].\" Demetra requesting to call patient and ask why she is needing this lab. Pt recently had labs 4/2/23    Thelma Leach RN    "
Called patient and asked what 4/10 lab appt was made for, patient states that she already got her labs done on 4/2/23 and doesn't need appt on 4/10. Patient asking writer to cancel this appt, writer cancelled appt. No further questions or concerns at this time.      Wanda Greenwood, ARENN, RN  [facility]    
I'm not sure why she has lab appointment  Please call her to ask  
Paula Ho has an upcoming lab appointment:    Future Appointments   Date Time Provider Department [facility]   4/10/2023  9:15 AM BK LAB [facility]   4/10/2023 10:00 AM Paty Dial BSW [facility]   4/12/2023  3:30 PM Fernanad Tinajero LGSW [facility]   4/19/2023  3:30 PM Fernanda Tinajero LGSW [facility]   5/1/2023 10:45 AM Tigist Manjarrez NP SPPSY [facility]   5/19/2023 12:45 PM Za Aldridge DO [facility]   6/8/2023 10:00 AM Zoila Martinez MD [facility]     Patient is scheduled for the following lab(s): none    There is no order available. Please review and place either future orders or HMPO (Review of Health Maintenance Protocol Orders), as appropriate.    Health Maintenance Due   Topic     ANNUAL REVIEW OF HM ORDERS      Nicki Rodriguez    
[place] (granddaughter)

## 2023-04-10 ENCOUNTER — TELEPHONE (OUTPATIENT)
Dept: BEHAVIORAL HEALTH | Facility: CLINIC | Age: 63
End: 2023-04-10

## 2023-04-10 ENCOUNTER — VIRTUAL VISIT (OUTPATIENT)
Dept: BEHAVIORAL HEALTH | Facility: CLINIC | Age: 63
End: 2023-04-10
Payer: MEDICARE

## 2023-04-10 DIAGNOSIS — R69 DIAGNOSIS DEFERRED: Primary | ICD-10-CM

## 2023-04-10 NOTE — Clinical Note
JOE Ford this patient has been struggling today, and she declined to meet with a therapist sooner than your appointment. She told her on and off boyfriend that he needs to move out because she knows it's best for her, but she's still struggling. Let me know if you have any questions about my note!   Thanks, Yesi Dial, LSW [facility]  [facility] [phone]

## 2023-04-10 NOTE — TELEPHONE ENCOUNTER
[facility] called patient for scheduled visit and patient was upset and crying. Patient reported she's too upset to talk right now and would like to talk later. SWCC rescheduled patient for later today, and asked if she needed anything from [facility] right now and patient declined needing anything. SWCC encouraged patient to reach out if she needed anything from [facility] before their meeting this afternoon.     Paty Dial LSW Behavior[facility] Home ([facility])   [facility]  [phone]

## 2023-04-10 NOTE — PROGRESS NOTES
"Behavioral Health Home Services  [facility]: [place]        Social Work Care Navigator Note      Patient: Paula Ho  Date: April 10, 2023  Preferred Name: Paula    Previous PHQ-9:       1/18/2023    10:40 AM 2/6/2023    10:00 AM 3/20/2023    12:52 PM   PHQ-9 SCORE   PHQ-9 Total Score MyChart 11 (Moderate depression)  5 (Mild depression)   PHQ-9 Total Score 11 21 5     Previous LIBRA-7:       1/18/2023    10:41 AM 2/6/2023    10:00 AM 3/20/2023    12:53 PM   LIBRA-7 SCORE   Total Score 9 (mild anxiety)  13 (moderate anxiety)   Total Score 9 10 13     ARNALDO LEVEL:       View : No data to display.                Preferred Contact:  Need for : No  Preferred Contact: Cell      Type of Contact Today: Phone call (patient / identified key support person reached)    Service Modality: Phone Visit:      Provider verified identity through the following two step process.  Patient provided:  Patient is known previously to provider    Telephone Visit: The patient's condition can be safely assessed and treated via synchronous audio telemedicine encounter.      Reason for Audio Telemedicine Visit: Services only offered telehealth    Originating Site (Patient Location): Patient's home    Distant Site (Provider Location): Provider Remote Setting- Home Office    Consent:  The patient/guardian has verbally consented to:     1. The potential risks and benefits of telemedicine (telephone visit) versus in person care;    The patient has been notified of the following:      \"We have found that certain health care needs can be provided without the need for a face to face visit.  This service lets us provide the care you need with a phone conversation.       I will have full access to your [facility] medical record during this entire phone call.   I will be taking notes for your medical record.      Since this is like an office visit, we will bill your insurance company for this service.       There are potential benefits " "and risks of telephone visits (e.g. limits to patient confidentiality) that differ from in-person visits.?Confidentiality still applies for telephone services, and nobody will record the visit.  It is important to be in a quiet, private space that is free of distractions (including cell phone or other devices) during the visit.??      If during the course of the call I believe a telephone visit is not appropriate, you will not be charged for this service\"     Consent has been obtained for this service by care team member: Yes       Data: (subjective / Objective):  Recent ED/IP Admission or Discharge?   None    Patient Goals:  Goal Areas: Health; Mental Health; Future HAP Goal area; Chemical Health  Patient stated goals: -Health: Paula would like to continue to meet with her providers, and take her medications as prescribed. Paula would also like assistance with improving her back pain, and receive physical therapy, as well as possibly surgery.  -Chemical Health: Paula would like assistance from her providers to quit smoking and will work on reducing her use, so she can be approved for back surgery.   -Mental Health: Paula would like to find a long term therapy provider through [employer], and continue to receive assistance with [facility] for monthly check ins. She would also like to start working with an [facility] worker again.   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 She would like to receive home delivered meals if she does open.        [facility] Core Service Provided:  Comprehensive Care Management: utilized the electronic medical record / patient registry to identify and support patient's health conditions / needs more effectively   Care Transitions: focused on the coordinated and seamless movement of patient between or within different levels of care or settings  Care Coordination: provided care management " services/referrals necessary to ensure patient and their identified supports have access to medical, behavioral health, pharmacology and recovery support services.  Ensured that patient's care is integrated across all settings and services.   Individual and Family Support: aimed to help clients reduce barriers to achieving goals, increase health literacy and knowledge about chronic condition(s), increase self-efficacy skills, and improve health outcomes    Current Stressors / Issues / Care Plan Objective Addressed Today:  SWCC and patient were able to meet today for Behavioral Health Home ([facility]) monthly check-in via telehealth visit. All required ROIs have been filed with HIM/patient chart.    1. Patient reported she's still very upset and reported she had to put an end to a relationship because she had to set boundaries. Patient reported her family was questioning why she let him treat her like this, and she's tired of his actions with being out all the time and it's not good for her. She reports he's working on moving his stuff out, and she's struggling with this since they've known each other for 8 years. Patient and CC processed through the relationship and CC encouraged patient to do what's best for her health and goals. CC encouraged patient to talk with her therapist, and spend time with other people as well. Patient reported her niece will be home around 5:30-6pm.     2. Patient reported that she's still been struggling with where she's living and wishes she could move out. She reported knowing it will be hard to find somewhere that she can live with her dogs, but her current place is too big. Patient reports that her niece is going to move in and be there for around 6 months, but now she's possibly only staying there for a month. She is trying to prepare and figure out what her plan in so she can decide what she would like to do. Patient is still considering moving out and needs to talk with her niece  again about her plans. Patient reported frustrations with her niece changing her mind, and she feels that she's putting her life on hold for other people and they're not doing the same for her.     3. Patient reported having a hard time thinking right now because she's been crying all day and she has a headache. She reported taking some Nyquil because she can't sleep and she wants to be able to get some rest. [facility] asked if patient's having any suicidal thoughts and she reported she's had thoughts to hurt herself earlier today, but she confirmed she didn't hurt herself and she has no intentions to hurt herself. Patient reported she will go to the emergency room if she gets to this point. Patient also reported that she knows that she shouldn't drive right now, so she's staying home. Patient declined setting up an appointment with a [facility] and reported she'll be okay to wait for her appointment with her therapist on Wednesday.     4. [facility] and patient discussed activities she can do right now to keep her mind busy. She reports she's going to try to get dressed and take the dogs outside to spend some time outside. Patient also reported she's going to try to listen to some meditation music and try to go to sleep.     Intervention:  Motivational Interviewing: Expressed Empathy/Understanding, Supported Autonomy, Collaboration, Evocation, Permission to raise concern or advise, Open-ended questions and Reflections: simple and complex   Target Behavior(s): Explored thoughts about taking an anti-depressant, Explored and resolved challenges related to taking anti-depressants as prescribed, Explored thoughts and readiness to participate in individual therapy, Explored and resolved challenges to attending appointments as scheduled and Explored current social supports and reinforced opportunities to increase engagement    Assessment: (Progress on Goals / Homework):  Patient would benefit from continued coordination in reaching their  goals set for the Behavioral Health Home ([facility]) program. SWCC reviewed Health Action Plan goals and will continue to monitor progress and work with patient and their care team.    Plan: (Homework, other):  Patient was encouraged to continue to seek condition-related information and education.      Scheduled a Phone follow up appointment with MISA RICE in 1 week     Patient has set self-identified goals and will monitor progress until the next appointment on: 4/17/23.      DENI Humphries  Behavioral Health Home ([facility])   [facility]  [phone]

## 2023-04-12 ENCOUNTER — VIRTUAL VISIT (OUTPATIENT)
Dept: PSYCHOLOGY | Facility: CLINIC | Age: 63
End: 2023-04-12
Payer: MEDICARE

## 2023-04-12 DIAGNOSIS — F33.1 MODERATE EPISODE OF RECURRENT MAJOR DEPRESSIVE DISORDER (H): Primary | ICD-10-CM

## 2023-04-12 PROCEDURE — 90834 PSYTX W PT 45 MINUTES: CPT | Mod: 95

## 2023-04-14 NOTE — PROGRESS NOTES
"    [facility]                                     Progress Note    Patient Name: Paula Ho  Date: 23         Service Type: Individual    Session Start Time: 3:30 PM Session End Time: 04:19 PM      Session Length: 49 Minutes     Session #: 15    Attendees: Client attended alone    Service Modality:  Phone Visit:      Provider verified identity through the following two step process.  Patient provided:  Patient  and Patient is known previously to provider    Telephone Visit: The patient's condition can be safely assessed and treated via synchronous audio telemedicine encounter.      Reason for Audio Telemedicine Visit: Patient has requested telehealth visit    Originating Site (Patient Location): Patient's home    Distant Site (Provider Location): Provider Remote Setting- Home Office    Consent:  The patient/guardian has verbally consented to:     1. The potential risks and benefits of telemedicine (telephone visit) versus in person care;    The patient has been notified of the following:      \"We have found that certain health care needs can be provided without the need for a face to face visit.  This service lets us provide the care you need with a phone conversation.       I will have full access to your [facility] medical record during this entire phone call.   I will be taking notes for your medical record.      Since this is like an office visit, we will bill your insurance company for this service.       There are potential benefits and risks of telephone visits (e.g. limits to patient confidentiality) that differ from in-person visits.?Confidentiality still applies for telephone services, and nobody will record the visit.  It is important to be in a quiet, private space that is free of distractions (including cell phone or other devices) during the visit.??      If during the course of the call I believe a telephone visit is not appropriate, you will not be charged for " "this service\"     Consent has been obtained for this service by care team member: Yes     DATA  Interactive Complexity: No  Crisis: No        Progress Since Last Session (Related to Symptoms / Goals / Homework):   Symptoms: No change Patient reports continued stress     Homework: Did not complete      Episode of Care Goals: Minimal progress - CONTEMPLATION (Considering change and yet undecided); Intervened by assessing the negative and positive thinking (ambivalence) about behavior change     Current / Ongoing Stressors and Concerns:   Patient reports to having a difficult past couple of days.  She reports experiencing thoughts of possible self-harm in the previous days.  Provider assessed for current thoughts of self-harm or suicide and patient declined both thoughts. She continues to report stress in regards to the boundaries she has had with her previous partner.  Patient reports wanting to set more boundaries with her previous partner because of how her family was viewing her.  She also states she understands this is something that is important for her.  Paula reports she is continued stress in regards to her niece recently moving in. She continues to state difficulties with her expectations being met with her niece.  She reports her niece has also told her that she may be moving in a couple of months.  She reports feeling frustrated because this is \"not what they agreed\".  Patient also reports concerns with wanting to look for a new place for herself.  Patient reports concerns for needing to increase her medications due to an increase in how much she has been crying.  Patient also reports an increase in her back pain.      Treatment Objective(s) Addressed in This Session:   Increase interest, engagement, and pleasure in doing things  Decrease frequency and intensity of feeling down, depressed, hopeless  Identify negative self-talk and behaviors: challenge core beliefs, myths, and actions  Grief and the grieving " process   Boundaries  Healthy Relationships   Possible medication change      Intervention:   Client Centered: Patient was educated on the importance of mindfulness, deep breathing, and self care when her anxiety is increased.  Provider also spoke with patient about communicating her expectations with her niece.  Provider also discussed with the patient her recent frustrations with her previous partner.  Provider also assessed for safety with the patient.  Provider discussed the importance of utilizing her safety plan when experiencing thoughts of self-harm.      Motivational Interviewing     MI Intervention: Expressed Empathy/Understanding, Supported Autonomy, Collaboration, Evocation, Permission to raise concern or advise, Open-ended questions and Reframe     Change Talk Expressed by the Patient: Reasons to change Need to change Taking steps    Provider Response to Change Talk: E - Evoked more info from patient about behavior change, A - Affirmed patient's thoughts, decisions, or attempts at behavior change and R - Reflected patient's change talk       ASSESSMENT: Current Emotional / Mental Status (status of significant symptoms):   Risk status (Self / Other harm or suicidal ideation)   Patient denies current fears or concerns for personal safety.   Patient denies current or recent suicidal ideation or behaviors.   Patient denies current or recent homicidal ideation or behaviors.   Patient denies current or recent self injurious behavior or ideation.   Patient denies other safety concerns.   Patient reports there has been no change in risk factors since their last session.     Patient reports there has been no change in protective factors since their last session.     There are no firearms in the house.     Appearance:   NA    Eye Contact:   NA    Psychomotor Behavior: Normal    Attitude:   Cooperative  Attentive   Orientation:   All   Speech    Rate / Production: Normal     Volume:  Normal    Mood:    Normal Sad   Agitated   Affect:    Appropriate  Tearful   Thought Content:  Clear    Thought Form:  Coherent  Logical    Insight:    Good      Medication Review:   No changes to current psychiatric medication(s)     Medication Compliance:   Yes     Changes in Health Issues:   Yes: Pain, No Psychological Distress  Patient reports increased pain in her back     Chemical Use Review:   Substance Use: Chemical use reviewed, no active concerns identified      Tobacco Use: No change in amount of tobacco use since last session.  Provider did not address in session    Diagnosis:  Moderate Episode of Recurrent Major Depressive Disorder (F33.1)    Collateral Reports Completed:   Not Applicable    PLAN: (Patient Tasks / Therapist Tasks / Other)  Provider encouraged patient to work on mindfulness activities to aid in managing her anxiety and stress. Provided encourage the patient to communicate with her niece again her expectations with her niece.  Patient also encouraged herself to work on self-care and doing activities she enjoys.    BECKY MANRIQUEZ    This note has been reviewed and I agree with the plan of care. This note is co-signed by JASON Bang, [facility], Supervisor, on: April 17, 2023   ______________________________________________________________________    Individual Treatment Plan    Patient's Name: Paula Ho  YOB: 1960    Date of Creation: 11/7/22  Date Treatment Plan Last Reviewed/Revised: 11/7/22     DSM5 Diagnoses: 296.32 (F33.1) Major Depressive Disorder, Recurrent Episode, Moderate _  Psychosocial / Contextual Factors: Patient has physical medical concerns that impact her current depressive symptoms.  PROMIS (reviewed every 90 days):     Referral / Collaboration:  Referral to another professional/service is not indicated at this time..    Anticipated number of session for this episode of care: 9-12 sessions  Anticipation frequency of session: Weekly  Anticipated Duration of each session:  38-52 minutes  Treatment plan will be reviewed in 90 days or when goals have been changed.       MeasurableTreatment Goal(s) related to diagnosis / functional impairment(s)  Goal 1: Patient will develop healthy thinking patterns and beliefs about self, others, and the world.       Objective #A (Patient Action)    Patient will Identify negative self-talk and behaviors: challenge core beliefs, myths, and actions.  Status: New - Date: 11/7/22     Intervention(s)  Therapist will teach how to identify negative self-talk and behaviors.    Objective #B  Patient will Improve concentration, focus, and mindfulness in daily activities .  Status: New - Date: 11/7/22     Intervention(s)  Therapist will teach how to improve concentration, focus, and mindfulness..      Goal 2: Patient will alleviate depressive symptoms in order to return to a previous level of functioning. As evidenced by a PHQ-9 score of 9 or less.       Objective # A  Status: New - Date: 11/7/22     Patient will lidentify at least 4 adaptive coping statements to counteract negative thoughts    Intervention(s)  Therapist will teach adaptive coping statements to conteract negative thoughts.    Objective #B  Patient will Increase interest, engagement, and pleasure in doing things.    Status: New - Date: 11/7/22     Intervention(s)  Therapist will assign homework related to increasing engagement and interest in doing things.        Patient has reviewed and agreed to the above plan.      BECKY MANRIQUEZ  November 7, 2022    This note has been reviewed and I agree with the plan of care. This note is co-signed by JASON Bang, [facility], Supervisor, on: November 8, 2022

## 2023-04-16 NOTE — TELEPHONE ENCOUNTER
RECORDS STATUS - ALL OTHER DIAGNOSIS      RECORDS RECEIVED FROM: Epic   DATE RECEIVED:    NOTES STATUS DETAILS   OFFICE NOTE from referring provider Epic 03/20/23: VIET Mc CNP   MEDICATION LIST [facility]    LABS     PATHOLOGY REPORTS     ANYTHING RELATED TO DIAGNOSIS Epic Most recent 04/03/23

## 2023-04-17 ENCOUNTER — VIRTUAL VISIT (OUTPATIENT)
Dept: BEHAVIORAL HEALTH | Facility: CLINIC | Age: 63
End: 2023-04-17
Payer: MEDICARE

## 2023-04-17 DIAGNOSIS — R69 DIAGNOSIS DEFERRED: Primary | ICD-10-CM

## 2023-04-17 NOTE — PROGRESS NOTES
"Behavioral Health Home Services  [facility]: [place]        Social Work Care Navigator Note      Patient: Paula Ho  Date: April 17, 2023  Preferred Name: Paula    Previous PHQ-9:       1/18/2023    10:40 AM 2/6/2023    10:00 AM 3/20/2023    12:52 PM   PHQ-9 SCORE   PHQ-9 Total Score MyChart 11 (Moderate depression)  5 (Mild depression)   PHQ-9 Total Score 11 21 5     Previous LIBRA-7:       1/18/2023    10:41 AM 2/6/2023    10:00 AM 3/20/2023    12:53 PM   LIBRA-7 SCORE   Total Score 9 (mild anxiety)  13 (moderate anxiety)   Total Score 9 10 13     ARNALDO LEVEL:       View : No data to display.                Preferred Contact:  Need for : No  Preferred Contact: Cell      Type of Contact Today: Phone call (patient / identified key support person reached)    Service Modality: Phone Visit:      Provider verified identity through the following two step process.  Patient provided:  Patient is known previously to provider    Telephone Visit: The patient's condition can be safely assessed and treated via synchronous audio telemedicine encounter.      Reason for Audio Telemedicine Visit: Services only offered telehealth    Originating Site (Patient Location): Patient's home    Distant Site (Provider Location): Provider Remote Setting- Home Office    Consent:  The patient/guardian has verbally consented to:     1. The potential risks and benefits of telemedicine (telephone visit) versus in person care;    The patient has been notified of the following:      \"We have found that certain health care needs can be provided without the need for a face to face visit.  This service lets us provide the care you need with a phone conversation.       I will have full access to your [facility] medical record during this entire phone call.   I will be taking notes for your medical record.      Since this is like an office visit, we will bill your insurance company for this service.       There are potential benefits " "and risks of telephone visits (e.g. limits to patient confidentiality) that differ from in-person visits.?Confidentiality still applies for telephone services, and nobody will record the visit.  It is important to be in a quiet, private space that is free of distractions (including cell phone or other devices) during the visit.??      If during the course of the call I believe a telephone visit is not appropriate, you will not be charged for this service\"     Consent has been obtained for this service by care team member: Yes       Data: (subjective / Objective):  Recent ED/IP Admission or Discharge?   None    Patient Goals:  Goal Areas: Health; Mental Health; Future HAP Goal area; Chemical Health  Patient stated goals: Health: Paula would like to continue to meet with her providers, and take her medications as prescribed. Paula would also like assistance with improving her back pain, and receive pool therapy, as well as possibly surgery.  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   Chemical Health: Paula would like assistance from her providers to quit smoking and will work on reducing her use, so she can be approved for back surgery.   Mental Health: Paula would like to find a long term therapy provider through [facility], and continue to receive assistance with [facility] for monthly check ins. She would also like to start working with an [facility] worker again.  She reported that she wants to build herself back to feel like somebody and take it day by day.   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 She would like to receive home delivered meals if she does open.    [facility] Core Service Provided:  Comprehensive Care Management: utilized the electronic medical record / patient registry to identify and support patient's health conditions / needs more " effectively   Care Transitions: focused on the coordinated and seamless movement of patient between or within different levels of care or settings  Care Coordination: provided care management services/referrals necessary to ensure patient and their identified supports have access to medical, behavioral health, pharmacology and recovery support services.  Ensured that patient's care is integrated across all settings and services.   Individual and Family Support: aimed to help clients reduce barriers to achieving goals, increase health literacy and knowledge about chronic condition(s), increase self-efficacy skills, and improve health outcomes    Current Stressors / Issues / Care Plan Objective Addressed Today:  SWCC and patient were able to meet today for Behavioral Health Home ([facility]) monthly check-in via telehealth visit. All required ROIs have been filed with HIM/patient chart.    Health: Paula would like to continue to meet with her providers, and take her medications as prescribed. Paula would also like assistance with improving her back pain, and receive pool therapy, as well as possibly surgery.  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       Patient reported that she would like to buy a portable Jacuzzi because she thinks it would be helpful for her back pain. She reports that she's going to look into this and see if she can afford to pay for this right now. She reported having a pool last year, but this would be more helpful for the pain.     Patient reported that she goes to the pain doctor on Wednesday, and she's going to talk with them about options. She reports that she can't take the pain anymore because it's gotten bad, and she's can hardly walk by night time. She reported that it around her lower back/buttocks area. She reported that it feels like a nerve is being pinched. Patient has the shock unit, but she doesn't really see a  difference. Patient reports her sleep at night has been unbearable too.     Patient reported that her pool therapy seems to be helping her, but she knows that she's not going enough since she only goes once a week. She doesn't think she can go more, but she'll bring this up to her pain doctor to get another referral to keep going. She's not sure yet about the gym membership, so she'll ask about this in the near future with the [employer] plan for her insurance.     Chemical Health: Paula would like assistance from her providers to quit smoking and will work on reducing her use, so she can be approved for back surgery.       Patient reported that she got a pack of cigarettes yesterday and she still has the pack, so she's happy that her use is reduced. She discussed her use with her mom, who is also needing to quit for surgery. They are trying to figure out how to stop permanently. She reported that the last couple nice days have been helpful for her to stay busy doing things, and she was keeping her mind busy.     Mental Health: Paula would like to find a long term therapy provider through [facility], and continue to receive assistance with [facility] for monthly check ins. She would also like to start working with an [employer] worker again. She reported that she wants to build herself back to feel like somebody and take it day by day.       Patient reported her mood has been up and down, and she's doing better right now. She is working with her therapist to work through her feelings about breaking up with her ex. She sees her again on Wednesday. She reported that it was rough this weekend because her ex was asking her for some money, and she told him he needed to leave.     Patient reported she met her new [employer] worker today and she's going to come back. She reports that she likes her so far. She talked with her [employer] worker about computer assistance, and they also are going to try to find services that can take her dogs if  she ever ends up in the hospital. She told her worker that she doesn't know how to use the computer, so her worker is going to help her with this.     Patient reports not knowing what she wants to do with her niece. She reported that she's having troubles with the niece, and she's frustrated that she's not taking responsibility for herself and take care of herself. Patient reported that she can't take care of her niece and needs to take care of herself. Patient asked her for help with cleaning, and her niece didn't end up helping even though she knew patient was cleaning. Patient reported that she discussed her concerns with her niece, and she feels that her niece is starting to add to her depression/anxiety versus making it better. She is talking with her therapist about this and they discussed again that patient needs to focus on herself and what's best for her.     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 She would like to receive home delivered meals if she does open.    Other updates:   Patient reported that she only has a couple car payments left for her car loan. She reported that she didn't realize that she was overpaying for her car payment, and now she's ahead. She might use the money she had ready for her April car payment and possibly use it for the [place].     Intervention:  Motivational Interviewing: Expressed Empathy/Understanding, Supported Autonomy, Collaboration, Evocation, Permission to raise concern or advise, Open-ended questions and Reflections: simple and complex   Target Behavior(s): Explored thoughts about taking an anti-depressant, Explored and resolved challenges related to taking anti-depressants as prescribed, Explored thoughts and readiness to participate in individual therapy, Explored and resolved challenges to attending appointments as scheduled, Explored current  social supports and reinforced opportunities to increase engagement and Explored patient's perception of how alcohol and / or drugs influences mood    Assessment: (Progress on Goals / Homework):  Patient would benefit from continued coordination in reaching their goals set for the Behavioral Health Home ([facility]) program. SWCC reviewed Health Action Plan goals and will continue to monitor progress and work with patient and their care team.    Plan: (Homework, other):  Patient was encouraged to continue to seek condition-related information and education.      Scheduled a Phone follow up appointment with MISA RICE in 2 weeks     Patient has set self-identified goals and will monitor progress until the next appointment on: 5/1/23.      DENI Humphries  Behavioral Health Home ([facility])   [facility]  [phone]

## 2023-04-18 DIAGNOSIS — F90.0 ATTENTION DEFICIT HYPERACTIVITY DISORDER (ADHD), PREDOMINANTLY INATTENTIVE TYPE: ICD-10-CM

## 2023-04-18 NOTE — TELEPHONE ENCOUNTER
Reason for call:  Medication   If this is a refill request, has the caller requested the refill from the pharmacy already? No  Will the patient be using a [facility]? No  Name of the pharmacy and phone number for the current request: [facility] [phone]    Name of the medication requested: Adderall    Other request: I only have 2 left    Phone number to reach patient:  Cell number on file:    Telephone Information:   Mobile [phone]       Best Time:  anytime    Can we leave a detailed message on this number?  YES    Travel screening: Not Applicable

## 2023-04-21 ENCOUNTER — VIRTUAL VISIT (OUTPATIENT)
Dept: PSYCHOLOGY | Facility: CLINIC | Age: 63
End: 2023-04-21
Payer: MEDICARE

## 2023-04-21 DIAGNOSIS — F33.1 MODERATE EPISODE OF RECURRENT MAJOR DEPRESSIVE DISORDER (H): Primary | ICD-10-CM

## 2023-04-21 PROCEDURE — 90834 PSYTX W PT 45 MINUTES: CPT | Mod: 95

## 2023-04-24 NOTE — PROGRESS NOTES
"    [facility]                                     Progress Note    Patient Name: Paula Ho  Date: 23         Service Type: Individual    Session Start Time: 1:38 PM Session End Time: 02:24 PM      Session Length: 46 Minutes     Session #: 16    Attendees: Client attended alone    Service Modality:  Phone Visit:      Provider verified identity through the following two step process.  Patient provided:  Patient  and Patient is known previously to provider    Telephone Visit: The patient's condition can be safely assessed and treated via synchronous audio telemedicine encounter.      Reason for Audio Telemedicine Visit: Patient has requested telehealth visit    Originating Site (Patient Location): Patient's home    Distant Site (Provider Location): Provider Remote Setting- Home Office    Consent:  The patient/guardian has verbally consented to:     1. The potential risks and benefits of telemedicine (telephone visit) versus in person care;    The patient has been notified of the following:      \"We have found that certain health care needs can be provided without the need for a face to face visit.  This service lets us provide the care you need with a phone conversation.       I will have full access to your [facility] medical record during this entire phone call.   I will be taking notes for your medical record.      Since this is like an office visit, we will bill your insurance company for this service.       There are potential benefits and risks of telephone visits (e.g. limits to patient confidentiality) that differ from in-person visits.?Confidentiality still applies for telephone services, and nobody will record the visit.  It is important to be in a quiet, private space that is free of distractions (including cell phone or other devices) during the visit.??      If during the course of the call I believe a telephone visit is not appropriate, you will not be charged for " "this service\"     Consent has been obtained for this service by care team member: Yes     DATA  Interactive Complexity: No  Crisis: No        Progress Since Last Session (Related to Symptoms / Goals / Homework):   Symptoms: No change Patient reports continued stress     Homework: Did not complete      Episode of Care Goals: Minimal progress - CONTEMPLATION (Considering change and yet undecided); Intervened by assessing the negative and positive thinking (ambivalence) about behavior change     Current / Ongoing Stressors and Concerns:   Patient reports continues to report pain in her leg, and reports her doctor noticing increase in her pain.  Patient reports an increase in her pain medication for oxycodone due to her pain.  Paula reports increasing physical activity has increased her pain in her leg.  Patient wonders if contacting the spine clinic for injections in her back would be a good alternative for her pain.  Patient also continues to report relational concerns regarding her previous partner.  She also reports increased difficulty in regards to sleep.  Paula states her mother will be going through surgery soon and she is anxious about the surgery.  She continues to report frustrations in regards to her niece living with her.  Paula also reports wanting to buy a mobile home and wondering if she has enough safe for the purchase.  Provider and patient began treatment plan review, but were not able to complete a full review.    Treatment Objective(s) Addressed in This Session:   Increase interest, engagement, and pleasure in doing things  Decrease frequency and intensity of feeling down, depressed, hopeless  Identify negative self-talk and behaviors: challenge core beliefs, myths, and actions  Boundaries  Medication compliance  Financial stressors  Treatment plan review  Health concerns     Intervention:   Client Centered: Patient was educated on the importance of mindfulness, deep breathing, and self care when " her anxiety is increased.  Provider also discussed with the patient her concerns in regards to her health issues.      Motivational Interviewing     MI Intervention: Expressed Empathy/Understanding, Supported Autonomy, Collaboration, Evocation, Permission to raise concern or advise, Open-ended questions and Reframe     Change Talk Expressed by the Patient: Reasons to change Need to change Taking steps    Provider Response to Change Talk: E - Evoked more info from patient about behavior change, A - Affirmed patient's thoughts, decisions, or attempts at behavior change and R - Reflected patient's change talk       ASSESSMENT: Current Emotional / Mental Status (status of significant symptoms):   Risk status (Self / Other harm or suicidal ideation)   Patient denies current fears or concerns for personal safety.   Patient denies current or recent suicidal ideation or behaviors.   Patient denies current or recent homicidal ideation or behaviors.   Patient denies current or recent self injurious behavior or ideation.   Patient denies other safety concerns.   Patient reports there has been no change in risk factors since their last session.     Patient reports there has been no change in protective factors since their last session.     There are no firearms in the house.     Appearance:   NA    Eye Contact:   NA    Psychomotor Behavior: Normal    Attitude:   Cooperative  Attentive   Orientation:   All   Speech    Rate / Production: Normal     Volume:  Normal    Mood:    Normal Agitated    Affect:    Appropriate    Thought Content:  Clear    Thought Form:  Coherent  Logical    Insight:    Good      Medication Review:   No changes to current psychiatric medication(s)     Medication Compliance:   Yes     Changes in Health Issues:   Yes: Pain, No Psychological Distress  Patient reports increased pain in her back     Chemical Use Review:   Substance Use: Chemical use reviewed, no active concerns identified      Tobacco Use: No  change in amount of tobacco use since last session.  Provider did not address in session    Diagnosis:  Moderate Episode of Recurrent Major Depressive Disorder (F33.1)    Collateral Reports Completed:   Not Applicable    PLAN: (Patient Tasks / Therapist Tasks / Other)  Provider encouraged patient to work on mindfulness activities to aid in managing her anxiety and stress. Provided encouraged the patient to write a pros and cons list in regards to buying a mobile home.    JEFF VERONICA MISA    This note has been reviewed and I agree with the plan of care. This note is co-signed by JASON Bang, [facility], Supervisor, on: April 25, 2023  ______________________________________________________________________    Individual Treatment Plan    Patient's Name: Paula Ho  YOB: 1960    Date of Creation: 11/7/22  Date Treatment Plan Last Reviewed/Revised: 11/7/22     DSM5 Diagnoses: 296.32 (F33.1) Major Depressive Disorder, Recurrent Episode, Moderate _  Psychosocial / Contextual Factors: Patient has physical medical concerns that impact her current depressive symptoms.  PROMIS (reviewed every 90 days):     Referral / Collaboration:  Referral to another professional/service is not indicated at this time..    Anticipated number of session for this episode of care: 9-12 sessions  Anticipation frequency of session: Weekly  Anticipated Duration of each session: 38-52 minutes  Treatment plan will be reviewed in 90 days or when goals have been changed.       MeasurableTreatment Goal(s) related to diagnosis / functional impairment(s)  Goal 1: Patient will develop healthy thinking patterns and beliefs about self, others, and the world.       Objective #A (Patient Action)    Patient will Identify negative self-talk and behaviors: challenge core beliefs, myths, and actions.  Status: New - Date: 11/7/22     Intervention(s)  Therapist will teach how to identify negative self-talk and behaviors.    Objective  #B  Patient will Improve concentration, focus, and mindfulness in daily activities .  Status: New - Date: 11/7/22     Intervention(s)  Therapist will teach how to improve concentration, focus, and mindfulness..      Goal 2: Patient will alleviate depressive symptoms in order to return to a previous level of functioning. As evidenced by a PHQ-9 score of 9 or less.       Objective # A  Status: New - Date: 11/7/22     Patient will lidentify at least 4 adaptive coping statements to counteract negative thoughts    Intervention(s)  Therapist will teach adaptive coping statements to conteract negative thoughts.    Objective #B  Patient will Increase interest, engagement, and pleasure in doing things.    Status: New - Date: 11/7/22     Intervention(s)  Therapist will assign homework related to increasing engagement and interest in doing things.        Patient has reviewed and agreed to the above plan.      BECKY MANRIQUEZ  November 7, 2022    This note has been reviewed and I agree with the plan of care. This note is co-signed by JASON Bang, LICSW, Supervisor, on: November 8, 2022

## 2023-04-25 RX ORDER — DEXTROAMPHETAMINE SACCHARATE, AMPHETAMINE ASPARTATE, DEXTROAMPHETAMINE SULFATE AND AMPHETAMINE SULFATE 3.75; 3.75; 3.75; 3.75 MG/1; MG/1; MG/1; MG/1
15 TABLET ORAL 2 TIMES DAILY
Qty: 60 TABLET | Refills: 0 | Status: SHIPPED | OUTPATIENT
Start: 2023-04-25 | End: 2023-05-01

## 2023-04-25 NOTE — TELEPHONE ENCOUNTER
Date of Last Office Visit: 11/10/22  Date of Next Office Visit: 5/1/23  No shows since last visit: 0  Cancellations since last visit: 0    Medication requested: amphetamine-dextroamphetamine 15 mg Date last ordered: 3/14/23 Qty: 60 Refills: 0     [place] of [place] ?: yes  Medication last filled date: 3/14/23 Qty filled: 60  Other controlled substance on [place] ?: yes  If yes, is this a new medication?: no  If yes, name of medication: NA and date filled: NA      Lapse in medication adherence greater than 5 days?: no  If yes, call patient and gather details: NA  Medication refill request verified as identical to current order?: yes  Result of Last DAM, VPA, Li+ Level, CBC, or Carbamazepine Level (at or since last visit): N/A      Last visit treatment plan:   Treatment Plan:          1.  Mirtazapine 15 mg at bedtime    2.  Adderall 15 mg twice daily    3.  Fluoxetine 40 mg twice daily    4.  Gabapentin as needed for anxiety and pain-no refills requested today    5.  Abilify 5 mg daily    6.  Wellbutrin  mg twice daily    7.  Continue talk therapy to learn coping skills for managing life stressors         Continue all other medications as reviewed per electronic medical record today.     Safety plan reviewed. To the Emergency Department as needed or call after hours crisis line at [phone] or [phone]. [place] Crisis Text Line. Text [place] to 665193 or Suicide LifeLine Chat: suicidepreventionlifeline.org/chat/    To schedule individual or family therapy, call [facility] at [phone]    Schedule an appointment with me in 6 weeks or sooner as needed. Call [facility] at [phone] to schedule.    Follow up with primary care provider as planned or for acute medical concerns.    Call the psychiatric nurse line with medication questions or concerns at [phone]    MyChart may be used to communicate with your provider, but this is not intended to be used for  emergencies.    []Medication refilled per  Medication Refill in Ambulatory Care  policy.  [x]Medication unable to be refilled by RN due to criteria not met as indicated below:    []Eligibility - not seen in the last year   []Supervision - no future appointment   []Compliance - no shows, cancellations or lapse in therapy   []Verification - order discrepancy   [x]Controlled medication   []Medication not included in policy   []90-day supply request   []Other

## 2023-05-01 ENCOUNTER — TELEPHONE (OUTPATIENT)
Dept: BEHAVIORAL HEALTH | Facility: CLINIC | Age: 63
End: 2023-05-01
Payer: MEDICARE

## 2023-05-01 ENCOUNTER — VIRTUAL VISIT (OUTPATIENT)
Dept: PSYCHIATRY | Facility: CLINIC | Age: 63
End: 2023-05-01
Payer: MEDICARE

## 2023-05-01 DIAGNOSIS — F33.1 MODERATE EPISODE OF RECURRENT MAJOR DEPRESSIVE DISORDER (H): Primary | ICD-10-CM

## 2023-05-01 DIAGNOSIS — F90.0 ATTENTION DEFICIT HYPERACTIVITY DISORDER (ADHD), PREDOMINANTLY INATTENTIVE TYPE: ICD-10-CM

## 2023-05-01 DIAGNOSIS — F51.04 PSYCHOPHYSIOLOGICAL INSOMNIA: ICD-10-CM

## 2023-05-01 DIAGNOSIS — F33.2 SEVERE EPISODE OF RECURRENT MAJOR DEPRESSIVE DISORDER, WITHOUT PSYCHOTIC FEATURES (H): ICD-10-CM

## 2023-05-01 DIAGNOSIS — F41.1 GAD (GENERALIZED ANXIETY DISORDER): ICD-10-CM

## 2023-05-01 PROCEDURE — 99214 OFFICE O/P EST MOD 30 MIN: CPT | Mod: 95 | Performed by: NURSE PRACTITIONER

## 2023-05-01 RX ORDER — ARIPIPRAZOLE 5 MG/1
5 TABLET ORAL DAILY
Qty: 30 TABLET | Refills: 3 | Status: SHIPPED | OUTPATIENT
Start: 2023-05-01 | End: 2023-08-18

## 2023-05-01 RX ORDER — BUPROPION HYDROCHLORIDE 100 MG/1
100 TABLET, EXTENDED RELEASE ORAL 2 TIMES DAILY
Qty: 60 TABLET | Refills: 3 | Status: SHIPPED | OUTPATIENT
Start: 2023-05-01 | End: 2023-08-18

## 2023-05-01 RX ORDER — MIRTAZAPINE 30 MG/1
30 TABLET, FILM COATED ORAL AT BEDTIME
Qty: 30 TABLET | Refills: 3 | Status: SHIPPED | OUTPATIENT
Start: 2023-05-01 | End: 2023-07-03

## 2023-05-01 RX ORDER — GABAPENTIN 600 MG/1
600 TABLET ORAL DAILY
Qty: 60 TABLET | Refills: 3 | Status: SHIPPED | OUTPATIENT
Start: 2023-05-01 | End: 2023-08-18

## 2023-05-01 RX ORDER — DEXTROAMPHETAMINE SACCHARATE, AMPHETAMINE ASPARTATE, DEXTROAMPHETAMINE SULFATE AND AMPHETAMINE SULFATE 3.75; 3.75; 3.75; 3.75 MG/1; MG/1; MG/1; MG/1
15 TABLET ORAL 2 TIMES DAILY
Qty: 60 TABLET | Refills: 0 | Status: SHIPPED | OUTPATIENT
Start: 2023-05-01 | End: 2023-07-03

## 2023-05-01 RX ORDER — FLUOXETINE 40 MG/1
40 CAPSULE ORAL 2 TIMES DAILY
Qty: 60 CAPSULE | Refills: 3 | Status: SHIPPED | OUTPATIENT
Start: 2023-05-01 | End: 2023-08-18

## 2023-05-01 RX ORDER — DEXTROAMPHETAMINE SACCHARATE, AMPHETAMINE ASPARTATE, DEXTROAMPHETAMINE SULFATE AND AMPHETAMINE SULFATE 3.75; 3.75; 3.75; 3.75 MG/1; MG/1; MG/1; MG/1
15 TABLET ORAL 2 TIMES DAILY
Qty: 60 TABLET | Refills: 0 | Status: SHIPPED | OUTPATIENT
Start: 2023-06-27 | End: 2023-08-18

## 2023-05-01 RX ORDER — DEXTROAMPHETAMINE SACCHARATE, AMPHETAMINE ASPARTATE, DEXTROAMPHETAMINE SULFATE AND AMPHETAMINE SULFATE 3.75; 3.75; 3.75; 3.75 MG/1; MG/1; MG/1; MG/1
15 TABLET ORAL 2 TIMES DAILY
Qty: 60 TABLET | Refills: 0 | Status: SHIPPED | OUTPATIENT
Start: 2023-05-30 | End: 2023-08-18

## 2023-05-01 ASSESSMENT — ANXIETY QUESTIONNAIRES
8. IF YOU CHECKED OFF ANY PROBLEMS, HOW DIFFICULT HAVE THESE MADE IT FOR YOU TO DO YOUR WORK, TAKE CARE OF THINGS AT HOME, OR GET ALONG WITH OTHER PEOPLE?: SOMEWHAT DIFFICULT
GAD7 TOTAL SCORE: 13
1. FEELING NERVOUS, ANXIOUS, OR ON EDGE: SEVERAL DAYS
7. FEELING AFRAID AS IF SOMETHING AWFUL MIGHT HAPPEN: SEVERAL DAYS
7. FEELING AFRAID AS IF SOMETHING AWFUL MIGHT HAPPEN: SEVERAL DAYS
4. TROUBLE RELAXING: SEVERAL DAYS
IF YOU CHECKED OFF ANY PROBLEMS ON THIS QUESTIONNAIRE, HOW DIFFICULT HAVE THESE PROBLEMS MADE IT FOR YOU TO DO YOUR WORK, TAKE CARE OF THINGS AT HOME, OR GET ALONG WITH OTHER PEOPLE: SOMEWHAT DIFFICULT
2. NOT BEING ABLE TO STOP OR CONTROL WORRYING: NEARLY EVERY DAY
GAD7 TOTAL SCORE: 13
3. WORRYING TOO MUCH ABOUT DIFFERENT THINGS: NEARLY EVERY DAY
5. BEING SO RESTLESS THAT IT IS HARD TO SIT STILL: SEVERAL DAYS
6. BECOMING EASILY ANNOYED OR IRRITABLE: NEARLY EVERY DAY
GAD7 TOTAL SCORE: 13

## 2023-05-01 ASSESSMENT — PATIENT HEALTH QUESTIONNAIRE - PHQ9
SUM OF ALL RESPONSES TO PHQ QUESTIONS 1-9: 6
SUM OF ALL RESPONSES TO PHQ QUESTIONS 1-9: 6
10. IF YOU CHECKED OFF ANY PROBLEMS, HOW DIFFICULT HAVE THESE PROBLEMS MADE IT FOR YOU TO DO YOUR WORK, TAKE CARE OF THINGS AT HOME, OR GET ALONG WITH OTHER PEOPLE: SOMEWHAT DIFFICULT

## 2023-05-01 NOTE — PROGRESS NOTES
"Virtual Visit Details    Type of service:  Telephone Visit   Phone call duration: 30 minutes     Chelsea Figueredo  VVF    Provider location: Onsite  Patient location: Home               Outpatient Psychiatric Progress Note    Name: Paula Ho   : 1960                    Primary Care Provider: LEONA ALANIZ CNP   Therapist: None    PHQ-9 scores:      2023    10:40 AM 2023    10:00 AM 3/20/2023    12:52 PM   PHQ-9 SCORE   PHQ-9 Total Score MyChart 11 (Moderate depression)  5 (Mild depression)   PHQ-9 Total Score 11 21 5       LIBRA-7 scores:      2023    10:41 AM 2023    10:00 AM 3/20/2023    12:53 PM   LIBRA-7 SCORE   Total Score 9 (mild anxiety)  13 (moderate anxiety)   Total Score 9 10 13       Patient Identification:    Patient is a 62 year old year old, single  White Not  or  female  who presents for return visit with me.  Patient is currently unemployed. Patient attended the session alone. Patient prefers to be called: \" Paula\".    Current medications include: albuterol (PROAIR HFA) 108 (90 Base) MCG/ACT inhaler, Inhale 2 puffs into the lungs every 4 hours as needed for shortness of breath / dyspnea or wheezing  albuterol (PROVENTIL) (2.5 MG/3ML) 0.083% neb solution, NEBULIZE THE CONTENTS OF 1 VIAL EVERY 6 HOURS AS NEEDED.  amphetamine-dextroamphetamine (ADDERALL) 15 MG tablet, Take 1 tablet (15 mg) by mouth 2 times daily  ARIPiprazole (ABILIFY) 5 MG tablet, Take 1 tablet (5 mg) by mouth daily  budesonide-formoterol (SYMBICORT) 160-4.5 MCG/ACT Inhaler, Inhale 2 puffs into the lungs 2 times daily  buprenorphine HCl-naloxone HCl (SUBOXONE) 8-2 MG per film, Place 1 Film under the tongue daily  buPROPion (WELLBUTRIN SR) 100 MG 12 hr tablet, Take 1 tablet (100 mg) by mouth 2 times daily  cetirizine (ZYRTEC) 10 MG tablet, Take 1 tablet (10 mg) by mouth daily  dexamethasone (DECADRON) 1 MG tablet, Take 1 mg at 11 pm and get a morning blood draw at 8 am for " cortisol.  FLUoxetine (PROZAC) 40 MG capsule, Take 1 capsule (40 mg) by mouth 2 times daily  gabapentin (NEURONTIN) 600 MG tablet, Take 1 tablet (600 mg) by mouth daily At bedtime  levothyroxine (SYNTHROID/LEVOTHROID) 25 MCG tablet, Take 1 tablet (25 mcg) by mouth daily  medical cannabis (Patient's own supply), See Admin Instructions (The purpose of this order is to document that the patient reports taking medical cannabis.  This is not a prescription, and is not used to certify that the patient has a qualifying medical condition.)  mirtazapine (REMERON) 15 MG tablet, Take 1 tablet (15 mg) by mouth At Bedtime  Multiple Vitamins-Minerals (MULTIVITAMIN ADULT PO),   mupirocin (BACTROBAN) 2 % external ointment, Apply topically 3 times daily  NARCAN 4 MG/0.1ML nasal spray, INHALE VIA NASAL ROUTE FOR OVERDOSE AS NEEDED. MAY REPEAT AFTER 2-3 MINUTES  order for DME, Equipment being ordered: Nebulizer  oxyCODONE IR (ROXICODONE) 15 MG tablet, Take 15 mg by mouth 3 times daily + 5 mg x1 daily  tiotropium (SPIRIVA) 18 MCG inhaled capsule, Inhale 1 capsule (18 mcg) into the lungs daily  tiZANidine (ZANAFLEX) 2 MG tablet, TK 2 TO 3 TS PO QHS  topiramate (TOPAMAX) 100 MG tablet, Take 1 tablet (100 mg) by mouth 2 times daily  vitamin D3 (CHOLECALCIFEROL) 2000 units (50 mcg) tablet, Take 1 tablet by mouth daily    lidocaine (PF) (XYLOCAINE) 1 % injection 8 mL         The [place] Prescription Monitoring Program has been reviewed and there are no concerns about diversionary activity for controlled substances at this time.      I was able to review most recent Primary Care Provider, specialty provider, and therapy visit notes that I have access to.     Today, patient reports she is having back pain that makes her depression and anxiety increase in intensity.  She felt suicidal last month but told me that she would not do anything to end her life.  When she tries to do things, then the next day she is laid out.  When she ran out of  Adderall, she felt scattered and disorganized.  Her niece is living with her and Paula has difficulties dealing with her nieces behaviors of cluttering up her home.        has a past medical history of Acute respiratory failure (H) (02/01/2020), Anxiety, Asthma, Depression, and Hypertension.    Social history updates:    Paula has her  Niece lives with her.    Substance use updates:    No  Alcohol use reported  Tobacco use: Yes Cigarettes  Ready to quit?  Yes as she wants to get back surgery  Nicotine Replacement Therapy tried: unknown     Vital Signs:   LMP  (LMP Unknown)     Labs:    Most recent laboratory results reviewed and no new labs.     Mental Status Examination:  Appearance: awake, alert and mild distress  Attitude: cooperative  Eye Contact:  unable to assess  Gait and Station: No dizziness or falls  Psychomotor Behavior:  unable to assess  Oriented to:  time, person, and place  Attention Span and Concentration:  Normal  Speech:   vtspeech: clear, coherent and Speaks: English  Mood:  : anxious and depressed  Affect:  : mood congruent  Associations:  no loose associations  Thought Process:  goal oriented  Thought Content:  no evidence of suicidal ideation or homicidal ideation, no auditory hallucinations present and no visual hallucinations present  Recent and Remote Memory:  intact Not formally assessed. No amnesia.  Fund of Knowledge: appropriate  Insight:  good  Judgment: good  Impulse Control:  good    Suicide Risk Assessment:  Today Paula Ho reports no thoughts to harm themself or others. In addition, there are notable risk factors for self-harm, including anxiety, comorbid medical condition of chronic pain and mood change. However, risk is mitigated by commitment to family, history of seeking help when needed, future oriented, denies suicidal intent or plan and denies homicidal ideation, intent, or plan. Therefore, based on all available evidence including the factors cited above, Paula PICHARDO  Vic does not appear to be at imminent risk for self-harm, does not meet criteria for a 72-hr hold, and therefore remains appropriate for ongoing outpatient level of care.  A thorough assessment of risk factors related to suicide and self-harm have been reviewed and are noted above. The patient convincingly denies suicidality on several occasions. Local community safety resources printed and reviewed for patient to use if needed. There was no deceit detected, and the patient presented in a manner that was believable.     DSM5 Diagnosis:  Attention-Deficit/Hyperactivity Disorder  314.00 (F90.0) Predominantly inattentive presentation  296.32 (F33.1) Major Depressive Disorder, Recurrent Episode, Moderate _ and With mixed features  300.02 (F41.1) Generalized Anxiety Disorder  780.52 (G47.00) Insomnia Disorder   With other medical comorbidity  Recurrent      Medical comorbidities include:   Patient Active Problem List    Diagnosis Date Noted     Angiolipoma of right kidney 01/20/2023     Priority: Medium     Adrenal nodule (H) 01/20/2023     Priority: Medium     Opioid dependence with current use (H) 06/30/2022     Priority: Medium     Infection due to 2019 novel coronavirus 12/09/2020     Priority: Medium     Hyperlipidemia 04/14/2020     Priority: Medium     Benign essential hypertension 04/14/2020     Priority: Medium     MDD (major depressive disorder), recurrent severe, without psychosis (H) 03/02/2020     Priority: Medium     Chronic obstructive pulmonary disease, unspecified COPD type (H) 02/04/2020     Priority: Medium     No PFTS  In EPIC       Attention deficit hyperactivity disorder (ADHD) 01/31/2020     Priority: Medium     Chronic midline low back pain with bilateral sciatica 01/31/2020     Priority: Medium     Brain aneurysm 12/03/2019     Priority: Medium     Dec 2017  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 Device is MR compatible to 3 BREANN 3/2020       History  of subarachnoid hemorrhage 12/22/2017     Priority: Medium     H/O of SAH, Cam 2, Hunt-Thomas 1, from a ruptured 9mm left Pcomm aneurysm with a wide neck and a fetal left PCA arising from the aneurysm neck, s/p successful coil embolization 12/23/2017 by Dr. Otto Hurley       Chronic GERD 01/26/2017     Priority: Medium     Chronic knee pain 12/12/2014     Priority: Medium     Cigarette smoker 07/08/2014     Priority: Medium     Chronic, continuous use of opioids 04/24/2013     Priority: Medium     Managed by pain clinic outside of [place].  UDS + cocaine in December 2019 without confirmation testing at her pain clinic per patient report       Morbid obesity (H) 03/27/2013     Priority: Medium     Status post knee surgery 03/27/2013     Priority: Medium     Per patient's recollection:  Circa 2002: right knee arthroscopy (Dr. Pappas)  Circa 2004: right knee partial knee replacement (Iam Ritchie MD)  Circa 2006: right TKA (Ron Ryder MD; [facility])  Circa 2008: right TKA revision due to infection (Ron Rdyer MD; [facility])  Circa 2009: right TKA revision due to infection (Ron Ryder MD; [facility])  April 2011: right TKA (Ron Ryder MD; [facility])       LIBRA (generalized anxiety disorder) 09/28/2011     Priority: Medium     Chronic pain 09/28/2011     Priority: Medium     Knee and low back         DJD (degenerative joint disease) 09/28/2011     Priority: Medium     Insomnia 04/13/2011     Priority: Medium     Insomnia  NOS       Tobacco use disorder 07/31/2006     Priority: Medium     Asthma 11/15/2004     Priority: Medium     Asthma  NOS         Assessment:    Paula Ho reported in today that she has increased anxiety and depression that can be related to her ongoing chronic pain symptoms.  Her niece has recently moved in with her and that has been difficult to manage.  She denies having any suicide thinking today.  When she ran out of Adderall she tells me  it was increasingly difficult to stay organized at home and complete projects.  Adderall will continue at 15 mg twice daily.  To help her sleep at night and also manage some of the increase in depression symptoms, I increased her mirtazapine to 30 mg at bedtime.  She will continue fluoxetine at maximum dose.  Wellbutrin will be continued twice daily as well as Abilify to adjunct her depression medication.  She takes gabapentin at nighttime only to help her slow down racing thoughts..    Medication side effects and alternatives were reviewed. Health promotion activities recommended and reviewed today. All questions addressed. Education and counseling completed regarding risks and benefits of medications and psychotherapy options.    Treatment Plan:        1.  Increase mirtazapine to 30 mg at bedtime    2.  Adderall 15 mg twice daily    3.  Fluoxetine 40 mg twice daily    4.  Wellbutrin  mg twice daily    5.  Abilify 5 mg daily    6.  Gabapentin 600 mg at bedtime        Continue all other medications as reviewed per electronic medical record today.     Safety plan reviewed. To the Emergency Department as needed or call after hours crisis line at [phone] or [phone]. [place] Crisis Text Line. Text [place] to 105774 or Suicide LifeLine Chat: suicidepreventionlifeline.org/chat/    To schedule individual or family therapy, call [facility] at [phone]    Schedule an appointment with me in 2 months or sooner as needed. Call [facility] at [phone] to schedule.    Follow up with primary care provider as planned or for acute medical concerns.    Call the psychiatric nurse line with medication questions or concerns at [phone]    MyChart may be used to communicate with your provider, but this is not intended to be used for emergencies.    Crisis Resources:    National Suicide Prevention Lifeline: 627.375.5211 (TTY: 476.322.1979). Call anytime for help.   (www.suicidepreventionlifeline.org)  [employer] [place] [employer]ness (www.raudel.org): 161.486.8645 or 799-989-9114.   Mental Health Association (www.mentalhealth.org): 448.943.9675 or 075-297-2160.  [place] Crisis Text Line: Text [place] to 332322  Suicide LifeLine Chat: suicideFitness Partners.org/chat    Administrative Billing:   Time spent with patient includes counseling and coordination of care regarding above diagnoses and treatment plan.    Patient Status:  This is a continuous care patient and medications will be prescribed by the psychiatric provider until further indicated.    Signed:   STEPH KiddBC   Psychiatry       Answers for HPI/ROS submitted by the patient on 5/1/2023  If you checked off any problems, how difficult have these problems made it for you to do your work, take care of things at home, or get along with other people?: Somewhat difficult  PHQ9 TOTAL SCORE: 6  LIBRA 7 TOTAL SCORE: 13

## 2023-05-01 NOTE — TELEPHONE ENCOUNTER
[facility]  [facility]: [place]        Social Work Care Navigator Note      Patient: Paula Ho  Date: May 1, 2023  Preferred Name: Paula    Previous PHQ-9:       2/6/2023    10:00 AM 3/20/2023    12:52 PM 5/1/2023    10:37 AM   PHQ-9 SCORE   PHQ-9 Total Score MyChart  5 (Mild depression) 6 (Mild depression)   PHQ-9 Total Score 21 5 6     Previous LIBRA-7:       2/6/2023    10:00 AM 3/20/2023    12:53 PM 5/1/2023    10:38 AM   LIBRA-7 SCORE   Total Score  13 (moderate anxiety) 13 (moderate anxiety)   Total Score 10 13 13    13     ARNALDO LEVEL:       View : No data to display.                Preferred Contact:  Need for : No  Preferred Contact: Cell      Type of Contact Today: Phone call (not reached/unavailable)          Data: (subjective / Objective):  Attempted to reach patient for scheduled check in, but was unsuccessful. [facility] left a message requesting a call back to reschedule.  Plan to attempt again.      DENI Humphries  Behavioral Health [place] ([facility])   [facility]  [phone]

## 2023-05-01 NOTE — PATIENT INSTRUCTIONS
"Patient Education   The Panel Psychiatry Program  What to Expect  Here's what to expect in the Panel Psychiatry Program.   About the program  You'll be meeting with a psychiatric doctor to check your mental health. A psychiatric doctor helps you deal with troubling thoughts and feelings by giving you medicine. They'll make sure you know the plan for your care. You may see them for a long time. When you're feeling better, they may refer you back to seeing your family doctor.   If you have any questions, we'll be glad to talk to you.  About visits  Be open  At your visits, please talk openly about your problems. It may feel hard, but it's the best way for us to help you.  Cancelling visits  If you can't come to your visit, please call us right away at [phone]. If you don't cancel at least 24 hours (1 full day) before your visit, that's \"late cancellation.\"  Not showing up for your visits  Being very late is the same as not showing up. You'll be a \"no show\" if:  You're more than 15 minutes late for a 30-minute (half hour) visit.  You're more than 30 minutes late for a 60-minute (full hour) visit.  If you cancel late or don't show up 2 times within 6 months, we may end your care.  Getting help between visits  If you need help between visits, you can call us Monday to Friday from 8 a.m. to 4:30 p.m. at [phone].  Emergency care  Call 911 or go to the nearest emergency department if your life or someone else's life is in danger.  Call 988 anytime to reach the national Suicide and Crisis hotline.  Medicine refills  To refill your medicine, call your pharmacy. You can also call [facility]'s [facility] at [phone], Monday to Friday, 8 a.m. to 4:30 p.m. It can take 1 to 3 business days to get a refill.   Forms, letters, and tests  You may have papers to fill out, like FMLA, short-term disability, and workability. We can help you with these forms at your visits, but you must have an " appointment. You may need more than 1 visit for this, to be in an intensive therapy program, or both.  Before we can give you medicine for ADHD, we may refer you to get tested for it or confirm it another way.  We may not be able to give you an emotional support animal letter.  We don't do mental health checks ordered by the court.   We don't do mental health testing, but we can refer you to get tested.   Thank you for choosing us for your care.  For informational purposes only. Not to replace the advice of your health care provider. Copyright   2022 [facility]. All rights reserved. [employer] 312903 - 12/22.      1.  Increase mirtazapine to 30 mg at bedtime  2.  Adderall 15 mg twice daily  3.  Fluoxetine 40 mg twice daily  4.  Wellbutrin  mg twice daily  5.  Abilify 5 mg daily  6.  Gabapentin 600 mg at bedtime    Continue all other medications as reviewed per electronic medical record today.   Safety plan reviewed. To the Emergency Department as needed or call after hours crisis line at [phone] or [phone]. [place] Crisis Text Line. Text [place] to 074481 or Suicide LifeLine Chat: suicidepreventionlifeline.org/chat/  To schedule individual or family therapy, call [facility] at [phone]  Schedule an appointment with me in 2 months or sooner as needed. Call [facility] at [phone] to schedule.  Follow up with primary care provider as planned or for acute medical concerns.  Call the psychiatric nurse line with medication questions or concerns at [phone]  MyChart may be used to communicate with your provider, but this is not intended to be used for emergencies.    Crisis Resources:    National Suicide Prevention Lifeline: 363.850.6235 (TTY: 129.474.7860). Call anytime for help.  (www.suicidepreventionlifeline.org)  National [place] on [place]ness (www.raudel.org): 270.783.1473 or 528-591-9340.   Mental Health Association (www.mentalhealth.org):  288-739-7271 or 099-522-1416.  [place] Crisis Text Line: Text [place] to 600471  Suicide LifeLine Chat: suicidepreventionlifeline.org/chat

## 2023-05-04 ENCOUNTER — TELEPHONE (OUTPATIENT)
Dept: PSYCHOLOGY | Facility: CLINIC | Age: 63
End: 2023-05-04
Payer: MEDICARE

## 2023-05-04 NOTE — TELEPHONE ENCOUNTER
Patient did not arrive for the [facility] visit as of 11:22Am. Provider called patient and left two messages to call my office number, to reschedule an appointment with me. I also let the patient know they can call Behavioral Access as well to reschedule. Provider will reach out to patient on Friday, 05/05 to check in with the patient.

## 2023-05-10 ENCOUNTER — VIRTUAL VISIT (OUTPATIENT)
Dept: PSYCHOLOGY | Facility: CLINIC | Age: 63
End: 2023-05-10
Payer: MEDICARE

## 2023-05-10 DIAGNOSIS — F33.1 MODERATE EPISODE OF RECURRENT MAJOR DEPRESSIVE DISORDER (H): Primary | ICD-10-CM

## 2023-05-10 PROCEDURE — 90834 PSYTX W PT 45 MINUTES: CPT | Mod: 95

## 2023-05-10 ASSESSMENT — PATIENT HEALTH QUESTIONNAIRE - PHQ9: SUM OF ALL RESPONSES TO PHQ QUESTIONS 1-9: 9

## 2023-05-10 NOTE — PROGRESS NOTES
"    [facility]                                     Progress Note    Patient Name: Paula Ho  Date: 5/10/23         Service Type: Individual    Session Start Time: 3:33 PM Session End Time: 04:20 PM      Session Length: 47  Minutes     Session #: 17    Attendees: Client attended alone    Service Modality:  Phone Visit:      Provider verified identity through the following two step process.  Patient provided:  Patient  and Patient is known previously to provider    Telephone Visit: The patient's condition can be safely assessed and treated via synchronous audio telemedicine encounter.      Reason for Audio Telemedicine Visit: Patient has requested telehealth visit    Originating Site (Patient Location): Patient's home    Distant Site (Provider Location): Provider Remote Setting- Home Office    Consent:  The patient/guardian has verbally consented to:     1. The potential risks and benefits of telemedicine (telephone visit) versus in person care;    The patient has been notified of the following:      \"We have found that certain health care needs can be provided without the need for a face to face visit.  This service lets us provide the care you need with a phone conversation.       I will have full access to your [facility] medical record during this entire phone call.   I will be taking notes for your medical record.      Since this is like an office visit, we will bill your insurance company for this service.       There are potential benefits and risks of telephone visits (e.g. limits to patient confidentiality) that differ from in-person visits.?Confidentiality still applies for telephone services, and nobody will record the visit.  It is important to be in a quiet, private space that is free of distractions (including cell phone or other devices) during the visit.??      If during the course of the call I believe a telephone visit is not appropriate, you will not be charged for " "this service\"     Consent has been obtained for this service by care team member: Yes     DATA  Interactive Complexity: No  Crisis: No        Progress Since Last Session (Related to Symptoms / Goals / Homework):   Symptoms: No change Patient reports continued stress     Homework: Did not complete      Episode of Care Goals: Minimal progress - CONTEMPLATION (Considering change and yet undecided); Intervened by assessing the negative and positive thinking (ambivalence) about behavior change     Current / Ongoing Stressors and Concerns:   Patient reports continues to report severe back pain.  She continues to want to go through back surgery, but recognizes that she needs to quit smoking before that is possible.  Patient also reports increased stress and concern for her mother who recently had surgery and will have another surgery in the next coming week.  Patient reports feeling concerned for her mother's current health.  Patient also reports concerns in regards to her [employer] worker.      Treatment Objective(s) Addressed in This Session:   Increase interest, engagement, and pleasure in doing things  Decrease frequency and intensity of feeling down, depressed, hopeless  Identify negative self-talk and behaviors: challenge core beliefs, myths, and actions  Treatment plan review  Health concerns     Intervention:   Client Centered:  Provider also discussed with the patient her concerns in regards to her health issues.  Patient and provider also reviewed patient's treatment plan and work on goals for treatment.    Motivational Interviewing     MI Intervention: Expressed Empathy/Understanding, Supported Autonomy, Collaboration, Evocation, Permission to raise concern or advise, Open-ended questions and Reframe     Change Talk Expressed by the Patient: Reasons to change Need to change Taking steps    Provider Response to Change Talk: E - Evoked more info from patient about behavior change, A - Affirmed patient's thoughts, " decisions, or attempts at behavior change and R - Reflected patient's change talk       ASSESSMENT: Current Emotional / Mental Status (status of significant symptoms):   Risk status (Self / Other harm or suicidal ideation)   Patient denies current fears or concerns for personal safety.   Patient denies current or recent suicidal ideation or behaviors.   Patient denies current or recent homicidal ideation or behaviors.   Patient denies current or recent self injurious behavior or ideation.   Patient denies other safety concerns.   Patient reports there has been no change in risk factors since their last session.     Patient reports there has been no change in protective factors since their last session.     There are no firearms in the house.     Appearance:   NA    Eye Contact:   NA    Psychomotor Behavior: Normal    Attitude:   Cooperative  Attentive   Orientation:   All   Speech    Rate / Production: Normal     Volume:  Normal    Mood:    Normal Agitated    Affect:    NA    Thought Content:  Clear    Thought Form:  Coherent  Logical    Insight:    Good      Medication Review:   No changes to current psychiatric medication(s)     Medication Compliance:   Yes     Changes in Health Issues:   Yes: Pain, No Psychological Distress  Patient reports increased pain in her back     Chemical Use Review:   Substance Use: Chemical use reviewed, no active concerns identified      Tobacco Use: No change in amount of tobacco use since last session.  Provided encouragement to quit     Diagnosis:  Moderate Episode of Recurrent Major Depressive Disorder (F33.1)    Collateral Reports Completed:   Not Applicable    PLAN: (Patient Tasks / Therapist Tasks / Other)  Provider encouraged patient to work on mindfulness activities to aid in managing her anxiety and stress. Provided encouraged the patient to reach out to her doctor in regards to her back pain.    BECKY MANRIQUEZ    This note has been reviewed and I agree with the plan of  care. This note is co-signed by JASON Bang, [facility], Supervisor, on: May 12, 2023  ______________________________________________________________________    Individual Treatment Plan    Patient's Name: Paula Ho  YOB: 1960    Date of Creation: 11/7/22  Date Treatment Plan Last Reviewed/Revised: 05/10/23    DSM5 Diagnoses: 296.32 (F33.1) Major Depressive Disorder, Recurrent Episode, Moderate _  Psychosocial / Contextual Factors: Patient has physical medical concerns that impact her current depressive symptoms.  PROMIS (reviewed every 90 days): 16    Referral / Collaboration:  Referral to another professional/service is not indicated at this time..    Anticipated number of session for this episode of care: 17+  Anticipation frequency of session: Biweekly  Anticipated Duration of each session: 38-52 minutes  Treatment plan will be reviewed in 90 days or when goals have been changed.       MeasurableTreatment Goal(s) related to diagnosis / functional impairment(s)  Goal 1: Patient will develop healthy thinking patterns and beliefs about self, others, and the world.       Objective #A (Patient Action)    Patient will Identify negative self-talk and behaviors: challenge core beliefs, myths, and actions.  Status: Continued - Date(s):05/10/23     Intervention(s)  Therapist will teach how to identify negative self-talk and behaviors.    Objective #B  Patient will Improve concentration, focus, and mindfulness in daily activities .  Status: Continued - Date(s):05/10/23     Intervention(s)  Therapist will teach how to improve concentration, focus, and mindfulness..      Goal 2: Patient will alleviate depressive symptoms in order to return to a previous level of functioning. As evidenced by a PHQ-9 score of 9 or less.       Objective # A  Status: Continued - Date(s):05/10/23     Patient will identify at least 4 adaptive coping statements to counteract negative  thoughts    Intervention(s)  Therapist will teach adaptive coping statements to conteract negative thoughts.    Objective #B  Patient will Increase interest, engagement, and pleasure in doing things.    Status: Continued - Date(s):05/10/23     Intervention(s)  Therapist will assign homework related to increasing engagement and interest in doing things.        Patient has reviewed and agreed to the above plan.      BECKY MANRIQUEZ  May 10, 2023    This note has been reviewed and I agree with the plan of care. This note is co-signed by JASON Bang, [facility], Supervisor, on: November 8, 2022

## 2023-05-19 ENCOUNTER — TELEPHONE (OUTPATIENT)
Dept: ONCOLOGY | Facility: CLINIC | Age: 63
End: 2023-05-19

## 2023-05-19 ENCOUNTER — PRE VISIT (OUTPATIENT)
Dept: ONCOLOGY | Facility: CLINIC | Age: 63
End: 2023-05-19

## 2023-05-19 ENCOUNTER — ONCOLOGY VISIT (OUTPATIENT)
Dept: ONCOLOGY | Facility: CLINIC | Age: 63
End: 2023-05-19
Attending: NURSE PRACTITIONER
Payer: MEDICARE

## 2023-05-19 VITALS
OXYGEN SATURATION: 96 % | TEMPERATURE: 97.7 F | DIASTOLIC BLOOD PRESSURE: 84 MMHG | WEIGHT: 269.5 LBS | BODY MASS INDEX: 44.85 KG/M2 | SYSTOLIC BLOOD PRESSURE: 150 MMHG | RESPIRATION RATE: 18 BRPM | HEART RATE: 69 BPM

## 2023-05-19 DIAGNOSIS — Z11.59 ENCOUNTER FOR SCREENING FOR OTHER VIRAL DISEASES: ICD-10-CM

## 2023-05-19 DIAGNOSIS — D69.6 PLATELETS DECREASED (H): Primary | ICD-10-CM

## 2023-05-19 DIAGNOSIS — R63.39 OTHER FEEDING DIFFICULTIES: ICD-10-CM

## 2023-05-19 DIAGNOSIS — R23.3 EASY BRUISING: ICD-10-CM

## 2023-05-19 LAB
BASOPHILS # BLD AUTO: 0 10E3/UL (ref 0–0.2)
BASOPHILS NFR BLD AUTO: 0 %
CRP SERPL-MCNC: 8 MG/L (ref 0–8)
EOSINOPHIL # BLD AUTO: 0.3 10E3/UL (ref 0–0.7)
EOSINOPHIL NFR BLD AUTO: 3 %
ERYTHROCYTE [DISTWIDTH] IN BLOOD BY AUTOMATED COUNT: 12.9 % (ref 10–15)
ERYTHROCYTE [SEDIMENTATION RATE] IN BLOOD BY WESTERGREN METHOD: 22 MM/HR (ref 0–30)
HCT VFR BLD AUTO: 40.6 % (ref 35–47)
HGB BLD-MCNC: 13.5 G/DL (ref 11.7–15.7)
IMM GRANULOCYTES # BLD: 0 10E3/UL
IMM GRANULOCYTES NFR BLD: 0 %
LYMPHOCYTES # BLD AUTO: 3.4 10E3/UL (ref 0.8–5.3)
LYMPHOCYTES NFR BLD AUTO: 40 %
MCH RBC QN AUTO: 31.1 PG (ref 26.5–33)
MCHC RBC AUTO-ENTMCNC: 33.3 G/DL (ref 31.5–36.5)
MCV RBC AUTO: 94 FL (ref 78–100)
MONOCYTES # BLD AUTO: 0.5 10E3/UL (ref 0–1.3)
MONOCYTES NFR BLD AUTO: 6 %
NEUTROPHILS # BLD AUTO: 4.2 10E3/UL (ref 1.6–8.3)
NEUTROPHILS NFR BLD AUTO: 51 %
NRBC # BLD AUTO: 0 10E3/UL
NRBC BLD AUTO-RTO: 0 /100
PLATELET # BLD AUTO: 346 10E3/UL (ref 150–450)
RBC # BLD AUTO: 4.34 10E6/UL (ref 3.8–5.2)
RETICS # AUTO: 0.07 10E6/UL (ref 0.03–0.1)
RETICS/RBC NFR AUTO: 1.7 % (ref 0.5–2)
VIT B12 SERPL-MCNC: 213 PG/ML (ref 232–1245)
WBC # BLD AUTO: 8.4 10E3/UL (ref 4–11)

## 2023-05-19 PROCEDURE — 99204 OFFICE O/P NEW MOD 45 MIN: CPT | Performed by: INTERNAL MEDICINE

## 2023-05-19 PROCEDURE — 87522 HEPATITIS C REVRS TRNSCRPJ: CPT | Performed by: INTERNAL MEDICINE

## 2023-05-19 PROCEDURE — 82607 VITAMIN B-12: CPT | Performed by: INTERNAL MEDICINE

## 2023-05-19 PROCEDURE — 82525 ASSAY OF COPPER: CPT | Performed by: INTERNAL MEDICINE

## 2023-05-19 PROCEDURE — 36415 COLL VENOUS BLD VENIPUNCTURE: CPT | Performed by: INTERNAL MEDICINE

## 2023-05-19 PROCEDURE — 86200 CCP ANTIBODY: CPT | Performed by: INTERNAL MEDICINE

## 2023-05-19 PROCEDURE — 85025 COMPLETE CBC W/AUTO DIFF WBC: CPT | Performed by: INTERNAL MEDICINE

## 2023-05-19 PROCEDURE — 82747 ASSAY OF FOLIC ACID RBC: CPT | Performed by: INTERNAL MEDICINE

## 2023-05-19 PROCEDURE — 86140 C-REACTIVE PROTEIN: CPT | Performed by: INTERNAL MEDICINE

## 2023-05-19 PROCEDURE — 86803 HEPATITIS C AB TEST: CPT | Performed by: INTERNAL MEDICINE

## 2023-05-19 PROCEDURE — 86038 ANTINUCLEAR ANTIBODIES: CPT | Performed by: INTERNAL MEDICINE

## 2023-05-19 PROCEDURE — 85045 AUTOMATED RETICULOCYTE COUNT: CPT | Performed by: INTERNAL MEDICINE

## 2023-05-19 PROCEDURE — 99000 SPECIMEN HANDLING OFFICE-LAB: CPT | Performed by: INTERNAL MEDICINE

## 2023-05-19 PROCEDURE — 86039 ANTINUCLEAR ANTIBODIES (ANA): CPT | Performed by: INTERNAL MEDICINE

## 2023-05-19 PROCEDURE — 99207 BLOOD MORPHOLOGY PATHOLOGIST REVIEW: CPT | Performed by: PATHOLOGY

## 2023-05-19 PROCEDURE — 85652 RBC SED RATE AUTOMATED: CPT | Performed by: INTERNAL MEDICINE

## 2023-05-19 PROCEDURE — 87340 HEPATITIS B SURFACE AG IA: CPT | Performed by: INTERNAL MEDICINE

## 2023-05-19 PROCEDURE — 84630 ASSAY OF ZINC: CPT | Performed by: INTERNAL MEDICINE

## 2023-05-19 PROCEDURE — 86431 RHEUMATOID FACTOR QUANT: CPT | Performed by: INTERNAL MEDICINE

## 2023-05-19 PROCEDURE — 87389 HIV-1 AG W/HIV-1&-2 AB AG IA: CPT | Performed by: INTERNAL MEDICINE

## 2023-05-19 ASSESSMENT — PAIN SCALES - GENERAL: PAINLEVEL: EXTREME PAIN (8)
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
## 2023-05-19 NOTE — NURSING NOTE
"Oncology Rooming Note    May 19, 2023 1:03 PM   Paula Ho is a 62 year old female who presents for:    Chief Complaint   Patient presents with     Oncology Clinic Visit     New patient: Thrombocytopenia      Initial Vitals: BP (!) 150/84 (BP Location: Right arm, Patient Position: Sitting, Cuff Size: Adult Regular)   Pulse 69   Temp 97.7  F (36.5  C) (Oral)   Resp 18   Wt 122.2 kg (269 lb 8 oz)   LMP  (LMP Unknown)   SpO2 96%   BMI 44.85 kg/m   Estimated body mass index is 44.85 kg/m  as calculated from the following:    Height as of 1/18/23: 1.651 m (5' 5\").    Weight as of this encounter: 122.2 kg (269 lb 8 oz). Body surface area is 2.37 meters squared.  Extreme Pain (8) Comment: Data Unavailable   No LMP recorded (lmp unknown). Patient is postmenopausal.  Allergies reviewed: Yes  Medications reviewed: Yes, some.     Medications: Medication refills not needed today.  Pharmacy name entered into [employer]: [id number] - [facility] [place] - [id number] [address]    Clinical concerns: New patient. Reports low back pain 8/10-took oxycodone prior to visit in clinic.        Inge Ren LPN May 19, 2023 1:04 PM              "
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
## 2023-05-19 NOTE — TELEPHONE ENCOUNTER
Left a message for Paula following up to let her know we scheduled her 4 week follow up with Dr. Aldridge on Friday Jun 16th at 1:45 p.m. HB

## 2023-05-20 LAB
HBV SURFACE AG SERPL QL IA: NONREACTIVE
HCV AB SERPL QL IA: NONREACTIVE
HIV 1+2 AB+HIV1 P24 AG SERPL QL IA: NONREACTIVE

## 2023-05-21 LAB
COPPER SERPL-MCNC: 132.8 UG/DL
FOLATE RBC-MCNC: 253 NG/ML
ZINC SERPL-MCNC: 77.5 UG/DL

## 2023-05-22 ENCOUNTER — TELEPHONE (OUTPATIENT)
Dept: PSYCHOLOGY | Facility: CLINIC | Age: 63
End: 2023-05-22
Payer: MEDICARE

## 2023-05-22 DIAGNOSIS — E53.8 FOLATE DEFICIENCY: ICD-10-CM

## 2023-05-22 DIAGNOSIS — E53.8 VITAMIN B12 DEFICIENCY (NON ANEMIC): Primary | ICD-10-CM

## 2023-05-22 LAB
ANA PAT SER IF-IMP: ABNORMAL
ANA SER QL IF: ABNORMAL
ANA TITR SER IF: ABNORMAL {TITER}
HCV RNA SERPL NAA+PROBE-ACNC: NOT DETECTED IU/ML
PATH REPORT.COMMENTS IMP SPEC: NORMAL
PATH REPORT.FINAL DX SPEC: NORMAL
PATH REPORT.MICROSCOPIC SPEC OTHER STN: NORMAL
PATH REPORT.MICROSCOPIC SPEC OTHER STN: NORMAL
PATH REPORT.RELEVANT HX SPEC: NORMAL
RHEUMATOID FACT SER NEPH-ACNC: <7 IU/ML

## 2023-05-22 RX ORDER — FOLIC ACID 1 MG/1
1 TABLET ORAL DAILY
Qty: 90 TABLET | Refills: 3 | Status: SHIPPED | OUTPATIENT
Start: 2023-05-22 | End: 2023-08-18

## 2023-05-22 RX ORDER — CYANOCOBALAMIN 1000 UG/ML
INJECTION, SOLUTION INTRAMUSCULAR; SUBCUTANEOUS
Qty: 20 ML | Refills: 1 | Status: SHIPPED | OUTPATIENT
Start: 2023-05-22

## 2023-05-22 NOTE — TELEPHONE ENCOUNTER
Patient did not arrive for the [facility] visit as of 11:22Am. Provider called patient and left two messages to call my office number, to reschedule an appointment with me. I also let the patient know they can call [facility] as well to reschedule. Provider will reach out to patient on Tuesday, 05/23 to check in with the patient.

## 2023-05-25 LAB — CCP AB SER IA-ACNC: 1 U/ML

## 2023-05-26 ENCOUNTER — VIRTUAL VISIT (OUTPATIENT)
Dept: PSYCHOLOGY | Facility: CLINIC | Age: 63
End: 2023-05-26
Payer: MEDICARE

## 2023-05-26 ENCOUNTER — LAB (OUTPATIENT)
Dept: LAB | Facility: CLINIC | Age: 63
End: 2023-05-26
Payer: MEDICARE

## 2023-05-26 DIAGNOSIS — D69.6 PLATELETS DECREASED (H): ICD-10-CM

## 2023-05-26 DIAGNOSIS — F33.1 MODERATE EPISODE OF RECURRENT MAJOR DEPRESSIVE DISORDER (H): Primary | ICD-10-CM

## 2023-05-26 DIAGNOSIS — Z11.59 ENCOUNTER FOR SCREENING FOR OTHER VIRAL DISEASES: ICD-10-CM

## 2023-05-26 LAB
BASOPHILS # BLD AUTO: 0 10E3/UL (ref 0–0.2)
BASOPHILS NFR BLD AUTO: 0 %
EOSINOPHIL # BLD AUTO: 0.2 10E3/UL (ref 0–0.7)
EOSINOPHIL NFR BLD AUTO: 2 %
ERYTHROCYTE [DISTWIDTH] IN BLOOD BY AUTOMATED COUNT: 12.7 % (ref 10–15)
HCT VFR BLD AUTO: 43.8 % (ref 35–47)
HGB BLD-MCNC: 14.4 G/DL (ref 11.7–15.7)
IMM GRANULOCYTES # BLD: 0 10E3/UL
IMM GRANULOCYTES NFR BLD: 0 %
LYMPHOCYTES # BLD AUTO: 3 10E3/UL (ref 0.8–5.3)
LYMPHOCYTES NFR BLD AUTO: 32 %
MCH RBC QN AUTO: 30.9 PG (ref 26.5–33)
MCHC RBC AUTO-ENTMCNC: 32.9 G/DL (ref 31.5–36.5)
MCV RBC AUTO: 94 FL (ref 78–100)
MONOCYTES # BLD AUTO: 0.6 10E3/UL (ref 0–1.3)
MONOCYTES NFR BLD AUTO: 6 %
NEUTROPHILS # BLD AUTO: 5.4 10E3/UL (ref 1.6–8.3)
NEUTROPHILS NFR BLD AUTO: 60 %
NRBC # BLD AUTO: 0 10E3/UL
NRBC BLD AUTO-RTO: 0 /100
PLATELET # BLD AUTO: 376 10E3/UL (ref 150–450)
RBC # BLD AUTO: 4.66 10E6/UL (ref 3.8–5.2)
WBC # BLD AUTO: 9.3 10E3/UL (ref 4–11)

## 2023-05-26 PROCEDURE — 85025 COMPLETE CBC W/AUTO DIFF WBC: CPT

## 2023-05-26 PROCEDURE — 86704 HEP B CORE ANTIBODY TOTAL: CPT

## 2023-05-26 PROCEDURE — 36415 COLL VENOUS BLD VENIPUNCTURE: CPT

## 2023-05-26 PROCEDURE — 99000 SPECIMEN HANDLING OFFICE-LAB: CPT | Performed by: INTERNAL MEDICINE

## 2023-05-26 PROCEDURE — 90834 PSYTX W PT 45 MINUTES: CPT | Mod: 95

## 2023-05-26 PROCEDURE — 86340 INTRINSIC FACTOR ANTIBODY: CPT | Mod: 90 | Performed by: INTERNAL MEDICINE

## 2023-05-26 PROCEDURE — 86706 HEP B SURFACE ANTIBODY: CPT

## 2023-05-26 PROCEDURE — 83516 IMMUNOASSAY NONANTIBODY: CPT | Mod: 90 | Performed by: INTERNAL MEDICINE

## 2023-05-27 LAB
HBV CORE AB SERPL QL IA: NONREACTIVE
HBV SURFACE AB SERPL IA-ACNC: 0 M[IU]/ML
HBV SURFACE AB SERPL IA-ACNC: NONREACTIVE M[IU]/ML

## 2023-05-28 LAB — IF BLOCK AB SER QL RIA: NEGATIVE

## 2023-05-29 LAB — PCA IGG SER-ACNC: 2.3 UNITS

## 2023-05-30 ENCOUNTER — TELEPHONE (OUTPATIENT)
Dept: ENDOCRINOLOGY | Facility: CLINIC | Age: 63
End: 2023-05-30
Payer: MEDICARE

## 2023-05-30 NOTE — TELEPHONE ENCOUNTER
Communication Summary: Spoke to patient and rescheduled appointment    Appointment type: Return Endocrine  Provider: Dr. Martinez  Return date: 10/03/2023  Speciality phone number: [phone]  Additional appointment(s) needed: N/A  Additional notes: N/A    Sebas Samuels on 5/30/2023 at 11:25 AM

## 2023-05-31 ENCOUNTER — VIRTUAL VISIT (OUTPATIENT)
Dept: BEHAVIORAL HEALTH | Facility: CLINIC | Age: 63
End: 2023-05-31
Payer: MEDICARE

## 2023-05-31 DIAGNOSIS — R69 DIAGNOSIS DEFERRED: Primary | ICD-10-CM

## 2023-05-31 NOTE — PROGRESS NOTES
"Behavioral Health Home Services  [facility]: [place]        Social Work Care Navigator Note      Patient: Paula Ho  Date: May 31, 2023  Preferred Name: Paula    Previous PHQ-9:       3/20/2023    12:52 PM 5/1/2023    10:37 AM 5/10/2023     3:47 PM   PHQ-9 SCORE   PHQ-9 Total Score MyChart 5 (Mild depression) 6 (Mild depression)    PHQ-9 Total Score 5 6 9     Previous LIBRA-7:       2/6/2023    10:00 AM 3/20/2023    12:53 PM 5/1/2023    10:38 AM   LIBRA-7 SCORE   Total Score  13 (moderate anxiety) 13 (moderate anxiety)   Total Score 10 13 13    13    13     ARNALDO LEVEL:       View : No data to display.                Preferred Contact:  Need for : No  Preferred Contact: Cell      Type of Contact Today: Phone call (patient / identified key support person reached)    Service Modality: Phone Visit:      Provider verified identity through the following two step process.  Patient provided:  Patient is known previously to provider    Telephone Visit: The patient's condition can be safely assessed and treated via synchronous audio telemedicine encounter.      Reason for Audio Telemedicine Visit: Services only offered telehealth    Originating Site (Patient Location): Patient's home    Distant Site (Provider Location): Provider Remote Setting- Home Office    Consent:  The patient/guardian has verbally consented to:     1. The potential risks and benefits of telemedicine (telephone visit) versus in person care;    The patient has been notified of the following:      \"We have found that certain health care needs can be provided without the need for a face to face visit.  This service lets us provide the care you need with a phone conversation.       I will have full access to your [facility] medical record during this entire phone call.   I will be taking notes for your medical record.      Since this is like an office visit, we will bill your insurance company for this service.       There are potential " "benefits and risks of telephone visits (e.g. limits to patient confidentiality) that differ from in-person visits.?Confidentiality still applies for telephone services, and nobody will record the visit.  It is important to be in a quiet, private space that is free of distractions (including cell phone or other devices) during the visit.??      If during the course of the call I believe a telephone visit is not appropriate, you will not be charged for this service\"     Consent has been obtained for this service by care team member: Yes       Data: (subjective / Objective):  Recent ED/IP Admission or Discharge?   None    Patient Goals:  Goal Areas: Health; Mental Health; Future HAP Goal area; Chemical Health  Patient stated goals: Health: Paula would like to continue to meet with her providers, and take her medications as prescribed. Paula would also like assistance with improving her back pain, and receive pool therapy, as well as possibly surgery.  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   Chemical Health: Paula would like assistance from her providers to quit smoking and will work on reducing her use, so she can be approved for back surgery.   Mental Health: Paula would like to find a long term therapy provider through [facility], and continue to receive assistance with [facility] for monthly check ins. She would also like to start working with an [facility] worker again.  She reported that she wants to build herself back to feel like somebody and take it day by day.   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 She would like to receive home delivered meals if she does open.        [facility] Core Service Provided:  Comprehensive Care Management: utilized the electronic medical record / patient registry to identify and support patient's health conditions " / needs more effectively   Care Transitions: focused on the coordinated and seamless movement of patient between or within different levels of care or settings  Care Coordination: provided care management services/referrals necessary to ensure patient and their identified supports have access to medical, behavioral health, pharmacology and recovery support services.  Ensured that patient's care is integrated across all settings and services.   Individual and Family Support: aimed to help clients reduce barriers to achieving goals, increase health literacy and knowledge about chronic condition(s), increase self-efficacy skills, and improve health outcomes    Current Stressors / Issues / Care Plan Objective Addressed Today:  SWCC and patient were able to meet today for Behavioral Health Home ([facility]) monthly check-in via telehealth visit. All required ROIs have been filed with HIM/patient chart.    Health: Paula would like to continue to meet with her providers, and take her medications as prescribed. Paula would also like assistance with improving her back pain, and receive pool therapy, as well as possibly surgery.  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       Patient reported that she had a good birthday a couple days ago and it was relaxing. She reports that she did end up getting a jacuzzi, so she used this on her birthday.     Patient reports that she's been in severe pain on some days, and she reports it's hurt to walk. She is going to have xray's on her back and hips and talk with her providers about her hips to see what they can do to help the pain. Patient reported that she hasn't been doing pool therapy, but she wants to go back. She feels that she's been too busy and needed to stop going for a little while. Patient reported they were able to increase her pain medicine a little bit.     Patient reported that she did look into membership at the  same place and she's able to get one there with her [employer].     Chemical Health: Paula would like assistance from her providers to quit smoking and will work on reducing her use, so she can be approved for back surgery.       Patient reported that she's not had much progress on her smoking, and she's still at about a pack a day. She does notice that she's not been smoking a whole pack, so it's been a little better.     Mental Health: Paula would like to find a long term therapy provider through [employer], and continue to receive assistance with [facility] for monthly check ins. She would also like to start working with an [employer] worker again.  She reported that she wants to build herself back to feel like somebody and take it day by day.       Patient reports that her mood has been up and down, and a lot of this is related to her niece living with her. She also has been very affected by her pain and the limitations.      Patient reported that she is supposed to see her [employer] worker today, but she doesn't think that she wants to meet with her today. She is also trying to figure out how she feels about the worker still because she's not sure how she meshes with this worker yet.     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 She would like to receive home delivered meals if she does open.    Patient reports her care coordinator is coming out on Friday for her initial assessment. She thinks this is from her insurance.     Patient reported that she needs to get a new battery for her scooter because the battery is no longer working. She is going to go to a supply store and see how much it's going to cost her to get it replaced.     Patient reports that she is still working on paying her car payment and she's almost done with the loan, so she's continuing to weigh her options for what bills she has coming up.      Intervention:  Motivational Interviewing: Expressed Empathy/Understanding, Supported Autonomy, Collaboration, Evocation, Permission to raise concern or advise, Open-ended questions and Reflections: simple and complex   Target Behavior(s): Explored thoughts about taking an anti-depressant, Explored and resolved challenges related to taking anti-depressants as prescribed, Explored thoughts and readiness to participate in individual therapy, Explored and resolved challenges to attending appointments as scheduled and Explored current social supports and reinforced opportunities to increase engagement    Assessment: (Progress on Goals / Homework):  Patient would benefit from continued coordination in reaching their goals set for the Behavioral Health Home ([facility]) program. SWCC reviewed Health Action Plan goals and will continue to monitor progress and work with patient and their care team.    Plan: (Homework, other):  Patient was encouraged to continue to seek condition-related information and education.      Scheduled a Phone follow up appointment with MISA RICE in 3 weeks     Patient has set self-identified goals and will monitor progress until the next appointment on: 6/21/23.      DENI Humphries  [facility] [place] ([facility])   [facility]  [phone]

## 2023-05-31 NOTE — PROGRESS NOTES
"    [facility] Counseling                                     Progress Note    Patient Name: Paula Ho  Date: 23         Service Type: Individual    Session Start Time: 11:02 AM Session End Time: 11:53 AM      Session Length: 51 Minutes     Session #: 19    Attendees: Client attended alone    Service Modality:  Phone Visit:      Provider verified identity through the following two step process.  Patient provided:  Patient  and Patient is known previously to provider    Telephone Visit: The patient's condition can be safely assessed and treated via synchronous audio telemedicine encounter.      Reason for Audio Telemedicine Visit: Patient has requested telehealth visit    Originating Site (Patient Location): Patient's home    Distant Site (Provider Location): Provider Remote Setting- Home Office    Consent:  The patient/guardian has verbally consented to:     1. The potential risks and benefits of telemedicine (telephone visit) versus in person care;    The patient has been notified of the following:      \"We have found that certain health care needs can be provided without the need for a face to face visit.  This service lets us provide the care you need with a phone conversation.       I will have full access to your [facility] medical record during this entire phone call.   I will be taking notes for your medical record.      Since this is like an office visit, we will bill your insurance company for this service.       There are potential benefits and risks of telephone visits (e.g. limits to patient confidentiality) that differ from in-person visits.?Confidentiality still applies for telephone services, and nobody will record the visit.  It is important to be in a quiet, private space that is free of distractions (including cell phone or other devices) during the visit.??      If during the course of the call I believe a telephone visit is not appropriate, you will not be charged for " "this service\"     Consent has been obtained for this service by care team member: Yes     DATA  Interactive Complexity: No  Crisis: No        Progress Since Last Session (Related to Symptoms / Goals / Homework):   Symptoms: No change Patient reports continued stress     Homework: Did not complete      Episode of Care Goals: Minimal progress - CONTEMPLATION (Considering change and yet undecided); Intervened by assessing the negative and positive thinking (ambivalence) about behavior change     Current / Ongoing Stressors and Concerns:   Patient reports recently getting blood work done due to possible injections she is to receive in the future.  She states she has learned that her white platelets are low and this may be a factor of why she has been sleepy.  Patient also continues to report being in a lot of pain with her back.  Patient also reports looking forward to going through x-rays and MRIs on her back for a 1 year checkup.  Patient also reports her birthday coming up and not knowing what to do.  Paula continues to report stress in regards to her niece living with her.    Treatment Objective(s) Addressed in This Session:   Increase interest, engagement, and pleasure in doing things  Decrease frequency and intensity of feeling down, depressed, hopeless  Identify negative self-talk and behaviors: challenge core beliefs, myths, and actions  Relational Concerns  Effective Communication  Health concerns     Intervention:   Client Centered:  Provider also discussed with the patient her concerns in regards to her health issues.  Patient and provider also discussed patient's concerns regarding her niece.    Motivational Interviewing     MI Intervention: Expressed Empathy/Understanding, Supported Autonomy, Collaboration, Evocation, Permission to raise concern or advise, Open-ended questions and Reframe     Change Talk Expressed by the Patient: Reasons to change Need to change Taking steps    Provider Response to Change " Talk: E - Evoked more info from patient about behavior change, A - Affirmed patient's thoughts, decisions, or attempts at behavior change and R - Reflected patient's change talk       ASSESSMENT: Current Emotional / Mental Status (status of significant symptoms):   Risk status (Self / Other harm or suicidal ideation)   Patient denies current fears or concerns for personal safety.   Patient denies current or recent suicidal ideation or behaviors.   Patient denies current or recent homicidal ideation or behaviors.   Patient denies current or recent self injurious behavior or ideation.   Patient denies other safety concerns.   Patient reports there has been no change in risk factors since their last session.     Patient reports there has been no change in protective factors since their last session.     There are no firearms in the house.     Appearance:   NA    Eye Contact:   NA    Psychomotor Behavior: Normal    Attitude:   Cooperative  Interested Attentive   Orientation:   All   Speech    Rate / Production: Normal     Volume:  Normal    Mood:    Normal Agitated    Affect:    NA    Thought Content:  Clear    Thought Form:  Coherent  Logical    Insight:    Good      Medication Review:   No changes to current psychiatric medication(s)     Medication Compliance:   Yes     Changes in Health Issues:   Yes: Pain, No Psychological Distress  Patient reports increased pain in her back     Chemical Use Review:   Substance Use: Chemical use reviewed, no active concerns identified      Tobacco Use: No change in amount of tobacco use since last session.  Provided encouragement to quit     Diagnosis:  Moderate Episode of Recurrent Major Depressive Disorder (F33.1)    Collateral Reports Completed:   Not Applicable    PLAN: (Patient Tasks / Therapist Tasks / Other)  Provider encouraged patient to work on mindfulness activities to aid in managing her anxiety and stress. Provided encouraged the patient to reach out to her doctor in  regards to her back pain.  Patient was also encouraged to have a discussion with her niece about her expectations with her living with her.    BECKY MANRIQUEZ    This note has been reviewed and I agree with the plan of care. This note is co-signed by JASON Bang, [facility], Supervisor, on: June 1, 2023  ______________________________________________________________________    Individual Treatment Plan    Patient's Name: Paula Ho  YOB: 1960    Date of Creation: 11/7/22  Date Treatment Plan Last Reviewed/Revised: 05/10/23    DSM5 Diagnoses: 296.32 (F33.1) Major Depressive Disorder, Recurrent Episode, Moderate _  Psychosocial / Contextual Factors: Patient has physical medical concerns that impact her current depressive symptoms.  PROMIS (reviewed every 90 days): 16    Referral / Collaboration:  Referral to another professional/service is not indicated at this time..    Anticipated number of session for this episode of care: 17+  Anticipation frequency of session: Biweekly  Anticipated Duration of each session: 38-52 minutes  Treatment plan will be reviewed in 90 days or when goals have been changed.       MeasurableTreatment Goal(s) related to diagnosis / functional impairment(s)  Goal 1: Patient will develop healthy thinking patterns and beliefs about self, others, and the world.       Objective #A (Patient Action)    Patient will Identify negative self-talk and behaviors: challenge core beliefs, myths, and actions.  Status: Continued - Date(s):05/10/23     Intervention(s)  Therapist will teach how to identify negative self-talk and behaviors.    Objective #B  Patient will Improve concentration, focus, and mindfulness in daily activities .  Status: Continued - Date(s):05/10/23     Intervention(s)  Therapist will teach how to improve concentration, focus, and mindfulness..      Goal 2: Patient will alleviate depressive symptoms in order to return to a previous level of functioning. As  evidenced by a PHQ-9 score of 9 or less.       Objective # A  Status: Continued - Date(s):05/10/23     Patient will identify at least 4 adaptive coping statements to counteract negative thoughts    Intervention(s)  Therapist will teach adaptive coping statements to conteract negative thoughts.    Objective #B  Patient will Increase interest, engagement, and pleasure in doing things.    Status: Continued - Date(s):05/10/23     Intervention(s)  Therapist will assign homework related to increasing engagement and interest in doing things.        Patient has reviewed and agreed to the above plan.      BECKY MANRIQUEZ  May 10, 2023    This note has been reviewed and I agree with the plan of care. This note is co-signed by JASON Bang, [facility], Supervisor, on:May 12, 2022

## 2023-06-02 ENCOUNTER — TELEPHONE (OUTPATIENT)
Dept: PSYCHOLOGY | Facility: CLINIC | Age: 63
End: 2023-06-02
Payer: MEDICARE

## 2023-06-02 NOTE — TELEPHONE ENCOUNTER
Patient did not arrive for the [facility] visit as of 10:22 AM Provider called patient and left two messages to call my office number, to reschedule an appointment with me. I also let the patient know they can call [facility] as well to reschedule. Provider will reach out to patient on Monday, 06/05 to check in with the patient.

## 2023-06-07 ENCOUNTER — TELEPHONE (OUTPATIENT)
Dept: FAMILY MEDICINE | Facility: CLINIC | Age: 63
End: 2023-06-07
Payer: MEDICARE

## 2023-06-07 NOTE — TELEPHONE ENCOUNTER
Left detailed message on patient's VM relaying provider's message below. Clinic call back number also left if patient has any further questions for our office.      Wanda Greenwood, ARENN, RN  [facility]

## 2023-06-07 NOTE — TELEPHONE ENCOUNTER
Medication Question or Refill    Contacts       Type Contact Phone/Fax    06/07/2023 01:09 PM CDT Phone (Incoming) Sirena Hoita KYLEE (Self) [phone] (M)     Ok to leave adetailed message          What medication are you calling about (include dose and sig)?:   New Medication Request    Contacts       Type Contact Phone/Fax    06/07/2023 01:09 PM CDT Phone (Incoming) Vic Paula PICHARDO (Self) [phone] (M)     Ok to leave adetailed message          What medication are you requesting?: Unsure    Reason for medication request: Upcoming    Have you taken this medication before?: No    Controlled Substance Agreement on file:   CSA -- Patient Level:    CSA: None found at the patient level.         Patient offered an appointment? No    [facility]erred Pharm[id number] [address] [address] [address]  Phone: [phone] Fax: [phone]      Okay to leave a detailed message?: Yes at Home number on file [phone] (home)      Preferred Pharmacy:   [id number] [address] [address] [address]  Phone: [phone] Fax: [phone]      Controlled Substance Agreement on file:   CSA -- Patient Level:    CSA: None found at the patient level.       Who prescribed the medication?: noone    Do you need a refill? Yes, No    When did you use the medication last? never    Patient offered an appointment? No    Do you have any questions or concerns?  Yes:       Okay to leave a detailed message?: Yes at Home number on file [phone] (home)

## 2023-06-09 ENCOUNTER — VIRTUAL VISIT (OUTPATIENT)
Dept: PSYCHOLOGY | Facility: CLINIC | Age: 63
End: 2023-06-09
Payer: MEDICARE

## 2023-06-09 DIAGNOSIS — F33.1 MODERATE EPISODE OF RECURRENT MAJOR DEPRESSIVE DISORDER (H): Primary | ICD-10-CM

## 2023-06-09 PROCEDURE — 90834 PSYTX W PT 45 MINUTES: CPT | Mod: VID

## 2023-06-12 NOTE — PROGRESS NOTES
"    [facility]                                     Progress Note    Patient Name: Paula Ho  Date: 23         Service Type: Individual    Session Start Time: 12:30 PM  Session End Time: 1:22 PM      Session Length: 52 Minutes     Session #: 20    Attendees: Client attended alone    Service Modality:  Phone Visit:      Provider verified identity through the following two step process.  Patient provided:  Patient  and Patient is known previously to provider    Telephone Visit: The patient's condition can be safely assessed and treated via synchronous audio telemedicine encounter.      Reason for Audio Telemedicine Visit: Patient has requested telehealth visit    Originating Site (Patient Location): Patient's home    Distant Site (Provider Location): Provider Remote Setting- Home Office    Consent:  The patient/guardian has verbally consented to:     1. The potential risks and benefits of telemedicine (telephone visit) versus in person care;    The patient has been notified of the following:      \"We have found that certain health care needs can be provided without the need for a face to face visit.  This service lets us provide the care you need with a phone conversation.       I will have full access to your [facility] medical record during this entire phone call.   I will be taking notes for your medical record.      Since this is like an office visit, we will bill your insurance company for this service.       There are potential benefits and risks of telephone visits (e.g. limits to patient confidentiality) that differ from in-person visits.?Confidentiality still applies for telephone services, and nobody will record the visit.  It is important to be in a quiet, private space that is free of distractions (including cell phone or other devices) during the visit.??      If during the course of the call I believe a telephone visit is not appropriate, you will not be charged for " "this service\"     Consent has been obtained for this service by care team member: Yes     DATA  Interactive Complexity: No  Crisis: No        Progress Since Last Session (Related to Symptoms / Goals / Homework):   Symptoms: No change Patient reports continued stress     Homework: Completed in session      Episode of Care Goals: Minimal progress - PREPARATION (Decided to change - considering how); Intervened by negotiating a change plan and determining options / strategies for behavior change, identifying triggers, exploring social supports, and working towards setting a date to begin behavior change     Current / Ongoing Stressors and Concerns:   Patient reports continued stress due to physical pain in her leg and back. Patient also states she has had more doctor's appointments and her anxiety has increased. She reports worrying about any upcoming MRI appointment due to the machine. Paula also states she has experienced poor sleep quantity and quality within the past week. The patient also continues to reports feeling tired and annoyed with her family members whom are staying with her. Paula also feels confused regarding her relationship with her partner.     Treatment Objective(s) Addressed in This Session:   identify their fears / thoughts that contribute to feeling anxious  Increase interest, engagement, and pleasure in doing things  Decrease frequency and intensity of feeling down, depressed, hopeless  Improve quantity and quality of night time sleep / decrease daytime naps  Identify negative self-talk and behaviors: challenge core beliefs, myths, and actions  Relational Concerns  Health concerns     Intervention:   Client Centered:  Provider also discussed with the patient her concerns in regards to her health issues.  Patient and provider also discussed the connection between patient's emotions and behaviors.    Motivational Interviewing     MI Intervention: Expressed Empathy/Understanding, Supported Autonomy, " Collaboration, Evocation, Permission to raise concern or advise, Open-ended questions and Reframe     Change Talk Expressed by the Patient: Reasons to change Need to change Taking steps    Provider Response to Change Talk: E - Evoked more info from patient about behavior change, A - Affirmed patient's thoughts, decisions, or attempts at behavior change and R - Reflected patient's change talk       ASSESSMENT: Current Emotional / Mental Status (status of significant symptoms):   Risk status (Self / Other harm or suicidal ideation)   Patient denies current fears or concerns for personal safety.    Patient denies current or recent suicidal ideation or behaviors.   Patient denies current or recent homicidal ideation or behaviors.   Patient denies current or recent self injurious behavior or ideation.   Patient denies other safety concerns.   Patient reports there has been no change in risk factors since their last session.     Patient reports there has been no change in protective factors since their last session.     There are no firearms in the house.     Appearance:   NA    Eye Contact:   NA    Psychomotor Behavior: Normal    Attitude:   Cooperative  Interested Attentive   Orientation:   All   Speech    Rate / Production: Normal     Volume:  Normal    Mood:    Normal    Affect:    Tearful Worrisome    Thought Content:  Clear    Thought Form:  Coherent  Logical    Insight:    Good      Medication Review:   No changes to current psychiatric medication(s)     Medication Compliance:   Yes     Changes in Health Issues:   Yes: Pain, Associated Psychological Distress  Patient reports increased pain in her back and leg      Chemical Use Review:   Substance Use: Chemical use reviewed, no active concerns identified      Tobacco Use: No change in amount of tobacco use since last session.  Provided encouragement to quit     Diagnosis:  Moderate Episode of Recurrent Major Depressive Disorder (F33.1)    Collateral Reports  Completed:   Not Applicable    PLAN: (Patient Tasks / Therapist Tasks / Other)  Provider encouraged patient to work on mindfulness activities to aid in managing her anxiety and stress. Provided also encouraged to continue looking at the feelings wheel and journal ng her feelings and motions.     BECKY MANRIQUEZ    This note has been reviewed and I agree with the plan of care. This note is co-signed by JASON Bang, [facility], Supervisor, on: June 12, 2023   ______________________________________________________________________    Individual Treatment Plan    Patient's Name: Paula Ho  YOB: 1960    Date of Creation: 11/7/22  Date Treatment Plan Last Reviewed/Revised: 05/10/23    DSM5 Diagnoses: 296.32 (F33.1) Major Depressive Disorder, Recurrent Episode, Moderate _  Psychosocial / Contextual Factors: Patient has physical medical concerns that impact her current depressive symptoms.  PROMIS (reviewed every 90 days): 16    Referral / Collaboration:  Referral to another professional/service is not indicated at this time..    Anticipated number of session for this episode of care: 17+  Anticipation frequency of session: Biweekly  Anticipated Duration of each session: 38-52 minutes  Treatment plan will be reviewed in 90 days or when goals have been changed.       MeasurableTreatment Goal(s) related to diagnosis / functional impairment(s)  Goal 1: Patient will develop healthy thinking patterns and beliefs about self, others, and the world.       Objective #A (Patient Action)    Patient will Identify negative self-talk and behaviors: challenge core beliefs, myths, and actions.  Status: Continued - Date(s):05/10/23     Intervention(s)  Therapist will teach how to identify negative self-talk and behaviors.    Objective #B  Patient will Improve concentration, focus, and mindfulness in daily activities .  Status: Continued - Date(s):05/10/23     Intervention(s)  Therapist will teach how to improve  concentration, focus, and mindfulness..      Goal 2: Patient will alleviate depressive symptoms in order to return to a previous level of functioning. As evidenced by a PHQ-9 score of 9 or less.       Objective # A  Status: Continued - Date(s):05/10/23     Patient will identify at least 4 adaptive coping statements to counteract negative thoughts    Intervention(s)  Therapist will teach adaptive coping statements to conteract negative thoughts.    Objective #B  Patient will Increase interest, engagement, and pleasure in doing things.    Status: Continued - Date(s):05/10/23     Intervention(s)  Therapist will assign homework related to increasing engagement and interest in doing things.        Patient has reviewed and agreed to the above plan.      BECKY MANRIQUEZ  May 10, 2023    This note has been reviewed and I agree with the plan of care. This note is co-signed by JASON Bang, [facility], Supervisor, on:May 12, 2022

## 2023-06-13 ENCOUNTER — TELEPHONE (OUTPATIENT)
Dept: PSYCHOLOGY | Facility: CLINIC | Age: 63
End: 2023-06-13
Payer: MEDICARE

## 2023-06-13 NOTE — TELEPHONE ENCOUNTER
Patient did not arrive for the [facility] visit as of 11:22Am. Provider called patient twice and was unable to leave a message for the patient. Provider will reach out to patient on Wednesday, 06/14 to check in with the patient.

## 2023-06-16 ENCOUNTER — ONCOLOGY VISIT (OUTPATIENT)
Dept: ONCOLOGY | Facility: CLINIC | Age: 63
End: 2023-06-16
Payer: MEDICARE

## 2023-06-16 VITALS
TEMPERATURE: 97.7 F | BODY MASS INDEX: 43.97 KG/M2 | DIASTOLIC BLOOD PRESSURE: 84 MMHG | OXYGEN SATURATION: 94 % | SYSTOLIC BLOOD PRESSURE: 122 MMHG | WEIGHT: 264.2 LBS | RESPIRATION RATE: 18 BRPM | HEART RATE: 70 BPM

## 2023-06-16 DIAGNOSIS — E53.8 FOLATE DEFICIENCY: ICD-10-CM

## 2023-06-16 DIAGNOSIS — D69.6 PLATELETS DECREASED (H): ICD-10-CM

## 2023-06-16 DIAGNOSIS — E53.8 VITAMIN B12 DEFICIENCY (NON ANEMIC): Primary | ICD-10-CM

## 2023-06-16 PROCEDURE — 99214 OFFICE O/P EST MOD 30 MIN: CPT | Performed by: INTERNAL MEDICINE

## 2023-06-16 ASSESSMENT — PAIN SCALES - GENERAL: PAINLEVEL: NO PAIN (0)
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
## 2023-06-21 ENCOUNTER — VIRTUAL VISIT (OUTPATIENT)
Dept: BEHAVIORAL HEALTH | Facility: CLINIC | Age: 63
End: 2023-06-21
Payer: MEDICARE

## 2023-06-21 DIAGNOSIS — R69 DIAGNOSIS DEFERRED: Primary | ICD-10-CM

## 2023-06-21 NOTE — PROGRESS NOTES
"Behavioral Health Home Services  [facility]: [place]        Social Work Care Navigator Note      Patient: Paula Ho  Date: June 21, 2023  Preferred Name: Paula    Previous PHQ-9:       3/20/2023    12:52 PM 5/1/2023    10:37 AM 5/10/2023     3:47 PM   PHQ-9 SCORE   PHQ-9 Total Score MyChart 5 (Mild depression) 6 (Mild depression)    PHQ-9 Total Score 5 6 9     Previous LIBRA-7:       2/6/2023    10:00 AM 3/20/2023    12:53 PM 5/1/2023    10:38 AM   LIBRA-7 SCORE   Total Score  13 (moderate anxiety) 13 (moderate anxiety)   Total Score 10 13 13    13    13     ARNALDO LEVEL:       No data to display                Preferred Contact:  Need for : No  Preferred Contact: Cell      Type of Contact Today: Phone call (patient / identified key support person reached)    Service Modality: Phone Visit:      Provider verified identity through the following two step process.  Patient provided:  Patient is known previously to provider    Telephone Visit: The patient's condition can be safely assessed and treated via synchronous audio telemedicine encounter.      Reason for Audio Telemedicine Visit: Services only offered telehealth    Originating Site (Patient Location): Patient's home    Distant Site (Provider Location): Provider Remote Setting- Home Office    Consent:  The patient/guardian has verbally consented to:     1. The potential risks and benefits of telemedicine (telephone visit) versus in person care;    The patient has been notified of the following:      \"We have found that certain health care needs can be provided without the need for a face to face visit.  This service lets us provide the care you need with a phone conversation.       I will have full access to your [facility] medical record during this entire phone call.   I will be taking notes for your medical record.      Since this is like an office visit, we will bill your insurance company for this service.       There are potential benefits " "and risks of telephone visits (e.g. limits to patient confidentiality) that differ from in-person visits.?Confidentiality still applies for telephone services, and nobody will record the visit.  It is important to be in a quiet, private space that is free of distractions (including cell phone or other devices) during the visit.??      If during the course of the call I believe a telephone visit is not appropriate, you will not be charged for this service\"     Consent has been obtained for this service by care team member: Yes       Data: (subjective / Objective):  Recent ED/IP Admission or Discharge?   None    Patient Goals:  Goal Areas: Health; Mental Health; Future HAP Goal area; Chemical Health  Patient stated goals: Health: Paula would like to continue to meet with her providers, and take her medications as prescribed. Paula would also like assistance with improving her back pain, and receive pool therapy, as well as possibly surgery.  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   Chemical Health: Paula would like assistance from her providers to quit smoking and will work on reducing her use, so she can be approved for back surgery.   Mental Health: Paula would like to find a long term therapy provider through [employer], and continue to receive assistance with [facility] for monthly check ins. She would also like to start working with an [facility] worker again.  She reported that she wants to build herself back to feel like somebody and take it day by day.   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 She would like to receive home delivered meals if she does open.        [facility] Core Service Provided:  Comprehensive Care Management: utilized the electronic medical record / patient registry to identify and support patient's health conditions / needs " more effectively   Care Transitions: focused on the coordinated and seamless movement of patient between or within different levels of care or settings  Care Coordination: provided care management services/referrals necessary to ensure patient and their identified supports have access to medical, behavioral health, pharmacology and recovery support services.  Ensured that patient's care is integrated across all settings and services.   Individual and Family Support: aimed to help clients reduce barriers to achieving goals, increase health literacy and knowledge about chronic condition(s), increase self-efficacy skills, and improve health outcomes    Current Stressors / Issues / Care Plan Objective Addressed Today:  SW[facility] and patient were able to meet today for Behavioral Health Home ([facility]) monthly check-in via telehealth visit. All required ROIs have been filed with HIM/patient chart.     Health: Paula would like to continue to meet with her providers, and take her medications as prescribed. Paula would also like assistance with improving her back pain, and receive pool therapy, as well as possibly surgery.  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     Patient reported she had her MRI and x-rays completed for the surgery, so she's waiting to hear about this and what they will decide for surgery. She also reports her back is hurting more today, and she thinks she slept wrong possibly. She reported that she hasn't been doing too much and has been sitting still. Some days, it takes her until 2pm to get going for the day due to the pain. Patient is still using her jacuzzi, and this has been helpful for her back pain as well.     Patient reports that she hasn't gotten a membership for the gym yet, but she's been busy last week and wasn't able to get to pool therapy. She also needs to get another referral for the pool therapy to continue this as  well.    Chemical Health: Paula would like assistance from her providers to quit smoking and will work on reducing her use, so she can be approved for back surgery.       Patient reports that her smoking has been better this week, but she's still not progressing too much. She reports that she's at about a half a pack per day right now and sometimes less.     Mental Health: Paula would like to find a long term therapy provider through [employer], and continue to receive assistance with [facility] for monthly check ins. She would also like to start working with an [facility] worker again.  She reported that she wants to build herself back to feel like somebody and take it day by day.       Patient reports that she missed her last appointment with her therapist, Fernanda, because her phone was off and she was having phone problems. [facility] gave her the phone number to get rescheduled.     Patient reports that her mood has been up and down, but it's been better this week than last week.     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 She would like to receive home delivered meals if she does open.    Other updates:   Patient reports she did get a new battery for her scooter, which she had to pay $175 for. She reported that she couldn't afford it, but she needed the new scooter. Her [facility]  tried looking into how to pay for it, but they couldn't get it covered.     Intervention:  Motivational Interviewing: Expressed Empathy/Understanding, Supported Autonomy, Collaboration, Evocation, Permission to raise concern or advise, Open-ended questions and Reflections: simple and complex   Target Behavior(s): Explored thoughts about taking an anti-depressant, Explored and resolved challenges related to taking anti-depressants as prescribed, Explored thoughts and readiness to participate in individual therapy, Explored and  resolved challenges to attending appointments as scheduled, Explored current social supports and reinforced opportunities to increase engagement and Explored patient's perception of how alcohol and / or drugs influences mood    Assessment: (Progress on Goals / Homework):  Patient would benefit from continued coordination in reaching their goals set for the Behavioral Health Home ([facility]) program. SW[facility] reviewed Health Action Plan goals and will continue to monitor progress and work with patient and their care team.    Plan: (Homework, other):  Patient was encouraged to continue to seek condition-related information and education.      Scheduled a Phone follow up appointment with MISA RICE in 4 weeks     Patient has set self-identified goals and will monitor progress until the next appointment on: 7/18/23.     DENI Humphries  Behavioral Health Home ([facility])   [facility]  [phone]

## 2023-06-27 ENCOUNTER — TELEPHONE (OUTPATIENT)
Dept: FAMILY MEDICINE | Facility: CLINIC | Age: 63
End: 2023-06-27
Payer: MEDICARE

## 2023-06-27 NOTE — TELEPHONE ENCOUNTER
Patient Quality Outreach    Patient is due for the following:   Breast Cancer Screening - Mammogram  Cervical Cancer Screening - PAP Needed  Depression  -  Depression follow-up visit  Physical Annual Wellness Visit    Type of outreach:    Sent letter.    Next Steps:  Reach out within 90 days via Letter.    Max number of attempts reached: Yes. Will try again in 90 days if patient still on fail list.    Questions for provider review:    None           Valentino Paz MA  Chart routed to Care Team.

## 2023-06-27 NOTE — LETTER
June 27, 2023    Paula Ho  [id number] [facility] [address]    Dear Paula    At [facility] we care about your health and are committed to providing quality patient care.     Here is a list of Health Maintenance topics that are due now or due soon:  Health Maintenance Due   Topic Date Due    ADVANCE CARE PLANNING  Never done    COPD ACTION PLAN  Never done    DEPRESSION ACTION PLAN  Never done    TREATMENT AGREEMENT FOR CHRONIC PAIN MANAGEMENT  Never done    MEDICARE ANNUAL WELLNESS VISIT  Never done    Pneumococcal Vaccine: Pediatrics (0 to 5 Years) and At-Risk Patients (6 to 64 Years) (2 - PCV) 08/07/2010    COVID-19 Vaccine (3 - Pfizer series) 06/18/2021    ANNUAL REVIEW OF HM ORDERS  05/20/2022    PAP  05/29/2022    MAMMO SCREENING  03/19/2023    URINE DRUG SCREEN  06/09/2023    NICOTINE/TOBACCO CESSATION COUNSELING Q 1 YR  06/30/2023        We are recommending that you:  Schedule a WELLNESS (Preventative/Physical) APPOINTMENT with your primary care provider. If you go elsewhere for your wellness appointments then please disregard this reminder    ,   Schedule a MAMMOGRAM which is due.    1 in 8 women will develop invasive breast cancer during her lifetime and it is the most common non-skin cancer in American women.  EARLY detection, new treatments, and a better understanding of the disease have increased survival rates - the 5 year survival rate in the 1960s was 63% and today it is close to 90%.    If you are under/uninsured, we recommend you contact [facility]. They offer mammograms at no charge or on a sliding fee charge. You can schedule with them at [phone]. Please have them send us the results.      Please disregard this reminder if you have had this exam elsewhere within the last year.  It would be helpful for us to have a copy of your mammogram report in your file so that we can best coordinate your care - please contact us with when your  test was done so we can update your record.    , and   Schedule a PAP SMEAR EXAM which is due.  Please disregard this reminder if you have had this exam elsewhere within the last year.  It would be helpful for us to have a copy of your recent pap smear report in our file so that we can best coordinate your care.    If you are under/uninsured, we recommend you contact [facility]. They offer pap smears at no charge or on a sliding fee charge. You can schedule with them at [phone]. Please have them send us the results.    To schedule an appointment or discuss this further, you may contact us by phone at the [facility] at [phone] or online through the patient portal/[email or web address] @ [email or web address]    Thank you for trusting [facility] and we appreciate the opportunity to serve you.  We look forward to supporting your healthcare needs in the future.    Your partners in health,      Quality Committee at [facility]

## 2023-07-03 ENCOUNTER — VIRTUAL VISIT (OUTPATIENT)
Dept: PSYCHIATRY | Facility: CLINIC | Age: 63
End: 2023-07-03
Payer: MEDICARE

## 2023-07-03 DIAGNOSIS — F51.04 PSYCHOPHYSIOLOGICAL INSOMNIA: ICD-10-CM

## 2023-07-03 DIAGNOSIS — F41.1 GAD (GENERALIZED ANXIETY DISORDER): ICD-10-CM

## 2023-07-03 DIAGNOSIS — F33.1 MODERATE EPISODE OF RECURRENT MAJOR DEPRESSIVE DISORDER (H): Primary | ICD-10-CM

## 2023-07-03 DIAGNOSIS — F90.0 ATTENTION DEFICIT HYPERACTIVITY DISORDER (ADHD), PREDOMINANTLY INATTENTIVE TYPE: ICD-10-CM

## 2023-07-03 PROCEDURE — 99214 OFFICE O/P EST MOD 30 MIN: CPT | Mod: 95 | Performed by: NURSE PRACTITIONER

## 2023-07-03 RX ORDER — MIRTAZAPINE 30 MG/1
30 TABLET, FILM COATED ORAL AT BEDTIME
Qty: 30 TABLET | Refills: 3 | Status: SHIPPED | OUTPATIENT
Start: 2023-07-03 | End: 2023-08-18

## 2023-07-03 ASSESSMENT — PATIENT HEALTH QUESTIONNAIRE - PHQ9
SUM OF ALL RESPONSES TO PHQ QUESTIONS 1-9: 4
SUM OF ALL RESPONSES TO PHQ QUESTIONS 1-9: 4
10. IF YOU CHECKED OFF ANY PROBLEMS, HOW DIFFICULT HAVE THESE PROBLEMS MADE IT FOR YOU TO DO YOUR WORK, TAKE CARE OF THINGS AT HOME, OR GET ALONG WITH OTHER PEOPLE: SOMEWHAT DIFFICULT

## 2023-07-03 NOTE — PROGRESS NOTES
"Virtual Visit Details    Type of service:  Telephone Visit   Phone call duration: 30 minutes     Patient location: Home  Provider location: Off-site             Outpatient Psychiatric Progress Note    Name: Paula Ho   : 1960                    Primary Care Provider: LEONA ALANIZ CNP   Therapist: None    PHQ-9 scores:      2023    10:37 AM 5/10/2023     3:47 PM 7/3/2023    10:43 AM   PHQ-9 SCORE   PHQ-9 Total Score MyChart 6 (Mild depression)  4 (Minimal depression)   PHQ-9 Total Score 6 9 4       LIBRA-7 scores:      2023    10:00 AM 3/20/2023    12:53 PM 2023    10:38 AM   LIBRA-7 SCORE   Total Score  13 (moderate anxiety) 13 (moderate anxiety)   Total Score 10 13 13    13    13       Patient Identification:    Patient is a 63 year old year old, single  White Not  or  female  who presents for return visit with me.  Patient is currently disabled. Patient attended the session alone. Patient prefers to be called: \" Paula\".    Current medications include: albuterol (PROAIR HFA) 108 (90 Base) MCG/ACT inhaler, Inhale 2 puffs into the lungs every 4 hours as needed for shortness of breath / dyspnea or wheezing  albuterol (PROVENTIL) (2.5 MG/3ML) 0.083% neb solution, NEBULIZE THE CONTENTS OF 1 VIAL EVERY 6 HOURS AS NEEDED.  amphetamine-dextroamphetamine (ADDERALL) 15 MG tablet, Take 1 tablet (15 mg) by mouth 2 times daily  amphetamine-dextroamphetamine (ADDERALL) 15 MG tablet, Take 1 tablet (15 mg) by mouth 2 times daily Fill May 30  amphetamine-dextroamphetamine (ADDERALL) 15 MG tablet, Take 1 tablet (15 mg) by mouth 2 times daily Fill   ARIPiprazole (ABILIFY) 5 MG tablet, Take 1 tablet (5 mg) by mouth daily  budesonide-formoterol (SYMBICORT) 160-4.5 MCG/ACT Inhaler, Inhale 2 puffs into the lungs 2 times daily  buprenorphine HCl-naloxone HCl (SUBOXONE) 8-2 MG per film, Place 1 Film under the tongue daily  buPROPion (WELLBUTRIN SR) 100 MG 12 hr tablet, Take 1 tablet " (100 mg) by mouth 2 times daily  cetirizine (ZYRTEC) 10 MG tablet, Take 1 tablet (10 mg) by mouth daily  cyanocobalamin (CYANOCOBALAMIN) 1000 MCG/ML injection, 1000mcg subcutaneous injection daily for 7 days, then weekly for 4 weeks, then monthly  dexamethasone (DECADRON) 1 MG tablet, Take 1 mg at 11 pm and get a morning blood draw at 8 am for cortisol.  FLUoxetine (PROZAC) 40 MG capsule, Take 1 capsule (40 mg) by mouth 2 times daily  folic acid (FOLVITE) 1 MG tablet, Take 1 tablet (1 mg) by mouth daily  gabapentin (NEURONTIN) 600 MG tablet, Take 1 tablet (600 mg) by mouth daily At bedtime  levothyroxine (SYNTHROID/LEVOTHROID) 25 MCG tablet, Take 1 tablet (25 mcg) by mouth daily  medical cannabis (Patient's own supply), See Admin Instructions (The purpose of this order is to document that the patient reports taking medical cannabis.  This is not a prescription, and is not used to certify that the patient has a qualifying medical condition.)  mirtazapine (REMERON) 30 MG tablet, Take 1 tablet (30 mg) by mouth At Bedtime  Multiple Vitamins-Minerals (MULTIVITAMIN ADULT PO),   mupirocin (BACTROBAN) 2 % external ointment, Apply topically 3 times daily  NARCAN 4 MG/0.1ML nasal spray, INHALE VIA NASAL ROUTE FOR OVERDOSE AS NEEDED. MAY REPEAT AFTER 2-3 MINUTES (Patient not taking: Reported on 5/19/2023)  order for DME, Equipment being ordered: Nebulizer  oxyCODONE IR (ROXICODONE) 15 MG tablet, Take 15 mg by mouth 3 times daily + 5 mg x1 daily  tiotropium (SPIRIVA) 18 MCG inhaled capsule, Inhale 1 capsule (18 mcg) into the lungs daily  tiZANidine (ZANAFLEX) 2 MG tablet, TK 2 TO 3 TS PO QHS  topiramate (TOPAMAX) 100 MG tablet, Take 1 tablet (100 mg) by mouth 2 times daily  vitamin D3 (CHOLECALCIFEROL) 2000 units (50 mcg) tablet, Take 1 tablet by mouth daily    lidocaine (PF) (XYLOCAINE) 1 % injection 8 mL         The [place] Prescription Monitoring Program has been reviewed and there are no concerns about diversionary  activity for controlled substances at this time.      I was able to review most recent Primary Care Provider, specialty provider, and therapy visit notes that I have access to.     Today, patient reports that she has pain today.  Her niece has been living with her for the past two months.  Her niece has not been so helpful with keeping up the house.  Paula informed me that she is allowed 4 tablets daily of oxycodone .  The suboxone blocks her brain from telling her to get another oxycodone.  She now smokes a little less than 1/2 PPD.  She cannot have back surgery until she stops smoking.       has a past medical history of Acute respiratory failure (H) (02/01/2020), Anxiety, Asthma, Depression, and Hypertension.    Social history updates:    Paula receives disability money to support herself financially.    Substance use updates:    No alcohol use reported  Tobacco use: Yes Cigarettes  Ready to quit?  Yes trying nicotine Replacement Therapy tried: Cold turkey     Vital Signs:   LMP  (LMP Unknown)     Labs:    Most recent laboratory results reviewed and no new labs.     Mental Status Examination:  Appearance: awake, alert and mild distress  Attitude: cooperative  Eye Contact:   Unable to assess  Gait and Station: No dizziness or falls  Psychomotor Behavior:   Unable to assess  Oriented to:  time, person, and place  Attention Span and Concentration:  Normal  Speech:   vtspeech: clear, coherent and Speaks: English  Mood:  anxious and depressed  Affect:  intensity is heightened  Associations:  no loose associations  Thought Process:  goal oriented  Thought Content:  no evidence of suicidal ideation or homicidal ideation, no auditory hallucinations present, and no visual hallucinations present  Recent and Remote Memory:  intact Not formally assessed. No amnesia.  Fund of Knowledge: appropriate  Insight:  good  Judgment: good  Impulse Control:  good    Suicide Risk Assessment:  Today Paula Ho reports no thoughts  to harm themself or others. In addition, there are notable risk factors for self-harm, including single status, anxiety, and comorbid medical condition of chronic pain . However, risk is mitigated by commitment to family, history of seeking help when needed, future oriented, denies suicidal intent or plan, and denies homicidal ideation, intent, or plan. Therefore, based on all available evidence including the factors cited above, Paula Ho does not appear to be at imminent risk for self-harm, does not meet criteria for a 72-hr hold, and therefore remains appropriate for ongoing outpatient level of care.  A thorough assessment of risk factors related to suicide and self-harm have been reviewed and are noted above. The patient convincingly denies suicidality on several occasions. Local community safety resources printed and reviewed for patient to use if needed. There was no deceit detected, and the patient presented in a manner that was believable.     DSM5 Diagnosis:  Attention-Deficit/Hyperactivity Disorder  314.00 (F90.0) Predominantly inattentive presentation  296.32 (F33.1) Major Depressive Disorder, Recurrent Episode, Moderate _ and With mixed features  300.02 (F41.1) Generalized Anxiety Disorder  780.52 (G47.00) Insomnia Disorder   With other medical comorbidity  Recurrent      Medical comorbidities include:   Patient Active Problem List    Diagnosis Date Noted    Angiolipoma of right kidney 01/20/2023     Priority: Medium    Adrenal nodule (H) 01/20/2023     Priority: Medium    Opioid dependence with current use (H) 06/30/2022     Priority: Medium    Infection due to 2019 novel coronavirus 12/09/2020     Priority: Medium    Hyperlipidemia 04/14/2020     Priority: Medium    Benign essential hypertension 04/14/2020     Priority: Medium    MDD (major depressive disorder), recurrent severe, without psychosis (H) 03/02/2020     Priority: Medium    Chronic obstructive pulmonary disease, unspecified COPD type  (H) 02/04/2020     Priority: Medium     No PFTS  In EPIC      Attention deficit hyperactivity disorder (ADHD) 01/31/2020     Priority: Medium    Chronic midline low back pain with bilateral sciatica 01/31/2020     Priority: Medium    Brain aneurysm 12/03/2019     Priority: Medium     Dec 2017  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 Device is MR compatible to 3 BREANN 3/2020      History of subarachnoid hemorrhage 12/22/2017     Priority: Medium     H/O of SAH, Cam 2, Hunt-Thomas 1, from a ruptured 9mm left Pcomm aneurysm with a wide neck and a fetal left PCA arising from the aneurysm neck, s/p successful coil embolization 12/23/2017 by Dr. Otto Hurley      Chronic GERD 01/26/2017     Priority: Medium    Chronic knee pain 12/12/2014     Priority: Medium    Cigarette smoker 07/08/2014     Priority: Medium    Chronic, continuous use of opioids 04/24/2013     Priority: Medium     Managed by pain clinic outside of [place].  UDS + cocaine in December 2019 without confirmation testing at her pain clinic per patient report      Morbid obesity (H) 03/27/2013     Priority: Medium    Status post knee surgery 03/27/2013     Priority: Medium     Per patient's recollection:  Circa 2002: right knee arthroscopy (Dr. Pappas)  Circa 2004: right knee partial knee replacement (Iam Ritchie MD)  Circa 2006: right TKA (Ron Ryder MD; [facility])  Circa 2008: right TKA revision due to infection (Ron Ryder MD; [facility])  Circa 2009: right TKA revision due to infection (Ron Ryder MD; [facility])  April 2011: right TKA (Ron Ryder MD; [facility])      LIBRA (generalized anxiety disorder) 09/28/2011     Priority: Medium    Chronic pain 09/28/2011     Priority: Medium     Knee and low back        DJD (degenerative joint disease) 09/28/2011     Priority: Medium    Insomnia 04/13/2011     Priority: Medium     Insomnia  NOS      Tobacco use disorder  07/31/2006     Priority: Medium    Asthma 11/15/2004     Priority: Medium     Asthma  NOS         Assessment:    Paula Ho reported in today that she is having some increased anxiety at home since her niece has been living with her.  She would like to see her niece help out more around the house.  At this point, Paula feels that her medications are helping her to stay stable.  Mirtazapine, Abilify, bupropion, and fluoxetine are ordered to help give her relief from depression.  The bupropion also is hopefully going to help her continue to work on stopping smoking.  Then she will be approved as a candidate for back surgery.  Gabapentin is available at bedtime for anxiety.  Adderall continues as she maintains that, without it, she is unable to focus, concentrate, and stay motivated to care for herself as well as her mother.  She continues to take pain medication under supervision.  She tells me that when she takes Suboxone, this helps her decrease the urge to reach for additional pain medication.    Medication side effects and alternatives were reviewed. Health promotion activities recommended and reviewed today. All questions addressed. Education and counseling completed regarding risks and benefits of medications and psychotherapy options.    Treatment Plan:      1.  Mirtazapine 30 mg at bedtime  2.  Adderall 15 mg 2 times daily-no refills requested today  3.  Abilify 5 mg daily  4.  Bupropion  mg 2 times daily  5.  Fluoxetine 40 mg 2 times daily  6.  Gabapentin 600 mg at bedtime  7.  Talk therapy, when able to, for learning skills to manage life stressors    Continue all other medications as reviewed per electronic medical record today.   Safety plan reviewed. To the Emergency Department as needed or call after hours crisis line at [phone] or [phone]. [place] Crisis Text [place]. Text [place] to 687551 or Suicide LifeLine Chat: suicidepreventionlifeline.org/chat/  To schedule individual or family  therapy, call [facility] at [phone]  Schedule an appointment with me in 6 weeks or sooner as needed. Call [facility] at [phone] to schedule.  Follow up with primary care provider as planned or for acute medical concerns.  Call the psychiatric nurse line with medication questions or concerns at [phone]  MyChart may be used to communicate with your provider, but this is not intended to be used for emergencies.    Crisis Resources:    National Suicide Prevention Lifeline: 467.265.5998 (TTY: 467.478.3337). Call anytime for help.  (www.suicidepreventionlifeline.org)  National [place] on Mental Illness (www.raudel.org): 928.403.9457 or 067-602-5301.   Mental Health Association (www.mentalhealth.org): 861.471.6570 or 727-579-5485.  [place] Crisis Text [place]: Text [place] to 911525  Suicide LifeLine Chat: suicidepreAi2 UKlifeline.org/chat    Administrative Billing:   Time spent with patient includes counseling and coordination of care regarding above diagnoses and treatment plan.    Patient Status:  This is a continuous care patient and medications will be prescribed by the psychiatric provider until further indicated.    Signed:   VIRGILIO Kidd-BC   Psychiatry

## 2023-07-03 NOTE — PATIENT INSTRUCTIONS
"Patient Education   The [facility]  What to Expect  Here's what to expect in the [facility] Program.   About the program  You'll be meeting with a psychiatric doctor to check your mental health. A psychiatric doctor helps you deal with troubling thoughts and feelings by giving you medicine. They'll make sure you know the plan for your care. You may see them for a long time. When you're feeling better, they may refer you back to seeing your family doctor.   If you have any questions, we'll be glad to talk to you.  About visits  Be open  At your visits, please talk openly about your problems. It may feel hard, but it's the best way for us to help you.  Cancelling visits  If you can't come to your visit, please call us right away at [phone]. If you don't cancel at least 24 hours (1 full day) before your visit, that's \"late cancellation.\"  Not showing up for your visits  Being very late is the same as not showing up. You'll be a \"no show\" if:  You're more than 15 minutes late for a 30-minute (half hour) visit.  You're more than 30 minutes late for a 60-minute (full hour) visit.  If you cancel late or don't show up 2 times within 6 months, we may end your care.  Getting help between visits  If you need help between visits, you can call us Monday to Friday from 8 a.m. to 4:30 p.m. at [phone].  Emergency care  Call 911 or go to the nearest emergency department if your life or someone else's life is in danger.  Call 988 anytime to reach the national Suicide and Crisis hotline.  Medicine refills  To refill your medicine, call your pharmacy. You can also call [facility]'s [facility] at [phone], Monday to Friday, 8 a.m. to 4:30 p.m. It can take 1 to 3 business days to get a refill.   Forms, letters, and tests  You may have papers to fill out, like FMLA, short-term disability, and workability. We can help you with these forms at your visits, but you must have an " appointment. You may need more than 1 visit for this, to be in an intensive therapy program, or both.  Before we can give you medicine for ADHD, we may refer you to get tested for it or confirm it another way.  We may not be able to give you an emotional support animal letter.  We don't do mental health checks ordered by the court.   We don't do mental health testing, but we can refer you to get tested.   Thank you for choosing us for your care.  For informational purposes only. Not to replace the advice of your health care provider. Copyright   2022 [facility]. All rights reserved. Adomo [id number] - 12/22.      Treatment Plan:      1.  Mirtazapine 30 mg at bedtime  2.  Adderall 15 mg 2 times daily-no refills requested today  3.  Abilify 5 mg daily  4.  Bupropion  mg 2 times daily  5.  Fluoxetine 40 mg 2 times daily  6.  Gabapentin 600 mg at bedtime  7.  Talk therapy, when able to, for learning skills to manage life stressors      Continue all other medical directions per primary care provider.   Continue all other medications as reviewed per electronic medical record today.   Safety plan reviewed. To the Emergency Department as needed or call after hours crisis line at [phone] or [phone]. [place] Crisis Text [place]: Text [place] to 794448  or  Suicide LifeLine Chat: suicidepreventionlifeline.org/chat/  To schedule individual or family therapy, call [facility] at [phone].   Schedule an appointment with me in 6 weeks or sooner as needed.  Call [facility] at [phone] to schedule.  Follow up with primary care provider as planned or for acute medical concerns.  Call the psychiatric nurse line with medication questions or concerns at [phone].  MyChart may be used to communicate with your provider, but this is not intended to be used for emergencies.    Crisis Resources:    National Suicide Prevention Lifeline: 793.257.6145 (TTY: 359.775.2778).  Call anytime for help.  (www.suicidepreventionlifeline.org)  National [place] on Mental Illness (www.raudel.org): 185.422.2553 or 786-294-2986.   Mental Health Association (www.mentalhealth.org): 603.264.9377 or 303-520-6788.  [place] Crisis Text [place]: Text [place] to 006930  Suicide LifeLine Chat: suicidepreOracle Youthline.org/chat

## 2023-07-17 DIAGNOSIS — J44.9 CHRONIC OBSTRUCTIVE PULMONARY DISEASE, UNSPECIFIED COPD TYPE (H): ICD-10-CM

## 2023-07-17 NOTE — TELEPHONE ENCOUNTER
Received Rx refill request for Spiriva. Chart reviewed, patient is overdue for pulmonary follow up. Contacted patient informing her of this information.     Patient has been scheduled for pulmonary follow up 07/24/2023. 1 ree refill has been sent in. Patient to receive additional refills at the time of her office visit.      Stephanie Jang LPN  Pulmonary Medicine:  [facility]  Phone: [phone] Fax: [phone]

## 2023-07-17 NOTE — TELEPHONE ENCOUNTER
Medication:     tiotropium (SPIRIVA) 18 MCG inhaled capsule 30 capsule 11 5/4/2022  No   Sig - Route: Inhale 1 capsule (18 mcg) into the lungs daily - Inhalation       Date last written: 05/04/2022  Dispensed amount: 30 capsules  Refills: 11    [facility]ed [facility]: [facility]    Pt's last office visit: 05/04/2022  Next scheduled office visit: 07/24/2023      Per the RN/LPN medication refill protocol, writer is unable to refill this request.       Stephanie Jang LPN  Pulmonary Medicine:  [facility]  Phone: [phone] Fax: [phone]

## 2023-07-18 ENCOUNTER — TELEPHONE (OUTPATIENT)
Dept: BEHAVIORAL HEALTH | Facility: CLINIC | Age: 63
End: 2023-07-18
Payer: MEDICARE

## 2023-07-18 NOTE — TELEPHONE ENCOUNTER
[facility] called patient for HAP review appointment and patient reported the nurse is coming out for her monthly check in, which is also scheduled for today at 11. [facility] and patient rescheduled appointment for next week on Tuesday at the same time. Patient also requested [facility] send her the scheduling number for her therapist, Fernanda, via email. [facility] emailed the [facility] scheduling number.       DENI Humphries  [facility] [place] [facility])   [facility]  [phone]

## 2023-07-20 RX ORDER — TIOTROPIUM BROMIDE 18 UG/1
18 CAPSULE ORAL; RESPIRATORY (INHALATION) DAILY
Qty: 30 CAPSULE | Refills: 0 | Status: SHIPPED | OUTPATIENT
Start: 2023-07-20 | End: 2023-07-24

## 2023-07-24 ENCOUNTER — OFFICE VISIT (OUTPATIENT)
Dept: PULMONOLOGY | Facility: CLINIC | Age: 63
End: 2023-07-24
Payer: MEDICARE

## 2023-07-24 VITALS
DIASTOLIC BLOOD PRESSURE: 79 MMHG | WEIGHT: 267 LBS | HEART RATE: 72 BPM | OXYGEN SATURATION: 94 % | BODY MASS INDEX: 44.43 KG/M2 | SYSTOLIC BLOOD PRESSURE: 135 MMHG

## 2023-07-24 DIAGNOSIS — J44.9 CHRONIC OBSTRUCTIVE PULMONARY DISEASE, UNSPECIFIED COPD TYPE (H): ICD-10-CM

## 2023-07-24 PROCEDURE — G0296 VISIT TO DETERM LDCT ELIG: HCPCS | Performed by: INTERNAL MEDICINE

## 2023-07-24 PROCEDURE — 99215 OFFICE O/P EST HI 40 MIN: CPT | Mod: 25 | Performed by: INTERNAL MEDICINE

## 2023-07-24 RX ORDER — TIOTROPIUM BROMIDE 18 UG/1
18 CAPSULE ORAL; RESPIRATORY (INHALATION) DAILY
Qty: 30 CAPSULE | Refills: 0 | Status: SHIPPED | OUTPATIENT
Start: 2023-07-24 | End: 2024-04-02

## 2023-07-24 RX ORDER — ALBUTEROL SULFATE 90 UG/1
2 AEROSOL, METERED RESPIRATORY (INHALATION) EVERY 4 HOURS PRN
Qty: 8.5 G | Refills: 11 | Status: SHIPPED | OUTPATIENT
Start: 2023-07-24 | End: 2024-01-26

## 2023-07-24 RX ORDER — BUDESONIDE AND FORMOTEROL FUMARATE DIHYDRATE 160; 4.5 UG/1; UG/1
2 AEROSOL RESPIRATORY (INHALATION) 2 TIMES DAILY
Qty: 30.6 G | Refills: 11 | Status: SHIPPED | OUTPATIENT
Start: 2023-07-24 | End: 2023-08-01

## 2023-07-24 RX ORDER — ALBUTEROL SULFATE 0.83 MG/ML
SOLUTION RESPIRATORY (INHALATION)
Qty: 90 ML | Refills: 11 | Status: SHIPPED | OUTPATIENT
Start: 2023-07-24 | End: 2024-01-26

## 2023-07-24 ASSESSMENT — ASTHMA QUESTIONNAIRES
QUESTION_3 LAST FOUR WEEKS HOW OFTEN DID YOUR ASTHMA SYMPTOMS (WHEEZING, COUGHING, SHORTNESS OF BREATH, CHEST TIGHTNESS OR PAIN) WAKE YOU UP AT NIGHT OR EARLIER THAN USUAL IN THE MORNING: ONCE OR TWICE
ACUTE_EXACERBATION_TODAY: NO
ACT_TOTALSCORE: 16
QUESTION_1 LAST FOUR WEEKS HOW MUCH OF THE TIME DID YOUR ASTHMA KEEP YOU FROM GETTING AS MUCH DONE AT WORK, SCHOOL OR AT HOME: A LITTLE OF THE TIME
QUESTION_4 LAST FOUR WEEKS HOW OFTEN HAVE YOU USED YOUR RESCUE INHALER OR NEBULIZER MEDICATION (SUCH AS ALBUTEROL): ONE OR TWO TIMES PER DAY
QUESTION_2 LAST FOUR WEEKS HOW OFTEN HAVE YOU HAD SHORTNESS OF BREATH: THREE TO SIX TIMES A WEEK
ACT_TOTALSCORE: 16
QUESTION_5 LAST FOUR WEEKS HOW WOULD YOU RATE YOUR ASTHMA CONTROL: SOMEWHAT CONTROLLED

## 2023-07-24 ASSESSMENT — PAIN SCALES - GENERAL: PAINLEVEL: EXTREME PAIN (9)
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
## 2023-07-25 ENCOUNTER — TELEPHONE (OUTPATIENT)
Dept: BEHAVIORAL HEALTH | Facility: CLINIC | Age: 63
End: 2023-07-25
Payer: MEDICARE

## 2023-07-25 DIAGNOSIS — R05.9 COUGH: ICD-10-CM

## 2023-07-25 DIAGNOSIS — J44.9 CHRONIC OBSTRUCTIVE PULMONARY DISEASE, UNSPECIFIED COPD TYPE (H): Primary | ICD-10-CM

## 2023-07-25 NOTE — TELEPHONE ENCOUNTER
Behavioral Health Home Services  [facility]: [place]        Social Work Care Navigator Note      Patient: Paula Ho  Date: July 25, 2023  Preferred Name: Paula    Previous PHQ-9:       5/1/2023    10:37 AM 5/10/2023     3:47 PM 7/3/2023    10:43 AM   PHQ-9 SCORE   PHQ-9 Total Score MyChart 6 (Mild depression)  4 (Minimal depression)   PHQ-9 Total Score 6 9 4     Previous LIBRA-7:       2/6/2023    10:00 AM 3/20/2023    12:53 PM 5/1/2023    10:38 AM   LIBRA-7 SCORE   Total Score  13 (moderate anxiety) 13 (moderate anxiety)   Total Score 10 13 13    13    13     ARNALDO LEVEL:       No data to display                Preferred Contact:  Need for : No  Preferred Contact: Cell      Type of Contact Today: Phone call (not reached/unavailable)      Data: (subjective / Objective):  Attempted to reach patient for scheduled HAP review, but was unsuccessful. [facility] wasn't able to leave a message because her mailbox was full. Plan to attempt again.      [facility] received a call back from patient asking if they can reschedule for 7/31, and she said [facility] can schedule her if she doesn't answer again.     [facility] called patient back and was able to leave a message. [facility] let patient know they scheduled for 7/31 but [facility] requested they meet this week if patient is available. [facility] requested a call back to confirm.     Paty Dial, LSW Behavioral Health Home ([facility])   [facility]  [phone]

## 2023-07-27 NOTE — TELEPHONE ENCOUNTER
Central Prior Authorization Team   Phone: [phone]    PA Initiation    Medication: BUDESONIDE-FORMOTEROL FUMARATE 160-4.5 MCG/ACT IN AERO  Insurance Company: [employer] Part D - Phone [phone] Fax [phone]  Pharmacy Filling the Rx: [id number] - [place] - [address]  Filling Pharmacy Phone: [phone]  Filling Pharmacy Fax:    Start Date: 7/27/2023

## 2023-07-31 ENCOUNTER — VIRTUAL VISIT (OUTPATIENT)
Dept: BEHAVIORAL HEALTH | Facility: CLINIC | Age: 63
End: 2023-07-31
Payer: MEDICARE

## 2023-07-31 DIAGNOSIS — R69 DIAGNOSIS DEFERRED: Primary | ICD-10-CM

## 2023-07-31 RX ORDER — FLUTICASONE PROPIONATE AND SALMETEROL XINAFOATE 230; 21 UG/1; UG/1
2 AEROSOL, METERED RESPIRATORY (INHALATION) 2 TIMES DAILY
Qty: 12 G | Refills: 11 | Status: SHIPPED | OUTPATIENT
Start: 2023-07-31 | End: 2023-10-10

## 2023-07-31 ASSESSMENT — ANXIETY QUESTIONNAIRES
1. FEELING NERVOUS, ANXIOUS, OR ON EDGE: SEVERAL DAYS
2. NOT BEING ABLE TO STOP OR CONTROL WORRYING: NEARLY EVERY DAY
5. BEING SO RESTLESS THAT IT IS HARD TO SIT STILL: NOT AT ALL
GAD7 TOTAL SCORE: 11
4. TROUBLE RELAXING: NEARLY EVERY DAY
3. WORRYING TOO MUCH ABOUT DIFFERENT THINGS: NEARLY EVERY DAY
IF YOU CHECKED OFF ANY PROBLEMS ON THIS QUESTIONNAIRE, HOW DIFFICULT HAVE THESE PROBLEMS MADE IT FOR YOU TO DO YOUR WORK, TAKE CARE OF THINGS AT HOME, OR GET ALONG WITH OTHER PEOPLE: SOMEWHAT DIFFICULT
GAD7 TOTAL SCORE: 11
6. BECOMING EASILY ANNOYED OR IRRITABLE: SEVERAL DAYS
7. FEELING AFRAID AS IF SOMETHING AWFUL MIGHT HAPPEN: NOT AT ALL

## 2023-07-31 ASSESSMENT — PATIENT HEALTH QUESTIONNAIRE - PHQ9: SUM OF ALL RESPONSES TO PHQ QUESTIONS 1-9: 10

## 2023-07-31 NOTE — PROGRESS NOTES
"Behavioral Health Home Services  [facility]: [place]        Social Work Care Navigator Note      Patient: Paula Ho  Date: July 31, 2023  Preferred Name: Paula    Previous PHQ-9:       5/1/2023    10:37 AM 5/10/2023     3:47 PM 7/3/2023    10:43 AM   PHQ-9 SCORE   PHQ-9 Total Score MyChart 6 (Mild depression)  4 (Minimal depression)   PHQ-9 Total Score 6 9 4     Previous LIBRA-7:       2/6/2023    10:00 AM 3/20/2023    12:53 PM 5/1/2023    10:38 AM   LIBRA-7 SCORE   Total Score  13 (moderate anxiety) 13 (moderate anxiety)   Total Score 10 13 13    13    13     ARNALDO LEVEL:       No data to display                Preferred Contact:  Need for : No  Preferred Contact: Cell      Type of Contact Today: Phone call (patient / identified key support person reached)    Service Modality: Phone Visit:      Provider verified identity through the following two step process.  Patient provided:  Patient is known previously to provider    Telephone Visit: The patient's condition can be safely assessed and treated via synchronous audio telemedicine encounter.      Reason for Audio Telemedicine Visit: Services only offered telehealth    Originating Site (Patient Location): Patient's home    Distant Site (Provider Location): Provider Remote Setting- Home Office    Consent:  The patient/guardian has verbally consented to:     1. The potential risks and benefits of telemedicine (telephone visit) versus in person care;    The patient has been notified of the following:      \"We have found that certain health care needs can be provided without the need for a face to face visit.  This service lets us provide the care you need with a phone conversation.       I will have full access to your [facility] medical record during this entire phone call.   I will be taking notes for your medical record.      Since this is like an office visit, we will bill your insurance company for this service.       There are potential " "benefits and risks of telephone visits (e.g. limits to patient confidentiality) that differ from in-person visits.?Confidentiality still applies for telephone services, and nobody will record the visit.  It is important to be in a quiet, private space that is free of distractions (including cell phone or other devices) during the visit.??      If during the course of the call I believe a telephone visit is not appropriate, you will not be charged for this service\"     Consent has been obtained for this service by care team member: Yes       Data: (subjective / Objective):    Patient came in to complete the comprehensive wellness assessment for [facility] Services.  See [facility] Flowsheets for details on the assessment.  See [facility] for a copy of the patient's care plan.    Patient reports he mood has been so so recently.     2. Patient reported that her niece is moving out. She reports that she's happy because she's so messy, but she was also there is she needed help and she was comfortable with having her there. Patient and [facility] discussed establishing a support with someone she's comfortable with to do consistent check ins, so someone is checking in on her. Patient also confirmed she always has her phone on her in case she wasn't able to move and needed to call someone.     3. Patient reports that she hasn't been able to schedule with Fernanda, her therapist, so [facility] offered to call with her now to get this scheduled. They are scheduled for 8/4.    4. Patient reported pool therapy has been going well, she's going today and again on Wednesday.     5. Patient reports her smoking has been awful recently. She reported that she's at about a half a pack per day still.    6. Patient reported that she's still meeting with her [employer] worker every other week, and she's coming to meet with her today as well.     7. Patient completed the PHQ-9 and answered 2 on question 9. She reports that she has no " plans or intentions to hurt herself and she's still agreeable to her safety plan. [facility] offered to scheduled her with a sooner appointment with a [facility] or to reach out to her therapist to see her sooner, and she declined at this time. Patient reported she's okay waiting until her appointment with there therapist on Friday and confirmed she's comfortable using the crisis hotlines, as well as calling 911 if there's an emergency.     Updated Goals:   Health: Patient would like to continue to meet with her providers, and take her medications as prescribed. Patient would also like assistance with improving her back pain, and receive pool therapy, as well as possibly surgery. Patient would like to get a gym membership so she can get more exercise at the place she does pool therapy.   Chemical Health: Patient would like assistance from her providers to quit smoking and will work on reducing her use, so she can be approved for back surgery.   Mental Health: Patient would like to continue meeting with her [place] therapist, Fernanda, and continue to receive assistance with [facility] for monthly check ins. She would like to continue working with an [facility] worker again. She reported that she wants to build herself back to feel like somebody and take it day by day.   Future goals: Patient would like to open to the CADI waiver in the future, but she'll continue with only PCA for now. She would like to receive home delivered meals if she does open.           Paty Dial, [facility]  Behavioral Health Home ([facility])   [facility]  [phone]

## 2023-07-31 NOTE — TELEPHONE ENCOUNTER
Impression: Vitreous hemorrhage, right eye: H43.11. Plan: Discussed diagnosis in detail with patient. The patient was advised to limit physical activity and limit lifting to 50lbs. Patient was instructed to report any type of visual field changes or visual changes immediately. Patient was told to sleep with 2 pillows in order to allow the blood to gravitate. PRIOR AUTHORIZATION DENIED    Medication: BUDESONIDE-FORMOTEROL FUMARATE 160-4.5 MCG/ACT IN AERO  Insurance Company: [employer] Part D - Phone [phone] Fax [phone]  Denial Date: 7/27/2023  Denial Rational:               Appeal Information:

## 2023-07-31 NOTE — LETTER
Behavioral Health Home ([facility]): Health Action Plan  [facility] Clinic: [place]    [facility]      Name: Paula Ho  Preferred Name: Paula  : 1960  MRN: [id number]      My Goals  Goal Areas: Health; Mental Health; Future HAP Goal area; Chemical Health    Patient stated goals:   Health: Paula would like to continue to meet with her providers, and take her medications as prescribed.   Paula would also like assistance with improving her back pain, and receive pool therapy, as well as possibly surgery.   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       Chemical Health: Paula would like assistance from her providers to quit smoking and will work on reducing her use, so she can be approved for back surgery.       Mental Health: Paula would like to continue meeting with her [facility] therapist, Fernanda, and continue to receive assistance with [facility] for monthly check ins. She would like to continue working with an [facility] worker again. She reported that she wants to build herself back to feel like somebody and take it day by day.       Future goals: Paula would like to open to the CADI waiver in the future, but she'll continue with only PCA for now. She would like to receive home delivered meals if she does open.    Strengths related to each goal: Paula states her strengths are the support of her dogs, good advocate for herself, support of good friends, and care for others. Patient states she has a big heart.    Services and Supports Needed: The [facility] Team will provide monthly contact with patient in order to monitor progress towards goals.    Activities / Actions of Team to support goal(s): [facility] team will locate appropriate resources that align with patient's goals and promote health and wellness.    Activities / Actions of Patient / Parent / Guardian to support goal(s): It is a requirement that patient's primary care physician is through the [facility]  system and that they are on Medical Assistance/Medicaid. If either of these were to change or if patient needs any type of assistance, they are to reach out to their Behavioral Health Home ([facility]) team.      Recommended Referral  Tobacco cessation referrals made?: No  Mental Health / Chemical Dependency Referrals: Yes  Substance Use Referrals: Not Applicable  Mental Health Referrals: [facility] Referrals: [employer]; [employer] ; Other (see comments)      My Team Members and Their Contact Information  Patient Care Team         Relationship Specialty Notifications Start End    Demetra Bedolla APRN CNP PCP - General Nurse Practitioner  12/5/19     Referred Pt to urology    Phone: [phone] Fax: [phone]         [address] [place] [address]    Demetra Bedolla APRN CNP Assigned PCP   9/15/19     Phone: [phone] Fax: [phone]         [address] [place] [address]    Tigist Manjarrez NP Nurse Practitioner Nurse Practitioner Admissions 8/3/20     Psychiatry    Phone: [phone] Fax: [phone]         [address] [place] [address]    Tigist Manjarrez NP Assigned Behavioral Health Provider   11/15/20     Phone: [phone] Fax: [phone]         [address] [place] [address]    [facility] [place] [facility] [place]  12/23/20     Phone: [phone]         Dane Irizarry MD Anesthesiologist Anesthesiology  1/21/21     Phone: [phone] Pager: [phone] Fax: [phone] [address] [facility] [address]    Chanell Joya MD MD Dermatology  10/25/21     Joie Nobles MD Referring Physician Family Medicine  10/25/21     referring to Dermatology    Phone: [phone] Fax: [phone]         [address] [place] [address]    Man Goode MD Assigned Pulmonology Provider   11/7/21     Phone: [phone] Fax: [phone] [address]    [facility] [address]    Paty Dial BSW  Clinic Admissions 2/17/22     [facility] Case Management - Enrolled 02/17/2021 -[employer] - direct extension [phone].    Nathalia Ordoñez MD MD Endocrinology, Diabetes, and Metabolism  1/20/23     Phone: [phone] Fax: [phone]         [address] [facility] [address]    Raul Marinelli MD MD Urology  1/20/23     Phone: [phone] Fax: [phone] [address] [place] [address]    Zoila Martinez MD Assigned Endocrinology Provider   3/4/23     Phone: [phone] Fax: [phone]         [address] [facility] [address]    Raul Marinelli MD Assigned Surgical Provider   5/6/23     Phone: [phone] Fax: [phone] [address] [place] [address]    Za Aldridge DO Assigned Cancer Care Provider   5/27/23     Phone: [phone] Fax: [phone]         [address] [facility] [address]            My Wellness Plan  Safety Concerns: Suicide Ideation    Recommendations / Plan for safety concerns: Patient will follow her safety plan that was co-developed with [facility] Isela Kirk on 9/26/22, and she also feels comfortable reaching out to her clinic or [facility] crisis, as well as the suicide hotline.    Crisis Plan (emergencies / when urgent support needed): Patient states she feels comfortable contacting her PCA Sharla, utilizing [facility] crisis/suicide hotline, or go to the [facility] unit in case immediate assistance is needed.      Paula Ho co-developed the Health Action Plan with the [facility] Team and received a copy of this document.  Date Health Action Plan Completed/Updated: 07/31/23

## 2023-07-31 NOTE — TELEPHONE ENCOUNTER
Contacted patient regarding PA denial. Informed patient that PA for high dose Symbicort has been denied, however, Advair HFA would be a covered alternative. Requested that patient complete any Symbicort that she has on hand and then she may start using the Advair. Patient expressed understanding.    Rx for Advair HFA has been routed to Dr. Goode for review/signature.    Stephanie Jang LPN  Pulmonary Medicine:  [facility]  Phone: [phone] Fax: [phone]

## 2023-08-04 ENCOUNTER — TELEPHONE (OUTPATIENT)
Dept: PSYCHIATRY | Facility: CLINIC | Age: 63
End: 2023-08-04
Payer: MEDICARE

## 2023-08-04 ENCOUNTER — VIRTUAL VISIT (OUTPATIENT)
Dept: PSYCHOLOGY | Facility: CLINIC | Age: 63
End: 2023-08-04
Payer: MEDICARE

## 2023-08-04 DIAGNOSIS — F33.1 MODERATE EPISODE OF RECURRENT MAJOR DEPRESSIVE DISORDER (H): Primary | ICD-10-CM

## 2023-08-04 PROCEDURE — 90834 PSYTX W PT 45 MINUTES: CPT | Mod: 95

## 2023-08-04 NOTE — TELEPHONE ENCOUNTER
RN reviewed the . Adderall was sold on 7/22/23. Patient has an appointment on 8/18/23 with Tigist Manjarrez. She has enough medication to get her through until her appointment. RN called patient at [phone] and let her know. The patient thanked RN.          DEBBIE TA RN on 8/4/2023 at 11:24 AM

## 2023-08-04 NOTE — PROGRESS NOTES
"    [facility]                                     Progress Note    Patient Name: Paula Ho  Date: 23         Service Type: Individual    Session Start Time: 2:31 PM  Session End Time: 3:15 PM      Session Length: 44 Minutes     Session #: 21    Attendees: Client attended alone    Service Modality:  Phone Visit:      Provider verified identity through the following two step process.  Patient provided:  Patient  and Patient is known previously to provider    Telephone Visit: The patient's condition can be safely assessed and treated via synchronous audio telemedicine encounter.      Reason for Audio Telemedicine Visit: Patient has requested telehealth visit    Originating Site (Patient Location): Patient's other parked car     Distant Site (Provider Location): Provider Remote Setting- Home Office    Consent:  The patient/guardian has verbally consented to:     1. The potential risks and benefits of telemedicine (telephone visit) versus in person care;    The patient has been notified of the following:      \"We have found that certain health care needs can be provided without the need for a face to face visit.  This service lets us provide the care you need with a phone conversation.       I will have full access to your [facility] medical record during this entire phone call.   I will be taking notes for your medical record.      Since this is like an office visit, we will bill your insurance company for this service.       There are potential benefits and risks of telephone visits (e.g. limits to patient confidentiality) that differ from in-person visits.?Confidentiality still applies for telephone services, and nobody will record the visit.  It is important to be in a quiet, private space that is free of distractions (including cell phone or other devices) during the visit.??      If during the course of the call I believe a telephone visit is not appropriate, you will not be " "charged for this service\"     Consent has been obtained for this service by care team member: Yes     DATA  Interactive Complexity: No  Crisis: No        Progress Since Last Session (Related to Symptoms / Goals / Homework):   Symptoms: No change Patient reports continued stress in regards to her relationship      Homework: Did not complete      Episode of Care Goals: Minimal progress - PREPARATION (Decided to change - considering how); Intervened by negotiating a change plan and determining options / strategies for behavior change, identifying triggers, exploring social supports, and working towards setting a date to begin behavior change     Current / Ongoing Stressors and Concerns:   Patient reports continued distress with her previous partner.  She reports feeling upset about a behavior towards her.  Patient also reports feeling sad about her niece moving out of her home.  Patient reports discomfort in her chest due to recent stress regarding her relationship with her previous partner.  Patient also reports relational concerns with her friends due to her relationship.    Treatment Objective(s) Addressed in This Session:   identify their fears / thoughts that contribute to feeling anxious  Increase interest, engagement, and pleasure in doing things  Decrease frequency and intensity of feeling down, depressed, hopeless  Improve quantity and quality of night time sleep / decrease daytime naps  Identify negative self-talk and behaviors: challenge core beliefs, myths, and actions  Relational Concerns     Intervention:   Client Centered:  Patient and provider discussed patient's concerns and her relationship.  Patient also discussed goals for therapy.     Motivational Interviewing     MI Intervention: Expressed Empathy/Understanding, Supported Autonomy, Collaboration, Evocation, Permission to raise concern or advise, Open-ended questions and Reframe     Change Talk Expressed by the Patient: Reasons to change Need to " change Taking steps    Provider Response to Change Talk: E - Evoked more info from patient about behavior change, A - Affirmed patient's thoughts, decisions, or attempts at behavior change and R - Reflected patient's change talk       ASSESSMENT: Current Emotional / Mental Status (status of significant symptoms):   Risk status (Self / Other harm or suicidal ideation)   Patient denies current fears or concerns for personal safety.    Patient denies current or recent suicidal ideation or behaviors.   Patient denies current or recent homicidal ideation or behaviors.   Patient denies current or recent self injurious behavior or ideation.   Patient denies other safety concerns.   Patient reports there has been no change in risk factors since their last session.     Patient reports there has been no change in protective factors since their last session.     There are no firearms in the house.     Appearance:   NA    Eye Contact:   NA    Psychomotor Behavior: Normal    Attitude:   Cooperative  Attentive   Orientation:   All   Speech    Rate / Production: Normal     Volume:  Normal    Mood:    Sad     Affect:    Tearful Worrisome    Thought Content:  Clear    Thought Form:  Coherent  Logical    Insight:    Good      Medication Review:   No changes to current psychiatric medication(s)     Medication Compliance:   Yes     Changes in Health Issues:   None reported       Chemical Use Review:   Substance Use: Chemical use reviewed, no active concerns identified      Tobacco Use: No change in amount of tobacco use since last session.  Provided encouragement to quit     Diagnosis:  Moderate Episode of Recurrent Major Depressive Disorder (F33.1)    Collateral Reports Completed:   Not Applicable    PLAN: (Patient Tasks / Therapist Tasks / Other)  Provider encouraged patient to think of her goals for therapy.    BECKY MANRIQUEZ    This note has been reviewed and I agree with the plan of care. This note is co-signed by Nola  JASON Velásquez, [facility], Supervisor, on: August 9, 2023  ______________________________________________________________________    Individual Treatment Plan    Patient's Name: Paula Ho  YOB: 1960    Date of Creation: 11/7/22  Date Treatment Plan Last Reviewed/Revised: 05/10/23    DSM5 Diagnoses: 296.32 (F33.1) Major Depressive Disorder, Recurrent Episode, Moderate _  Psychosocial / Contextual Factors: Patient has physical medical concerns that impact her current depressive symptoms.  PROMIS (reviewed every 90 days): 16    Referral / Collaboration:  Referral to another professional/service is not indicated at this time..    Anticipated number of session for this episode of care:  17+  Anticipation frequency of session: Biweekly  Anticipated Duration of each session: 38-52 minutes  Treatment plan will be reviewed in 90 days or when goals have been changed.       MeasurableTreatment Goal(s) related to diagnosis / functional impairment(s)  Goal 1: Patient will develop healthy thinking patterns and beliefs about self, others, and the world.       Objective #A (Patient Action)    Patient will Identify negative self-talk and behaviors: challenge core beliefs, myths, and actions.  Status: Continued - Date(s):05/10/23     Intervention(s)  Therapist will teach  how to identify negative self-talk and behaviors .    Objective #B  Patient will Improve concentration, focus, and mindfulness in daily activities .  Status: Continued - Date(s):05/10/23     Intervention(s)  Therapist will teach how to improve concentration, focus, and mindfulness. .      Goal 2: Patient will alleviate depressive symptoms in order to return to a previous level of functioning. As evidenced by a PHQ-9 score of 9 or less.       Objective # A  Status: Continued - Date(s):05/10/23     Patient will identify at least 4 adaptive coping statements to counteract negative thoughts    Intervention(s)  Therapist will teach adaptive coping  statements to conteract negative thoughts .    Objective #B  Patient will Increase interest, engagement, and pleasure in doing things.    Status: Continued - Date(s):05/10/23     Intervention(s)  Therapist will assign homework related to increasing engagement and interest in doing things .        Patient has reviewed and agreed to the above plan.      BECKY MANRIQUEZ  May 10, 2023    This note has been reviewed and I agree with the plan of care. This note is co-signed by JASON Bang, LICSW, Supervisor, on:May 12, 2022

## 2023-08-04 NOTE — TELEPHONE ENCOUNTER
Reason for Call:  Medication or medication refill:    Do you use a [facility]?  Name of the pharmacy and phone number for the current request:  [facility] [place]   [address]   [phone]    Name of the medication requested:   amphetamine-dextroamphetamine (ADDERALL) 15 MG tablet     Other request: Please Call when refill has been sent, if unable to refill please call.    Can we leave a detailed message on this number? YES    Phone number patient can be reached at: Cell number on file:    Telephone Information:   Mobile [phone]       Best Time: ASAP    Call taken on 8/4/2023 at 11:04 AM by Paty Kurtz

## 2023-08-14 ENCOUNTER — VIRTUAL VISIT (OUTPATIENT)
Dept: PSYCHOLOGY | Facility: CLINIC | Age: 63
End: 2023-08-14
Payer: MEDICARE

## 2023-08-14 DIAGNOSIS — F33.1 MODERATE EPISODE OF RECURRENT MAJOR DEPRESSIVE DISORDER (H): Primary | ICD-10-CM

## 2023-08-14 PROCEDURE — 90834 PSYTX W PT 45 MINUTES: CPT | Mod: VID

## 2023-08-16 NOTE — PROGRESS NOTES
"    [facility]                                     Progress Note    Patient Name: Paula Ho  Date: 23         Service Type: Individual    Session Start Time: 2:36 PM  Session End Time: 3:27 PM      Session Length: 51 Minutes     Session #: 22    Attendees: Client attended alone    Service Modality:  Phone Visit:      Provider verified identity through the following two step process.  Patient provided:  Patient  and Patient is known previously to provider    Telephone Visit: The patient's condition can be safely assessed and treated via synchronous audio telemedicine encounter.      Reason for Audio Telemedicine Visit: Patient has requested telehealth visit    Originating Site (Patient Location): Patient's home    Distant Site (Provider Location): [facility]    Consent:  The patient/guardian has verbally consented to:     1. The potential risks and benefits of telemedicine (telephone visit) versus in person care;    The patient has been notified of the following:      \"We have found that certain health care needs can be provided without the need for a face to face visit.  This service lets us provide the care you need with a phone conversation.       I will have full access to your [facility] medical record during this entire phone call.   I will be taking notes for your medical record.      Since this is like an office visit, we will bill your insurance company for this service.       There are potential benefits and risks of telephone visits (e.g. limits to patient confidentiality) that differ from in-person visits.?Confidentiality still applies for telephone services, and nobody will record the visit.  It is important to be in a quiet, private space that is free of distractions (including cell phone or other devices) during the visit.??      If during the course of the call I believe a telephone visit is not appropriate, " "you will not be charged for this service\"     Consent has been obtained for this service by care team member: Yes     DATA  Interactive Complexity: No  Crisis: No        Progress Since Last Session (Related to Symptoms / Goals / Homework):   Symptoms: Worsening patient reports symptoms of depression have worsened due to her relationship with    Homework: Did not complete      Episode of Care Goals: Minimal progress - PREPARATION (Decided to change - considering how); Intervened by negotiating a change plan and determining options / strategies for behavior change, identifying triggers, exploring social supports, and working towards setting a date to begin behavior change     Current / Ongoing Stressors and Concerns:   Patient increased discomfort physically due to increased pain in her knee.  She reports she does not know why the pain in her knee has increased.  Patient reports continued distress with her previous partner.  She reports feeling frustrated that she has been accused of different things by her partner and has increased her negative self talk.  Patient also continues to report frustration with her partners current substance use.  Paula reports being sad her niece moved out of her home due to the issues that she was having with Paula's partner.  Patient reports a decrease in sleep quality and quantity due to her frustrations in her relationship.  Patient reported passive suicidal ideations with no plans or intent on harming herself.  Provider assessed for safety and found no risk.      Treatment Objective(s) Addressed in This Session:   identify their fears / thoughts that contribute to feeling anxious  Increase interest, engagement, and pleasure in doing things  Decrease frequency and intensity of feeling down, depressed, hopeless  Improve quantity and quality of night time sleep / decrease daytime naps  Feel less tired and more energy during the day   Improve diet, appetite, mindful eating, and / or meal " planning  Identify negative self-talk and behaviors: challenge core beliefs, myths, and actions  Provider and patient also reviewed patient's safety plan     Intervention:   Client Centered:  Patient and provider discussed patient's concerns and her relationship.      Motivational Interviewing     MI Intervention: Expressed Empathy/Understanding, Supported Autonomy, Collaboration, Evocation, Permission to raise concern or advise, Open-ended questions and Reframe     Change Talk Expressed by the Patient: Reasons to change Need to change Taking steps    Provider Response to Change Talk: E - Evoked more info from patient about behavior change, A - Affirmed patient's thoughts, decisions, or attempts at behavior change and R - Reflected patient's change talk       ASSESSMENT: Current Emotional / Mental Status (status of significant symptoms):   Risk status (Self / Other harm or suicidal ideation)   Patient denies current fears or concerns for personal safety.    Patient reports the following current or recent suicidal ideation or behaviors: Patient reported passive suicidal ideations with no intent or plan on harming herself.   Patient denies current or recent homicidal ideation or behaviors.   Patient denies current or recent self injurious behavior or ideation.   Patient denies other safety concerns.   Patient reports there has been a change in risk factors since their last session.  Patient's distress in regards to her relationship.   Patient reports there has been no change in protective factors since their last session.     There are no firearms in the house.     Appearance:   NA    Eye Contact:   NA    Psychomotor Behavior: Normal    Attitude:   Cooperative  Attentive   Orientation:   All   Speech    Rate / Production: Emotional    Volume:  Normal    Mood:    Depressed  Irritable  Sad     Affect:    Tearful Worrisome    Thought Content:  Clear    Thought Form:  Coherent  Logical    Insight:    Good      Medication  Review:   No changes to current psychiatric medication(s)     Medication Compliance:   Yes     Changes in Health Issues:   None reported       Chemical Use Review:   Substance Use: Chemical use reviewed, no active concerns identified      Tobacco Use: No change in amount of tobacco use since last session.  Provided encouragement to quit     Diagnosis:  Moderate Episode of Recurrent Major Depressive Disorder (F33.1)    Collateral Reports Completed:   Not Applicable    PLAN: (Patient Tasks / Therapist Tasks / Other)  Provider encouraged to utilize her safety plan and the resources given to her if any thoughts of passive suicidal ideation intensify.  Patient was also encouraged to reach out to her mother for increased support.    BECKY MANRIQUEZ    This note has been reviewed and I agree with the plan of care. This note is co-signed by JASON Bang, [facility], Supervisor, on: August 17, 2023  ______________________________________________________________________    Individual Treatment Plan    Patient's Name: Paula Ho  YOB: 1960    Date of Creation: 11/7/22  Date Treatment Plan Last Reviewed/Revised: 08/14/23    DSM5 Diagnoses: 296.32 (F33.1) Major Depressive Disorder, Recurrent Episode, Moderate _  Psychosocial / Contextual Factors: Patient has physical medical concerns that impact her current depressive symptoms.  PROMIS (reviewed every 90 days): 16    Referral / Collaboration:  Referral to another professional/service is not indicated at this time..    Anticipated number of session for this episode of care:  17+  Anticipation frequency of session: Weekly  Anticipated Duration of each session: 38-52 minutes  Treatment plan will be reviewed in 90 days or when goals have been changed.       MeasurableTreatment Goal(s) related to diagnosis / functional impairment(s)  Goal 1: Patient will develop healthy thinking patterns and beliefs about self, others, and the world.       Objective #A  (Patient Action)    Patient will Identify negative self-talk and behaviors: challenge core beliefs, myths, and actions.  Status: Continued - Date(s):08/14/23     Intervention(s)  Therapist will teach  how to identify negative self-talk and behaviors .    Objective #B  Patient will Improve concentration, focus, and mindfulness in daily activities .  Status: Continued - Date(s):08/14/23     Intervention(s)  Therapist will teach how to improve concentration, focus, and mindfulness. .      Goal 2: Patient will alleviate depressive symptoms in order to return to a previous level of functioning. As evidenced by a PHQ-9 score of 9 or less.       Objective # A  Status: Continued - Date(s):08/14/23     Patient will identify at least 4 adaptive coping statements to counteract negative thoughts    Intervention(s)  Therapist will teach adaptive coping statements to conteract negative thoughts .    Objective #B  Patient will Increase interest, engagement, and pleasure in doing things.    Status: Continued - Date(s):08/14/23     Intervention(s)  Therapist will assign homework related to increasing engagement and interest in doing things .        Patient has reviewed and agreed to the above plan.      BECKY MANRIQUEZ  August 14, 2023    This note has been reviewed and I agree with the plan of care. This note is co-signed by JASON Bang, [facility], Supervisor, on: August 17, 2023

## 2023-08-18 ENCOUNTER — VIRTUAL VISIT (OUTPATIENT)
Dept: PSYCHIATRY | Facility: CLINIC | Age: 63
End: 2023-08-18
Payer: MEDICARE

## 2023-08-18 DIAGNOSIS — F90.0 ATTENTION DEFICIT HYPERACTIVITY DISORDER (ADHD), PREDOMINANTLY INATTENTIVE TYPE: ICD-10-CM

## 2023-08-18 DIAGNOSIS — F51.04 PSYCHOPHYSIOLOGICAL INSOMNIA: ICD-10-CM

## 2023-08-18 DIAGNOSIS — F41.1 GAD (GENERALIZED ANXIETY DISORDER): ICD-10-CM

## 2023-08-18 DIAGNOSIS — E53.8 FOLATE DEFICIENCY: ICD-10-CM

## 2023-08-18 DIAGNOSIS — F33.1 MODERATE EPISODE OF RECURRENT MAJOR DEPRESSIVE DISORDER (H): Primary | ICD-10-CM

## 2023-08-18 PROCEDURE — 99443 PR PHYSICIAN TELEPHONE EVALUATION 21-30 MIN: CPT | Mod: 95 | Performed by: NURSE PRACTITIONER

## 2023-08-18 RX ORDER — FLUOXETINE 40 MG/1
40 CAPSULE ORAL 2 TIMES DAILY
Qty: 60 CAPSULE | Refills: 3 | Status: SHIPPED | OUTPATIENT
Start: 2023-08-18 | End: 2023-10-26

## 2023-08-18 RX ORDER — MIRTAZAPINE 45 MG/1
45 TABLET, FILM COATED ORAL AT BEDTIME
Qty: 30 TABLET | Refills: 3 | Status: SHIPPED | OUTPATIENT
Start: 2023-08-18 | End: 2023-10-26

## 2023-08-18 RX ORDER — TOPIRAMATE 100 MG/1
100 TABLET, FILM COATED ORAL 2 TIMES DAILY
Qty: 60 TABLET | Refills: 3 | Status: SHIPPED | OUTPATIENT
Start: 2023-08-18 | End: 2023-10-26

## 2023-08-18 RX ORDER — DEXTROAMPHETAMINE SACCHARATE, AMPHETAMINE ASPARTATE, DEXTROAMPHETAMINE SULFATE AND AMPHETAMINE SULFATE 3.75; 3.75; 3.75; 3.75 MG/1; MG/1; MG/1; MG/1
15 TABLET ORAL 2 TIMES DAILY
Qty: 60 TABLET | Refills: 0 | Status: SHIPPED | OUTPATIENT
Start: 2023-09-15 | End: 2023-10-26

## 2023-08-18 RX ORDER — FOLIC ACID 1 MG/1
1 TABLET ORAL DAILY
Qty: 90 TABLET | Refills: 3 | Status: SHIPPED | OUTPATIENT
Start: 2023-08-18

## 2023-08-18 RX ORDER — DEXTROAMPHETAMINE SACCHARATE, AMPHETAMINE ASPARTATE, DEXTROAMPHETAMINE SULFATE AND AMPHETAMINE SULFATE 3.75; 3.75; 3.75; 3.75 MG/1; MG/1; MG/1; MG/1
15 TABLET ORAL 2 TIMES DAILY
Qty: 60 TABLET | Refills: 0 | Status: SHIPPED | OUTPATIENT
Start: 2023-08-18 | End: 2023-10-26

## 2023-08-18 RX ORDER — BUPROPION HYDROCHLORIDE 100 MG/1
100 TABLET, EXTENDED RELEASE ORAL 2 TIMES DAILY
Qty: 60 TABLET | Refills: 3 | Status: SHIPPED | OUTPATIENT
Start: 2023-08-18 | End: 2023-10-26

## 2023-08-18 RX ORDER — ARIPIPRAZOLE 5 MG/1
5 TABLET ORAL DAILY
Qty: 30 TABLET | Refills: 3 | Status: SHIPPED | OUTPATIENT
Start: 2023-08-18 | End: 2023-10-26

## 2023-08-18 RX ORDER — GABAPENTIN 600 MG/1
600 TABLET ORAL DAILY
Qty: 60 TABLET | Refills: 3 | Status: SHIPPED | OUTPATIENT
Start: 2023-08-18 | End: 2023-12-21

## 2023-08-18 ASSESSMENT — PATIENT HEALTH QUESTIONNAIRE - PHQ9
SUM OF ALL RESPONSES TO PHQ QUESTIONS 1-9: 17
SUM OF ALL RESPONSES TO PHQ QUESTIONS 1-9: 17
10. IF YOU CHECKED OFF ANY PROBLEMS, HOW DIFFICULT HAVE THESE PROBLEMS MADE IT FOR YOU TO DO YOUR WORK, TAKE CARE OF THINGS AT HOME, OR GET ALONG WITH OTHER PEOPLE: SOMEWHAT DIFFICULT

## 2023-08-18 ASSESSMENT — PAIN SCALES - GENERAL: PAINLEVEL: NO PAIN (0)

## 2023-08-18 NOTE — PROGRESS NOTES
"Virtual Visit Details    Type of service:  Telephone Visit   Phone call duration: 30 minutes     Patient Location:  Home  Provider Location:  Off Site           Outpatient Psychiatric Progress Note    Name: Paula Ho   : 1960                    Primary Care Provider: LEONA ALANIZ CNP   Therapist: Yes    PHQ-9 scores:      5/10/2023     3:47 PM 7/3/2023    10:43 AM 2023    11:00 AM   PHQ-9 SCORE   PHQ-9 Total Score MyChart  4 (Minimal depression)    PHQ-9 Total Score 9 4 10       LIBRA-7 scores:      3/20/2023    12:53 PM 2023    10:38 AM 2023    11:00 AM   LIBRA-7 SCORE   Total Score 13 (moderate anxiety) 13 (moderate anxiety)    Total Score 13 13    13    13 11       Patient Identification:    Patient is a 63 year old year old, single  White Not  or  female  who presents for return visit with me.  Patient is currently disabled. Patient attended the session alone. Patient prefers to be called: \" Paula\".    Current medications include: albuterol (PROAIR HFA) 108 (90 Base) MCG/ACT inhaler, Inhale 2 puffs into the lungs every 4 hours as needed for shortness of breath or wheezing  albuterol (PROVENTIL) (2.5 MG/3ML) 0.083% neb solution, NEBULIZE THE CONTENTS OF 1 VIAL EVERY 6 HOURS AS NEEDED.  amphetamine-dextroamphetamine (ADDERALL) 15 MG tablet, Take 1 tablet (15 mg) by mouth 2 times daily Fill May 30  amphetamine-dextroamphetamine (ADDERALL) 15 MG tablet, Take 1 tablet (15 mg) by mouth 2 times daily Fill   ARIPiprazole (ABILIFY) 5 MG tablet, Take 1 tablet (5 mg) by mouth daily  buprenorphine HCl-naloxone HCl (SUBOXONE) 8-2 MG per film, Place 1 Film under the tongue daily  buPROPion (WELLBUTRIN SR) 100 MG 12 hr tablet, Take 1 tablet (100 mg) by mouth 2 times daily  cetirizine (ZYRTEC) 10 MG tablet, Take 1 tablet (10 mg) by mouth daily  cyanocobalamin (CYANOCOBALAMIN) 1000 MCG/ML injection, 1000mcg subcutaneous injection daily for 7 days, then weekly for 4 weeks, " then monthly  dexamethasone (DECADRON) 1 MG tablet, Take 1 mg at 11 pm and get a morning blood draw at 8 am for cortisol.  FLUoxetine (PROZAC) 40 MG capsule, Take 1 capsule (40 mg) by mouth 2 times daily  fluticasone-salmeterol (ADVAIR-HFA) 230-21 MCG/ACT inhaler, Inhale 2 puffs into the lungs 2 times daily  folic acid (FOLVITE) 1 MG tablet, Take 1 tablet (1 mg) by mouth daily  gabapentin (NEURONTIN) 600 MG tablet, Take 1 tablet (600 mg) by mouth daily At bedtime  levothyroxine (SYNTHROID/LEVOTHROID) 25 MCG tablet, Take 1 tablet (25 mcg) by mouth daily  medical cannabis (Patient's own supply), See Admin Instructions (The purpose of this order is to document that the patient reports taking medical cannabis.  This is not a prescription, and is not used to certify that the patient has a qualifying medical condition.)  mirtazapine (REMERON) 30 MG tablet, Take 1 tablet (30 mg) by mouth At Bedtime  Multiple Vitamins-Minerals (MULTIVITAMIN ADULT PO),   mupirocin (BACTROBAN) 2 % external ointment, Apply topically 3 times daily  NARCAN 4 MG/0.1ML nasal spray,   order for DME, Equipment being ordered: Nebulizer  oxyCODONE IR (ROXICODONE) 15 MG tablet, Take 15 mg by mouth 3 times daily + 5 mg x1 daily  tiotropium (SPIRIVA) 18 MCG inhaled capsule, Inhale 1 capsule (18 mcg) into the lungs daily  tiZANidine (ZANAFLEX) 2 MG tablet, TK 2 TO 3 TS PO QHS  topiramate (TOPAMAX) 100 MG tablet, Take 1 tablet (100 mg) by mouth 2 times daily  vitamin D3 (CHOLECALCIFEROL) 2000 units (50 mcg) tablet, Take 1 tablet by mouth daily    lidocaine (PF) (XYLOCAINE) 1 % injection 8 mL         The [place] Prescription Monitoring Program has been reviewed and there are no concerns about diversionary activity for controlled substances at this time.      I was able to review most recent Primary Care Provider, specialty provider, and therapy visit notes that I have access to.     Today, patient reports Her niece moved out on August 1.  She has discord  with her boyfriend.  Verbal altercations. She became tearful describing how he is using drugs and seeing another woman.  She does not know why she has to have someone in her life all the time.  She has a sore on her back.  She sees Fernanda for therapy.  Her Adderall is helping her to stay motivated and follow through with tasks that she needs to get done.     has a past medical history of Acute respiratory failure (H) (02/01/2020), Anxiety, Asthma, Depression, and Hypertension.    Social history updates:    Paula  lives alone.  She identifies family and friends as support for her.    Substance use updates:    No alcohol use reported  Tobacco use: Yes Cigarettes  Ready to quit?  No  Nicotine Replacement Therapy tried: None    Vital Signs:   LMP  (LMP Unknown)     Labs:    Most recent laboratory results reviewed and no new labs.     Mental Status Examination:  Appearance: awake, alert and moderate distress  Attitude: cooperative  Eye Contact:   Unable to assess  Gait and Station: No dizziness or falls  Psychomotor Behavior:   Unable to assess  Oriented to:  time, person, and place  Attention Span and Concentration:  Normal  Speech:   vtspeech: clear, coherent and Speaks: English  Mood:  anxious and depressed  Affect:  intensity is heightened  Associations:  no loose associations  Thought Process:  goal oriented  Thought Content:  passive suicidal ideation present, no auditory hallucinations present, and no visual hallucinations present  Recent and Remote Memory:  intact Not formally assessed. No amnesia.  Fund of Knowledge: appropriate  Insight:  good  Judgment: good  Impulse Control:  good    Suicide Risk Assessment:  Today Paula Ho reports no thoughts to harm themself or others. In addition, there are notable risk factors for self-harm, including single status, anxiety, substance abuse, suicidal ideation, and mood change. However, risk is mitigated by commitment to family, history of seeking help when needed,  future oriented, denies suicidal intent or plan, and denies homicidal ideation, intent, or plan. Therefore, based on all available evidence including the factors cited above, Paula Ho does not appear to be at imminent risk for self-harm, does not meet criteria for a 72-hr hold, and therefore remains appropriate for ongoing outpatient level of care.  A thorough assessment of risk factors related to suicide and self-harm have been reviewed and are noted above. The patient convincingly denies suicidality on several occasions. Local community safety resources printed and reviewed for patient to use if needed. There was no deceit detected, and the patient presented in a manner that was believable.     DSM5 Diagnosis:  Attention-Deficit/Hyperactivity Disorder  314.00 (F90.0) Predominantly inattentive presentation  296.32 (F33.1) Major Depressive Disorder, Recurrent Episode, Moderate _ and With mixed features  300.02 (F41.1) Generalized Anxiety Disorder  780.52 (G47.00) Insomnia Disorder   With non-sleep disorder mental comorbidity  Recurrent      Medical comorbidities include:   Patient Active Problem List    Diagnosis Date Noted    Angiolipoma of right kidney 01/20/2023     Priority: Medium    Adrenal nodule (H) 01/20/2023     Priority: Medium    Opioid dependence with current use (H) 06/30/2022     Priority: Medium    Infection due to 2019 novel coronavirus 12/09/2020     Priority: Medium    Hyperlipidemia 04/14/2020     Priority: Medium    Benign essential hypertension 04/14/2020     Priority: Medium    MDD (major depressive disorder), recurrent severe, without psychosis (H) 03/02/2020     Priority: Medium    Chronic obstructive pulmonary disease, unspecified COPD type (H) 02/04/2020     Priority: Medium     No PFTS  In EPIC        Attention deficit hyperactivity disorder (ADHD) 01/31/2020     Priority: Medium    Chronic midline low back pain with bilateral sciatica 01/31/2020     Priority: Medium    Brain  aneurysm 12/03/2019     Priority: Medium     Dec 2017  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 Device is MR compatible to 3 BREANN 3/2020        History of subarachnoid hemorrhage 12/22/2017     Priority: Medium     H/O of SAH, Cam 2, Hunt-Thomas 1, from a ruptured 9mm left Pcomm aneurysm with a wide neck and a fetal left PCA arising from the aneurysm neck, s/p successful coil embolization 12/23/2017 by Dr. Otto Hurley        Chronic GERD 01/26/2017     Priority: Medium    Chronic knee pain 12/12/2014     Priority: Medium    Cigarette smoker 07/08/2014     Priority: Medium    Chronic, continuous use of opioids 04/24/2013     Priority: Medium     Managed by pain clinic outside of [place].  UDS + cocaine in December 2019 without confirmation testing at her pain clinic per patient report        Morbid obesity (H) 03/27/2013     Priority: Medium    Status post knee surgery 03/27/2013     Priority: Medium     Per patient's recollection:  Circa 2002: right knee arthroscopy (Dr. Pappas)  Circa 2004: right knee partial knee replacement (Iam Ritchie MD)  Circa 2006: right TKA (Ron Ryder MD; [facility])  Circa 2008: right TKA revision due to infection (Ron Ryder MD; [facility])  Circa 2009: right TKA revision due to infection (Ron Ryder MD; [facility])  April 2011: right TKA (Ron Ryder MD; [facility])        LIBRA (generalized anxiety disorder) 09/28/2011     Priority: Medium    Chronic pain 09/28/2011     Priority: Medium     Knee and low back          DJD (degenerative joint disease) 09/28/2011     Priority: Medium    Insomnia 04/13/2011     Priority: Medium     Insomnia  NOS        Tobacco use disorder 07/31/2006     Priority: Medium    Asthma 11/15/2004     Priority: Medium     Asthma  NOS           Assessment:    Paula Ho was tearful today as she talked about relationship discord with her boyfriend.  She has been also  experiencing worsening depression symptoms and I increased her dose of mirtazapine.  Fluoxetine will continue at maximum dose as well as bupropion twice daily.  Adderall is helpful at 15 mg twice daily to help her stay organized and on task.  Abilify and gabapentin are continuing to help with anxiety and mood stabilization.  Talk therapy is urged for her to participate in so that she might learn skills to manage anxiety and her frustration at wanting to always have someone in her life, even if they do not treat her well..    Medication side effects and alternatives were reviewed. Health promotion activities recommended and reviewed today. All questions addressed. Education and counseling completed regarding risks and benefits of medications and psychotherapy options.    Treatment Plan:      1.  Increase mirtazapine to 45 mg at bedtime  2.  Gabapentin 600 mg at bedtime  3.  Topiramate 100 mg twice daily  4.  Adderall 15 mg twice daily  5.  Abilify 5 mg daily  6.  Bupropion  mg 2 times daily  7.  Fluoxetine 40 mg twice daily  8.  Folic acid 1 mg daily  9.  Talk therapy, when able to, for learning skills to manage life stressors  10.  Continue following with behavioral health home care social work services    Continue all other medications as reviewed per electronic medical record today.   Safety plan reviewed. To the Emergency Department as needed or call after hours crisis line at [phone] or [phone]. [place] Crisis Text Line. Text [place] to 394103 or Suicide LifeLine Chat: suicidepreventionlifeline.org/chat/  To schedule individual or family therapy, call [facility] at [phone]  Schedule an appointment with me in 6 weeks or sooner as needed. Call [facility] at [phone] to schedule.  Follow up with primary care provider as planned or for acute medical concerns.  Call the psychiatric nurse line with medication questions or concerns at [phone]  Janett may be  used to communicate with your provider, but this is not intended to be used for emergencies.    Crisis Resources:    National Suicide Prevention Lifeline: 748.540.9229 (TTY: 141.950.7214). Call anytime for help.  (www.suicidepreventionlifeline.org)  National [place]vid on Mental Illness (www.raudel.org): 169.990.1144 or 473-155-5623.   Mental Health Association (www.mentalhealth.org): 157.434.2828 or 592-379-3634.  [place] Crisis Text Line: Text [place] to 013093  Suicide LifeLine Chat: suicideBook A Boat.org/chat    Administrative Billing:   Time spent with patient includes counseling and coordination of care regarding above diagnoses and treatment plan.    Patient Status:  This is a continuous care patient and medications will be prescribed by the psychiatric provider until further indicated.    Signed:   [facility]   Psychiatry     Answers submitted by the patient for this visit:  Patient Health Questionnaire (Submitted on 8/18/2023)  If you checked off any problems, how difficult have these problems made it for you to do your work, take care of things at home, or get along with other people?: Somewhat difficult  PHQ9 TOTAL SCORE: 17

## 2023-08-18 NOTE — PATIENT INSTRUCTIONS
"Patient Education   The [facility]  What to Expect  Here's what to expect in the Panel Psych[facility]ry Program.   About the program  You'll be meeting with a psychiatric doctor to check your mental health. A psychiatric doctor helps you deal with troubling thoughts and feelings by giving you medicine. They'll make sure you know the plan for your care. You may see them for a long time. When you're feeling better, they may refer you back to seeing your family doctor.   If you have any questions, we'll be glad to talk to you.  About visits  Be open  At your visits, please talk openly about your problems. It may feel hard, but it's the best way for us to help you.  Cancelling visits  If you can't come to your visit, please call us right away at [phone]. If you don't cancel at least 24 hours (1 full day) before your visit, that's \"late cancellation.\"  Not showing up for your visits  Being very late is the same as not showing up. You'll be a \"no show\" if:  You're more than 15 minutes late for a 30-minute (half hour) visit.  You're more than 30 minutes late for a 60-minute (full hour) visit.  If you cancel late or don't show up 2 times within 6 months, we may end your care.  Getting help between visits  If you need help between visits, you can call us Monday to Friday from 8 a.m. to 4:30 p.m. at [phone].  Emergency care  Call 911 or go to the nearest emergency department if your life or someone else's life is in danger.  Call 988 anytime to reach the national Suicide and Crisis hotline.  Medicine refills  To refill your medicine, call your pharmacy. You can also call [facility] at [phone], Monday to Friday, 8 a.m. to 4:30 p.m. It can take 1 to 3 business days to get a refill.   Forms, letters, and tests  You may have papers to fill out, like FMLA, short-term disability, and workability. We can help you with these forms at your visits, but you must have an " appointment. You may need more than 1 visit for this, to be in an intensive therapy program, or both.  Before we can give you medicine for ADHD, we may refer you to get tested for it or confirm it another way.  We may not be able to give you an emotional support animal letter.  We don't do mental health checks ordered by the court.   We don't do mental health testing, but we can refer you to get tested.   Thank you for choosing us for your care.  For informational purposes only. Not to replace the advice of your health care provider. Copyright   2022 [facility]. All rights reserved. [employer] 426517 - 12/22.         Treatment Plan:      1.  Increase mirtazapine to 45 mg at bedtime  2.  Gabapentin 600 mg at bedtime  3.  Topiramate 100 mg twice daily  4.  Adderall 15 mg twice daily  5.  Abilify 5 mg daily  6.  Bupropion  mg 2 times daily  7.  Fluoxetine 40 mg twice daily  8.  Folic acid 1 mg daily  9.  Talk therapy, when able to, for learning skills to manage life stressors  10.  Continue following with behavioral health home care social work services        Continue all other medical directions per primary care provider.   Continue all other medications as reviewed per electronic medical record today.   Safety plan reviewed. To the Emergency Department as needed or call after hours crisis line at [phone] or [phone]. [place] Crisis Text Line: Text [place] to 366720  or  Suicide LifeLine Chat: suicidepreventionlifeline.org/chat/  To schedule individual or family therapy, call [facility] at [phone].   Schedule an appointment with me in 6 weeks or sooner as needed.  Call [facility] at [phone] to schedule.  Follow up with primary care provider as planned or for acute medical concerns.  Call the psychiatric nurse line with medication questions or concerns at [phone].  MyChart may be used to communicate with your provider, but this is not intended  to be used for emergencies.    Crisis Resources:    National Suicide Prevention Lifeline: 100.898.1649 (TTY: 990.858.8649). Call anytime for help.  (www.suicidepreventionlifeline.org)  National [place] on Mental Illness (www.raudel.org): 701.552.7053 or 142-415-4249.   Mental Health Association (www.mentalhealth.org): 804.203.4893 or 056-606-3969.  [place] Crisis Text [place]: Text [place] to 034505  Suicide LifeLine Chat: suicidepreventionlifeline.org/chat

## 2023-08-18 NOTE — NURSING NOTE
Is the patient currently in the state of [place]? YES    Visit mode:TELEPHONE    If the visit is dropped, the patient can be reconnected by: TELEPHONE VISIT: Phone number: [phone]    Will anyone else be joining the visit? No  (If patient encounters technical issues they should call [phone])    How would you like to obtain your AVS? MyChart    Are changes needed to the allergy or medication list? No    Rooming Documentation: Assigned questionnaire(s) completed .    Reason for visit: SUSAN Blood

## 2023-08-21 ENCOUNTER — VIRTUAL VISIT (OUTPATIENT)
Dept: PSYCHOLOGY | Facility: CLINIC | Age: 63
End: 2023-08-21
Payer: MEDICARE

## 2023-08-21 ENCOUNTER — VIRTUAL VISIT (OUTPATIENT)
Dept: BEHAVIORAL HEALTH | Facility: CLINIC | Age: 63
End: 2023-08-21
Payer: MEDICARE

## 2023-08-21 DIAGNOSIS — R69 DIAGNOSIS DEFERRED: Primary | ICD-10-CM

## 2023-08-21 DIAGNOSIS — F33.2 MDD (MAJOR DEPRESSIVE DISORDER), RECURRENT SEVERE, WITHOUT PSYCHOSIS (H): Primary | ICD-10-CM

## 2023-08-21 PROCEDURE — 90837 PSYTX W PT 60 MINUTES: CPT | Mod: VID
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
## 2023-08-22 ENCOUNTER — VIRTUAL VISIT (OUTPATIENT)
Dept: BEHAVIORAL HEALTH | Facility: CLINIC | Age: 63
End: 2023-08-22
Payer: MEDICARE

## 2023-08-22 DIAGNOSIS — R69 DIAGNOSIS DEFERRED: Primary | ICD-10-CM
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
## 2023-08-22 NOTE — PROGRESS NOTES
"    [facility]                                     Progress Note    Patient Name: Paula Ho  Date: 23         Service Type: Individual    Session Start Time: 11:06 AM  Session End Time: 12:06 PM      Session Length: 60 Minutes     Session #: 23    Attendees: Client attended alone    Service Modality:  Phone Visit:      Provider verified identity through the following two step process.  Patient provided:  Patient  and Patient is known previously to provider    Telephone Visit: The patient's condition can be safely assessed and treated via synchronous audio telemedicine encounter.      Reason for Audio Telemedicine Visit: Patient has requested telehealth visit    Originating Site (Patient Location): Patient's home    Distant Site (Provider Location): [facility] [place]    Consent:  The patient/guardian has verbally consented to:     1. The potential risks and benefits of telemedicine (telephone visit) versus in person care;    The patient has been notified of the following:      \"We have found that certain health care needs can be provided without the need for a face to face visit.  This service lets us provide the care you need with a phone conversation.       I will have full access to your [facility] medical record during this entire phone call.   I will be taking notes for your medical record.      Since this is like an office visit, we will bill your insurance company for this service.       There are potential benefits and risks of telephone visits (e.g. limits to patient confidentiality) that differ from in-person visits.?Confidentiality still applies for telephone services, and nobody will record the visit.  It is important to be in a quiet, private space that is free of distractions (including cell phone or other devices) during the visit.??      If during the course of the call I believe a telephone visit is not appropriate, " "you will not be charged for this service\"     Consent has been obtained for this service by care team member: Yes     DATA    Extended Session (53+ minutes):   - High distress and under complex circumstances.  See Data section for details  Interactive Complexity: No  Crisis: Yes, visit entailed Crisis Management / Stabilization requiring urgent assessment and history of the crisis state, mental status exam and disposition  -Presenting problem with life threatening or complex issues that required immediate attention to a patient in high distress       Progress Since Last Session (Related to Symptoms / Goals / Homework):   Symptoms: Worsening patient reports being worried about her safety due to her ex-husbands behaviors    Homework: Did not complete      Episode of Care Goals: Minimal progress - PREPARATION (Decided to change - considering how); Intervened by negotiating a change plan and determining options / strategies for behavior change, identifying triggers, exploring social supports, and working towards setting a date to begin behavior change     Current / Ongoing Stressors and Concerns:   Patient reports being in the hospital the night before due to increased distress.  She reports her blood pressure being high and having difficulty calming down due to her ex-'s behavior.  She reports being given medications to calm down, but states that if not for her dogs she would have liked to stay in the hospital due to her mood.  She reported having difficulty eating due to her symptoms of depression.  Patient reports feeling worried for her safety due to her ex- telling her \"she will be saying hello to God\".  She reports being worried about his increased risk behaviors with having access to a gun and increase substance use.  Paula states she would like to go to a shelter friend's home, but is in need of her pets to be with her.  Paula reports wanting to make a reports of police for her safety.  Provider " and client looked into shelters for the patient to go to the patient's safety during session        Treatment Objective(s) Addressed in This Session:   identify their fears / thoughts that contribute to feeling anxious  Increase interest, engagement, and pleasure in doing things  Decrease frequency and intensity of feeling down, depressed, hopeless  Feel less tired and more energy during the day   Improve diet, appetite, mindful eating, and / or meal planning  Identify negative self-talk and behaviors: challenge core beliefs, myths, and actions  Decrease thoughts that you'd be better off dead or of suicide / self-harm  Provider and patient looked into available shelters for the patient to go to for safety     Intervention:   Client Centered:  Patient and provider worked on skills to regulate the patient's emotions and looked into shelters the patient david be able to go to for her safety.      Motivational Interviewing     MI Intervention: Expressed Empathy/Understanding, Supported Autonomy, Collaboration, Evocation, Permission to raise concern or advise, Open-ended questions and Reframe     Change Talk Expressed by the Patient: Reasons to change Need to change Taking steps    Provider Response to Change Talk: E - Evoked more info from patient about behavior change, A - Affirmed patient's thoughts, decisions, or attempts at behavior change and R - Reflected patient's change talk       ASSESSMENT: Current Emotional / Mental Status (status of significant symptoms):   Risk status (Self / Other harm or suicidal ideation)   Patient reports the following current fears or concerns for personal safety: reports being worried about her ex-'s increased risky behaviors.    Patient reports the following current or recent suicidal ideation or behaviors: Patient reported passive suicidal ideations with no intent or plan on harming herself.   Patient denies current or recent homicidal ideation or behaviors.   Patient denies  current or recent self injurious behavior or ideation.   Patient denies other safety concerns.   Patient reports there has been a change in risk factors since their last session.  Patient's distress in regards to her relationship.   Patient reports there has been a chance in protective factors since their last session.  Patient reports letting her friends and neighbors to notify the police if her ex- is in the neighborhood   There are no firearms in the house.     Appearance:   NA    Eye Contact:   NA    Psychomotor Behavior: Normal    Attitude:   Cooperative    Orientation:   All   Speech    Rate / Production: Emotional    Volume:  Normal    Mood:    Anxious  Depressed  Sad  Panicked Fearful    Affect:    Tearful Worrisome    Thought Content:  Clear    Thought Form:  Logical    Insight:    Good      Medication Review:   No changes to current psychiatric medication(s)     Medication Compliance:   Yes     Changes in Health Issues:   None reported       Chemical Use Review:   Substance Use: Chemical use reviewed, no active concerns identified      Tobacco Use: No change in amount of tobacco use since last session.  Provided encouragement to quit     Diagnosis:  1. MDD (major depressive disorder), recurrent severe, without psychosis (H)      Collateral Reports Completed:   Not Applicable    PLAN: (Patient Tasks / Therapist Tasks / Other)  Provider encouraged patient to continued to look into shelters or availability to stay with her friends for her safety. Provider will also reach out to the patient to work on a safety plan and have the patient create a go bag. Provider will also look into other resources that may be beneficial for the patient's current situation.     BECKY MANRIQUEZ    This note has been reviewed and I agree with the plan of care. This note is co-signed by JASON Bang, [facility], Supervisor, on: August 29,  2023  ______________________________________________________________________    Individual Treatment Plan    Patient's Name: Paula Ho  YOB: 1960    Date of Creation: 11/7/22  Date Treatment Plan Last Reviewed/Revised: 08/14/23    DSM5 Diagnoses: 296.32 (F33.1) Major Depressive Disorder, Recurrent Episode, Moderate _  Psychosocial / Contextual Factors: Patient has physical medical concerns that impact her current depressive symptoms.  PROMIS (reviewed every 90 days): 16    Referral / Collaboration:  Referral to another professional/service is not indicated at this time..    Anticipated number of session for this episode of care:  17+  Anticipation frequency of session: Weekly  Anticipated Duration of each session: 38-52 minutes  Treatment plan will be reviewed in 90 days or when goals have been changed.       MeasurableTreatment Goal(s) related to diagnosis / functional impairment(s)  Goal 1: Patient will develop healthy thinking patterns and beliefs about self, others, and the world.       Objective #A (Patient Action)    Patient will Identify negative self-talk and behaviors: challenge core beliefs, myths, and actions.  Status: Continued - Date(s):08/14/23     Intervention(s)  Therapist will teach  how to identify negative self-talk and behaviors .    Objective #B  Patient will Improve concentration, focus, and mindfulness in daily activities .  Status: Continued - Date(s):08/14/23     Intervention(s)  Therapist will teach how to improve concentration, focus, and mindfulness. .      Goal 2: Patient will alleviate depressive symptoms in order to return to a previous level of functioning. As evidenced by a PHQ-9 score of 9 or less.       Objective # A  Status: Continued - Date(s):08/14/23     Patient will identify at least 4 adaptive coping statements to counteract negative thoughts    Intervention(s)  Therapist will teach adaptive coping statements to conteract negative thoughts .    Objective  #B  Patient will Increase interest, engagement, and pleasure in doing things.    Status: Continued - Date(s):08/14/23     Intervention(s)  Therapist will assign homework related to increasing engagement and interest in doing things .        Patient has reviewed and agreed to the above plan.      BECKY MANIRQUEZ  August 14, 2023

## 2023-09-18 ENCOUNTER — VIRTUAL VISIT (OUTPATIENT)
Dept: PSYCHOLOGY | Facility: CLINIC | Age: 63
End: 2023-09-18
Payer: MEDICARE

## 2023-09-18 DIAGNOSIS — F33.2 MDD (MAJOR DEPRESSIVE DISORDER), RECURRENT SEVERE, WITHOUT PSYCHOSIS (H): Primary | ICD-10-CM

## 2023-09-18 PROCEDURE — 90834 PSYTX W PT 45 MINUTES: CPT | Mod: VID

## 2023-09-18 NOTE — PROGRESS NOTES
"    [facility]                                     Progress Note    Patient Name: Paula Ho  Date: 23         Service Type: Individual    Session Start Time: 10:07 AM  Session End Time: 10::51 AM      Session Length: 44 Minutes     Session #: 25    Attendees: Client attended alone    Service Modality:  Phone Visit:      Provider verified identity through the following two step process.  Patient provided:  Patient  and Patient is known previously to provider    Telephone Visit: The patient's condition can be safely assessed and treated via synchronous audio telemedicine encounter.      Reason for Audio Telemedicine Visit: Patient has requested telehealth visit    Originating Site (Patient Location): Patient's home    Distant Site (Provider Location): [facility] [place]    Consent:  The patient/guardian has verbally consented to:     1. The potential risks and benefits of telemedicine (telephone visit) versus in person care;    The patient has been notified of the following:      \"We have found that certain health care needs can be provided without the need for a face to face visit.  This service lets us provide the care you need with a phone conversation.       I will have full access to your [facility] medical record during this entire phone call.   I will be taking notes for your medical record.      Since this is like an office visit, we will bill your insurance company for this service.       There are potential benefits and risks of telephone visits (e.g. limits to patient confidentiality) that differ from in-person visits.?Confidentiality still applies for telephone services, and nobody will record the visit.  It is important to be in a quiet, private space that is free of distractions (including cell phone or other devices) during the visit.??      If during the course of the call I believe a telephone visit is not " "appropriate, you will not be charged for this service\"     Consent has been obtained for this service by care team member: Yes     DATA    Interactive Complexity: No  Crisis: No       Progress Since Last Session (Related to Symptoms / Goals / Homework):   Symptoms: No change Patient continues to stress due to relational concerns regarding her ex     Homework: Did not complete      Episode of Care Goals: Minimal progress - PREPARATION (Decided to change - considering how); Intervened by negotiating a change plan and determining options / strategies for behavior change, identifying triggers, exploring social supports, and working towards setting a date to begin behavior change     Current / Ongoing Stressors and Concerns:   Patient reports planning to put her dogs in the care of the [employer] for some time. She reports being concerned about  the quality of the care they may receive. Patient reports plans to travel to [place] to spend time with her extended family members. Patient also continues to report stress due to relational concerns with her friends and ex . Patient reports concerns involving  her cars being in the care of other people.     Treatment Objective(s) Addressed in This Session:   identify their fears / thoughts that contribute to feeling anxious  Increase interest, engagement, and pleasure in doing things  Decrease frequency and intensity of feeling down, depressed, hopeless  Feel less tired and more energy during the day   Identify negative self-talk and behaviors: challenge core beliefs, myths, and actions  Improve concentration, focus, and mindfulness in daily activities        Intervention:   CBT:  Patient and provider worked on challenging anxious thoughts and challenging negative self-talk and negative core beliefs.     Motivational Interviewing     MI Intervention: Expressed Empathy/Understanding, Supported Autonomy, Collaboration, Evocation, Permission to raise concern or " advise, Open-ended questions and Reframe     Change Talk Expressed by the Patient: Reasons to change Need to change Taking steps    Provider Response to Change Talk: E - Evoked more info from patient about behavior change, A - Affirmed patient's thoughts, decisions, or attempts at behavior change and R - Reflected patient's change talk       ASSESSMENT: Current Emotional / Mental Status (status of significant symptoms):   Risk status (Self / Other harm or suicidal ideation)   Patient denies current fears or concerns for personal safety.    Patient denies current or recent suicidal ideation or behaviors.   Patient denies current or recent homicidal ideation or behaviors.   Patient denies current or recent self injurious behavior or ideation.   Patient denies other safety concerns.   Patient reports there has been no change in risk factors since their last session.     Patient reports there has been no change in protective factors since their last session.     Recommended that patient call 911 or go to the local ED should there be a change in any of these risk factors.     Appearance:   NA    Eye Contact:   NA    Psychomotor Behavior: Normal    Attitude:   Cooperative  Interested   Orientation:   All   Speech    Rate / Production: Normal/ Responsive Emotional    Volume:  Normal    Mood:    Anxious     Affect:    Appropriate    Thought Content:  Clear    Thought Form:  Logical    Insight:    Good      Medication Review:   No changes to current psychiatric medication(s)     Medication Compliance:   Yes     Changes in Health Issues:   None reported       Chemical Use Review:   Substance Use: Chemical use reviewed, no active concerns identified      Tobacco Use: No change in amount of tobacco use since last session.  Provided encouragement to quit     Diagnosis:  1. MDD (major depressive disorder), recurrent severe, without psychosis (H)      Collateral Reports Completed:   Not Applicable    PLAN: (Patient Tasks /  Therapist Tasks / Other)  Provider encouraged on her plan to take her dogs to be in the care of the [employer] for some time. Patient was also encouraged on her goal towards seeing her family in [place].   BECKY MANRIQUEZ    This note has been reviewed and I agree with the plan of care. This note is co-signed by JASON Bang, [facility], Supervisor, on: September 22, 2023  ______________________________________________________________________    Individual Treatment Plan    Patient's Name: Paula Ho  YOB: 1960    Date of Creation: 11/7/22  Date Treatment Plan Last Reviewed/Revised: 08/14/23    DSM5 Diagnoses: 296.32 (F33.1) Major Depressive Disorder, Recurrent Episode, Moderate _  Psychosocial / Contextual Factors: Patient has physical medical concerns that impact her current depressive symptoms.  PROMIS (reviewed every 90 days): 16    Referral / Collaboration:  Referral to another professional/service is not indicated at this time..    Anticipated number of session for this episode of care:  17+  Anticipation frequency of session: Weekly  Anticipated Duration of each session: 38-52 minutes  Treatment plan will be reviewed in 90 days or when goals have been changed.       MeasurableTreatment Goal(s) related to diagnosis / functional impairment(s)  Goal 1: Patient will develop healthy thinking patterns and beliefs about self, others, and the world.       Objective #A (Patient Action)    Patient will Identify negative self-talk and behaviors: challenge core beliefs, myths, and actions.  Status: Continued - Date(s):08/14/23     Intervention(s)  Therapist will teach  how to identify negative self-talk and behaviors .    Objective #B  Patient will Improve concentration, focus, and mindfulness in daily activities .  Status: Continued - Date(s):08/14/23     Intervention(s)  Therapist will teach how to improve concentration, focus, and mindfulness. .      Goal 2: Patient will alleviate  depressive symptoms in order to return to a previous level of functioning. As evidenced by a PHQ-9 score of 9 or less.       Objective # A  Status: Continued - Date(s):08/14/23     Patient will identify at least 4 adaptive coping statements to counteract negative thoughts    Intervention(s)  Therapist will teach adaptive coping statements to conteract negative thoughts .    Objective #B  Patient will Increase interest, engagement, and pleasure in doing things.    Status: Continued - Date(s):08/14/23     Intervention(s)  Therapist will assign homework related to increasing engagement and interest in doing things .        Patient has reviewed and agreed to the above plan.      BECKY MANRIQUEZ  August 14, 2023

## 2023-09-19 ENCOUNTER — TELEPHONE (OUTPATIENT)
Dept: BEHAVIORAL HEALTH | Facility: CLINIC | Age: 63
End: 2023-09-19
Payer: MEDICARE

## 2023-09-20 NOTE — TELEPHONE ENCOUNTER
[facility]  [facility]: [place]        Social Work Care Navigator Note      Patient: Paula Ho  Date: September 19, 2023  Preferred Name: Paula    Previous PHQ-9:       7/3/2023    10:43 AM 7/31/2023    11:00 AM 8/18/2023     2:09 PM   PHQ-9 SCORE   PHQ-9 Total Score MyChart 4 (Minimal depression)  17 (Moderately severe depression)   PHQ-9 Total Score 4 10 17     Previous LIBRA-7:       3/20/2023    12:53 PM 5/1/2023    10:38 AM 7/31/2023    11:00 AM   LIBRA-7 SCORE   Total Score 13 (moderate anxiety) 13 (moderate anxiety)    Total Score 13 13    13    13 11     ARNALDO LEVEL:       No data to display                Preferred Contact:  Need for : No  Preferred Contact: Cell      Type of Contact Today: Phone call (not reached/unavailable)        Data: (subjective / Objective):  Attempted to reach patient for scheduled check in, but was unsuccessful. [facility] left a message requesting a call back to reschedule.  Plan to attempt again.      DENI Humphries  Behavioral [facility] ([facility])   [facility]  [phone]      Next 5 appointments (look out 90 days)      Oct 10, 2023  4:00 PM  (Arrive by 3:40 PM)  Provider Visit with Renan Dickerson MD  [facility] ([facility] ) [address] [place] [id number] [phone]

## 2023-09-25 ENCOUNTER — VIRTUAL VISIT (OUTPATIENT)
Dept: PSYCHOLOGY | Facility: CLINIC | Age: 63
End: 2023-09-25
Payer: MEDICARE

## 2023-09-25 ENCOUNTER — VIRTUAL VISIT (OUTPATIENT)
Dept: BEHAVIORAL HEALTH | Facility: CLINIC | Age: 63
End: 2023-09-25
Payer: MEDICARE

## 2023-09-25 DIAGNOSIS — F33.2 MDD (MAJOR DEPRESSIVE DISORDER), RECURRENT SEVERE, WITHOUT PSYCHOSIS (H): Primary | ICD-10-CM

## 2023-09-25 DIAGNOSIS — R69 DIAGNOSIS DEFERRED: Primary | ICD-10-CM

## 2023-09-25 PROCEDURE — 90834 PSYTX W PT 45 MINUTES: CPT | Mod: 95

## 2023-09-25 ASSESSMENT — ANXIETY QUESTIONNAIRES
5. BEING SO RESTLESS THAT IT IS HARD TO SIT STILL: NOT AT ALL
7. FEELING AFRAID AS IF SOMETHING AWFUL MIGHT HAPPEN: SEVERAL DAYS
GAD7 TOTAL SCORE: 7
GAD7 TOTAL SCORE: 7
1. FEELING NERVOUS, ANXIOUS, OR ON EDGE: SEVERAL DAYS
2. NOT BEING ABLE TO STOP OR CONTROL WORRYING: SEVERAL DAYS
6. BECOMING EASILY ANNOYED OR IRRITABLE: NEARLY EVERY DAY
3. WORRYING TOO MUCH ABOUT DIFFERENT THINGS: SEVERAL DAYS
IF YOU CHECKED OFF ANY PROBLEMS ON THIS QUESTIONNAIRE, HOW DIFFICULT HAVE THESE PROBLEMS MADE IT FOR YOU TO DO YOUR WORK, TAKE CARE OF THINGS AT HOME, OR GET ALONG WITH OTHER PEOPLE: SOMEWHAT DIFFICULT

## 2023-09-25 ASSESSMENT — PATIENT HEALTH QUESTIONNAIRE - PHQ9
5. POOR APPETITE OR OVEREATING: NOT AT ALL
SUM OF ALL RESPONSES TO PHQ QUESTIONS 1-9: 7
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
## 2023-09-28 NOTE — PROGRESS NOTES
"    [facility]                                     Progress Note    Patient Name: Paula Ho  Date: 23         Service Type: Individual    Session Start Time: 11:01 AM  Session End Time: 11:46 AM      Session Length: 45 Minutes     Session #: 26    Attendees: Client attended alone    Service Modality:  Phone Visit:      Provider verified identity through the following two step process.  Patient provided:  Patient  and Patient is known previously to provider    Telephone Visit: The patient's condition can be safely assessed and treated via synchronous audio telemedicine encounter.      Reason for Audio Telemedicine Visit: Patient has requested telehealth visit    Originating Site (Patient Location): Patient's home    Distant Site (Provider Location): [facility] [place]    Consent:  The patient/guardian has verbally consented to:     1. The potential risks and benefits of telemedicine (telephone visit) versus in person care;    The patient has been notified of the following:      \"We have found that certain health care needs can be provided without the need for a face to face visit.  This service lets us provide the care you need with a phone conversation.       I will have full access to your [facility] medical record during this entire phone call.   I will be taking notes for your medical record.      Since this is like an office visit, we will bill your insurance company for this service.       There are potential benefits and risks of telephone visits (e.g. limits to patient confidentiality) that differ from in-person visits.?Confidentiality still applies for telephone services, and nobody will record the visit.  It is important to be in a quiet, private space that is free of distractions (including cell phone or other devices) during the visit.??      If during the course of the call I believe a telephone visit is not appropriate, " "you will not be charged for this service\"     Consent has been obtained for this service by care team member: Yes     DATA    Interactive Complexity: No  Crisis: No       Progress Since Last Session (Related to Symptoms / Goals / Homework):   Symptoms: No change Patient continues to stress due to financial strerssors due to investments and car related issues     Homework: Achieved / completed to satisfaction      Episode of Care Goals: Minimal progress - PREPARATION (Decided to change - considering how); Intervened by negotiating a change plan and determining options / strategies for behavior change, identifying triggers, exploring social supports, and working towards setting a date to begin behavior change     Current / Ongoing Stressors and Concerns:   Patient reports improved sleep quality and quantity that has improved her daily functioning. Patient reports speaking with her cousin to plan her upcoming trip to [place].Patient reports making the decision to not take her dogs to the [employer] for a short time, due to only being able to do so once a year. She reports being concerned about needing the resource when going through back surgery.  Patient reports looking forward to receiving a referral for her back surgery. Patient continues to report relational issues with her ex . Patient reports concerns in regards to an investment she has made. She reports being concerned she has will not receive money for her investment.     Treatment Objective(s) Addressed in This Session:   identify their fears / thoughts that contribute to feeling anxious  Increase interest, engagement, and pleasure in doing things  Decrease frequency and intensity of feeling down, depressed, hopeless  Feel less tired and more energy during the day   Identify negative self-talk and behaviors: challenge core beliefs, myths, and actions  Improve concentration, focus, and mindfulness in daily activities        Intervention:   CBT:  " Patient and provider worked on challenging anxious thoughts and challenging negative self-talk and negative core beliefs.   Client-Centered: Provider and patient also discussed patient's current frustrations regarding her vehicles. Provider and patient also discussed patient's relational concerns with her ex.     Motivational Interviewing     MI Intervention: Expressed Empathy/Understanding, Supported Autonomy, Collaboration, Evocation, Permission to raise concern or advise, Open-ended questions and Reframe     Change Talk Expressed by the Patient: Reasons to change Need to change Taking steps    Provider Response to Change Talk: E - Evoked more info from patient about behavior change, A - Affirmed patient's thoughts, decisions, or attempts at behavior change and R - Reflected patient's change talk       ASSESSMENT: Current Emotional / Mental Status (status of significant symptoms):   Risk status (Self / Other harm or suicidal ideation)   Patient denies current fears or concerns for personal safety.    Patient denies current or recent suicidal ideation or behaviors.   Patient denies current or recent homicidal ideation or behaviors.   Patient denies current or recent self injurious behavior or ideation.   Patient denies other safety concerns.   Patient reports there has been no change in risk factors since their last session.     Patient reports there has been no change in protective factors since their last session.     Recommended that patient call 911 or go to the local ED should there be a change in any of these risk factors.     Appearance:   NA    Eye Contact:   NA    Psychomotor Behavior: Normal    Attitude:   Cooperative  Interested Attentive   Orientation:   All   Speech    Rate / Production: Normal/ Responsive    Volume:  Normal    Mood:    Normal    Affect:    Appropriate    Thought Content:  Clear    Thought Form:  Logical    Insight:    Good      Medication Review:   No changes to current psychiatric  medication(s)     Medication Compliance:   Yes     Changes in Health Issues:   None reported       Chemical Use Review:   Substance Use: Chemical use reviewed, no active concerns identified      Tobacco Use: No change in amount of tobacco use since last session.  Provided encouragement to quit     Diagnosis:  1. MDD (major depressive disorder), recurrent severe, without psychosis (H)      Collateral Reports Completed:   Not Applicable    PLAN: (Patient Tasks / Therapist Tasks / Other)  Provider encouraged patient to get in contact with the [employer] system regarding her concerns with her regarding her claims.   JEFF VERONICA MISA    This note has been reviewed and I agree with the plan of care. This note is co-signed by JASON Bang, [facility], Supervisor, on: September 29, 2023  ______________________________________________________________________    Individual Treatment Plan    Patient's Name: Paula Ho  YOB: 1960    Date of Creation: 11/7/22  Date Treatment Plan Last Reviewed/Revised: 08/14/23    DSM5 Diagnoses: 296.32 (F33.1) Major Depressive Disorder, Recurrent Episode, Moderate _  Psychosocial / Contextual Factors: Patient has physical medical concerns that impact her current depressive symptoms.  PROMIS (reviewed every 90 days): 16    Referral / Collaboration:  Referral to another professional/service is not indicated at this time..    Anticipated number of session for this episode of care:  17+  Anticipation frequency of session: Weekly  Anticipated Duration of each session: 38-52 minutes  Treatment plan will be reviewed in 90 days or when goals have been changed.       MeasurableTreatment Goal(s) related to diagnosis / functional impairment(s)  Goal 1: Patient will develop healthy thinking patterns and beliefs about self, others, and the world.       Objective #A (Patient Action)    Patient will Identify negative self-talk and behaviors: challenge core beliefs, myths, and  actions.  Status: Continued - Date(s):08/14/23     Intervention(s)  Therapist will teach  how to identify negative self-talk and behaviors .    Objective #B  Patient will Improve concentration, focus, and mindfulness in daily activities .  Status: Continued - Date(s):08/14/23     Intervention(s)  Therapist will teach how to improve concentration, focus, and mindfulness. .      Goal 2: Patient will alleviate depressive symptoms in order to return to a previous level of functioning. As evidenced by a PHQ-9 score of 9 or less.       Objective # A  Status: Continued - Date(s):08/14/23     Patient will identify at least 4 adaptive coping statements to counteract negative thoughts    Intervention(s)  Therapist will teach adaptive coping statements to conteract negative thoughts .    Objective #B  Patient will Increase interest, engagement, and pleasure in doing things.    Status: Continued - Date(s):08/14/23     Intervention(s)  Therapist will assign homework related to increasing engagement and interest in doing things .        Patient has reviewed and agreed to the above plan.      BECKY MANRIQUEZ  August 14, 2023

## 2023-10-02 ENCOUNTER — TELEPHONE (OUTPATIENT)
Dept: PSYCHOLOGY | Facility: CLINIC | Age: 63
End: 2023-10-02
Payer: MEDICARE

## 2023-10-02 NOTE — TELEPHONE ENCOUNTER
Patient did not arrive for their [facility] visit as of 10:20 AM. Provider called patient and left two messages to call Behavioral Intake to reschedule an appointment with me. Provider will reach out to the patient on Tuesday 10/03 to check in with the patient.

## 2023-10-09 ENCOUNTER — VIRTUAL VISIT (OUTPATIENT)
Dept: PSYCHOLOGY | Facility: CLINIC | Age: 63
End: 2023-10-09
Payer: MEDICARE

## 2023-10-09 DIAGNOSIS — F33.1 MODERATE EPISODE OF RECURRENT MAJOR DEPRESSIVE DISORDER (H): Primary | ICD-10-CM

## 2023-10-09 PROCEDURE — 90834 PSYTX W PT 45 MINUTES: CPT | Mod: VID

## 2023-10-10 ENCOUNTER — OFFICE VISIT (OUTPATIENT)
Dept: FAMILY MEDICINE | Facility: CLINIC | Age: 63
End: 2023-10-10
Payer: MEDICARE

## 2023-10-10 VITALS
OXYGEN SATURATION: 98 % | TEMPERATURE: 98.2 F | DIASTOLIC BLOOD PRESSURE: 79 MMHG | RESPIRATION RATE: 18 BRPM | WEIGHT: 268 LBS | HEIGHT: 66 IN | HEART RATE: 70 BPM | BODY MASS INDEX: 43.07 KG/M2 | SYSTOLIC BLOOD PRESSURE: 117 MMHG

## 2023-10-10 DIAGNOSIS — L73.9 FOLLICULITIS: ICD-10-CM

## 2023-10-10 DIAGNOSIS — J44.9 STAGE 1 MILD COPD BY GOLD CLASSIFICATION (H): Primary | ICD-10-CM

## 2023-10-10 DIAGNOSIS — M51.16 LUMBAR DISC HERNIATION WITH RADICULOPATHY: ICD-10-CM

## 2023-10-10 PROCEDURE — 99214 OFFICE O/P EST MOD 30 MIN: CPT | Performed by: INTERNAL MEDICINE

## 2023-10-10 RX ORDER — BUDESONIDE AND FORMOTEROL FUMARATE DIHYDRATE 160; 4.5 UG/1; UG/1
2 AEROSOL RESPIRATORY (INHALATION) 2 TIMES DAILY
Qty: 10.2 G | Refills: 5 | Status: SHIPPED | OUTPATIENT
Start: 2023-10-10 | End: 2024-01-26

## 2023-10-10 RX ORDER — DOXYCYCLINE 100 MG/1
100 CAPSULE ORAL 2 TIMES DAILY
Qty: 28 CAPSULE | Refills: 1 | Status: SHIPPED | OUTPATIENT
Start: 2023-10-10 | End: 2024-05-21

## 2023-10-10 ASSESSMENT — PAIN SCALES - GENERAL: PAINLEVEL: SEVERE PAIN (6)

## 2023-10-10 NOTE — COMMUNITY RESOURCES LIST (ENGLISH)
10/10/2023   [facility] [employer]  N/A  For questions about this resource list or additional care needs, please contact your primary care clinic or care manager.  Phone: [phone]   Email: N/A   Address: [facility] [place] [address]   Hours: N/A        Food and Nutrition       Food pantry  1  [facility] Food Shelf Distance: 1 miles      Pickup   [address] [place] [address]  Language: English  Hours: Wed 2:30 PM - 4:30 PM  Fees: Free   Phone: (715) 848-6219 Email: office@[employer] Website: http://www.[employer]     2  [facility] - Family Table Meals - [facility] Food [facility] Distance: 1.31 miles      Pickup   [address] [place] [address]  Language: English  Hours: Wed 12:00 PM - 2:00 PM  Fees: Free   Phone: (456) 905-6455 Email: [employer] [facility].[employer] Website: http://www.[employer].org     SNAP application assistance  3  [facility] Economic Assistance Department Distance: 3.63 miles      Phone/Virtual   [address] [address]  Language: English  Hours: Mon - Fri 8:15 AM - 4:00 PM  Fees: Free   Phone: (448) 620-7896 Email: paperwork@co.[facility]. Website: http://www.[facility]./193/Economic-Assistance     4  [facility] -  [employer] (AIF[employer]) Distance: 6.01 miles      In-Person, Phone/Virtual   [address] [place] [address]  Language: Hungarian, Persian, English, Gujarati, Glory, Wolof, Arabic, Macedonian, Indonesian, Hungarian  Hours: Mon - Wed 9:00 AM - 5:00 PM , Thu 12:00 PM - 6:00 PM , Fri 9:00 AM - 5:00 PM , Sun 10:30 AM - 2:00 PM Appt. Only  Fees: Free   Phone: (552) 709-9709 Email: info@sewa-aifw.org Website: https://www.sewa-aifw.org/     Soup kitchen or free meals  5  [facility] - Family Table Meal Distance: 1 miles      In-Person, Pickup   [address] [place] [address]  Language: English  Hours: u 5:30 PM - 6:30 PM   Fees: Free   Phone: (445) 624-9237 Email: office@[employer]" Website: http://www.[employer].org     6  [facility] - Family Table Meals - Family Table Meal Distance: 1.31 miles      Pickup   [address] [place] [address]  Language: English  Hours: Thu 5:00 PM - 6:30 PM  Fees: Free   Phone: (438) 515-2706 Email: Waggl@[employer]" Website: http://www.[employer]."[employer]"          Important Numbers & Websites       Emergency Services   911  [facility]   311  Poison Control   (451) 813-4472  Suicide Prevention Lifeline   (549) 933-4133 (TALK)  Child Abuse Hotline   (807) 134-8338 (4-A-Child)  Sexual Assault Hotline   (708) 758-3075 (HOPE)  National Runaway Safeline   (161) 851-9549 (RUNAWAY)  All-Options Talkline   (613) 394-4533  Substance Abuse Referral   (960) 464-1669 (HELP)

## 2023-10-10 NOTE — PROGRESS NOTES
"                                                      [facility] INTERNAL MEDICINE NOTE    Paula Ho is a 63 year old female who presents to clinic today for the following health issues:    Back Pain  Duration of complaint: chronic   Specific cause: overuse.  Description:   Location of pain: low back bilateral  Character of pain: dull ache  Pain radiation:none  Intensity: mild  Accompanying Signs & Symptoms:  Fever: no   Numbness or weakness in legs:  no  Dysuria or Hematuria: no  Bowel or bladder incontinence: no  History:   Any injury (lifting, bending, twisting): no   Work Injury: no  History of back problems: previous degenerative joint disease of the lumbar spine  Any previous MRI or X-rays: YES  Any history of back surgery: no   Any cancer history: no   Precipitating factors:   Worsened by: Lifting and Standing.  Alleviating factors:  Improved by: yes.  Therapies Tried and outcome: none.     COPD follow-up   Overall, how are your COPD symptoms since your last clinic visit?  Stable   How much fatigue or shortness of breath do you have when you are walking?  Same as usual  How much shortness of breath do you have when you are resting?  Same as usual  How often do you cough? Rarely  Have you noticed any change in your sputum/phlegm?  No  Have you experienced a recent fever? No  Please describe how far you can walk without stopping to rest:  Not obtained.  How many flights of stairs are you able to walk up without stopping?  2  Have you had any Emergency Room Visits, Urgent Care Visits, or Hospital Admissions because of your COPD since your last office visit?  No      History   Smoking Status    Every Day    Packs/day: 1.00    Types: Cigarettes   Smokeless Tobacco    Never     No results found for: \"FEV1\", \"KYY5RGK\"     ______________________________________________________________________________________      Patient Active Problem List   Diagnosis    Chronic knee pain    LIBRA (generalized " anxiety disorder)    Brain aneurysm    Chronic, continuous use of opioids    Asthma    Chronic GERD    Chronic pain    Cigarette smoker    DJD (degenerative joint disease)    Insomnia    Morbid obesity (H)    History of subarachnoid hemorrhage    Status post knee surgery    Tobacco use disorder    Chronic obstructive pulmonary disease, unspecified COPD type (H)    MDD (major depressive disorder), recurrent severe, without psychosis (H)    Attention deficit hyperactivity disorder (ADHD)    Chronic midline low back pain with bilateral sciatica    Hyperlipidemia    Benign essential hypertension    Infection due to 2019 novel coronavirus    Opioid dependence with current use (H)    Angiolipoma of right kidney    Adrenal nodule (H24)     Past Surgical History:   Procedure Laterality Date    APPENDECTOMY      APPENDECTOMY      CEREBRAL ANEURYSM REPAIR      JOINT REPLACEMENT      ORTHOPEDIC SURGERY  2002    Circa 2002: right knee arthroscopy (Dr. Pappas)    ORTHOPEDIC SURGERY  2004    Circa 2004: right knee partial knee replacement (Iam Ritchie MD)    ORTHOPEDIC SURGERY  2006    Circa 2006: right TKA (Ron Ryder MD; [facility])    ORTHOPEDIC SURGERY  2008    Circa 2008: right TKA revision due to infection (Ron Ryder MD; [facility])    ORTHOPEDIC SURGERY  2009    Circa 2009: right TKA revision due to infection (Ron Ryder MD; [facility])    ORTHOPEDIC SURGERY  2011 April 2011: right TKA (Ron Ryder MD; [facility])    REPLACEMENT TOTAL KNEE Right     TONSILLECTOMY      tonsils    TONSILLECTOMY         Social History     Tobacco Use    Smoking status: Every Day     Packs/day: 1     Types: Cigarettes    Smokeless tobacco: Never    Tobacco comments:     Patient has been trying patches and they have not been working.   Substance Use Topics    Alcohol use: Not Currently     Family History   Problem Relation Age of Onset    Depression Mother     Substance Abuse Father           Allergies   Allergen Reactions    Compazine [Prochlorperazine]     Droperidol     Lisinopril     Morphine      Other reaction(s): hives    Pravastatin      Other reaction(s): Edema    Seroquel [Quetiapine]      Recent Labs   Lab Test 04/02/23  0918 01/18/23  1204 03/15/22  1511 07/12/21  1218 05/20/21  0811 03/02/21  1606 02/23/21  1551   A1C 5.9*  --   --   --   --   --   --    LDL  --   --  128*  --  139*  --   --    HDL  --   --   --   --  51  --   --    TRIG  --   --   --   --  82  --   --    ALT 18 25 32  --  24  --  28   CR 0.57 0.55 0.51*   < > 0.65  --  0.55   GFRESTIMATED >90 >90 >90   < > >90  --  >90   GFRESTBLACK  --   --   --   --  >90  --  >90   POTASSIUM 4.3 4.8 4.1   < > 4.2  --  4.0   TSH  --  3.53 3.71   < >  --    < > 5.77*    < > = values in this interval not displayed.      BP Readings from Last 3 Encounters:   10/10/23 117/79   07/24/23 135/79   06/16/23 122/84    Wt Readings from Last 3 Encounters:   10/10/23 121.6 kg (268 lb)   07/24/23 121.1 kg (267 lb)   06/16/23 119.8 kg (264 lb 3.2 oz)           ROS:  C: NEGATIVE for fever, chills, change in weight  I: As above.  E: NEGATIVE for vision changes or irritation  E/M: NEGATIVE for ear, mouth and throat problems  R: NEGATIVE for significant cough or SOB  B: NEGATIVE for masses, tenderness or discharge  CV: NEGATIVE for chest pain, palpitations or peripheral edema  GI: NEGATIVE for nausea, abdominal pain, heartburn, or change in bowel habits  : NEGATIVE for frequency, dysuria, or hematuria  M: NEGATIVE for significant arthralgias or myalgia  N: NEGATIVE for weakness, dizziness or paresthesias  E: NEGATIVE for temperature intolerance, skin/hair changes  H: NEGATIVE for bleeding problems  P: NEGATIVE for changes in mood or affect    OBJECTIVE:                                                    /79 (BP Location: Left arm, Patient Position: Chair, Cuff Size: Adult Regular)   Pulse 70   Temp 98.2  F (36.8  C) (Oral)   Resp 18   Ht  "1.676 m (5' 6\")   Wt 121.6 kg (268 lb)   LMP  (LMP Unknown)   SpO2 98%   BMI 43.26 kg/m    Body mass index is 43.26 kg/m .  GENERAL: healthy, alert and no distress  EYES: Eyes grossly normal to inspection and conjunctivae and sclerae normal  HENT: normal cephalic/atraumatic  RESP: lungs clear to auscultation - no rales, rhonchi or wheezes  CV: regular rates and rhythm and no peripheral edema  NEURO: Normal strength and tone, mentation intact and speech normal  Comprehensive back pain exam:  No tenderness, Pain limits the following motions: extension and flexion, and Straight leg raise negative bilaterally  PSYCH: mentation appears normal, affect normal/bright    Diagnostic Test Results:       MR LUMBAR SPINE W/O CONTRAST 3/16/2021 11:44 AM     Provided History: Low back pain, > 6 wks; low back pain with  radiculopathy x4+ years; Chronic midline low back pain with bilateral  sciatica; Chronic midline low back pain with bilateral sciatica;  Chronic midline low back pain with bilateral sciatica     ICD-10: Chronic midline low back pain with bilateral sciatica; Chronic  midline low back pain with bilateral sciatica; Chronic midline low  back pain with bilateral sciatica     Comparison: Lumbar spine radiograph 2/3/2021     Technique: Sagittal T1-weighted, sagittal STIR, 3D volumetric axial  and sagittal reconstructed T2-weighted images of the lumbar spine were  obtained without intravenous contrast.      Findings: There are 5 lumbar-type vertebrae assumed for the purposes  of this dictation.  The tip of the conus medullaris is at L1.  There  is mild grade 1 anterolisthesis of L3 on L4 and of L4 on L5.  There is  multilevel disc height narrowing and less intradiscal T2 hyperintense  signal scattered throughout the lumbar spine.  There are degenerative  changes of the opposing endplates of L5-S1. Scattered foci of T1 and  T2 hyperintense signal in the vertebral bodies likely represent a  benign process such as " cavernous hemangiomas or possibly focal fatty  deposition. On a level by level basis:     T12-L1: No spinal canal or neural foraminal stenosis.     L1-2: Circumferential disc bulge.. Mild neural foraminal stenosis  bilaterally. Spinal canal is patent.     L2-3: Circumferential disc bulge. Mild neural foraminal stenosis  bilaterally. Spinal canal is patent.     L3-4: Mild grade 1 anterolisthesis. Uncovering of the posterior disc.  Mild facet arthropathy. Mild left neural foraminal stenosis. Right  neural foramen and spinal canal are patent.     L4-5: Mild grade 1 anterolisthesis. Uncovering of the posterior disc.  Mild left neural foraminal stenosis. Right neural foramen and spinal  canal are patent.     L5-S1: Circumferential disc bulge with a superimposed central disc  protrusion and annular fissure. Facet arthropathy bilaterally. Mild to  moderate right neural foraminal stenosis and mild left neural  foraminal stenosis. Spinal canal is patent.     Paraspinous tissues are within normal limits.                                                                      Impression: Multilevel lumbar spondylosis, most pronounced at L5-S1  with mild to moderate right neural foraminal stenosis and mild left  neural foraminal stenosis. No evidence for high-grade spinal canal  stenosis at any level.         ANATOLY MOTA MD     IMPRESSION:    Mild Airflow Obstruction    No significant change following bronchodilators but this does not rule out clinical efficacy.    Normal diffusing capacity.    The increased lung volumes indicate hyperinflation.    No previous studies available for comparison.    Elaine Hernandez MD        ____________________________________________MBenDELAINE COSME      This interpretation has been electronically signed:  ELAINE HERNANDEZ 10/07/2021  05:56:47 PM        Specimen Collected: 10/07/21  3:09 PM Last Resulted: 10/07/21  6:04 PM           ASSESSMENT/PLAN:                                                       Stage 1 mild COPD by GOLD classification (H)  - budesonide-formoterol (SYMBICORT) 160-4.5 MCG/ACT Inhaler; Inhale 2 puffs into the lungs 2 times daily    Folliculitis  - doxycycline monohydrate (MONODOX) 100 MG capsule; Take 1 capsule (100 mg) by mouth 2 times daily    Lumbar disc herniation with radiculopathy  - Spine  Referral     Disposition:  Follow-up in 4 weeks or as needed.    Renan Dickerson MD  [facility]

## 2023-10-10 NOTE — COMMUNITY RESOURCES LIST (ENGLISH)
10/10/2023   [facility]  N/A  For questions about this resource list or additional care needs, please contact your primary care clinic or care manager.  Phone: [phone]   Email: N/A   Address: [facility] [place] [address]   Hours: N/A        Food and Nutrition       Food pantry  1  [facility] Food Shelf Distance: 1 miles      Pickup   [address] [place] [address]  Language: English  Hours: Wed 2:30 PM - 4:30 PM  Fees: Free   Phone: (541) 369-9411 Email: office@[place] Website: http://www.worldhistoryproject.G[employer] Party     2  [facility] - Family Table Meals - [employer] [facility] Distance: 1.31 miles      Pickup   [address] [place] [address]  Language: English  Hours: Wed 12:00 PM - 2:00 PM  Fees: Free   Phone: (132) 733-9971 Email: [employer]@Brockt[facility].[facility].[employer] Website: http://www.[employer].org     SNAP application assistance  3  [facility] Economic Assistance Department Distance: 3.63 miles      Phone/Virtual   [address] [address]  Language: English  Hours: Mon - Fri 8:15 AM - 4:00 PM  Fees: Free   Phone: (861) 329-9746 Email: paperwork@co.[facility]. Website: http://www.[facility]./193/Economic-Assistance     4  [facility] -  [employer] (AIFW) Distance: 6.01 miles      In-Person, Phone/Virtual   [address] [place] [address]  Language: Amharic, Yi, English, Gujarati, Glory, Yakut, Italian, Romanian, Belarusian, Latvian  Hours: Mon - Wed 9:00 AM - 5:00 PM , Thu 12:00 PM - 6:00 PM , Fri 9:00 AM - 5:00 PM , Sun 10:30 AM - 2:00 PM Appt. Only  Fees: Free   Phone: (366) 147-9070 Email: info@sewa-aifw.org Website: https://www.sewa-aifw.org/     Soup kitchen or free meals  5  [facility] - Family Table Meal Distance: 1 miles      In-Person, Pickup   [address] [place] [address]  Language: English  Hours: u 5:30 PM - 6:30 PM   Fees: Free   Phone: (727) 210-1555 Email: office@[place] Website: http://www.[place].org     6  [facility] - Family Table Meals - Family Table Meal Distance: 1.31 miles      Pickup   [address] [place] [address]  Language: English  Hours: Thu 5:00 PM - 6:30 PM  Fees: Free   Phone: (381) 839-5058 Email: [employer] Website: http://www.[facility].[employer]          Important Numbers & Websites       Emergency Services   911  [facility] Services   311  Poison Control   (824) 700-4298  Suicide Prevention Lifeline   (314) 790-7979 (TALK)  Child Abuse Hotline   (841) 338-7248 (4-A-Child)  Sexual Assault Hotline   (937) 112-6009 (HOPE)  National Runaway Safeline   (897) 876-5386 (RUNAWAY)  All-Options Talkline   (555) 990-9724  Substance Abuse Referral   (413) 951-5859 (HELP)

## 2023-10-10 NOTE — PATIENT INSTRUCTIONS
At [facility], we strive to deliver an exceptional experience to you, every time we see you. If you receive a survey, please complete it as we do value your feedback.  If you have MyChart, you can expect to receive results automatically within 24 hours of their completion.  Your provider will send a note interpreting your results as well.   If you do not have MyChart, you should receive your results in about a week by mail.    Your care team:                            Family Medicine Internal Medicine   MD Renan Chambers MD Shantel Branch-Fleming, MD Srinivasa Vaka, MD Katya Belousova, PAPANKAJ Dobson, MD Pediatrics   Taurus Torres, PAMD Dorie Jiménez MD Amelia Massimini APRN CNP   LEONA Buckner CNP, MD Charanya Pasupathi, MD Kathleen Widmer, NP coming October 2023 Same-Day (No follow up visit)    STEPH Vázquez PA coming Oct 2023     Clinic hours: Monday - Thursday 7 am-6 pm; Fridays 7 am-5 pm.   Urgent care: Monday - Friday 10 am- 8 pm; Saturday and Sunday 9 am-5 pm.    Clinic: [phone]       [facility]: Monday - Thursday 8 am - 7 pm; Friday 8 am - 6 pm  [facility]: [phone]

## 2023-10-16 ENCOUNTER — VIRTUAL VISIT (OUTPATIENT)
Dept: BEHAVIORAL HEALTH | Facility: CLINIC | Age: 63
End: 2023-10-16
Payer: MEDICARE

## 2023-10-16 DIAGNOSIS — R69 DIAGNOSIS DEFERRED: Primary | ICD-10-CM

## 2023-10-16 PROCEDURE — 99207 PR NO BILLABLE SERVICE THIS VISIT: CPT
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
## 2023-10-17 NOTE — PROGRESS NOTES
"    [facility]                                     Progress Note    Patient Name: Paula Ho  Date: 10/09/23         Service Type: Individual    Session Start Time: 11:06 AM  Session End Time: 11:48 AM      Session Length: 42 Minutes     Session #: 27    Attendees: Client attended alone    Service Modality:  Phone Visit:      Provider verified identity through the following two step process.  Patient provided:  Patient  and Patient is known previously to provider    Telephone Visit: The patient's condition can be safely assessed and treated via synchronous audio telemedicine encounter.      Reason for Audio Telemedicine Visit: Patient has requested telehealth visit    Originating Site (Patient Location): Patient's home    Distant Site (Provider Location): [facility] [place]    Consent:  The patient/guardian has verbally consented to:     1. The potential risks and benefits of telemedicine (telephone visit) versus in person care;    The patient has been notified of the following:      \"We have found that certain health care needs can be provided without the need for a face to face visit.  This service lets us provide the care you need with a phone conversation.       I will have full access to your [facility] medical record during this entire phone call.   I will be taking notes for your medical record.      Since this is like an office visit, we will bill your insurance company for this service.       There are potential benefits and risks of telephone visits (e.g. limits to patient confidentiality) that differ from in-person visits.?Confidentiality still applies for telephone services, and nobody will record the visit.  It is important to be in a quiet, private space that is free of distractions (including cell phone or other devices) during the visit.??      If during the course of the call I believe a telephone visit is not " "appropriate, you will not be charged for this service\"     Consent has been obtained for this service by care team member: Yes     DATA    Interactive Complexity: No  Crisis: No       Progress Since Last Session (Related to Symptoms / Goals / Homework):   Symptoms: No change Patient continues to report frustration regarding her relationship and back pain     Homework: Achieved / completed to satisfaction      Episode of Care Goals: Minimal progress - PREPARATION (Decided to change - considering how); Intervened by negotiating a change plan and determining options / strategies for behavior change, identifying triggers, exploring social supports, and working towards setting a date to begin behavior change     Current / Ongoing Stressors and Concerns:   Patient reports being frustrated due to her phone not working properly because of a virus in it. Patient continues to report financial stressors in regards to a previous investment she had made and not receiving money for it. Patient also worries about completing [address] paperwork for her apartment she reports is due soon. Paula continues to report feeling upset about her relationship with her ex .  Patient reports increased pain in her back and the possibility of needing to move homes before her future back surgery.     Treatment Objective(s) Addressed in This Session:   identify their fears / thoughts that contribute to feeling anxious  Increase interest, engagement, and pleasure in doing things  Decrease frequency and intensity of feeling down, depressed, hopeless  Feel less tired and more energy during the day   Identify negative self-talk and behaviors: challenge core beliefs, myths, and actions  Improve concentration, focus, and mindfulness in daily activities        Intervention:   CBT:  Patient and provider worked on challenging anxious thoughts and challenging negative self-talk and negative core beliefs.   Client-Centered: Provider and patient also " discussed patient's current frustrations regarding financial stressors. Provider and patient also discussed patient's relational concerns regarding her physical health.     Motivational Interviewing     MI Intervention: Expressed Empathy/Understanding, Supported Autonomy, Collaboration, Evocation, Permission to raise concern or advise, Open-ended questions and Reframe     Change Talk Expressed by the Patient: Reasons to change Need to change Taking steps    Provider Response to Change Talk: E - Evoked more info from patient about behavior change, A - Affirmed patient's thoughts, decisions, or attempts at behavior change and R - Reflected patient's change talk       ASSESSMENT: Current Emotional / Mental Status (status of significant symptoms):   Risk status (Self / Other harm or suicidal ideation)   Patient denies current fears or concerns for personal safety.    Patient denies current or recent suicidal ideation or behaviors.   Patient denies current or recent homicidal ideation or behaviors.   Patient denies current or recent self injurious behavior or ideation.   Patient denies other safety concerns.   Patient reports there has been no change in risk factors since their last session.     Patient reports there has been no change in protective factors since their last session.     Recommended that patient call 911 or go to the local ED should there be a change in any of these risk factors.     Appearance:   NA    Eye Contact:   NA    Psychomotor Behavior: Normal    Attitude:   Cooperative  Interested Attentive   Orientation:   All   Speech    Rate / Production: Normal/ Responsive    Volume:  Normal    Mood:    Anxious     Affect:    Worrisome    Thought Content:  Rumination    Thought Form:  Logical    Insight:    Good      Medication Review:   No changes to current psychiatric medication(s)     Medication Compliance:   Yes     Changes in Health Issues:   None reported       Chemical Use Review:   Substance Use:  Chemical use reviewed, no active concerns identified      Tobacco Use: No change in amount of tobacco use since last session.  Provided encouragement to quit     Diagnosis:  1. MDD (major depressive disorder), recurrent severe, without psychosis (H)      Collateral Reports Completed:   Not Applicable    PLAN: (Patient Tasks / Therapist Tasks / Other)  Provider encouraged patient to towards decrease of caffeine and tobacco intake in order to improve her changes of going through back surgery.   BECKY MANRIQUEZ    This note has been reviewed and I agree with the plan of care. This note is co-signed by JASON Bang, [facility] Care[facility], Supervisor, on: October 17, 2023  ______________________________________________________________________    Individual Treatment Plan    Patient's Name: Paula Ho  YOB: 1960    Date of Creation: 11/7/22  Date Treatment Plan Last Reviewed/Revised: 08/14/23    DSM5 Diagnoses: 296.32 (F33.1) Major Depressive Disorder, Recurrent Episode, Moderate _  Psychosocial / Contextual Factors: Patient has physical medical concerns that impact her current depressive symptoms.  PROMIS (reviewed every 90 days): 16    Referral / Collaboration:  Referral to another professional/service is not indicated at this time..    Anticipated number of session for this episode of care:  17+  Anticipation frequency of session: Weekly  Anticipated Duration of each session: 38-52 minutes  Treatment plan will be reviewed in 90 days or when goals have been changed.       MeasurableTreatment Goal(s) related to diagnosis / functional impairment(s)  Goal 1: Patient will develop healthy thinking patterns and beliefs about self, others, and the world.       Objective #A (Patient Action)    Patient will Identify negative self-talk and behaviors: challenge core beliefs, myths, and actions.  Status: Continued - Date(s):08/14/23     Intervention(s)  Therapist will teach  how to identify negative self-talk and  behaviors .    Objective #B  Patient will Improve concentration, focus, and mindfulness in daily activities .  Status: Continued - Date(s):08/14/23     Intervention(s)  Therapist will teach how to improve concentration, focus, and mindfulness. .      Goal 2: Patient will alleviate depressive symptoms in order to return to a previous level of functioning. As evidenced by a PHQ-9 score of 9 or less.       Objective # A  Status: Continued - Date(s):08/14/23     Patient will identify at least 4 adaptive coping statements to counteract negative thoughts    Intervention(s)  Therapist will teach adaptive coping statements to conteract negative thoughts .    Objective #B  Patient will Increase interest, engagement, and pleasure in doing things.    Status: Continued - Date(s):08/14/23     Intervention(s)  Therapist will assign homework related to increasing engagement and interest in doing things .        Patient has reviewed and agreed to the above plan.      BECKY MANRIQUEZ  August 14, 2023

## 2023-10-26 ENCOUNTER — VIRTUAL VISIT (OUTPATIENT)
Dept: PSYCHIATRY | Facility: CLINIC | Age: 63
End: 2023-10-26
Payer: MEDICARE

## 2023-10-26 DIAGNOSIS — F51.04 PSYCHOPHYSIOLOGICAL INSOMNIA: ICD-10-CM

## 2023-10-26 DIAGNOSIS — F90.0 ATTENTION DEFICIT HYPERACTIVITY DISORDER (ADHD), PREDOMINANTLY INATTENTIVE TYPE: ICD-10-CM

## 2023-10-26 DIAGNOSIS — F41.1 GAD (GENERALIZED ANXIETY DISORDER): ICD-10-CM

## 2023-10-26 DIAGNOSIS — F33.1 MODERATE EPISODE OF RECURRENT MAJOR DEPRESSIVE DISORDER (H): Primary | ICD-10-CM

## 2023-10-26 PROCEDURE — 99214 OFFICE O/P EST MOD 30 MIN: CPT | Mod: 95 | Performed by: NURSE PRACTITIONER

## 2023-10-26 RX ORDER — ARIPIPRAZOLE 5 MG/1
5 TABLET ORAL DAILY
Qty: 30 TABLET | Refills: 3 | Status: SHIPPED | OUTPATIENT
Start: 2023-10-26 | End: 2023-12-21

## 2023-10-26 RX ORDER — FLUOXETINE 40 MG/1
40 CAPSULE ORAL 2 TIMES DAILY
Qty: 60 CAPSULE | Refills: 3 | Status: SHIPPED | OUTPATIENT
Start: 2023-10-26 | End: 2023-12-21

## 2023-10-26 RX ORDER — TOPIRAMATE 100 MG/1
100 TABLET, FILM COATED ORAL 2 TIMES DAILY
Qty: 60 TABLET | Refills: 3 | Status: SHIPPED | OUTPATIENT
Start: 2023-10-26 | End: 2023-12-21

## 2023-10-26 RX ORDER — MIRTAZAPINE 45 MG/1
45 TABLET, FILM COATED ORAL AT BEDTIME
Qty: 30 TABLET | Refills: 3 | Status: SHIPPED | OUTPATIENT
Start: 2023-10-26 | End: 2023-12-21

## 2023-10-26 RX ORDER — DEXTROAMPHETAMINE SACCHARATE, AMPHETAMINE ASPARTATE, DEXTROAMPHETAMINE SULFATE AND AMPHETAMINE SULFATE 3.75; 3.75; 3.75; 3.75 MG/1; MG/1; MG/1; MG/1
15 TABLET ORAL 2 TIMES DAILY
Qty: 60 TABLET | Refills: 0 | Status: SHIPPED | OUTPATIENT
Start: 2023-10-26 | End: 2023-12-21

## 2023-10-26 RX ORDER — BUPROPION HYDROCHLORIDE 100 MG/1
100 TABLET, EXTENDED RELEASE ORAL 2 TIMES DAILY
Qty: 60 TABLET | Refills: 3 | Status: SHIPPED | OUTPATIENT
Start: 2023-10-26 | End: 2023-12-21

## 2023-10-26 RX ORDER — DEXTROAMPHETAMINE SACCHARATE, AMPHETAMINE ASPARTATE, DEXTROAMPHETAMINE SULFATE AND AMPHETAMINE SULFATE 3.75; 3.75; 3.75; 3.75 MG/1; MG/1; MG/1; MG/1
15 TABLET ORAL 2 TIMES DAILY
Qty: 60 TABLET | Refills: 0 | Status: SHIPPED | OUTPATIENT
Start: 2023-11-23 | End: 2023-12-21

## 2023-10-26 ASSESSMENT — ANXIETY QUESTIONNAIRES
4. TROUBLE RELAXING: NOT AT ALL
4. TROUBLE RELAXING: NOT AT ALL
7. FEELING AFRAID AS IF SOMETHING AWFUL MIGHT HAPPEN: NOT AT ALL
2. NOT BEING ABLE TO STOP OR CONTROL WORRYING: SEVERAL DAYS
IF YOU CHECKED OFF ANY PROBLEMS ON THIS QUESTIONNAIRE, HOW DIFFICULT HAVE THESE PROBLEMS MADE IT FOR YOU TO DO YOUR WORK, TAKE CARE OF THINGS AT HOME, OR GET ALONG WITH OTHER PEOPLE: SOMEWHAT DIFFICULT
5. BEING SO RESTLESS THAT IT IS HARD TO SIT STILL: NOT AT ALL
3. WORRYING TOO MUCH ABOUT DIFFERENT THINGS: SEVERAL DAYS
1. FEELING NERVOUS, ANXIOUS, OR ON EDGE: MORE THAN HALF THE DAYS
2. NOT BEING ABLE TO STOP OR CONTROL WORRYING: SEVERAL DAYS
5. BEING SO RESTLESS THAT IT IS HARD TO SIT STILL: NOT AT ALL
7. FEELING AFRAID AS IF SOMETHING AWFUL MIGHT HAPPEN: NOT AT ALL
GAD7 TOTAL SCORE: 4
7. FEELING AFRAID AS IF SOMETHING AWFUL MIGHT HAPPEN: NOT AT ALL
GAD7 TOTAL SCORE: 4
IF YOU CHECKED OFF ANY PROBLEMS ON THIS QUESTIONNAIRE, HOW DIFFICULT HAVE THESE PROBLEMS MADE IT FOR YOU TO DO YOUR WORK, TAKE CARE OF THINGS AT HOME, OR GET ALONG WITH OTHER PEOPLE: SOMEWHAT DIFFICULT
GAD7 TOTAL SCORE: 4
8. IF YOU CHECKED OFF ANY PROBLEMS, HOW DIFFICULT HAVE THESE MADE IT FOR YOU TO DO YOUR WORK, TAKE CARE OF THINGS AT HOME, OR GET ALONG WITH OTHER PEOPLE?: SOMEWHAT DIFFICULT
3. WORRYING TOO MUCH ABOUT DIFFERENT THINGS: SEVERAL DAYS
GAD7 TOTAL SCORE: 4
1. FEELING NERVOUS, ANXIOUS, OR ON EDGE: MORE THAN HALF THE DAYS
6. BECOMING EASILY ANNOYED OR IRRITABLE: NOT AT ALL
6. BECOMING EASILY ANNOYED OR IRRITABLE: NOT AT ALL
GAD7 TOTAL SCORE: 4

## 2023-10-26 ASSESSMENT — PATIENT HEALTH QUESTIONNAIRE - PHQ9
10. IF YOU CHECKED OFF ANY PROBLEMS, HOW DIFFICULT HAVE THESE PROBLEMS MADE IT FOR YOU TO DO YOUR WORK, TAKE CARE OF THINGS AT HOME, OR GET ALONG WITH OTHER PEOPLE: SOMEWHAT DIFFICULT
SUM OF ALL RESPONSES TO PHQ QUESTIONS 1-9: 6
SUM OF ALL RESPONSES TO PHQ QUESTIONS 1-9: 6

## 2023-10-26 ASSESSMENT — PAIN SCALES - GENERAL: PAINLEVEL: EXTREME PAIN (8)

## 2023-10-26 NOTE — PATIENT INSTRUCTIONS
"Patient Education   [facility]  What to Expect  Here's what to expect in the [facility].   About the program  You'll be meeting with a psychiatric doctor to check your mental health. A psychiatric doctor helps you deal with troubling thoughts and feelings by giving you medicine. They'll make sure you know the plan for your care. You may see them for a long time. When you're feeling better, they may refer you back to seeing your family doctor.   If you have any questions, we'll be glad to talk to you.  About visits  Be open  At your visits, please talk openly about your problems. It may feel hard, but it's the best way for us to help you.  Cancelling visits  If you can't come to your visit, please call us right away at [phone]. If you don't cancel at least 24 hours (1 full day) before your visit, that's \"late cancellation.\"  Not showing up for your visits  Being very late is the same as not showing up. You'll be a \"no show\" if:  You're more than 15 minutes late for a 30-minute (half hour) visit.  You're more than 30 minutes late for a 60-minute (full hour) visit.  If you cancel late or don't show up 2 times within 6 months, we may end your care.  Getting help between visits  If you need help between visits, you can call us Monday to Friday from 8 a.m. to 4:30 p.m. at [phone].  Emergency care  Call 911 or go to the nearest emergency department if your life or someone else's life is in danger.  Call 988 anytime to reach the national Suicide and Crisis hotline.  Medicine refills  To refill your medicine, call your pharmacy. You can also call [facility]'s [facility] at [phone], Monday to Friday, 8 a.m. to 4:30 p.m. It can take 1 to 3 business days to get a refill.   Forms, letters, and tests  You may have papers to fill out, like FMLA, short-term disability, and workability. We can help you with these forms at your visits, but you must have an " appointment. You may need more than 1 visit for this, to be in an intensive therapy program, or both.  Before we can give you medicine for ADHD, we may refer you to get tested for it or confirm it another way.  We may not be able to give you an emotional support animal letter.  We don't do mental health checks ordered by the court.   We don't do mental health testing, but we can refer you to get tested.   Thank you for choosing us for your care.  For informational purposes only. Not to replace the advice of your health care provider. Copyright   2022 [facility]. All rights reserved. [employer] 419021 - 12/22.       Treatment Plan:      1.  Continue Adderall 15 mg 2 times daily  2.  Continue Abilify 5 mg daily  3.  Continue Wellbutrin  mg 2 times daily  4.  Continue fluoxetine 40 mg twice daily  5.  Continue gabapentin 600 mg at bedtime-no refills needed today  6.  Continue mirtazapine 45 mg at bedtime  7.  Continue topiramate 100 mg 2 times daily  8.  I will notify your pain clinic for your need of oxycodone refills    Continue all other medications as reviewed per electronic medical record today.   Safety plan reviewed. To the Emergency Department as needed or call after hours crisis line at [phone] or [phone]. [place] Crisis Text Line. Text [place] to 344638 or Suicide LifeLine Chat: suicidepreventionlifeline.org/chat/  To schedule individual or family therapy, call [facility] at [phone]  Schedule an appointment with me in December or sooner as needed. Call [facility] at [phone] to schedule.  Follow up with primary care provider as planned or for acute medical concerns.  Call the psychiatric nurse line with medication questions or concerns at [phone]  MyChart may be used to communicate with your provider, but this is not intended to be used for emergencies.    Crisis Resources:    National Suicide Prevention Lifeline: 273.602.7468 (TTY:  571.512.5155). Call anytime for help.  (www.suicidepreventionlifeline.org)  National [place] [employer]ness (www.raudel.org): 732.324.9732 or 080-034-7993.   Mental Health Association (www.mentalhealth.org): 677.403.5870 or 414-383-1687.  [place] Crisis Text Line: Text [place] to 513193  Suicide LifeLine Chat: suicidepreNeoconixline.org/chat

## 2023-10-26 NOTE — NURSING NOTE
Is the patient currently in the state of [place]? YES    Visit mode:VIDEO    If the visit is dropped, the patient can be reconnected by: TELEPHONE VISIT: Phone number:   Telephone Information:   Mobile [phone]       Will anyone else be joining the visit? No  (If patient encounters technical issues they should call [phone])    How would you like to obtain your AVS? MyChart    Are changes needed to the allergy or medication list? No    Rooming Documentation: Assigned questionnaire(s) completed .    Reason for visit: RECHECK     SUSAN Butcher

## 2023-10-26 NOTE — PROGRESS NOTES
"Virtual Visit Details    Type of service:  Telephone Visit   Phone call duration: 30 minutes     Patient Location: Home  Provider Location: Onsite         Outpatient Psychiatric Progress Note    Name: Paula Ho   : 1960                    Primary Care Provider: LEONA ALANIZ CNP   Therapist: None    PHQ-9 scores:      2023    11:00 AM 2023     2:09 PM 2023    11:26 AM   PHQ-9 SCORE   PHQ-9 Total Score MyChart  17 (Moderately severe depression)    PHQ-9 Total Score 10 17 7       LIBRA-7 scores:      2023    10:38 AM 2023    11:00 AM 2023    11:26 AM   LIBRA-7 SCORE   Total Score 13 (moderate anxiety)     Total Score 13    13    13 11 7       Patient Identification:    Patient is a 63 year old year old, single  White Not  or  female  who presents for return visit with me.  Patient is currently disabled. Patient attended the session alone. Patient prefers to be called: \" Paula\".    Current medications include: albuterol (PROAIR HFA) 108 (90 Base) MCG/ACT inhaler, Inhale 2 puffs into the lungs every 4 hours as needed for shortness of breath or wheezing  albuterol (PROVENTIL) (2.5 MG/3ML) 0.083% neb solution, NEBULIZE THE CONTENTS OF 1 VIAL EVERY 6 HOURS AS NEEDED.  amphetamine-dextroamphetamine (ADDERALL) 15 MG tablet, Take 1 tablet (15 mg) by mouth 2 times daily  amphetamine-dextroamphetamine (ADDERALL) 15 MG tablet, Take 1 tablet (15 mg) by mouth 2 times daily Fill on or after September 15  ARIPiprazole (ABILIFY) 5 MG tablet, Take 1 tablet (5 mg) by mouth daily  budesonide-formoterol (SYMBICORT) 160-4.5 MCG/ACT Inhaler, Inhale 2 puffs into the lungs 2 times daily  buprenorphine HCl-naloxone HCl (SUBOXONE) 8-2 MG per film, Place 1 Film under the tongue daily  buPROPion (WELLBUTRIN SR) 100 MG 12 hr tablet, Take 1 tablet (100 mg) by mouth 2 times daily  cetirizine (ZYRTEC) 10 MG tablet, Take 1 tablet (10 mg) by mouth daily  cyanocobalamin (CYANOCOBALAMIN) " 1000 MCG/ML injection, 1000mcg subcutaneous injection daily for 7 days, then weekly for 4 weeks, then monthly  dexamethasone (DECADRON) 1 MG tablet, Take 1 mg at 11 pm and get a morning blood draw at 8 am for cortisol.  doxycycline monohydrate (MONODOX) 100 MG capsule, Take 1 capsule (100 mg) by mouth 2 times daily  FLUoxetine (PROZAC) 40 MG capsule, Take 1 capsule (40 mg) by mouth 2 times daily  folic acid (FOLVITE) 1 MG tablet, Take 1 tablet (1 mg) by mouth daily  gabapentin (NEURONTIN) 600 MG tablet, Take 1 tablet (600 mg) by mouth daily At bedtime  levothyroxine (SYNTHROID/LEVOTHROID) 25 MCG tablet, Take 1 tablet (25 mcg) by mouth daily  medical cannabis (Patient's own supply), See Admin Instructions (The purpose of this order is to document that the patient reports taking medical cannabis.  This is not a prescription, and is not used to certify that the patient has a qualifying medical condition.)  mirtazapine (REMERON) 45 MG tablet, Take 1 tablet (45 mg) by mouth At Bedtime  Multiple Vitamins-Minerals (MULTIVITAMIN ADULT PO),   mupirocin (BACTROBAN) 2 % external ointment, Apply topically 3 times daily  NARCAN 4 MG/0.1ML nasal spray,   order for DME, Equipment being ordered: Nebulizer  oxyCODONE IR (ROXICODONE) 15 MG tablet, Take 15 mg by mouth 3 times daily + 5 mg x1 daily  tiotropium (SPIRIVA) 18 MCG inhaled capsule, Inhale 1 capsule (18 mcg) into the lungs daily  tiZANidine (ZANAFLEX) 2 MG tablet, TK 2 TO 3 TS PO QHS  topiramate (TOPAMAX) 100 MG tablet, Take 1 tablet (100 mg) by mouth 2 times daily  vitamin D3 (CHOLECALCIFEROL) 2000 units (50 mcg) tablet, Take 1 tablet by mouth daily    lidocaine (PF) (XYLOCAINE) 1 % injection 8 mL         The [place] Prescription Monitoring Program has been reviewed and there are no concerns about diversionary activity for controlled substances at this time.      I was able to review most recent Primary Care Provider, specialty provider, and therapy visit notes that I  have access to.     Today, patient reports she is feeling irritated as she has been trying to contact her pain clinic to get a refill adjustment on her oxycodone.  Apparently her pharmacy is not carrying the 15 mg dose and a new prescription needs to be called in with alternative dose tablets.  She is planning on having her bottom teeth removed soon to avoid future infections.  Right now she is caring for her ex-boyfriend at her home but she is not happy about that.  Depression and anxiety symptoms are stable at this point.  Sleep is disrupted secondary to pain symptoms.  She denies having suicide thinking.      [facility]:  [phone]: Voicemail message left for patient while she was visiting with me today.       has a past medical history of Acute respiratory failure (H) (02/01/2020), Anxiety, Asthma, Depression, and Hypertension.    Social history updates:    Paula lives by herself.  Finances are stable.    Substance use updates:    No alcohol use reported  Tobacco use: Yes Cigarettes  Ready to quit?  No  Nicotine Replacement Therapy tried: None    Vital Signs:   LMP  (LMP Unknown)     Labs:    Most recent laboratory results reviewed and no new labs.     Mental Status Examination:  Appearance: awake, alert and mild distress  Attitude: cooperative  Eye Contact:   Not able to assess  Gait and Station: No dizziness or falls  Psychomotor Behavior:   Not able to assess  Oriented to:  time, person, and place  Attention Span and Concentration:  Normal  Speech:   vtspeech: clear, coherent and Speaks: English  Mood:  anxious, depressed, and labile  Affect:  mood congruent  Associations:  no loose associations  Thought Process:  goal oriented  Thought Content:  no evidence of suicidal ideation or homicidal ideation, no auditory hallucinations present, and no visual hallucinations present  Recent and Remote Memory:  intact Not formally assessed. No amnesia.  Fund of Knowledge: appropriate  Insight:  good  Judgment:  good  Impulse Control:  good    Suicide Risk Assessment:  Today Paula Ho reports no thoughts to harm themself or others. In addition, there are notable risk factors for self-harm, including single status, anxiety, comorbid medical condition of chronic pain, and mood change. However, risk is mitigated by history of seeking help when needed, future oriented, denies suicidal intent or plan, and denies homicidal ideation, intent, or plan. Therefore, based on all available evidence including the factors cited above, Paula Ho does not appear to be at imminent risk for self-harm, does not meet criteria for a 72-hr hold, and therefore remains appropriate for ongoing outpatient level of care.  A thorough assessment of risk factors related to suicide and self-harm have been reviewed and are noted above. The patient convincingly denies suicidality on several occasions. Local community safety resources printed and reviewed for patient to use if needed. There was no deceit detected, and the patient presented in a manner that was believable.     DSM5 Diagnosis:  Attention-Deficit/Hyperactivity Disorder  314.00 (F90.0) Predominantly inattentive presentation  296.32 (F33.1) Major Depressive Disorder, Recurrent Episode, Moderate _ and With mixed features  300.02 (F41.1) Generalized Anxiety Disorder  780.52 (G47.00) Insomnia Disorder   With non-sleep disorder mental comorbidity  Episodic      Medical comorbidities include:   Patient Active Problem List    Diagnosis Date Noted    Angiolipoma of right kidney 01/20/2023     Priority: Medium    Adrenal nodule (H24) 01/20/2023     Priority: Medium    Opioid dependence with current use (H) 06/30/2022     Priority: Medium    Infection due to 2019 novel coronavirus 12/09/2020     Priority: Medium    Hyperlipidemia 04/14/2020     Priority: Medium    Benign essential hypertension 04/14/2020     Priority: Medium    MDD (major depressive disorder), recurrent severe, without  psychosis (H) 03/02/2020     Priority: Medium    Chronic obstructive pulmonary disease, unspecified COPD type (H) 02/04/2020     Priority: Medium     No PFTS  In EPIC      Attention deficit hyperactivity disorder (ADHD) 01/31/2020     Priority: Medium    Chronic midline low back pain with bilateral sciatica 01/31/2020     Priority: Medium    Brain aneurysm 12/03/2019     Priority: Medium     Dec 2017  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 Device is MR compatible to 3 BREANN 3/2020      History of subarachnoid hemorrhage 12/22/2017     Priority: Medium     H/O of SAH, Cam 2, Hunt-Thomas 1, from a ruptured 9mm left Pcomm aneurysm with a wide neck and a fetal left PCA arising from the aneurysm neck, s/p successful coil embolization 12/23/2017 by Dr. Otto Hurley      Chronic GERD 01/26/2017     Priority: Medium    Chronic knee pain 12/12/2014     Priority: Medium    Cigarette smoker 07/08/2014     Priority: Medium    Chronic, continuous use of opioids 04/24/2013     Priority: Medium     Managed by pain clinic outside of [place].  UDS + cocaine in December 2019 without confirmation testing at her pain clinic per patient report      Morbid obesity (H) 03/27/2013     Priority: Medium    Status post knee surgery 03/27/2013     Priority: Medium     Per patient's recollection:  Circa 2002: right knee arthroscopy (Dr. Pappas)  Circa 2004: right knee partial knee replacement (Iam Ritchie MD)  Circa 2006: right TKA (Ron Ryder MD; [facility])  Circa 2008: right TKA revision due to infection (Ron Ryder MD; [facility])  Circa 2009: right TKA revision due to infection (Ron Ryder MD; [facility])  April 2011: right TKA (Ron Ryder MD; [facility])      LIBRA (generalized anxiety disorder) 09/28/2011     Priority: Medium    Chronic pain 09/28/2011     Priority: Medium     Knee and low back        DJD (degenerative joint disease) 09/28/2011      Priority: Medium    Insomnia 04/13/2011     Priority: Medium     Insomnia  NOS      Tobacco use disorder 07/31/2006     Priority: Medium    Asthma 11/15/2004     Priority: Medium     Asthma  NOS         Assessment:    Paula Ho is concerned about getting a refill on her pain medication.  The 15 mg dose of oxycodone is not available to her through her pharmacy.  I left a message at her pain clinic to discuss her concern.  At this point she desires no changes in her medications.  The mirtazapine is helpful at bedtime to help her sleep at night.  No refills are needed on the gabapentin.  Topiramate is taken twice daily to help with mood regulation.  Fluoxetine and Abilify are keeping her depression manageable as well as the Wellbutrin SR.  Adderall, she maintains, is helpful with task completion at home and to follow through with staying organized with managing her own health care and caring for other people.    Medication side effects and alternatives were reviewed. Health promotion activities recommended and reviewed today. All questions addressed. Education and counseling completed regarding risks and benefits of medications and psychotherapy options.    Treatment Plan:      1.  Continue Adderall 15 mg 2 times daily  2.  Continue Abilify 5 mg daily  3.  Continue Wellbutrin  mg 2 times daily  4.  Continue fluoxetine 40 mg twice daily  5.  Continue gabapentin 600 mg at bedtime-no refills needed today  6.  Continue mirtazapine 45 mg at bedtime  7.  Continue topiramate 100 mg 2 times daily  8.  I will notify your pain clinic for your need of oxycodone refills    Continue all other medications as reviewed per electronic medical record today.   Safety plan reviewed. To the Emergency Department as needed or call after hours crisis line at [phone] or [phone]. [place] Crisis Text Line. Text [place] to 740223 or Suicide LifeLine Chat: suicidepreventionlifeline.org/chat/  To schedule individual or family  therapy, call [facility] at [phone]  Schedule an appointment with me in December or sooner as needed. Call [facility] at [phone] to schedule.  Follow up with primary care provider as planned or for acute medical concerns.  Call the psychiatric nurse line with medication questions or concerns at [phone]  MyChart may be used to communicate with your provider, but this is not intended to be used for emergencies.    Crisis Resources:    National Suicide Prevention Lifeline: 261.250.9178 (TTY: 607.206.4491). Call anytime for help.  (www.suicidepreventionlifeline.org)  National [place] on Mental Illness (www.raudel.org): 402.125.2322 or 607-299-5677.   Mental Health Association (www.mentalhealth.org): 172.432.5073 or 958-518-0920.  [place] Crisis Text Line: Text [place] to 825568  Suicide LifeLine Chat: suicideCloud Cruiserline.org/chat    Administrative Billing:   Time spent with patient includes counseling and coordination of care regarding above diagnoses and treatment plan.    Patient Status:  This is a continuous care patient and medications will be prescribed by the psychiatric provider until further indicated.    Signed:   VIRGILIO Kidd-BC   Psychiatry       Answers submitted by the patient for this visit:  Patient Health Questionnaire (Submitted on 10/26/2023)  If you checked off any problems, how difficult have these problems made it for you to do your work, take care of things at home, or get along with other people?: Somewhat difficult  PHQ9 TOTAL SCORE: 6  LIBRA-7 (Submitted on 10/26/2023)  LIBRA 7 TOTAL SCORE: 4

## 2023-10-30 ENCOUNTER — VIRTUAL VISIT (OUTPATIENT)
Dept: PSYCHOLOGY | Facility: CLINIC | Age: 63
End: 2023-10-30
Payer: MEDICARE

## 2023-10-30 DIAGNOSIS — F33.1 MODERATE EPISODE OF RECURRENT MAJOR DEPRESSIVE DISORDER (H): Primary | ICD-10-CM

## 2023-10-30 PROCEDURE — 90834 PSYTX W PT 45 MINUTES: CPT | Mod: 95

## 2023-10-31 NOTE — PROGRESS NOTES
"    [facility]                                     Progress Note    Patient Name: Paula Ho  Date: 10/30/23         Service Type: Individual    Session Start Time: 10:00 AM  Session End Time: 10:45 AM      Session Length: 45 Minutes     Session #: 28    Attendees: Client attended alone    Service Modality:  Phone Visit:      Provider verified identity through the following two step process.  Patient provided:  Patient  and Patient is known previously to provider    Telephone Visit: The patient's condition can be safely assessed and treated via synchronous audio telemedicine encounter.      Reason for Audio Telemedicine Visit: Patient has requested telehealth visit    Originating Site (Patient Location): Patient's home    Distant Site (Provider Location): [facility] [place]    Consent:  The patient/guardian has verbally consented to:     1. The potential risks and benefits of telemedicine (telephone visit) versus in person care;    The patient has been notified of the following:      \"We have found that certain health care needs can be provided without the need for a face to face visit.  This service lets us provide the care you need with a phone conversation.       I will have full access to your [facility] medical record during this entire phone call.   I will be taking notes for your medical record.      Since this is like an office visit, we will bill your insurance company for this service.       There are potential benefits and risks of telephone visits (e.g. limits to patient confidentiality) that differ from in-person visits.?Confidentiality still applies for telephone services, and nobody will record the visit.  It is important to be in a quiet, private space that is free of distractions (including cell phone or other devices) during the visit.??      If during the course of the call I believe a telephone visit is not " "appropriate, you will not be charged for this service\"     Consent has been obtained for this service by care team member: Yes     DATA    Interactive Complexity: No  Crisis: No       Progress Since Last Session (Related to Symptoms / Goals / Homework):   Symptoms: Worsening Patient reports increase in back and hip pain. Patient also reports increased worry due to her mother's upcoming surgery    Homework: Achieved / completed to satisfaction      Episode of Care Goals: Minimal progress - PREPARATION (Decided to change - considering how); Intervened by negotiating a change plan and determining options / strategies for behavior change, identifying triggers, exploring social supports, and working towards setting a date to begin behavior change     Current / Ongoing Stressors and Concerns:   Patient reports increase in intense back and hip pain. She reports being referred for a surgeon regarding her back, but worries the provider is too far a distance from her. Patient reports increased stress due to an upcoming procedure to get her teeth pulled because of gingivitis. Paula reports being concerned about her the procedure's affect on her ability to talk in the future. Patient also reports continued stress due to relational concerns with her friend.  She reports feeling frustrated about her friend's extended possession of her car. Patient reports increased concern for her mother due to an upcoming surgery she is to have.        Treatment Objective(s) Addressed in This Session:   identify their fears / thoughts that contribute to feeling anxious  Increase interest, engagement, and pleasure in doing things  Decrease frequency and intensity of feeling down, depressed, hopeless  Feel less tired and more energy during the day   Identify negative self-talk and behaviors: challenge core beliefs, myths, and actions  Improve concentration, focus, and mindfulness in daily activities        Intervention:   CBT:  Patient and " "provider worked on challenging anxious thoughts and challenging negative self-talk and negative core beliefs. Provider and patient discussed the 15555 grounding technique and \"I statements\",.    Client-Centered:  Provider and patient also discussed patient's relational concerns regarding her physical health.     Motivational Interviewing     MI Intervention: Expressed Empathy/Understanding, Supported Autonomy, Collaboration, Evocation, Permission to raise concern or advise, Open-ended questions and Reframe     Change Talk Expressed by the Patient: Reasons to change Need to change Taking steps    Provider Response to Change Talk: E - Evoked more info from patient about behavior change, A - Affirmed patient's thoughts, decisions, or attempts at behavior change and R - Reflected patient's change talk    The following assessments were completed by patient for this visit:  PHQ9:       5/1/2023    10:37 AM 5/10/2023     3:47 PM 7/3/2023    10:43 AM 7/31/2023    11:00 AM 8/18/2023     2:09 PM 9/25/2023    11:26 AM 10/26/2023     9:09 AM   PHQ-9 SCORE   PHQ-9 Total Score MyChart 6 (Mild depression)  4 (Minimal depression)  17 (Moderately severe depression)  6 (Mild depression)   PHQ-9 Total Score 6 9 4 10 17 7 6     GAD7:       1/18/2023    10:41 AM 2/6/2023    10:00 AM 3/20/2023    12:53 PM 5/1/2023    10:38 AM 7/31/2023    11:00 AM 9/25/2023    11:26 AM 10/26/2023     9:10 AM   LIBRA-7 SCORE   Total Score 9 (mild anxiety)  13 (moderate anxiety) 13 (moderate anxiety)   4 (minimal anxiety)   Total Score 9 10 13 13    13    13 11 7 4    4     PROMIS 10-Global Health (only subscores and total score):       5/1/2023    10:40 AM 5/10/2023     3:47 PM 8/18/2023     2:12 PM   PROMIS-10 Scores Only   Global Mental Health Score 8    8    8 7 6    6   Global Physical Health Score 8    8    8 9 15    15   PROMIS TOTAL - SUBSCORES 16    16    16 16 21    21      ASSESSMENT: Current Emotional / Mental Status (status of significant " "symptoms):   Risk status (Self / Other harm or suicidal ideation)   Patient denies current fears or concerns for personal safety.    Patient denies current or recent suicidal ideation or behaviors.   Patient denies current or recent homicidal ideation or behaviors.   Patient denies current or recent self injurious behavior or ideation.   Patient denies other safety concerns.   Patient reports there has been no change in risk factors since their last session.     Patient reports there has been no change in protective factors since their last session.     Recommended that patient call 911 or go to the local ED should there be a change in any of these risk factors.     Appearance:   NA    Eye Contact:   NA    Psychomotor Behavior: Normal    Attitude:   Cooperative  Interested Attentive   Orientation:   All   Speech    Rate / Production: Talkative    Volume:  Normal    Mood:    Anxious  Irritable     Affect:    Worrisome    Thought Content:  Rumination    Thought Form:  Logical    Insight:    Good      Medication Review:   No changes to current psychiatric medication(s)     Medication Compliance:   Yes     Changes in Health Issues:   Yes: Pain, No Psychological Distress       Chemical Use Review:   Substance Use: Chemical use reviewed, no active concerns identified      Tobacco Use: No change in amount of tobacco use since last session.  Provided encouragement to quit     Diagnosis:  1. MDD (major depressive disorder), recurrent severe, without psychosis (H)      Collateral Reports Completed:   Not Applicable    PLAN: (Patient Tasks / Therapist Tasks / Other)  Provider encouraged patient to utilize the 08375 grounding technique and the use of \"I statements\" during the week.    BECKY MANRIQUEZ    This note has been reviewed and I agree with the plan of care. This note is co-signed by JASON Bang, [facility], Supervisor, on: October 31, " 2023  ______________________________________________________________________    Individual Treatment Plan    Patient's Name: Paula Ho  YOB: 1960    Date of Creation: 11/7/22  Date Treatment Plan Last Reviewed/Revised: 08/14/23    DSM5 Diagnoses: 296.32 (F33.1) Major Depressive Disorder, Recurrent Episode, Moderate _  Psychosocial / Contextual Factors: Patient has physical medical concerns that impact her current depressive symptoms.  PROMIS (reviewed every 90 days): 16    Referral / Collaboration:  Referral to another professional/service is not indicated at this time..    Anticipated number of session for this episode of care:  17+  Anticipation frequency of session: Weekly  Anticipated Duration of each session: 38-52 minutes  Treatment plan will be reviewed in 90 days or when goals have been changed.       MeasurableTreatment Goal(s) related to diagnosis / functional impairment(s)  Goal 1: Patient will develop healthy thinking patterns and beliefs about self, others, and the world.       Objective #A (Patient Action)    Patient will Identify negative self-talk and behaviors: challenge core beliefs, myths, and actions.  Status: Continued - Date(s):08/14/23     Intervention(s)  Therapist will teach  how to identify negative self-talk and behaviors .    Objective #B  Patient will Improve concentration, focus, and mindfulness in daily activities .  Status: Continued - Date(s):08/14/23     Intervention(s)  Therapist will teach how to improve concentration, focus, and mindfulness. .      Goal 2: Patient will alleviate depressive symptoms in order to return to a previous level of functioning. As evidenced by a PHQ-9 score of 9 or less.       Objective # A  Status: Continued - Date(s):08/14/23     Patient will identify at least 4 adaptive coping statements to counteract negative thoughts    Intervention(s)  Therapist will teach adaptive coping statements to conteract negative thoughts .    Objective  #B  Patient will Increase interest, engagement, and pleasure in doing things.    Status: Continued - Date(s):08/14/23     Intervention(s)  Therapist will assign homework related to increasing engagement and interest in doing things .        Patient has reviewed and agreed to the above plan.      BECKY MANRIQUEZ  August 14, 2023

## 2023-11-07 ENCOUNTER — VIRTUAL VISIT (OUTPATIENT)
Dept: BEHAVIORAL HEALTH | Facility: CLINIC | Age: 63
End: 2023-11-07
Payer: MEDICARE

## 2023-11-07 DIAGNOSIS — R69 DIAGNOSIS DEFERRED: Primary | ICD-10-CM

## 2023-11-07 NOTE — PROGRESS NOTES
"Behavioral Health Home Services  [facility]: [place]        Social Work Care Navigator Note      Patient: Paula Ho  Date: November 7, 2023  Preferred Name: Paula Pryor PHQ-9:       8/18/2023     2:09 PM 9/25/2023    11:26 AM 10/26/2023     9:09 AM   PHQ-9 SCORE   PHQ-9 Total Score MyChart 17 (Moderately severe depression)  6 (Mild depression)   PHQ-9 Total Score 17 7 6     Previous LIBRA-7:       7/31/2023    11:00 AM 9/25/2023    11:26 AM 10/26/2023     9:10 AM   LIBRA-7 SCORE   Total Score   4 (minimal anxiety)   Total Score 11 7 4    4     ARNALDO LEVEL:       No data to display                Preferred Contact:  Need for : No  Preferred Contact: Cell      Type of Contact Today: Phone call (patient / identified key support person reached)    Service Modality: Phone Visit:      Provider verified identity through the following two step process.  Patient provided:  Patient is known previously to provider    Telephone Visit: The patient's condition can be safely assessed and treated via synchronous audio telemedicine encounter.      Reason for Audio Telemedicine Visit: Patient has requested telehealth visit    Originating Site (Patient Location): Patient's home    Distant Site (Provider Location): Provider Remote Setting- Home Office    Consent:  The patient/guardian has verbally consented to:     1. The potential risks and benefits of telemedicine (telephone visit) versus in person care;    The patient has been notified of the following:      \"We have found that certain health care needs can be provided without the need for a face to face visit.  This service lets us provide the care you need with a phone conversation.       I will have full access to your [facility] medical record during this entire phone call.   I will be taking notes for your medical record.      Since this is like an office visit, we will bill your insurance company for this service.       There are potential benefits and " "risks of telephone visits (e.g. limits to patient confidentiality) that differ from in-person visits.?Confidentiality still applies for telephone services, and nobody will record the visit.  It is important to be in a quiet, private space that is free of distractions (including cell phone or other devices) during the visit.??      If during the course of the call I believe a telephone visit is not appropriate, you will not be charged for this service\"     Consent has been obtained for this service by care team member: Yes       Data: (subjective / Objective):  Recent ED/IP Admission or Discharge?   None    Patient Goals:  Goal Areas: Health; Mental Health; Future HAP Goal area; Chemical Health  Patient stated goals: Health: Paula would like to continue to meet with her providers, and take her medications as prescribed. Paula would also like assistance with improving her back pain, and receive pool therapy, as well as possibly surgery. 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   Chemical Health: Paula would like assistance from her providers to quit smoking and will work on reducing her use, so she can be approved for back surgery.   Mental Health: Paula would like to continue meeting with her [place] therapist, Fernanda, and continue to receive assistance with [facility] for monthly check ins. She would like to continue working with an [facility] worker again. She reported that she wants to build herself back to feel like somebody and take it day by day.   Future goals: Paula would like to open to the CADI waiver in the future, but she'll continue with only PCA for now. She would like to receive home delivered meals if she does open.        [facility] Core Service Provided:  Comprehensive Care Management: utilized the electronic medical record / patient registry to identify and support patient's health conditions / needs more effectively   Care Transitions: focused on " the coordinated and seamless movement of patient between or within different levels of care or settings  Care Coordination: provided care management services/referrals necessary to ensure patient and their identified supports have access to medical, behavioral health, pharmacology and recovery support services.  Ensured that patient's care is integrated across all settings and services.   Individual and Family Support: aimed to help clients reduce barriers to achieving goals, increase health literacy and knowledge about chronic condition(s), increase self-efficacy skills, and improve health outcomes    Current Stressors / Issues / Care Plan Objective Addressed Today:  SWCC and patient were able to meet today for Behavioral Health Home ([facility]) monthly check-in via telehealth visit. All required ROIs have been filed with HIM/patient chart.    Patient reported that she had all of her teeth pulled a couple days ago, and she's still recovering from this. She reports that she's experiencing some pain. Patient reports that she has a set of dentures, but she needs to have another set made. She hasn't been able to wear her current ones because they hurt, so she's hoping that she can get new ones but she still is upset that they weren't comfortable. She isn't sure what she'll do yet, but she's hoping she can get some dentures that are more comfortable.     2. 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 She's going to see how her body feels when she goes on Friday and Saturday.     3. Patient reports that she's been trying to clean up her kitchen recently.    4. Patient reported that she wants to go see her mom because she's not been doing well recently with her health concerns. She reports that her mom told her she may not make it much longer. She lives two hours away, so patient reported that she's  trying to figure this out. She reported that she's been conflicted because of this and her mood has been up and down because of this.     5. Patient reported that she still would like to go out of town and go to [place] to visit family (goddaughter). She reports that she thinks this would be good for her, so she would like to see if she can do this.     6. Patient reports that she's been doing good with her [employer] worker, and she's supposed to come on Friday. She thinks that she may have a conflict so she will figure this out.     7. Patient reported that one of her dogs hasn't been doing well recently. She is trying to figure out what he needs because she also doesn't want him to suffer if he's in pain too. She also reports that he's been having a cough for awhile too. She reported that this was her late 's dog, so she knows that she'll feel she lost the last part of her . She is trying to mentally prepare herself for when this comes.     8. Patient reported that she's received a gift card in the past for a mammogram, so she's trying to figure out where this is from. [facility] encouraged her to call [facility] to see if this is through the insurance.     9. Patient reports she still hasn't heard anything back yet from the energy assistance program.     10. Patient reported that [employer] failed her unit for inspection. She reports there were a couple repairs that needed to be made. She is still waiting for her lease from her landlord as well. She reported that her landlord has to reach back out to them to have them come back once he makes the repairs. She will call him once she's off the phone so she can get an update.     11. Patient reports that she's not sure if she needs the appointment that's scheduled for tomorrow, so [facility] gave her the department phone number to call before going to the appointment. The phone number is [phone].       Intervention:  Motivational Interviewing: Expressed  Empathy/Understanding, Supported Autonomy, Collaboration, Evocation, Permission to raise concern or advise, Open-ended questions, and Reflections: simple and complex   Target Behavior(s): Explored thoughts about taking an anti-depressant, Explored and resolved challenges related to taking anti-depressants as prescribed, Explored thoughts and readiness to participate in individual therapy, Explored and resolved challenges to attending appointments as scheduled, and Explored current social supports and reinforced opportunities to increase engagement    Assessment: (Progress on Goals / Homework):  Patient would benefit from continued coordination in reaching their goals set for the Behavioral Health Home ([facility]) program. SWCC reviewed Health Action Plan goals and will continue to monitor progress and work with patient and their care team.    Plan: (Homework, other):  Patient was encouraged to continue to seek condition-related information and education.      Scheduled a Phone follow up appointment with MISA RICE in 4 weeks     Patient has set self-identified goals and will monitor progress until the next appointment on: 12/4/23.       DENI Humphries  Behavioral Health Home ([facility])   [facility]  [phone]        Next 5 appointments (look out 90 days)      Dec 07, 2023  2:00 PM  (Arrive by 1:40 PM)  Adult Preventative Visit with Renan Dickerson MD  [facility] ([facility] ) [address] [place] [id number] [phone]

## 2023-11-09 ENCOUNTER — TELEPHONE (OUTPATIENT)
Dept: PSYCHOLOGY | Facility: CLINIC | Age: 63
End: 2023-11-09
Payer: MEDICARE

## 2023-11-09 NOTE — TELEPHONE ENCOUNTER
Patient did not arrive for their [place] visit as of 3:50 PM. Provider called patient and left two messages to call Behavioral Intake to reschedule an appointment with me. Provider will reach out to the patient on Friday, 11/10 to check in with the patient.

## 2023-11-28 ENCOUNTER — FCC EXTENDED DOCUMENTATION (OUTPATIENT)
Dept: PSYCHOLOGY | Facility: CLINIC | Age: 63
End: 2023-11-28
Payer: MEDICARE

## 2023-11-28 NOTE — PROGRESS NOTES
Discharge Summary  Multiple Sessions    Client Name: Paula Ho MRN#: [id number] YOB: 1960      Intake / Discharge Date: 11/28/23        DSM5 Diagnoses: (Sustained by DSM5 Criteria Listed Above)  Diagnoses: 296.32 (F33.1) Major Depressive Disorder, Recurrent Episode, Moderate _  Psychosocial & Contextual Factors: Patient reports experiencing physical medical concerns increasing her experienced depressive symptoms daily.   PROMIS-10 Score: 21          Presenting Concern:  Patient reports increased concerns and stress due to intense back and hip pain. Patient also reports concerns regarding the relationship with her previous partner. Patient also reports increased financial stressors impacting her symptoms of depression.       Reason for Discharge:  Provider is leaving the [facility].       Disposition at Time of Last Encounter:   Comments:   Patient reported not wanting to transfer her therapeutic care to a different provider in the [facility] network.      Risk Management:   Client has had a history of suicidal ideation: patient reports passive suicidal ideations without plans to harm herself, suicide attempts: Patient reports previous attempt through medication, and self-injurious behavior: Patient reports previous self-injurious behaviors.   Recommended that patient call 911 or go to the local ED should there be a change in any of these risk factors.      Referred To:  Client may resume counseling services at any time in the future by calling the [facility] Intake Office, [phone].      BECKY MANRIQUEZ   11/28/2023

## 2023-12-04 ENCOUNTER — TELEPHONE (OUTPATIENT)
Dept: BEHAVIORAL HEALTH | Facility: CLINIC | Age: 63
End: 2023-12-04
Payer: MEDICARE

## 2023-12-04 NOTE — TELEPHONE ENCOUNTER
Behavioral Health Home Services  [facility]: [place]        Social Work Care Navigator Note      Patient: Paula Ho  Date: December 4, 2023  Preferred Name: Paula    Previous PHQ-9:       8/18/2023     2:09 PM 9/25/2023    11:26 AM 10/26/2023     9:09 AM   PHQ-9 SCORE   PHQ-9 Total Score MyChart 17 (Moderately severe depression)  6 (Mild depression)   PHQ-9 Total Score 17 7 6     Previous LIBRA-7:       7/31/2023    11:00 AM 9/25/2023    11:26 AM 10/26/2023     9:10 AM   LIBRA-7 SCORE   Total Score   4 (minimal anxiety)   Total Score 11 7 4    4     ARNALDO LEVEL:       No data to display                Preferred Contact:  Need for : No  Preferred Contact: Cell      Type of Contact Today: Phone call (not reached/unavailable)      Data: (subjective / Objective):    [facility] SWCC called patient for scheduled HAP review and check in, and patient reported she was sleeping because she didn't fall asleep until 4am this morning. SWCC offered to reschedule and patient requested an appointment sometime this week. CC offered to call back today at 1pm and check in if she's able to meet, so patient agreed to this.    Update: SWCC called patient again for visit at 1pm and patient didn't answer. [facility] left a message requesting a call back to reschedule.    DNEI Humphries  Behavioral [facility] ([facility])   [facility]  [phone]        Next 5 appointments (look out 90 days)      Dec 07, 2023  2:00 PM  (Arrive by 1:40 PM)  Adult Preventative Visit with Renan Dickerson MD  [facility] ([facility] ) [address] [place] [id number] [phone]

## 2023-12-05 ENCOUNTER — ANCILLARY PROCEDURE (OUTPATIENT)
Dept: MAMMOGRAPHY | Facility: CLINIC | Age: 63
End: 2023-12-05
Payer: MEDICARE

## 2023-12-05 DIAGNOSIS — Z12.31 VISIT FOR SCREENING MAMMOGRAM: ICD-10-CM

## 2023-12-05 PROCEDURE — 77063 BREAST TOMOSYNTHESIS BI: CPT | Mod: TC | Performed by: RADIOLOGY

## 2023-12-05 PROCEDURE — 77067 SCR MAMMO BI INCL CAD: CPT | Mod: TC | Performed by: RADIOLOGY

## 2023-12-07 ENCOUNTER — TELEPHONE (OUTPATIENT)
Dept: BEHAVIORAL HEALTH | Facility: CLINIC | Age: 63
End: 2023-12-07

## 2023-12-07 NOTE — TELEPHONE ENCOUNTER
Behavioral Health Home Services  [facility]: [place]        Social Work Care Navigator Note      Patient: Paula Ho  Date: December 7, 2023  Preferred Name: Paula    Previous PHQ-9:       8/18/2023     2:09 PM 9/25/2023    11:26 AM 10/26/2023     9:09 AM   PHQ-9 SCORE   PHQ-9 Total Score MyChart 17 (Moderately severe depression)  6 (Mild depression)   PHQ-9 Total Score 17 7 6     Previous LIBRA-7:       7/31/2023    11:00 AM 9/25/2023    11:26 AM 10/26/2023     9:10 AM   LIBRA-7 SCORE   Total Score   4 (minimal anxiety)   Total Score 11 7 4    4     ARNALDO LEVEL:       No data to display                Preferred Contact:  Need for : No  Preferred Contact: Cell      Type of Contact Today: Phone call (not reached/unavailable)      Data: (subjective / Objective):    Attempted to reach patient to reschedule check in and HAP review, but was unsuccessful. [facility] left a message requesting a call back to reschedule. Plan to attempt again.      DENI Humphries  Behavioral Health [place] ([facility])   [facility]  [phone]        Next 5 appointments (look out 90 days)      Jan 26, 2024  2:30 PM  (Arrive by 2:10 PM)  Annual Wellness Visit with Renan Dickerson MD  [facility] ([facility] ) [address] [place] [phone]

## 2023-12-15 ENCOUNTER — VIRTUAL VISIT (OUTPATIENT)
Dept: BEHAVIORAL HEALTH | Facility: CLINIC | Age: 63
End: 2023-12-15
Payer: MEDICARE

## 2023-12-15 DIAGNOSIS — R69 DIAGNOSIS DEFERRED: Primary | ICD-10-CM

## 2023-12-15 ASSESSMENT — ANXIETY QUESTIONNAIRES
GAD7 TOTAL SCORE: 10
IF YOU CHECKED OFF ANY PROBLEMS ON THIS QUESTIONNAIRE, HOW DIFFICULT HAVE THESE PROBLEMS MADE IT FOR YOU TO DO YOUR WORK, TAKE CARE OF THINGS AT HOME, OR GET ALONG WITH OTHER PEOPLE: SOMEWHAT DIFFICULT
6. BECOMING EASILY ANNOYED OR IRRITABLE: MORE THAN HALF THE DAYS
4. TROUBLE RELAXING: NOT AT ALL
GAD7 TOTAL SCORE: 10
7. FEELING AFRAID AS IF SOMETHING AWFUL MIGHT HAPPEN: NOT AT ALL
2. NOT BEING ABLE TO STOP OR CONTROL WORRYING: MORE THAN HALF THE DAYS
1. FEELING NERVOUS, ANXIOUS, OR ON EDGE: MORE THAN HALF THE DAYS
3. WORRYING TOO MUCH ABOUT DIFFERENT THINGS: NEARLY EVERY DAY
5. BEING SO RESTLESS THAT IT IS HARD TO SIT STILL: SEVERAL DAYS

## 2023-12-15 ASSESSMENT — PATIENT HEALTH QUESTIONNAIRE - PHQ9: SUM OF ALL RESPONSES TO PHQ QUESTIONS 1-9: 10

## 2023-12-15 NOTE — PROGRESS NOTES
"Behavioral Health Home Services  [facility]: [place]        Social Work Care Navigator Note      Patient: Paula Ho  Date: December 15, 2023  Preferred Name: Paula    Previous PHQ-9:       8/18/2023     2:09 PM 9/25/2023    11:26 AM 10/26/2023     9:09 AM   PHQ-9 SCORE   PHQ-9 Total Score MyChart 17 (Moderately severe depression)  6 (Mild depression)   PHQ-9 Total Score 17 7 6     Previous LIBRA-7:       7/31/2023    11:00 AM 9/25/2023    11:26 AM 10/26/2023     9:10 AM   LIBRA-7 SCORE   Total Score   4 (minimal anxiety)   Total Score 11 7 4    4     ARNALDO LEVEL:       No data to display                Preferred Contact:  Need for : No  Preferred Contact: Cell      Type of Contact Today: Phone call (patient / identified key support person reached)    Service Modality: Phone Visit:      Provider verified identity through the following two step process.  Patient provided:  Patient is known previously to provider    Telephone Visit: The patient's condition can be safely assessed and treated via synchronous audio telemedicine encounter.      Reason for Audio Telemedicine Visit: Patient has requested telehealth visit    Originating Site (Patient Location): Patient's home    Distant Site (Provider Location): Provider Remote Setting- Home Office    Consent:  The patient/guardian has verbally consented to:     1. The potential risks and benefits of telemedicine (telephone visit) versus in person care;    The patient has been notified of the following:      \"We have found that certain health care needs can be provided without the need for a face to face visit.  This service lets us provide the care you need with a phone conversation.       I will have full access to your [facility] medical record during this entire phone call.   I will be taking notes for your medical record.      Since this is like an office visit, we will bill your insurance company for this service.       There are potential benefits " "and risks of telephone visits (e.g. limits to patient confidentiality) that differ from in-person visits.?Confidentiality still applies for telephone services, and nobody will record the visit.  It is important to be in a quiet, private space that is free of distractions (including cell phone or other devices) during the visit.??      If during the course of the call I believe a telephone visit is not appropriate, you will not be charged for this service\"     Consent has been obtained for this service by care team member: Yes       Data: (subjective / Objective):    Patient came in to complete the comprehensive wellness assessment for Behavioral Health Home Services.  See [facility] Flowsheets for details on the assessment.  See [facility], Behavioral Health Home for a copy of the patient's care plan.    Patient reported that she worked for about a week or week and a half, and the [facility] sent her a letter about it. 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 Patient reported that she needed to do verification and her food stamps are being reduced as well. She hasn't heard how much they are going to take from her yet though. Patient let the [employer] know that she's not going to do anymore shifts.     2. Patient reported that she really enjoyed getting out and interacting with people at the job, so [facility] and patient discussed if she would maybe like to find volunteering opportunities.     3. Patient reported that she hasn't been able to get her Adderall recently because they are out of supply for 15 mg. Patient reports that she was told that her psychiatrist should change it to the 20mg because they are able to get this. They also said the same thing for her pain medication, which is prescribed by her pain doctor through . Patient reported that she's been struggling without her Adderall. Patient has an appointment with [facility] next week, and " she has a pain clinic appointment next week as well.     4. Patient reports that her phone hasn't been working, so she gave [facility] a new number and [facility] updated the phone in her chart. 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 She reports she has texts on her phone from him, so she needs this information. She is suing him so she can get her money back since she didn't get paid for anything. Patient reported that she needs to file her paperwork today, which she completed today with her [employer] worker, so she doesn't have a court date yet.     5. Patient reported that she did go to see her mom and she's doing okay, but patient reports she gets irritated with her mom when she's there too. She reported that her mom wants her to come visit again this weekend, and she's not sure if she's going to go. Patient also reports that she struggles with the amount of stuff that her mom has and she doesn't like how there is no where to sit and it's very cluttered.     6. Patient reported that she passed her follow up inspection for [employer], so she's good to go. She also reports that her rent is cheaper this year, so she has to pay $100 less.     7. Patient reported that her dog is still not doing well, but she's letting nature take it's course. She reports that she will take him to the vet if it gets too bad. She also would like to have him cremated.     8. Patient reported that she received help from the energy assistance assistance program, and this was just for this year. She reports that she's not sure if she can get more assistance for next year. [facility] gave her the [facility] EAP number [phone].     9. Patient reports that she's working on trying to say no more because she feels that too many people take advantage of her. She will continue to work on this.     10. Patient reported that she  found out that her therapist, Fernanda, was leaving [place]. She reported that she's not been contacted to find a new provider.     11. Patient reported that she is still working on quitting smoking ,and she thinks it will help that they are eventually getting rid of menthol.     Update Goals:   Health: Patient would like to continue to meet with her providers, and take her medications as prescribed.   -Patient would also like assistance with improving her back pain, and receive pool therapy, as well as possibly surgery.   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  -Patient would like to find a nutrition plan that would work for her and help her to lose weight.   Mental Health: Patient would like to find a new therapist within [place], and continue to receive assistance with [facility] for monthly check ins. She would like to continue working with an [facility] worker.  -She reported that she wants to build herself back to feel like somebody and take it day by day.     Chemical Health: Patient would like assistance from her providers to quit smoking and will work on reducing her use, so she can be approved for back surgery.     Future goals: Patient would like to open to the CADI waiver in the future, but she'll continue with only PCA for now. She would like to receive home delivered meals if she does open.     [facility] will mail the HAP letter once approved by [facility].     [facility] offered in person, video, or telephone visits, and patient requested to continue with telephone visits and next check in is scheduled for 12/18.     Paty Dial TOPHER  Behavioral Health Home ([facility])   [facility]  [phone]        Next 5 appointments (look out 90 days)      Jan 26, 2024  2:30 PM  (Arrive by 2:10 PM)  Annual Wellness Visit with Renan Dickerson MD  [facility] ([facility] -  [address] [phone]  [phone]

## 2023-12-15 NOTE — LETTER
[facility]  Behavioral Health Home     Dear Paula,    I have enclosed the Health Action Plan (HAP) that we completed together that includes your goals for Behavioral Health Home ([facility]) Services.    As a reminder, to continue enrollment with the Behavioral Health Home program we will complete check ins a minimum of a monthly basis and update your goals twice a year.  You must also continue to maintain primary care through [place] and keep your medical assistance insurance active.     If you or someone you know is experiencing a mental health crisis and you need help, the following crisis hotlines are available to help.    The new Crisis line from any phone is 988, you can also text a message to this line.      Professionals or agencies I can contact during a crisis:     Suicide Prevention Lifeline: just dial 988  Crisis Text Line Service (available 24 hours a day, 7 days a week): Text [place] to 118020     Crisis Services By [facility]: Phone Number:   [place]     [phone]   [place]  [phone]   [facility]  [phone]   [place]    [phone]   [place]/ [place]    [phone]   [place] [phone]   [place]    [phone]   [place]    [phone]   [place] [phone]   [place]     [phone]      Please let me know if you have any questions or if there is anything that we can assist with. I can be reached by phone or [employer]. My number is listed below.     Sincerely,    DENI House  Behavioral Health Home ([facility])   [place] [phone]

## 2023-12-15 NOTE — LETTER
Behavioral Health Home ([facility]): Health Action Plan  [facility] Clinic: [place]    [facility]      Name: Paula Ho  Preferred Name: Paula  : 1960  MRN: [id number]    My Goals  Goal Areas: Health; Mental Health; Future HAP Goal area; Chemical Health    Patient stated goals:   Health: Paula would like to continue to meet with her providers, and take her medications as prescribed.     -Paula would also like assistance with improving her back pain, and receive pool therapy, as well as possibly surgery.     -Paula would like to get a gym membership so she can get more exercise at the place she does pool therapy.    -Paula would like to find a nutrition plan that would work for her and help her to lose weight.       Mental Health: Paula would like to find a new therapist within [place], and continue to receive assistance with [facility] for monthly check ins. She would like to continue working with an [facility] worker.    -She reported that she wants to build herself back to feel like somebody and take it day by day.         Chemical Health: Paula would like assistance from her providers to quit smoking and will work on reducing her use, so she can be approved for back surgery.         Future goals: Paula would like to open to the CADI waiver in the future, but she'll continue with only PCA for now. She would like to receive home delivered meals if she does open.    Strengths related to each goal: Paula states her strengths are the support of her dogs, good advocate for herself, support of good friends, and care for others. Patient states she has a big heart.    Services and Supports Needed: The [facility] Team will provide monthly contact with patient in order to monitor progress towards goals.    Activities / Actions of Team to support goal(s): [facility] team will locate appropriate resources that align with patient's goals and promote health and wellness.    Activities / Actions of Patient / Parent / Guardian to support  goal(s): It is a requirement that patient's primary care physician is through the [facility] system and that they are on Medical Assistance/Medicaid. If either of these were to change or if patient needs any type of assistance, they are to reach out to their Behavioral Health Home ([facility]) team.      Recommended Referral  Tobacco cessation referrals made?: No  Mental Health / Chemical Dependency Referrals: Yes  Substance Use Referrals: Not Applicable  Mental Health Referrals: [facility] Referrals: [facility]; [facility] ; Other (see comments)      My Team Members and Their Contact Information  Patient Care Team         Relationship Specialty Notifications Start End    Renan Dickerson MD PCP - General Internal Medicine Admissions 10/29/23     Phone: [phone] Fax: [phone]         [address] [place] [address]    Tigist Manjarrez NP Nurse Practitioner Nurse Practitioner Admissions 8/3/20     Psychiatry    Phone: [phone] Fax: [phone]         [address] [place] [address]    Tigist Manjarrez NP Assigned Behavioral Health Provider   11/15/20     Phone: [phone] Fax: [phone]         [address] [place] [address]    [facility] ([facility] ([place])  12/23/20     Phone: [phone]         Dane Irizarry MD Anesthesiologist Anesthesiology  1/21/21     Phone: [phone] Pager: [phone] Fax: [phone]        [address] [facility] [address]    Chanell Joya MD MD Dermatology  10/25/21     Joie Nobles MD Referring Physician Family Medicine  10/25/21     referring to Dermatology    Phone: [phone] Fax: [phone]         [address] [place] [address]    Man Goode MD Assigned Pulmonology Provider   11/7/21     Phone: [phone] Fax: [phone]         [address] [facility] [address]    Paty Dial BSW  Clinic  Admissions 2/17/22     [facility] Case Management - Enrolled 02/17/2021 -[employer] [facility] [place] [employer] - direct extension [phone].    Nathalia Ordoñez MD MD Endocrinology, Diabetes, and Metabolism  1/20/23     Phone: [phone] Fax: [phone]         [address] [address]    Raul Marinelli MD MD Urology  1/20/23     Phone: [phone] Fax: [phone] [address] [place] [address]    Zoila Martinez MD Assigned Endocrinology Provider   3/4/23     Phone: [phone] Fax: [phone]         [address] [place] [address]    Raul Marinelli MD Assigned Surgical Provider   5/6/23     Phone: [phone] Fax: [phone] [address] [place] [address]    Za Aldridge DO Assigned Cancer Care Provider   5/27/23     Phone: [phone] Fax: [phone]         [address] [facility] [address]    Renan Dickerson MD Assigned PCP   11/4/23     Phone: [phone] Fax: [phone]         [address] [facility] [address]            My Wellness Plan  Safety Concerns: Suicide Ideation    Recommendations / Plan for safety concerns: Patient will follow her safety plan that was co-developed/updated with therapist, Fernanda, and she also feels comfortable reaching out to her clinic or [facility] crisis, as well as the suicide hotline.    Crisis Plan (emergencies / when urgent support needed): Patient states she feels comfortable contacting her PCA Sharla, utilizing [facility] crisis/suicide hotline, or go to the [facility] unit in case immediate assistance is needed.      Paula Ho co-developed the Health Action Plan with the [facility] Team and received a copy of this document.  Date Health Action Plan Completed/Updated: 12/15/23

## 2023-12-21 ENCOUNTER — VIRTUAL VISIT (OUTPATIENT)
Dept: PSYCHIATRY | Facility: CLINIC | Age: 63
End: 2023-12-21
Payer: MEDICARE

## 2023-12-21 DIAGNOSIS — F51.04 PSYCHOPHYSIOLOGICAL INSOMNIA: ICD-10-CM

## 2023-12-21 DIAGNOSIS — F41.1 GAD (GENERALIZED ANXIETY DISORDER): ICD-10-CM

## 2023-12-21 DIAGNOSIS — F33.1 MODERATE EPISODE OF RECURRENT MAJOR DEPRESSIVE DISORDER (H): Primary | ICD-10-CM

## 2023-12-21 DIAGNOSIS — F90.0 ATTENTION DEFICIT HYPERACTIVITY DISORDER (ADHD), PREDOMINANTLY INATTENTIVE TYPE: ICD-10-CM

## 2023-12-21 PROCEDURE — 99214 OFFICE O/P EST MOD 30 MIN: CPT | Mod: 95 | Performed by: NURSE PRACTITIONER

## 2023-12-21 RX ORDER — ARIPIPRAZOLE 10 MG/1
10 TABLET ORAL DAILY
Qty: 30 TABLET | Refills: 3 | Status: SHIPPED | OUTPATIENT
Start: 2023-12-21 | End: 2024-04-01

## 2023-12-21 RX ORDER — GABAPENTIN 600 MG/1
600 TABLET ORAL DAILY
Qty: 30 TABLET | Refills: 3 | Status: SHIPPED | OUTPATIENT
Start: 2023-12-21 | End: 2024-06-04

## 2023-12-21 RX ORDER — DEXTROAMPHETAMINE SACCHARATE, AMPHETAMINE ASPARTATE, DEXTROAMPHETAMINE SULFATE AND AMPHETAMINE SULFATE 3.75; 3.75; 3.75; 3.75 MG/1; MG/1; MG/1; MG/1
15 TABLET ORAL 2 TIMES DAILY
Qty: 60 TABLET | Refills: 0 | Status: SHIPPED | OUTPATIENT
Start: 2023-12-21 | End: 2024-02-02

## 2023-12-21 RX ORDER — MIRTAZAPINE 45 MG/1
45 TABLET, FILM COATED ORAL AT BEDTIME
Qty: 30 TABLET | Refills: 3 | Status: SHIPPED | OUTPATIENT
Start: 2023-12-21 | End: 2024-04-01

## 2023-12-21 RX ORDER — DEXTROAMPHETAMINE SACCHARATE, AMPHETAMINE ASPARTATE, DEXTROAMPHETAMINE SULFATE AND AMPHETAMINE SULFATE 3.75; 3.75; 3.75; 3.75 MG/1; MG/1; MG/1; MG/1
15 TABLET ORAL 2 TIMES DAILY
Qty: 60 TABLET | Refills: 0 | Status: SHIPPED | OUTPATIENT
Start: 2024-01-18 | End: 2024-02-02

## 2023-12-21 RX ORDER — TOPIRAMATE 100 MG/1
100 TABLET, FILM COATED ORAL 2 TIMES DAILY
Qty: 60 TABLET | Refills: 3 | Status: SHIPPED | OUTPATIENT
Start: 2023-12-21 | End: 2024-04-01

## 2023-12-21 ASSESSMENT — PATIENT HEALTH QUESTIONNAIRE - PHQ9: SUM OF ALL RESPONSES TO PHQ QUESTIONS 1-9: 10

## 2023-12-21 ASSESSMENT — PAIN SCALES - GENERAL: PAINLEVEL: SEVERE PAIN (7)

## 2023-12-21 NOTE — NURSING NOTE
Is the patient currently in the state of [place]? YES    Visit mode:TELEPHONE    If the visit is dropped, the patient can be reconnected by: TELEPHONE VISIT: Phone number: [phone]    Will anyone else be joining the visit? NO  (If patient encounters technical issues they should call [phone]    How would you like to obtain your AVS? MyChart    Are changes needed to the allergy or medication list? Pt stated no changes to allergies and Pt declined med review    Reason for visit: TISH DAVIS

## 2023-12-21 NOTE — PROGRESS NOTES
"Virtual Visit Details    Type of service:  Telephone Visit   Phone call duration: 25 minutes     Patient location: Home  Provider location: On site             Outpatient Psychiatric Progress Note    Name: Paula Ho   : 1960                    Primary Care Provider: Renan Dickerson MD   Therapist: None    PHQ-9 scores:      2023    11:26 AM 10/26/2023     9:09 AM 12/15/2023     1:00 PM   PHQ-9 SCORE   PHQ-9 Total Score MyChart  6 (Mild depression)    PHQ-9 Total Score 7 6 10       LIBRA-7 scores:      2023    11:26 AM 10/26/2023     9:10 AM 12/15/2023     1:00 PM   LIBRA-7 SCORE   Total Score  4 (minimal anxiety)    Total Score 7 4    4 10       Patient Identification:    Patient is a 63 year old year old, single  White Not  or  female  who presents for return visit with me.  Patient is currently disabled. Patient attended the session alone. Patient prefers to be called: \" Paula\".    Current medications include: albuterol (PROAIR HFA) 108 (90 Base) MCG/ACT inhaler, Inhale 2 puffs into the lungs every 4 hours as needed for shortness of breath or wheezing  albuterol (PROVENTIL) (2.5 MG/3ML) 0.083% neb solution, NEBULIZE THE CONTENTS OF 1 VIAL EVERY 6 HOURS AS NEEDED.  amphetamine-dextroamphetamine (ADDERALL) 15 MG tablet, Take 1 tablet (15 mg) by mouth 2 times daily  amphetamine-dextroamphetamine (ADDERALL) 15 MG tablet, Take 1 tablet (15 mg) by mouth 2 times daily  ARIPiprazole (ABILIFY) 5 MG tablet, Take 1 tablet (5 mg) by mouth daily  budesonide-formoterol (SYMBICORT) 160-4.5 MCG/ACT Inhaler, Inhale 2 puffs into the lungs 2 times daily  buprenorphine HCl-naloxone HCl (SUBOXONE) 8-2 MG per film, Place 1 Film under the tongue daily  buPROPion (WELLBUTRIN SR) 100 MG 12 hr tablet, Take 1 tablet (100 mg) by mouth 2 times daily  cetirizine (ZYRTEC) 10 MG tablet, Take 1 tablet (10 mg) by mouth daily  cyanocobalamin (CYANOCOBALAMIN) 1000 MCG/ML injection, 1000mcg subcutaneous " injection daily for 7 days, then weekly for 4 weeks, then monthly  dexamethasone (DECADRON) 1 MG tablet, Take 1 mg at 11 pm and get a morning blood draw at 8 am for cortisol.  doxycycline monohydrate (MONODOX) 100 MG capsule, Take 1 capsule (100 mg) by mouth 2 times daily  FLUoxetine (PROZAC) 40 MG capsule, Take 1 capsule (40 mg) by mouth 2 times daily  folic acid (FOLVITE) 1 MG tablet, Take 1 tablet (1 mg) by mouth daily  gabapentin (NEURONTIN) 600 MG tablet, Take 1 tablet (600 mg) by mouth daily At bedtime  levothyroxine (SYNTHROID/LEVOTHROID) 25 MCG tablet, Take 1 tablet (25 mcg) by mouth daily  medical cannabis (Patient's own supply), See Admin Instructions (The purpose of this order is to document that the patient reports taking medical cannabis.  This is not a prescription, and is not used to certify that the patient has a qualifying medical condition.)  mirtazapine (REMERON) 45 MG tablet, Take 1 tablet (45 mg) by mouth at bedtime  Multiple Vitamins-Minerals (MULTIVITAMIN ADULT PO),   mupirocin (BACTROBAN) 2 % external ointment, Apply topically 3 times daily  NARCAN 4 MG/0.1ML nasal spray,   order for DME, Equipment being ordered: Nebulizer  oxyCODONE IR (ROXICODONE) 15 MG tablet, Take 15 mg by mouth 3 times daily + 5 mg x1 daily  tiotropium (SPIRIVA) 18 MCG inhaled capsule, Inhale 1 capsule (18 mcg) into the lungs daily  tiZANidine (ZANAFLEX) 2 MG tablet, TK 2 TO 3 TS PO QHS  topiramate (TOPAMAX) 100 MG tablet, Take 1 tablet (100 mg) by mouth 2 times daily  vitamin D3 (CHOLECALCIFEROL) 2000 units (50 mcg) tablet, Take 1 tablet by mouth daily    lidocaine (PF) (XYLOCAINE) 1 % injection 8 mL         The [place] Prescription Monitoring Program has been reviewed and there are no concerns about diversionary activity for controlled substances at this time.      I was able to review most recent Primary Care Provider, specialty provider, and therapy visit notes that I have access to.     Today, patient reports she  has not received  her Adderall for over a  month.  Since then she has noticed herself starting projects and not completing them.  She has no energy.  She is sleeping poorly. Her pain is shooting down her legs and on her tailbone.  Every day she feels down.  Some days she does not want to get out of bed.  She lost her dad and sister over the holidays which makes this time of year hard on her.     has a past medical history of Acute respiratory failure (H) (02/01/2020), Anxiety, Asthma, Depression, and Hypertension.    Social history updates:    No changes in  her social history to report    Substance use updates:    No alcohol use reported  Tobacco use: Yes Cigarettes  Ready to quit?  No  Nicotine Replacement Therapy tried: none     Vital Signs:   LMP  (LMP Unknown)     Labs:    Most recent laboratory results reviewed and no new labs.     Mental Status Examination:  Appearance: awake, alert and mild distress  Attitude: cooperative  Eye Contact:   not able to assess  Gait and Station: No dizziness or falls  Psychomotor Behavior:   not able to assess  Oriented to:  time, person, and place  Attention Span and Concentration:  Normal  Speech:   vtspeech: clear, coherent and Speaks: English  Mood:  anxious, depressed, and labile  Affect:  intensity is heightened  Associations:  no loose associations  Thought Process:  goal oriented  Thought Content:  no evidence of suicidal ideation or homicidal ideation, no auditory hallucinations present, and no visual hallucinations present  Recent and Remote Memory:  intact Not formally assessed. No amnesia.  Fund of Knowledge: appropriate  Insight:  good  Judgment: good  Impulse Control:  good    Suicide Risk Assessment:  Today Paula Ho reports no thoughts to harm themself or others. In addition, there are notable risk factors for self-harm, including anxiety, anger/rage, and mood change. However, risk is mitigated by history of seeking help when needed, future oriented,  denies suicidal intent or plan, and denies homicidal ideation, intent, or plan. Therefore, based on all available evidence including the factors cited above, Paula Ho does not appear to be at imminent risk for self-harm, does not meet criteria for a 72-hr hold, and therefore remains appropriate for ongoing outpatient level of care.  A thorough assessment of risk factors related to suicide and self-harm have been reviewed and are noted above. The patient convincingly denies suicidality on several occasions. Local community safety resources printed and reviewed for patient to use if needed. There was no deceit detected, and the patient presented in a manner that was believable.     DSM5 Diagnosis:  Attention-Deficit/Hyperactivity Disorder  314.00 (F90.0) Predominantly inattentive presentation  296.32 (F33.1) Major Depressive Disorder, Recurrent Episode, Moderate _ and With mixed features  300.02 (F41.1) Generalized Anxiety Disorder  780.52 (G47.00) Insomnia Disorder   With non-sleep disorder mental comorbidity  Recurrent      Medical comorbidities include:   Patient Active Problem List    Diagnosis Date Noted    Angiolipoma of right kidney 01/20/2023     Priority: Medium    Adrenal nodule (H24) 01/20/2023     Priority: Medium    Opioid dependence with current use (H) 06/30/2022     Priority: Medium    Infection due to 2019 novel coronavirus 12/09/2020     Priority: Medium    Hyperlipidemia 04/14/2020     Priority: Medium    Benign essential hypertension 04/14/2020     Priority: Medium    MDD (major depressive disorder), recurrent severe, without psychosis (H) 03/02/2020     Priority: Medium    Chronic obstructive pulmonary disease, unspecified COPD type (H) 02/04/2020     Priority: Medium     No PFTS  In EPIC      Attention deficit hyperactivity disorder (ADHD) 01/31/2020     Priority: Medium    Chronic midline low back pain with bilateral sciatica 01/31/2020     Priority: Medium    Brain aneurysm 12/03/2019      Priority: Medium     Dec 2017  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 Device is MR compatible to 3 BREANN 3/2020      History of subarachnoid hemorrhage 12/22/2017     Priority: Medium     H/O of SAH, Acm 2, Hunt-Thomas 1, from a ruptured 9mm left Pcomm aneurysm with a wide neck and a fetal left PCA arising from the aneurysm neck, s/p successful coil embolization 12/23/2017 by Dr. Otto Hurley      Chronic GERD 01/26/2017     Priority: Medium    Chronic knee pain 12/12/2014     Priority: Medium    Cigarette smoker 07/08/2014     Priority: Medium    Chronic, continuous use of opioids 04/24/2013     Priority: Medium     Managed by pain clinic outside of [place].  UDS + cocaine in December 2019 without confirmation testing at her pain clinic per patient report      Morbid obesity (H) 03/27/2013     Priority: Medium    Status post knee surgery 03/27/2013     Priority: Medium     Per patient's recollection:  Circa 2002: right knee arthroscopy (Dr. Pappas)  Circa 2004: right knee partial knee replacement (Iam Ritchie MD)  Circa 2006: right TKA (Ron Ryder MD; [facility])  Circa 2008: right TKA revision due to infection (Ron Ryder MD; [facility])  Circa 2009: right TKA revision due to infection (Ron Ryder MD; [facility])  April 2011: right TKA (Ron Ryder MD; [facility])      LIBRA (generalized anxiety disorder) 09/28/2011     Priority: Medium    Chronic pain 09/28/2011     Priority: Medium     Knee and low back        DJD (degenerative joint disease) 09/28/2011     Priority: Medium    Insomnia 04/13/2011     Priority: Medium     Insomnia  NOS      Tobacco use disorder 07/31/2006     Priority: Medium    Asthma 11/15/2004     Priority: Medium     Asthma  NOS         Assessment:    Paula Ho found that when she did not take her Adderall, she became more distracted and disorganized.  This time of year is particularly difficult  for her as she remembers the loss of family members.  At this point she will continue her Adderall as ordered.  Her depression medications do not seem to be as effective for her as they once were.  At this point the bupropion has been discontinued by herself.  I decreased the fluoxetine to 20 mg 2 times daily to avoid serotonin overload.  Mirtazapine continues at bedtime.  As a depression adjunct medication I increased her Abilify to 10 mg daily.  I am hoping this will also operate as a mood stabilizer for her.  Gabapentin continues at bedtime.  Topiramate is also ordered at 100 mg 2 times daily as a mood stabilizer.    Medication side effects and alternatives were reviewed. Health promotion activities recommended and reviewed today. All questions addressed. Education and counseling completed regarding risks and benefits of medications and psychotherapy options.    Treatment Plan:      1.  Continue Adderall 15 mg 2 times daily  2.  Bupropion discontinued  3.  Continue mirtazapine 45 mg at bedtime  4.  Fluoxetine decreased to 20 mg 2 times daily  5.  Continue gabapentin 600 mg at bedtime  6.  Continue topiramate 100 mg 2 times daily  7.  Increase Abilify to 10 mg daily    Continue all other medications as reviewed per electronic medical record today.   Safety plan reviewed. To the Emergency Department as needed or call after hours crisis line at [phone] or [phone]. [place] Crisis Text Line. Text [place] to 228024 or Suicide LifeLine Chat: suicidepreventionlifeline.org/chat/  To schedule individual or family therapy, call [facility] at [phone]  Schedule an appointment with me in February or sooner as needed. Call [facility] at [phone] to schedule.  Follow up with primary care provider as planned or for acute medical concerns.  Call the psychiatric nurse line with medication questions or concerns at [phone]  MyChart may be used to communicate with your provider,  but this is not intended to be used for emergencies.    Crisis Resources:    National Suicide Prevention Lifeline: 721.566.5819 (TTY: 615.220.7634). Call anytime for help.  (www.suicidepreventionlifeline.org)  National [place] on Mental Illness (www.raudel.org): 433.669.9153 or 594-734-9871.   Mental Health Association (www.mentalhealth.org): 993.389.7517 or 750-361-8412.  [place] Crisis Text Line: Text [place] to 796755  Suicide LifeLine Chat: suicideRebel Coast Winery.org/chat    Administrative Billing:   Time spent with patient includes counseling and coordination of care regarding above diagnoses and treatment plan.    Patient Status:  This is a continuous care patient and medications will be prescribed by the psychiatric provider until further indicated.    Signed:   VIRGILIO Kidd-BC   Psychiatry

## 2023-12-21 NOTE — PATIENT INSTRUCTIONS
"Patient Education   The Panel Psychiatry Program  What to Expect  Here's what to expect in the Panel Psychiatry Program.   About the program  You'll be meeting with a psychiatric doctor to check your mental health. A psychiatric doctor helps you deal with troubling thoughts and feelings by giving you medicine. They'll make sure you know the plan for your care. You may see them for a long time. When you're feeling better, they may refer you back to seeing your family doctor.   If you have any questions, we'll be glad to talk to you.  About visits  Be open  At your visits, please talk openly about your problems. It may feel hard, but it's the best way for us to help you.  Cancelling visits  If you can't come to your visit, please call us right away at [phone]. If you don't cancel at least 24 hours (1 full day) before your visit, that's \"late cancellation.\"  Not showing up for your visits  Being very late is the same as not showing up. You'll be a \"no show\" if:  You're more than 15 minutes late for a 30-minute (half hour) visit.  You're more than 30 minutes late for a 60-minute (full hour) visit.  If you cancel late or don't show up 2 times within 6 months, we may end your care.  Getting help between visits  If you need help between visits, you can call us Monday to Friday from 8 a.m. to 4:30 p.m. at [phone].  Emergency care  Call 911 or go to the nearest emergency department if your life or someone else's life is in danger.  Call 988 anytime to reach the national Suicide and Crisis hotline.  Medicine refills  To refill your medicine, call your pharmacy. You can also call [facility]'s Behavioral Access at [phone], Monday to Friday, 8 a.m. to 4:30 p.m. It can take 1 to 3 business days to get a refill.   Forms, letters, and tests  You may have papers to fill out, like FMLA, short-term disability, and workability. We can help you with these forms at your visits, but you must have an " appointment. You may need more than 1 visit for this, to be in an intensive therapy program, or both.  Before we can give you medicine for ADHD, we may refer you to get tested for it or confirm it another way.  We may not be able to give you an emotional support animal letter.  We don't do mental health checks ordered by the court.   We don't do mental health testing, but we can refer you to get tested.   Thank you for choosing us for your care.  For informational purposes only. Not to replace the advice of your health care provider. Copyright   2022 [facility] [employer]. All rights reserved. [employer] [id number] - 12/22.       Treatment Plan:      1.  Continue Adderall 15 mg 2 times daily  2.  Bupropion discontinued  3.  Continue mirtazapine 45 mg at bedtime  4.  Fluoxetine decreased to 20 mg 2 times daily  5.  Continue gabapentin 600 mg at bedtime  6.  Continue topiramate 100 mg 2 times daily  7.  Increase Abilify to 10 mg daily    Continue all other medications as reviewed per electronic medical record today.   Safety plan reviewed. To the Emergency Department as needed or call after hours crisis line at [phone] or [phone]. [place] Crisis Text Line. Text [place] to 491863 or Suicide LifeLine Chat: suicidepreventionlifeline.org/chat/  To schedule individual or family therapy, call [facility] at [phone]  Schedule an appointment with me in February or sooner as needed. Call [facility] at [phone] to schedule.  Follow up with primary care provider as planned or for acute medical concerns.  Call the psychiatric nurse line with medication questions or concerns at [phone]  MyChart may be used to communicate with your provider, but this is not intended to be used for emergencies.    Crisis Resources:    National Suicide Prevention Lifeline: 874.124.6162 (TTY: 630.719.6978). Call anytime for help.  (www.suicidepreventionlifeline.org)  National [place] on Mental  Illness (www.raudel.org): 905-865-7198 or 873-063-8632.   Mental Health Association (www.mentalhealth.org): 132.723.5219 or 630-500-1307.  [place] Crisis Text Line: Text [place] to 156774  Suicide LifeLine Chat: suicidepreventionlifeline.org/chat

## 2024-01-15 ENCOUNTER — TELEPHONE (OUTPATIENT)
Dept: PULMONOLOGY | Facility: CLINIC | Age: 64
End: 2024-01-15
Payer: MEDICARE

## 2024-01-18 ENCOUNTER — TELEPHONE (OUTPATIENT)
Dept: BEHAVIORAL HEALTH | Facility: CLINIC | Age: 64
End: 2024-01-18
Payer: MEDICARE

## 2024-01-18 NOTE — TELEPHONE ENCOUNTER
Writer called pt with intent of rescheduling upcoming [facility] SW CC apt due to SW CC being out of office during scheduled apt time. Pt answered call. Writer assisted pt in rescheduling apt. Writer asked pt if they had any immediate questions/concerns/needs. Pt reports none.       DENI Padgett  [facility]al [facility] [place] ([facility])   [facility]  [phone]

## 2024-01-24 ENCOUNTER — VIRTUAL VISIT (OUTPATIENT)
Dept: BEHAVIORAL HEALTH | Facility: CLINIC | Age: 64
End: 2024-01-24
Payer: MEDICARE

## 2024-01-24 DIAGNOSIS — R69 DIAGNOSIS DEFERRED: Primary | ICD-10-CM

## 2024-01-24 NOTE — PROGRESS NOTES
"Behavioral Health Home Services  [facility]: [place]        Social Work Care Navigator Note      Patient: Paula Ho  Date: January 24, 2024  Preferred Name: Paula    Previous PHQ-9:       10/26/2023     9:09 AM 12/15/2023     1:00 PM 12/21/2023    10:57 AM   PHQ-9 SCORE   PHQ-9 Total Score MyChart 6 (Mild depression)     PHQ-9 Total Score 6 10 10     Previous LIBRA-7:       9/25/2023    11:26 AM 10/26/2023     9:10 AM 12/15/2023     1:00 PM   LIBRA-7 SCORE   Total Score  4 (minimal anxiety)    Total Score 7 4    4 10     ARNALDO LEVEL:       No data to display                Preferred Contact:  Need for : No  Preferred Contact: Cell      Type of Contact Today: Phone call (patient / identified key support person reached)    Service Modality: Phone Visit:      Provider verified identity through the following two step process.  Patient provided:  Patient is known previously to provider    Telephone Visit: The patient's condition can be safely assessed and treated via synchronous audio telemedicine encounter.      Reason for Audio Telemedicine Visit: Patient has requested telehealth visit    Originating Site (Patient Location): Patient's home    Distant Site (Provider Location): Provider Remote Setting- Home Office    Consent:  The patient/guardian has verbally consented to:     1. The potential risks and benefits of telemedicine (telephone visit) versus in person care;    The patient has been notified of the following:      \"We have found that certain health care needs can be provided without the need for a face to face visit.  This service lets us provide the care you need with a phone conversation.       I will have full access to your [facility] medical record during this entire phone call.   I will be taking notes for your medical record.      Since this is like an office visit, we will bill your insurance company for this service.       There are potential benefits and risks of telephone visits " "(e.g. limits to patient confidentiality) that differ from in-person visits.?Confidentiality still applies for telephone services, and nobody will record the visit.  It is important to be in a quiet, private space that is free of distractions (including cell phone or other devices) during the visit.??      If during the course of the call I believe a telephone visit is not appropriate, you will not be charged for this service\"     Consent has been obtained for this service by care team member: Yes       Data: (subjective / Objective):  Recent ED/IP Admission or Discharge?   None    Patient Goals:  Goal Areas: Health; Mental Health; Future HAP Goal area; Chemical Health  Patient stated goals: Health: Paula would like to continue to meet with her providers, and take her medications as prescribed.   -Paula would also like assistance with improving her back pain, and receive pool therapy, as well as possibly surgery.   -Paula would like to get a gym membership so she can get more exercise at the place she does pool therapy.  -Paula would like to find a nutrition plan that would work for her and help her to lose weight.   Mental Health: Paula would like to find a new therapist within [facility], and continue to receive assistance with [facility] for monthly check ins. She would like to continue working with an [facility] worker.  -She reported that she wants to build herself back to feel like somebody and take it day by day.     Chemical Health: Paula would like assistance from her providers to quit smoking and will work on reducing her use, so she can be approved for back surgery.     Future goals: Paula would like to open to the CADI waiver in the future, but she'll continue with only PCA for now. She would like to receive home delivered meals if she does open.    [facility] Core Service Provided:  Comprehensive Care Management: utilized the electronic medical record / patient registry to identify and support patient's health " conditions / needs more effectively   Care Transitions: focused on the coordinated and seamless movement of patient between or within different levels of care or settings  Care Coordination: provided care management services/referrals necessary to ensure patient and their identified supports have access to medical, behavioral health, pharmacology and recovery support services.  Ensured that patient's care is integrated across all settings and services.   Individual and Family Support: aimed to help clients reduce barriers to achieving goals, increase health literacy and knowledge about chronic condition(s), increase self-efficacy skills, and improve health outcomes    Current Stressors / Issues / Care Plan Objective Addressed Today:  [facility] and patient were able to meet today for Behavioral Health Home ([facility]) monthly check-in via telehealth visit. All required ROIs have been filed with HIM/patient chart.    Patient reported that she's been doing okay, but she's still been struggling with low energy and having no motivation. She reports she's going to talk with her PCP about this on Friday, and will meet with her psychiatrist next week on 2/2. She is going to ask about vitamins and anything else that may be causing the low energy.     2. Patient reported that she received her dentures back, but she's not happy with them. She reported that the bottom ones she can't wear because there's nothing to hold onto. She reports the top ones fit correctly, but she can't wear them without the bottoms. The dentist said she should just use more of the glue, but this doesn't help because she doesn't have a ridge to hold onto. She reported that she wishes she would like implants, but she knows that she can't afford this and insurance doesn't cover this.     3. Patient reported that she reached out to her brother and was able to talk with him. She reports they were able to have a conversation, but he's still upset about his dog  situation.     4. Patient reported that she was able to file the paperwork for her legal situation with her [employer] worker, so she's still waiting on the court date.     5. Patient reported that her mom is still asking for her to come visit, but she reports that she has things going on right now and isn't able to visit. She reports that she has to find another place to stay than her mom's place, so she has to figure out where she can afford to stay when she visits.     6. Patient reported that her dog has been doing better recently, and she reports he's still surviving right now.     7. SWCC and patient discussed therapy and patient is okay with scheduling with [facility], while also getting a long term therapy referral. [facility] scheduled patient with [facility] for 1/30 and completed a mental health referral.     Intervention:  Motivational Interviewing: Expressed Empathy/Understanding, Supported Autonomy, Collaboration, Evocation, Permission to raise concern or advise, Open-ended questions, and Reflections: simple and complex   Target Behavior(s): Explored thoughts about taking an anti-depressant, Explored and resolved challenges related to taking anti-depressants as prescribed, Explored thoughts and readiness to participate in individual therapy, Explored and resolved challenges to attending appointments as scheduled, and Explored current social supports and reinforced opportunities to increase engagement    Assessment: (Progress on Goals / Homework):  Patient would benefit from continued coordination in reaching their goals set for the Behavioral Health Home ([facility]) program. [facility] reviewed Health Action Plan goals and will continue to monitor progress and work with patient and their care team.    Plan: (Homework, other):  Patient was encouraged to continue to seek condition-related information and education.      Scheduled a Phone follow up appointment with MISA RICE in 4 weeks     Patient has set self-identified goals and will  monitor progress until the next appointment on: 2/21/24.       DENI Humphries  Behavioral Health Home ([facility])   [facility]  [phone]      Next 5 appointments (look out 90 days)      Jan 26, 2024  2:30 PM  (Arrive by 2:10 PM)  Annual Wellness Visit with Renan Dickerson MD  [facility] ([facility] ) [address] [place] [id number] [phone]

## 2024-01-24 NOTE — Clinical Note
Anson Orozco,   This patient is scheduled with you next week on 1/30 in a new patient slot. She had a long term therapist who is no longer with [facility], so I placed a long term therapy referral but she agreed to meeting with you until she's scheduled with someone. She is stable right now, but has hx of SI.   Thank you!  Yesi

## 2024-01-26 ENCOUNTER — OFFICE VISIT (OUTPATIENT)
Dept: FAMILY MEDICINE | Facility: CLINIC | Age: 64
End: 2024-01-26
Payer: MEDICARE

## 2024-01-26 VITALS
WEIGHT: 278 LBS | TEMPERATURE: 98 F | BODY MASS INDEX: 44.68 KG/M2 | RESPIRATION RATE: 20 BRPM | HEART RATE: 67 BPM | SYSTOLIC BLOOD PRESSURE: 136 MMHG | HEIGHT: 66 IN | OXYGEN SATURATION: 95 % | DIASTOLIC BLOOD PRESSURE: 87 MMHG

## 2024-01-26 DIAGNOSIS — J44.9 STAGE 1 MILD COPD BY GOLD CLASSIFICATION (H): ICD-10-CM

## 2024-01-26 DIAGNOSIS — J44.9 CHRONIC OBSTRUCTIVE PULMONARY DISEASE, UNSPECIFIED COPD TYPE (H): ICD-10-CM

## 2024-01-26 DIAGNOSIS — Z13.6 CARDIOVASCULAR SCREENING; LDL GOAL LESS THAN 130: ICD-10-CM

## 2024-01-26 DIAGNOSIS — Z00.00 ENCOUNTER FOR MEDICARE ANNUAL WELLNESS EXAM: Primary | ICD-10-CM

## 2024-01-26 DIAGNOSIS — E66.01 MORBID OBESITY WITH BMI OF 45.0-49.9, ADULT (H): ICD-10-CM

## 2024-01-26 DIAGNOSIS — R73.03 PREDIABETES: ICD-10-CM

## 2024-01-26 PROCEDURE — G0438 PPPS, INITIAL VISIT: HCPCS | Performed by: INTERNAL MEDICINE

## 2024-01-26 RX ORDER — ALBUTEROL SULFATE 90 UG/1
2 AEROSOL, METERED RESPIRATORY (INHALATION) EVERY 4 HOURS PRN
Qty: 8.5 G | Refills: 11 | Status: SHIPPED | OUTPATIENT
Start: 2024-01-26 | End: 2024-04-02

## 2024-01-26 RX ORDER — BUDESONIDE AND FORMOTEROL FUMARATE DIHYDRATE 160; 4.5 UG/1; UG/1
2 AEROSOL RESPIRATORY (INHALATION) 2 TIMES DAILY
Qty: 10.2 G | Refills: 11 | Status: SHIPPED | OUTPATIENT
Start: 2024-01-26 | End: 2024-04-02

## 2024-01-26 RX ORDER — ALBUTEROL SULFATE 0.83 MG/ML
SOLUTION RESPIRATORY (INHALATION)
Qty: 90 ML | Refills: 11 | Status: SHIPPED | OUTPATIENT
Start: 2024-01-26 | End: 2024-07-14

## 2024-01-26 ASSESSMENT — ENCOUNTER SYMPTOMS
JOINT SWELLING: 0
COUGH: 0
DIZZINESS: 0
DIARRHEA: 0
HEARTBURN: 0
FEVER: 0
ARTHRALGIAS: 1
EYE PAIN: 0
NAUSEA: 0
NERVOUS/ANXIOUS: 0
PALPITATIONS: 0
PARESTHESIAS: 0
CONSTIPATION: 0
CHILLS: 0
FREQUENCY: 1
SHORTNESS OF BREATH: 0
MYALGIAS: 1
HEMATURIA: 0
DYSURIA: 0
HEADACHES: 0
WEAKNESS: 0
SORE THROAT: 0
BREAST MASS: 0
HEMATOCHEZIA: 0
ABDOMINAL PAIN: 0

## 2024-01-26 ASSESSMENT — ACTIVITIES OF DAILY LIVING (ADL)
CURRENT_FUNCTION: HOUSEWORK REQUIRES ASSISTANCE
CURRENT_FUNCTION: BATHING REQUIRES ASSISTANCE
CURRENT_FUNCTION: PREPARING MEALS REQUIRES ASSISTANCE
CURRENT_FUNCTION: SHOPPING REQUIRES ASSISTANCE

## 2024-01-26 ASSESSMENT — PAIN SCALES - GENERAL: PAINLEVEL: NO PAIN (0)

## 2024-01-26 NOTE — PROGRESS NOTES
She is at risk for lack of exercise and has been provided with information to increase physical activity for the benefit of her well-being.  The patient was counseled and encouraged to consider modifying their diet and eating habits. She was provided with information on recommended healthy diet options.  The patient reports that she has difficulty with activities of daily living. I have asked that the patient make a follow up appointment in 12 weeks where this issue will be further evaluated and addressed.  The patient was provided with suggestions to help her develop a healthy emotional lifestyle.  The patient s PHQ-9 score is consistent with moderate depression. She was provided with information regarding depression and was advised to schedule a follow up appointment in 12 weeks to further address this issue.

## 2024-01-26 NOTE — PATIENT INSTRUCTIONS
"Patient Education   Personalized Prevention Plan  You are due for the preventive services outlined below.  Your care team is available to assist you in scheduling these services.  If you have already completed any of these items, please share that information with your care team to update in your medical record.  Health Maintenance Due   Topic Date Due     COPD Action Plan  Never done     Depression Action Plan  Never done     CONTROLLED SUBSTANCE AGREEMENT FOR CHRONIC PAIN MANAGEMENT  Never done     Annual Wellness Visit  Never done     PAP Smear  Never done     URINE DRUG SCREEN  06/09/2023     Flu Vaccine (1) 09/01/2023     Colorectal Cancer Screening  10/16/2023     Thyroid Function Lab  01/18/2024     LUNG CANCER SCREENING  03/02/2024     Learning About Being Physically Active  What is physical activity?     Being physically active means doing any kind of activity that gets your body moving.  The types of physical activity that can help you get fit and stay healthy include:  Aerobic or \"cardio\" activities. These make your heart beat faster and make you breathe harder, such as brisk walking, riding a bike, or running. They strengthen your heart and lungs and build up your endurance.  Strength training activities. These make your muscles work against, or \"resist,\" something. Examples include lifting weights or doing push-ups. These activities help tone and strengthen your muscles and bones.  Stretches. These let you move your joints and muscles through their full range of motion. Stretching helps you be more flexible.  Reaching a balance between these three types of physical activity is important because each one contributes to your overall fitness.  What are the benefits of being active?  Being active is one of the best things you can do for your health. It helps you to:  Feel stronger and have more energy to do all the things you like to do.  Focus better at school or work.  Feel, think, and sleep " "better.  Reach and stay at a healthy weight.  Lose fat and build lean muscle.  Lower your risk for serious health problems, including diabetes, heart attack, high blood pressure, and some cancers.  Keep your heart, lungs, bones, muscles, and joints strong and healthy.  How can you make being active part of your life?  Start slowly. Make it your long-term goal to get at least 30 minutes of exercise on most days of the week. Walking is a good choice. You also may want to do other activities, such as running, swimming, cycling, or playing tennis or team sports.  Pick activities that you like--ones that make your heart beat faster, your muscles stronger, and your muscles and joints more flexible. If you find more than one thing you like doing, do them all. You don't have to do the same thing every day.  Get your heart pumping every day. Any activity that makes your heart beat faster and keeps it at that rate for a while counts.  Here are some great ways to get your heart beating faster:  Go for a brisk walk, run, or hike.  Go for a swim or bike ride.  Take an online exercise class or dance.  Play a game of touch football, basketball, or soccer.  Play tennis, pickleball, or racquetball.  Climb stairs.  Even some household chores can be aerobic. Just do them at a faster pace. Raking or mowing the lawn, sweeping the garage, and vacuuming and cleaning your home all can help get your heart rate up.  Strengthen your muscles during the week. You don't have to lift heavy weights or grow big, bulky muscles to get stronger. Doing a few simple activities that make your muscles work against, or \"resist,\" something can help you get stronger. Aim for at least twice a week.  For example, you can:  Do push-ups or sit-ups, which use your own body weight as resistance.  Lift weights or dumbbells or use stretch bands at home or in a gym or community center.  Stretch your muscles often. Stretching will help you as you become more active. " "It can help you stay flexible and loosen tight muscles. It can also help improve your balance and posture and can be a great way to relax.  Be sure to stretch the muscles you'll be using when you work out. It's best to warm your muscles slightly before you stretch them. Walk or do some other light aerobic activity for a few minutes. Then start stretching.  When you stretch your muscles:  Do it slowly. Stretching is not about going fast or making sudden movements.  Don't push or bounce during a stretch.  Hold each stretch for at least 15 to 30 seconds, if you can. You should feel a stretch in the muscle, but not pain.  Breathe out as you do the stretch. Then breathe in as you hold the stretch. Don't hold your breath.  If you're worried about how more activity might affect your health, have a checkup before you start. Follow any special advice your doctor gives you for getting a smart start.  Where can you learn more?  Go to https://www.FamilyLeaf.Datappraise/patiented  Enter W332 in the search box to learn more about \"Learning About Being Physically Active.\"  Current as of: June 6, 2023               Content Version: 13.8    3203-1861 [employer].   Care instructions adapted under license by your healthcare professional. If you have questions about a medical condition or this instruction, always ask your healthcare professional. [employer] disclaims any warranty or liability for your use of this information.      Learning About Dietary Guidelines  What are the Dietary Guidelines for Americans?     Dietary Guidelines for Americans provide tips for eating well and staying healthy. This helps reduce the risk for long-term (chronic) diseases.  These guidelines recommend that you:  Eat and drink the right amount for you. The [place] government's food guide is called MyPlate. It can help you make your own well-balanced eating plan.  Try to balance your eating with your activity. This helps you stay at a " "healthy weight.  Drink alcohol in moderation, if at all.  Limit foods high in salt, saturated fat, trans fat, and added sugar.  These guidelines are from the [place] Department of Agriculture and the [place] Department of Health and Human Services. They are updated every 5 years.  What is MyPlate?  MyPlate is the [place] government's food guide. It can help you make your own well-balanced eating plan. A balanced eating plan means that you eat enough, but not too much, and that your food gives you the nutrients you need to stay healthy.  MyPlate focuses on eating plenty of whole grains, fruits, and vegetables, and on limiting fat and sugar. It is available online at www.ChooseMyPlate.gov.  How can you get started?  If you're trying to eat healthier, you can slowly change your eating habits over time. You don't have to make big changes all at once. Start by adding one or two healthy foods to your meals each day.  Grains  Choose whole-grain breads and cereals and whole-wheat pasta and whole-grain crackers.  Vegetables  Eat a variety of vegetables every day. They have lots of nutrients and are part of a heart-healthy diet.  Fruits  Eat a variety of fruits every day. Fruits contain lots of nutrients. Choose fresh fruit instead of fruit juice.  Protein foods  Choose fish and lean poultry more often. Eat red meat and fried meats less often. Dried beans, tofu, and nuts are also good sources of protein.  Dairy  Choose low-fat or fat-free products from this food group. If you have problems digesting milk, try eating cheese or yogurt instead.  Fats and oils  Limit fats and oils if you're trying to cut calories. Choose healthy fats when you cook. These include canola oil and olive oil.  Where can you learn more?  Go to https://www.healthwise.net/patiented  Enter D676 in the search box to learn more about \"Learning About Dietary Guidelines.\"  Current as of: February 28, 2023               Content Version: 13.8    6616-9447 [employer] " [employer].   Care instructions adapted under license by your healthcare professional. If you have questions about a medical condition or this instruction, always ask your healthcare professional. [employer] disclaims any warranty or liability for your use of this information.      Activities of Daily Living    Your Health Risk Assessment indicates you have difficulties with activities of daily living such as housework, bathing, preparing meals, taking medication, etc. Please make a follow up appointment for us to address this issue in more detail.  Your Health Risk Assessment indicates you feel you are not in good emotional health.    Recreation   Recreation is not limited to sports and team events. It includes any activity that provides relaxation, interest, enjoyment, and exercise. Recreation provides an outlet for physical, mental, and social energy. It can give a sense of worth and achievement. It can help you stay healthy.    Mental Exercise and Social Involvement  Mental and emotional health is as important as physical health. Keep in touch with friends and family. Stay as active as possible. Continue to learn and challenge yourself.   Things you can do to stay mentally active are:  Learn something new, like a foreign language or musical instrument.   Play SCRABBLE or do crossword puzzles. If you cannot find people to play these games with you at home, you can play them with others on your computer through the Internet.   Join a games club--anything from card games to chess or checkers or lawn bowling.   Start a new hobby.   Go back to school.   Volunteer.   Read.   Keep up with world events.  Learning About Depression Screening  What is depression screening?  Depression screening is a way to see if you have depression symptoms. It may be done by a doctor or counselor. It's often part of a routine checkup. That's because your mental health is just as important as your physical  "health.  Depression is a mental health condition that affects how you feel, think, and act. You may:  Have less energy.  Lose interest in your daily activities.  Feel sad and grouchy for a long time.  Depression is very common. It affects people of all ages.  Many things can lead to depression. Some people become depressed after they have a stroke or find out they have a major illness like cancer or heart disease. The death of a loved one or a breakup may lead to depression. It can run in families. Most experts believe that a combination of inherited genes and stressful life events can cause it.  What happens during screening?  You may be asked to fill out a form about your depression symptoms. You and the doctor will discuss your answers. The doctor may ask you more questions to learn more about how you think, act, and feel.  What happens after screening?  If you have symptoms of depression, your doctor will talk to you about your options.  Doctors usually treat depression with medicines or counseling. Often, combining the two works best. Many people don't get help because they think that they'll get over the depression on their own. But people with depression may not get better unless they get treatment.  The cause of depression is not well understood. There may be many factors involved. But if you have depression, it's not your fault.  A serious symptom of depression is thinking about death or suicide. If you or someone you care about talks about this or about feeling hopeless, get help right away.  It's important to know that depression can be treated. Medicine, counseling, and self-care may help.  Where can you learn more?  Go to https://www.Microlaunchers.net/patiented  Enter T185 in the search box to learn more about \"Learning About Depression Screening.\"  Current as of: June 25, 2023               Content Version: 13.8    1717-5922 [employer].   Care instructions adapted under license by your " healthcare professional. If you have questions about a medical condition or this instruction, always ask your healthcare professional. [employer]wise, Inc[employer] disclaims any warranty or liability for your use of this information.

## 2024-01-26 NOTE — PROGRESS NOTES
"Preventive Care Visit  [facility]  Renan Dickerson MD, Internal Medicine  Jan 26, 2024      SUBJECTIVE:   Paula is a 63 year old, presenting for the following:  Wellness Visit    Are you in the first 12 months of your Medicare coverage?  No    Healthy Habits:     In general, how would you rate your overall health?  Good    Frequency of exercise:  None    Do you usually eat at least 4 servings of fruit and vegetables a day, include whole grains    & fiber and avoid regularly eating high fat or \"junk\" foods?  No    Taking medications regularly:  Yes    Medication side effects:  Other    Ability to successfully perform activities of daily living:  Shopping requires assistance, preparing meals requires assistance, housework requires assistance and bathing requires assistance    Home Safety:  No safety concerns identified    Hearing Impairment:  No hearing concerns    In the past 6 months, have you been bothered by leaking of urine?  No    In general, how would you rate your overall mental or emotional health?  Poor    Additional concerns today:  Yes    Today's PHQ-9 Score:       1/26/2024     2:04 PM   PHQ-9 SCORE   PHQ-9 Total Score MyChart 14 (Moderate depression)   PHQ-9 Total Score 14         Have you ever done Advance Care Planning? (For example, a Health Directive, POLST, or a discussion with a medical provider or your loved ones about your wishes): Yes, patient states has an Advance Care Planning document and will bring a copy to the clinic.       Fall risk  Fallen 2 or more times in the past year?: No  Any fall with injury in the past year?: No    Cognitive Screening   1) Repeat 3 items (Leader, Season, Table)    2) Clock draw: NORMAL  3) 3 item recall: Recalls 1 object   Results: NORMAL clock, 1-2 items recalled: COGNITIVE IMPAIRMENT LESS LIKELY    Mini-CogTM Copyright S Mignon. Licensed by the author for use in [facility]; reprinted with permission ([email or web address]). " All rights reserved.      Do you have sleep apnea, excessive snoring or daytime drowsiness? : yes    Reviewed and updated as needed this visit by clinical staff   Tobacco  Allergies  Meds              Reviewed and updated as needed this visit by Provider                  Social History     Tobacco Use    Smoking status: Every Day     Packs/day: 1     Types: Cigarettes    Smokeless tobacco: Never    Tobacco comments:     Patient has been trying patches and they have not been working.   Substance Use Topics    Alcohol use: Not Currently             1/26/2024     2:12 PM   Alcohol Use   Prescreen: >3 drinks/day or >7 drinks/week? Not Applicable     Do you have a current opioid prescription? (!) YES   How severe is your pain on a scale from 1-10? 10/10     Do you use any other controlled substances or medications that are not prescribed by a provider? Cannabis    Current providers sharing in care for this patient include:   Patient Care Team:  Renan Dickerson MD as PCP - General (Internal Medicine)  Tigist Manjarrez NP as Nurse Practitioner (Nurse Practitioner)  Tigist Manjarrez NP as Assigned Behavioral Health Provider  Care, [facility] ([facility] ([facility]  Dane Irizarry MD as Anesthesiologist (Anesthesiology)  Chanell Joya MD as MD (Dermatology)  Joie Nobles MD as Referring Physician (Family Medicine)  Man Goode MD as Assigned Pulmonology Provider  Paty Dial BSW as  (Clinic)  Nathalia Ordoñez MD as MD (Endocrinology, Diabetes, and Metabolism)  Raul Marinelli MD as MD (Urology)  Zoila Martinez MD as Assigned Endocrinology Provider  Raul Marinelli MD as Assigned Surgical Provider  Za Aldridge DO as Assigned Cancer Care Provider  Renan Dickerson MD as Assigned PCP    The following health maintenance items are reviewed in Epic and correct as of today:  Health Maintenance   Topic Date Due    ADVANCE CARE PLANNING   Never done    COPD ACTION PLAN  Never done    DEPRESSION ACTION PLAN  Never done    CONTROLLED SUBSTANCE AGREEMENT FOR CHRONIC PAIN MANAGEMENT  Never done    MEDICARE ANNUAL WELLNESS VISIT  Never done    PAP  Never done    ANNUAL REVIEW OF HM ORDERS  05/20/2022    URINE DRUG SCREEN  06/09/2023    NICOTINE/TOBACCO CESSATION COUNSELING Q 1 YR  06/30/2023    INFLUENZA VACCINE (1) 09/01/2023    COLORECTAL CANCER SCREENING  10/16/2023    TSH W/FREE T4 REFLEX  01/18/2024    LUNG CANCER SCREENING  03/02/2024    DEPRESSION 6 MO INDEX REPEAT PHQ-9  02/09/2024    LIBRA ASSESSMENT  12/15/2024    PHQ-9  01/26/2025    MAMMO SCREENING  12/05/2025    LIPID  05/20/2026    DTAP/TDAP/TD IMMUNIZATION (3 - Td or Tdap) 02/18/2030    SPIROMETRY  Completed    HEPATITIS C SCREENING  Completed    HIV SCREENING  Completed    Pneumococcal Vaccine: Pediatrics (0 to 5 Years) and At-Risk Patients (6 to 64 Years)  Completed    ZOSTER IMMUNIZATION  Completed    HEPATITIS A IMMUNIZATION  Completed    RSV VACCINE (Pregnancy & 60+)  Completed    COVID-19 Vaccine  Completed    IPV IMMUNIZATION  Aged Out    HPV IMMUNIZATION  Aged Out    MENINGITIS IMMUNIZATION  Aged Out    RSV MONOCLONAL ANTIBODY  Aged Out     Labs reviewed in EPIC  BP Readings from Last 3 Encounters:   01/26/24 136/87   10/10/23 117/79   07/24/23 135/79    Wt Readings from Last 3 Encounters:   01/26/24 126.1 kg (278 lb)   10/10/23 121.6 kg (268 lb)   07/24/23 121.1 kg (267 lb)                  Patient Active Problem List   Diagnosis    Chronic knee pain    LIBRA (generalized anxiety disorder)    Brain aneurysm    Chronic, continuous use of opioids    Asthma    Chronic GERD    Chronic pain    Cigarette smoker    DJD (degenerative joint disease)    Insomnia    Morbid obesity (H)    History of subarachnoid hemorrhage    Status post knee surgery    Tobacco use disorder    Chronic obstructive pulmonary disease, unspecified COPD type (H)    MDD (major depressive disorder), recurrent severe,  without psychosis (H)    Attention deficit hyperactivity disorder (ADHD)    Chronic midline low back pain with bilateral sciatica    Hyperlipidemia    Benign essential hypertension    Infection due to 2019 novel coronavirus    Opioid dependence with current use (H)    Angiolipoma of right kidney    Adrenal nodule (H24)     Past Surgical History:   Procedure Laterality Date    APPENDECTOMY      APPENDECTOMY      CEREBRAL ANEURYSM REPAIR      JOINT REPLACEMENT      ORTHOPEDIC SURGERY  2002    Circa 2002: right knee arthroscopy (Dr. Pappas)    ORTHOPEDIC SURGERY  2004    Circa 2004: right knee partial knee replacement (Iam Ritchie MD)    ORTHOPEDIC SURGERY  2006    Circa 2006: right TKA (Ron Ryder MD; [facility])    ORTHOPEDIC SURGERY  2008    Circa 2008: right TKA revision due to infection (Ron Ryder MD; [facility])    ORTHOPEDIC SURGERY  2009    Circa 2009: right TKA revision due to infection (Ron Ryder MD; [facility])    ORTHOPEDIC SURGERY  2011 April 2011: right TKA (Ron Ryder MD; [facility])    REPLACEMENT TOTAL KNEE Right     TONSILLECTOMY      tonsils    TONSILLECTOMY         Social History     Tobacco Use    Smoking status: Every Day     Packs/day: 1     Types: Cigarettes    Smokeless tobacco: Never    Tobacco comments:     Patient has been trying patches and they have not been working.   Substance Use Topics    Alcohol use: Not Currently     Family History   Problem Relation Age of Onset    Depression Mother     Substance Abuse Father          Allergies   Allergen Reactions    Compazine [Prochlorperazine]     Droperidol     Lisinopril     Morphine      Other reaction(s): hives    Pravastatin      Other reaction(s): Edema    Seroquel [Quetiapine]      Recent Labs   Lab Test 04/02/23  0918 01/18/23  1204 03/15/22  1511 07/12/21  1218 05/20/21  0811 03/02/21  1606 02/23/21  1551   A1C 5.9*  --   --   --   --   --   --    LDL  --   --  128*  --  139*  --   --     HDL  --   --   --   --  51  --   --    TRIG  --   --   --   --  82  --   --    ALT 18 25 32  --  24  --  28   CR 0.57 0.55 0.51*   < > 0.65  --  0.55   GFRESTIMATED >90 >90 >90   < > >90  --  >90   GFRESTBLACK  --   --   --   --  >90  --  >90   POTASSIUM 4.3 4.8 4.1   < > 4.2  --  4.0   TSH  --  3.53 3.71   < >  --    < > 5.77*    < > = values in this interval not displayed.      Mammogram Screening:  Patient is overdue for a mammogram.    FHS-7:       12/5/2023     1:01 PM   Breast CA Risk Assessment (FHS-7)   Did any of your first-degree relatives have breast or ovarian cancer? Yes   Did any of your relatives have bilateral breast cancer? Yes   Did any man in your family have breast cancer? No   Did any woman in your family have breast and ovarian cancer? No   Did any woman in your family have breast cancer before age 50 y? No   Do you have 2 or more relatives with breast and/or ovarian cancer? No   Do you have 2 or more relatives with breast and/or bowel cancer? Unknown       Pertinent mammograms are reviewed under the imaging tab.  Review of Systems   Constitutional:  Negative for chills and fever.   HENT:  Negative for congestion, ear pain, hearing loss and sore throat.    Eyes:  Positive for visual disturbance. Negative for pain.   Respiratory:  Negative for cough and shortness of breath.    Cardiovascular:  Positive for chest pain. Negative for palpitations.   Gastrointestinal:  Negative for abdominal pain, constipation, diarrhea and nausea.   Genitourinary:  Positive for frequency and urgency. Negative for dysuria, genital sores, hematuria, pelvic pain, vaginal bleeding and vaginal discharge.   Musculoskeletal:  Positive for arthralgias and myalgias. Negative for joint swelling.   Skin:  Negative for rash.   Neurological:  Negative for dizziness, weakness and headaches.   Psychiatric/Behavioral:  The patient is not nervous/anxious.         OBJECTIVE:   /87 (BP Location: Left arm, Patient Position:  "Sitting, Cuff Size: Adult Large)   Pulse 67   Temp 98  F (36.7  C) (Tympanic)   Resp 20   Ht 1.664 m (5' 5.5\")   Wt 126.1 kg (278 lb)   LMP  (LMP Unknown)   SpO2 95%   BMI 45.56 kg/m     Estimated body mass index is 45.56 kg/m  as calculated from the following:    Height as of this encounter: 1.664 m (5' 5.5\").    Weight as of this encounter: 126.1 kg (278 lb).  Physical Exam  GENERAL: alert and no distress  EYES: Eyes grossly normal to inspection and conjunctivae and sclerae normal  HENT: normal cephalic/atraumatic and oral mucous membranes moist  NECK: no adenopathy, no asymmetry, masses, or scars  RESP: lungs clear to auscultation - no rales, rhonchi or wheezes  CV: regular rates and rhythm and no peripheral edema  ABDOMEN: soft, nontender, no hepatosplenomegaly, no masses and bowel sounds normal  MS: no gross musculoskeletal defects noted, no edema  NEURO: Normal strength and tone, mentation intact and speech normal  PSYCH: mentation appears normal, affect normal/bright    Diagnostic Test Results:  No results found for any visits on 01/26/24.    ASSESSMENT / PLAN:     Encounter for Medicare annual wellness exam  - REVIEW OF HEALTH MAINTENANCE PROTOCOL ORDERS  - PRIMARY CARE FOLLOW-UP SCHEDULING; Future    Morbid obesity with BMI of 45.0-49.9, adult (H)  - Semaglutide-Weight Management (WEGOVY) 0.25 MG/0.5ML pen; Inject 0.25 mg Subcutaneous once a week for 4 doses  - Semaglutide-Weight Management (WEGOVY) 0.5 MG/0.5ML pen; Inject 0.5 mg Subcutaneous once a week for 4 doses  - Semaglutide-Weight Management (WEGOVY) 1 MG/0.5ML pen; Inject 1 mg Subcutaneous once a week  - Semaglutide-Weight Management (WEGOVY) 1.7 MG/0.75ML pen; Inject 1.7 mg Subcutaneous once a week  - Semaglutide-Weight Management (WEGOVY) 2.4 MG/0.75ML pen; Inject 2.4 mg Subcutaneous once a week    Stage 1 mild COPD by GOLD classification (H)  - budesonide-formoterol (SYMBICORT) 160-4.5 MCG/ACT Inhaler; Inhale 2 puffs into the lungs 2 " "times daily    Chronic obstructive pulmonary disease, unspecified COPD type (H)  - albuterol (PROAIR HFA) 108 (90 Base) MCG/ACT inhaler; Inhale 2 puffs into the lungs every 4 hours as needed for shortness of breath or wheezing  - albuterol (PROVENTIL) (2.5 MG/3ML) 0.083% neb solution; NEBULIZE THE CONTENTS OF 1 VIAL EVERY 6 HOURS AS NEEDED.    Prediabetes  - Hemoglobin A1c; Future    CARDIOVASCULAR SCREENING; LDL GOAL LESS THAN 130  - Lipid panel reflex to direct LDL Fasting; Future     Patient has been advised of split billing requirements and indicates understanding: Yes      Counseling  Special attention given to:       Regular exercise       Healthy diet/nutrition       The 10-year ASCVD risk score (Deja GARCIA, et al., 2019) is: 9.6%    Values used to calculate the score:      Age: 63 years      Sex: Female      Is Non- : No      Diabetic: No      Tobacco smoker: Yes      Systolic Blood Pressure: 136 mmHg      Is BP treated: No      HDL Cholesterol: 51 mg/dL      Total Cholesterol: 206 mg/dL      BMI  Estimated body mass index is 45.56 kg/m  as calculated from the following:    Height as of this encounter: 1.664 m (5' 5.5\").    Weight as of this encounter: 126.1 kg (278 lb).   Weight management plan: diet and exercise.      She reports that she has been smoking cigarettes. She has been smoking an average of 1 pack per day. She has never used smokeless tobacco.  Nicotine/Tobacco Cessation Plan  Information offered: Patient not interested at this time      Appropriate preventive services were discussed with this patient, including applicable screening as appropriate for fall prevention, nutrition, physical activity, Tobacco-use cessation, weight loss and cognition.  Checklist reviewing preventive services available has been given to the patient.    Reviewed patients plan of care and provided an AVS. The Basic Care Plan (routine screening as documented in Health Maintenance) for Paula meets " the Care Plan requirement. This Care Plan has been established and reviewed with the Patient.        Signed Electronically by: Renan Dickerson MD    Identified Health Risks  High risk of hospitalization due to COPD and chronic pain.

## 2024-02-02 ENCOUNTER — VIRTUAL VISIT (OUTPATIENT)
Dept: PSYCHIATRY | Facility: CLINIC | Age: 64
End: 2024-02-02
Payer: MEDICARE

## 2024-02-02 DIAGNOSIS — F51.04 PSYCHOPHYSIOLOGICAL INSOMNIA: ICD-10-CM

## 2024-02-02 DIAGNOSIS — F33.1 MODERATE EPISODE OF RECURRENT MAJOR DEPRESSIVE DISORDER (H): Primary | ICD-10-CM

## 2024-02-02 DIAGNOSIS — F90.0 ATTENTION DEFICIT HYPERACTIVITY DISORDER (ADHD), PREDOMINANTLY INATTENTIVE TYPE: ICD-10-CM

## 2024-02-02 DIAGNOSIS — F41.1 GAD (GENERALIZED ANXIETY DISORDER): ICD-10-CM

## 2024-02-02 PROCEDURE — 99443 PR PHYSICIAN TELEPHONE EVALUATION 21-30 MIN: CPT | Mod: 93 | Performed by: NURSE PRACTITIONER

## 2024-02-02 RX ORDER — DEXTROAMPHETAMINE SACCHARATE, AMPHETAMINE ASPARTATE, DEXTROAMPHETAMINE SULFATE AND AMPHETAMINE SULFATE 3.75; 3.75; 3.75; 3.75 MG/1; MG/1; MG/1; MG/1
15 TABLET ORAL 2 TIMES DAILY
Qty: 60 TABLET | Refills: 0 | Status: SHIPPED | OUTPATIENT
Start: 2024-02-17 | End: 2024-04-01

## 2024-02-02 RX ORDER — DEXTROAMPHETAMINE SACCHARATE, AMPHETAMINE ASPARTATE, DEXTROAMPHETAMINE SULFATE AND AMPHETAMINE SULFATE 3.75; 3.75; 3.75; 3.75 MG/1; MG/1; MG/1; MG/1
15 TABLET ORAL 2 TIMES DAILY
Qty: 60 TABLET | Refills: 0 | Status: SHIPPED | OUTPATIENT
Start: 2024-03-16 | End: 2024-04-01

## 2024-02-02 ASSESSMENT — PAIN SCALES - GENERAL: PAINLEVEL: NO PAIN (0)

## 2024-02-02 ASSESSMENT — PATIENT HEALTH QUESTIONNAIRE - PHQ9
SUM OF ALL RESPONSES TO PHQ QUESTIONS 1-9: 7
10. IF YOU CHECKED OFF ANY PROBLEMS, HOW DIFFICULT HAVE THESE PROBLEMS MADE IT FOR YOU TO DO YOUR WORK, TAKE CARE OF THINGS AT HOME, OR GET ALONG WITH OTHER PEOPLE: VERY DIFFICULT
SUM OF ALL RESPONSES TO PHQ QUESTIONS 1-9: 7

## 2024-02-02 NOTE — PROGRESS NOTES
"         Outpatient Psychiatric Progress Note    Name: Paula Ho   : 1960                    Primary Care Provider: Renan Dickerson MD   Therapist: Yes    PHQ-9 scores:      12/15/2023     1:00 PM 2023    10:57 AM 2024     2:04 PM   PHQ-9 SCORE   PHQ-9 Total Score MyChart   14 (Moderate depression)   PHQ-9 Total Score 10 10 14       LIBRA-7 scores:      2023    11:26 AM 10/26/2023     9:10 AM 12/15/2023     1:00 PM   LIBRA-7 SCORE   Total Score  4 (minimal anxiety)    Total Score 7 4    4 10       Patient Identification:    Patient is a 63 year old year old, single  White Not  or  female  who presents for return visit with me.  Patient is currently unemployed. Patient attended the session alone. Patient prefers to be called: \" Paula\".    Current medications include: albuterol (PROAIR HFA) 108 (90 Base) MCG/ACT inhaler, Inhale 2 puffs into the lungs every 4 hours as needed for shortness of breath or wheezing  albuterol (PROVENTIL) (2.5 MG/3ML) 0.083% neb solution, NEBULIZE THE CONTENTS OF 1 VIAL EVERY 6 HOURS AS NEEDED.  amphetamine-dextroamphetamine (ADDERALL) 15 MG tablet, Take 1 tablet (15 mg) by mouth 2 times daily  amphetamine-dextroamphetamine (ADDERALL) 15 MG tablet, Take 1 tablet (15 mg) by mouth 2 times daily Fill no sooner than   ARIPiprazole (ABILIFY) 10 MG tablet, Take 1 tablet (10 mg) by mouth daily  budesonide-formoterol (SYMBICORT) 160-4.5 MCG/ACT Inhaler, Inhale 2 puffs into the lungs 2 times daily  buprenorphine HCl-naloxone HCl (SUBOXONE) 8-2 MG per film, Place 1 Film under the tongue daily  cetirizine (ZYRTEC) 10 MG tablet, Take 1 tablet (10 mg) by mouth daily  cyanocobalamin (CYANOCOBALAMIN) 1000 MCG/ML injection, 1000mcg subcutaneous injection daily for 7 days, then weekly for 4 weeks, then monthly  dexamethasone (DECADRON) 1 MG tablet, Take 1 mg at 11 pm and get a morning blood draw at 8 am for cortisol.  doxycycline monohydrate (MONODOX) " 100 MG capsule, Take 1 capsule (100 mg) by mouth 2 times daily  FLUoxetine (PROZAC) 20 MG capsule, Take 1 capsule (20 mg) by mouth 2 times daily  folic acid (FOLVITE) 1 MG tablet, Take 1 tablet (1 mg) by mouth daily  gabapentin (NEURONTIN) 600 MG tablet, Take 1 tablet (600 mg) by mouth daily At bedtime  levothyroxine (SYNTHROID/LEVOTHROID) 25 MCG tablet, Take 1 tablet (25 mcg) by mouth daily  medical cannabis (Patient's own supply), See Admin Instructions (The purpose of this order is to document that the patient reports taking medical cannabis.  This is not a prescription, and is not used to certify that the patient has a qualifying medical condition.)  mirtazapine (REMERON) 45 MG tablet, Take 1 tablet (45 mg) by mouth at bedtime  Multiple Vitamins-Minerals (MULTIVITAMIN ADULT PO),   mupirocin (BACTROBAN) 2 % external ointment, Apply topically 3 times daily  NARCAN 4 MG/0.1ML nasal spray,   order for DME, Equipment being ordered: Nebulizer  oxyCODONE IR (ROXICODONE) 15 MG tablet, Take 15 mg by mouth 3 times daily + 5 mg x1 daily  Semaglutide-Weight Management (WEGOVY) 0.25 MG/0.5ML pen, Inject 0.25 mg Subcutaneous once a week for 4 doses  [START ON 2/28/2024] Semaglutide-Weight Management (WEGOVY) 0.5 MG/0.5ML pen, Inject 0.5 mg Subcutaneous once a week for 4 doses  [START ON 3/27/2024] Semaglutide-Weight Management (WEGOVY) 1 MG/0.5ML pen, Inject 1 mg Subcutaneous once a week  [START ON 4/24/2024] Semaglutide-Weight Management (WEGOVY) 1.7 MG/0.75ML pen, Inject 1.7 mg Subcutaneous once a week  [START ON 5/22/2024] Semaglutide-Weight Management (WEGOVY) 2.4 MG/0.75ML pen, Inject 2.4 mg Subcutaneous once a week  tiotropium (SPIRIVA) 18 MCG inhaled capsule, Inhale 1 capsule (18 mcg) into the lungs daily  tiZANidine (ZANAFLEX) 2 MG tablet, TK 2 TO 3 TS PO QHS  topiramate (TOPAMAX) 100 MG tablet, Take 1 tablet (100 mg) by mouth 2 times daily  vitamin D3 (CHOLECALCIFEROL) 2000 units (50 mcg) tablet, Take 1 tablet by  mouth daily    lidocaine (PF) (XYLOCAINE) 1 % injection 8 mL         The [place] Prescription Monitoring Program has been reviewed and there are no concerns about diversionary activity for controlled substances at this time.      I was able to review most recent Primary Care Provider, specialty provider, and therapy visit notes that I have access to.     Today, patient reports that she had been feeling sluggish with difficulties getting out of bed in the morning.  Since starting a vitamin D supplement she feels better.  Some days her pain is so high that it is difficult for her to get out of bed.  It often wakes her up at night.  She tells me she takes her pain medication as prescribed and sometimes not as much as prescribed when she feels like she does not need it.  With regards to her depression symptoms, she feels they are starting to stabilize.  She notices that she has not been crying a lot lately as before.       has a past medical history of Acute respiratory failure (H) (02/01/2020), Anxiety, Asthma, Depression, and Hypertension.    Social history updates:    Paula lives by herself in an apartment.    Substance use updates:    No alcohol use reported  Tobacco use: Yes Cigarettes  Ready to quit?  No  Nicotine Replacement Therapy tried: None    Vital Signs:   LMP  (LMP Unknown)     Labs:    Most recent laboratory results reviewed and no new labs.     Mental Status Examination:  Appearance: awake, alert  Attitude: cooperative  Eye Contact:   Not able to assess  Gait and Station: No dizziness or falls  Psychomotor Behavior:   Not able to assess  Oriented to:  time, person, and place  Attention Span and Concentration:  Normal  Speech:   vtspeech: clear, coherent and Speaks: English  Mood:  anxious and depressed  Affect:  mood congruent  Associations:  no loose associations  Thought Process:  goal oriented  Thought Content:  no evidence of suicidal ideation or homicidal ideation, no auditory hallucinations  present, and no visual hallucinations present  Recent and Remote Memory:  intact Not formally assessed. No amnesia.  Fund of Knowledge: appropriate  Insight:  good  Judgment: good  Impulse Control:  good    Suicide Risk Assessment:  Today Paula Ho reports no thoughts to harm themself or others. In addition, there are notable risk factors for self-harm, including single status, anxiety, comorbid medical condition of chronic pain, anger/rage, withdrawing, and mood change. However, risk is mitigated by history of seeking help when needed, future oriented, denies suicidal intent or plan, and denies homicidal ideation, intent, or plan. Therefore, based on all available evidence including the factors cited above, Paula Ho does not appear to be at imminent risk for self-harm, does not meet criteria for a 72-hr hold, and therefore remains appropriate for ongoing outpatient level of care.  A thorough assessment of risk factors related to suicide and self-harm have been reviewed and are noted above. The patient convincingly denies suicidality on several occasions. Local community safety resources printed and reviewed for patient to use if needed. There was no deceit detected, and the patient presented in a manner that was believable.     DSM5 Diagnosis:  Attention-Deficit/Hyperactivity Disorder  314.00 (F90.0) Predominantly inattentive presentation  296.32 (F33.1) Major Depressive Disorder, Recurrent Episode, Moderate _ and With mixed features  300.02 (F41.1) Generalized Anxiety Disorder  780.52 (G47.00) Insomnia Disorder   With non-sleep disorder mental comorbidity  Recurrent      Medical comorbidities include:   Patient Active Problem List    Diagnosis Date Noted    Angiolipoma of right kidney 01/20/2023     Priority: Medium    Adrenal nodule (H24) 01/20/2023     Priority: Medium    Opioid dependence with current use (H) 06/30/2022     Priority: Medium    Infection due to 2019 novel coronavirus 12/09/2020      Priority: Medium    Hyperlipidemia 04/14/2020     Priority: Medium    Benign essential hypertension 04/14/2020     Priority: Medium    MDD (major depressive disorder), recurrent severe, without psychosis (H) 03/02/2020     Priority: Medium    Chronic obstructive pulmonary disease, unspecified COPD type (H) 02/04/2020     Priority: Medium     No PFTS  In EPIC      Attention deficit hyperactivity disorder (ADHD) 01/31/2020     Priority: Medium    Chronic midline low back pain with bilateral sciatica 01/31/2020     Priority: Medium    Brain aneurysm 12/03/2019     Priority: Medium     Dec 2017  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 Device is MR compatible to 3 BREANN 3/2020      History of subarachnoid hemorrhage 12/22/2017     Priority: Medium     H/O of SAH, Cam 2, Hunt-Thomas 1, from a ruptured 9mm left Pcomm aneurysm with a wide neck and a fetal left PCA arising from the aneurysm neck, s/p successful coil embolization 12/23/2017 by Dr. Otto Hurley      Chronic GERD 01/26/2017     Priority: Medium    Chronic knee pain 12/12/2014     Priority: Medium    Cigarette smoker 07/08/2014     Priority: Medium    Chronic, continuous use of opioids 04/24/2013     Priority: Medium     Managed by pain clinic outside of [place].  UDS + cocaine in December 2019 without confirmation testing at her pain clinic per patient report      Morbid obesity (H) 03/27/2013     Priority: Medium    Status post knee surgery 03/27/2013     Priority: Medium     Per patient's recollection:  Circa 2002: right knee arthroscopy (Dr. Pappas)  Circa 2004: right knee partial knee replacement (Iam Ritchie MD)  Circa 2006: right TKA (Ron Ryder MD; [facility])  Circa 2008: right TKA revision due to infection (Ron Ryder MD; [facility])  Circa 2009: right TKA revision due to infection (Ron Ryder MD; [facility])  April 2011: right TKA (Ron Ryder MD; [facility])       LIBRA (generalized anxiety disorder) 09/28/2011     Priority: Medium    Chronic pain 09/28/2011     Priority: Medium     Knee and low back        DJD (degenerative joint disease) 09/28/2011     Priority: Medium    Insomnia 04/13/2011     Priority: Medium     Insomnia  NOS      Tobacco use disorder 07/31/2006     Priority: Medium    Asthma 11/15/2004     Priority: Medium     Asthma  NOS         Assessment:    Paula Ho Rupa feels that her depression and anxiety symptoms have lessened with this current combination of medications.  She has had less intense anger outbursts and periods of irritability.  Chronic pain symptoms continue and this exacerbates her symptoms.  At this point she will continue with fluoxetine, Abilify, and mirtazapine to manage her depression symptoms.  The mirtazapine helps her to also relax at night so she can sleep.  Adderall continues twice daily to help her remain organized and less distracted so that she may keep up her home and attend medical appointments.  Gabapentin is being taken at bedtime for anxiety.  Topiramate is being taken twice daily to help with mood regulation..    Medication side effects and alternatives were reviewed. Health promotion activities recommended and reviewed today. All questions addressed. Education and counseling completed regarding risks and benefits of medications and psychotherapy options.    Treatment Plan:      1.  Continue topiramate 100 mg 2 times daily  2.  Continue mirtazapine 45 mg at bedtime  3.  Continue gabapentin 600 mg at bedtime  4.  Continue fluoxetine 20 mg 2 times daily  5.  Continue Abilify 10 mg daily  6.  Continue Adderall 15 mg 2 times daily    Continue all other medications as reviewed per electronic medical record today.   Safety plan reviewed. To the Emergency Department as needed or call after hours crisis line at [phone] or [phone]. [place] Crisis Text [place]. Text [place] to 384209 or Suicide LifeLine Chat:  suicidepreventionlifeline.org/chat/  To schedule individual or family therapy, call [facility] at [phone]  Schedule an appointment with me in 2 months or sooner as needed. Call [facility] at [phone] to schedule.  Follow up with primary care provider as planned or for acute medical concerns.  Call the psychiatric nurse line with medication questions or concerns at [phone]  MyChart may be used to communicate with your provider, but this is not intended to be used for emergencies.        Crisis Resources   The EmPath is an adults only unit located at [facility] is a short term (generally less than 23 hour stay) designed for crisis intervention and stabilization. Pts have the opportunity to meet quickly with a behavioral health team for evaluation in a calm and peaceful therapuetic environment. To be evaluated for admission pts are triaged throught the [facility] ED.      The following hotlines are for both adults and children. The and are open 24 hours a day, 7 days a week unless noted otherwise.        Crisis Lines      Crisis Text Line  Text 876151  You will be connected with a trained live crisis counselor to provide support.        Gambling Hotline  6.787.844.4831 [hope]        línea de crisis española  [phone]        [facility]  [phone]        National Hope Line  6.095.149.4831 [hope]        National Suicide Prevention Lifeline  Free and confidential support  988 or 1.782.221.TALK [8255]  http://suicidepreventionlifeline.org        The Saul Project (LGBTQ Youth Crisis Line)  7.852.113.8261  text START to 469-668        [place] Crisis Line  1.415.884.6063 (Press 1)  or text 765093    [facility] Mental Health Crisis Response  Within [place], call **CRISIS [**501251] to be connected to a mental health professional who can assist you.        [facility] Crisis  [phone]      [facility] Crisis   [phone]      [facility] Crisis  [phone]      [facility] Mobile Crisis  [phone] (adults)  [phone] (children)      [facility] Crisis  [phone] (adults)  [phone] (children)      [facility] Mobile Crisis  [phone]      [facility] Mobile Crisis  [phone]    Community Resources      Fast Tracker  Linking people to mental health and substance use disorder resources  [employer]        [place] Mental Health Warmline  Peer to peer support  5 pm to 9 am 7 days/week  5.931.367.7419  https://mnwitw.org/parviz        National [place] on Mental Illness (UTE)  756.681.4206 or 1.888.UTE.HELPS  https://namimn.org/        [facility]  [facility] residents [address]  [phone]        [facility]  Free mental health counseling  https://walkin.org/  612.870.0565 X2    Mental Health Apps      Calm Harm  https://calmharm.co.uk/      My3  https://my3app.org/      [employer] Safety Plan  https://www.mysafetyplan.org/   Administrative Billing:   Time spent with patient includes counseling and coordination of care regarding above diagnoses and treatment plan.    Patient Status:  This is a continuous care patient and medications will be prescribed by the psychiatric provider until further indicated.    Signed:   VIRGILIO Kidd-BC   Psychiatry       Answers submitted by the patient for this visit:  Patient Health Questionnaire (Submitted on 2/2/2024)  If you checked off any problems, how difficult have these problems made it for you to do your work, take care of things at home, or get along with other people?: Very difficult  PHQ9 TOTAL SCORE: 7

## 2024-02-02 NOTE — PATIENT INSTRUCTIONS
"Patient Education   The Panel Psychiatry Program  What to Expect  Here's what to expect in the Panel Psychiatry Program.   About the program  You'll be meeting with a psychiatric doctor to check your mental health. A psychiatric doctor helps you deal with troubling thoughts and feelings by giving you medicine. They'll make sure you know the plan for your care. You may see them for a long time. When you're feeling better, they may refer you back to seeing your family doctor.   If you have any questions, we'll be glad to talk to you.  About visits  Be open  At your visits, please talk openly about your problems. It may feel hard, but it's the best way for us to help you.  Cancelling visits  If you can't come to your visit, please call us right away at [phone]. If you don't cancel at least 24 hours (1 full day) before your visit, that's \"late cancellation.\"  Not showing up for your visits  Being very late is the same as not showing up. You'll be a \"no show\" if:  You're more than 15 minutes late for a 30-minute (half hour) visit.  You're more than 30 minutes late for a 60-minute (full hour) visit.  If you cancel late or don't show up 2 times within 6 months, we may end your care.  Getting help between visits  If you need help between visits, you can call us Monday to Friday from 8 a.m. to 4:30 p.m. at [phone].  Emergency care  Call 911 or go to the nearest emergency department if your life or someone else's life is in danger.  Call 988 anytime to reach the national Suicide and Crisis hotline.  Medicine refills  To refill your medicine, call your pharmacy. You can also call [facility]'s Behavioral Access at [phone], Monday to Friday, 8 a.m. to 4:30 p.m. It can take 1 to 3 business days to get a refill.   Forms, letters, and tests  You may have papers to fill out, like FMLA, short-term disability, and workability. We can help you with these forms at your visits, but you must have an " appointment. You may need more than 1 visit for this, to be in an intensive therapy program, or both.  Before we can give you medicine for ADHD, we may refer you to get tested for it or confirm it another way.  We may not be able to give you an emotional support animal letter.  We don't do mental health checks ordered by the court.   We don't do mental health testing, but we can refer you to get tested.   Thank you for choosing us for your care.  For informational purposes only. Not to replace the advice of your health care provider. Copyright   2022 [facility] [employer]. All rights reserved. [employer] 447854 - 12/22.       Treatment Plan:      1.  Continue topiramate 100 mg 2 times daily  2.  Continue mirtazapine 45 mg at bedtime  3.  Continue gabapentin 600 mg at bedtime  4.  Continue fluoxetine 20 mg 2 times daily  5.  Continue Abilify 10 mg daily  6.  Continue Adderall 15 mg 2 times daily        Continue all other medical directions per primary care provider.   Continue all other medications as reviewed per electronic medical record today.   Safety plan reviewed. To the Emergency Department as needed or call after hours crisis line at [phone] or [phone]. [place] Crisis Text Line: Text [place] to 860849  or  Suicide LifeLine Chat: suicidepreventionlifeline.org/chat/  To schedule individual or family therapy, call [facility] at [phone].   Schedule an appointment with me in 6 weeks or sooner as needed.  Call [facility] at [phone] to schedule.  Follow up with primary care provider as planned or for acute medical concerns.  Call the psychiatric nurse line with medication questions or concerns at [phone].  [employer] may be used to communicate with your provider, but this is not intended to be used for emergencies.        Crisis Resources   The EmPath is an adults only unit located at [facility] in [place] is a short term (generally less than 23 hour  stay) designed for crisis intervention and stabilization. Pts have the opportunity to meet quickly with a behavioral health team for evaluation in a calm and peaceful therapuetic environment. To be evaluated for admission pts are triaged throught the [facility] ED.      The following hotlines are for both adults and children. The and are open 24 hours a day, 7 days a week unless noted otherwise.        Crisis Lines      Crisis Text Line  Text 912216  You will be connected with a trained live crisis counselor to provide support.        Gambling Hotline  1.079.313.4306 [hope]        línea de crisis española  [phone]        [facility] [employer] Helpline  348.982.5242        National Hope Line  3.223.234.0464 [hope]        National Suicide Prevention Lifeline  Free and confidential support  988 or 1.945.110.TALK [8255]  http://suicidepreventionlifeline.org        The Saul Project (LGBTQ Youth Crisis Line)  5.194.454.8673  text START to 911-900        [facility] Crisis Line  1.848.297.8934 (Press 1)  or text 368895    [facility] Mental Health Crisis Response  Within [place], call **CRISIS [**887353] to be connected to a mental health professional who can assist you.        [facility] Crisis  [phone]      [facility] Mobile Crisis  [phone]      [facility] Crisis  [phone]      [facility] Mobile Crisis  [phone] (adults)  [phone] (children)      [facility] Mobile Crisis  [phone] (adults)  [phone] (children)      [facility] Mobile Crisis  [phone]      [facility] Mobile Crisis  [phone]    Community Resources      Fast Tracker  Linking people to mental health and substance use disorder resources  fasttrackermn.org        [place] Mental Health Warmline  Peer to peer support  5 pm to 9 am 7 days/week  0.993.494.0321  https://mnwitw.org/parviz        National [place] on Mental Illness (UTE)  371.230.9270 or  1.888.UTE.HELPS  https://namimn.org/        [facility] for Adult Mental Health  [facility] residents [place]   [address] [facility] [address] [place]  [phone]        [facility]  Free mental health counseling  https://walkin.org/  [phone] X2    Mental Health Apps      Calm Harm  https://calmharm.co.uk/      My3  https://my3app.org/      [place] Safety Plan  https://www.mysafetyplan.org/

## 2024-02-02 NOTE — NURSING NOTE
Is the patient currently in the state of [place]? YES    Visit mode:TELEPHONE    If the visit is dropped, the patient can be reconnected by: TELEPHONE VISIT: Phone number:   Telephone Information:   Mobile [phone]       Will anyone else be joining the visit? NO  (If patient encounters technical issues they should call [phone])    How would you like to obtain your AVS? MyChart    Are changes needed to the allergy or medication list? No    Reason for visit: RECHECK    Pete DAVIS

## 2024-02-02 NOTE — PROGRESS NOTES
Virtual Visit Details    Type of service:  Telephone Visit   Phone call duration: 30 minutes    Patient location: Home  Provider location: Offsite  Answers submitted by the patient for this visit:  Patient Health Questionnaire (Submitted on 2/2/2024)  If you checked off any problems, how difficult have these problems made it for you to do your work, take care of things at home, or get along with other people?: Very difficult  PHQ9 TOTAL SCORE: 7

## 2024-02-21 ENCOUNTER — VIRTUAL VISIT (OUTPATIENT)
Dept: BEHAVIORAL HEALTH | Facility: CLINIC | Age: 64
End: 2024-02-21
Payer: MEDICARE

## 2024-02-21 DIAGNOSIS — R69 DIAGNOSIS DEFERRED: Primary | ICD-10-CM

## 2024-02-21 PROCEDURE — 99207 PR NO BILLABLE SERVICE THIS VISIT: CPT | Mod: 93

## 2024-02-21 NOTE — PROGRESS NOTES
"Behavioral Health Home Services  [facility]: [place]        Social Work Care Navigator Note      Patient: Paula Ho  Date: February 21, 2024  Preferred Name: Paula    Previous PHQ-9:       12/21/2023    10:57 AM 1/26/2024     2:04 PM 2/2/2024     2:13 PM   PHQ-9 SCORE   PHQ-9 Total Score MyChart  14 (Moderate depression) 7 (Mild depression)   PHQ-9 Total Score 10 14 7     Previous LIBRA-7:       9/25/2023    11:26 AM 10/26/2023     9:10 AM 12/15/2023     1:00 PM   LIBRA-7 SCORE   Total Score  4 (minimal anxiety)    Total Score 7 4    4 10     ARNALDO LEVEL:       No data to display                Preferred Contact:  Need for : No  Preferred Contact: Cell      Type of Contact Today: Phone call (patient / identified key support person reached)    Service Modality: Phone Visit:      Provider verified identity through the following two step process.  Patient provided:  Patient is known previously to provider    Telephone Visit: The patient's condition can be safely assessed and treated via synchronous audio telemedicine encounter.      Reason for Audio Telemedicine Visit: Patient has requested telehealth visit    Originating Site (Patient Location): Patient's home    Distant Site (Provider Location): Provider Remote Setting- Home Office    Consent:  The patient/guardian has verbally consented to:     1. The potential risks and benefits of telemedicine (telephone visit) versus in person care;    The patient has been notified of the following:      \"We have found that certain health care needs can be provided without the need for a face to face visit.  This service lets us provide the care you need with a phone conversation.       I will have full access to your [facility] medical record during this entire phone call.   I will be taking notes for your medical record.      Since this is like an office visit, we will bill your insurance company for this service.       There are potential benefits and risks " "of telephone visits (e.g. limits to patient confidentiality) that differ from in-person visits.?Confidentiality still applies for telephone services, and nobody will record the visit.  It is important to be in a quiet, private space that is free of distractions (including cell phone or other devices) during the visit.??      If during the course of the call I believe a telephone visit is not appropriate, you will not be charged for this service\"     Consent has been obtained for this service by care team member: Yes       Data: (subjective / Objective):  Recent ED/IP Admission or Discharge?   None    Patient Goals:  Goal Areas: Health; Mental Health; Future HAP Goal area; Chemical Health  Patient stated goals: Health: Paula would like to continue to meet with her providers, and take her medications as prescribed.   -Paula would also like assistance with improving her back pain, and receive pool therapy, as well as possibly surgery.   -Paula would like to get a gym membership so she can get more exercise at the place she does pool therapy.  -Paula would like to find a nutrition plan that would work for her and help her to lose weight.   Mental Health: Paula would like to find a new therapist within [place], and continue to receive assistance with [facility] for monthly check ins. She would like to continue working with an [facility] worker.  -She reported that she wants to build herself back to feel like somebody and take it day by day.     Chemical Health: Paula would like assistance from her providers to quit smoking and will work on reducing her use, so she can be approved for back surgery.     Future goals: Paula would like to open to the CADI waiver in the future, but she'll continue with only PCA for now. She would like to receive home delivered meals if she does open.    [facility] Core Service Provided:  Comprehensive Care Management: utilized the electronic medical record / patient registry to identify and support " patient's health conditions / needs more effectively   Care Transitions: focused on the coordinated and seamless movement of patient between or within different levels of care or settings  Care Coordination: provided care management services/referrals necessary to ensure patient and their identified supports have access to medical, behavioral health, pharmacology and recovery support services.  Ensured that patient's care is integrated across all settings and services.   Individual and Family Support: aimed to help clients reduce barriers to achieving goals, increase health literacy and knowledge about chronic condition(s), increase self-efficacy skills, and improve health outcomes    Current Stressors / Issues / Care Plan Objective Addressed Today:  [facility] and patient were able to meet today for Behavioral Health Home ([facility]) monthly check-in via telehealth visit. All required ROIs have been filed with HIM/patient chart.    Patient is waiting on hold for the [facility] right now because she hasn't received a call from an  for her PCA reassessment because she knows she's due for the assessment and hasn't heard anything yet. [facility] gave patient the [facility] assessment intake phone number ([phone]), so she's going to try this number instead once she gets off the phone with [facility].     2. Patient reported that she wasn't able to meet with [facility] because it was cancelled, but she hasn't received a call to get it rescheduled. [facility] helped patient schedule with [facility] again on 3/12.     3. Patient reported that she signed up at the gym recently, and she's been going to do exercises about every other day. She reports it's been something she looks forward to and she's been able to get out of the house. She reported that she's liked it so far and she caters to her pain based on the day. Patient reported that she's discussed with her PCP about weight loss.    4. Patient reported that she sold her car, and she's frustrated  with the sale because she didn't get as much money as she thought she would get. She reports that her friend that used the car for work, used the car a lot and it wasn't in as good of shape as it was before he used it. She reported that she is no longer helping her friend out with the rides because he wasn't giving her any gas money. She reported that she's looking for a car, but she's not in a rush to get one because she doesn't really use it.     5. Patient reported that she needs to make a lab appointment that her PCP request, but she hasn't made the appointment yet.     6.  Patient reported that she still doesn't have the court date for her legal situation, so she's going to follow up with this to figure it out.     7. Patient reported that she's been doing a lot better with getting up earlier in the day, which she was struggling to do. She reports that she is now getting up earlier in the day and she's been liking this a lot better.     Intervention:  Motivational Interviewing: Expressed Empathy/Understanding, Supported Autonomy, Collaboration, Evocation, Permission to raise concern or advise, Open-ended questions, and Reflections: simple and complex   Target Behavior(s): Explored thoughts about taking an anti-depressant, Explored and resolved challenges related to taking anti-depressants as prescribed, Explored thoughts and readiness to participate in individual therapy, Explored and resolved challenges to attending appointments as scheduled, and Explored current social supports and reinforced opportunities to increase engagement    Assessment: (Progress on Goals / Homework):  Patient would benefit from continued coordination in reaching their goals set for the Behavioral Health Home ([facility]) program. [facility] reviewed Health Action Plan goals and will continue to monitor progress and work with patient and their care team.    Plan: (Homework, other):  Patient was encouraged to continue to seek condition-related  information and education.      Scheduled a Phone follow up appointment with MISA RICE in 4 weeks     Patient has set self-identified goals and will monitor progress until the next appointment on: 3/20/24.       DENI Humphries  Behavioral Health Home ([facility])   [facility]  [phone]

## 2024-02-27 ENCOUNTER — LAB (OUTPATIENT)
Dept: LAB | Facility: CLINIC | Age: 64
End: 2024-02-27
Payer: MEDICARE

## 2024-02-27 DIAGNOSIS — Z13.6 CARDIOVASCULAR SCREENING; LDL GOAL LESS THAN 130: ICD-10-CM

## 2024-02-27 DIAGNOSIS — R73.03 PREDIABETES: ICD-10-CM

## 2024-02-27 LAB — HBA1C MFR BLD: 5.9 % (ref 0–5.6)

## 2024-02-27 PROCEDURE — 80061 LIPID PANEL: CPT

## 2024-02-27 PROCEDURE — 83036 HEMOGLOBIN GLYCOSYLATED A1C: CPT

## 2024-02-27 PROCEDURE — 36415 COLL VENOUS BLD VENIPUNCTURE: CPT

## 2024-02-28 LAB
CHOLEST SERPL-MCNC: 253 MG/DL
FASTING STATUS PATIENT QL REPORTED: YES
HDLC SERPL-MCNC: 48 MG/DL
LDLC SERPL CALC-MCNC: 182 MG/DL
NONHDLC SERPL-MCNC: 205 MG/DL
TRIGL SERPL-MCNC: 113 MG/DL

## 2024-03-13 ENCOUNTER — VIRTUAL VISIT (OUTPATIENT)
Dept: BEHAVIORAL HEALTH | Facility: CLINIC | Age: 64
End: 2024-03-13
Payer: MEDICARE

## 2024-03-13 DIAGNOSIS — F41.1 GAD (GENERALIZED ANXIETY DISORDER): Primary | ICD-10-CM

## 2024-03-13 PROCEDURE — 90832 PSYTX W PT 30 MINUTES: CPT | Mod: 93

## 2024-03-18 NOTE — PROGRESS NOTES
"[facility] Primary [facility]: Integrated Behavioral Health  2024    Behavioral Health Clinician Progress Note    Patient Name: Paula Ho        Service Type:  Individual      Service Location:   Phone call (patient / identified key support person reached)     Session Start Time: 2:00pm Session End Time: 2:16pm      Session Length: 16 - 37      Attendees: Patient     Service Modality:  Phone Visit:      Provider verified identity through the following two step process.  Patient provided:  Patient  and Patient is known previously to provider    Telephone Visit: The patient's condition can be safely assessed and treated via synchronous audio telemedicine encounter.      Reason for Audio Telemedicine Visit: Patient has requested telehealth visit    Originating Site (Patient Location): Patient's home    Distant Site (Provider Location): [facility] MENTAL [facility] & ADDICTION [facility]    Consent:  The patient/guardian has verbally consented to:     1. The potential risks and benefits of telemedicine (telephone visit) versus in person care;    The patient has been notified of the following:      \"We have found that certain health care needs can be provided without the need for a face to face visit.  This service lets us provide the care you need with a phone conversation.       I will have full access to your [facility] medical record during this entire phone call.   I will be taking notes for your medical record.      Since this is like an office visit, we will bill your insurance company for this service.       There are potential benefits and risks of telephone visits (e.g. limits to patient confidentiality) that differ from in-person visits.?Confidentiality still applies for telephone services, and nobody will record the visit.  It is important to be in a quiet, private space that is free of distractions (including cell phone or other devices) during the visit.?? " "     If during the course of the call I believe a telephone visit is not appropriate, you will not be charged for this service\"     Consent has been obtained for this service by care team member: Yes   Visit Activities (Refresh list every visit): NEW, [facility] Only, and Referral - Mental Health    Diagnostic Assessment Date: Will complete in the next few sessions, not completed due to time constraints.    Treatment Plan Review Date: Will complete in the next few sessions, not completed due to time constraints.    See Flowsheets for today's PHQ-9 and LIBRA-7 results  Previous PHQ-9:       12/21/2023    10:57 AM 1/26/2024     2:04 PM 2/2/2024     2:13 PM   PHQ-9 SCORE   PHQ-9 Total Score MyChart  14 (Moderate depression) 7 (Mild depression)   PHQ-9 Total Score 10 14 7     Previous LIBRA-7:       9/25/2023    11:26 AM 10/26/2023     9:10 AM 12/15/2023     1:00 PM   LIBRA-7 SCORE   Total Score  4 (minimal anxiety)    Total Score 7 4    4 10     DATA  Extended Session (60+ minutes): No  Interactive Complexity: No  Crisis: No  [facility] Patient: Yes, addressed the follow [facility] Core Component(s):                          Health and Wellness Promotion    Treatment Objective(s) Addressed in This Session:  Target Behavior(s): disease management/lifestyle changes ongoing anxiety    Anxiety: will experience a reduction in anxiety, will develop more effective coping skills to manage anxiety symptoms, will develop healthy cognitive patterns and beliefs, and will increase ability to function adaptively    Current Stressors / Issues:  Pt asked to keep session short as she needed to get to the clinic to get more labs done.  She asked if the appt could be via phone and while she was driving, writer informed her that she would not able to conduct the appt.  She discussed her ongoing anxiety about health issues and managing her day to day life.  Reviewed coping skills and prompted follow through.      Progress on Treatment Objective(s) / Homework:  New " Objective established this session - CONTEMPLATION (Considering change and yet undecided); Intervened by assessing the negative and positive thinking (ambivalence) about behavior change    Motivational Interviewing    MI Intervention: Expressed Empathy/Understanding, Supported Autonomy, Collaboration, Evocation, Permission to raise concern or advise, and Reflections: simple and complex     Change Talk Expressed by the Patient: Desire to change Ability to change    Provider Response to Change Talk: A - Affirmed patient's thoughts, decisions, or attempts at behavior change and R - Reflected patient's change talk    Also provided psychoeducation about behavioral health condition, symptoms, and treatment options    Assessments completed prior to visit: pt did not complete them.    The following assessments were completed by patient for this visit:  PHQ9:       8/18/2023     2:09 PM 9/25/2023    11:26 AM 10/26/2023     9:09 AM 12/15/2023     1:00 PM 12/21/2023    10:57 AM 1/26/2024     2:04 PM 2/2/2024     2:13 PM   PHQ-9 SCORE   PHQ-9 Total Score MyChart 17 (Moderately severe depression)  6 (Mild depression)   14 (Moderate depression) 7 (Mild depression)   PHQ-9 Total Score 17 7 6 10 10 14 7     GAD7:       2/6/2023    10:00 AM 3/20/2023    12:53 PM 5/1/2023    10:38 AM 7/31/2023    11:00 AM 9/25/2023    11:26 AM 10/26/2023     9:10 AM 12/15/2023     1:00 PM   LIBRA-7 SCORE   Total Score  13 (moderate anxiety) 13 (moderate anxiety)   4 (minimal anxiety)    Total Score 10 13 13    13    13 11 7 4    4 10     CAGE-AID:        No data to display              PROMIS 10-Global Health (only subscores and total score):       5/1/2023    10:40 AM 5/10/2023     3:47 PM 8/18/2023     2:12 PM   PROMIS-10 Scores Only   Global Mental Health Score 8    8     7 6       Global Physical Health Score 8    8     9 15       PROMIS TOTAL - SUBSCORES 16    16     16 21           Information is confidential and restricted. Go to Review  "Flowsheets to unlock data.    Multiple values from one day are sorted in reverse-chronological order     Chase Suicide Severity Rating Scale (Lifetime/Recent)      10/10/2019     3:54 PM 2/14/2020     4:00 PM 10/31/2022    11:18 AM   Chase Suicide Severity Rating (Lifetime/Recent)   Q1 Wish to be Dead (Lifetime) Yes No    Comments Often due to pain and loss. \"I hate to be alone.\"     Q2 Non-Specific Active Suicidal Thoughts (Lifetime) Yes Yes    Non-Specific Active Suicidal Thought Description (Lifetime) Often due to pain and loss. \"I hate to be alone.\" Did attempt in the past, 15 years ago.     Most Severe Ideation Rating (Lifetime) 4 3    Most Severe Ideation Description (Lifetime)  Did attempt in the past, 15 years ago. Not fully assessed since Paula needed to leave the session early.     Frequency (Lifetime)  3    Duration (Lifetime)  3    Controllability (Lifetime)  3    Protective Factors  (Lifetime)  4    Reasons for Ideation (Lifetime)  4    RETIRED: 1. Wish to be Dead (Recent) Yes     RETIRED: Wish to be Dead Description (Recent) Often due to pain and loss. \"I hate to be alone.\" No plan or intention.     RETIRED: 2. Non-Specific Active Suicidal Thoughts (Recent) Yes     Non-Specific Active Suicidal Thought Description (Recent) Often due to pain and loss. \"I hate to be alone.\" No plan or intention.     3. Active Suicidal Ideation with any Methods (Not Plan) Without Intent to Act (Lifetime) Yes Yes    RETIRE: Active Suicidal Ideation with any Methods (Not Plan) Description (Lifetime) Often due to pain and loss. \"I hate to be alone.\" Did attempt in the past, 15 years ago.     RETIRED: 3. Active Suicidal Ideation with any Methods (Not Plan) Without Intent to Act (Recent) Yes     RETIRED: Active Suicidal Ideation with any Methods (Not Plan) Description (Recent) Often due to pain and loss. \"I hate to be alone.\" No plan or intention.     RETIRE: 4. Active Suicidal Ideation with Some Intent to Act, Without " Specific Plan (Lifetime) Yes Yes    RETIRE: Active Suicidal Ideation with Some Intent to Act, Without Specific Plan Description (Lifetime) Did attempt in the past, 15 years ago.     4. Active Suicidal Ideation with Some Intent to Act, Without Specific Plan (Recent) No No    RETIRE: 5. Active Suicidal Ideation with Specific Plan and Intent (Lifetime) Yes     RETIRE: Active Suicidal Ideation with Specific Plan and Intent Description (Lifetime) Did attempt in the past, 15 years ago.     RETIRED: 5. Active Suicidal Ideation with Specific Plan and Intent (Recent) No     Most Severe Ideation Rating (Past Month) 2 1    Most Severe Ideation Description (Past Month) Regular thoughts of being dead due to chronic pain and loss.     Frequency (Past Month) 3 2    Duration (Past Month) 1     Controllability (Past Month) 2 3    Protective Factors (Past Month) NA 2    Reasons for Ideation (Past Month)  4    Actual Attempt (Lifetime)  Yes    Comments  OD 25 years ago was hospitalized none since    Has subject engaged in non-suicidal self-injurious behavior? (Past 3 Months)  Yes    Comments  burning arms in oven since marriage in 2014.    Comments  25 years ago    Most Recent Attempt Actual Lethality Code NA 1    Most Lethal Attempt Actual Lethality Code NA     Initial/First Attempt Actual Lethality Code NA     Q1 Wish to be Dead (Lifetime)   Y   Wish to be Dead Description (Lifetime)   After a breakup, she reports that she was around [age]   1. Wish to be Dead (Past 1 Month)   N   Q2 Non-Specific Active Suicidal Thoughts (Lifetime)   Y   2. Non-Specific Active Suicidal Thoughts (Past 1 Month)   N   3. Active Suicidal Ideation with any Methods (Not Plan) Without Intent to Act (Lifetime)   Y   Q3 Active Suicidal Ideation with any Methods (Not Plan) Without Intent to Act (Past 1 Month)   N   Q4 Active Suicidal Ideation with Some Intent to Act, Without Specific Plan (Lifetime)   N   Q5 Active Suicidal Ideation with Specific Plan and  Intent (Lifetime)   N   Description of Most Severe Ideation (Lifetime)   5   Description of Most Severe Ideation (Past 1 Month)   NA   Frequency (Lifetime)   3   Duration (Lifetime)   1   Controllability (Lifetime)   1   Deterrents (Lifetime)   0   Reasons for Ideation (Lifetime)   4   Actual Attempt (Lifetime)   Y   Total Number of Actual Attempts (Lifetime)   1   Actual Attempt Description (Lifetime)   Patient states she attempted through medication.   Actual Attempt (Past 3 Months)   N   Has subject engaged in non-suicidal self-injurious behavior? (Lifetime)   Y   Has subject engaged in non-suicidal self-injurious behavior? (Past 3 Months)   N   Interrupted Attempts (Lifetime)   N   Preparatory Acts or Behavior (Lifetime)   Y   Total Number of Preparatory Acts (Lifetime)   1   Actual Lethality/Medical Damage Code (Most Recent Attempt)   2   Potential Lethality Code (Most Recent Attempt)   1   Actual Lethality/Medical Damage Code (Most Lethal Attempt)   2   Potential Lethality Code (Most Lethal Attempt)   1   Calculated C-SSRS Risk Score (Lifetime/Recent)   Moderate Risk     Care Plan review completed: Yes    Medication Review:  No changes to current psychiatric medication(s)    Medication Compliance:  NA    Changes in Health Issues:   None reported    Chemical Use Review:   Substance Use: Chemical use reviewed, no active concerns identified      Tobacco Use: not discussed    Assessment: Current Emotional / Mental Status (status of significant symptoms):  Risk status (Self / Other harm or suicidal ideation)  Patient  Not discussed this session  Patient denies current fears or concerns for personal safety.  Patient denies current or recent suicidal ideation or behaviors.  Patient denies current or recent homicidal ideation or behaviors.  Patient denies current or recent self injurious behavior or ideation.  Patient denies other safety concerns.  A safety and risk management plan has not been developed at this time,  however patient was encouraged to call [place] [phone] should there be a change in any of these risk factors.    Appearance:   Unable to assess  Eye Contact:   Unable to assess   Psychomotor Behavior: Unable to assess   Attitude:   Cooperative   Orientation:   All  Speech   Rate / Production: Normal    Volume:  Normal   Mood:    Normal  Affect:    Appropriate   Thought Content:  Clear   Thought Form:  Coherent  Logical   Insight:    Fair     Diagnoses:  No diagnosis found.    Collateral Reports Completed:  Not Applicable    Plan: (Homework, other):  Patient was given information about behavioral services and encouraged to schedule a follow up appointment with the clinic [facility] in 2 weeks.  She was also given information about mental health symptoms and treatment options .  CD Recommendations: No indications of CD issues.     Pam Arboleda, [facility]  3/13/24

## 2024-03-19 ENCOUNTER — TELEPHONE (OUTPATIENT)
Dept: FAMILY MEDICINE | Facility: CLINIC | Age: 64
End: 2024-03-19
Payer: MEDICARE

## 2024-03-19 NOTE — TELEPHONE ENCOUNTER
Please schedule appointment with this provider during the first week of April 2024. May use Same Day slot.    If patient cannot wait, then please schedule appointment with a any available provider this week.

## 2024-03-19 NOTE — TELEPHONE ENCOUNTER
Reason for Call:  Appointment Request    Patient requesting this type of appt:  Patient    Requested provider: Renan Dickerson    Reason patient unable to be scheduled: Not within requested timeframe    When does patient want to be seen/preferred time: 1-2 days    Comments: Body pain and depression    Okay to leave a detailed message?: Yes at Home number on file [phone] (home)    Call taken on 3/19/2024 at 12:07 PM by Luisa Meza

## 2024-03-19 NOTE — TELEPHONE ENCOUNTER
Called patient and scheduled a follow up visit with Dr. Dickerson on 04/02/2024.    Aleksandra Iyer

## 2024-03-20 ENCOUNTER — VIRTUAL VISIT (OUTPATIENT)
Dept: BEHAVIORAL HEALTH | Facility: CLINIC | Age: 64
End: 2024-03-20
Payer: MEDICARE

## 2024-03-20 DIAGNOSIS — R69 DIAGNOSIS DEFERRED: Primary | ICD-10-CM

## 2024-03-20 PROCEDURE — 99207 PR NO BILLABLE SERVICE THIS VISIT: CPT

## 2024-03-20 NOTE — PROGRESS NOTES
"Behavioral Health Home Services  [facility]: [place]        Social Work Care Navigator Note      Patient: Paula Ho  Date: March 20, 2024  Preferred Name: Paula    Previous PHQ-9:       12/21/2023    10:57 AM 1/26/2024     2:04 PM 2/2/2024     2:13 PM   PHQ-9 SCORE   PHQ-9 Total Score MyChart  14 (Moderate depression) 7 (Mild depression)   PHQ-9 Total Score 10 14 7     Previous LIBRA-7:       9/25/2023    11:26 AM 10/26/2023     9:10 AM 12/15/2023     1:00 PM   LIBRA-7 SCORE   Total Score  4 (minimal anxiety)    Total Score 7 4    4 10     ARNALDO LEVEL:       No data to display                Preferred Contact:  Need for : No  Preferred Contact: Cell      Type of Contact Today: Phone call (patient / identified key support person reached)    Service Modality: Phone Visit:      Provider verified identity through the following two step process.  Patient provided:  Patient is known previously to provider    Telephone Visit: The patient's condition can be safely assessed and treated via synchronous audio telemedicine encounter.      Reason for Audio Telemedicine Visit: Patient has requested telehealth visit    Originating Site (Patient Location): Patient's home    Distant Site (Provider Location): Provider Remote Setting- Home Office    Consent:  The patient/guardian has verbally consented to:     1. The potential risks and benefits of telemedicine (telephone visit) versus in person care;    The patient has been notified of the following:      \"We have found that certain health care needs can be provided without the need for a face to face visit.  This service lets us provide the care you need with a phone conversation.       I will have full access to your [facility] medical record during this entire phone call.   I will be taking notes for your medical record.      Since this is like an office visit, we will bill your insurance company for this service.       There are potential benefits and risks of " "telephone visits (e.g. limits to patient confidentiality) that differ from in-person visits.?Confidentiality still applies for telephone services, and nobody will record the visit.  It is important to be in a quiet, private space that is free of distractions (including cell phone or other devices) during the visit.??      If during the course of the call I believe a telephone visit is not appropriate, you will not be charged for this service\"     Consent has been obtained for this service by care team member: Yes       Data: (subjective / Objective):  Recent ED/IP Admission or Discharge?   None    Patient Goals:  Goal Areas: Health; Mental Health; Future HAP Goal area; Chemical Health  Patient stated goals: Health: Paula would like to continue to meet with her providers, and take her medications as prescribed.   -Paula would also like assistance with improving her back pain, and receive pool therapy, as well as possibly surgery.   -Paula would like to get a gym membership so she can get more exercise at the place she does pool therapy.  -Paula would like to find a nutrition plan that would work for her and help her to lose weight.   Mental Health: Paula would like to find a new therapist within [place], and continue to receive assistance with [facility] for monthly check ins. She would like to continue working with an Critical access hospital worker.  -She reported that she wants to build herself back to feel like somebody and take it day by day.     Chemical Health: Paula would like assistance from her providers to quit smoking and will work on reducing her use, so she can be approved for back surgery.     Future goals: Paula would like to open to the CADI waiver in the future, but she'll continue with only PCA for now. She would like to receive home delivered meals if she does open.    [facility] Core Service Provided:  Comprehensive Care Management: utilized the electronic medical record / patient registry to identify and support patient's " health conditions / needs more effectively   Care Transitions: focused on the coordinated and seamless movement of patient between or within different levels of care or settings  Care Coordination: provided care management services/referrals necessary to ensure patient and their identified supports have access to medical, behavioral health, pharmacology and recovery support services.  Ensured that patient's care is integrated across all settings and services.   Individual and Family Support: aimed to help clients reduce barriers to achieving goals, increase health literacy and knowledge about chronic condition(s), increase self-efficacy skills, and improve health outcomes    Current Stressors / Issues / Care Plan Objective Addressed Today:  SWCC and patient were able to meet today for Behavioral Health Home ([facility]) monthly check-in via telehealth visit. All required ROIs have been filed with HIM/patient chart.    Patient reports she's doing alright today, and she would like to go to [place] to figure out the court things that she wants to file against the person she was going to be a  with.     2. Patient reported that her ex's brother  recently, and she was close with him so she's sad about this. She reports that she's talking with him again and she said that she's off and on with him right now. Patient reported that he's helped her with fixing some things, and she paid him for it.     3. Patient reported that she had the PCA reassessment, and she's waiting to hear about the results of this assessment.     4. Patient and SWCC discussed her meeting with [facility] Pam is scheduled for in person, but CC changed it to telephone instead.     5. Patient reported that she scheduled an appointment with her PCP regarding the shots she was going to start taking regarding weight loss. She sees him on , and she's scheduled again for . She is also going to discuss the spots on her back that are growing  as well, which she is currently scheduled with dermatology for but not until October.     6. Patient reported that she's been going to the gym recently, but she hasn't been able to get there this week. She reports that she'll try to get there so she can continue to get her money's worth.     7. Patient reports that she's doing a lot better with her smoking, and she thinks she's about a half pack a day. She reported that she has motivation with her breathing struggles, as well as her ex's brother dying recently. She also reported they cleaned her house, with the walls and the carpet, because she knew it smelled like smoke so she isn't going to smoke in the rooms anymore. She reports that she smokes outside now.     Intervention:  Motivational Interviewing: Expressed Empathy/Understanding, Supported Autonomy, Collaboration, Evocation, Permission to raise concern or advise, Open-ended questions, and Reflections: simple and complex   Target Behavior(s): Explored thoughts about taking an anti-depressant, Explored and resolved challenges related to taking anti-depressants as prescribed, Explored thoughts and readiness to participate in individual therapy, Explored and resolved challenges to attending appointments as scheduled, and Explored current social supports and reinforced opportunities to increase engagement    Assessment: (Progress on Goals / Homework):  Patient would benefit from continued coordination in reaching their goals set for the Behavioral Health Home ([facility]) program. [facility] reviewed Health Action Plan goals and will continue to monitor progress and work with patient and their care team.    Plan: (Homework, other):  Patient was encouraged to continue to seek condition-related information and education.      Scheduled a Phone follow up appointment with MISA RICE in 4 weeks     Patient has set self-identified goals and will monitor progress until the next appointment on: 4/17/24.         Paty Dial  LSW  Behavioral Health Home ([facility])   [facility]  [phone]      Next 5 appointments (look out 90 days)      Apr 02, 2024  3:00 PM  (Arrive by 2:40 PM)  Provider Visit with Renan Dickerson MD  [facility] ([facility] ) [address] [id number]  [phone]     May 21, 2024  3:30 PM  (Arrive by 3:10 PM)  Provider Visit with Renan Dickerson MD  [facility] ([facility] ) [address] [id number] [phone]

## 2024-04-01 ENCOUNTER — VIRTUAL VISIT (OUTPATIENT)
Dept: PSYCHIATRY | Facility: CLINIC | Age: 64
End: 2024-04-01
Payer: MEDICARE

## 2024-04-01 DIAGNOSIS — F51.04 PSYCHOPHYSIOLOGICAL INSOMNIA: ICD-10-CM

## 2024-04-01 DIAGNOSIS — F41.1 GAD (GENERALIZED ANXIETY DISORDER): ICD-10-CM

## 2024-04-01 DIAGNOSIS — F90.0 ATTENTION DEFICIT HYPERACTIVITY DISORDER (ADHD), PREDOMINANTLY INATTENTIVE TYPE: ICD-10-CM

## 2024-04-01 DIAGNOSIS — F33.1 MODERATE EPISODE OF RECURRENT MAJOR DEPRESSIVE DISORDER (H): Primary | ICD-10-CM

## 2024-04-01 PROCEDURE — 99443 PR PHYSICIAN TELEPHONE EVALUATION 21-30 MIN: CPT | Mod: 93 | Performed by: NURSE PRACTITIONER

## 2024-04-01 RX ORDER — DEXTROAMPHETAMINE SACCHARATE, AMPHETAMINE ASPARTATE, DEXTROAMPHETAMINE SULFATE AND AMPHETAMINE SULFATE 3.75; 3.75; 3.75; 3.75 MG/1; MG/1; MG/1; MG/1
15 TABLET ORAL 2 TIMES DAILY
Qty: 60 TABLET | Refills: 0 | Status: SHIPPED | OUTPATIENT
Start: 2024-04-29 | End: 2024-07-30

## 2024-04-01 RX ORDER — FLUOXETINE 40 MG/1
40 CAPSULE ORAL DAILY
Qty: 30 CAPSULE | Refills: 3 | Status: SHIPPED | OUTPATIENT
Start: 2024-04-01 | End: 2024-06-04

## 2024-04-01 RX ORDER — DEXTROAMPHETAMINE SACCHARATE, AMPHETAMINE ASPARTATE, DEXTROAMPHETAMINE SULFATE AND AMPHETAMINE SULFATE 3.75; 3.75; 3.75; 3.75 MG/1; MG/1; MG/1; MG/1
15 TABLET ORAL 2 TIMES DAILY
Qty: 60 TABLET | Refills: 0 | Status: SHIPPED | OUTPATIENT
Start: 2024-04-01 | End: 2024-07-30

## 2024-04-01 RX ORDER — MIRTAZAPINE 45 MG/1
45 TABLET, FILM COATED ORAL AT BEDTIME
Qty: 30 TABLET | Refills: 3 | Status: SHIPPED | OUTPATIENT
Start: 2024-04-01 | End: 2024-06-04

## 2024-04-01 RX ORDER — ARIPIPRAZOLE 10 MG/1
10 TABLET ORAL DAILY
Qty: 30 TABLET | Refills: 3 | Status: SHIPPED | OUTPATIENT
Start: 2024-04-01 | End: 2024-06-04

## 2024-04-01 ASSESSMENT — PAIN SCALES - GENERAL: PAINLEVEL: SEVERE PAIN (6)

## 2024-04-01 ASSESSMENT — PATIENT HEALTH QUESTIONNAIRE - PHQ9
10. IF YOU CHECKED OFF ANY PROBLEMS, HOW DIFFICULT HAVE THESE PROBLEMS MADE IT FOR YOU TO DO YOUR WORK, TAKE CARE OF THINGS AT HOME, OR GET ALONG WITH OTHER PEOPLE: SOMEWHAT DIFFICULT
SUM OF ALL RESPONSES TO PHQ QUESTIONS 1-9: 13
10. IF YOU CHECKED OFF ANY PROBLEMS, HOW DIFFICULT HAVE THESE PROBLEMS MADE IT FOR YOU TO DO YOUR WORK, TAKE CARE OF THINGS AT HOME, OR GET ALONG WITH OTHER PEOPLE: SOMEWHAT DIFFICULT

## 2024-04-01 NOTE — PROGRESS NOTES
"Virtual Visit Details    Type of service:  Telephone Visit   Phone call duration: 25 minutes   Originating Location (pt. Location): Home    Distant Location (provider location):  On-site           Outpatient Psychiatric Progress Note    Name: Paula Ho   : 1960                    Primary Care Provider: Renan Dickerson MD   Therapist: Pam    PHQ-9 scores:      2023    10:57 AM 2024     2:04 PM 2024     2:13 PM   PHQ-9 SCORE   PHQ-9 Total Score MyChart  14 (Moderate depression) 7 (Mild depression)   PHQ-9 Total Score 10 14 7       LIBRA-7 scores:      2023    11:26 AM 10/26/2023     9:10 AM 12/15/2023     1:00 PM   LIBRA-7 SCORE   Total Score  4 (minimal anxiety)    Total Score 7 4    4 10       Patient Identification:    Patient is a 63 year old year old, partnered / significant other  White Not  or  female  who presents for return visit with me.  Patient is currently unemployed. Patient attended the session alone. Patient prefers to be called: \" Paula\".    Current medications include: albuterol (PROAIR HFA) 108 (90 Base) MCG/ACT inhaler, Inhale 2 puffs into the lungs every 4 hours as needed for shortness of breath or wheezing  albuterol (PROVENTIL) (2.5 MG/3ML) 0.083% neb solution, NEBULIZE THE CONTENTS OF 1 VIAL EVERY 6 HOURS AS NEEDED.  amphetamine-dextroamphetamine (ADDERALL) 15 MG tablet, Take 1 tablet (15 mg) by mouth 2 times daily  amphetamine-dextroamphetamine (ADDERALL) 15 MG tablet, Take 1 tablet (15 mg) by mouth 2 times daily Fill no sooner than   ARIPiprazole (ABILIFY) 10 MG tablet, Take 1 tablet (10 mg) by mouth daily  budesonide-formoterol (SYMBICORT) 160-4.5 MCG/ACT Inhaler, Inhale 2 puffs into the lungs 2 times daily  buprenorphine HCl-naloxone HCl (SUBOXONE) 8-2 MG per film, Place 1 Film under the tongue daily  cetirizine (ZYRTEC) 10 MG tablet, Take 1 tablet (10 mg) by mouth daily  cyanocobalamin (CYANOCOBALAMIN) 1000 MCG/ML injection, " 1000mcg subcutaneous injection daily for 7 days, then weekly for 4 weeks, then monthly  dexamethasone (DECADRON) 1 MG tablet, Take 1 mg at 11 pm and get a morning blood draw at 8 am for cortisol.  doxycycline monohydrate (MONODOX) 100 MG capsule, Take 1 capsule (100 mg) by mouth 2 times daily  FLUoxetine (PROZAC) 20 MG capsule, Take 1 capsule (20 mg) by mouth 2 times daily  folic acid (FOLVITE) 1 MG tablet, Take 1 tablet (1 mg) by mouth daily  gabapentin (NEURONTIN) 600 MG tablet, Take 1 tablet (600 mg) by mouth daily At bedtime  levothyroxine (SYNTHROID/LEVOTHROID) 25 MCG tablet, Take 1 tablet (25 mcg) by mouth daily  medical cannabis (Patient's own supply), See Admin Instructions (The purpose of this order is to document that the patient reports taking medical cannabis.  This is not a prescription, and is not used to certify that the patient has a qualifying medical condition.)  mirtazapine (REMERON) 45 MG tablet, Take 1 tablet (45 mg) by mouth at bedtime  Multiple Vitamins-Minerals (MULTIVITAMIN ADULT PO),   mupirocin (BACTROBAN) 2 % external ointment, Apply topically 3 times daily  NARCAN 4 MG/0.1ML nasal spray,   order for DME, Equipment being ordered: Nebulizer  oxyCODONE IR (ROXICODONE) 15 MG tablet, Take 15 mg by mouth 3 times daily + 5 mg x1 daily  Semaglutide-Weight Management (WEGOVY) 1 MG/0.5ML pen, Inject 1 mg Subcutaneous once a week  [START ON 4/24/2024] Semaglutide-Weight Management (WEGOVY) 1.7 MG/0.75ML pen, Inject 1.7 mg Subcutaneous once a week  [START ON 5/22/2024] Semaglutide-Weight Management (WEGOVY) 2.4 MG/0.75ML pen, Inject 2.4 mg Subcutaneous once a week  tiotropium (SPIRIVA) 18 MCG inhaled capsule, Inhale 1 capsule (18 mcg) into the lungs daily  tiZANidine (ZANAFLEX) 2 MG tablet, TK 2 TO 3 TS PO QHS  topiramate (TOPAMAX) 100 MG tablet, Take 1 tablet (100 mg) by mouth 2 times daily  vitamin D3 (CHOLECALCIFEROL) 2000 units (50 mcg) tablet, Take 1 tablet by mouth daily    lidocaine (PF)  (XYLOCAINE) 1 % injection 8 mL         The [place] Prescription Monitoring Program has been reviewed and there are no concerns about diversionary activity for controlled substances at this time.      I was able to review most recent Primary Care Provider, specialty provider, and therapy visit notes that I have access to.     Today, patient reports she went to the ED with right sided abdominal pain.  CT scan showed kidney stone but was told by ED providers that this should not  be the cause for  her pain.  Pain medication was not helpful for relief.  She has not been sleeping well.  She is up early in the morning.  For the past week and a  half. Mood has fluctuated.  Adderall helps her to stay focused.  Low energy.  She has to push herself to leave  her home.       has a past medical history of Acute respiratory failure (H) (02/01/2020), Anxiety, Asthma, Depression, and Hypertension.    Social history updates:    Paula lives alone.  She is unemployed.    Substance use updates:    Alcohol use reported  Tobacco use: Yes Cigarettes  Ready to quit?  No  Nicotine Replacement Therapy tried: None    Vital Signs:   LMP  (LMP Unknown)     Labs:    Most recent laboratory results reviewed and no new labs.     Mental Status Examination:  Appearance: awake, alert  Attitude: cooperative  Eye Contact:   Not able to assess  Gait and Station: No dizziness or falls  Psychomotor Behavior:   Not able to assess  Oriented to:  time, person, and place  Attention Span and Concentration:  Normal  Speech:   vtspeech: clear, coherent and Speaks: English  Mood:  anxious and depressed  Affect:  intensity is heightened  Associations:  no loose associations  Thought Process:  goal oriented  Thought Content:  no evidence of suicidal ideation or homicidal ideation, no auditory hallucinations present, and no visual hallucinations present  Recent and Remote Memory:  intact Not formally assessed. No amnesia.  Fund of Knowledge: appropriate  Insight:   good  Judgment: good  Impulse Control:  good    Suicide Risk Assessment:  Today Paula Ho reports no thoughts to harm themself or others. In addition, there are notable risk factors for self-harm, including anxiety, substance abuse, anger/rage, and mood change. However, risk is mitigated by history of seeking help when needed, future oriented, denies suicidal intent or plan, and denies homicidal ideation, intent, or plan. Therefore, based on all available evidence including the factors cited above, Paula Ho does not appear to be at imminent risk for self-harm, does not meet criteria for a 72-hr hold, and therefore remains appropriate for ongoing outpatient level of care.  A thorough assessment of risk factors related to suicide and self-harm have been reviewed and are noted above. The patient convincingly denies suicidality on several occasions. Local community safety resources printed and reviewed for patient to use if needed. There was no deceit detected, and the patient presented in a manner that was believable.     DSM5 Diagnosis:  Attention-Deficit/Hyperactivity Disorder  314.00 (F90.0) Predominantly inattentive presentation  296.32 (F33.1) Major Depressive Disorder, Recurrent Episode, Moderate _ and With mixed features  300.02 (F41.1) Generalized Anxiety Disorder  780.52 (G47.00) Insomnia Disorder   With non-sleep disorder mental comorbidity  Recurrent      Medical comorbidities include:   Patient Active Problem List    Diagnosis Date Noted    Angiolipoma of right kidney 01/20/2023     Priority: Medium    Adrenal nodule (H24) 01/20/2023     Priority: Medium    Opioid dependence with current use (H) 06/30/2022     Priority: Medium    Infection due to 2019 novel coronavirus 12/09/2020     Priority: Medium    Hyperlipidemia 04/14/2020     Priority: Medium    Benign essential hypertension 04/14/2020     Priority: Medium    MDD (major depressive disorder), recurrent severe, without psychosis (H)  03/02/2020     Priority: Medium    Chronic obstructive pulmonary disease, unspecified COPD type (H) 02/04/2020     Priority: Medium     No PFTS  In EPIC      Attention deficit hyperactivity disorder (ADHD) 01/31/2020     Priority: Medium    Chronic midline low back pain with bilateral sciatica 01/31/2020     Priority: Medium    Brain aneurysm 12/03/2019     Priority: Medium     Dec 2017  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 Device is MR compatible to 3 BREANN 3/2020      History of subarachnoid hemorrhage 12/22/2017     Priority: Medium     H/O of SAH, Cam 2, Hunt-Thomas 1, from a ruptured 9mm left Pcomm aneurysm with a wide neck and a fetal left PCA arising from the aneurysm neck, s/p successful coil embolization 12/23/2017 by Dr. Otto Hurley      Chronic GERD 01/26/2017     Priority: Medium    Chronic knee pain 12/12/2014     Priority: Medium    Cigarette smoker 07/08/2014     Priority: Medium    Chronic, continuous use of opioids 04/24/2013     Priority: Medium     Managed by pain clinic outside of [place].  UDS + cocaine in December 2019 without confirmation testing at her pain clinic per patient report      Morbid obesity (H) 03/27/2013     Priority: Medium    Status post knee surgery 03/27/2013     Priority: Medium     Per patient's recollection:  Circa 2002: right knee arthroscopy (Dr. Pappas)  Circa 2004: right knee partial knee replacement (Iam Ritchie MD)  Circa 2006: right TKA (Ron Ryder MD; [facility])  Circa 2008: right TKA revision due to infection (Ron Ryder MD; [facility])  Circa 2009: right TKA revision due to infection (Ron Ryder MD; [facility])  April 2011: right TKA (Ron Ryder MD; [facility])      LIBRA (generalized anxiety disorder) 09/28/2011     Priority: Medium    Chronic pain 09/28/2011     Priority: Medium     Knee and low back        DJD (degenerative joint disease) 09/28/2011     Priority:  Medium    Insomnia 04/13/2011     Priority: Medium     Insomnia  NOS      Tobacco use disorder 07/31/2006     Priority: Medium    Asthma 11/15/2004     Priority: Medium     Asthma  NOS         Assessment:    Paula Ho has been experiencing some abdominal pain and was frustrated that her pain symptoms were not addressed during a recent ED visit.  She is questioning whether her medications are helping her manage her depression and anxiety symptoms.  Gabapentin continues at bedtime for anxiety.  Topiramate has been discontinued as she takes this infrequently.  I am changing the schedule of the fluoxetine to have her take 40 mg once daily instead of 20 mg twice daily to better manage her depression and anxiety symptoms.  She finds the mirtazapine helpful at bedtime for relaxing her.  Aripiprazole will continue for mood regulation.  She will continue Adderall 2 times daily as she maintains this helps her to stay focused and get things done in her day to day life activities.  As she was not feeling well recently, she missed his counseling appointment and that will be rescheduled..    Medication side effects and alternatives were reviewed. Health promotion activities recommended and reviewed today. All questions addressed. Education and counseling completed regarding risks and benefits of medications and psychotherapy options.    Treatment Plan:      1.  Discontinue topiramate  2.  Continue Adderall 15 mg 2 times daily  3.  Continue aripiprazole 10 mg daily  4.  Change fluoxetine to 40 mg 1 time daily  5.  Continue gabapentin 600 mg at bedtime  6.  Continue mirtazapine 45 mg at bedtime  7.  Call [phone] to reschedule your counseling appointment    Continue all other medications as reviewed per electronic medical record today.   Safety plan reviewed. To the Emergency Department as needed or call after hours crisis line at [phone] or [phone]. [place] Crisis Text Line. Text [place] to 579064 or Suicide  LifeLine Chat: suicidepreventionlifeline.org/chat/  To schedule individual or family therapy, call [facility] at [phone]  Schedule an appointment with me in 2 months or sooner as needed. Call [facility] at [phone] to schedule.  Follow up with primary care provider as planned or for acute medical concerns.  Call the psychiatric nurse line with medication questions or concerns at [phone]  MyChart may be used to communicate with your provider, but this is not intended to be used for emergencies.        Crisis Resources   The [facility] is an adults only unit located at [facility] is a short term (generally less than 23 hour stay) designed for crisis intervention and stabilization. Pts have the opportunity to meet quickly with a behavioral health team for evaluation in a calm and peaceful therapuetic environment. To be evaluated for admission pts are triaged throught the [facility] ED.      The following hotlines are for both adults and children. The and are open 24 hours a day, 7 days a week unless noted otherwise.        Crisis Lines      Crisis Text Line  Text 659820  You will be connected with a trained live crisis counselor to provide support.        [place] Hotline  4.022.528.1029 [hope]        línea de crisis española  [phone]        [facility]  [phone]        National Hope Line  1.846.650.9360 [hope]        National Suicide Prevention Lifeline  Free and confidential support  988 or 1.420.782.TALK [8255]  http://suicidepreventionlifeline.org        The [facility] Project (LGBTQ Youth Crisis Line)  9.323.417.4672  text START to 108-760        [facility] Crisis Line  9.559.498.8997 (Press 1)  or text 894201    [facility] Mental Health Crisis Response  Within [place], call **CRISIS [**604606] to be connected to a mental health professional who can assist you.        [facility] Crisis  [phone]      [facility]  Mobile Crisis  [phone]      [facility] Crisis  [phone]      [facility] Mobile Crisis  [phone] (adults)  [phone] (children)      [facility] Crisis  [phone] (adults)  [phone] (children)      [facility] Mobile Crisis  [phone]      [facility] Mobile Crisis  [phone]    Community Resources      Fast Tracker  Linking people to mental health and substance use disorder resources  Dryadn.Ventrix        [place] Mental Health Warmline  Peer to peer support  5 pm to 9 am 7 days/week  5.380.913.3511  https://mnwitw.org/parviz        National [place] on Mental Illness (UTE)  441.176.2328 or 1.888.UTE.HELPS  https://namimn.org/        [facility] for Adult [facility]  [facility] residents [address]  [phone]        [facility]  Free mental health counseling  https://walkin.org/  612.870.0565 X2    Mental Health Apps      Calm Harm  https://calmharm.co.uk/      My3  https://my3app.org/      [place] Safety Plan  https://www.mysafetyplan.org/   Administrative Billing:   Time spent with patient includes counseling and coordination of care regarding above diagnoses and treatment plan.    Patient Status:  This is a continuous care patient and medications will be prescribed by the psychiatric provider until further indicated.    Signed:   VIRGILIO Kidd-BC   Psychiatry

## 2024-04-01 NOTE — NURSING NOTE
Is the patient currently in the state of [place]? YES    Visit mode:TELEPHONE    If the visit is dropped, the patient can be reconnected by: TELEPHONE VISIT: Phone number: [phone]    Will anyone else be joining the visit? NO  (If patient encounters technical issues they should call [phone] :[phone])    How would you like to obtain your AVS? MyChart    Are changes needed to the allergy or medication list? No    Are refills needed on medications prescribed by this physician? No    PROMIS-10 and LIBRA incomplete due to time    Reason for visit: RECHECK    Carole DAVIS

## 2024-04-01 NOTE — PATIENT INSTRUCTIONS
"Patient Education   The Panel Psychiatry Program  What to Expect  Here's what to expect in the Panel Psychiatry Program.   About the program  You'll be meeting with a psychiatric doctor to check your mental health. A psychiatric doctor helps you deal with troubling thoughts and feelings by giving you medicine. They'll make sure you know the plan for your care. You may see them for a long time. When you're feeling better, they may refer you back to seeing your family doctor.   If you have any questions, we'll be glad to talk to you.  About visits  Be open  At your visits, please talk openly about your problems. It may feel hard, but it's the best way for us to help you.  Cancelling visits  If you can't come to your visit, please call us right away at [phone]. If you don't cancel at least 24 hours (1 full day) before your visit, that's \"late cancellation.\"  Not showing up for your visits  Being very late is the same as not showing up. You'll be a \"no show\" if:  You're more than 15 minutes late for a 30-minute (half hour) visit.  You're more than 30 minutes late for a 60-minute (full hour) visit.  If you cancel late or don't show up 2 times within 6 months, we may end your care.  Getting help between visits  If you need help between visits, you can call us Monday to Friday from 8 a.m. to 4:30 p.m. at [phone].  Emergency care  Call 911 or go to the nearest emergency department if your life or someone else's life is in danger.  Call 988 anytime to reach the national Suicide and Crisis hotline.  Medicine refills  To refill your medicine, call your pharmacy. You can also call [facility]'s Behavioral Access at [phone], Monday to Friday, 8 a.m. to 4:30 p.m. It can take 1 to 3 business days to get a refill.   Forms, letters, and tests  You may have papers to fill out, like FMLA, short-term disability, and workability. We can help you with these forms at your visits, but you must have an " appointment. You may need more than 1 visit for this, to be in an intensive therapy program, or both.  Before we can give you medicine for ADHD, we may refer you to get tested for it or confirm it another way.  We may not be able to give you an emotional support animal letter.  We don't do mental health checks ordered by the court.   We don't do mental health testing, but we can refer you to get tested.   Thank you for choosing us for your care.  For informational purposes only. Not to replace the advice of your health care provider. Copyright   2022 [employer]. All rights reserved. [employer] 046801 - 12/22.    Treatment Plan:      1.  Discontinue topiramate  2.  Continue Adderall 15 mg 2 times daily  3.  Continue aripiprazole 10 mg daily  4.  Change fluoxetine to 40 mg 1 time daily  5.  Continue gabapentin 600 mg at bedtime  6.  Continue mirtazapine 45 mg at bedtime  7.  Call [phone] to reschedule your counseling appointment    Continue all other medical directions per primary care provider.   Continue all other medications as reviewed per electronic medical record today.   Safety plan reviewed. To the Emergency Department as needed or call after hours crisis line at [phone] or [phone]. [place] Crisis Text Line: Text [place] to 660647  or  Suicide LifeLine Chat: suicidepreventionlifeline.org/chat/  To schedule individual or family therapy, call [facility] at [phone].   Schedule an appointment with me in 6 weeks or sooner as needed.  Call [facility] at [phone] to schedule.  Follow up with primary care provider as planned or for acute medical concerns.  Call the psychiatric nurse line with medication questions or concerns at [phone].  [employer] may be used to communicate with your provider, but this is not intended to be used for emergencies.        Crisis Resources   The EmPath is an adults only unit located at [facility] in [facility] a  short term (generally less than 23 hour stay) designed for crisis intervention and stabilization. Pts have the opportunity to meet quickly with a behavioral health team for evaluation in a calm and peaceful therapuetic environment. To be evaluated for admission pts are triaged throught the [facility] ED.      The following hotlines are for both adults and children. The and are open 24 hours a day, 7 days a week unless noted otherwise.        Crisis Lines      Crisis Text Line  Text 491244  You will be connected with a trained live crisis counselor to provide support.        Gambling Hotline  7.866.281.1596 [hope]        línea de crisis española  510.767.0101        [facility] Helpline  [phone]        National Hope Line  9.549.431.0373 [hope]        National Suicide Prevention Lifeline  Free and confidential support  988 or 1.710.110.TALK [8255]  http://suicidepreventionlifeline.org        The [facility] Project (LGBTQ Youth Crisis Line)  5.061.432.9693  text START to 973-141        [place] Crisis Line  2.875.119.7592 (Press 1)  or text 827534    [facility] Mental Health Crisis Response  Within [place], call **CRISIS [**453908] to be connected to a mental health professional who can assist you.        [facility] Crisis  [phone]      [facility] Mobile Crisis  [phone]      [facility] Crisis  [phone]      [facility] Mobile Crisis  [phone] (adults)  [phone] (children)      [facility] Mobile Crisis  [phone] (adults)  [phone] (children)      [facility] Mobile Crisis  [phone]      [facility] Mobile Crisis  [phone]    Community Resources      Fast Tracker  Linking people to mental health and substance use disorder resources  fasttrackermn.org        [place] Mental Health Warmline  Peer to peer support  5 pm to 9 am 7 days/week  1.520.052.8155  https://mnwitw.org/parviz        [place] on Mental Illness  ([facility])  286.624.3493 or 1.888.UTE.HELPS  https://namimn.org/        [facility] for Adult Mental Health  [facility] residents [address]  [phone]        [facility]  Free mental health counseling  https://walkin.org/  612.870.0565 X2    Mental Health Apps      Calm Harm  https://calmharm.co.uk/      My3  https://my3app.org/      [place] Safety Plan  https://www.mysafetyplan.org/

## 2024-04-02 ENCOUNTER — ANCILLARY PROCEDURE (OUTPATIENT)
Dept: GENERAL RADIOLOGY | Facility: CLINIC | Age: 64
End: 2024-04-02
Attending: INTERNAL MEDICINE
Payer: MEDICARE

## 2024-04-02 ENCOUNTER — OFFICE VISIT (OUTPATIENT)
Dept: FAMILY MEDICINE | Facility: CLINIC | Age: 64
End: 2024-04-02
Payer: MEDICARE

## 2024-04-02 VITALS
BODY MASS INDEX: 47.13 KG/M2 | SYSTOLIC BLOOD PRESSURE: 124 MMHG | HEART RATE: 70 BPM | RESPIRATION RATE: 20 BRPM | OXYGEN SATURATION: 95 % | WEIGHT: 287.6 LBS | DIASTOLIC BLOOD PRESSURE: 71 MMHG | TEMPERATURE: 97.5 F

## 2024-04-02 DIAGNOSIS — E66.01 MORBID OBESITY (H): ICD-10-CM

## 2024-04-02 DIAGNOSIS — M65.949 SYNOVITIS OF FINGER: ICD-10-CM

## 2024-04-02 DIAGNOSIS — Z12.11 SCREEN FOR COLON CANCER: ICD-10-CM

## 2024-04-02 DIAGNOSIS — J44.9 CHRONIC OBSTRUCTIVE PULMONARY DISEASE, UNSPECIFIED COPD TYPE (H): ICD-10-CM

## 2024-04-02 DIAGNOSIS — N20.0 RIGHT NEPHROLITHIASIS: Primary | ICD-10-CM

## 2024-04-02 PROCEDURE — 73140 X-RAY EXAM OF FINGER(S): CPT | Mod: TC | Performed by: RADIOLOGY

## 2024-04-02 PROCEDURE — 99214 OFFICE O/P EST MOD 30 MIN: CPT | Performed by: INTERNAL MEDICINE

## 2024-04-02 RX ORDER — ALBUTEROL SULFATE 90 UG/1
2 AEROSOL, METERED RESPIRATORY (INHALATION) EVERY 4 HOURS PRN
Qty: 8.5 G | Refills: 11 | Status: SHIPPED | OUTPATIENT
Start: 2024-04-02

## 2024-04-02 ASSESSMENT — PAIN SCALES - PAIN ENJOYMENT GENERAL ACTIVITY SCALE (PEG)
INTERFERED_ENJOYMENT_LIFE: 7
INTERFERED_GENERAL_ACTIVITY: 8
INTERFERED_GENERAL_ACTIVITY: 8
AVG_PAIN_PASTWEEK: 9
PEG_TOTALSCORE: 8
PEG_TOTALSCORE: 8
AVG_PAIN_PASTWEEK: 9
INTERFERED_ENJOYMENT_LIFE: 7

## 2024-04-02 ASSESSMENT — ANXIETY QUESTIONNAIRES
2. NOT BEING ABLE TO STOP OR CONTROL WORRYING: SEVERAL DAYS
IF YOU CHECKED OFF ANY PROBLEMS ON THIS QUESTIONNAIRE, HOW DIFFICULT HAVE THESE PROBLEMS MADE IT FOR YOU TO DO YOUR WORK, TAKE CARE OF THINGS AT HOME, OR GET ALONG WITH OTHER PEOPLE: VERY DIFFICULT
5. BEING SO RESTLESS THAT IT IS HARD TO SIT STILL: NOT AT ALL
GAD7 TOTAL SCORE: 8
6. BECOMING EASILY ANNOYED OR IRRITABLE: SEVERAL DAYS
1. FEELING NERVOUS, ANXIOUS, OR ON EDGE: MORE THAN HALF THE DAYS
3. WORRYING TOO MUCH ABOUT DIFFERENT THINGS: NEARLY EVERY DAY
4. TROUBLE RELAXING: NOT AT ALL
GAD7 TOTAL SCORE: 8
7. FEELING AFRAID AS IF SOMETHING AWFUL MIGHT HAPPEN: SEVERAL DAYS
8. IF YOU CHECKED OFF ANY PROBLEMS, HOW DIFFICULT HAVE THESE MADE IT FOR YOU TO DO YOUR WORK, TAKE CARE OF THINGS AT HOME, OR GET ALONG WITH OTHER PEOPLE?: VERY DIFFICULT
GAD7 TOTAL SCORE: 8
7. FEELING AFRAID AS IF SOMETHING AWFUL MIGHT HAPPEN: SEVERAL DAYS

## 2024-04-02 ASSESSMENT — PAIN SCALES - GENERAL: PAINLEVEL: SEVERE PAIN (7)

## 2024-04-02 NOTE — LETTER
April 5, 2024      Paula Ho  [address] [address] [address]        Dear Paula,     X-rays of your left rhumb shows mild swelling and degenerative arthritis. No evidence of fractures. There is also a tiny retained foreign body, which could've caused the swelling of your left thumb. For any questions, you may call my office at [phone].     Sincerely,     Renan Dickerson MD   Internal Medicine   Resulted Orders   XR Finger Left G/E 2 Views    Narrative    FINGER LEFT TWO OR MORE VIEWS April 2, 2024 4:07 PM    INDICATION: Synovitis of finger.    COMPARISON: None available.       Impression    IMPRESSION: Anatomic alignment left thumb. No acute displaced left  thumb fracture is identified. Retained radiopaque tiny linear foreign  body is present in the palmar soft tissues at the distal aspect of the  left thumb. Mild degenerative change in the left thumb, most  pronounced at the thumb CMC joint. Mild thumb soft tissue swelling.       SHERRY YEPEZ MD         SYSTEM ID:  [id number]       If you have any questions or concerns, please call the clinic at the number listed above.

## 2024-04-02 NOTE — LETTER

## 2024-04-02 NOTE — PROGRESS NOTES
[facility] INTERNAL MEDICINE NOTE    Paula Ho is a 63 year old female who presents to clinic today for the following health issues:    Back Pain:  She presents for follow up of back pain. Patient's back pain is a chronic problem.  Location of back pain:  Right lower back, left lower back, right middle of back, left middle of back, right hip and left hip  Description of back pain: burning, dull ache, sharp, shooting and stabbing  Back pain spreads: right thigh    Since patient first noticed back pain, pain is: gradually worsening  Does back pain interfere with her job:  Yes     COPD:  She presents for follow up of COPD.  Overall, COPD symptoms are stable since last visit.  She has more than usual fatigue or shortness of breath with exertion and no shortness of breath at rest.  She sometimes coughs and does not have change in sputum. No recent fever. She can walk less than 1 block without stopping to rest. She can walk 3 or more flights of stairs without resting. The patient has had no ED, urgent care, or hospital admissions because of COPD since the last visit.       Answers submitted by the patient for this visit:  Provider Visit on 4/2/2024  3:00 PM with Renan Dickerson  Patient Health Questionnaire (Submitted on 4/1/2024)  If you checked off any problems, how difficult have these problems made it for you to do your work, take care of things at home, or get along with other people?: Somewhat difficult  PHQ9 TOTAL SCORE: 13  LIBRA-7 (Submitted on 4/2/2024)  LIBRA 7 TOTAL SCORE: 8    Patient Active Problem List   Diagnosis    Chronic knee pain    LIBRA (generalized anxiety disorder)    Brain aneurysm    Chronic, continuous use of opioids    Asthma    Chronic GERD    Chronic pain    Cigarette smoker    DJD (degenerative joint disease)    Insomnia    Morbid obesity (H)    History of subarachnoid hemorrhage    Status post knee surgery    Tobacco use  disorder    Chronic obstructive pulmonary disease, unspecified COPD type (H)    MDD (major depressive disorder), recurrent severe, without psychosis (H)    Attention deficit hyperactivity disorder (ADHD)    Chronic midline low back pain with bilateral sciatica    Hyperlipidemia    Benign essential hypertension    Infection due to 2019 novel coronavirus    Opioid dependence with current use (H)    Angiolipoma of right kidney    Adrenal nodule (H24)     Past Surgical History:   Procedure Laterality Date    APPENDECTOMY      APPENDECTOMY      CEREBRAL ANEURYSM REPAIR      JOINT REPLACEMENT      ORTHOPEDIC SURGERY  2002    Circa 2002: right knee arthroscopy (Dr. Pappas)    ORTHOPEDIC SURGERY  2004    Circa 2004: right knee partial knee replacement (Iam Ritchie MD)    ORTHOPEDIC SURGERY  2006    Circa 2006: right TKA (Ron Ryder MD; [facility])    ORTHOPEDIC SURGERY  2008    Circa 2008: right TKA revision due to infection (Ron Ryder MD; [facility])    ORTHOPEDIC SURGERY  2009    Circa 2009: right TKA revision due to infection (Ron Ryedr MD; [facility])    ORTHOPEDIC SURGERY  2011 April 2011: right TKA (Ron Ryder MD; [facility])    REPLACEMENT TOTAL KNEE Right     TONSILLECTOMY      tonsils    TONSILLECTOMY         Social History     Tobacco Use    Smoking status: Every Day     Packs/day: 1     Types: Cigarettes    Smokeless tobacco: Never    Tobacco comments:     Patient has been trying patches and they have not been working.   Substance Use Topics    Alcohol use: Not Currently     Family History   Problem Relation Age of Onset    Depression Mother     Substance Abuse Father          Allergies   Allergen Reactions    Compazine [Prochlorperazine]     Droperidol     Lisinopril     Morphine      Other reaction(s): hives    Pravastatin      Other reaction(s): Edema    Seroquel [Quetiapine]      Recent Labs   Lab Test 02/27/24  1314 04/02/23  0918 01/18/23  1204  03/15/22  1511 07/12/21  1218 05/20/21  0811 03/02/21  1606 02/23/21  1551   A1C 5.9* 5.9*  --   --   --   --   --   --    *  --   --  128*  --  139*  --   --    HDL 48*  --   --   --   --  51  --   --    TRIG 113  --   --   --   --  82  --   --    ALT  --  18 25 32  --  24  --  28   CR  --  0.57 0.55 0.51*   < > 0.65  --  0.55   GFRESTIMATED  --  >90 >90 >90   < > >90  --  >90   GFRESTBLACK  --   --   --   --   --  >90  --  >90   POTASSIUM  --  4.3 4.8 4.1   < > 4.2  --  4.0   TSH  --   --  3.53 3.71   < >  --    < > 5.77*    < > = values in this interval not displayed.      BP Readings from Last 3 Encounters:   04/02/24 124/71   01/26/24 136/87   10/10/23 117/79    Wt Readings from Last 3 Encounters:   04/02/24 130.5 kg (287 lb 9.6 oz)   01/26/24 126.1 kg (278 lb)   10/10/23 121.6 kg (268 lb)           ROS:  C: NEGATIVE for fever, chills. POSITIVE for morbid obesity. She is requesting a substitute for Wegovy.  I: NEGATIVE for worrisome rashes, moles or lesions  E: NEGATIVE for vision changes or irritation  E/M: NEGATIVE for ear, mouth and throat problems  R: NEGATIVE for significant cough or SOB  B: NEGATIVE for masses, tenderness or discharge  CV: NEGATIVE for chest pain, palpitations or peripheral edema  GI: NEGATIVE for nausea, abdominal pain, heartburn, or change in bowel habits  : NEGATIVE for frequency, dysuria, or hematuria  M: POSITIVE for swelling of right thumb.  N: NEGATIVE for weakness, dizziness or paresthesias  E: NEGATIVE for temperature intolerance, skin/hair changes  H: NEGATIVE for bleeding problems  P: NEGATIVE for changes in mood or affect    OBJECTIVE:                                                    /71 (BP Location: Left arm, Patient Position: Sitting, Cuff Size: Adult Large)   Pulse 70   Temp 97.5  F (36.4  C) (Tympanic)   Resp 20   Wt 130.5 kg (287 lb 9.6 oz)   LMP  (LMP Unknown)   SpO2 95%   BMI 47.13 kg/m    Body mass index is 47.13 kg/m .  GENERAL: alert and no  distress  EYES: Eyes grossly normal to inspection and conjunctivae and sclerae normal  HENT: normal cephalic/atraumatic and oral mucous membranes moist  RESP: lungs clear to auscultation - no rales, rhonchi or wheezes  CV: regular rates and rhythm, normal S1 S2, no S3 or S4, no murmur, click or rub, and no peripheral edema  MS: no gross musculoskeletal defects noted, no edema  NEURO: Normal strength and tone, mentation intact and speech normal  PSYCH: mentation appears normal, affect normal/bright    Diagnostic Test Results:    [facility] (Please always keep checked)  Order: [id number]  Collected 10/7/2021  3:09 PM       Status: Final result    0 Result Notes       1 HM Topic         Component  Ref Range & Units 2 yr ago    FVC-Pred  L 3.28    FVC-Pre  L 3.47    FVC-%Pred-Pre  % 105    FEV1-Pre  L 2.46    FEV1-%Pred-Pre  % 95    FEV1FVC-Pred  % 79    FEV1FVC-Pre  % 71    FEFMax-Pred  L/sec 6.45    FEFMax-Pre  L/sec 5.06    FEFMax-%Pred-Pre  % 78    FEF2575-Pred  L/sec 2.32    FEF2575-Pre  L/sec 1.77    OYT4450-%Pred-Pre  % 76    FEF2575-Post  L/sec 1.99    IIE6470-%Pred-Post  % 85    ExpTime-Pre  sec 8.32    FIFMax-Pre  L/sec 3.79    VC-Pred  L 3.40    VC-Pre  L 3.23    VC-%Pred-Pre  % 95    IC-Pred  L 3.16    IC-Pre  L 3.05    IC-%Pred-Pre  % 96    ERV-Pred  L 0.23    ERV-Pre  L 0.18    ERV-%Pred-Pre  % 76    FEV1FEV6-Pred  % 81    FEV1FEV6-Pre  % 71    FRCPleth-Pred  L 2.76    FRCPleth-Pre  L 3.68    FRCPleth-%Pred-Pre  % 133    RVPleth-Pred  L 1.95    RVPleth-Pre  L 3.50    RVPleth-%Pred-Pre  % 179    TLCPleth-Pred  L 5.11    TLCPleth-Pre  L 6.73    TLCPleth-%Pred-Pre  % 131    DLCOunc-Pred  ml/min/mmHg 20.92    DLCOunc-Pre  ml/min/mmHg 23.03    DLCOunc-%Pred-Pre  % 110    VA-Pre  L 5.16    VA-%Pred-Pre  % 103    FEV1SVC-Pred  % 76    FEV1SVC-Pre  % 76   Resulting Agency [facility]              Narrative  Performed by: [facility]  Although the FVC and FEV1 are within normal limits, the FEV1/FEV6 ratio is  reduced.  The inspiratory flow rates are within normal limits.  The TLC and RV are increased. Following administration of bronchodilators, there is no significant response.  The  diffusing capacity is normal.  However, the diffusing capacity was not corrected for the patient's hemoglobin.    Mild airway obstruction and overinflation are present.    IMPRESSION:    Mild Airflow Obstruction    No significant change following bronchodilators but this does not rule out clinical efficacy.    Normal diffusing capacity.    The increased lung volumes indicate hyperinflation.    No previous studies available for comparison.    Elaine Hernandez MD                ASSESSMENT/PLAN:                                                      Right nephrolithiasis  - Oxalate quantitative urine; Future  - Uric acid random urine with Creat Ratio  - [facility]; 9927610; Oxalate, Urine (Laboratory Miscellaneous Order); Future  - [facility]; [id number]; Oxalate, Urine (Laboratory Miscellaneous Order)    Morbid obesity (H)  - tirzepatide-Weight Management (ZEPBOUND) 2.5 MG/0.5ML prefilled pen; Inject 0.5 mLs (2.5 mg) Subcutaneous every 7 days    Synovitis of finger  - XR Finger Left G/E 2 Views    Chronic obstructive pulmonary disease, unspecified COPD type (H)  - Fluticasone-Umeclidin-Vilanterol (TRELEGY ELLIPTA) 200-62.5-25 MCG/ACT oral inhaler; Inhale 1 puff into the lungs daily  - albuterol (PROAIR HFA) 108 (90 Base) MCG/ACT inhaler; Inhale 2 puffs into the lungs every 4 hours as needed for shortness of breath or wheezing    Screen for colon cancer  - Fecal colorectal cancer screen FIT; Future  - Fecal colorectal cancer screen FIT     Disposition:  Follow-up in 12 weeks or as needed.    Renan Dickerson MD  [facility]

## 2024-04-02 NOTE — LETTER
April 8, 2024      Paula Ho  [address] [address] [address]        Dear ,    We are writing to inform you of your test results.    High urine uric acid means that your kidney stone is mostly composed of uric acid. Therefore, I recommend a low uric acid to prevent kidney stones. For any questions, you may call my office at [phone].    Resulted Orders   XR Finger Left G/E 2 Views    Narrative    FINGER LEFT TWO OR MORE VIEWS April 2, 2024 4:07 PM    INDICATION: Synovitis of finger.    COMPARISON: None available.       Impression    IMPRESSION: Anatomic alignment left thumb. No acute displaced left  thumb fracture is identified. Retained radiopaque tiny linear foreign  body is present in the palmar soft tissues at the distal aspect of the  left thumb. Mild degenerative change in the left thumb, most  pronounced at the thumb CMC joint. Mild thumb soft tissue swelling.       SHERRY YEPEZ MD         SYSTEM ID:  [id number]   Uric acid random urine with Creat Ratio   Result Value Ref Range    Uric Acid Urine mg/dL 19.1 (L) 37.0 - 92.0 mg/dL    Creatinine Urine mg/dL 39.4 mg/dL    Uric Acid Urine g/g Cr 0.48 <0.60 g/g Cr   [facility]; [id number]; Oxalate, Urine (Laboratory Miscellaneous Order)   Result Value Ref Range    See Scanned Result       Specimen received. Reordered and sent to performing laboratory. Report to follow up on completion.    Performing Laboratory [facility]     Test Name Oxalate, Urine     Test Code [id number]        If you have any questions or concerns, please call the clinic at the number listed above.       Sincerely,      Renan Dickerson MD

## 2024-04-02 NOTE — LETTER
April 8, 2024      Paula Ho  [address] [address] [facility] [address]            Dear Paula,     Your stool occult test is negative, which indicates low risk of colon cancer. No reason to undergo colonoscopy. Let's repeat this test next year. For any questions, you may call my office at [phone].     Sincerely,     Renan Dickerson MD   Internal Medicine

## 2024-04-04 ENCOUNTER — TELEPHONE (OUTPATIENT)
Dept: FAMILY MEDICINE | Facility: CLINIC | Age: 64
End: 2024-04-04
Payer: MEDICARE

## 2024-04-04 NOTE — TELEPHONE ENCOUNTER
Pt calling to confirm instructions for 24 hour urine collection. RN reviewed instructions with pt, pt verbalized understanding.     Rose Mix RN

## 2024-04-05 ENCOUNTER — TELEPHONE (OUTPATIENT)
Dept: FAMILY MEDICINE | Facility: CLINIC | Age: 64
End: 2024-04-05
Payer: MEDICARE

## 2024-04-05 PROCEDURE — 83945 ASSAY OF OXALATE: CPT | Mod: GA | Performed by: INTERNAL MEDICINE

## 2024-04-05 PROCEDURE — 84560 ASSAY OF URINE/URIC ACID: CPT | Performed by: INTERNAL MEDICINE

## 2024-04-05 NOTE — TELEPHONE ENCOUNTER
----- Message from Renan Dickerson MD sent at 4/5/2024  3:35 PM CDT -----  Dear Paula,    X-rays of your left rhumb shows mild swelling and degenerative arthritis. No evidence of fractures. There is also a tiny retained foreign body, which could've caused the swelling of your left thumb. For any questions, you may call my office at [phone].    Sincerely,     Renan Dickerson MD  Internal Medicine   (Send letter)

## 2024-04-06 LAB
CREAT UR-MCNC: 39.4 MG/DL
Lab: NORMAL
PERFORMING LABORATORY: NORMAL
SPECIMEN STATUS: NORMAL
TEST NAME: NORMAL
URATE 24H UR-MRATE: 0.48 G/G CR
URATE UR-MCNC: 19.1 MG/DL (ref 37–92)

## 2024-04-06 PROCEDURE — 82274 ASSAY TEST FOR BLOOD FECAL: CPT | Performed by: INTERNAL MEDICINE

## 2024-04-08 ENCOUNTER — TELEPHONE (OUTPATIENT)
Dept: FAMILY MEDICINE | Facility: CLINIC | Age: 64
End: 2024-04-08
Payer: MEDICARE

## 2024-04-08 LAB
HEMOCCULT STL QL IA: NEGATIVE
MISCELLANEOUS TEST 1 (ARUP): NORMAL

## 2024-04-08 NOTE — TELEPHONE ENCOUNTER
This writer attempted to contact patient on 04/08/24      Reason for call results and left message.      If patient calls back:   Relay message below, (read verbatim), document that pt called and close encounter        Rose Mix RN    ----- Message from Renan Dickerson MD sent at 4/8/2024  7:39 AM CDT -----  24 hour urine oxalate test is normal.   It means that her kidney stone composition is unlikely calcium oxalate, but rather uric acid.  Proceed with low purine diet to reduce risk of uric acid stones.    (Call patient)

## 2024-04-08 NOTE — TELEPHONE ENCOUNTER
Pt notified of provider message as written.  Pt verbalized good understanding.    Demetra Schultz, BSN, RN

## 2024-04-17 ENCOUNTER — VIRTUAL VISIT (OUTPATIENT)
Dept: BEHAVIORAL HEALTH | Facility: CLINIC | Age: 64
End: 2024-04-17
Payer: MEDICARE

## 2024-04-17 DIAGNOSIS — R69 DIAGNOSIS DEFERRED: Primary | ICD-10-CM

## 2024-04-17 PROCEDURE — 99207 PR NO BILLABLE SERVICE THIS VISIT: CPT | Mod: 93

## 2024-04-17 NOTE — PROGRESS NOTES
"Behavioral Health Home Services  [facility]: [place]        Social Work Care Navigator Note      Patient: Paula Ho  Date: April 17, 2024  Preferred Name: Paula    Previous PHQ-9:       1/26/2024     2:04 PM 2/2/2024     2:13 PM 4/1/2024    12:46 PM   PHQ-9 SCORE   PHQ-9 Total Score MyChart 14 (Moderate depression) 7 (Mild depression) 13 (Moderate depression)   PHQ-9 Total Score 14 7 13    13     Previous LIBRA-7:       10/26/2023     9:10 AM 12/15/2023     1:00 PM 4/2/2024     2:40 PM   LIBRA-7 SCORE   Total Score 4 (minimal anxiety)  8 (mild anxiety)   Total Score 4    4 10 8     ARNALDO LEVEL:       No data to display                Preferred Contact:  Need for : No  Preferred Contact: Cell      Type of Contact Today: Phone call (patient / identified key support person reached)    Service Modality: Phone Visit:      Provider verified identity through the following two step process.  Patient provided:  Patient is known previously to provider    Telephone Visit: The patient's condition can be safely assessed and treated via synchronous audio telemedicine encounter.      Reason for Audio Telemedicine Visit: Patient has requested telehealth visit    Originating Site (Patient Location): Patient's home    Distant Site (Provider Location): Provider Remote Setting- Home Office    Consent:  The patient/guardian has verbally consented to:     1. The potential risks and benefits of telemedicine (telephone visit) versus in person care;    The patient has been notified of the following:      \"We have found that certain health care needs can be provided without the need for a face to face visit.  This service lets us provide the care you need with a phone conversation.       I will have full access to your [facility] medical record during this entire phone call.   I will be taking notes for your medical record.      Since this is like an office visit, we will bill your insurance company for this service.     " "  There are potential benefits and risks of telephone visits (e.g. limits to patient confidentiality) that differ from in-person visits.?Confidentiality still applies for telephone services, and nobody will record the visit.  It is important to be in a quiet, private space that is free of distractions (including cell phone or other devices) during the visit.??      If during the course of the call I believe a telephone visit is not appropriate, you will not be charged for this service\"     Consent has been obtained for this service by care team member: Yes       Data: (subjective / Objective):  Recent ED/IP Admission or Discharge?   None    Patient Goals:  Goal Areas: Health; Mental Health; Future HAP Goal area; Chemical Health  Patient stated goals: Health: Paula would like to continue to meet with her providers, and take her medications as prescribed.   -Paula would also like assistance with improving her back pain, and receive pool therapy, as well as possibly surgery.   -Paula would like to get a gym membership so she can get more exercise at the place she does pool therapy.  -Paula would like to find a nutrition plan that would work for her and help her to lose weight.   Mental Health: Paula would like to find a new therapist within [place], and continue to receive assistance with [facility] for monthly check ins. She would like to continue working with an [facility] worker.  -She reported that she wants to build herself back to feel like somebody and take it day by day.     Chemical Health: Paula would like assistance from her providers to quit smoking and will work on reducing her use, so she can be approved for back surgery.     Future goals: Paula would like to open to the CADI waiver in the future, but she'll continue with only PCA for now. She would like to receive home delivered meals if she does open.        [facility] Core Service Provided:  Comprehensive Care Management: utilized the electronic medical record / " "patient registry to identify and support patient's health conditions / needs more effectively   Care Transitions: focused on the coordinated and seamless movement of patient between or within different levels of care or settings  Care Coordination: provided care management services/referrals necessary to ensure patient and their identified supports have access to medical, behavioral health, pharmacology and recovery support services.  Ensured that patient's care is integrated across all settings and services.   Individual and Family Support: aimed to help clients reduce barriers to achieving goals, increase health literacy and knowledge about chronic condition(s), increase self-efficacy skills, and improve health outcomes    Current Stressors / Issues / Care Plan Objective Addressed Today:  SWCC and patient were able to meet today for Behavioral Health Home ([facility]) monthly check-in via telehealth visit. All required ROIs have been filed with HIM/patient chart.    - Patient reports that she's not doing well and she reported that she fell off of everything. She reported depressive symptoms due to weight gain as well. She reports that her provider is trying to get the shot approved through insurance for weight loss, but her insurance is denying the medication. She reports they have discussed the importance of losing weight for her health and so she can get surgery. She continues to work with her PCP to get this covered, and her pain clinic has also been in support of this, but they can't do this medication.      Patient reports that she's wanting to stay home and not get anything done, or get dressed. She is going to try to get out of the house today, and she's going to work on grooming her dog. She reported that she is in a \"slump\", so SWCC and patient discussed again about having a routine and trying to get out of the house. Patient reports that she struggles because it costs money to go out of the house too.     - " SWCC and patient discussed [facility] and therapy, and patient is still hesitant with a new provider. SWCC encouraged patient and reminded her that this is a short term provider, so SWCC offered the long term therapy referral as well.     - Patient reported that the temporary job agency reached out for a temporary job that's a week long. 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     - Patient has been in a lot of pain recently, with the kidney stones, as well as her normal pain.  She reports that she hasn't been able to go to the gym because of her pain.     Intervention:  Motivational Interviewing: Expressed Empathy/Understanding, Supported Autonomy, Collaboration, Evocation, Permission to raise concern or advise, Open-ended questions, and Reflections: simple and complex   Target Behavior(s): Explored thoughts about taking an anti-depressant, Explored and resolved challenges related to taking anti-depressants as prescribed, Explored thoughts and readiness to participate in individual therapy, Explored and resolved challenges to attending appointments as scheduled, and Explored current social supports and reinforced opportunities to increase engagement    Assessment: (Progress on Goals / Homework):  Patient would benefit from continued coordination in reaching their goals set for the Behavioral Health Home ([facility]) program. SWCC reviewed Health Action Plan goals and will continue to monitor progress and work with patient and their care team.    Plan: (Homework, other):  Patient was encouraged to continue to seek condition-related information and education.      Scheduled a Phone follow up appointment with MISA RICE in 3 weeks     Patient has set self-identified goals and will monitor progress until the next appointment on: 5/14/24.       DENI Humphries  Behavioral Health Home ([facility])   [facility]  [facility]  [phone]      Next 5 appointments (look out 90 days)      Apr 23, 2024  8:00 AM  Provider Visit with Renan Dickerson MD  [facility] ([facility] ) [address] [place] [id number]  [phone]     May 21, 2024  3:30 PM  (Arrive by 3:10 PM)  Provider Visit with Renan Dickerson MD  [facility] ([facility] ) [address] [place] [id number]  [phone]

## 2024-04-23 ENCOUNTER — VIRTUAL VISIT (OUTPATIENT)
Dept: FAMILY MEDICINE | Facility: CLINIC | Age: 64
End: 2024-04-23
Payer: MEDICARE

## 2024-04-23 DIAGNOSIS — E66.01 MORBID OBESITY (H): Primary | ICD-10-CM

## 2024-04-23 DIAGNOSIS — A49.01 STAPH AUREUS INFECTION: ICD-10-CM

## 2024-04-23 PROCEDURE — 99442 PR PHYSICIAN TELEPHONE EVALUATION 11-20 MIN: CPT | Mod: 93 | Performed by: INTERNAL MEDICINE

## 2024-04-23 RX ORDER — DOXYCYCLINE 100 MG/1
100 CAPSULE ORAL 2 TIMES DAILY
Qty: 28 CAPSULE | Refills: 1 | Status: SHIPPED | OUTPATIENT
Start: 2024-04-23 | End: 2024-05-21

## 2024-04-23 NOTE — PROGRESS NOTES
"Paula is a 63 year old who is being evaluated via a billable telephone visit.    What phone number would you like to be contacted at? [phone]  How would you like to obtain your AVS? Mail a copy    [facility] Internal Medicine Progress Note           Assessment and Plan:     Morbid obesity (H)  - liraglutide - Weight Management (SAXENDA) 18 MG/3ML pen; Inject 0.6 mg Subcutaneous daily for 90 days    Staph aureus infection  - doxycycline monohydrate (MONODOX) 100 MG capsule; Take 1 capsule (100 mg) by mouth 2 times daily          Interval History:     Reason for visit: weight concerns  Onset: chronic  Description: BMI of more 45.  Course: worse  Co-morbidities: chronic low back due to degenerative arthritis, prediabetes and hyperlipidemia.  Evaluation: Yes  Precipitating factor: sedentary lifestyle and dietary choices.  Alleviating factor: none  Therapies tried and outcome: GLP-1 agonists are not covered by her health insurance.       \"Is there anything we can do to get my shots for weight loss?\"  \"Is there any way you can get me some antibiotics on my stomach? It's the same spot.\"  \"I cannot walk 3 blocks. I'm sure it's my weight and back. My back hurts so much because of my weight.\"       Significant Problems:     Patient Active Problem List   Diagnosis    Chronic knee pain    LIBRA (generalized anxiety disorder)    Brain aneurysm    Chronic, continuous use of opioids    Asthma    Chronic GERD    Chronic pain    Cigarette smoker    DJD (degenerative joint disease)    Insomnia    Morbid obesity (H)    History of subarachnoid hemorrhage    Status post knee surgery    Tobacco use disorder    Chronic obstructive pulmonary disease, unspecified COPD type (H)    MDD (major depressive disorder), recurrent severe, without psychosis (H)    Attention deficit hyperactivity disorder (ADHD)    Chronic midline low back pain with bilateral sciatica    Hyperlipidemia    Benign essential hypertension    Infection due " to 2019 novel coronavirus    Opioid dependence with current use (H)    Angiolipoma of right kidney    Adrenal nodule (H24)              Review of Systems:     CONSTITUTIONAL: As above.  INTEGUMENTARY/SKIN: NEGATIVE for worrisome rashes, moles or lesions  EYES: NEGATIVE for vision changes or irritation  ENT/MOUTH: NEGATIVE for ear, mouth and throat problems  RESP: NEGATIVE for significant cough or SOB  BREAST: NEGATIVE for masses, tenderness or discharge  CV: NEGATIVE for chest pain, palpitations or peripheral edema  GI: NEGATIVE for nausea, abdominal pain, heartburn, or change in bowel habits  : NEGATIVE for frequency, dysuria, or hematuria  MUSCULOSKELETAL: NEGATIVE for significant arthralgias or myalgia  NEURO: NEGATIVE for weakness, dizziness or paresthesias  ENDOCRINE: NEGATIVE for temperature intolerance, skin/hair changes  HEME: NEGATIVE for bleeding problems  PSYCHIATRIC: NEGATIVE for changes in mood or affect             Physical Exam:   Vital signs and physical exam not obtained due to nature of visit.          Data:     Epic reviewed    Disposition:  Follow-up in 12 weeks.      Renan Dickerson MD  Internal Medicine    Phone call duration: 15 minutes  Start: 8:00 AM  End: 8:15 AM  [facility]

## 2024-05-14 ENCOUNTER — VIRTUAL VISIT (OUTPATIENT)
Dept: BEHAVIORAL HEALTH | Facility: CLINIC | Age: 64
End: 2024-05-14
Payer: MEDICARE

## 2024-05-14 DIAGNOSIS — R69 DIAGNOSIS DEFERRED: Primary | ICD-10-CM

## 2024-05-14 NOTE — PROGRESS NOTES
"Behavioral Health Home Services  [facility]: [place]        Social Work Care Navigator Note      Patient: Paula Ho  Date: May 14, 2024  Preferred Name: Paula    Previous PHQ-9:       1/26/2024     2:04 PM 2/2/2024     2:13 PM 4/1/2024    12:46 PM   PHQ-9 SCORE   PHQ-9 Total Score MyChart 14 (Moderate depression) 7 (Mild depression) 13 (Moderate depression)   PHQ-9 Total Score 14 7 13    13     Previous LIBRA-7:       10/26/2023     9:10 AM 12/15/2023     1:00 PM 4/2/2024     2:40 PM   LIBRA-7 SCORE   Total Score 4 (minimal anxiety)  8 (mild anxiety)   Total Score 4    4 10 8     ARNALDO LEVEL:       No data to display                Preferred Contact:  Need for : No  Preferred Contact: Cell      Type of Contact Today: Phone call (patient / identified key support person reached)    Service Modality: Phone Visit:      Provider verified identity through the following two step process.  Patient provided:  Patient is known previously to provider    Telephone Visit: The patient's condition can be safely assessed and treated via synchronous audio telemedicine encounter.      Reason for Audio Telemedicine Visit: Patient has requested telehealth visit    Originating Site (Patient Location): Patient's home    Distant Site (Provider Location): Provider Remote Setting- Home Office    Consent:  The patient/guardian has verbally consented to:     1. The potential risks and benefits of telemedicine (telephone visit) versus in person care;    The patient has been notified of the following:      \"We have found that certain health care needs can be provided without the need for a face to face visit.  This service lets us provide the care you need with a phone conversation.       I will have full access to your [facility] medical record during this entire phone call.   I will be taking notes for your medical record.      Since this is like an office visit, we will bill your insurance company for this service.     " "  There are potential benefits and risks of telephone visits (e.g. limits to patient confidentiality) that differ from in-person visits.?Confidentiality still applies for telephone services, and nobody will record the visit.  It is important to be in a quiet, private space that is free of distractions (including cell phone or other devices) during the visit.??      If during the course of the call I believe a telephone visit is not appropriate, you will not be charged for this service\"     Consent has been obtained for this service by care team member: Yes       Data: (subjective / Objective):  Recent ED/IP Admission or Discharge?   None    Patient Goals:  Goal Areas: Health; Mental Health; Future HAP Goal area; Chemical Health  Patient stated goals: Health: Paula would like to continue to meet with her providers, and take her medications as prescribed.   -Paula would also like assistance with improving her back pain, and receive pool therapy, as well as possibly surgery.   -Paula would like to get a gym membership so she can get more exercise at the place she does pool therapy.  -Paula would like to find a nutrition plan that would work for her and help her to lose weight.   Mental Health: Paula would like to find a new therapist within [place], and continue to receive assistance with [facility] for monthly check ins. She would like to continue working with an [facility] worker.  -She reported that she wants to build herself back to feel like somebody and take it day by day.     Chemical Health: Paula would like assistance from her providers to quit smoking and will work on reducing her use, so she can be approved for back surgery.     Future goals: Paula would like to open to the CADI waiver in the future, but she'll continue with only PCA for now. She would like to receive home delivered meals if she does open.    [facility] Core Service Provided:  Comprehensive Care Management: utilized the electronic medical record / " "patient registry to identify and support patient's health conditions / needs more effectively   Care Transitions: focused on the coordinated and seamless movement of patient between or within different levels of care or settings  Care Coordination: provided care management services/referrals necessary to ensure patient and their identified supports have access to medical, behavioral health, pharmacology and recovery support services.  Ensured that patient's care is integrated across all settings and services.   Individual and Family Support: aimed to help clients reduce barriers to achieving goals, increase health literacy and knowledge about chronic condition(s), increase self-efficacy skills, and improve health outcomes    Current Stressors / Issues / Care Plan Objective Addressed Today:  SWCC and patient were able to meet today for Behavioral Health Home ([facility]) monthly check-in via telehealth visit. All required ROIs have been filed with HIM/patient chart.    - Patient reports she's struggling today and she feels like she's on a \"downward spiral\". She reports that she's having back pain still and they're trying to figure out her weight management as well. She reports that she has a referral for a weight management, so she's waiting for a call on this. She reports that she wants to get back surgery, but again she has to lose weight before she can be considered for the surgery.       [facility] also helped her reschedule with [facility] again and they discuss struggles with making the appointment. [facility] offered to do a warm hand off and patient declined, and reports she'll show up for the call. [facility] also offered to remind her that day, and she reports it's in her phone so she doesn't need a reminder call.     - Patient reported that she received some bad news about her mom's health and they aren't sure she can do something to fix this. She and [facility] discussed trying to spend time with her as much as she can, but patient " reports that it's hard for her to do this.     - Patient reports she's struggling with getting her tasks done in the day because she has a hard time getting up with her pain.    - Patient reported that she received a call from an [employer] worker and she reports that they didn't introduce themselves very friendly, so patient was frustrated with this. SWCC encouraged her to call the worker back when she's ready, and she reports she's just been in too much pain but she will.     - Patient reports she's going to go on a walk today with her dogs, even for a small walk so she can get outside.     Intervention:  Motivational Interviewing: Expressed Empathy/Understanding, Supported Autonomy, Collaboration, Evocation, Permission to raise concern or advise, Open-ended questions, and Reflections: simple and complex   Target Behavior(s): Explored thoughts about taking an anti-depressant, Explored and resolved challenges related to taking anti-depressants as prescribed, Explored thoughts and readiness to participate in individual therapy, Explored and resolved challenges to attending appointments as scheduled, and Explored current social supports and reinforced opportunities to increase engagement    Assessment: (Progress on Goals / Homework):  Patient would benefit from continued coordination in reaching their goals set for the Behavioral Health Home ([facility]) program. SW reviewed Health Action Plan goals and will continue to monitor progress and work with patient and their care team.    Plan: (Homework, other):  Patient was encouraged to continue to seek condition-related information and education.      Scheduled a Phone follow up appointment with MISA RICE in 3 weeks     Patient has set self-identified goals and will monitor progress until the next appointment on: 6/4/24.       DENI Humphries  Behavioral Health Home ([facility])   [facility]  [phone]      Next 5 appointments (look  out 90 days)      May 21, 2024  3:30 PM  (Arrive by 3:10 PM)  Provider Visit with Renan Dickerson MD  [facility] ([facility] ) [address] [place] [id number] [phone]

## 2024-05-21 ENCOUNTER — OFFICE VISIT (OUTPATIENT)
Dept: FAMILY MEDICINE | Facility: CLINIC | Age: 64
End: 2024-05-21
Payer: MEDICARE

## 2024-05-21 ENCOUNTER — TELEPHONE (OUTPATIENT)
Dept: FAMILY MEDICINE | Facility: CLINIC | Age: 64
End: 2024-05-21

## 2024-05-21 VITALS
TEMPERATURE: 97.6 F | HEIGHT: 66 IN | OXYGEN SATURATION: 98 % | BODY MASS INDEX: 46.77 KG/M2 | WEIGHT: 291 LBS | SYSTOLIC BLOOD PRESSURE: 127 MMHG | HEART RATE: 70 BPM | RESPIRATION RATE: 20 BRPM | DIASTOLIC BLOOD PRESSURE: 77 MMHG

## 2024-05-21 DIAGNOSIS — A49.01 STAPHYLOCOCCUS AUREUS INFECTION: ICD-10-CM

## 2024-05-21 DIAGNOSIS — J44.9 CHRONIC OBSTRUCTIVE PULMONARY DISEASE, UNSPECIFIED COPD TYPE (H): ICD-10-CM

## 2024-05-21 DIAGNOSIS — M47.816 LUMBAR FACET ARTHROPATHY: Primary | ICD-10-CM

## 2024-05-21 DIAGNOSIS — T63.443S ANAPHYLACTIC REACTION TO BEE STING, ASSAULT, SEQUELA: ICD-10-CM

## 2024-05-21 DIAGNOSIS — E78.2 MIXED HYPERLIPIDEMIA: ICD-10-CM

## 2024-05-21 DIAGNOSIS — K64.4 EXTERNAL HEMORRHOIDS: ICD-10-CM

## 2024-05-21 DIAGNOSIS — T78.2XXS ANAPHYLACTIC REACTION TO BEE STING, ASSAULT, SEQUELA: ICD-10-CM

## 2024-05-21 PROCEDURE — 99207 PR NO CHARGE LOS: CPT | Performed by: INTERNAL MEDICINE

## 2024-05-21 RX ORDER — EPINEPHRINE 0.3 MG/.3ML
0.3 INJECTION SUBCUTANEOUS PRN
Qty: 2 EACH | Refills: 0 | Status: SHIPPED | OUTPATIENT
Start: 2024-05-21

## 2024-05-21 RX ORDER — MUPIROCIN CALCIUM 20 MG/G
CREAM TOPICAL 3 TIMES DAILY
Qty: 30 G | Refills: 2 | Status: SHIPPED | OUTPATIENT
Start: 2024-05-21 | End: 2024-05-28

## 2024-05-21 RX ORDER — ORLISTAT 120 MG/1
120 CAPSULE ORAL
Qty: 90 CAPSULE | Refills: 5 | Status: SHIPPED | OUTPATIENT
Start: 2024-05-21

## 2024-05-21 RX ORDER — HYDROCORTISONE 25 MG/G
CREAM TOPICAL 2 TIMES DAILY PRN
Qty: 28 G | Refills: 11 | Status: SHIPPED | OUTPATIENT
Start: 2024-05-21

## 2024-05-21 RX ORDER — SULFAMETHOXAZOLE/TRIMETHOPRIM 800-160 MG
1 TABLET ORAL 2 TIMES DAILY
Qty: 20 TABLET | Refills: 0 | Status: SHIPPED | OUTPATIENT
Start: 2024-05-21

## 2024-05-21 ASSESSMENT — ANXIETY QUESTIONNAIRES
4. TROUBLE RELAXING: SEVERAL DAYS
GAD7 TOTAL SCORE: 9
IF YOU CHECKED OFF ANY PROBLEMS ON THIS QUESTIONNAIRE, HOW DIFFICULT HAVE THESE PROBLEMS MADE IT FOR YOU TO DO YOUR WORK, TAKE CARE OF THINGS AT HOME, OR GET ALONG WITH OTHER PEOPLE: VERY DIFFICULT
7. FEELING AFRAID AS IF SOMETHING AWFUL MIGHT HAPPEN: NOT AT ALL
GAD7 TOTAL SCORE: 9
2. NOT BEING ABLE TO STOP OR CONTROL WORRYING: SEVERAL DAYS
5. BEING SO RESTLESS THAT IT IS HARD TO SIT STILL: SEVERAL DAYS
GAD7 TOTAL SCORE: 9
7. FEELING AFRAID AS IF SOMETHING AWFUL MIGHT HAPPEN: NOT AT ALL
3. WORRYING TOO MUCH ABOUT DIFFERENT THINGS: MORE THAN HALF THE DAYS
8. IF YOU CHECKED OFF ANY PROBLEMS, HOW DIFFICULT HAVE THESE MADE IT FOR YOU TO DO YOUR WORK, TAKE CARE OF THINGS AT HOME, OR GET ALONG WITH OTHER PEOPLE?: VERY DIFFICULT
6. BECOMING EASILY ANNOYED OR IRRITABLE: MORE THAN HALF THE DAYS
1. FEELING NERVOUS, ANXIOUS, OR ON EDGE: MORE THAN HALF THE DAYS

## 2024-05-21 ASSESSMENT — PATIENT HEALTH QUESTIONNAIRE - PHQ9
SUM OF ALL RESPONSES TO PHQ QUESTIONS 1-9: 12
SUM OF ALL RESPONSES TO PHQ QUESTIONS 1-9: 12
10. IF YOU CHECKED OFF ANY PROBLEMS, HOW DIFFICULT HAVE THESE PROBLEMS MADE IT FOR YOU TO DO YOUR WORK, TAKE CARE OF THINGS AT HOME, OR GET ALONG WITH OTHER PEOPLE: VERY DIFFICULT

## 2024-05-21 ASSESSMENT — PAIN SCALES - GENERAL: PAINLEVEL: NO PAIN (0)

## 2024-05-21 NOTE — PROGRESS NOTES
[facility] INTERNAL MEDICINE NOTE    Paula Ho is a 64 year old female who presents to clinic today for the following health issues:    Back Pain:  She presents for follow up of back pain. Patient's back pain is a chronic problem.  Location of back pain:  Right lower back, left lower back, right upper back, left upper back, right buttock, left buttock, right side of waist and left side of waist  Description of back pain: burning, dull ache, sharp and shooting  Back pain spreads: right buttocks, left buttocks, right thigh and left thigh    Since patient first noticed back pain, pain is: gradually worsening  Does back pain interfere with her job:  Not applicable       Patient Active Problem List   Diagnosis    Chronic knee pain    LIBRA (generalized anxiety disorder)    Brain aneurysm    Chronic, continuous use of opioids    Asthma    Chronic GERD    Chronic pain    Cigarette smoker    DJD (degenerative joint disease)    Insomnia    Morbid obesity (H)    History of subarachnoid hemorrhage    Status post knee surgery    Tobacco use disorder    Chronic obstructive pulmonary disease, unspecified COPD type (H)    MDD (major depressive disorder), recurrent severe, without psychosis (H)    Attention deficit hyperactivity disorder (ADHD)    Chronic midline low back pain with bilateral sciatica    Hyperlipidemia    Benign essential hypertension    Infection due to 2019 novel coronavirus    Opioid dependence with current use (H)    Angiolipoma of right kidney    Adrenal nodule (H24)     Past Surgical History:   Procedure Laterality Date    APPENDECTOMY      APPENDECTOMY      CEREBRAL ANEURYSM REPAIR      JOINT REPLACEMENT      ORTHOPEDIC SURGERY  2002    Circa 2002: right knee arthroscopy (Dr. Pappas)    ORTHOPEDIC SURGERY  2004    Circa 2004: right knee partial knee replacement (Iam Ritchie MD)    ORTHOPEDIC SURGERY  2006    Circa 2006: right TKA (Ron  MD Aleksey; [facility])    ORTHOPEDIC SURGERY  2008    Circa 2008: right TKA revision due to infection (Ron Ryder MD; [facility])    ORTHOPEDIC SURGERY  2009    Circa 2009: right TKA revision due to infection (Ron Ryder MD; [facility])    ORTHOPEDIC SURGERY  2011 April 2011: right TKA (Ron Ryder MD; [facility])    REPLACEMENT TOTAL KNEE Right     TONSILLECTOMY      tonsils    TONSILLECTOMY         Social History     Tobacco Use    Smoking status: Every Day     Current packs/day: 1.00     Types: Cigarettes    Smokeless tobacco: Never    Tobacco comments:     Patient has been trying patches and they have not been working.   Substance Use Topics    Alcohol use: Not Currently     Family History   Problem Relation Age of Onset    Depression Mother     Substance Abuse Father          Allergies   Allergen Reactions    Compazine [Prochlorperazine]     Droperidol     Lisinopril     Morphine      Other reaction(s): hives    Pravastatin      Other reaction(s): Edema    Seroquel [Quetiapine]      Recent Labs   Lab Test 02/27/24  1314 04/02/23  0918 01/18/23  1204 03/15/22  1511 07/12/21  1218 05/20/21  0811 03/02/21  1606 02/23/21  1551   A1C 5.9* 5.9*  --   --   --   --   --   --    *  --   --  128*  --  139*  --   --    HDL 48*  --   --   --   --  51  --   --    TRIG 113  --   --   --   --  82  --   --    ALT  --  18 25 32  --  24  --  28   CR  --  0.57 0.55 0.51*   < > 0.65  --  0.55   GFRESTIMATED  --  >90 >90 >90   < > >90  --  >90   GFRESTBLACK  --   --   --   --   --  >90  --  >90   POTASSIUM  --  4.3 4.8 4.1   < > 4.2  --  4.0   TSH  --   --  3.53 3.71   < >  --    < > 5.77*    < > = values in this interval not displayed.      BP Readings from Last 3 Encounters:   05/21/24 127/77   04/02/24 124/71   01/26/24 136/87    Wt Readings from Last 3 Encounters:   05/21/24 132 kg (291 lb)   04/02/24 130.5 kg (287 lb 9.6 oz)   01/26/24 126.1 kg (278 lb)           ROS:  C:  "NEGATIVE for fever, chills, change in weight  I: NEGATIVE for worrisome rashes, moles or lesions  E: NEGATIVE for vision changes or irritation  E/M: NEGATIVE for ear, mouth and throat problems  R: NEGATIVE for significant cough or SOB  B: NEGATIVE for masses, tenderness or discharge  CV: NEGATIVE for chest pain, palpitations or peripheral edema  GI: NEGATIVE for nausea, abdominal pain, heartburn, or change in bowel habits  : NEGATIVE for frequency, dysuria, or hematuria  M: As above.  N: NEGATIVE for weakness, dizziness or paresthesias  E: NEGATIVE for temperature intolerance, skin/hair changes  H: NEGATIVE for bleeding problems  P: NEGATIVE for changes in mood or affect    OBJECTIVE:                                                    /77 (BP Location: Left arm, Patient Position: Chair, Cuff Size: Adult Large)   Pulse 70   Temp 97.6  F (36.4  C) (Tympanic)   Resp 20   Ht 1.676 m (5' 6\")   Wt 132 kg (291 lb)   LMP  (LMP Unknown)   SpO2 98%   BMI 46.97 kg/m    Body mass index is 46.97 kg/m .  GENERAL: alert, no distress, obese, and fatigued  EYES: Eyes grossly normal to inspection and conjunctivae and sclerae normal  HENT: normal cephalic/atraumatic and oral mucous membranes moist  RESP: Decreased breath sounds bilaterally.  CV: regular rates and rhythm and no peripheral edema  MS: Tenderness on palpation of midline lumbar area  NEURO: Normal strength and tone, mentation intact and speech normal  PSYCH: mentation appears normal, affect normal/bright    Diagnostic Test Results:  No results found for any visits on 05/21/24.     ASSESSMENT/PLAN:                                                         General PFT Lab (Please always keep checked)  Order: [id number]  Collected 10/7/2021  3:09 PM       Status: Final result    0 Result Notes       1  Topic         Component  Ref Range & Units 2 yr ago    FVC-Pred  L 3.28    FVC-Pre  L 3.47    FVC-%Pred-Pre  % 105    FEV1-Pre  L 2.46    FEV1-%Pred-Pre  % 95    " FEV1FVC-Pred  % 79    FEV1FVC-Pre  % 71    FEFMax-Pred  L/sec 6.45    FEFMax-Pre  L/sec 5.06    FEFMax-%Pred-Pre  % 78    FEF2575-Pred  L/sec 2.32    FEF2575-Pre  L/sec 1.77    YHP8084-%Pred-Pre  % 76    FEF2575-Post  L/sec 1.99    KTU4140-%Pred-Post  % 85    ExpTime-Pre  sec 8.32    FIFMax-Pre  L/sec 3.79    VC-Pred  L 3.40    VC-Pre  L 3.23    VC-%Pred-Pre  % 95    IC-Pred  L 3.16    IC-Pre  L 3.05    IC-%Pred-Pre  % 96    ERV-Pred  L 0.23    ERV-Pre  L 0.18    ERV-%Pred-Pre  % 76    FEV1FEV6-Pred  % 81    FEV1FEV6-Pre  % 71    FRCPleth-Pred  L 2.76    FRCPleth-Pre  L 3.68    FRCPleth-%Pred-Pre  % 133    RVPleth-Pred  L 1.95    RVPleth-Pre  L 3.50    RVPleth-%Pred-Pre  % 179    TLCPleth-Pred  L 5.11    TLCPleth-Pre  L 6.73    TLCPleth-%Pred-Pre  % 131    DLCOunc-Pred  ml/min/mmHg 20.92    DLCOunc-Pre  ml/min/mmHg 23.03    DLCOunc-%Pred-Pre  % 110    VA-Pre  L 5.16    VA-%Pred-Pre  % 103    FEV1SVC-Pred  % 76    FEV1SVC-Pre  % 76   Resulting Agency [facility]              Narrative  Performed by: [facility]  Although the FVC and FEV1 are within normal limits, the FEV1/FEV6 ratio is reduced.  The inspiratory flow rates are within normal limits.  The TLC and RV are increased. Following administration of bronchodilators, there is no significant response.  The  diffusing capacity is normal.  However, the diffusing capacity was not corrected for the patient's hemoglobin.        Mild airway obstruction and overinflation are present.        IMPRESSION:    Mild Airflow Obstruction           Disposition:  Follow-up in 4 weeks or as needed.      Renan Dickerson MD  [facility]

## 2024-05-21 NOTE — TELEPHONE ENCOUNTER
Plan does not cover mupirocin (BACTROBAN) 2 % external cream. Please log on to [email or web address] to initiate PA or switch to alternative medication.    KEY: [id number]

## 2024-05-28 ENCOUNTER — VIRTUAL VISIT (OUTPATIENT)
Dept: BEHAVIORAL HEALTH | Facility: CLINIC | Age: 64
End: 2024-05-28
Payer: MEDICARE

## 2024-05-28 ENCOUNTER — TELEPHONE (OUTPATIENT)
Dept: FAMILY MEDICINE | Facility: CLINIC | Age: 64
End: 2024-05-28
Payer: MEDICARE

## 2024-05-28 DIAGNOSIS — F33.2 MDD (MAJOR DEPRESSIVE DISORDER), RECURRENT SEVERE, WITHOUT PSYCHOSIS (H): Primary | ICD-10-CM

## 2024-05-28 DIAGNOSIS — F41.1 GAD (GENERALIZED ANXIETY DISORDER): ICD-10-CM

## 2024-05-28 PROCEDURE — 90832 PSYTX W PT 30 MINUTES: CPT | Mod: 93

## 2024-05-28 RX ORDER — MUPIROCIN 20 MG/G
OINTMENT TOPICAL 3 TIMES DAILY
Qty: 30 G | Refills: 2 | Status: SHIPPED | OUTPATIENT
Start: 2024-05-28

## 2024-05-28 NOTE — TELEPHONE ENCOUNTER
Outpatient Medication Detail     Disp Refills Start End AARON   mupirocin (BACTROBAN) 2 % external ointment 30 g 2 5/28/2024 -- No   Sig - Route: Apply topically 3 times daily - Topical   Sent to pharmacy as: Mupirocin 2 % External Ointment (BACTROBAN)   Class: E-Prescribe   Order: [id number]   E-Prescribing Status: Sent to pharmacy (5/28/2024 11:08 AM CDT)       Pharmacy    [id number] - [place] - [id number] [address]

## 2024-05-28 NOTE — PROGRESS NOTES
"[facility] Primary Care: Integrated Behavioral Health  May 28, 2024    Behavioral Health Clinician Progress Note    Patient Name: Paula Ho        Service Type:  Individual      Service Location:   Phone call (patient / identified key support person reached)     Session Start Time: 11:35am Session End Time: 11:55am      Session Length: 16 - 37      Attendees: Patient     Service Modality:  Phone Visit:      Provider verified identity through the following two step process.  Patient provided:  Patient  and Patient is known previously to provider    Telephone Visit: The patient's condition can be safely assessed and treated via synchronous audio telemedicine encounter.      Reason for Audio Telemedicine Visit: Patient has requested telehealth visit    Originating Site (Patient Location): Patient's home    Distant Site (Provider Location): [facility] MENTAL [facility] & ADDICTION [facility]    Consent:  The patient/guardian has verbally consented to:     1. The potential risks and benefits of telemedicine (telephone visit) versus in person care;    The patient has been notified of the following:      \"We have found that certain health care needs can be provided without the need for a face to face visit.  This service lets us provide the care you need with a phone conversation.       I will have full access to your [facility] medical record during this entire phone call.   I will be taking notes for your medical record.      Since this is like an office visit, we will bill your insurance company for this service.       There are potential benefits and risks of telephone visits (e.g. limits to patient confidentiality) that differ from in-person visits.?Confidentiality still applies for telephone services, and nobody will record the visit.  It is important to be in a quiet, private space that is free of distractions (including cell phone or other devices) during the visit.?? " "     If during the course of the call I believe a telephone visit is not appropriate, you will not be charged for this service\"     Consent has been obtained for this service by care team member: Yes   Visit Activities (Refresh list every visit): NEW, [facility] Only, and Referral - Mental Health    Diagnostic Assessment Date: Will complete in the next few sessions, not completed due to time constraints.    Treatment Plan Review Date: Will complete in the next few sessions, not completed due to time constraints.    See Flowsheets for today's PHQ-9 and LIBRA-7 results  Previous PHQ-9:       2/2/2024     2:13 PM 4/1/2024    12:46 PM 5/21/2024     2:51 PM   PHQ-9 SCORE   PHQ-9 Total Score MyChart 7 (Mild depression) 13 (Moderate depression) 12 (Moderate depression)   PHQ-9 Total Score 7 13    13 12     Previous LIBRA-7:       4/2/2024     2:40 PM 5/21/2024     3:03 PM 5/21/2024     3:04 PM   LIBRA-7 SCORE   Total Score 8 (mild anxiety)  9 (mild anxiety)   Total Score 8 9      DATA  Extended Session (60+ minutes): No  Interactive Complexity: No  Crisis: No  [facility] Patient: Yes, addressed the follow [facility] Core Component(s):                          Health and Wellness Promotion    Treatment Objective(s) Addressed in This Session:  Target Behavior(s): disease management/lifestyle changes ongoing anxiety    Anxiety: will experience a reduction in anxiety, will develop more effective coping skills to manage anxiety symptoms, will develop healthy cognitive patterns and beliefs, and will increase ability to function adaptively    Current Stressors / Issues:  Pt discussed her stressors including finances and relationships.  Reviewed anxiety and coping skills.  Encouraged her to continue to work with  to address needs.   Reviewed coping skills and prompted follow through.      Progress on Treatment Objective(s) / Homework:  New Objective established this session - CONTEMPLATION (Considering change and yet undecided); Intervened by " assessing the negative and positive thinking (ambivalence) about behavior change    Motivational Interviewing    MI Intervention: Expressed Empathy/Understanding, Supported Autonomy, Collaboration, Evocation, Permission to raise concern or advise, and Reflections: simple and complex     Change Talk Expressed by the Patient: Desire to change Ability to change    Provider Response to Change Talk: A - Affirmed patient's thoughts, decisions, or attempts at behavior change and R - Reflected patient's change talk    Also provided psychoeducation about behavioral health condition, symptoms, and treatment options    Assessments completed prior to visit: pt did not complete them.    The following assessments were completed by patient for this visit:  PHQ9:       10/26/2023     9:09 AM 12/15/2023     1:00 PM 12/21/2023    10:57 AM 1/26/2024     2:04 PM 2/2/2024     2:13 PM 4/1/2024    12:46 PM 5/21/2024     2:51 PM   PHQ-9 SCORE   PHQ-9 Total Score MyChart 6 (Mild depression)   14 (Moderate depression) 7 (Mild depression) 13 (Moderate depression) 12 (Moderate depression)   PHQ-9 Total Score 6 10 10 14 7 13    13 12     GAD7:       7/31/2023    11:00 AM 9/25/2023    11:26 AM 10/26/2023     9:10 AM 12/15/2023     1:00 PM 4/2/2024     2:40 PM 5/21/2024     3:03 PM 5/21/2024     3:04 PM   LIBRA-7 SCORE   Total Score   4 (minimal anxiety)  8 (mild anxiety)  9 (mild anxiety)   Total Score 11 7 4    4 10 8 9      CAGE-AID:        No data to display              PROMIS 10-Global Health (only subscores and total score):       5/1/2023    10:40 AM 5/10/2023     3:47 PM 8/18/2023     2:12 PM   PROMIS-10 Scores Only   Global Mental Health Score 8        8 7 6       Global Physical Health Score 8        8 9 15       PROMIS TOTAL - SUBSCORES 16        16 16 21           Information is confidential and restricted. Go to Review Flowsheets to unlock data.    Multiple values from one day are sorted in reverse-chronological order     [place]  "Suicide Severity Rating Scale (Lifetime/Recent)      10/10/2019     3:54 PM 2/14/2020     4:00 PM 10/31/2022    11:18 AM   [place] Suicide Severity Rating (Lifetime/Recent)   Q1 Wish to be Dead (Lifetime) Yes No    Comments Often due to pain and loss. \"I hate to be alone.\"     Q2 Non-Specific Active Suicidal Thoughts (Lifetime) Yes Yes    Non-Specific Active Suicidal Thought Description (Lifetime) Often due to pain and loss. \"I hate to be alone.\" Did attempt in the past, 15 years ago.     Most Severe Ideation Rating (Lifetime) 4 3    Most Severe Ideation Description (Lifetime)  Did attempt in the past, 15 years ago. Not fully assessed since Paula needed to leave the session early.     Frequency (Lifetime)  3    Duration (Lifetime)  3    Controllability (Lifetime)  3    Protective Factors  (Lifetime)  4    Reasons for Ideation (Lifetime)  4    RETIRED: 1. Wish to be Dead (Recent) Yes     RETIRED: Wish to be Dead Description (Recent) Often due to pain and loss. \"I hate to be alone.\" No plan or intention.     RETIRED: 2. Non-Specific Active Suicidal Thoughts (Recent) Yes     Non-Specific Active Suicidal Thought Description (Recent) Often due to pain and loss. \"I hate to be alone.\" No plan or intention.     3. Active Suicidal Ideation with any Methods (Not Plan) Without Intent to Act (Lifetime) Yes Yes    RETIRE: Active Suicidal Ideation with any Methods (Not Plan) Description (Lifetime) Often due to pain and loss. \"I hate to be alone.\" Did attempt in the past, 15 years ago.     RETIRED: 3. Active Suicidal Ideation with any Methods (Not Plan) Without Intent to Act (Recent) Yes     RETIRED: Active Suicidal Ideation with any Methods (Not Plan) Description (Recent) Often due to pain and loss. \"I hate to be alone.\" No plan or intention.     RETIRE: 4. Active Suicidal Ideation with Some Intent to Act, Without Specific Plan (Lifetime) Yes Yes    RETIRE: Active Suicidal Ideation with Some Intent to Act, Without Specific Plan " Description (Lifetime) Did attempt in the past, 15 years ago.     4. Active Suicidal Ideation with Some Intent to Act, Without Specific Plan (Recent) No No    RETIRE: 5. Active Suicidal Ideation with Specific Plan and Intent (Lifetime) Yes     RETIRE: Active Suicidal Ideation with Specific Plan and Intent Description (Lifetime) Did attempt in the past, 15 years ago.     RETIRED: 5. Active Suicidal Ideation with Specific Plan and Intent (Recent) No     Most Severe Ideation Rating (Past Month) 2 1    Most Severe Ideation Description (Past Month) Regular thoughts of being dead due to chronic pain and loss.     Frequency (Past Month) 3 2    Duration (Past Month) 1     Controllability (Past Month) 2 3    Protective Factors (Past Month) NA 2    Reasons for Ideation (Past Month)  4    Actual Attempt (Lifetime)  Yes    Has subject engaged in non-suicidal self-injurious behavior? (Past 3 Months)  Yes    Most Recent Attempt Actual Lethality Code NA 1    Most Lethal Attempt Actual Lethality Code NA     Initial/First Attempt Actual Lethality Code NA     Q1 Wish to be Dead (Lifetime)   Y   Wish to be Dead Description (Lifetime)   After a breakup, she reports that she was around [age]   1. Wish to be Dead (Past 1 Month)   N   Q2 Non-Specific Active Suicidal Thoughts (Lifetime)   Y   2. Non-Specific Active Suicidal Thoughts (Past 1 Month)   N   3. Active Suicidal Ideation with any Methods (Not Plan) Without Intent to Act (Lifetime)   Y   Q3 Active Suicidal Ideation with any Methods (Not Plan) Without Intent to Act (Past 1 Month)   N   Q4 Active Suicidal Ideation with Some Intent to Act, Without Specific Plan (Lifetime)   N   Q5 Active Suicidal Ideation with Specific Plan and Intent (Lifetime)   N   Description of Most Severe Ideation (Lifetime)   5   Description of Most Severe Ideation (Past 1 Month)   NA   Frequency (Lifetime)   3   Duration (Lifetime)   1   Controllability (Lifetime)   1   Deterrents (Lifetime)   0   Reasons  for Ideation (Lifetime)   4   Actual Attempt (Lifetime)   Y   Total Number of Actual Attempts (Lifetime)   1   Actual Attempt Description (Lifetime)   Patient states she attempted through medication.   Actual Attempt (Past 3 Months)   N   Has subject engaged in non-suicidal self-injurious behavior? (Lifetime)   Y   Has subject engaged in non-suicidal self-injurious behavior? (Past 3 Months)   N   Interrupted Attempts (Lifetime)   N   Preparatory Acts or Behavior (Lifetime)   Y   Total Number of Preparatory Acts (Lifetime)   1   Actual Lethality/Medical Damage Code (Most Recent Attempt)   2   Potential Lethality Code (Most Recent Attempt)   1   Actual Lethality/Medical Damage Code (Most Lethal Attempt)   2   Potential Lethality Code (Most Lethal Attempt)   1   Calculated C-SSRS Risk Score (Lifetime/Recent)   Moderate Risk     Care Plan review completed: Yes    Medication Review:  No changes to current psychiatric medication(s)    Medication Compliance:  NA    Changes in Health Issues:   None reported    Chemical Use Review:   Substance Use: Chemical use reviewed, no active concerns identified      Tobacco Use: not discussed    Assessment: Current Emotional / Mental Status (status of significant symptoms):  Risk status (Self / Other harm or suicidal ideation)  Patient  Not discussed this session  Patient denies current fears or concerns for personal safety.  Patient denies current or recent suicidal ideation or behaviors.  Patient denies current or recent homicidal ideation or behaviors.  Patient denies current or recent self injurious behavior or ideation.  Patient denies other safety concerns.  A safety and risk management plan has not been developed at this time, however patient was encouraged to call [facility] should there be a change in any of these risk factors.    Appearance:   Unable to assess  Eye Contact:   Unable to assess   Psychomotor Behavior: Unable to assess   Attitude:   Cooperative    Orientation:   All  Speech   Rate / Production: Normal    Volume:  Normal   Mood:    Normal  Affect:    Appropriate   Thought Content:  Clear   Thought Form:  Coherent  Logical   Insight:    Fair     Diagnoses:  1. MDD (major depressive disorder), recurrent severe, without psychosis (H)    2. LIBRA (generalized anxiety disorder)      Collateral Reports Completed:  Not Applicable    Plan: (Homework, other):  Patient was given information about behavioral services and encouraged to schedule a follow up appointment with the clinic [facility] in 2 weeks.  She was also given information about mental health symptoms and treatment options .  CD Recommendations: No indications of CD issues.     Pam Arboleda, [facility]  5/28/24

## 2024-05-28 NOTE — TELEPHONE ENCOUNTER
Per pharmacy fax:    Ointment is covered.    If that is an appropriate change, please send new rx

## 2024-05-28 NOTE — TELEPHONE ENCOUNTER
Plan does not cover orlistat (XENICAL) 120 MG capsule .      Please initiate Prior Auth or switch to alternative medication.    Insurance type and ID number: [id number]  ID# [id number]      Additional Information:     Mary Junior

## 2024-06-03 ENCOUNTER — TRANSFERRED RECORDS (OUTPATIENT)
Dept: HEALTH INFORMATION MANAGEMENT | Facility: CLINIC | Age: 64
End: 2024-06-03

## 2024-06-04 ENCOUNTER — VIRTUAL VISIT (OUTPATIENT)
Dept: BEHAVIORAL HEALTH | Facility: CLINIC | Age: 64
End: 2024-06-04
Payer: MEDICARE

## 2024-06-04 ENCOUNTER — VIRTUAL VISIT (OUTPATIENT)
Dept: PSYCHIATRY | Facility: CLINIC | Age: 64
End: 2024-06-04
Payer: MEDICARE

## 2024-06-04 DIAGNOSIS — F41.1 GAD (GENERALIZED ANXIETY DISORDER): ICD-10-CM

## 2024-06-04 DIAGNOSIS — F51.04 PSYCHOPHYSIOLOGICAL INSOMNIA: ICD-10-CM

## 2024-06-04 DIAGNOSIS — R69 DIAGNOSIS DEFERRED: Primary | ICD-10-CM

## 2024-06-04 DIAGNOSIS — F33.1 MODERATE EPISODE OF RECURRENT MAJOR DEPRESSIVE DISORDER (H): Primary | ICD-10-CM

## 2024-06-04 PROCEDURE — 99443 PR PHYSICIAN TELEPHONE EVALUATION 21-30 MIN: CPT | Mod: 93 | Performed by: NURSE PRACTITIONER

## 2024-06-04 PROCEDURE — 99207 PR NO CHARGE LOS: CPT | Mod: 93

## 2024-06-04 RX ORDER — TRAZODONE HYDROCHLORIDE 50 MG/1
50 TABLET, FILM COATED ORAL AT BEDTIME
Qty: 30 TABLET | Refills: 3 | Status: SHIPPED | OUTPATIENT
Start: 2024-06-04 | End: 2024-07-30

## 2024-06-04 RX ORDER — ARIPIPRAZOLE 10 MG/1
10 TABLET ORAL DAILY
Qty: 30 TABLET | Refills: 3 | Status: SHIPPED | OUTPATIENT
Start: 2024-06-04 | End: 2024-07-30

## 2024-06-04 RX ORDER — GABAPENTIN 600 MG/1
600 TABLET ORAL DAILY
Qty: 30 TABLET | Refills: 3 | Status: SHIPPED | OUTPATIENT
Start: 2024-06-04

## 2024-06-04 RX ORDER — MIRTAZAPINE 45 MG/1
22.5 TABLET, FILM COATED ORAL AT BEDTIME
COMMUNITY
Start: 2024-06-04 | End: 2024-07-30

## 2024-06-04 RX ORDER — FLUOXETINE 40 MG/1
40 CAPSULE ORAL DAILY
Qty: 30 CAPSULE | Refills: 3 | Status: SHIPPED | OUTPATIENT
Start: 2024-06-04 | End: 2024-07-30

## 2024-06-04 ASSESSMENT — ANXIETY QUESTIONNAIRES
3. WORRYING TOO MUCH ABOUT DIFFERENT THINGS: NEARLY EVERY DAY
IF YOU CHECKED OFF ANY PROBLEMS ON THIS QUESTIONNAIRE, HOW DIFFICULT HAVE THESE PROBLEMS MADE IT FOR YOU TO DO YOUR WORK, TAKE CARE OF THINGS AT HOME, OR GET ALONG WITH OTHER PEOPLE: SOMEWHAT DIFFICULT
6. BECOMING EASILY ANNOYED OR IRRITABLE: NEARLY EVERY DAY
4. TROUBLE RELAXING: NOT AT ALL
GAD7 TOTAL SCORE: 11
1. FEELING NERVOUS, ANXIOUS, OR ON EDGE: NEARLY EVERY DAY
2. NOT BEING ABLE TO STOP OR CONTROL WORRYING: SEVERAL DAYS
5. BEING SO RESTLESS THAT IT IS HARD TO SIT STILL: NOT AT ALL
7. FEELING AFRAID AS IF SOMETHING AWFUL MIGHT HAPPEN: SEVERAL DAYS
GAD7 TOTAL SCORE: 11

## 2024-06-04 ASSESSMENT — PAIN SCALES - GENERAL: PAINLEVEL: NO PAIN (0)

## 2024-06-04 ASSESSMENT — PATIENT HEALTH QUESTIONNAIRE - PHQ9: SUM OF ALL RESPONSES TO PHQ QUESTIONS 1-9: 12

## 2024-06-04 NOTE — LETTER
Paula Ho  [address] [address]       Dear Paula,    I have enclosed the Health Action Plan (HAP) that we completed together that includes your goals for Behavioral Health Home ([facility]) Services.    As a reminder, to continue enrollment with the Behavioral Health Home program we will complete check ins a minimum of a monthly basis and update your goals twice a year.  You must also continue to maintain primary care through [facility] and keep your medical assistance insurance active.     If you or someone you know is experiencing a mental health crisis and you need help, the following crisis hotlines are available to help.    The new Crisis line from any phone is 988, you can also text a message to this line.      Professionals or agencies I can contact during a crisis:     Suicide Prevention Lifeline: just dial 988  Crisis Text Line Service (available 24 hours a day, 7 days a week): Text [place] to 007485     Crisis Services By County: Phone Number:   [place]     [phone]   [place]  [phone]   [facility]  [phone]   [place]    [phone]   [place]/ [place]    [phone]   [place] [phone]   [place]    [phone]   [place]    [phone]   [place] [phone]   [place]     [phone]      Please let me know if you have any questions or if there is anything that we can assist with. I can be reached by phone or [facility]. My number is listed below.     Sincerely,    DENI House  Behavioral Health Home ([facility])   [facility] [place]  [phone]

## 2024-06-04 NOTE — NURSING NOTE
Is the patient currently in the state of [place]? YES    Visit mode:TELEPHONE    If the visit is dropped, the patient can be reconnected by: TELEPHONE VISIT: Phone number:   Telephone Information:   Mobile [phone]       Will anyone else be joining the visit? NO  (If patient encounters technical issues they should call [phone])    How would you like to obtain your AVS? MyChart    Are changes needed to the allergy or medication list? No    Are refills needed on medications prescribed by this physician? YES    Reason for visit: RECHECK    Pete DAVIS

## 2024-06-04 NOTE — PROGRESS NOTES
"[facility]al [facility]  [facility]: [place]        Social Work Care Navigator Note      Patient: Paula Ho  Date: June 4, 2024  Preferred Name: Paula    Previous PHQ-9:       2/2/2024     2:13 PM 4/1/2024    12:46 PM 5/21/2024     2:51 PM   PHQ-9 SCORE   PHQ-9 Total Score MyChart 7 (Mild depression) 13 (Moderate depression) 12 (Moderate depression)   PHQ-9 Total Score 7 13    13 12     Previous LIBRA-7:       4/2/2024     2:40 PM 5/21/2024     3:03 PM 5/21/2024     3:04 PM   LIBRA-7 SCORE   Total Score 8 (mild anxiety)  9 (mild anxiety)   Total Score 8 9      ARNALDO LEVEL:       No data to display                Preferred Contact:  Need for : No  Preferred Contact: Cell      Type of Contact Today: Phone call (patient / identified key support person reached)    Service Modality: Phone Visit:      Provider verified identity through the following two step process.  Patient provided:  Patient is known previously to provider    Telephone Visit: The patient's condition can be safely assessed and treated via synchronous audio telemedicine encounter.      Reason for Audio Telemedicine Visit: Patient has requested telehealth visit    Originating Site (Patient Location): Patient's home    Distant Site (Provider Location): Provider Remote Setting- Home Office    Consent:  The patient/guardian has verbally consented to:     1. The potential risks and benefits of telemedicine (telephone visit) versus in person care;    The patient has been notified of the following:      \"We have found that certain health care needs can be provided without the need for a face to face visit.  This service lets us provide the care you need with a phone conversation.       I will have full access to your [facility] medical record during this entire phone call.   I will be taking notes for your medical record.      Since this is like an office visit, we will bill your insurance company for this service.       There are " "potential benefits and risks of telephone visits (e.g. limits to patient confidentiality) that differ from in-person visits.?Confidentiality still applies for telephone services, and nobody will record the visit.  It is important to be in a quiet, private space that is free of distractions (including cell phone or other devices) during the visit.??      If during the course of the call I believe a telephone visit is not appropriate, you will not be charged for this service\"     Consent has been obtained for this service by care team member: Yes       Data: (subjective / Objective):    Patient came in to complete the comprehensive wellness assessment for Behavioral Health Home Services.  See [facility] Flowsheets for details on the assessment.  See [facility], [facility] [place] for a copy of the patient's care plan.    - Patient reports meeting with her psychiatrist and they changed her medications recently.     - Patient reported that she might be starting with weight management and she's being referred to the weight management clinic. She reports that she's had a lot of anxiety and depression symptoms from the weight gain. She reports that she doesn't want to go anywhere either.     - Patient reported that her godson got killed early last week, and his  is on . She reports that she thinks they got he person, but they haven't heard any more information. She reports being upset from the information about the  that was killed recently as well.     - Patient talked about visiting her mom, but it's hard for her to go because she can't stay with her and she can't afford to stay somewhere else. [facility] encouraged patient to continue     - Patient reports that she has been working with a peer support person along with the [employer] worker. She reports that she's having issues with understanding the [facility] worker and she also doesn't like dogs, which patient has. She is going to talk with her peer support person " about the issues.     - Patient reported that she found out about her godson the last time she met with [facility] Pam. She reports it went okay, and she has another appointment scheduled with Pam.     - Patient reports that she's been doing well with reducing her cigarette use, and she's at about less than half a pack per day now.       Updated Goals:   Health: Patient would like to continue to meet with her providers, and take her medications as prescribed.   -Patient would like to start working with the weight management clinic to assist with losing weight.   -Patient would like to go to the gym at least three times per week.   -Patient would like to find a nutrition plan that would work for her and help her to lose weight.   Mental Health: Patient would like to find a new therapist within [place] and receive support from Behavioral Health Clinician with this. She will also  continue to receive assistance with [facility] for monthly check ins. She would like to continue working with an [facility] worker and Peer Support Person through the same agency.   -She reported that she wants to build herself back to feel like somebody and take it day by day.   Chemical Health: Patient would like assistance from her providers to quit smoking and will work on reducing her use, so she can be approved for back surgery.   Future goals: Patient is going to consider back surgery in the future after losing the recommended weight.     [facility] will mail HAP letter message once approved by [facility].   Addendum 7/24/24: HAP approved by [facility] and HAP letter mailed to patient with cover letter.    Next check in is scheduled for 7/2/24 for telephone call per patient's preference. [facility] offered in person and video visit as well.     DENI Humphries  Behavioral Health [place] ([facility])   [facility]  [phone]

## 2024-06-04 NOTE — PROGRESS NOTES
"         Outpatient Psychiatric Progress Note    Name: Paula Ho   : 1960                    Primary Care Provider: Renan Dickerson MD   Therapist: Yes    PHQ-9 scores:      2024     2:13 PM 2024    12:46 PM 2024     2:51 PM   PHQ-9 SCORE   PHQ-9 Total Score MyChart 7 (Mild depression) 13 (Moderate depression) 12 (Moderate depression)   PHQ-9 Total Score 7 13    13 12       LIBRA-7 scores:      2024     2:40 PM 2024     3:03 PM 2024     3:04 PM   LIBRA-7 SCORE   Total Score 8 (mild anxiety)  9 (mild anxiety)   Total Score 8 9        Patient Identification:    Patient is a 64 year old year old, single  White Not  or  female  who presents for return visit with me.  Patient is currently unemployed. Patient attended the session alone. Patient prefers to be called: \" Paula\".    Current medications include:   Current Outpatient Medications   Medication Sig Dispense Refill    albuterol (PROAIR HFA) 108 (90 Base) MCG/ACT inhaler Inhale 2 puffs into the lungs every 4 hours as needed for shortness of breath or wheezing 8.5 g 11    albuterol (PROVENTIL) (2.5 MG/3ML) 0.083% neb solution NEBULIZE THE CONTENTS OF 1 VIAL EVERY 6 HOURS AS NEEDED. 90 mL 11    amphetamine-dextroamphetamine (ADDERALL) 15 MG tablet Take 1 tablet (15 mg) by mouth 2 times daily 60 tablet 0    amphetamine-dextroamphetamine (ADDERALL) 15 MG tablet Take 1 tablet (15 mg) by mouth 2 times daily 60 tablet 0    ARIPiprazole (ABILIFY) 10 MG tablet Take 1 tablet (10 mg) by mouth daily 30 tablet 3    buprenorphine HCl-naloxone HCl (SUBOXONE) 8-2 MG per film Place 1 Film under the tongue daily      cetirizine (ZYRTEC) 10 MG tablet Take 1 tablet (10 mg) by mouth daily 14 tablet 1    cyanocobalamin (CYANOCOBALAMIN) 1000 MCG/ML injection 1000mcg subcutaneous injection daily for 7 days, then weekly for 4 weeks, then monthly 20 mL 1    dexamethasone (DECADRON) 1 MG tablet Take 1 mg at 11 pm and get a morning blood " draw at 8 am for cortisol. 1 tablet 0    EPINEPHrine (ANY BX GENERIC EQUIV) 0.3 MG/0.3ML injection 2-pack Inject 0.3 mLs (0.3 mg) into the muscle as needed for anaphylaxis May repeat one time in 5-15 minutes if response to initial dose is inadequate. 2 each 0    FLUoxetine (PROZAC) 40 MG capsule Take 1 capsule (40 mg) by mouth daily 30 capsule 3    Fluticasone-Umeclidin-Vilanterol (TRELEGY ELLIPTA) 200-62.5-25 MCG/ACT oral inhaler Inhale 1 puff into the lungs daily 60 each 11    folic acid (FOLVITE) 1 MG tablet Take 1 tablet (1 mg) by mouth daily 90 tablet 3    gabapentin (NEURONTIN) 600 MG tablet Take 1 tablet (600 mg) by mouth daily At bedtime 30 tablet 3    hydrocortisone, Perianal, (HYDROCORTISONE) 2.5 % cream Place rectally 2 times daily as needed for hemorrhoids 28 g 11    levothyroxine (SYNTHROID/LEVOTHROID) 25 MCG tablet Take 1 tablet (25 mcg) by mouth daily 90 tablet 1    medical cannabis (Patient's own supply) See Admin Instructions (The purpose of this order is to document that the patient reports taking medical cannabis.  This is not a prescription, and is not used to certify that the patient has a qualifying medical condition.)      mirtazapine (REMERON) 45 MG tablet Take 1 tablet (45 mg) by mouth at bedtime 30 tablet 3    Multiple Vitamins-Minerals (MULTIVITAMIN ADULT PO)       mupirocin (BACTROBAN) 2 % external ointment Apply topically 3 times daily 30 g 2    NARCAN 4 MG/0.1ML nasal spray   0    order for DME Equipment being ordered: Nebulizer 1 Units 0    orlistat (XENICAL) 120 MG capsule Take 1 capsule (120 mg) by mouth 3 times daily (with meals) 90 capsule 5    oxyCODONE IR (ROXICODONE) 15 MG tablet Take 15 mg by mouth 3 times daily + 5 mg x1 daily      sulfamethoxazole-trimethoprim (BACTRIM DS) 800-160 MG tablet Take 1 tablet by mouth 2 times daily 20 tablet 0    tiZANidine (ZANAFLEX) 2 MG tablet TK 2 TO 3 TS PO QHS  4    vitamin D3 (CHOLECALCIFEROL) 2000 units (50 mcg) tablet Take 1 tablet by  mouth daily  1     Current Facility-Administered Medications   Medication Dose Route Frequency Provider Last Rate Last Admin    lidocaine (PF) (XYLOCAINE) 1 % injection 8 mL  8 mL   Greg Lam MD   8 mL at 07/27/21 1123        The [place] Prescription Monitoring Program has been reviewed and there are no concerns about diversionary activity for controlled substances at this time.      I was able to review most recent Primary Care Provider, specialty provider, and therapy visit notes that I have access to.     Today, patient reports She was sleeping.  Her anxiety is through the roof.  She has been depressed.  She gets chest pain.  She has sleep disruption. She has been gaining weight since taking Aripiprazole.  For the past two months.  Amotivation, anhedonia.  Some SI.  She gets treatment through a pain clinic for back pain - gets both suboxone and oxycodone.  She is waiting for approval for an injection for weight loss.  She needs to  lose weight before she can have surgery.  Low self image due to weight gain.  She sees therapist on Thursday - comes to her home.       has a past medical history of Acute respiratory failure (H) (02/01/2020), Anxiety, Asthma, Depression, and Hypertension.    Social history updates:    No change in social history to report today    Substance use updates:    No alcohol use  Tobacco use: Yes Cigarettes  Ready to quit?  No  Nicotine Replacement Therapy tried: none     Vital Signs:   LMP  (LMP Unknown)     Labs:    Most recent laboratory results reviewed and no new labs.     Mental Status Examination:  Appearance: awake, alert and mild distress  Attitude: cooperative  Eye Contact:   Not able to assess  Gait and Station: No dizziness or falls  Psychomotor Behavior:   Not able to assess  Oriented to:  time, person, and place  Attention Span and Concentration:  Normal  Speech:   vtspeech: clear, coherent and Speaks: English  Mood:  anxious and depressed  Affect:  mood  congruent  Associations:  no loose associations  Thought Process:  goal oriented  Thought Content:  no evidence of suicidal ideation or homicidal ideation, no auditory hallucinations present, and no visual hallucinations present  Recent and Remote Memory:  intact Not formally assessed. No amnesia.  Fund of Knowledge: appropriate  Insight:  good  Judgment: good  Impulse Control:  good    Suicide Risk Assessment:  Today Paula Ho reports no thoughts to harm themself or others. In addition, there are notable risk factors for self-harm, including anxiety, comorbid medical condition of chronic pain, and mood change. However, risk is mitigated by commitment to family, history of seeking help when needed, future oriented, denies suicidal intent or plan, and denies homicidal ideation, intent, or plan. Therefore, based on all available evidence including the factors cited above, Paula Ho does not appear to be at imminent risk for self-harm, does not meet criteria for a 72-hr hold, and therefore remains appropriate for ongoing outpatient level of care.  A thorough assessment of risk factors related to suicide and self-harm have been reviewed and are noted above. The patient convincingly denies suicidality on several occasions. Local community safety resources printed and reviewed for patient to use if needed. There was no deceit detected, and the patient presented in a manner that was believable.     DSM5 Diagnosis:  296.32 (F33.1) Major Depressive Disorder, Recurrent Episode, Moderate _ and With mixed features  300.02 (F41.1) Generalized Anxiety Disorder  780.52 (G47.00) Insomnia Disorder   With non-sleep disorder mental comorbidity  Episodic      Medical comorbidities include:   Patient Active Problem List    Diagnosis Date Noted    Angiolipoma of right kidney 01/20/2023     Priority: Medium    Adrenal nodule (H24) 01/20/2023     Priority: Medium    Opioid dependence with current use (H) 06/30/2022      Priority: Medium    Infection due to 2019 novel coronavirus 12/09/2020     Priority: Medium    Hyperlipidemia 04/14/2020     Priority: Medium    Benign essential hypertension 04/14/2020     Priority: Medium    MDD (major depressive disorder), recurrent severe, without psychosis (H) 03/02/2020     Priority: Medium    Chronic obstructive pulmonary disease, unspecified COPD type (H) 02/04/2020     Priority: Medium     No PFTS  In EPIC      Attention deficit hyperactivity disorder (ADHD) 01/31/2020     Priority: Medium    Chronic midline low back pain with bilateral sciatica 01/31/2020     Priority: Medium    Brain aneurysm 12/03/2019     Priority: Medium     Dec 2017  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 Device is MR compatible to 3 BREANN 3/2020      History of subarachnoid hemorrhage 12/22/2017     Priority: Medium     H/O of SAH, Cam 2, Hunt-Thomas 1, from a ruptured 9mm left Pcomm aneurysm with a wide neck and a fetal left PCA arising from the aneurysm neck, s/p successful coil embolization 12/23/2017 by Dr. Otto Hurley      Chronic GERD 01/26/2017     Priority: Medium    Chronic knee pain 12/12/2014     Priority: Medium    Cigarette smoker 07/08/2014     Priority: Medium    Chronic, continuous use of opioids 04/24/2013     Priority: Medium     Managed by pain clinic outside of [place].  UDS + cocaine in December 2019 without confirmation testing at her pain clinic per patient report      Morbid obesity (H) 03/27/2013     Priority: Medium    Status post knee surgery 03/27/2013     Priority: Medium     Per patient's recollection:  Circa 2002: right knee arthroscopy (Dr. Pappas)  Circa 2004: right knee partial knee replacement (Iam Ritchie MD)  Circa 2006: right TKA (Ron Ryder MD; [facility])  Circa 2008: right TKA revision due to infection (Ron Ryder MD; [facility])  Circa 2009: right TKA revision due to infection (Ron Ryder MD; [facility])  April 2011: right TKA (Ron Ryder MD; [facility])      LIBRA (generalized anxiety disorder) 09/28/2011     Priority: Medium    Chronic pain 09/28/2011     Priority: Medium     Knee and low back        DJD (degenerative joint disease) 09/28/2011     Priority: Medium    Insomnia 04/13/2011     Priority: Medium     Insomnia  NOS      Tobacco use disorder 07/31/2006     Priority: Medium    Asthma 11/15/2004     Priority: Medium     Asthma  NOS         Assessment:    Paula Ho continues to report anxiety that has worsened.  She continues to have chronic pain and is concerned about not being able to lose weight..  We talked about considering a decrease in her Abilify dose at her next visit.  Fluoxetine continues.  She is concerned about ongoing depression symptoms and we discussed possibly changing to Lexapro at her next visit.  Because of her anxiety I discussed with her decreasing the dose of her Adderall.  She is sleeping poorly at night.  I am tapering her off of mirtazapine and adding trazodone.  Gabapentin will continue at bedtime to help her rest.  She is continuing talk therapy to help her learn skills to manage life stressors and adjustments.    Medication side effects and alternatives were reviewed. Health promotion activities recommended and reviewed today. All questions addressed. Education and counseling completed regarding risks and benefits of medications and psychotherapy options.    Treatment Plan:      1.  Continue aripiprazole 10 mg daily-consider decreasing this dose at your next visit  2.  Continue fluoxetine 40 mg daily-consider changing to Lexapro at your next visit  3.  Change Adderall to 1/2 to 1 tablet twice daily  4.  Continue gabapentin 600 mg at bedtime  5.  Decrease mirtazapine to 1/2 tablet at bedtime for 7 days then stop  6.  Add trazodone 50 mg at bedtime  7.  Continue talk therapy to learn skills to manage life stressors and adjustments    Continue all other  medications as reviewed per electronic medical record today.   Safety plan reviewed. To the Emergency Department as needed or call after hours crisis line at [phone] or [phone]. [place] Crisis Text Line. Text [place] to 718298 or Suicide LifeLine Chat: suicidepreventionlifeline.org/chat/  To schedule individual or family therapy, call [facility] at [phone]  Schedule an appointment with me in 6 weeks or sooner as needed. Call [facility] at [phone] to schedule.  Follow up with primary care provider as planned or for acute medical concerns.  Call the psychiatric nurse line with medication questions or concerns at [phone]  Intivix[employer] may be used to communicate with your provider, but this is not intended to be used for emergencies.        Crisis Resources   The [facility]Path is an adults only unit located at [facility] is a short term (generally less than 23 hour stay) designed for crisis intervention and stabilization. Pts have the opportunity to meet quickly with a behavioral health team for evaluation in a calm and peaceful therapuetic environment. To be evaluated for admission pts are triaged throught the [facility] ED.      The following hotlines are for both adults and children. The and are open 24 hours a day, 7 days a week unless noted otherwise.        Crisis Lines      Crisis Text Line  Text 978787  You will be connected with a trained live crisis counselor to provide support.        [place] Hotline  2.313.147.0855 [hope]        línea de crisis española  308.075.7229        [facility] Helpline  812.815.1767        National Hope Line  3.410.035.6129 [hope]        National Suicide Prevention Lifeline  Free and confidential support  988 or 1.116.034.TALK [8255]  http://suicidepreventionlifeline.org        The [place] Project (LGBTQ Youth Crisis Line)  8.517.312.9349  text START to 563-008        [facility] Crisis Line   9.237.027.3859 (Press 1)  or text 673630    [facility] Mental Health Crisis Response  Within [place], call **CRISIS [**720231] to be connected to a mental health professional who can assist you.        [facility] [place] Crisis  [phone]      [facility] Mobile Crisis  [phone]      [facility] Crisis  [phone]      [facility] Mobile Crisis  [phone] (adults)  [phone] (children)      [facility] Mobile Crisis  [phone] (adults)  [phone] (children)      [facility] Mobile Crisis  [phone]      [facility] Mobile Crisis  [phone]    Community Resources      Fast Tracker  Linking people to mental health and substance use disorder resources  Rezzie.org        [place] Mental Health Warmline  Peer to peer support  5 pm to 9 am 7 days/week  1.959.546.3562  https://mnwitw.org/parviz        National [place] on Mental Illness (UTE)  003.787.4619 or 1.888.UTE.HELPS  https://namimn.org/        [facility] Urgent [facility] for Adult Mental Health  [facility] residents [address]  [phone]        [facility]  Free mental health counseling  https://walkin.org/  612.870.0565 X2    Mental Health Apps      Calm Harm  https://calmharm.co.uk/      My3  https://my3app.org/      [place] Safety Plan  https://www.mysafetyplan.org/   Administrative Billing:   Time spent with patient includes counseling and coordination of care regarding above diagnoses and treatment plan.    The longitudinal plan of care for the diagnosis(es)/condition(s) as documented were addressed during this visit. Due to the added complexity in care, I will continue to support Paula in the subsequent management and with ongoing continuity of care.    Patient Status:  This is a continuous care patient and medications will be prescribed by the psychiatric provider until further indicated.    Signed:   KYREE Kidd    Psychiatry

## 2024-06-04 NOTE — PATIENT INSTRUCTIONS
"Patient Education   The Panel Psych[facility] Program  What to Expect  Here's what to expect in the Panel Psychiatry Program.   About the program  You'll be meeting with a psychiatric doctor to check your mental health. A psychiatric doctor helps you deal with troubling thoughts and feelings by giving you medicine. They'll make sure you know the plan for your care. You may see them for a long time. When you're feeling better, they may refer you back to seeing your family doctor.   If you have any questions, we'll be glad to talk to you.  About visits  Be open  At your visits, please talk openly about your problems. It may feel hard, but it's the best way for us to help you.  Cancelling visits  If you can't come to your visit, please call us right away at [phone]. If you don't cancel at least 24 hours (1 full day) before your visit, that's \"late cancellation.\"  Not showing up for your visits  Being very late is the same as not showing up. You'll be a \"no show\" if:  You're more than 15 minutes late for a 30-minute (half hour) visit.  You're more than 30 minutes late for a 60-minute (full hour) visit.  If you cancel late or don't show up 2 times within 6 months, we may end your care.  Getting help between visits  If you need help between visits, you can call us Monday to Friday from 8 a.m. to 4:30 p.m. at [phone].  Emergency care  Call 911 or go to the nearest emergency department if your life or someone else's life is in danger.  Call 988 anytime to reach the national Suicide and Crisis hotline.  Medicine refills  To refill your medicine, call your pharmacy. You can also call [facility]'s Behavioral Access at [phone], Monday to Friday, 8 a.m. to 4:30 p.m. It can take 1 to 3 business days to get a refill.   Forms, letters, and tests  You may have papers to fill out, like FMLA, short-term disability, and workability. We can help you with these forms at your visits, but you must have an " appointment. You may need more than 1 visit for this, to be in an intensive therapy program, or both.  Before we can give you medicine for ADHD, we may refer you to get tested for it or confirm it another way.  We may not be able to give you an emotional support animal letter.  We don't do mental health checks ordered by the court.   We don't do mental health testing, but we can refer you to get tested.   Thank you for choosing us for your care.  For informational purposes only. Not to replace the advice of your health care provider. Copyright   2022 [facility]. All rights reserved. bitmovin [id number] - 12/22.       Treatment Plan:      1.  Continue aripiprazole 10 mg daily-consider decreasing this dose at your next visit  2.  Continue fluoxetine 40 mg daily-consider changing to Lexapro at your next visit  3.  Change Adderall to 1/2 to 1 tablet twice daily  4.  Continue gabapentin 600 mg at bedtime  5.  Decrease mirtazapine to 1/2 tablet at bedtime for 7 days then stop  6.  Add trazodone 50 mg at bedtime  7.  Continue talk therapy to learn skills to manage life stressors and adjustments    Continue all other medical directions per primary care provider.   Continue all other medications as reviewed per electronic medical record today.   Safety plan reviewed. To the Emergency Department as needed or call after hours crisis line at [phone] or [phone]. [place] Crisis Text Line: Text [place] to 695098  or  Suicide LifeLine Chat: suicidepreventionlifeline.org/chat/  To schedule individual or family therapy, call [facility] at [phone].   Schedule an appointment with me in 6 weeks or sooner as needed.  Call [facility] at [phone] to schedule.  Follow up with primary care provider as planned or for acute medical concerns.  Call the psychiatric nurse line with medication questions or concerns at [phone].  [employer] may be used to communicate with your provider,  but this is not intended to be used for emergencies.        Crisis Resources   The EmPath is an adults only unit located at [facility] in [place] is a short term (generally less than 23 hour stay) designed for crisis intervention and stabilization. Pts have the opportunity to meet quickly with a behavioral health team for evaluation in a calm and peaceful therapuetic environment. To be evaluated for admission pts are triaged throught the [facility] ED.      The following hotlines are for both adults and children. The and are open 24 hours a day, 7 days a week unless noted otherwise.        Crisis Lines      Crisis Text Line  Text 267587  You will be connected with a trained live crisis counselor to provide support.        [employer] Hotline  6.424.815.2817 [hope]        línea de crisis española  060.383.1836        [facility] cVidya Helpline  876.935.5047        National Hope Line  2.413.796.1498 [hope]        National Suicide Prevention Lifeline  Free and confidential support  988 or 1.642.708.TALK [8255]  http://suicidepreventionlifeline.org        The Saul Project (LGBTQ Youth Crisis Line)  7.917.104.2623  text START to 804-807        [place]'s Crisis Line  1.431.563.4242 (Press 1)  or text 323119    [facility] [place] Mental [facility] Crisis Response  Within [place], call **CRISIS [**422799] to be connected to a mental health professional who can assist you.        [facility] Crisis  [phone]      [facility] Mobile Crisis  [phone]      [facility] Crisis  [phone]      [facility] Mobile Crisis  [phone] (adults)  [phone] (children)      [facility] Mobile Crisis  [phone] (adults)  [phone] (children)      [facility] Mobile Crisis  [phone]      [facility] Mobile Crisis  [phone]    Community Resources      Fast Tracker  Linking people to mental health and substance use disorder resources  fasttrackermn.org        [facility]  Health Warmline  Peer to peer support  5 pm to 9 am 7 days/week  9.775.303.5851  https://mnwitw.org/parviz        National [place] on Mental Illness (UTE)  437.286.6035 or 1.888.UTE.HELPS  https://namimn.org/        [facility]  [facility] residents [address]  [phone]        [facility]  Free mental health counseling  https://walkin.org/  612.870.0565 X2    Mental Health Apps      Calm Harm  https://calmharm.co.uk/      My3  https://my3app.org/      [place] Safety Plan  https://www.mysafetyplan.org/

## 2024-06-04 NOTE — PROGRESS NOTES
Virtual Visit Details    Type of service:  Telephone Visit   Phone call duration: 30 minutes   Originating Location (pt. Location): Home    Distant Location (provider location):  Off-site

## 2024-06-04 NOTE — LETTER
Behavioral Health Home ([facility]): Health Action Plan  [facility]: [place]    [facility]      Name: Paula Ho  Preferred Name: Paula  : 1960  MRN: [id number]      My Goals  Goal Areas: Health; Mental Health; Future HAP Goal area; Chemical Health    Patient stated goals:   Health: Paula would like to continue to meet with her providers, and take her medications as prescribed.     -Paula would like to start working with the weight management clinic to assist with losing weight.     -Paula would like to go to the gym at least three times per week.     -Paula would like to find a nutrition plan that would work for her and help her to lose weight.       Mental Health: Paula would like to find a new therapist within [place] and receive support from Behavioral Health Clinician with this. She will also  continue to receive assistance with [facility] for monthly check ins. She would like to continue working with an [facility] worker and Peer Support Person through the same agency.     -She reported that she wants to build herself back to feel like somebody and take it day by day.       Chemical Health: Paula would like assistance from her providers to quit smoking and will work on reducing her use, so she can be approved for back surgery.       Future goals: Paula is going to consider back surgery in the future after losing the recommended weight.    Strengths related to each goal: Paula states her strengths are the support of her dogs, good advocate for herself, support of good friends, and care for others. Patient states she has a big heart.    Services and Supports Needed: The [facility] Team will provide monthly contact with patient in order to monitor progress towards goals.    Activities / Actions of Team to support goal(s): [facility] team will locate appropriate resources that align with patient's goals and promote health and wellness.    Activities / Actions of Patient / Parent / Guardian to support goal(s): It is a  requirement that patient's primary care physician is through the [facility] system and that they are on Medical Assistance/Medicaid. If either of these were to change or if patient needs any type of assistance, they are to reach out to their Behavioral Health Home ([facility]) team.      Recommended Referral  Tobacco cessation referrals made?: No  Mental Health / Chemical Dependency Referrals: Yes  Substance Use Referrals: Not Applicable  Mental Health Referrals: [facility] Referrals: [facility] Financial Services; [facility] ; Other (see comments)      My Team Members and Their Contact Information  Patient Care Team         Relationship Specialty Notifications Start End    Renan Dickerson MD PCP - General Internal Medicine Admissions 10/29/23     Phone: [phone] Fax: [phone] [address] [place] [address]    Tigist Manjarrez NP Nurse Practitioner Nurse Practitioner Admissions 8/3/20     Psychiatry    Phone: [phone] Fax: [phone]         [address] [place] [address]    Tigist Manjarrez NP Assigned Behavioral Health Provider   11/15/20     Phone: [phone] Fax: [phone]         [address] [place] [address]    [facility]  HOME HEALTH AGENCY ([facility]), ([place])  12/23/20     Phone: [phone]         Dane Irizarry MD Anesthesiologist Anesthesiology  1/21/21     Phone: [phone] Pager: [phone] Fax: [phone]        [address] [facility] [address]    Chanell Joya MD MD Dermatology  10/25/21     Joie Nobles MD Referring Physician Family Medicine  10/25/21     referring to Dermatology    Phone: [phone] Fax: [phone]         [address] [place] [address]    Man Goode MD Assigned Pulmonology Provider   11/7/21     Phone: [phone] Fax: [phone]         [address] [facility] [address]    Paty Dial BSW  Clinic Admissions 2/17/22      [facility] Case Management - Enrolled 02/17/2021 -[place] [facility] - direct extension [phone].    Nathalia Ordoñez MD MD Endocrinology, Diabetes, and Metabolism  1/20/23     Phone: [phone] Fax: [phone]         [address] [address]    Raul Marinelli MD MD Urology  1/20/23     Phone: [phone] Fax: [phone] [address] [place] [address]    Zoila Martinez MD Assigned Endocrinology Provider   3/4/23     Phone: [phone] Fax: [phone]         [address] [facility] [address]    Raul Marinelli MD Assigned Surgical Provider   5/6/23     Phone: [phone] Fax: [phone] [address] [place] [address]    Za Aldridge DO Assigned Cancer Care Provider   5/27/23     Phone: [phone] Fax: [phone]         [address] [facility] [address]    Renan Dickerson MD Assigned PCP   11/4/23     Phone: [phone] Fax: [phone]         [address] [facility] [address]    Paty Mix APRN CNP Nurse Practitioner Dermatology  3/19/24     Phone: [phone] Fax: [phone]         [facility] [facility] [address]            My Wellness Plan  Safety Concerns: Suicide Ideation    Recommendations / Plan for safety concerns: Patient will follow her safety plan that was co-developed with therapist, Fernanda, on 10/31/22, and she also feels comfortable reaching out to her clinic or [facility] crisis, as well as the suicide hotline.    Crisis Plan (emergencies / when urgent support needed): Patient states she feels comfortable contacting her informal supports, utilizing [facility] crisis/suicide hotline, or go to the EmPATH unit or nearest emergency department in case immediate assistance is needed.    Paula Ho co-developed the Health Action Plan with the [facility] Team and received a copy of this document.  Date Health Action Plan Completed/Updated: 06/04/24

## 2024-06-06 NOTE — TELEPHONE ENCOUNTER
PA Initiation    Medication: XENICAL 120 MG PO CAPS  Insurance Company: [employer] Part D - Phone [phone] Fax [phone]  Pharmacy Filling the Rx: [id number] - [place] - [address] [facility] [address] [facility]  Filling Pharmacy Phone: [phone]  Filling Pharmacy Fax:    Start Date: 6/6/2024

## 2024-06-10 NOTE — TELEPHONE ENCOUNTER
Prior Authorization Approval    Medication: XENICAL 120 MG PO CAPS  Authorization Effective Date: 6/6/2024  Authorization Expiration Date: 12/31/2024  Approved Dose/Quantity: 90/30  Reference #: [id number]   Insurance Company: [employer] Part D - Phone [phone] Fax [phone]  Expected CoPay: $    CoPay Card Available:      Financial Assistance Needed:   Which Pharmacy is filling the prescription: [id number] - [place] - [address] [facility]  Pharmacy Notified: Yes  Patient Notified: Yes

## 2024-06-24 ENCOUNTER — TELEPHONE (OUTPATIENT)
Dept: OTOLARYNGOLOGY | Facility: CLINIC | Age: 64
End: 2024-06-24

## 2024-06-24 ENCOUNTER — VIRTUAL VISIT (OUTPATIENT)
Dept: FAMILY MEDICINE | Facility: CLINIC | Age: 64
End: 2024-06-24
Payer: MEDICARE

## 2024-06-24 DIAGNOSIS — K13.70 ORAL LESION: Primary | ICD-10-CM

## 2024-06-24 DIAGNOSIS — E03.9 HYPOTHYROIDISM, UNSPECIFIED TYPE: ICD-10-CM

## 2024-06-24 PROCEDURE — 99442 PR PHYSICIAN TELEPHONE EVALUATION 11-20 MIN: CPT | Mod: 93 | Performed by: INTERNAL MEDICINE

## 2024-06-24 RX ORDER — LEVOTHYROXINE SODIUM 25 UG/1
25 TABLET ORAL DAILY
Qty: 90 TABLET | Refills: 1 | Status: SHIPPED | OUTPATIENT
Start: 2024-06-24

## 2024-06-24 ASSESSMENT — PATIENT HEALTH QUESTIONNAIRE - PHQ9
10. IF YOU CHECKED OFF ANY PROBLEMS, HOW DIFFICULT HAVE THESE PROBLEMS MADE IT FOR YOU TO DO YOUR WORK, TAKE CARE OF THINGS AT HOME, OR GET ALONG WITH OTHER PEOPLE: SOMEWHAT DIFFICULT
SUM OF ALL RESPONSES TO PHQ QUESTIONS 1-9: 9
SUM OF ALL RESPONSES TO PHQ QUESTIONS 1-9: 9

## 2024-06-24 NOTE — PROGRESS NOTES
Paula is a 64 year old who is being evaluated via a billable telephone visit.    What phone number would you like to be contacted at? [phone]  How would you like to obtain your AVS? Mail a copy    [facility] Internal Medicine Progress Note           Assessment and Plan:     Oral lesion  Differential diagnoses include oral lichen planus or mucous membrane bullous pemphigoid. No empirical treatment as diagnosis remains unknown.  - Adult ENT  Referral; Future  - Adult Dermatology  Referral; Future    Hypothyroidism, unspecified type  - levothyroxine (SYNTHROID/LEVOTHROID) 25 MCG tablet; Take 1 tablet (25 mcg) by mouth daily          Interval History:     Reason for visit:  Mouth sore- inside of right side of cheek  Symptom onset:  1-2 weeks ago  Symptoms include:  Feels like a mole, denies pain, no drainage.  Symptom intensity:  Mild  Symptom progression:  Worsening  Had these symptoms before:  No  What makes it worse:  Nothing  What makes it better:  Nothing           Significant Problems:     Patient Active Problem List   Diagnosis    Chronic knee pain    LIBRA (generalized anxiety disorder)    Brain aneurysm    Chronic, continuous use of opioids    Asthma    Chronic GERD    Chronic pain    Cigarette smoker    DJD (degenerative joint disease)    Insomnia    Morbid obesity (H)    History of subarachnoid hemorrhage    Status post knee surgery    Tobacco use disorder    Chronic obstructive pulmonary disease, unspecified COPD type (H)    MDD (major depressive disorder), recurrent severe, without psychosis (H)    Attention deficit hyperactivity disorder (ADHD)    Chronic midline low back pain with bilateral sciatica    Hyperlipidemia    Benign essential hypertension    Infection due to 2019 novel coronavirus    Opioid dependence with current use (H)    Angiolipoma of right kidney    Adrenal nodule (H24)              Review of Systems:     CONSTITUTIONAL: NEGATIVE for fever, chills, change  in weight  INTEGUMENTARY/SKIN: As above.  EYES: NEGATIVE for vision changes or irritation  ENT/MOUTH: NEGATIVE for ear, mouth and throat problems  RESP: NEGATIVE for significant cough or SOB  BREAST: NEGATIVE for masses, tenderness or discharge  CV: NEGATIVE for chest pain, palpitations or peripheral edema  GI: NEGATIVE for nausea, abdominal pain, heartburn, or change in bowel habits  : NEGATIVE for frequency, dysuria, or hematuria  MUSCULOSKELETAL: NEGATIVE for significant arthralgias or myalgia  NEURO: NEGATIVE for weakness, dizziness or paresthesias  ENDOCRINE: NEGATIVE for temperature intolerance, skin/hair changes  HEME: NEGATIVE for bleeding problems  PSYCHIATRIC: NEGATIVE for changes in mood or affect            Medications:     Current Outpatient Medications   Medication Sig Dispense Refill    albuterol (PROAIR HFA) 108 (90 Base) MCG/ACT inhaler Inhale 2 puffs into the lungs every 4 hours as needed for shortness of breath or wheezing 8.5 g 11    albuterol (PROVENTIL) (2.5 MG/3ML) 0.083% neb solution NEBULIZE THE CONTENTS OF 1 VIAL EVERY 6 HOURS AS NEEDED. 90 mL 11    amphetamine-dextroamphetamine (ADDERALL) 15 MG tablet Take 1 tablet (15 mg) by mouth 2 times daily 60 tablet 0    amphetamine-dextroamphetamine (ADDERALL) 15 MG tablet Take 1 tablet (15 mg) by mouth 2 times daily 60 tablet 0    ARIPiprazole (ABILIFY) 10 MG tablet Take 1 tablet (10 mg) by mouth daily 30 tablet 3    buprenorphine HCl-naloxone HCl (SUBOXONE) 8-2 MG per film Place 1 Film under the tongue daily      cetirizine (ZYRTEC) 10 MG tablet Take 1 tablet (10 mg) by mouth daily 14 tablet 1    cyanocobalamin (CYANOCOBALAMIN) 1000 MCG/ML injection 1000mcg subcutaneous injection daily for 7 days, then weekly for 4 weeks, then monthly 20 mL 1    dexamethasone (DECADRON) 1 MG tablet Take 1 mg at 11 pm and get a morning blood draw at 8 am for cortisol. 1 tablet 0    EPINEPHrine (ANY BX GENERIC EQUIV) 0.3 MG/0.3ML injection 2-pack Inject 0.3 mLs  (0.3 mg) into the muscle as needed for anaphylaxis May repeat one time in 5-15 minutes if response to initial dose is inadequate. 2 each 0    FLUoxetine (PROZAC) 40 MG capsule Take 1 capsule (40 mg) by mouth daily 30 capsule 3    Fluticasone-Umeclidin-Vilanterol (TRELEGY ELLIPTA) 200-62.5-25 MCG/ACT oral inhaler Inhale 1 puff into the lungs daily 60 each 11    folic acid (FOLVITE) 1 MG tablet Take 1 tablet (1 mg) by mouth daily 90 tablet 3    gabapentin (NEURONTIN) 600 MG tablet Take 1 tablet (600 mg) by mouth daily At bedtime 30 tablet 3    hydrocortisone, Perianal, (HYDROCORTISONE) 2.5 % cream Place rectally 2 times daily as needed for hemorrhoids 28 g 11    levothyroxine (SYNTHROID/LEVOTHROID) 25 MCG tablet Take 1 tablet (25 mcg) by mouth daily 90 tablet 1    levothyroxine (SYNTHROID/LEVOTHROID) 25 MCG tablet Take 1 tablet (25 mcg) by mouth daily 90 tablet 1    medical cannabis (Patient's own supply) See Admin Instructions (The purpose of this order is to document that the patient reports taking medical cannabis.  This is not a prescription, and is not used to certify that the patient has a qualifying medical condition.)      mirtazapine (REMERON) 45 MG tablet Take 0.5 tablets (22.5 mg) by mouth at bedtime For 7 days then stop      Multiple Vitamins-Minerals (MULTIVITAMIN ADULT PO)       mupirocin (BACTROBAN) 2 % external ointment Apply topically 3 times daily 30 g 2    NARCAN 4 MG/0.1ML nasal spray   0    order for DME Equipment being ordered: Nebulizer 1 Units 0    orlistat (XENICAL) 120 MG capsule Take 1 capsule (120 mg) by mouth 3 times daily (with meals) 90 capsule 5    oxyCODONE IR (ROXICODONE) 15 MG tablet Take 15 mg by mouth 3 times daily + 5 mg x1 daily      sulfamethoxazole-trimethoprim (BACTRIM DS) 800-160 MG tablet Take 1 tablet by mouth 2 times daily 20 tablet 0    tiZANidine (ZANAFLEX) 2 MG tablet TK 2 TO 3 TS PO QHS  4    traZODone (DESYREL) 50 MG tablet Take 1 tablet (50 mg) by mouth at bedtime 30  tablet 3    vitamin D3 (CHOLECALCIFEROL) 2000 units (50 mcg) tablet Take 1 tablet by mouth daily  1     Current Facility-Administered Medications   Medication Dose Route Frequency Provider Last Rate Last Admin    lidocaine (PF) (XYLOCAINE) 1 % injection 8 mL  8 mL   Greg Lam MD   8 mL at 07/27/21 1123             Physical Exam:   Vital signs and physical exam not performed due to nature of visit.          Data:   Anti Nuclear Ladonna IgG by IFA with Reflex    Status: Final result       Visible to patient: No (inaccessible in MyChart)       Dx: Platelets decreased (H24)    0 Result Notes         Component  Ref Range & Units 1 yr ago    JOSIAH interpretation  Negative Borderline Positive Abnormal    Comment:  Negative:              <1:40  Borderline Positive:   1:40 - 1:80  Positive:              >1:80    JOSIAH pattern 1 Speckled    JOSIAH titer 1 1:80   Resulting Agency [facility]              Specimen Collected: 05/19/23  1:45 PM Last Resulted: 05/22/23  3:56 PM        Disposition:  Follow-up on August 8, 2024.      Renan Dickerson MD  Internal Medicine  [facility] Team     Phone call duration: 15 minutes  Start: 10:15 AM  End: 10:15 AM  Answers submitted by the patient for this visit:  Office Visit - Telephone on 6/24/2024 10:00 AM with Renan Dickerson  Patient Health Questionnaire (Submitted on 6/24/2024)  If you checked off any problems, how difficult have these problems made it for you to do your work, take care of things at home, or get along with other people?: Somewhat difficult  PHQ9 TOTAL SCORE: 9

## 2024-06-24 NOTE — TELEPHONE ENCOUNTER
Spoke with patient and scheduled an appointment for July.    Demetra Joseph RN Care Coordinator, [facility] 06/24/24 3:59 PM

## 2024-06-24 NOTE — PROGRESS NOTES
Patient Quality Outreach    Patient is due for the following:   Depression  -  PHQ-9 needed    Next Steps:   Patient was assigned appropriate questionnaire to complete    Type of outreach:    Chart review performed, no outreach needed.      Questions for provider review:    None           Nancy Noel MA

## 2024-06-24 NOTE — TELEPHONE ENCOUNTER
Patient called and left message.  Called her back and left voicemail with callback number.    Demetra Joseph RN Care Coordinator, [facility] 06/24/24 3:46 PM

## 2024-06-24 NOTE — TELEPHONE ENCOUNTER
This encounter is being sent to inform the clinic that this patient has a referral from Renan Dickerson MD  for the diagnoses of Oral lesion [K13.70]  Painless, non-traumatic, whitish oral lesion x two weeks that is progressively larger.   and has requested that this patient be seen within 1-2 WEEKS and/or with ANY PROVIDER.  Based on the availability of our provider(s), we are unable to accommodate this request.    Were all sites offered this patient?  Yes    Does scheduling algorithm request to schedule next available?  Patient has been scheduled for the first available opening with DR BALTAZAR on 8/5.  We have informed the patient that the clinic will review their referral and reach out if a sooner appointment is medically necessary.     OPEN TO FK OR MG

## 2024-06-24 NOTE — TELEPHONE ENCOUNTER
Left message with callback number.    Demetra Joseph RN Care Coordinator, [facility] 06/24/24 1:03 PM

## 2024-06-25 ENCOUNTER — VIRTUAL VISIT (OUTPATIENT)
Dept: BEHAVIORAL HEALTH | Facility: CLINIC | Age: 64
End: 2024-06-25
Payer: MEDICARE

## 2024-06-25 DIAGNOSIS — F41.1 GAD (GENERALIZED ANXIETY DISORDER): Primary | ICD-10-CM

## 2024-06-25 DIAGNOSIS — F33.2 MDD (MAJOR DEPRESSIVE DISORDER), RECURRENT SEVERE, WITHOUT PSYCHOSIS (H): ICD-10-CM

## 2024-06-25 PROCEDURE — 90832 PSYTX W PT 30 MINUTES: CPT | Mod: 93
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
## 2024-07-02 ENCOUNTER — TELEPHONE (OUTPATIENT)
Dept: BEHAVIORAL HEALTH | Facility: CLINIC | Age: 64
End: 2024-07-02
Payer: MEDICARE

## 2024-07-02 NOTE — TELEPHONE ENCOUNTER
[facility] called patient for scheduled check in and patient reports she's at work. She reports she picked up a shift so she could have money for the 4th, so she's not able to check in long. [facility] offered to reschedule patient for tomorrow and patient agreed, so they will meet for her check in tomorrow, 7/3, at 2pm.     Paty Dial LSW Behavioral Health Home ([facility])   [facility]  [phone]

## 2024-07-03 ENCOUNTER — TELEPHONE (OUTPATIENT)
Dept: BEHAVIORAL HEALTH | Facility: CLINIC | Age: 64
End: 2024-07-03
Payer: MEDICARE

## 2024-07-03 NOTE — TELEPHONE ENCOUNTER
[facility] called patient for scheduled check in, but patient apologized and said she's grocery shopping currently and forgot about appointment. They rescheduled for next week, 7/9, at 1pm.     DENI Humphries  Behavioral Health [place] ([facility])   [facility] & [facility]  [phone]

## 2024-07-09 ENCOUNTER — VIRTUAL VISIT (OUTPATIENT)
Dept: BEHAVIORAL HEALTH | Facility: CLINIC | Age: 64
End: 2024-07-09
Payer: MEDICARE

## 2024-07-09 DIAGNOSIS — R69 DIAGNOSIS DEFERRED: Primary | ICD-10-CM

## 2024-07-09 PROCEDURE — 99207 PR NO CHARGE LOS: CPT | Mod: 93

## 2024-07-09 NOTE — PROGRESS NOTES
"Behavioral Health Home Services  [facility]: [place]        Social Work Care Navigator Note      Patient: Paula Ho  Date: July 9, 2024  Preferred Name: Paula    Previous PHQ-9:       5/21/2024     2:51 PM 6/4/2024     1:00 PM 6/24/2024     8:26 AM   PHQ-9 SCORE   PHQ-9 Total Score MyChart 12 (Moderate depression)  9 (Mild depression)   PHQ-9 Total Score 12 12 9     Previous LIBRA-7:       5/21/2024     3:03 PM 5/21/2024     3:04 PM 6/4/2024     2:00 PM   LIBRA-7 SCORE   Total Score  9 (mild anxiety)    Total Score 9  11     ARNALDO LEVEL:       No data to display                Preferred Contact:  Need for : No  Preferred Contact: Cell      Type of Contact Today: Phone call (patient / identified key support person reached)    Service Modality: Phone Visit:      Provider verified identity through the following two step process.  Patient provided:  Patient is known previously to provider    Telephone Visit: The patient's condition can be safely assessed and treated via synchronous audio telemedicine encounter.      Reason for Audio Telemedicine Visit: Patient has requested telehealth visit    Originating Site (Patient Location): Patient's home    Distant Site (Provider Location): Provider Remote Setting- Home Office    Consent:  The patient/guardian has verbally consented to:     1. The potential risks and benefits of telemedicine (telephone visit) versus in person care;    The patient has been notified of the following:      \"We have found that certain health care needs can be provided without the need for a face to face visit.  This service lets us provide the care you need with a phone conversation.       I will have full access to your [facility] medical record during this entire phone call.   I will be taking notes for your medical record.      Since this is like an office visit, we will bill your insurance company for this service.       There are potential benefits and risks of telephone " "visits (e.g. limits to patient confidentiality) that differ from in-person visits.?Confidentiality still applies for telephone services, and nobody will record the visit.  It is important to be in a quiet, private space that is free of distractions (including cell phone or other devices) during the visit.??      If during the course of the call I believe a telephone visit is not appropriate, you will not be charged for this service\"     Consent has been obtained for this service by care team member: Yes       Data: (subjective / Objective):  Recent ED/IP Admission or Discharge?   None    Patient Goals:  Goal Areas: Health; Mental Health; Future HAP Goal area; Chemical Health  Patient stated goals: Health: Paula would like to continue to meet with her providers, and take her medications as prescribed.   -Paula would like to start working with the weight management clinic to assist with losing weight.   -Paula would like to go to the gym at least three times per week.   -Paula would like to find a nutrition plan that would work for her and help her to lose weight.   Mental Health: Paula would like to find a new therapist within [place] and receive support from Behavioral Health Clinician with this. She will also  continue to receive assistance with [facility] for monthly check ins. She would like to continue working with an [facility] worker and Peer Support Person through the same agency.   -She reported that she wants to build herself back to feel like somebody and take it day by day.   Chemical Health: Paula would like assistance from her providers to quit smoking and will work on reducing her use, so she can be approved for back surgery.   Future goals: Paula is going to consider back surgery in the future after losing the recommended weight.      [facility] Core Service Provided:  Comprehensive Care Management: utilized the electronic medical record / patient registry to identify and support patient's health conditions / " "needs more effectively   Care Transitions: focused on the coordinated and seamless movement of patient between or within different levels of care or settings  Care Coordination: provided care management services/referrals necessary to ensure patient and their identified supports have access to medical, behavioral health, pharmacology and recovery support services.  Ensured that patient's care is integrated across all settings and services.   Individual and Family Support: aimed to help clients reduce barriers to achieving goals, increase health literacy and knowledge about chronic condition(s), increase self-efficacy skills, and improve health outcomes    Current Stressors / Issues / Care Plan Objective Addressed Today:  CC and patient were able to meet today for Behavioral Health Home ([facility]) monthly check-in via telehealth visit. All required ROIs have been filed with HIM/patient chart.    - Patient reports that her mood has been \"so-so\". She stopped taking a couple of her medications, and she told her provider this, because she was getting very itchy at night time and her hands were swelling up over night. She reports that she would wake up with her hands swollen, itchy, and hurting. She reports that she's not had the itching and swelling since she stopped the medication. She has an upcoming appointment with [facility] on Thursday, 7/11.     - Patient reports she started on a new medication to help her lose weight, but she hasn't noticed a change in her weight since starting the medication. She reports that she received the medication on 6/25, so it's been a couple of weeks. She was told the pill would take awhile, but she wasn't told how long it would take to start noticing. Patient reports she was supposed to be referred to a weight management provider from her pain doctor, but she hasn't been reached out to yet. [facility] encouraged her to reach back to her pain provider to follow up on the weight management " referral.     - Patient reports she's struggling more with eating due to her dentures and her gums are wearing away and no longer able to keep her dentures in place. [facility] encouraged patient to find a dentist and see if she's able to work with them to find a solution regarding eating with dentures.     - Patient reported that she's considering more about downsizing her place, but she only doesn't want to do this because of her three dogs. She reports she doesn't use her upstairs mostly and stays in one area of her place.     - Patient reports she would like to take a trip someone, and she may visit her niece in [place].     - Patient reports that her temporary work has been on and off depending when they have a project and hours for her. Patient reported that walks for the job, which she reports is helpful for her to get the exercise.     - Patient reported that she's been waiting for a new [employer] worker since the previous one didn't work because she didn't like dogs and patient has three dogs. She's on hold for now, and she's continuing to meet the new peer support person.     Intervention:  Motivational Interviewing: Expressed Empathy/Understanding, Supported Autonomy, Collaboration, Evocation, Permission to raise concern or advise, Open-ended questions, and Reflections: simple and complex   Target Behavior(s): Explored thoughts about taking an anti-depressant, Explored and resolved challenges related to taking anti-depressants as prescribed, Explored thoughts and readiness to participate in individual therapy, Explored and resolved challenges to attending appointments as scheduled, and Explored current social supports and reinforced opportunities to increase engagement    Assessment: (Progress on Goals / Homework):  Patient would benefit from continued coordination in reaching their goals set for the Behavioral Health Home ([facility]) program. [facility] reviewed Health Action Plan goals and will continue to monitor progress  and work with patient and their care team.    Plan: (Homework, other):  Patient was encouraged to continue to seek condition-related information and education.      Scheduled a Phone follow up appointment with MISA RICE in 4 weeks     Patient has set self-identified goals and will monitor progress until the next appointment on: 8/6/24.       DENI Humphries  Behavioral Health Home ([facility])   [facility]  [phone]      Next 5 appointments (look out 90 days)      Aug 08, 2024 4:00 PM  (Arrive by 3:40 PM)  Provider Visit with Renan Dickerson MD  [facility] ([facility] ) [address] [place] [id number] [phone]

## 2024-07-11 ENCOUNTER — VIRTUAL VISIT (OUTPATIENT)
Dept: BEHAVIORAL HEALTH | Facility: CLINIC | Age: 64
End: 2024-07-11
Payer: MEDICARE

## 2024-07-11 DIAGNOSIS — F33.2 MDD (MAJOR DEPRESSIVE DISORDER), RECURRENT SEVERE, WITHOUT PSYCHOSIS (H): ICD-10-CM

## 2024-07-11 DIAGNOSIS — F41.1 GAD (GENERALIZED ANXIETY DISORDER): Primary | ICD-10-CM

## 2024-07-11 PROCEDURE — 90832 PSYTX W PT 30 MINUTES: CPT | Mod: 93
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
## 2024-07-14 DIAGNOSIS — J44.9 CHRONIC OBSTRUCTIVE PULMONARY DISEASE, UNSPECIFIED COPD TYPE (H): ICD-10-CM

## 2024-07-15 RX ORDER — ALBUTEROL SULFATE 0.83 MG/ML
SOLUTION RESPIRATORY (INHALATION)
Qty: 90 ML | Refills: 2 | Status: SHIPPED | OUTPATIENT
Start: 2024-07-15
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
## 2024-07-19 ENCOUNTER — OFFICE VISIT (OUTPATIENT)
Dept: OTOLARYNGOLOGY | Facility: CLINIC | Age: 64
End: 2024-07-19
Payer: MEDICARE

## 2024-07-19 VITALS
DIASTOLIC BLOOD PRESSURE: 80 MMHG | BODY MASS INDEX: 46.48 KG/M2 | SYSTOLIC BLOOD PRESSURE: 144 MMHG | WEIGHT: 288 LBS | HEART RATE: 63 BPM

## 2024-07-19 DIAGNOSIS — K13.70 ORAL LESION: ICD-10-CM

## 2024-07-19 PROCEDURE — 88305 TISSUE EXAM BY PATHOLOGIST: CPT | Performed by: PATHOLOGY

## 2024-07-19 PROCEDURE — 99203 OFFICE O/P NEW LOW 30 MIN: CPT | Mod: 25 | Performed by: OTOLARYNGOLOGY

## 2024-07-19 PROCEDURE — 40808 BIOPSY OF MOUTH LESION: CPT | Performed by: OTOLARYNGOLOGY

## 2024-07-26 LAB
PATH REPORT.COMMENTS IMP SPEC: NORMAL
PATH REPORT.COMMENTS IMP SPEC: NORMAL
PATH REPORT.FINAL DX SPEC: NORMAL
PATH REPORT.GROSS SPEC: NORMAL
PATH REPORT.MICROSCOPIC SPEC OTHER STN: NORMAL
PATH REPORT.RELEVANT HX SPEC: NORMAL
PHOTO IMAGE: NORMAL

## 2024-07-30 ENCOUNTER — VIRTUAL VISIT (OUTPATIENT)
Dept: PSYCHIATRY | Facility: CLINIC | Age: 64
End: 2024-07-30
Payer: MEDICARE

## 2024-07-30 DIAGNOSIS — F33.1 MODERATE EPISODE OF RECURRENT MAJOR DEPRESSIVE DISORDER (H): Primary | ICD-10-CM

## 2024-07-30 DIAGNOSIS — F51.04 PSYCHOPHYSIOLOGICAL INSOMNIA: ICD-10-CM

## 2024-07-30 DIAGNOSIS — F41.1 GAD (GENERALIZED ANXIETY DISORDER): ICD-10-CM

## 2024-07-30 DIAGNOSIS — F90.0 ATTENTION DEFICIT HYPERACTIVITY DISORDER (ADHD), PREDOMINANTLY INATTENTIVE TYPE: ICD-10-CM

## 2024-07-30 PROCEDURE — 99443 PR PHYSICIAN TELEPHONE EVALUATION 21-30 MIN: CPT | Mod: 93 | Performed by: NURSE PRACTITIONER

## 2024-07-30 RX ORDER — DEXTROAMPHETAMINE SACCHARATE, AMPHETAMINE ASPARTATE, DEXTROAMPHETAMINE SULFATE AND AMPHETAMINE SULFATE 3.75; 3.75; 3.75; 3.75 MG/1; MG/1; MG/1; MG/1
15 TABLET ORAL 2 TIMES DAILY
Qty: 60 TABLET | Refills: 0 | Status: SHIPPED | OUTPATIENT
Start: 2024-08-27

## 2024-07-30 RX ORDER — DEXTROAMPHETAMINE SACCHARATE, AMPHETAMINE ASPARTATE, DEXTROAMPHETAMINE SULFATE AND AMPHETAMINE SULFATE 3.75; 3.75; 3.75; 3.75 MG/1; MG/1; MG/1; MG/1
15 TABLET ORAL 2 TIMES DAILY
Qty: 60 TABLET | Refills: 0 | Status: SHIPPED | OUTPATIENT
Start: 2024-07-30

## 2024-07-30 RX ORDER — FLUOXETINE 40 MG/1
40 CAPSULE ORAL DAILY
Qty: 30 CAPSULE | Refills: 5 | Status: SHIPPED | OUTPATIENT
Start: 2024-07-30

## 2024-07-30 RX ORDER — TRAZODONE HYDROCHLORIDE 50 MG/1
50 TABLET, FILM COATED ORAL AT BEDTIME
Qty: 30 TABLET | Refills: 5 | Status: SHIPPED | OUTPATIENT
Start: 2024-07-30

## 2024-07-30 RX ORDER — ARIPIPRAZOLE 10 MG/1
10 TABLET ORAL DAILY
Qty: 30 TABLET | Refills: 5 | Status: SHIPPED | OUTPATIENT
Start: 2024-07-30

## 2024-07-30 ASSESSMENT — ANXIETY QUESTIONNAIRES
6. BECOMING EASILY ANNOYED OR IRRITABLE: SEVERAL DAYS
8. IF YOU CHECKED OFF ANY PROBLEMS, HOW DIFFICULT HAVE THESE MADE IT FOR YOU TO DO YOUR WORK, TAKE CARE OF THINGS AT HOME, OR GET ALONG WITH OTHER PEOPLE?: VERY DIFFICULT
GAD7 TOTAL SCORE: 8
7. FEELING AFRAID AS IF SOMETHING AWFUL MIGHT HAPPEN: SEVERAL DAYS
4. TROUBLE RELAXING: NOT AT ALL
2. NOT BEING ABLE TO STOP OR CONTROL WORRYING: SEVERAL DAYS
GAD7 TOTAL SCORE: 8
GAD7 TOTAL SCORE: 8
3. WORRYING TOO MUCH ABOUT DIFFERENT THINGS: NEARLY EVERY DAY
7. FEELING AFRAID AS IF SOMETHING AWFUL MIGHT HAPPEN: SEVERAL DAYS
5. BEING SO RESTLESS THAT IT IS HARD TO SIT STILL: NOT AT ALL
1. FEELING NERVOUS, ANXIOUS, OR ON EDGE: MORE THAN HALF THE DAYS
IF YOU CHECKED OFF ANY PROBLEMS ON THIS QUESTIONNAIRE, HOW DIFFICULT HAVE THESE PROBLEMS MADE IT FOR YOU TO DO YOUR WORK, TAKE CARE OF THINGS AT HOME, OR GET ALONG WITH OTHER PEOPLE: VERY DIFFICULT

## 2024-07-30 ASSESSMENT — PAIN SCALES - GENERAL: PAINLEVEL: NO PAIN (0)

## 2024-07-30 NOTE — PROGRESS NOTES
"         Outpatient Psychiatric Progress Note    Name: Paula Ho   : 1960                    Primary Care Provider: Renan Dickerson MD   Therapist: None    PHQ-9 scores:      2024     2:51 PM 2024     1:00 PM 2024     8:26 AM   PHQ-9 SCORE   PHQ-9 Total Score MyChart 12 (Moderate depression)  9 (Mild depression)   PHQ-9 Total Score 12 12 9       LIBRA-7 scores:      2024     3:03 PM 2024     3:04 PM 2024     2:00 PM   LIBRA-7 SCORE   Total Score  9 (mild anxiety)    Total Score 9  11       Patient Identification:    Patient is a 64 year old year old, single  White Not  or  female  who presents for return visit with me.  Patient is currently unemployed. Patient attended the session alone. Patient prefers to be called: \" Paula\".    Current medications include:   Current Outpatient Medications   Medication Sig Dispense Refill    albuterol (PROAIR HFA) 108 (90 Base) MCG/ACT inhaler Inhale 2 puffs into the lungs every 4 hours as needed for shortness of breath or wheezing 8.5 g 11    albuterol (PROVENTIL) (2.5 MG/3ML) 0.083% neb solution NEBULIZE THE CONTENTS OF 1 VIAL EVERY 6 HOURS AS NEEDED. 90 mL 2    amphetamine-dextroamphetamine (ADDERALL) 15 MG tablet Take 1 tablet (15 mg) by mouth 2 times daily 60 tablet 0    amphetamine-dextroamphetamine (ADDERALL) 15 MG tablet Take 1 tablet (15 mg) by mouth 2 times daily 60 tablet 0    ARIPiprazole (ABILIFY) 10 MG tablet Take 1 tablet (10 mg) by mouth daily 30 tablet 3    buprenorphine HCl-naloxone HCl (SUBOXONE) 8-2 MG per film Place 1 Film under the tongue daily      cetirizine (ZYRTEC) 10 MG tablet Take 1 tablet (10 mg) by mouth daily 14 tablet 1    cyanocobalamin (CYANOCOBALAMIN) 1000 MCG/ML injection 1000mcg subcutaneous injection daily for 7 days, then weekly for 4 weeks, then monthly 20 mL 1    dexamethasone (DECADRON) 1 MG tablet Take 1 mg at 11 pm and get a morning blood draw at 8 am for cortisol. 1 tablet 0    " EPINEPHrine (ANY BX GENERIC EQUIV) 0.3 MG/0.3ML injection 2-pack Inject 0.3 mLs (0.3 mg) into the muscle as needed for anaphylaxis May repeat one time in 5-15 minutes if response to initial dose is inadequate. 2 each 0    FLUoxetine (PROZAC) 40 MG capsule Take 1 capsule (40 mg) by mouth daily 30 capsule 3    Fluticasone-Umeclidin-Vilanterol (TRELEGY ELLIPTA) 200-62.5-25 MCG/ACT oral inhaler Inhale 1 puff into the lungs daily 60 each 11    folic acid (FOLVITE) 1 MG tablet Take 1 tablet (1 mg) by mouth daily 90 tablet 3    gabapentin (NEURONTIN) 600 MG tablet Take 1 tablet (600 mg) by mouth daily At bedtime 30 tablet 3    hydrocortisone, Perianal, (HYDROCORTISONE) 2.5 % cream Place rectally 2 times daily as needed for hemorrhoids 28 g 11    levothyroxine (SYNTHROID/LEVOTHROID) 25 MCG tablet Take 1 tablet (25 mcg) by mouth daily 90 tablet 1    levothyroxine (SYNTHROID/LEVOTHROID) 25 MCG tablet Take 1 tablet (25 mcg) by mouth daily 90 tablet 1    medical cannabis (Patient's own supply) See Admin Instructions (The purpose of this order is to document that the patient reports taking medical cannabis.  This is not a prescription, and is not used to certify that the patient has a qualifying medical condition.)      mirtazapine (REMERON) 45 MG tablet Take 0.5 tablets (22.5 mg) by mouth at bedtime For 7 days then stop      Multiple Vitamins-Minerals (MULTIVITAMIN ADULT PO)       mupirocin (BACTROBAN) 2 % external ointment Apply topically 3 times daily 30 g 2    NARCAN 4 MG/0.1ML nasal spray   0    order for DME Equipment being ordered: Nebulizer 1 Units 0    orlistat (XENICAL) 120 MG capsule Take 1 capsule (120 mg) by mouth 3 times daily (with meals) 90 capsule 5    oxyCODONE IR (ROXICODONE) 15 MG tablet Take 15 mg by mouth 3 times daily + 5 mg x1 daily      sulfamethoxazole-trimethoprim (BACTRIM DS) 800-160 MG tablet Take 1 tablet by mouth 2 times daily 20 tablet 0    tiZANidine (ZANAFLEX) 2 MG tablet TK 2 TO 3 TS PO QHS  4     traZODone (DESYREL) 50 MG tablet Take 1 tablet (50 mg) by mouth at bedtime 30 tablet 3    vitamin D3 (CHOLECALCIFEROL) 2000 units (50 mcg) tablet Take 1 tablet by mouth daily  1     Current Facility-Administered Medications   Medication Dose Route Frequency Provider Last Rate Last Admin    lidocaine (PF) (XYLOCAINE) 1 % injection 8 mL  8 mL   Greg Lam MD   8 mL at 07/27/21 1123        The [place] Prescription Monitoring Program has been reviewed and there are no concerns about diversionary activity for controlled substances at this time.      I was able to review most recent Primary Care Provider, specialty provider, and therapy visit notes that I have access to.     Today, patient reports that she is now working 3 to 4 hours a day at a new job.  Her pain symptoms are worse as it wakes her up at night and it is painful for her to walk long distances.  Currently she is taking 4 tablets of oxycodone and has been on this medication since 2014.  She does have a therapist available to her but has to reschedule her appointment.  She finds the medications helpful in stabilizing her mood and helping her to sleep.       has a past medical history of Acute respiratory failure (H) (02/01/2020), Anxiety, Asthma, Depression, and Hypertension.    Social history updates:    No change in social history    Substance use updates:    No alcohol use reported  Tobacco use: No    Vital Signs:   LMP  (LMP Unknown)     Labs:    Most recent laboratory results reviewed and no new labs.     Mental Status Examination:  Appearance: awake, alert  Attitude: cooperative  Eye Contact:   Not able to assess  Gait and Station: No dizziness or falls  Psychomotor Behavior:   Not able to assess  Oriented to:  time, person, and place  Attention Span and Concentration:  Normal  Speech:   vtspeech: clear, coherent and Speaks: English  Mood:  anxious, depressed, and labile  Affect:  mood congruent  Associations:  no loose  associations  Thought Process:  goal oriented  Thought Content:  no evidence of suicidal ideation or homicidal ideation, no auditory hallucinations present, and no visual hallucinations present  Recent and Remote Memory:  intact Not formally assessed. No amnesia.  Fund of Knowledge: appropriate  Insight:  good  Judgment: good  Impulse Control:  good    Suicide Risk Assessment:  Today Paula Ho reports no thoughts to harm themself or others. In addition, there are notable risk factors for self-harm, including anxiety and comorbid medical condition of chronic pain . However, risk is mitigated by history of seeking help when needed, future oriented, denies suicidal intent or plan, and denies homicidal ideation, intent, or plan. Therefore, based on all available evidence including the factors cited above, Paula Ho does not appear to be at imminent risk for self-harm, does not meet criteria for a 72-hr hold, and therefore remains appropriate for ongoing outpatient level of care.  A thorough assessment of risk factors related to suicide and self-harm have been reviewed and are noted above. The patient convincingly denies suicidality on several occasions. Local community safety resources printed and reviewed for patient to use if needed. There was no deceit detected, and the patient presented in a manner that was believable.     DSM5 Diagnosis:  Attention-Deficit/Hyperactivity Disorder  314.00 (F90.0) Predominantly inattentive presentation  296.32 (F33.1) Major Depressive Disorder, Recurrent Episode, Moderate _ and With mixed features  300.02 (F41.1) Generalized Anxiety Disorder  780.52 (G47.00) Insomnia Disorder   With non-sleep disorder mental comorbidity  Recurrent      Medical comorbidities include:   Patient Active Problem List    Diagnosis Date Noted    Angiolipoma of right kidney 01/20/2023     Priority: Medium    Adrenal nodule (H24) 01/20/2023     Priority: Medium    Opioid dependence with current  use (H) 06/30/2022     Priority: Medium    Infection due to 2019 novel coronavirus 12/09/2020     Priority: Medium    Hyperlipidemia 04/14/2020     Priority: Medium    Benign essential hypertension 04/14/2020     Priority: Medium    MDD (major depressive disorder), recurrent severe, without psychosis (H) 03/02/2020     Priority: Medium    Chronic obstructive pulmonary disease, unspecified COPD type (H) 02/04/2020     Priority: Medium     No PFTS  In EPIC      Attention deficit hyperactivity disorder (ADHD) 01/31/2020     Priority: Medium    Chronic midline low back pain with bilateral sciatica 01/31/2020     Priority: Medium    Brain aneurysm 12/03/2019     Priority: Medium     Dec 2017  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 Device is MR compatible to 3 BREANN 3/2020      History of subarachnoid hemorrhage 12/22/2017     Priority: Medium     H/O of SAH, Cam 2, Hunt-Thomas 1, from a ruptured 9mm left Pcomm aneurysm with a wide neck and a fetal left PCA arising from the aneurysm neck, s/p successful coil embolization 12/23/2017 by Dr. Otto Hurley      Chronic GERD 01/26/2017     Priority: Medium    Chronic knee pain 12/12/2014     Priority: Medium    Cigarette smoker 07/08/2014     Priority: Medium    Chronic, continuous use of opioids 04/24/2013     Priority: Medium     Managed by pain clinic outside of [place].  UDS + cocaine in December 2019 without confirmation testing at her pain clinic per patient report      Morbid obesity (H) 03/27/2013     Priority: Medium    Status post knee surgery 03/27/2013     Priority: Medium     Per patient's recollection:  Circa 2002: right knee arthroscopy (Dr. Pappas)  Circa 2004: right knee partial knee replacement (Iam Ritchie MD)  Circa 2006: right TKA (Ron Ryder MD; [facility])  Circa 2008: right TKA revision due to infection (Ron Ryder MD; [facility])  Circa 2009: right TKA revision due to infection (Ron  MD Aleksey; [facility])  April 2011: right TKA (Ron Ryder MD; [facility])      LIBRA (generalized anxiety disorder) 09/28/2011     Priority: Medium    Chronic pain 09/28/2011     Priority: Medium     Knee and low back        DJD (degenerative joint disease) 09/28/2011     Priority: Medium    Insomnia 04/13/2011     Priority: Medium     Insomnia  NOS      Tobacco use disorder 07/31/2006     Priority: Medium    Asthma 11/15/2004     Priority: Medium     Asthma  NOS         Assessment:    Paula Ho continues to experience anxiety and depression that are exacerbated when her pain is not managed well.  Because of weight gain concerns the mirtazapine has been discontinued.  She will take trazodone instead for insomnia.  Adderall, seems to be helpful in sustaining her attention, now that she is working again part-time.  She desires no changes in her other medications at this time.    Medication side effects and alternatives were reviewed. Health promotion activities recommended and reviewed today. All questions addressed. Education and counseling completed regarding risks and benefits of medications and psychotherapy options.    Treatment Plan:      1.  Continue Adderall 15 mg 2 times daily for ADHD  2.  Continue Abilify 10 mg daily for mood regulation  3.  Continue fluoxetine 40 mg daily for depression  4.  Continue gabapentin 600 mg at bedtime for anxiety, and mood regulation  5.  Discontinue mirtazapine  6.  Trazodone 50 mg at bedtime for insomnia  7.  Continue talk therapy to learn skills to manage life stressors    Continue all other medications as reviewed per electronic medical record today.   Safety plan reviewed. To the Emergency Department as needed or call after hours crisis line at [phone] or [phone]. [place] Crisis Text [place]. Text [place] to 540198 or Suicide LifeLine Chat: suicidepreventionlifeline.org/chat/  To schedule individual or family therapy, call [facility] at [phone]  Schedule an appointment as needed. Call [facility] at [phone] to schedule.  Follow up with primary care provider as planned or for acute medical concerns.  Call the psychiatric nurse line with medication questions or concerns at [phone]  MyChart may be used to communicate with your provider, but this is not intended to be used for emergencies.        Crisis Resources   The EmPath is an adults only unit located at [facility] in [place] is a short term (generally less than 23 hour stay) designed for crisis intervention and stabilization. Pts have the opportunity to meet quickly with a behavioral health team for evaluation in a calm and peaceful therapuetic environment. To be evaluated for admission pts are triaged throught the [facility] ED.      The following hotlines are for both adults and children. The and are open 24 hours a day, 7 days a week unless noted otherwise.        Crisis Lines      Crisis Text Line  Text 739845  You will be connected with a trained live crisis counselor to provide support.        Gambling Hotline  9.544.073.9361 [hope]        línea de crisis española  [phone]        [facility]  [phone]        National Hope Line  3.300.641.9358 [hope]        National Suicide Prevention Lifeline  Free and confidential support  988 or 1.734.664.TALK [8255]  http://suicidepreventionlifeline.org        The Saul Project (LGBTQ Youth Crisis Line)  5.820.523.8474  text START to 187-890        [place] Crisis Line  6.805.664.8417 (Press 1)  or text 645263    [facility] Mental Health Crisis Response  Within [place], call **CRISIS [**716034] to be connected to a mental health professional who can assist you.        [facility] Crisis  [phone]      [facility] Mobile Crisis  [phone]      [facility] Crisis  [phone]      [facility] Mobile Crisis  [phone] (adults)  [phone]  (children)      [facility] Mobile Crisis  [phone] (adults)  [phone] (children)      [facility] Mobile Crisis  [phone]      [facility] Mobile Crisis  [phone]    Community Resources      Fast Tracker  Linking people to mental health and substance use disorder resources  [employer]        [place] Mental Health Warmline  Peer to peer support  5 pm to 9 am 7 days/week  7.917.957.6331  https://mnwitw.org/parviz        National [place] on Mental Illness (UTE)  243.649.8554 or 1.888.UTE.HELPS  https://namimn.org/        [facility]  [facility] residents only   [address] [place] [address]  [phone]        [facility]  Free mental health counseling  https://walkin.org/  612.870.0565 X2    Mental Health Apps      Calm Harm  https://Guidance Software.co.uk/      My3  https://my3app.org/      [place] Safety Plan  https://www.Connestaafetyplan.org/   Administrative Billing:   Time spent with patient includes counseling and coordination of care regarding above diagnoses and treatment plan.    The longitudinal plan of care for the diagnosis(es)/condition(s) as documented were addressed during this visit. Due to the added complexity in care, I will continue to support Paula in the subsequent management and with ongoing continuity of care.    Patient Status:  This is a continuous care patient and medications will be prescribed by the psychiatric provider until further indicated.    Signed:   VIRGILIO Kidd-BC   Psychiatry       Answers submitted by the patient for this visit:  Patient Health Questionnaire (Submitted on 7/30/2024)  If you checked off any problems, how difficult have these problems made it for you to do your work, take care of things at home, or get along with other people?: Somewhat difficult  PHQ9 TOTAL SCORE: 5  LIBRA-7 (Submitted on 7/30/2024)  LIBRA 7 TOTAL SCORE: 8

## 2024-07-30 NOTE — NURSING NOTE
Current patient location: Patient declined to provide     Is the patient currently in the state of [place]? YES    Visit mode:TELEPHONE    If the visit is dropped, the patient can be reconnected by: TELEPHONE VISIT: Phone number:   Telephone Information:   Mobile [phone]       Will anyone else be joining the visit? NO  (If patient encounters technical issues they should call [phone] :[phone])    How would you like to obtain your AVS? MyChart    Are changes needed to the allergy or medication list? No    Are refills needed on medications prescribed by this physician? NO    Reason for visit: RECHDEBO DAVIS

## 2024-07-30 NOTE — PROGRESS NOTES
Virtual Visit Details    Type of service:  Telephone Visit   Phone call duration: 30 minutes   Originating Location (pt. Location): Home    Distant Location (provider location):  Off-site  Answers submitted by the patient for this visit:  Patient Health Questionnaire (Submitted on 7/30/2024)  If you checked off any problems, how difficult have these problems made it for you to do your work, take care of things at home, or get along with other people?: Somewhat difficult  PHQ9 TOTAL SCORE: 5  LIBRA-7 (Submitted on 7/30/2024)  LIBRA 7 TOTAL SCORE: 8

## 2024-08-02 NOTE — PATIENT INSTRUCTIONS
"Patient Education   The Panel Psychiatry Program  What to Expect  Here's what to expect in the Panel Psychiatry Program.   About the program  You'll be meeting with a psychiatric doctor to check your mental health. A psychiatric doctor helps you deal with troubling thoughts and feelings by giving you medicine. They'll make sure you know the plan for your care. You may see them for a long time. When you're feeling better, they may refer you back to seeing your family doctor.   If you have any questions, we'll be glad to talk to you.  About visits  Be open  At your visits, please talk openly about your problems. It may feel hard, but it's the best way for us to help you.  Cancelling visits  If you can't come to your visit, please call us right away at [phone]. If you don't cancel at least 24 hours (1 full day) before your visit, that's \"late cancellation.\"  Not showing up for your visits  Being very late is the same as not showing up. You'll be a \"no show\" if:  You're more than 15 minutes late for a 30-minute (half hour) visit.  You're more than 30 minutes late for a 60-minute (full hour) visit.  If you cancel late or don't show up 2 times within 6 months, we may end your care.  Getting help between visits  If you need help between visits, you can call us Monday to Friday from 8 a.m. to 4:30 p.m. at [phone].  Emergency care  Call 911 or go to the nearest emergency department if your life or someone else's life is in danger.  Call 988 anytime to reach the national Suicide and Crisis hotline.  Medicine refills  To refill your medicine, call your pharmacy. You can also call [facility] at [phone], Monday to Friday, 8 a.m. to 4:30 p.m. It can take 1 to 3 business days to get a refill.   Forms, letters, and tests  You may have papers to fill out, like FMLA, short-term disability, and workability. We can help you with these forms at your visits, but you must have an " appointment. You may need more than 1 visit for this, to be in an intensive therapy program, or both.  Before we can give you medicine for ADHD, we may refer you to get tested for it or confirm it another way.  We may not be able to give you an emotional support animal letter.  We don't do mental health checks ordered by the court.   We don't do mental health testing, but we can refer you to get tested.   Thank you for choosing us for your care.  For informational purposes only. Not to replace the advice of your health care provider. Copyright   2022 [place] [employer]. All rights reserved. Patient Communicator 389936 - 12/22.       Treatment Plan:      1.  Continue Adderall 15 mg 2 times daily for ADHD  2.  Continue Abilify 10 mg daily for mood regulation  3.  Continue fluoxetine 40 mg daily for depression  4.  Continue gabapentin 600 mg at bedtime for anxiety, and mood regulation  5.  Discontinue mirtazapine  6.  Trazodone 50 mg at bedtime for insomnia  7.  Continue talk therapy to learn skills to manage life stressors    Continue all other medical directions per primary care provider.   Continue all other medications as reviewed per electronic medical record today.   Safety plan reviewed. To the Emergency Department as needed or call after hours crisis line at [phone] or [phone]. [place] Crisis Text Line: Text [place] to 839536  or  Suicide LifeLine Chat: suicidepreventionlifeline.org/chat/  To schedule individual or family therapy, call [facility] at [phone].   Schedule an appointment with me in 6 weeks or sooner as needed.  Call [facility] at [phone] to schedule.  Follow up with primary care provider as planned or for acute medical concerns.  Call the psychiatric nurse line with medication questions or concerns at [phone].  MyChart may be used to communicate with your provider, but this is not intended to be used for emergencies.        Crisis Resources   The  EmPath is an adults only unit located at [facility] in [place] is a short term (generally less than 23 hour stay) designed for crisis intervention and stabilization. Pts have the opportunity to meet quickly with a behavioral health team for evaluation in a calm and peaceful therapuetic environment. To be evaluated for admission pts are triaged throught the [facility] ED.      The following hotlines are for both adults and children. The and are open 24 hours a day, 7 days a week unless noted otherwise.        Crisis Lines      Crisis Text Line  Text 403776  You will be connected with a trained live crisis counselor to provide support.        [place]ambling Hotline  0.588.224.8039 [hope]        línea de crisis española  [phone]        [facility] [employer] Helpline  [phone]        National Hope Line  6.073.426.5264 [hope]        National Suicide Prevention Lifeline  Free and confidential support  988 or 1.442.623.TALK [8255]  http://suicidepreventionlifeline.org        The Saul Project (LGBTQ Youth Crisis Line)  6.099.451.8976  text START to 069-316        [place] Crisis Line  9.153.267.3256 (Press 1)  or text 649721    [facility] Mental Health Crisis Response  Within [place], call **CRISIS [**341149] to be connected to a mental health professional who can assist you.        [facility] Crisis  [phone]      [facility] Mobile Crisis  [phone]      [facility] Crisis  [phone]      [facility] Mobile Crisis  [phone] (adults)  [phone] (children)      [facility] Mobile Crisis  [phone] (adults)  [phone] (children)      [facility] Mobile Crisis  [phone]      [facility] Mobile Crisis  [phone]    Community Resources      Fast Tracker  Linking people to mental health and substance use disorder resources  fasttrackermn.org        [place] Mental Health Warmline  Peer to peer support  5 pm to 9 am 7 days/week  4.481.640.0934   https://mnwitw.org/parviz        National [place] on Mental Illness (UTE)  816.224.6222 or 1.888.UTE.HELPS  https://namimn.org/        [facility] for Adult Mental Health  [facility]   [address] [facility] [address] [facility] [facility]  [phone]        [facility]  Free mental health counseling  https://walkin.org/  [phone] X2    Mental Health Apps      Calm Harm  https://calmharm.co.uk/      My3  https://my3app.org/      [place] Safety Plan  https://www.mysafetyplan.org/

## 2024-08-06 ENCOUNTER — TELEPHONE (OUTPATIENT)
Dept: BEHAVIORAL HEALTH | Facility: CLINIC | Age: 64
End: 2024-08-06
Payer: MEDICARE

## 2024-08-06 NOTE — TELEPHONE ENCOUNTER
[facility] called patient for scheduled check in. Patient is sick and trying to sleep right now, so she asked to reschedule. They are scheduled for Monday, 8/12, and [facility] reminded patient of her next appointment on 8/8.     DENI Humphries  Behavioral Health [place] ([facility])   [facility]  [phone]

## 2024-08-08 ENCOUNTER — OFFICE VISIT (OUTPATIENT)
Dept: FAMILY MEDICINE | Facility: CLINIC | Age: 64
End: 2024-08-08
Payer: MEDICARE

## 2024-08-08 VITALS
BODY MASS INDEX: 45.8 KG/M2 | HEART RATE: 72 BPM | SYSTOLIC BLOOD PRESSURE: 119 MMHG | TEMPERATURE: 97.9 F | DIASTOLIC BLOOD PRESSURE: 79 MMHG | HEIGHT: 66 IN | OXYGEN SATURATION: 96 % | RESPIRATION RATE: 20 BRPM | WEIGHT: 285 LBS

## 2024-08-08 DIAGNOSIS — B02.9 HERPES ZOSTER WITHOUT COMPLICATION: Primary | ICD-10-CM

## 2024-08-08 PROCEDURE — G2211 COMPLEX E/M VISIT ADD ON: HCPCS | Performed by: INTERNAL MEDICINE

## 2024-08-08 PROCEDURE — 99213 OFFICE O/P EST LOW 20 MIN: CPT | Performed by: INTERNAL MEDICINE

## 2024-08-08 RX ORDER — VALACYCLOVIR HYDROCHLORIDE 1 G/1
1000 TABLET, FILM COATED ORAL 2 TIMES DAILY
Qty: 14 TABLET | Refills: 1 | Status: SHIPPED | OUTPATIENT
Start: 2024-08-08 | End: 2024-08-15

## 2024-08-08 RX ORDER — LIDOCAINE 50 MG/G
OINTMENT TOPICAL 2 TIMES DAILY
Qty: 50 G | Refills: 1 | Status: SHIPPED | OUTPATIENT
Start: 2024-08-08

## 2024-08-08 ASSESSMENT — PAIN SCALES - GENERAL: PAINLEVEL: SEVERE PAIN (7)

## 2024-08-12 ENCOUNTER — TELEPHONE (OUTPATIENT)
Dept: BEHAVIORAL HEALTH | Facility: CLINIC | Age: 64
End: 2024-08-12
Payer: MEDICARE

## 2024-08-12 NOTE — TELEPHONE ENCOUNTER
Behavioral Health Home Services  [facility]: [place]        Social Work Care Navigator Note      Patient: Paula Ho  Date: August 12, 2024  Preferred Name: Paula    Previous PHQ-9:       6/4/2024     1:00 PM 6/24/2024     8:26 AM 7/30/2024     1:58 PM   PHQ-9 SCORE   PHQ-9 Total Score MyChart  9 (Mild depression) 5 (Mild depression)   PHQ-9 Total Score 12 9 5     Previous LIBRA-7:       5/21/2024     3:04 PM 6/4/2024     2:00 PM 7/30/2024     1:58 PM   LIBRA-7 SCORE   Total Score 9 (mild anxiety)  8 (mild anxiety)   Total Score  11 8    8    8     ARNALDO LEVEL:       No data to display                Preferred Contact:  Need for : No  Preferred Contact: Cell      Type of Contact Today: Phone call (not reached/unavailable)      Data: (subjective / Objective):  Attempted to reach patient for scheduled check in, but was unsuccessful. The phone went straight voicemail so [facility] left a message requesting a call back.  Plan to attempt again.      DENI Humphries  Behavioral Health [place] ([facility])   [facility], & [facility]  [phone]

## 2024-08-13 ENCOUNTER — VIRTUAL VISIT (OUTPATIENT)
Dept: BEHAVIORAL HEALTH | Facility: CLINIC | Age: 64
End: 2024-08-13
Payer: MEDICARE

## 2024-08-13 DIAGNOSIS — F33.2 MDD (MAJOR DEPRESSIVE DISORDER), RECURRENT SEVERE, WITHOUT PSYCHOSIS (H): ICD-10-CM

## 2024-08-13 DIAGNOSIS — F41.1 GAD (GENERALIZED ANXIETY DISORDER): Primary | ICD-10-CM

## 2024-08-13 PROCEDURE — 90832 PSYTX W PT 30 MINUTES: CPT | Mod: 93

## 2024-08-13 NOTE — PROGRESS NOTES
"[facility] Primary Care: Integrated Behavioral Health  2024    Behavioral Health Clinician Progress Note    Patient Name: Paula Ho        Service Type:  Individual      Service Location:   Phone call (patient / identified key support person reached)     Session Start Time: 12:30pm Session End Time: 12:59pm      Session Length: 16 - 37      Attendees: Patient     Service Modality:  Phone Visit:      Provider verified identity through the following two step process.  Patient provided:  Patient  and Patient is known previously to provider    Telephone Visit: The patient's condition can be safely assessed and treated via synchronous audio telemedicine encounter.      Reason for Audio Telemedicine Visit: Patient has requested telehealth visit    Originating Site (Patient Location): Patient's home    Distant Site (Provider Location): [facility] MENTAL [facility] & ADDICTION [facility]    Consent:  The patient/guardian has verbally consented to:     1. The potential risks and benefits of telemedicine (telephone visit) versus in person care;    The patient has been notified of the following:      \"We have found that certain health care needs can be provided without the need for a face to face visit.  This service lets us provide the care you need with a phone conversation.       I will have full access to your [facility] medical record during this entire phone call.   I will be taking notes for your medical record.      Since this is like an office visit, we will bill your insurance company for this service.       There are potential benefits and risks of telephone visits (e.g. limits to patient confidentiality) that differ from in-person visits.?Confidentiality still applies for telephone services, and nobody will record the visit.  It is important to be in a quiet, private space that is free of distractions (including cell phone or other devices) during the " "visit.??      If during the course of the call I believe a telephone visit is not appropriate, you will not be charged for this service\"     Consent has been obtained for this service by care team member: Yes   Visit Activities (Refresh list every visit): NEW, [facility] [place] Only, and Referral - Mental Health    Diagnostic Assessment Date: Will complete in the next few sessions, not completed due to time constraints.    Treatment Plan Review Date: Will complete in the next few sessions, not completed due to time constraints.    See Flowsheets for today's PHQ-9 and LIBRA-7 results  Previous PHQ-9:       6/4/2024     1:00 PM 6/24/2024     8:26 AM 7/30/2024     1:58 PM   PHQ-9 SCORE   PHQ-9 Total Score MyChart  9 (Mild depression) 5 (Mild depression)   PHQ-9 Total Score 12 9 5     Previous LIBRA-7:       5/21/2024     3:04 PM 6/4/2024     2:00 PM 7/30/2024     1:58 PM   LIBRA-7 SCORE   Total Score 9 (mild anxiety)  8 (mild anxiety)   Total Score  11 8    8    8     DATA  Extended Session (60+ minutes): No  Interactive Complexity: No  Crisis: No  [facility] Patient: Yes, addressed the follow [facility] Core Component(s):                          Health and Wellness Promotion    Treatment Objective(s) Addressed in This Session:  Target Behavior(s): disease management/lifestyle changes ongoing anxiety    Anxiety: will experience a reduction in anxiety, will develop more effective coping skills to manage anxiety symptoms, will develop healthy cognitive patterns and beliefs, and will increase ability to function adaptively    Current Stressors / Issues:  Pt discussed her stressors including finances and relationships.  She stated that her ex has not been at her home and she was setting boundaries.  Praised her for setting limits.  Stated that she was thinking about leaving town for a bit to take a break, encouraged her take care of herself and focus on her needs.   Reviewed anxiety and coping skills.      Progress on Treatment Objective(s) / " Homework:  Stable - PREPARATION (Decided to change - considering how); Intervened by negotiating a change plan and determining options / strategies for behavior change, identifying triggers, exploring social supports, and working towards setting a date to begin behavior change    Motivational Interviewing    MI Intervention: Expressed Empathy/Understanding, Supported Autonomy, Collaboration, Evocation, Permission to raise concern or advise, and Reflections: simple and complex     Change Talk Expressed by the Patient: Desire to change Ability to change    Provider Response to Change Talk: A - Affirmed patient's thoughts, decisions, or attempts at behavior change and R - Reflected patient's change talk    Also provided psychoeducation about behavioral health condition, symptoms, and treatment options    Assessments completed prior to visit: pt did not complete them.    The following assessments were completed by patient for this visit:  PHQ9:       1/26/2024     2:04 PM 2/2/2024     2:13 PM 4/1/2024    12:46 PM 5/21/2024     2:51 PM 6/4/2024     1:00 PM 6/24/2024     8:26 AM 7/30/2024     1:58 PM   PHQ-9 SCORE   PHQ-9 Total Score MyChart 14 (Moderate depression) 7 (Mild depression) 13 (Moderate depression) 12 (Moderate depression)  9 (Mild depression) 5 (Mild depression)   PHQ-9 Total Score 14 7 13    13 12 12 9 5     GAD7:       10/26/2023     9:10 AM 12/15/2023     1:00 PM 4/2/2024     2:40 PM 5/21/2024     3:03 PM 5/21/2024     3:04 PM 6/4/2024     2:00 PM 7/30/2024     1:58 PM   LIBRA-7 SCORE   Total Score 4 (minimal anxiety)  8 (mild anxiety)  9 (mild anxiety)  8 (mild anxiety)   Total Score 4    4 10 8 9  11 8    8    8     CAGE-AID:        No data to display              PROMIS 10-Global Health (only subscores and total score):       5/1/2023    10:40 AM 5/10/2023     3:47 PM 8/18/2023     2:12 PM 7/30/2024     2:00 PM   PROMIS-10 Scores Only   Global Mental Health Score 8        8 7 6     10        10   Global  "Physical Health Score 8        8 9 15     5        5   PROMIS TOTAL - SUBSCORES 16        16 16 21     15        15       Information is confidential and restricted. Go to Review Flowsheets to unlock data.    Multiple values from one day are sorted in reverse-chronological order     [place] Suicide Severity Rating Scale (Lifetime/Recent)      10/10/2019     3:54 PM 2/14/2020     4:00 PM 10/31/2022    11:18 AM   [place] Suicide Severity Rating (Lifetime/Recent)   Q1 Wish to be Dead (Lifetime) Yes No    Comments Often due to pain and loss. \"I hate to be alone.\"     Q2 Non-Specific Active Suicidal Thoughts (Lifetime) Yes Yes    Non-Specific Active Suicidal Thought Description (Lifetime) Often due to pain and loss. \"I hate to be alone.\" Did attempt in the past, 15 years ago.     Most Severe Ideation Rating (Lifetime) 4 3    Most Severe Ideation Description (Lifetime)  Did attempt in the past, 15 years ago. Not fully assessed since Paula needed to leave the session early.     Frequency (Lifetime)  3    Duration (Lifetime)  3    Controllability (Lifetime)  3    Protective Factors  (Lifetime)  4    Reasons for Ideation (Lifetime)  4    RETIRED: 1. Wish to be Dead (Recent) Yes     RETIRED: Wish to be Dead Description (Recent) Often due to pain and loss. \"I hate to be alone.\" No plan or intention.     RETIRED: 2. Non-Specific Active Suicidal Thoughts (Recent) Yes     Non-Specific Active Suicidal Thought Description (Recent) Often due to pain and loss. \"I hate to be alone.\" No plan or intention.     3. Active Suicidal Ideation with any Methods (Not Plan) Without Intent to Act (Lifetime) Yes Yes    RETIRE: Active Suicidal Ideation with any Methods (Not Plan) Description (Lifetime) Often due to pain and loss. \"I hate to be alone.\" Did attempt in the past, 15 years ago.     RETIRED: 3. Active Suicidal Ideation with any Methods (Not Plan) Without Intent to Act (Recent) Yes     RETIRED: Active Suicidal Ideation with any Methods " "(Not Plan) Description (Recent) Often due to pain and loss. \"I hate to be alone.\" No plan or intention.     RETIRE: 4. Active Suicidal Ideation with Some Intent to Act, Without Specific Plan (Lifetime) Yes Yes    RETIRE: Active Suicidal Ideation with Some Intent to Act, Without Specific Plan Description (Lifetime) Did attempt in the past, 15 years ago.     4. Active Suicidal Ideation with Some Intent to Act, Without Specific Plan (Recent) No No    RETIRE: 5. Active Suicidal Ideation with Specific Plan and Intent (Lifetime) Yes     RETIRE: Active Suicidal Ideation with Specific Plan and Intent Description (Lifetime) Did attempt in the past, 15 years ago.     RETIRED: 5. Active Suicidal Ideation with Specific Plan and Intent (Recent) No     Most Severe Ideation Rating (Past Month) 2 1    Most Severe Ideation Description (Past Month) Regular thoughts of being dead due to chronic pain and loss.     Frequency (Past Month) 3 2    Duration (Past Month) 1     Controllability (Past Month) 2 3    Protective Factors (Past Month) NA 2    Reasons for Ideation (Past Month)  4    Actual Attempt (Lifetime)  Yes    Comments  OD 25 years ago was hospitalized none since    Has subject engaged in non-suicidal self-injurious behavior? (Past 3 Months)  Yes    Comments  burning arms in oven since marriage in 2014.    Comments  25 years ago    Most Recent Attempt Actual Lethality Code NA 1    Most Lethal Attempt Actual Lethality Code NA     Initial/First Attempt Actual Lethality Code NA     Q1 Wish to be Dead (Lifetime)   Y   Wish to be Dead Description (Lifetime)   After a breakup, she reports that she was around [age]   1. Wish to be Dead (Past 1 Month)   N   Q2 Non-Specific Active Suicidal Thoughts (Lifetime)   Y   2. Non-Specific Active Suicidal Thoughts (Past 1 Month)   N   3. Active Suicidal Ideation with any Methods (Not Plan) Without Intent to Act (Lifetime)   Y   Q3 Active Suicidal Ideation with any Methods (Not Plan) Without " Intent to Act (Past 1 Month)   N   Q4 Active Suicidal Ideation with Some Intent to Act, Without Specific Plan (Lifetime)   N   Q5 Active Suicidal Ideation with Specific Plan and Intent (Lifetime)   N   Description of Most Severe Ideation (Lifetime)   5   Description of Most Severe Ideation (Past 1 Month)   NA   Frequency (Lifetime)   3   Duration (Lifetime)   1   Controllability (Lifetime)   1   Deterrents (Lifetime)   0   Reasons for Ideation (Lifetime)   4   Actual Attempt (Lifetime)   Y   Total Number of Actual Attempts (Lifetime)   1   Actual Attempt Description (Lifetime)   Patient states she attempted through medication.   Actual Attempt (Past 3 Months)   N   Has subject engaged in non-suicidal self-injurious behavior? (Lifetime)   Y   Has subject engaged in non-suicidal self-injurious behavior? (Past 3 Months)   N   Interrupted Attempts (Lifetime)   N   Preparatory Acts or Behavior (Lifetime)   Y   Total Number of Preparatory Acts (Lifetime)   1   Actual Lethality/Medical Damage Code (Most Recent Attempt)   2   Potential Lethality Code (Most Recent Attempt)   1   Actual Lethality/Medical Damage Code (Most Lethal Attempt)   2   Potential Lethality Code (Most Lethal Attempt)   1   Calculated C-SSRS Risk Score (Lifetime/Recent)   Moderate Risk     Care Plan review completed: Yes    Medication Review:  No changes to current psychiatric medication(s)    Medication Compliance:  NA    Changes in Health Issues:   None reported    Chemical Use Review:   Substance Use: Chemical use reviewed, no active concerns identified      Tobacco Use: not discussed    Assessment: Current Emotional / Mental Status (status of significant symptoms):  Risk status (Self / Other harm or suicidal ideation)  Patient  Not discussed this session  Patient denies current fears or concerns for personal safety.  Patient denies current or recent suicidal ideation or behaviors.  Patient denies current or recent homicidal ideation or  behaviors.  Patient denies current or recent self injurious behavior or ideation.  Patient denies other safety concerns.  A safety and risk management plan has not been developed at this time, however patient was encouraged to call [place] [phone] should there be a change in any of these risk factors.    Appearance:   Unable to assess  Eye Contact:   Unable to assess   Psychomotor Behavior: Unable to assess   Attitude:   Cooperative   Orientation:   All  Speech   Rate / Production: Normal    Volume:  Normal   Mood:    Normal  Affect:    Appropriate   Thought Content:  Clear   Thought Form:  Coherent  Logical   Insight:    Fair     Diagnoses:  1. LIBRA (generalized anxiety disorder)    2. MDD (major depressive disorder), recurrent severe, without psychosis (H)      Collateral Reports Completed:  Not Applicable    Plan: (Homework, other):  Patient was given information about behavioral services and encouraged to schedule a follow up appointment with the clinic [facility] in 2 weeks.  She was also given information about mental health symptoms and treatment options .  CD Recommendations: No indications of CD issues.     Pam Arboleda, [facility] 8/13/24

## 2024-08-16 ENCOUNTER — VIRTUAL VISIT (OUTPATIENT)
Dept: BEHAVIORAL HEALTH | Facility: CLINIC | Age: 64
End: 2024-08-16
Payer: MEDICARE

## 2024-08-16 DIAGNOSIS — R69 DIAGNOSIS DEFERRED: Primary | ICD-10-CM

## 2024-08-16 PROCEDURE — 99207 PR NO BILLABLE SERVICE THIS VISIT: CPT | Mod: 93

## 2024-08-16 NOTE — PROGRESS NOTES
"Behavioral Health Home Services  [facility]: [place]        Social Work Care Navigator Note      Patient: Paula Ho  Date: August 16, 2024  Preferred Name: Paula    Previous PHQ-9:       6/4/2024     1:00 PM 6/24/2024     8:26 AM 7/30/2024     1:58 PM   PHQ-9 SCORE   PHQ-9 Total Score MyChart  9 (Mild depression) 5 (Mild depression)   PHQ-9 Total Score 12 9 5     Previous LIBRA-7:       5/21/2024     3:04 PM 6/4/2024     2:00 PM 7/30/2024     1:58 PM   LIBRA-7 SCORE   Total Score 9 (mild anxiety)  8 (mild anxiety)   Total Score  11 8    8    8     ARNALDO LEVEL:       No data to display                Preferred Contact:  Need for : No  Preferred Contact: Cell      Type of Contact Today: Phone call (patient / identified key support person reached)    Service Modality: Phone Visit:      Provider verified identity through the following two step process.  Patient provided:  Patient is known previously to provider    Telephone Visit: The patient's condition can be safely assessed and treated via synchronous audio telemedicine encounter.      Reason for Audio Telemedicine Visit: Patient has requested telehealth visit    Originating Site (Patient Location): Patient's home    Distant Site (Provider Location): Provider Remote Setting- Home Office    Consent:  The patient/guardian has verbally consented to:     1. The potential risks and benefits of telemedicine (telephone visit) versus in person care;    The patient has been notified of the following:      \"We have found that certain health care needs can be provided without the need for a face to face visit.  This service lets us provide the care you need with a phone conversation.       I will have full access to your [facility] medical record during this entire phone call.   I will be taking notes for your medical record.      Since this is like an office visit, we will bill your insurance company for this service.       There are potential benefits and " "risks of telephone visits (e.g. limits to patient confidentiality) that differ from in-person visits.?Confidentiality still applies for telephone services, and nobody will record the visit.  It is important to be in a quiet, private space that is free of distractions (including cell phone or other devices) during the visit.??      If during the course of the call I believe a telephone visit is not appropriate, you will not be charged for this service\"     Consent has been obtained for this service by care team member: Yes       Data: (subjective / Objective):  Recent ED/IP Admission or Discharge?   None    Patient Goals:  Goal Areas: Health; Mental Health; Future HAP Goal area; Chemical Health  Patient stated goals: Health: Paula would like to continue to meet with her providers, and take her medications as prescribed.   -Paula would like to start working with the weight management clinic to assist with losing weight.   -Paula would like to go to the gym at least three times per week.   -Paula would like to find a nutrition plan that would work for her and help her to lose weight.   Mental Health: Paula would like to find a new therapist within [place] and receive support from Behavioral Health Clinician with this. She will also  continue to receive assistance with [facility] for monthly check ins. She would like to continue working with an [facility] worker and Peer Support Person through the same agency.   -She reported that she wants to build herself back to feel like somebody and take it day by day.   Chemical Health: Paula would like assistance from her providers to quit smoking and will work on reducing her use, so she can be approved for back surgery.   Future goals: Paula is going to consider back surgery in the future after losing the recommended weight.    [facility] Core Service Provided:  Comprehensive Care Management: utilized the electronic medical record / patient registry to identify and support patient's health " conditions / needs more effectively   Care Transitions: focused on the coordinated and seamless movement of patient between or within different levels of care or settings  Care Coordination: provided care management services/referrals necessary to ensure patient and their identified supports have access to medical, behavioral health, pharmacology and recovery support services.  Ensured that patient's care is integrated across all settings and services.   Individual and Family Support: aimed to help clients reduce barriers to achieving goals, increase health literacy and knowledge about chronic condition(s), increase self-efficacy skills, and improve health outcomes    Current Stressors / Issues / Care Plan Objective Addressed Today:  SWCC and patient were able to meet today for Behavioral Health Home ([facility]) monthly check-in via telehealth visit. All required ROIs have been filed with HIM/patient chart.    - Patient reports being upset about her psychiatrist, Tigist, retiring and leaving [place]. SWCC and patient discussed terminations with providers being part of the therapeutic/health relationships. Patient doesn't have an appointment scheduled with Tigist right now, so she would like to try to get scheduled with her before Tigist leaves. SWCC called the [facility] line to trying scheduling with Tigist. The intake  said there are no available appointments and she reports that she'll call patient back because she's waiting on the transition plan from her supervisor for Tigist's transfer plan.     - Patient reports she went to court for the business investment issue she had. She reports that this person admitted to the  that he has the money, but he hasn't sold the investment supplies in order to pay her back. The  ruled that there's no win either way because it was an investment versus a loan. She reported that she wants to look into if there's anything else she can do. [facility] will mail patient legal resources  for [facility]  and [facility], so she can discuss with a .     - Patient is still meeting with [facility] Pam and she's doing well with this.     - Patient reports that she is meeting with her pain provider on 8/28.    - Patient reported that she's considering moving maybe in the next year because of the stairs. She really likes her place, but she doesn't think that she can manage the stairs much longer. She reports that she's also going to see if there's mold in her house because she hasn't been feeling well. Someone is going to come over and check under the carpet.     Intervention:  Motivational Interviewing: Expressed Empathy/Understanding, Supported Autonomy, Collaboration, Evocation, Permission to raise concern or advise, Open-ended questions, and Reflections: simple and complex   Target Behavior(s): Explored thoughts about taking an anti-depressant, Explored and resolved challenges related to taking anti-depressants as prescribed, Explored thoughts and readiness to participate in individual therapy, Explored and resolved challenges to attending appointments as scheduled, and Explored current social supports and reinforced opportunities to increase engagement    Assessment: (Progress on Goals / Homework):  Patient would benefit from continued coordination in reaching their goals set for the Behavioral Health Home ([facility]) program. [facility] reviewed Health Action Plan goals and will continue to monitor progress and work with patient and their care team.    Plan: (Homework, other):  Patient was encouraged to continue to seek condition-related information and education.      Scheduled a Phone follow up appointment with SW  in 4 weeks     Patient has set self-identified goals and will monitor progress until the next appointment on: 9/4/24.         DENI Humphries  Behavioral Health [place] ([facility])   [facility] [place], &  [facility]  [phone]

## 2024-08-16 NOTE — LETTER
Paula Ho  [address] [address] [address]         Dear Paula,      In our last check in, you requested legal resources for low cost/free legal services. You also asked them to be mailed to you, so below are the two law offices we discussed that serve your county.       [facility]    -[email or web address]  [place] Office   [address]  [place] [address] [phone] or [phone]      [facility]   [address]  [place], [place] [id number] [phone] or [phone]      Let me know if you have any other questions and I look forward to talking with your at our next scheduled check in.     Take care,   JOSH House  Behavioral Health Home ([facility])   [facility], & [facility]  [phone]

## 2024-08-27 ENCOUNTER — VIRTUAL VISIT (OUTPATIENT)
Dept: BEHAVIORAL HEALTH | Facility: CLINIC | Age: 64
End: 2024-08-27
Payer: MEDICARE

## 2024-08-27 DIAGNOSIS — F33.2 MDD (MAJOR DEPRESSIVE DISORDER), RECURRENT SEVERE, WITHOUT PSYCHOSIS (H): ICD-10-CM

## 2024-08-27 DIAGNOSIS — F41.1 GAD (GENERALIZED ANXIETY DISORDER): Primary | ICD-10-CM

## 2024-08-27 PROCEDURE — 90832 PSYTX W PT 30 MINUTES: CPT | Mod: 93
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
## 2024-09-04 ENCOUNTER — TELEPHONE (OUTPATIENT)
Dept: BEHAVIORAL HEALTH | Facility: CLINIC | Age: 64
End: 2024-09-04
Payer: MEDICARE

## 2024-09-04 NOTE — TELEPHONE ENCOUNTER
[facility] called patient for appointment and patient reports she's at her mom's because she was sick and patient decided to go help her mom at her house. Patient reports her anxiety increased since she's been there and she is going to drive home tonight. They rescheduled for next week on 9/11 for check in.     DENI Humphries  Behavioral Health Home ([facility])   [facility], [facility], & [facility]  [phone]

## 2024-09-11 ENCOUNTER — VIRTUAL VISIT (OUTPATIENT)
Dept: BEHAVIORAL HEALTH | Facility: CLINIC | Age: 64
End: 2024-09-11
Payer: MEDICARE

## 2024-09-11 DIAGNOSIS — R69 DIAGNOSIS DEFERRED: Primary | ICD-10-CM
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
## 2024-09-16 ENCOUNTER — NURSE TRIAGE (OUTPATIENT)
Dept: FAMILY MEDICINE | Facility: CLINIC | Age: 64
End: 2024-09-16
Payer: MEDICARE

## 2024-09-16 NOTE — TELEPHONE ENCOUNTER
"Patient reports right side facial pain around 3-4 out of 10, under her right eye. No fever, covid negative. Reports cough for 2 weeks and congestion 1 week. Reports she coughs yellow and green phlegm in the morning but nothing afterwards. She states the right side is completely congested but the left she can breathe through. Has been using at home medications, nebs, inhaler, feels out of breath when she's active but is fine at rest. Feels she is getting sicker and would like to be seen. Reports covid negative. Patient also reports \"I took some doxycycline antibiotics and that didn't do anything.\"     Patient offered urgent care or appointment, patient chose appointment with PCP tomorrow. Will continue at home support until appointment tomorrow or be seen if worses today. No further questions at this time.     Quincy Huitron, ARENN, RN, PHN  [facility], [place], [place]           Reason for Disposition   Lots of coughing    Additional Information   Negative: SEVERE difficulty breathing (e.g., struggling for each breath, speaks in single words)   Negative: Sounds like a life-threatening emergency to the triager   Negative: [1] Sinus infection AND [2] taking an antibiotic AND [3] symptoms continue   Negative: [1] Difficulty breathing AND [2] not from stuffy nose (e.g., not relieved by cleaning out the nose)   Negative: [1] SEVERE headache AND [2] fever   Negative: [1] Redness or swelling on the cheek, forehead or around the eye AND [2] fever   Negative: Fever > 104 F (40 C)   Negative: Patient sounds very sick or weak to the triager   Negative: [1] SEVERE pain AND [2] not improved 2 hours after pain medicine   Negative: [1] Redness or swelling on the cheek, forehead or around the eye AND [2] no fever   Negative: [1] Fever > 101 F (38.3 C) AND [2] age > 60 years   Negative: [1] Fever > 100.0 F (37.8 C) AND [2] bedridden (e.g., CVA, chronic illness, recovering from surgery)   " "Negative: [1] Fever > 100.0 F (37.8 C) AND [2] diabetes mellitus or weak immune system (e.g., HIV positive, cancer chemo, splenectomy, organ transplant, chronic steroids)   Negative: Fever present > 3 days (72 hours)   Negative: [1] Fever returns after gone for over 24 hours AND [2] symptoms worse or not improved   Negative: [1] Sinus pain (not just congestion) AND [2] fever   Negative: Earache   Negative: [1] Sinus congestion (pressure, fullness) AND [2] present > 10 days   Negative: [1] Nasal discharge AND [2] present > 10 days   Negative: [1] Using nasal washes and pain medicine > 24 hours AND [2] sinus pain (around cheekbone or eye) persists    Answer Assessment - Initial Assessment Questions  1. LOCATION: \"Where does it hurt?\"       Under right eye pressure  2. ONSET: \"When did the sinus pain start?\"  (e.g., hours, days)       About 7 days ago, cough 2 weeks  3. SEVERITY: \"How bad is the pain?\"   (Scale 1-10; mild, moderate or severe)    - MILD (1-3): doesn't interfere with normal activities     - MODERATE (4-7): interferes with normal activities (e.g., work or school) or awakens from sleep    - SEVERE (8-10): excruciating pain and patient unable to do any normal activities         4  4. RECURRENT SYMPTOM: \"Have you ever had sinus problems before?\" If Yes, ask: \"When was the last time?\" and \"What happened that time?\"       Have had one sinus infection before.  5. NASAL CONGESTION: \"Is the nose blocked?\" If Yes, ask: \"Can you open it or must you breathe through your mouth?\"      Congested can't breathe in   6. NASAL DISCHARGE: \"Do you have discharge from your nose?\" If so ask, \"What color?\"      Just back of throat  7. FEVER: \"Do you have a fever?\" If Yes, ask: \"What is it, how was it measured, and when did it start?\"       No fever   8. OTHER SYMPTOMS: \"Do you have any other symptoms?\" (e.g., sore throat, cough, earache, difficulty breathing)      Cough, difficulty breathing, hard to breathe through nose, yes " "to COPD  9. PREGNANCY: \"Is there any chance you are pregnant?\" \"When was your last menstrual period?\"      NA    Protocols used: Sinus Pain or Congestion-A-AH    "

## 2024-09-17 ENCOUNTER — ANCILLARY PROCEDURE (OUTPATIENT)
Dept: GENERAL RADIOLOGY | Facility: CLINIC | Age: 64
End: 2024-09-17
Attending: INTERNAL MEDICINE
Payer: MEDICARE

## 2024-09-17 ENCOUNTER — OFFICE VISIT (OUTPATIENT)
Dept: FAMILY MEDICINE | Facility: CLINIC | Age: 64
End: 2024-09-17
Payer: MEDICARE

## 2024-09-17 DIAGNOSIS — J32.0 RIGHT MAXILLARY SINUSITIS: Primary | ICD-10-CM

## 2024-09-17 DIAGNOSIS — J20.9 BRONCHITIS, CHRONIC OBSTRUCTIVE W ACUTE BRONCHITIS (H): ICD-10-CM

## 2024-09-17 DIAGNOSIS — J44.0 BRONCHITIS, CHRONIC OBSTRUCTIVE W ACUTE BRONCHITIS (H): ICD-10-CM

## 2024-09-17 PROCEDURE — 71046 X-RAY EXAM CHEST 2 VIEWS: CPT | Mod: TC | Performed by: RADIOLOGY

## 2024-09-17 PROCEDURE — G2211 COMPLEX E/M VISIT ADD ON: HCPCS | Performed by: INTERNAL MEDICINE

## 2024-09-17 PROCEDURE — 70220 X-RAY EXAM OF SINUSES: CPT | Mod: TC | Performed by: INTERNAL MEDICINE

## 2024-09-17 PROCEDURE — 99214 OFFICE O/P EST MOD 30 MIN: CPT | Performed by: INTERNAL MEDICINE

## 2024-09-17 RX ORDER — CEFDINIR 300 MG/1
300 CAPSULE ORAL 2 TIMES DAILY
Qty: 20 CAPSULE | Refills: 2 | Status: SHIPPED | OUTPATIENT
Start: 2024-09-17

## 2024-09-17 RX ORDER — PREDNISONE 10 MG/1
10 TABLET ORAL EVERY MORNING
Qty: 10 TABLET | Refills: 2 | Status: SHIPPED | OUTPATIENT
Start: 2024-09-18

## 2024-09-17 ASSESSMENT — PATIENT HEALTH QUESTIONNAIRE - PHQ9
10. IF YOU CHECKED OFF ANY PROBLEMS, HOW DIFFICULT HAVE THESE PROBLEMS MADE IT FOR YOU TO DO YOUR WORK, TAKE CARE OF THINGS AT HOME, OR GET ALONG WITH OTHER PEOPLE: EXTREMELY DIFFICULT
SUM OF ALL RESPONSES TO PHQ QUESTIONS 1-9: 15
SUM OF ALL RESPONSES TO PHQ QUESTIONS 1-9: 15

## 2024-09-17 ASSESSMENT — ENCOUNTER SYMPTOMS: COUGH: 1

## 2024-09-17 NOTE — PROGRESS NOTES
[facility] Internal Medicine Progress Note           Assessment and Plan:     Right maxillary sinusitis  - cefdinir (OMNICEF) 300 MG capsule; Take 1 capsule (300 mg) by mouth 2 times daily.  - XR Sinus Complete G/E 3 Views  - XR Chest 2 Views    Bronchitis, chronic obstructive w acute bronchitis (H)  - XR Chest 2 Views  - predniSONE (DELTASONE) 10 MG tablet; Take 1 tablet (10 mg) by mouth every morning.          Interval History:     RESPIRATORY SYMPTOMS    Duration: two weeks.  Description  nasal congestion, facial pain/pressure, and cough  Severity: moderate  Accompanying signs and symptoms: insomnia due to postnasal drainage.  History (predisposing factors):  COPD and tobacco abuse  Precipitating or alleviating factors: unknown  Therapies tried and outcome:  none            Significant Problems:     Patient Active Problem List   Diagnosis    Chronic knee pain    LIBRA (generalized anxiety disorder)    Brain aneurysm    Chronic, continuous use of opioids    Asthma    Chronic GERD    Chronic pain    Cigarette smoker    DJD (degenerative joint disease)    Insomnia    Morbid obesity (H)    History of subarachnoid hemorrhage    Status post knee surgery    Tobacco use disorder    Chronic obstructive pulmonary disease, unspecified COPD type (H)    MDD (major depressive disorder), recurrent severe, without psychosis (H)    Attention deficit hyperactivity disorder (ADHD)    Chronic midline low back pain with bilateral sciatica    Hyperlipidemia    Benign essential hypertension    Infection due to 2019 novel coronavirus    Opioid dependence with current use (H)    Angiolipoma of right kidney    Adrenal nodule (H24)              Review of Systems:     CONSTITUTIONAL: NEGATIVE for fever, chills, change in weight  INTEGUMENTARY/SKIN: NEGATIVE for worrisome rashes, moles or lesions  EYES: NEGATIVE for vision changes or irritation  ENT/MOUTH: NEGATIVE for ear, mouth and throat problems  RESP:NEGATIVE for hemoptysis  and pleurisy  BREAST: NEGATIVE for masses, tenderness or discharge  CV: NEGATIVE for chest pain, palpitations or peripheral edema  GI: NEGATIVE for nausea, abdominal pain, heartburn, or change in bowel habits  : NEGATIVE for frequency, dysuria, or hematuria  MUSCULOSKELETAL: NEGATIVE for significant arthralgias or myalgia  NEURO: NEGATIVE for weakness, dizziness or paresthesias  ENDOCRINE: NEGATIVE for temperature intolerance, skin/hair changes  HEME: NEGATIVE for bleeding problems  PSYCHIATRIC: NEGATIVE for changes in mood or affect              Physical Exam:   /85 (BP Location: Right arm, Patient Position: Sitting, Cuff Size: Adult Large)   Pulse 80   Temp 97.7  F (36.5  C)   LMP  (LMP Unknown)    Constitutional:   fatigued, alert, cooperative, no apparent distress, and appears stated age     Eyes:   extra-ocular muscles intact and sclera clear     ENT:   normocephalic, without obvious abnormality     Lungs:   no increased work of breathing, no retractions, and harsh breath sounds bilaterally.     Cardiovascular:   regular rate and rhythm     Neurologic:   Motor Exam:  moves all extremities well and symmetrically     Neuropsychiatric:   Affect: flat             Data:   Epic reviewed.     Disposition:  Follow-up in 4 weeks or as needed.    Renan Dickerson MD  Internal Medicine  [facility] Team

## 2024-09-17 NOTE — PATIENT INSTRUCTIONS
At [facility], we strive to deliver an exceptional experience to you, every time we see you. If you receive a survey, please let us know what we are doing well and/or what we could improve upon, as we do value your feedback.  If you have MyChart, you can expect to receive results automatically within 24 hours of their completion.  Your provider will send a note interpreting your results as well.   If you do not have MyChart, you should receive your results in about a week by mail.    Your care team:                            Family Medicine Internal Medicine   MD Renan Chambers, MD Aranza Piña, MD Hector Vargas, MD Sheryl Mora, PABunnyC    Esdras Dobson, MD Pediatrics   Joie Nobles, MD Dorie Rashid, MD Stacey Faith, APRN CNP Alison Venegas APRN CNP   MD Inocencia Pacheco, MD Bree Pena, CNP     Chance Vazquez, CNP Same-Day Provider (No follow-up visits)   LEONA Davidson, DNP Geovanna Kaur, LEONA Veliz, FNP, BC JAVON GuzmanC     Clinic hours: Monday - Thursday 7 am-6 pm; Fridays 7 am-5 pm.   Urgent care: Monday - Friday 10 am- 8 pm; Saturday and Sunday 9 am-5 pm.    Clinic: [phone]       [facility]: Monday - Thursday 8 am - 7 pm; Friday 8 am - 6 pm  [facility]: [phone]

## 2024-09-18 ENCOUNTER — TELEPHONE (OUTPATIENT)
Dept: FAMILY MEDICINE | Facility: CLINIC | Age: 64
End: 2024-09-18

## 2024-09-18 NOTE — TELEPHONE ENCOUNTER
This writer attempted to contact patient on 09/18/24      Reason for call results and left message.      If patient calls back:   Registered Nurse called. Relay provider message below.        Gabbie Murray RN          ----- Message from Renan Dickerson sent at 9/18/2024 12:44 PM CDT -----  Despite normal sinus x-rays and normal chest x-rays, continue Cefdinir and Prednisone for bronchitis.

## 2024-09-19 NOTE — TELEPHONE ENCOUNTER
Called and spoke with patient. Relayed Dr. Dickerson's message. Patient states she hasn't picked up prednisone. She thinks she had an allergic reaction sometime last year. Vaguely remembers discussing this with her pain DrBen States her hands swelled up and they were trying to determine the cause, and suspected it was due to prednisone. Patient states she feels the same as two days ago. Cough is the same, every once in awhile coughs up yellow or green mucus. No breathing changes. Still congested. Has started cefdinir and has been taking for two days.     Advised I will send message to Dr. Dickerson and if any further guidance, we will call the pt back.     Thelma Leach RN    [facility]- Primary Care

## 2024-09-26 VITALS — TEMPERATURE: 97.7 F | HEART RATE: 80 BPM | SYSTOLIC BLOOD PRESSURE: 138 MMHG | DIASTOLIC BLOOD PRESSURE: 85 MMHG

## 2024-10-09 ENCOUNTER — VIRTUAL VISIT (OUTPATIENT)
Dept: BEHAVIORAL HEALTH | Facility: CLINIC | Age: 64
End: 2024-10-09
Payer: MEDICARE

## 2024-10-09 DIAGNOSIS — R69 DIAGNOSIS DEFERRED: Primary | ICD-10-CM
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
## 2024-10-17 ENCOUNTER — VIRTUAL VISIT (OUTPATIENT)
Dept: BEHAVIORAL HEALTH | Facility: CLINIC | Age: 64
End: 2024-10-17
Payer: MEDICARE

## 2024-10-17 DIAGNOSIS — F41.1 GAD (GENERALIZED ANXIETY DISORDER): ICD-10-CM

## 2024-10-17 DIAGNOSIS — F33.2 MDD (MAJOR DEPRESSIVE DISORDER), RECURRENT SEVERE, WITHOUT PSYCHOSIS (H): Primary | ICD-10-CM

## 2024-10-17 PROCEDURE — 99207 PR NO CHARGE BEHAVIORAL WARM HANDOFF: CPT | Mod: 93
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
## 2024-10-22 ENCOUNTER — TELEPHONE (OUTPATIENT)
Dept: FAMILY MEDICINE | Facility: CLINIC | Age: 64
End: 2024-10-22
Payer: MEDICARE

## 2024-10-22 NOTE — TELEPHONE ENCOUNTER
Forms/Letter Request    Type of form/letter: OTHER: Drug Prior Authorization Semaglutide ([place])       Do we have the form/letter: Yes: Faxed in    Who is the form from? [employer] (if other please explain)    Where did/will the form come from? form was faxed in    When is form/letter needed by: ASAP    How would you like the form/letter returned: Fax : [phone]    Patient Notified form requests are processed in 5-7 business days:Yes    Okay to leave a detailed message?: Yes at Cell number on file:    Telephone Information:   Mobile [phone]

## 2024-10-22 NOTE — TELEPHONE ENCOUNTER
Form received and placed in provider's bin to complete section one and sign and date form. --TC has copy of form.     Provider--Please prescribe medication so we may submit this PA electronically.

## 2024-10-22 NOTE — TELEPHONE ENCOUNTER
Pt spoke with [facility] regarding [employer] coverage. Per pt, [facility] is going to fax over paperwork for provider to complete. Pt states [facility] is going to cover [employer] if forms completed by provider.     Routing to  basket and provider.     Rose Mix RN

## 2024-10-28 ENCOUNTER — VIRTUAL VISIT (OUTPATIENT)
Dept: BEHAVIORAL HEALTH | Facility: CLINIC | Age: 64
End: 2024-10-28
Payer: MEDICARE

## 2024-10-28 DIAGNOSIS — F41.1 GAD (GENERALIZED ANXIETY DISORDER): ICD-10-CM

## 2024-10-28 DIAGNOSIS — F33.2 MDD (MAJOR DEPRESSIVE DISORDER), RECURRENT SEVERE, WITHOUT PSYCHOSIS (H): Primary | ICD-10-CM

## 2024-10-28 PROCEDURE — 90832 PSYTX W PT 30 MINUTES: CPT | Mod: 93

## 2024-10-28 NOTE — PROGRESS NOTES
"[facility] Primary Care: Integrated Behavioral Health  2024    Behavioral Health Clinician Progress Note    Patient Name: Paula Ho        Service Type:  Individual      Service Location:   Phone call (patient / identified key support person reached)     Session Start Time: 1:00pm Session End Time: 1:30pm      Session Length: 30 minutes     Attendees: Patient     Service Modality:  Phone Visit:      Provider verified identity through the following two step process.  Patient provided:  Patient  and Patient is known previously to provider    Telephone Visit: The patient's condition can be safely assessed and treated via synchronous audio telemedicine encounter.      Reason for Audio Telemedicine Visit: Patient has requested telehealth visit    Originating Site (Patient Location): Patient's home    Distant Site (Provider Location): [facility] MENTAL [facility] & ADDICTION [facility]    Consent:  The patient/guardian has verbally consented to:     1. The potential risks and benefits of telemedicine (telephone visit) versus in person care;    The patient has been notified of the following:      \"We have found that certain health care needs can be provided without the need for a face to face visit.  This service lets us provide the care you need with a phone conversation.       I will have full access to your [facility] medical record during this entire phone call.   I will be taking notes for your medical record.      Since this is like an office visit, we will bill your insurance company for this service.       There are potential benefits and risks of telephone visits (e.g. limits to patient confidentiality) that differ from in-person visits.?Confidentiality still applies for telephone services, and nobody will record the visit.  It is important to be in a quiet, private space that is free of distractions (including cell phone or other devices) during the " "visit.??      If during the course of the call I believe a telephone visit is not appropriate, you will not be charged for this service\"     Consent has been obtained for this service by care team member: Yes   Visit Activities (Refresh list every visit): [facility] Only    Diagnostic Assessment Date: Will complete in the next few sessions, not completed due to time constraints.    Treatment Plan Review Date: Will complete in the next few sessions, not completed due to time constraints.    See Flowsheets for today's PHQ-9 and LIBRA-7 results  Previous PHQ-9:       6/24/2024     8:26 AM 7/30/2024     1:58 PM 9/17/2024     2:47 PM   PHQ-9 SCORE   PHQ-9 Total Score MyChart 9 (Mild depression) 5 (Mild depression) 15 (Moderately severe depression)   PHQ-9 Total Score 9 5 15     Previous LIBRA-7:       5/21/2024     3:04 PM 6/4/2024     2:00 PM 7/30/2024     1:58 PM   LIBRA-7 SCORE   Total Score 9 (mild anxiety)  8 (mild anxiety)   Total Score  11 8    8    8     DATA  Extended Session (60+ minutes): No  Interactive Complexity: No  Crisis: No  [facility] Patient: Yes, addressed the follow [facility] Core Component(s):                          Health and Wellness Promotion    Treatment Objective(s) Addressed in This Session:  Target Behavior(s): disease management/lifestyle changes ongoing anxiety    Anxiety: will experience a reduction in anxiety, will develop more effective coping skills to manage anxiety symptoms, will develop healthy cognitive patterns and beliefs, and will increase ability to function adaptively    Current Stressors / Issues:  Pt was emotional, stated that her brother was moving and he was her main support.  Review what was in her control and focus on herself.  She struggled to manage her emotions during the session.  Allowed her to process her thoughts.  She requested to meet with writer later in the week.    Progress on Treatment Objective(s) / Homework:  Stable - PREPARATION (Decided to change - considering how); Intervened " by negotiating a change plan and determining options / strategies for behavior change, identifying triggers, exploring social supports, and working towards setting a date to begin behavior change    Motivational Interviewing    MI Intervention: Expressed Empathy/Understanding, Supported Autonomy, Collaboration, Evocation, Permission to raise concern or advise, and Reflections: simple and complex     Change Talk Expressed by the Patient: Desire to change Ability to change    Provider Response to Change Talk: A - Affirmed patient's thoughts, decisions, or attempts at behavior change and R - Reflected patient's change talk    Also provided psychoeducation about behavioral health condition, symptoms, and treatment options    Assessments completed prior to visit: pt did not complete them.    The following assessments were completed by patient for this visit:  PHQ9:       2/2/2024     2:13 PM 4/1/2024    12:46 PM 5/21/2024     2:51 PM 6/4/2024     1:00 PM 6/24/2024     8:26 AM 7/30/2024     1:58 PM 9/17/2024     2:47 PM   PHQ-9 SCORE   PHQ-9 Total Score MyChart 7 (Mild depression) 13 (Moderate depression) 12 (Moderate depression)  9 (Mild depression) 5 (Mild depression) 15 (Moderately severe depression)   PHQ-9 Total Score 7 13    13 12 12 9 5 15     GAD7:       10/26/2023     9:10 AM 12/15/2023     1:00 PM 4/2/2024     2:40 PM 5/21/2024     3:03 PM 5/21/2024     3:04 PM 6/4/2024     2:00 PM 7/30/2024     1:58 PM   ILBRA-7 SCORE   Total Score 4 (minimal anxiety)  8 (mild anxiety)  9 (mild anxiety)  8 (mild anxiety)   Total Score 4    4 10 8 9  11 8    8    8     CAGE-AID:        No data to display              PROMIS 10-Global Health (only subscores and total score):       5/1/2023    10:40 AM 5/10/2023     3:47 PM 8/18/2023     2:12 PM 7/30/2024     2:00 PM   PROMIS-10 Scores Only   Global Mental Health Score 8        8 7 6     10        10   Global Physical Health Score 8        8 9 15     5        5   PROMIS TOTAL -  "SUBSCORES 16        16 16 21     15        15       Information is confidential and restricted. Go to Review Flowsheets to unlock data.    Multiple values from one day are sorted in reverse-chronological order     [place] Suicide Severity Rating Scale (Lifetime/Recent)      10/10/2019     3:54 PM 2/14/2020     4:00 PM 10/31/2022    11:18 AM   [place] Suicide Severity Rating (Lifetime/Recent)   Q1 Wish to be Dead (Lifetime) Yes No    Comments Often due to pain and loss. \"I hate to be alone.\"     Q2 Non-Specific Active Suicidal Thoughts (Lifetime) Yes Yes    Non-Specific Active Suicidal Thought Description (Lifetime) Often due to pain and loss. \"I hate to be alone.\" Did attempt in the past, 15 years ago.     Most Severe Ideation Rating (Lifetime) 4 3    Most Severe Ideation Description (Lifetime)  Did attempt in the past, 15 years ago. Not fully assessed since Paula needed to leave the session early.     Frequency (Lifetime)  3    Duration (Lifetime)  3    Controllability (Lifetime)  3    Protective Factors  (Lifetime)  4    Reasons for Ideation (Lifetime)  4    RETIRED: 1. Wish to be Dead (Recent) Yes     RETIRED: Wish to be Dead Description (Recent) Often due to pain and loss. \"I hate to be alone.\" No plan or intention.     RETIRED: 2. Non-Specific Active Suicidal Thoughts (Recent) Yes     Non-Specific Active Suicidal Thought Description (Recent) Often due to pain and loss. \"I hate to be alone.\" No plan or intention.     3. Active Suicidal Ideation with any Methods (Not Plan) Without Intent to Act (Lifetime) Yes Yes    RETIRE: Active Suicidal Ideation with any Methods (Not Plan) Description (Lifetime) Often due to pain and loss. \"I hate to be alone.\" Did attempt in the past, 15 years ago.     RETIRED: 3. Active Suicidal Ideation with any Methods (Not Plan) Without Intent to Act (Recent) Yes     RETIRED: Active Suicidal Ideation with any Methods (Not Plan) Description (Recent) Often due to pain and loss. \"I hate to " "be alone.\" No plan or intention.     RETIRE: 4. Active Suicidal Ideation with Some Intent to Act, Without Specific Plan (Lifetime) Yes Yes    RETIRE: Active Suicidal Ideation with Some Intent to Act, Without Specific Plan Description (Lifetime) Did attempt in the past, 15 years ago.     4. Active Suicidal Ideation with Some Intent to Act, Without Specific Plan (Recent) No No    RETIRE: 5. Active Suicidal Ideation with Specific Plan and Intent (Lifetime) Yes     RETIRE: Active Suicidal Ideation with Specific Plan and Intent Description (Lifetime) Did attempt in the past, 15 years ago.     RETIRED: 5. Active Suicidal Ideation with Specific Plan and Intent (Recent) No     Most Severe Ideation Rating (Past Month) 2 1    Most Severe Ideation Description (Past Month) Regular thoughts of being dead due to chronic pain and loss.     Frequency (Past Month) 3 2    Duration (Past Month) 1     Controllability (Past Month) 2 3    Protective Factors (Past Month) NA 2    Reasons for Ideation (Past Month)  4    Actual Attempt (Lifetime)  Yes    Actual Attempt Description (Lifetime)  --    Comments  OD 25 years ago was hospitalized none since    Has subject engaged in non-suicidal self-injurious behavior? (Past 3 Months)  Yes    Interrupted Attempts (Lifetime)  --    Comments  burning arms in oven since marriage in 2014.    Most Recent Attempt Date  --    Comments  25 years ago    Most Recent Attempt Actual Lethality Code NA 1    Most Lethal Attempt Actual Lethality Code NA     Initial/First Attempt Actual Lethality Code NA     Q1 Wish to be Dead (Lifetime)   Y   Wish to be Dead Description (Lifetime)   After a breakup, she reports that she was around [age]   1. Wish to be Dead (Past 1 Month)   N   Q2 Non-Specific Active Suicidal Thoughts (Lifetime)   Y   2. Non-Specific Active Suicidal Thoughts (Past 1 Month)   N   3. Active Suicidal Ideation with any Methods (Not Plan) Without Intent to Act (Lifetime)   Y   Q3 Active Suicidal " Ideation with any Methods (Not Plan) Without Intent to Act (Past 1 Month)   N   Q4 Active Suicidal Ideation with Some Intent to Act, Without Specific Plan (Lifetime)   N   Q5 Active Suicidal Ideation with Specific Plan and Intent (Lifetime)   N   Description of Most Severe Ideation (Lifetime)   5   Description of Most Severe Ideation (Past 1 Month)   NA   Frequency (Lifetime)   3   Duration (Lifetime)   1   Controllability (Lifetime)   1   Deterrents (Lifetime)   0   Reasons for Ideation (Lifetime)   4   Actual Attempt (Lifetime)   Y   Total Number of Actual Attempts (Lifetime)   1   Actual Attempt Description (Lifetime)   Patient states she attempted through medication.   Actual Attempt (Past 3 Months)   N   Has subject engaged in non-suicidal self-injurious behavior? (Lifetime)   Y   Has subject engaged in non-suicidal self-injurious behavior? (Past 3 Months)   N   Interrupted Attempts (Lifetime)   N   Preparatory Acts or Behavior (Lifetime)   Y   Total Number of Preparatory Acts (Lifetime)   1   Most Recent Attempt Date   --   Actual Lethality/Medical Damage Code (Most Recent Attempt)   2   Potential Lethality Code (Most Recent Attempt)   1   Actual Lethality/Medical Damage Code (Most Lethal Attempt)   2   Potential Lethality Code (Most Lethal Attempt)   1   Initial/First Attempt Date   --   Calculated C-SSRS Risk Score (Lifetime/Recent)   Moderate Risk     Care Plan review completed: Yes    Medication Review:  No changes to current psychiatric medication(s)    Medication Compliance:  NA    Changes in Health Issues:   None reported    Chemical Use Review:   Substance Use: Chemical use reviewed, no active concerns identified      Tobacco Use: not discussed    Assessment: Current Emotional / Mental Status (status of significant symptoms):  Risk status (Self / Other harm or suicidal ideation)  Patient  Not discussed this session  Patient denies current fears or concerns for personal safety.  Patient denies current  or recent suicidal ideation or behaviors.  Patient denies current or recent homicidal ideation or behaviors.  Patient denies current or recent self injurious behavior or ideation.  Patient denies other safety concerns.  A safety and risk management plan has not been developed at this time, however patient was encouraged to call [place] [phone] should there be a change in any of these risk factors.    Appearance:   Unable to assess  Eye Contact:   Unable to assess   Psychomotor Behavior: Unable to assess   Attitude:   Cooperative   Orientation:   All  Speech   Rate / Production: Normal    Volume:  Normal   Mood:    Anxious  Sad   Affect:    Tearful Worrisome   Thought Content:  Clear   Thought Form:  Coherent  Logical   Insight:    Fair     Diagnoses:  1. MDD (major depressive disorder), recurrent severe, without psychosis (H)    2. LIBRA (generalized anxiety disorder)          Collateral Reports Completed:  Not Applicable    Plan: (Homework, other):  Patient was given information about behavioral services and encouraged to schedule a follow up appointment with the clinic [facility] in 2 weeks.  She was also given information about mental health symptoms and treatment options .  CD Recommendations: No indications of CD issues.     RASHEED Parekh 10/28/24

## 2024-11-04 ENCOUNTER — VIRTUAL VISIT (OUTPATIENT)
Dept: BEHAVIORAL HEALTH | Facility: CLINIC | Age: 64
End: 2024-11-04
Payer: MEDICARE

## 2024-11-04 ENCOUNTER — VIRTUAL VISIT (OUTPATIENT)
Dept: FAMILY MEDICINE | Facility: CLINIC | Age: 64
End: 2024-11-04
Payer: MEDICARE

## 2024-11-04 ENCOUNTER — TELEPHONE (OUTPATIENT)
Dept: FAMILY MEDICINE | Facility: CLINIC | Age: 64
End: 2024-11-04

## 2024-11-04 DIAGNOSIS — R73.03 PREDIABETES: ICD-10-CM

## 2024-11-04 DIAGNOSIS — F41.1 GAD (GENERALIZED ANXIETY DISORDER): ICD-10-CM

## 2024-11-04 DIAGNOSIS — F33.2 MDD (MAJOR DEPRESSIVE DISORDER), RECURRENT SEVERE, WITHOUT PSYCHOSIS (H): Primary | ICD-10-CM

## 2024-11-04 DIAGNOSIS — E66.01 MORBID OBESITY (H): ICD-10-CM

## 2024-11-04 DIAGNOSIS — M47.816 SPONDYLOSIS OF LUMBAR REGION WITHOUT MYELOPATHY OR RADICULOPATHY: Primary | ICD-10-CM

## 2024-11-04 PROCEDURE — 90832 PSYTX W PT 30 MINUTES: CPT | Mod: 93

## 2024-11-04 PROCEDURE — 99442 PR PHYSICIAN TELEPHONE EVALUATION 11-20 MIN: CPT | Mod: 93 | Performed by: INTERNAL MEDICINE

## 2024-11-04 RX ORDER — CYCLOBENZAPRINE HCL 10 MG
10 TABLET ORAL AT BEDTIME
Qty: 90 TABLET | Refills: 3 | Status: SHIPPED | OUTPATIENT
Start: 2024-11-04

## 2024-11-04 ASSESSMENT — PATIENT HEALTH QUESTIONNAIRE - PHQ9
10. IF YOU CHECKED OFF ANY PROBLEMS, HOW DIFFICULT HAVE THESE PROBLEMS MADE IT FOR YOU TO DO YOUR WORK, TAKE CARE OF THINGS AT HOME, OR GET ALONG WITH OTHER PEOPLE: EXTREMELY DIFFICULT
SUM OF ALL RESPONSES TO PHQ QUESTIONS 1-9: 7
SUM OF ALL RESPONSES TO PHQ QUESTIONS 1-9: 7
10. IF YOU CHECKED OFF ANY PROBLEMS, HOW DIFFICULT HAVE THESE PROBLEMS MADE IT FOR YOU TO DO YOUR WORK, TAKE CARE OF THINGS AT HOME, OR GET ALONG WITH OTHER PEOPLE: EXTREMELY DIFFICULT
SUM OF ALL RESPONSES TO PHQ QUESTIONS 1-9: 7
SUM OF ALL RESPONSES TO PHQ QUESTIONS 1-9: 7

## 2024-11-04 NOTE — TELEPHONE ENCOUNTER
Form in inbox reviewed. It is only one page.    The correct form was downloaded from [facility] Prescriber Portal, which is 10 pages.    This form was completed as PDF file, printed, signed and placed in TC basket for faxing to [phone].    Rx for [employer]gov[employer] sent during today's virtual visit.

## 2024-11-04 NOTE — PROGRESS NOTES
"[facility] Primary Care: Integrated Behavioral Health  2024    Behavioral Health Clinician Progress Note    Patient Name: Paula Ho        Service Type:  Individual      Service Location:   Phone call (patient / identified key support person reached)     Session Start Time: 1:00pm Session End Time: 1:30pm      Session Length: 30 minutes     Attendees: Patient     Service Modality:  Phone Visit:      Provider verified identity through the following two step process.  Patient provided:  Patient  and Patient is known previously to provider    Telephone Visit: The patient's condition can be safely assessed and treated via synchronous audio telemedicine encounter.      Reason for Audio Telemedicine Visit: Patient has requested telehealth visit    Originating Site (Patient Location): Patient's home    Distant Site (Provider Location): [facility] MENTAL [facility] & ADDICTION [facility]    Consent:  The patient/guardian has verbally consented to:     1. The potential risks and benefits of telemedicine (telephone visit) versus in person care;    The patient has been notified of the following:      \"We have found that certain health care needs can be provided without the need for a face to face visit.  This service lets us provide the care you need with a phone conversation.       I will have full access to your [facility] medical record during this entire phone call.   I will be taking notes for your medical record.      Since this is like an office visit, we will bill your insurance company for this service.       There are potential benefits and risks of telephone visits (e.g. limits to patient confidentiality) that differ from in-person visits.?Confidentiality still applies for telephone services, and nobody will record the visit.  It is important to be in a quiet, private space that is free of distractions (including cell phone or other devices) during the " "visit.??      If during the course of the call I believe a telephone visit is not appropriate, you will not be charged for this service\"     Consent has been obtained for this service by care team member: Yes   Visit Activities (Refresh list every visit): [facility] Only    Diagnostic Assessment Date: Will complete in the next few sessions, not completed due to time constraints.    Treatment Plan Review Date: Will complete in the next few sessions, not completed due to time constraints.    See Flowsheets for today's PHQ-9 and LIBRA-7 results  Previous PHQ-9:       7/30/2024     1:58 PM 9/17/2024     2:47 PM 11/4/2024     7:20 AM   PHQ-9 SCORE   PHQ-9 Total Score MyChart 5 (Mild depression) 15 (Moderately severe depression) 7 (Mild depression)   PHQ-9 Total Score 5 15 7        Patient-reported     Previous LIBRA-7:       5/21/2024     3:04 PM 6/4/2024     2:00 PM 7/30/2024     1:58 PM   LIBRA-7 SCORE   Total Score 9 (mild anxiety)  8 (mild anxiety)   Total Score  11 8    8    8     DATA  Extended Session (60+ minutes): No  Interactive Complexity: No  Crisis: No  [facility] Patient: Yes, addressed the follow [facility] Core Component(s):                          Health and Wellness Promotion    Treatment Objective(s) Addressed in This Session:  Target Behavior(s): disease management/lifestyle changes ongoing anxiety    Anxiety: will experience a reduction in anxiety, will develop more effective coping skills to manage anxiety symptoms, will develop healthy cognitive patterns and beliefs, and will increase ability to function adaptively    Current Stressors / Issues:  Pt was emotional and frustrated with family and lack of support.  Allowed her to process her thoughts and feelings and help her refocus on what was in her control.     Progress on Treatment Objective(s) / Homework:  Stable - PREPARATION (Decided to change - considering how); Intervened by negotiating a change plan and determining options / strategies for behavior change, " identifying triggers, exploring social supports, and working towards setting a date to begin behavior change    Motivational Interviewing    MI Intervention: Expressed Empathy/Understanding, Supported Autonomy, Collaboration, Evocation, Permission to raise concern or advise, and Reflections: simple and complex     Change Talk Expressed by the Patient: Desire to change Ability to change    Provider Response to Change Talk: A - Affirmed patient's thoughts, decisions, or attempts at behavior change and R - Reflected patient's change talk    Also provided psychoeducation about behavioral health condition, symptoms, and treatment options    Assessments completed prior to visit: pt did not complete them.    The following assessments were completed by patient for this visit:  PHQ9:       4/1/2024    12:46 PM 5/21/2024     2:51 PM 6/4/2024     1:00 PM 6/24/2024     8:26 AM 7/30/2024     1:58 PM 9/17/2024     2:47 PM 11/4/2024     7:20 AM   PHQ-9 SCORE   PHQ-9 Total Score MyChart 13 (Moderate depression) 12 (Moderate depression)  9 (Mild depression) 5 (Mild depression) 15 (Moderately severe depression) 7 (Mild depression)   PHQ-9 Total Score 13    13 12 12 9 5 15 7        Patient-reported    Multiple values from one day are sorted in reverse-chronological order     GAD7:       10/26/2023     9:10 AM 12/15/2023     1:00 PM 4/2/2024     2:40 PM 5/21/2024     3:03 PM 5/21/2024     3:04 PM 6/4/2024     2:00 PM 7/30/2024     1:58 PM   LIBRA-7 SCORE   Total Score 4 (minimal anxiety)  8 (mild anxiety)  9 (mild anxiety)  8 (mild anxiety)   Total Score 4    4 10 8 9  11 8    8    8     CAGE-AID:        No data to display              PROMIS 10-Global Health (only subscores and total score):       5/1/2023    10:40 AM 5/10/2023     3:47 PM 8/18/2023     2:12 PM 7/30/2024     2:00 PM   PROMIS-10 Scores Only   Global Mental Health Score 8        8 7 6     10        10   Global Physical Health Score 8        8 9 15     5        5   PROMIS  "TOTAL - SUBSCORES 16        16 16 21     15        15       Information is confidential and restricted. Go to Review Flowsheets to unlock data.    Multiple values from one day are sorted in reverse-chronological order     [place] Suicide Severity Rating Scale (Lifetime/Recent)      10/10/2019     3:54 PM 2/14/2020     4:00 PM 10/31/2022    11:18 AM   [place] Suicide Severity Rating (Lifetime/Recent)   Q1 Wish to be Dead (Lifetime) Yes No    Comments Often due to pain and loss. \"I hate to be alone.\"     Q2 Non-Specific Active Suicidal Thoughts (Lifetime) Yes Yes    Non-Specific Active Suicidal Thought Description (Lifetime) Often due to pain and loss. \"I hate to be alone.\" Did attempt in the past, 15 years ago.     Most Severe Ideation Rating (Lifetime) 4 3    Most Severe Ideation Description (Lifetime)  Did attempt in the past, 15 years ago. Not fully assessed since Paula needed to leave the session early.     Frequency (Lifetime)  3    Duration (Lifetime)  3    Controllability (Lifetime)  3    Protective Factors  (Lifetime)  4    Reasons for Ideation (Lifetime)  4    RETIRED: 1. Wish to be Dead (Recent) Yes     RETIRED: Wish to be Dead Description (Recent) Often due to pain and loss. \"I hate to be alone.\" No plan or intention.     RETIRED: 2. Non-Specific Active Suicidal Thoughts (Recent) Yes     Non-Specific Active Suicidal Thought Description (Recent) Often due to pain and loss. \"I hate to be alone.\" No plan or intention.     3. Active Suicidal Ideation with any Methods (Not Plan) Without Intent to Act (Lifetime) Yes Yes    RETIRE: Active Suicidal Ideation with any Methods (Not Plan) Description (Lifetime) Often due to pain and loss. \"I hate to be alone.\" Did attempt in the past, 15 years ago.     RETIRED: 3. Active Suicidal Ideation with any Methods (Not Plan) Without Intent to Act (Recent) Yes     RETIRED: Active Suicidal Ideation with any Methods (Not Plan) Description (Recent) Often due to pain and loss. \"I " "hate to be alone.\" No plan or intention.     RETIRE: 4. Active Suicidal Ideation with Some Intent to Act, Without Specific Plan (Lifetime) Yes Yes    RETIRE: Active Suicidal Ideation with Some Intent to Act, Without Specific Plan Description (Lifetime) Did attempt in the past, 15 years ago.     4. Active Suicidal Ideation with Some Intent to Act, Without Specific Plan (Recent) No No    RETIRE: 5. Active Suicidal Ideation with Specific Plan and Intent (Lifetime) Yes     RETIRE: Active Suicidal Ideation with Specific Plan and Intent Description (Lifetime) Did attempt in the past, 15 years ago.     RETIRED: 5. Active Suicidal Ideation with Specific Plan and Intent (Recent) No     Most Severe Ideation Rating (Past Month) 2 1    Most Severe Ideation Description (Past Month) Regular thoughts of being dead due to chronic pain and loss.     Frequency (Past Month) 3 2    Duration (Past Month) 1     Controllability (Past Month) 2 3    Protective Factors (Past Month) NA 2    Reasons for Ideation (Past Month)  4    Actual Attempt (Lifetime)  Yes    Actual Attempt Description (Lifetime)  --    Comments  OD 25 years ago was hospitalized none since    Has subject engaged in non-suicidal self-injurious behavior? (Past 3 Months)  Yes    Interrupted Attempts (Lifetime)  --    Comments  burning arms in oven since marriage in 2014.    Most Recent Attempt Date  --    Comments  25 years ago    Most Recent Attempt Actual Lethality Code NA 1    Most Lethal Attempt Actual Lethality Code NA     Initial/First Attempt Actual Lethality Code NA     Q1 Wish to be Dead (Lifetime)   Y   Wish to be Dead Description (Lifetime)   After a breakup, she reports that she was around [age]   1. Wish to be Dead (Past 1 Month)   N   Q2 Non-Specific Active Suicidal Thoughts (Lifetime)   Y   2. Non-Specific Active Suicidal Thoughts (Past 1 Month)   N   3. Active Suicidal Ideation with any Methods (Not Plan) Without Intent to Act (Lifetime)   Y   Q3 Active " Suicidal Ideation with any Methods (Not Plan) Without Intent to Act (Past 1 Month)   N   Q4 Active Suicidal Ideation with Some Intent to Act, Without Specific Plan (Lifetime)   N   Q5 Active Suicidal Ideation with Specific Plan and Intent (Lifetime)   N   Description of Most Severe Ideation (Lifetime)   5   Description of Most Severe Ideation (Past 1 Month)   NA   Frequency (Lifetime)   3   Duration (Lifetime)   1   Controllability (Lifetime)   1   Deterrents (Lifetime)   0   Reasons for Ideation (Lifetime)   4   Actual Attempt (Lifetime)   Y   Total Number of Actual Attempts (Lifetime)   1   Actual Attempt Description (Lifetime)   Patient states she attempted through medication.   Actual Attempt (Past 3 Months)   N   Has subject engaged in non-suicidal self-injurious behavior? (Lifetime)   Y   Has subject engaged in non-suicidal self-injurious behavior? (Past 3 Months)   N   Interrupted Attempts (Lifetime)   N   Preparatory Acts or Behavior (Lifetime)   Y   Total Number of Preparatory Acts (Lifetime)   1   Most Recent Attempt Date   --   Actual Lethality/Medical Damage Code (Most Recent Attempt)   2   Potential Lethality Code (Most Recent Attempt)   1   Actual Lethality/Medical Damage Code (Most Lethal Attempt)   2   Potential Lethality Code (Most Lethal Attempt)   1   Initial/First Attempt Date   --   Calculated C-SSRS Risk Score (Lifetime/Recent)   Moderate Risk     Care Plan review completed: Yes    Medication Review:  No changes to current psychiatric medication(s)    Medication Compliance:  NA    Changes in Health Issues:   None reported    Chemical Use Review:   Substance Use: Chemical use reviewed, no active concerns identified      Tobacco Use: not discussed    Assessment: Current Emotional / Mental Status (status of significant symptoms):  Risk status (Self / Other harm or suicidal ideation)  Patient  Not discussed this session  Patient denies current fears or concerns for personal safety.  Patient denies  current or recent suicidal ideation or behaviors.  Patient denies current or recent homicidal ideation or behaviors.  Patient denies current or recent self injurious behavior or ideation.  Patient denies other safety concerns.  A safety and risk management plan has not been developed at this time, however patient was encouraged to call [place] [phone] should there be a change in any of these risk factors.    Appearance:   Unable to assess  Eye Contact:   Unable to assess   Psychomotor Behavior: Unable to assess   Attitude:   Cooperative   Orientation:   All  Speech   Rate / Production: Normal    Volume:  Normal   Mood:    Anxious  Sad   Affect:    Tearful Worrisome   Thought Content:  Clear   Thought Form:  Coherent  Logical   Insight:    Fair     Diagnoses:  1. MDD (major depressive disorder), recurrent severe, without psychosis (H)    2. LIBRA (generalized anxiety disorder)      Collateral Reports Completed:  Not Applicable    Plan: (Homework, other):  Patient was given information about behavioral services and encouraged to schedule a follow up appointment with the clinic [facility] in 2 weeks.  She was also given information about mental health symptoms and treatment options .  CD Recommendations: No indications of CD issues.     RASHEED Parekh 11/4/24    Answers submitted by the patient for this visit:  Patient Health Questionnaire (Submitted on 11/4/2024)  If you checked off any problems, how difficult have these problems made it for you to do your work, take care of things at home, or get along with other people?: Extremely difficult  PHQ9 TOTAL SCORE: 7

## 2024-11-04 NOTE — TELEPHONE ENCOUNTER
Prior authorization downloaded from [employer] Portal, completed and faxed today.    Refer to 10/22/2024 telephone encounter.

## 2024-11-04 NOTE — TELEPHONE ENCOUNTER
Form faxed to [facility] [phone] on 11/4/24 at 9:19pm. Copy placed in abstract and original in tc copy bin.

## 2024-11-04 NOTE — PROGRESS NOTES
Paula is a 64 year old who is being evaluated via a billable telephone visit.    What phone number would you like to be contacted at? [phone]  How would you like to obtain your AVS? Janett  Originating Location (pt. Location): Home  Provider location: On-site.    [facility] Internal Medicine Progress Note           Assessment and Plan:     Spondylosis of lumbar region without myelopathy or radiculopathy  - cyclobenzaprine (FLEXERIL) 10 MG tablet; Take 1 tablet (10 mg) by mouth at bedtime.    Morbid obesity (H)  - Semaglutide-Weight Management (WEGOVY) 0.25 MG/0.5ML pen; Inject 0.25 mg subcutaneously once a week.    Prediabetes  - empagliflozin (JARDIANCE) 10 MG TABS tablet; Take 1 tablet (10 mg) by mouth daily.         Interval History:     Back Pain:  She presents for follow up of back pain. Patient's back pain is a chronic problem.  Location of back pain:  Right lower back and left lower back  Description of back pain: burning and sharp  Back pain spreads: right buttocks, left buttocks, right thigh, left thigh, right knee and left knee  Since patient first noticed back pain, pain is: always present, but gets better and worse  Does back pain interfere with her job:  Not applicable            Significant Problems:     Patient Active Problem List   Diagnosis    Chronic knee pain    LIBRA (generalized anxiety disorder)    Brain aneurysm    Chronic, continuous use of opioids    Asthma    Chronic GERD    Chronic pain    Cigarette smoker    DJD (degenerative joint disease)    Insomnia    Morbid obesity (H)    History of subarachnoid hemorrhage    Status post knee surgery    Tobacco use disorder    Chronic obstructive pulmonary disease, unspecified COPD type (H)    MDD (major depressive disorder), recurrent severe, without psychosis (H)    Attention deficit hyperactivity disorder (ADHD)    Chronic midline low back pain with bilateral sciatica    Hyperlipidemia    Benign essential hypertension    Infection  due to 2019 novel coronavirus    Opioid dependence with current use (H)    Angiolipoma of right kidney    Adrenal nodule (H)              Review of Systems:     CONSTITUTIONAL:POSITIVE  for morbid obesity.  INTEGUMENTARY/SKIN: NEGATIVE for worrisome rashes, moles or lesions  EYES: NEGATIVE for vision changes or irritation  ENT/MOUTH: NEGATIVE for ear, mouth and throat problems  RESP: NEGATIVE for significant cough or SOB  BREAST: NEGATIVE for masses, tenderness or discharge  CV: NEGATIVE for chest pain, palpitations or peripheral edema  GI: NEGATIVE for nausea, abdominal pain, heartburn, or change in bowel habits  : NEGATIVE for frequency, dysuria, or hematuria  MUSCULOSKELETAL:As above.  NEURO: NEGATIVE for weakness, dizziness or paresthesias  ENDOCRINE: POSITIVE  for prediabetes.  HEME: NEGATIVE for bleeding problems  PSYCHIATRIC: NEGATIVE for changes in mood or affect             Physical Exam:   Vital signs and physical exam not obtained due to nature of visit.          Data:   Epic reviewed.     Disposition:  Follow-up in 4 weeks or as needed.    Renan Dickerson MD  Internal Medicine  [facility] Team     Phone call duration: 15 minutes  Start: 8:00 AM  End: 8:15 AM

## 2024-11-06 ENCOUNTER — TELEPHONE (OUTPATIENT)
Dept: FAMILY MEDICINE | Facility: CLINIC | Age: 64
End: 2024-11-06
Payer: MEDICARE

## 2024-11-06 NOTE — TELEPHONE ENCOUNTER
Patient returning call. She received notification from cover my meds that her PA request for the Wegovy 0.25 mg was denied. She is calling to find out what we need to do next. Notified that we have not gotten the denial notification yet, once we do, the PA team will send it to her provider for review, which will tell us why they denied the coverage. Notified that we did refax a 10 page PA to the insurance as well and this may help resolve the problem with the prescription. Advised to contact clinic if she hasn't heard anything in the next couple of days for an update on the process. Patient verbalized understanding and agrees with plan. Advised to call with questions or concerns. Tanner Aaron, RN, BSN

## 2024-11-06 NOTE — TELEPHONE ENCOUNTER
To RNs, a 10 page form regarding prior authorization for Wegovy,downloaded from [employer] Prescriber Portal, was completed and faxed to [phone] last November 4, 2024.     Please refer to October 22, 2024 telephone encounter.    To TCs, please re-fax PA form for Wegovy to [phone]. Please refer to October 22, 2024 telephone encounter.

## 2024-11-06 NOTE — TELEPHONE ENCOUNTER
Called patient and left a voicemail letting her know this was done.      Kristina Kjellberg, MSN, RN

## 2024-11-06 NOTE — TELEPHONE ENCOUNTER
Patient calling in stating her insurance company is requesting a Provider to provider communication about her Wegovy and Jardiance.  This is for Prior Authorization.        Provider: Please call [phone] to give communication and get the patient prior authorization.      Kristina Kjellberg, MSN, RN

## 2024-11-06 NOTE — TELEPHONE ENCOUNTER
"Went through right fax and pulled up the PDF form that was faxed on 11/4/2024 9:19 am faxed to # [phone], right fax confirmed [phone] \"no answer at fax\" x 3.  Re faxed to [phone], right fax confirmed at 1:21 pm today, 11/6/2024.    Routing to the RNs for the 1st part of Dr Dickerson's response.    Liliya Tran MA  [facility]   Primary Care    "

## 2024-11-15 ENCOUNTER — TELEPHONE (OUTPATIENT)
Dept: FAMILY MEDICINE | Facility: CLINIC | Age: 64
End: 2024-11-15
Payer: MEDICARE

## 2024-11-19 NOTE — TELEPHONE ENCOUNTER
Prior Authorization Not Needed per Insurance    Medication: WEGOVY 0.25 MG/0.5ML SC SOAJ SECONDARY INSURANCE   Insurance Company: [employer] - Phone [phone] Fax [phone]  Expected CoPay: $    [id number] - [place], [place] - [address] [address]  Pharmacy Notified: YES  Patient Notified: **Instructed pharmacy to notify patient when script is ready to /ship.**

## 2024-11-19 NOTE — TELEPHONE ENCOUNTER
PA Initiation    Medication: WEGOVY 0.25 MG/0.5ML SC SOAJ  Insurance Company: [employer] - Phone [phone] Fax [phone]  Pharmacy Filling the Rx: [id number] - [place] - [address]  Filling Pharmacy Phone: [phone]  Filling Pharmacy Fax: [phone]  Start Date: 11/19/2024

## 2024-11-22 ENCOUNTER — MEDICAL CORRESPONDENCE (OUTPATIENT)
Dept: HEALTH INFORMATION MANAGEMENT | Facility: CLINIC | Age: 64
End: 2024-11-22
Payer: MEDICARE

## 2024-11-25 ENCOUNTER — VIRTUAL VISIT (OUTPATIENT)
Dept: BEHAVIORAL HEALTH | Facility: CLINIC | Age: 64
End: 2024-11-25
Payer: MEDICARE

## 2024-11-25 DIAGNOSIS — R69 DIAGNOSIS DEFERRED: Primary | ICD-10-CM

## 2024-11-25 PROCEDURE — 99207 PR NO CHARGE LOS: CPT | Mod: 93

## 2024-11-25 ASSESSMENT — ANXIETY QUESTIONNAIRES
6. BECOMING EASILY ANNOYED OR IRRITABLE: NEARLY EVERY DAY
IF YOU CHECKED OFF ANY PROBLEMS ON THIS QUESTIONNAIRE, HOW DIFFICULT HAVE THESE PROBLEMS MADE IT FOR YOU TO DO YOUR WORK, TAKE CARE OF THINGS AT HOME, OR GET ALONG WITH OTHER PEOPLE: EXTREMELY DIFFICULT
GAD7 TOTAL SCORE: 13
4. TROUBLE RELAXING: NOT AT ALL
2. NOT BEING ABLE TO STOP OR CONTROL WORRYING: NEARLY EVERY DAY
3. WORRYING TOO MUCH ABOUT DIFFERENT THINGS: NEARLY EVERY DAY
5. BEING SO RESTLESS THAT IT IS HARD TO SIT STILL: NOT AT ALL
GAD7 TOTAL SCORE: 13
1. FEELING NERVOUS, ANXIOUS, OR ON EDGE: NEARLY EVERY DAY
7. FEELING AFRAID AS IF SOMETHING AWFUL MIGHT HAPPEN: SEVERAL DAYS

## 2024-11-25 ASSESSMENT — PATIENT HEALTH QUESTIONNAIRE - PHQ9: SUM OF ALL RESPONSES TO PHQ QUESTIONS 1-9: 12
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
## 2024-11-25 NOTE — LETTER
Behavioral [facility] [place] ([facility]): Health Action Plan  [facility]: [place]    [facility]      Name: Paula Ho  Preferred Name: Paula  : 1960  MRN: [id number]        My Goals  Goal Areas: Health; Mental Health; Future HAP Goal area; Chemical Health    Patient stated goals:   Health: Paula would like to continue to meet with her providers, and take her medications as prescribed.     -Paula would like to work on losing weight with a provider. She will start using prescribed medication for assistance, as well as working on a nutrition plan.     -Paula would like to go to the gym at least two times per week.       Mental Health: Paula would like to continue receiving support from the [facility], [place], for therapy within [place], and receive a long term therapy referral when needed. She will also continue to receive assistance with [facility] for monthly check ins.     -She would like to see a new psychiatrist as well.     -She would like to continue working with an [facility] worker and Peer Support Person through the same agency.     -She reported that she wants to build herself back to feel like somebody and take it day by day.       Chemical Health: Paula would like assistance from her providers to quit smoking and will work on reducing her use, so she can be approved for back surgery.       Future goals: Paula is going to consider back surgery in the future after losing the recommended weight.    Strengths related to each goal: Paula states her strengths are the support of her dogs, good advocate for herself, support of good friends, and care for others. Patient states she has a big heart.    Services and Supports Needed: The [facility] Team will provide monthly contact with patient in order to monitor progress towards goals.    Activities / Actions of Team to support goal(s): [facility] team will locate appropriate resources that align with patient's goals and promote health and wellness.    Activities / Actions of Patient /  Parent / Guardian to support goal(s): It is a requirement that patient's primary care physician is through the [facility] system and that they are on Medical Assistance/Medicaid. If either of these were to change or if patient needs any type of assistance, they are to reach out to their [facility] [place] ([facility]) team.    Recommended Referral  Tobacco cessation referrals made?: No  Mental Health / Chemical Dependency Referrals: Yes  Substance Use Referrals: Not Applicable  Mental Health Referrals: [facility] Referrals: [facility] Financial Services; [facility] ; Other (see comments)      My Team Members and Their Contact Information  Patient Care Team         Relationship Specialty Notifications Start End    Vocal, Renan Strickland MD PCP - General Internal Medicine Admissions 10/29/23     Phone: [phone] Fax: [phone]         [address] [place] [address]    Tigist Manjarrez NP Nurse Practitioner Nurse Practitioner Admissions 8/3/20     Psychiatry    Phone: [phone] Fax: [phone]         [address] [place] [address]    [facility] ([facility] ([place])  12/23/20     Phone: [phone]         Dane Irizarry MD Anesthesiologist Anesthesiology  1/21/21     Phone: [phone] Pager: [phone] Fax: [phone]        [address] [facility] [address]    Chanell Joya MD MD Dermatology  10/25/21     Phone: [phone] Fax: [phone]         [address] [place] [address]    Joie Nobles MD Referring Physician Family Medicine  10/25/21     referring to Dermatology    Phone: [phone] Fax: [phone]         [address] [place] [address]    Man Goode MD Assigned Pulmonology Provider   11/7/21     Phone: [phone] Fax: [phone]         [address] [place] [address]    Paty Dial BSW  Clinic Admissions 2/17/22     [facility] - [facility] Case Management -  Enrolled 02/17/2021 -[facility] - direct extension [phone].    Nathalia Ordoñez MD MD Endocrinology, Diabetes, and Metabolism  1/20/23     Phone: [phone] Fax: [phone]         [address] [facility] [address] [facility] [address]    Raul Marinelli MD MD Urology  1/20/23     Phone: [phone] Fax: [phone] [address] [place] [address]    Za Aldridge DO Assigned Cancer Care Provider   5/27/23     Phone: [phone] Fax: [phone] [address] [facility] [address]    Renan Dickerson MD Assigned PCP   11/4/23     Phone: [phone] Fax: [phone] [address] [facility] [address]    Paty Mix APRN CNP Nurse Practitioner Dermatology  3/19/24     Phone: [phone] Fax: [phone] [address] [facility] [address]    Trace Capellan MD MD Otolaryngology  6/24/24     Phone: [phone] Fax: [phone] [address] [facility] [address]    Trace Capellan MD Assigned Surgical Provider   7/23/24     Phone: [phone] Fax: [phone] [address] [facility] [address]    Pam Arboleda LICSW Assigned Behavioral Health Provider   11/23/24     Phone: [phone] Fax: [phone] [address] [facility] [address]            My Wellness Plan  Safety Concerns: Suicide Ideation    Recommendations / Plan for safety concerns: Patient will follow her safety plan that was co-developed with therapist, Fernanda, on 10/31/22, and she also feels comfortable reaching out to her clinic or [facility] crisis, as well as the suicide hotline.    Crisis Plan (emergencies / when urgent support needed): Patient states she feels comfortable contacting her PCA Sharla, utilizing [facility] crisis/suicide hotline, or go to the [facility] unit in case immediate assistance is needed.    Paula Ho co-developed the Health Action Plan with the [facility] Team and received a copy of this  document.  Date Health Action Plan Completed/Updated: 11/25/24

## 2024-12-12 ENCOUNTER — TELEPHONE (OUTPATIENT)
Dept: FAMILY MEDICINE | Facility: CLINIC | Age: 64
End: 2024-12-12
Payer: MEDICARE

## 2024-12-12 NOTE — TELEPHONE ENCOUNTER
Pt calling to request a psychiatrist referral.   States that she used to see someone at the [facility], Tigist Manjarrez NP, but she is no longer there and was told that another referral is needed. Pt states that her depression is bad.  States that a lot of the time she just wants to sleep and not wake up.  No plans to harm herself or others. No thoughts of suicide.  States that she wants to get help and start her medications again. She has been out of medication for a while.    States that she feels safe at home and has someone there with her.   Assisted in scheduling a visit with a provider to get a referral and to possibly get medication.     Pt understands to call the clinic back if she has other concerns or the call 911 if she develops any thoughts to harm herself.    AREN RamseyN, RN  [facility]     flori all pertinent systems normal

## 2024-12-18 ENCOUNTER — TELEPHONE (OUTPATIENT)
Dept: BEHAVIORAL HEALTH | Facility: CLINIC | Age: 64
End: 2024-12-18
Payer: MEDICARE

## 2024-12-18 DIAGNOSIS — R69 DIAGNOSIS DEFERRED: Primary | ICD-10-CM

## 2024-12-18 PROCEDURE — 99207 PR NO CHARGE LOS: CPT

## 2024-12-18 NOTE — TELEPHONE ENCOUNTER
Peer  contacted pt to advise scheduled appointment today with [facility] Paty Dial is cancelled due to illness. [facility] will reschedule with pt upon her return to the office.    Rafita Alfaro  Peer   [facility]  Direct: [phone]

## 2024-12-24 ENCOUNTER — OFFICE VISIT (OUTPATIENT)
Dept: FAMILY MEDICINE | Facility: CLINIC | Age: 64
End: 2024-12-24
Payer: MEDICARE

## 2024-12-24 VITALS
RESPIRATION RATE: 18 BRPM | DIASTOLIC BLOOD PRESSURE: 81 MMHG | BODY MASS INDEX: 46.67 KG/M2 | WEIGHT: 290.4 LBS | HEIGHT: 66 IN | OXYGEN SATURATION: 94 % | HEART RATE: 76 BPM | TEMPERATURE: 97.8 F | SYSTOLIC BLOOD PRESSURE: 135 MMHG

## 2024-12-24 DIAGNOSIS — E66.01 MORBID OBESITY (H): ICD-10-CM

## 2024-12-24 DIAGNOSIS — F33.1 MODERATE EPISODE OF RECURRENT MAJOR DEPRESSIVE DISORDER (H): Primary | ICD-10-CM

## 2024-12-24 DIAGNOSIS — F51.04 PSYCHOPHYSIOLOGICAL INSOMNIA: ICD-10-CM

## 2024-12-24 DIAGNOSIS — F90.0 ATTENTION DEFICIT HYPERACTIVITY DISORDER (ADHD), PREDOMINANTLY INATTENTIVE TYPE: ICD-10-CM

## 2024-12-24 DIAGNOSIS — E03.9 HYPOTHYROIDISM, UNSPECIFIED TYPE: ICD-10-CM

## 2024-12-24 LAB
ANION GAP SERPL CALCULATED.3IONS-SCNC: 10 MMOL/L (ref 7–15)
BUN SERPL-MCNC: 9.4 MG/DL (ref 8–23)
CALCIUM SERPL-MCNC: 9.3 MG/DL (ref 8.8–10.4)
CHLORIDE SERPL-SCNC: 104 MMOL/L (ref 98–107)
CREAT SERPL-MCNC: 0.62 MG/DL (ref 0.51–0.95)
EGFRCR SERPLBLD CKD-EPI 2021: >90 ML/MIN/1.73M2
GLUCOSE SERPL-MCNC: 111 MG/DL (ref 70–99)
HCO3 SERPL-SCNC: 25 MMOL/L (ref 22–29)
POTASSIUM SERPL-SCNC: 4.9 MMOL/L (ref 3.4–5.3)
SODIUM SERPL-SCNC: 139 MMOL/L (ref 135–145)
TSH SERPL DL<=0.005 MIU/L-ACNC: 2.92 UIU/ML (ref 0.3–4.2)

## 2024-12-24 PROCEDURE — 91320 SARSCV2 VAC 30MCG TRS-SUC IM: CPT | Performed by: INTERNAL MEDICINE

## 2024-12-24 PROCEDURE — G0008 ADMIN INFLUENZA VIRUS VAC: HCPCS | Performed by: INTERNAL MEDICINE

## 2024-12-24 PROCEDURE — 36415 COLL VENOUS BLD VENIPUNCTURE: CPT | Performed by: INTERNAL MEDICINE

## 2024-12-24 PROCEDURE — 99214 OFFICE O/P EST MOD 30 MIN: CPT | Performed by: INTERNAL MEDICINE

## 2024-12-24 PROCEDURE — 84443 ASSAY THYROID STIM HORMONE: CPT | Performed by: INTERNAL MEDICINE

## 2024-12-24 PROCEDURE — 90673 RIV3 VACCINE NO PRESERV IM: CPT | Performed by: INTERNAL MEDICINE

## 2024-12-24 PROCEDURE — 96127 BRIEF EMOTIONAL/BEHAV ASSMT: CPT | Performed by: INTERNAL MEDICINE

## 2024-12-24 PROCEDURE — 80048 BASIC METABOLIC PNL TOTAL CA: CPT | Performed by: INTERNAL MEDICINE

## 2024-12-24 PROCEDURE — 90480 ADMN SARSCOV2 VAC 1/ONLY CMP: CPT | Performed by: INTERNAL MEDICINE

## 2024-12-24 PROCEDURE — G2211 COMPLEX E/M VISIT ADD ON: HCPCS | Performed by: INTERNAL MEDICINE

## 2024-12-24 RX ORDER — TRAZODONE HYDROCHLORIDE 50 MG/1
50 TABLET, FILM COATED ORAL AT BEDTIME
Qty: 30 TABLET | Refills: 5 | Status: SHIPPED | OUTPATIENT
Start: 2024-12-24

## 2024-12-24 RX ORDER — DESVENLAFAXINE 50 MG/1
50 TABLET, FILM COATED, EXTENDED RELEASE ORAL DAILY
Qty: 30 TABLET | Refills: 5 | Status: SHIPPED | OUTPATIENT
Start: 2024-12-24

## 2024-12-24 RX ORDER — ARIPIPRAZOLE 10 MG/1
10 TABLET ORAL DAILY
Qty: 30 TABLET | Refills: 5 | Status: SHIPPED | OUTPATIENT
Start: 2024-12-24

## 2024-12-24 RX ORDER — DEXTROAMPHETAMINE SACCHARATE, AMPHETAMINE ASPARTATE, DEXTROAMPHETAMINE SULFATE AND AMPHETAMINE SULFATE 3.75; 3.75; 3.75; 3.75 MG/1; MG/1; MG/1; MG/1
15 TABLET ORAL 2 TIMES DAILY
Qty: 60 TABLET | Refills: 0 | Status: SHIPPED | OUTPATIENT
Start: 2024-12-24

## 2024-12-24 ASSESSMENT — PATIENT HEALTH QUESTIONNAIRE - PHQ9: SUM OF ALL RESPONSES TO PHQ QUESTIONS 1-9: 15

## 2024-12-24 NOTE — PROGRESS NOTES
{PROVIDER CHARTING PREFERENCE:799720}    Subjective   Paula is a 64 year old, presenting for the following health issues:  Depression        12/24/2024    10:54 AM   Additional Questions   Roomed by tyler   Accompanied by self         12/24/2024    10:54 AM   Patient Reported Additional Medications   Patient reports taking the following new medications no     History of Present Illness       Reason for visit:  Depressed   She is taking medications regularly.       {MA/LPN/RN Pre-Provider Visit Orders- hCG/UA/Strep (Optional):567547}  Depression   How are you doing with your depression since your last visit? Worsened  Are you having other symptoms that might be associated with depression? No  Have you had a significant life event?  Relationship Concerns   Are you feeling anxious or having panic attacks?   Yes:     Do you have any concerns with your use of alcohol or other drugs? No    Social History     Tobacco Use    Smoking status: Every Day     Current packs/day: 0.50     Types: Cigarettes    Smokeless tobacco: Never    Tobacco comments:     Patient has been trying patches and they have not been working.   Vaping Use    Vaping status: Never Used   Substance Use Topics    Alcohol use: Not Currently    Drug use: No     Comment: remote history of recreational cocaine and marijuana use         11/4/2024     7:20 AM 11/25/2024     3:00 PM 12/24/2024    10:58 AM   PHQ   PHQ-9 Total Score 7  12 15   Q9: Thoughts of better off dead/self-harm past 2 weeks Not at all  Not at all Several days       Proxy-reported         6/4/2024     2:00 PM 7/30/2024     1:58 PM 11/25/2024     3:00 PM   LIBRA-7 SCORE   Total Score  8 (mild anxiety)    Total Score 11 8    8    8 13     {Last PHQ9 or GAD7 Responses (Optional):697853}    Suicide Assessment Five-step Evaluation and Treatment (SAFE-T)  {Provider  Link to Depression Care Package SmartSet :015052}  How many servings of fruits and vegetables do you eat daily?  0-1  On average, how  "many sweetened beverages do you drink each day (Examples: soda, juice, sweet tea, etc.  Do NOT count diet or artificially sweetened beverages)?   4  How many days per week do you exercise enough to make your heart beat faster? 3 or less  How many minutes a day do you exercise enough to make your heart beat faster? 9 or less  How many days per week do you miss taking your medication? 0  {additonal problems for provider to add (Optional):601891}    {ROS Picklists (Optional):077317}      Objective    /81 (BP Location: Left arm, Patient Position: Sitting, Cuff Size: Adult Large)   Pulse 76   Temp 97.8  F (36.6  C) (Oral)   Resp 18   Ht 1.676 m (5' 6\")   Wt 131.7 kg (290 lb 6.4 oz)   LMP  (LMP Unknown)   SpO2 94%   BMI 46.87 kg/m    Body mass index is 46.87 kg/m .  Physical Exam   {Exam List (Optional):007181}    {Diagnostic Test Results (Optional):245489}        Signed Electronically by: Renan Dickerson MD  {Email feedback regarding this note to [email or web address]   :133569}  " mentation intact and speech normal  PSYCH: tearful, anxious, and fatigued    DIAGNOSTICS  Epic reviewed.    ASSESSMENT/PLAN  Moderate episode of recurrent major depressive disorder (H)  - Adult Mental Health  Referral; Future  - ARIPiprazole (ABILIFY) 10 MG tablet; Take 1 tablet (10 mg) by mouth daily.  - desvenlafaxine (PRISTIQ) 50 MG 24 hr tablet; Take 1 tablet (50 mg) by mouth daily.    Attention deficit hyperactivity disorder (ADHD), predominantly inattentive type  - [facility]; Future  - amphetamine-dextroamphetamine (ADDERALL) 15 MG tablet; Take 1 tablet (15 mg) by mouth 2 times daily.    Psychophysiological insomnia  - Adult Mental [facility] Referral; Future  - traZODone (DESYREL) 50 MG tablet; Take 1 tablet (50 mg) by mouth at bedtime.    Hypothyroidism, unspecified type  - TSH WITH FREE T4 REFLEX    Morbid obesity (H)  - Basic metabolic panel      Disposition:  Follow-up in 12 weeks.    Signed Electronically by: Renan Dickerson MD

## 2024-12-24 NOTE — LETTER

## 2024-12-24 NOTE — PATIENT INSTRUCTIONS
At [facility], we strive to deliver an exceptional experience to you, every time we see you. If you receive a survey, please let us know what we are doing well and/or what we could improve upon, as we do value your feedback.  If you have MyChart, you can expect to receive results automatically within 24 hours of their completion.  Your provider will send a note interpreting your results as well.   If you do not have MyChart, you should receive your results in about a week by mail.    Your care team:                            Family Medicine Internal Medicine   MD Renan Chambers, MD Aranza Piña, MD Hector Vargas, MD Sheryl Mora, PABunnyC    Esdras Dobson, MD Pediatrics   Joie Nobles, MD Dorie Rashid, MD Stacey Faith, APRN CNP Alison Venegas APRN CNP   MD Inocencia Pacheco, MD Bree Pena, CNP     Chance Vazquez, CNP Same-Day Provider (No follow-up visits)   LEONA Davidson, DNP Geovanna Kaur, LEONA Veliz, FNP, BC JAVON GuzmanC     Clinic hours: Monday - Thursday 7 am-6 pm; Fridays 7 am-5 pm.   Urgent care: Monday - Friday 10 am- 8 pm; Saturday and Sunday 9 am-5 pm.    Clinic: [phone]       [facility]: Monday - Thursday 8 am - 7 pm; Friday 8 am - 6 pm  [facility]: [phone]

## 2024-12-31 ENCOUNTER — NURSE TRIAGE (OUTPATIENT)
Dept: FAMILY MEDICINE | Facility: CLINIC | Age: 64
End: 2024-12-31
Payer: MEDICARE

## 2024-12-31 NOTE — TELEPHONE ENCOUNTER
Reason for Disposition   Lightheadedness (dizziness) present now, after 2 hours of rest and fluids    Additional Information   Negative: SEVERE difficulty breathing (e.g., struggling for each breath, speaks in single words)   Negative: Shock suspected (e.g., cold/pale/clammy skin, too weak to stand, low BP, rapid pulse)   Negative: Difficult to awaken or acting confused (e.g., disoriented, slurred speech)   Negative: Fainted, and still feels dizzy afterwards   Negative: Overdose (accidental or intentional) of medications   Negative: New neurologic deficit that is present now: * Weakness of the face, arm, or leg on one side of the body * Numbness of the face, arm, or leg on one side of the body * Loss of speech or garbled speech   Negative: Heart beating < 50 beats per minute OR > 140 beats per minute   Negative: Sounds like a life-threatening emergency to the triager   Negative: Chest pain   Negative: Rectal bleeding, bloody stool, or tarry-black stool   Negative: Vomiting is main symptom   Negative: Diarrhea is main symptom   Negative: Headache is main symptom   Negative: Heat exhaustion suspected (i.e., dehydration from heat exposure)   Negative: Patient states that they are having an anxiety or panic attack   Negative: Dizziness from low blood sugar (i.e., < 60 mg/dl or 3.5 mmol/l)   Negative: SEVERE dizziness (e.g., unable to stand, requires support to walk, feels like passing out now)   Negative: SEVERE headache or neck pain   Negative: Spinning or tilting sensation (vertigo) present now and one or more stroke risk factors (i.e., hypertension, diabetes mellitus, prior stroke/TIA, heart attack, age over 60) (Exception: Prior physician evaluation for this AND no different/worse than usual.)   Negative: Neurologic deficit that was brief (now gone), ANY of the following:* Weakness of the face, arm, or leg on one side of the body* Numbness of the face, arm, or leg on one side of the body* Loss of speech or garbled  "speech   Negative: Loss of vision or double vision  (Exception: Similar to previous migraines.)   Negative: Extra heartbeats, irregular heart beating, or heart is beating very fast (i.e., 'palpitations')   Negative: Difficulty breathing   Negative: Drinking very little and dehydration suspected (e.g., no urine > 12 hours, very dry mouth, very lightheaded)   Negative: Follows bleeding (e.g., stomach, rectum, vagina)  (Exception: Became dizzy from sight of small amount blood.)   Negative: Patient sounds very sick or weak to the triager    Answer Assessment - Initial Assessment Questions  1. DESCRIPTION: \"Describe your dizziness.\"      Lightheaded when sitting worse with standing  2. LIGHTHEADED: \"Do you feel lightheaded?\" (e.g., somewhat faint, woozy, weak upon standing)      yes  3. VERTIGO: \"Do you feel like either you or the room is spinning or tilting?\" (i.e. vertigo)      no  4. SEVERITY: \"How bad is it?\"  \"Do you feel like you are going to faint?\" \"Can you stand and walk?\"    - MILD: Feels slightly dizzy, but walking normally.    - MODERATE: Feels unsteady when walking, but not falling; interferes with normal activities (e.g., school, work).    - SEVERE: Unable to walk without falling, or requires assistance to walk without falling; feels like passing out now.       moderate  5. ONSET:  \"When did the dizziness begin?\"      Last couple days  6. AGGRAVATING FACTORS: \"Does anything make it worse?\" (e.g., standing, change in head position)      standing  7. HEART RATE: \"Can you tell me your heart rate?\" \"How many beats in 15 seconds?\"  (Note: not all patients can do this)        Hasn't checked, unable to check. \"Seems fine\"   8. CAUSE: \"What do you think is causing the dizziness?\"      no  9. RECURRENT SYMPTOM: \"Have you had dizziness before?\" If Yes, ask: \"When was the last time?\" \"What happened that time?\"      no  10. OTHER SYMPTOMS: \"Do you have any other symptoms?\" (e.g., fever, chest pain, vomiting, diarrhea, " "bleeding)        no  11. PREGNANCY: \"Is there any chance you are pregnant?\" \"When was your last menstrual period?\"        no    Protocols used: Dizziness-A-OH    "

## 2025-01-13 NOTE — NURSING NOTE
Is the patient currently in the state of [place]? YES    Visit mode:TELEPHONE    If the visit is dropped, the patient can be reconnected by: TELEPHONE VISIT: Phone number:   Telephone Information:   Mobile [phone]       Will anyone else be joining the visit? No  (If patient encounters technical issues they should call [phone])    How would you like to obtain your AVS? Declined    Are changes needed to the allergy or medication list? Yes. Pt states she has been out of her ADDERALL     Rooming Documentation: Care team has reviewed attendance agreement with patient. Patient advised that two failed appointments within 6 months may lead to termination of current episode of care.      Reason for visit: Telephone     SUSAN Johnson           Detail Level: Zone

## 2025-01-22 ENCOUNTER — TELEPHONE (OUTPATIENT)
Dept: FAMILY MEDICINE | Facility: CLINIC | Age: 65
End: 2025-01-22

## 2025-01-27 NOTE — TELEPHONE ENCOUNTER
PA Initiation    Medication: WEGOVY 0.5 MG/0.5ML SC SOAJ  Insurance Company: [employer] Part D - Phone [phone] Fax [phone]  Pharmacy Filling the Rx: [id number] - [place] - [address] [address] [facility] [facility]  Filling Pharmacy Phone: [phone]  Filling Pharmacy Fax:    Start Date: 1/27/2025    PRIMARY PAYER

## 2025-01-29 ENCOUNTER — VIRTUAL VISIT (OUTPATIENT)
Dept: BEHAVIORAL HEALTH | Facility: CLINIC | Age: 65
End: 2025-01-29
Payer: MEDICARE

## 2025-01-29 DIAGNOSIS — R69 DIAGNOSIS DEFERRED: Primary | ICD-10-CM

## 2025-01-29 PROCEDURE — 99207 PR NO CHARGE LOS: CPT | Mod: 93
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
## 2025-01-30 ENCOUNTER — TELEPHONE (OUTPATIENT)
Dept: FAMILY MEDICINE | Facility: CLINIC | Age: 65
End: 2025-01-30
Payer: MEDICARE

## 2025-01-30 NOTE — TELEPHONE ENCOUNTER
PRIOR AUTHORIZATION DENIED    Medication: WEGOVY 0.5 MG/0.5ML SC SOAJ  Insurance Company: [employer] Part D - Phone [phone] Fax [phone]  Denial Date: 1/27/2025  Denial Reason(s): Insurance states that patient does not meet coverage criteria. Please see denial letter below for more details.     Appeal Information: NA  Patient Notified: NO    NOW SUBMITTING TO [facility] PAYER [facility] IN SEPARATE ENCOUNTER

## 2025-01-30 NOTE — TELEPHONE ENCOUNTER
PA Initiation    Medication: WEGOVY 0.25 MG/0.5ML SC SOAJ  Insurance Company: [employer] - Phone [phone] Fax [phone]  Pharmacy Filling the Rx: [facility] #[id number] - [place] - [address] [address]  Filling Pharmacy Phone: [phone]  Filling Pharmacy Fax:    Start Date: 1/30/2025

## 2025-02-04 ENCOUNTER — VIRTUAL VISIT (OUTPATIENT)
Dept: BEHAVIORAL HEALTH | Facility: CLINIC | Age: 65
End: 2025-02-04
Payer: MEDICARE

## 2025-02-04 DIAGNOSIS — F33.1 MODERATE EPISODE OF RECURRENT MAJOR DEPRESSIVE DISORDER (H): Primary | ICD-10-CM

## 2025-02-04 PROCEDURE — 90832 PSYTX W PT 30 MINUTES: CPT | Mod: 93

## 2025-02-04 NOTE — PROGRESS NOTES
"    [facility]: Integrated Behavioral Health    Behavioral Health Clinician Progress Note   Mental Health & Addiction Services      Tuesday February 04, 2025    Patient Name: Paula Ho       Service Type:  Individual   Service Location:  MyChart / Email (patient reached)   Visit Start Time: 2:30pm  Visit End Time:  3:00pm    Session Length: 16 - 37    Attendees: Patient   Service Modality: Phone Visit:      Provider verified identity through the following two step process.  Patient provided:  Patient is known previously to provider    Telephone Visit: The patient's condition can be safely assessed and treated via synchronous audio telemedicine encounter.      Reason for Audio Telemedicine Visit: Patient has requested telehealth visit    Originating Site (Patient Location): Patient's home    Distant Site (Provider Location): Provider Remote Setting- Home Office    Telephone visit completed due to the patient did not consent to a video visit.    Consent:  The patient/guardian has verbally consented to:     1. The potential risks and benefits of telemedicine (telephone visit) versus in person care;    The patient has been notified of the following:      \"We have found that certain health care needs can be provided without the need for a face to face visit.  This service lets us provide the care you need with a phone conversation.       I will have full access to your [facility] medical record during this entire phone call.  I will be taking notes for your medical record.      Since this is like an office visit, we will bill your insurance company for this service.       There are potential benefits and risks of telephone visits (e.g. limits to patient confidentiality) that differ from in-person visits.?Confidentiality still applies for telephone services, and nobody will record the visit.  It is important to be in a quiet, private space that is free of distractions " "(including cell phone or other devices) during the visit.??      If during the course of the call I believe a telephone visit is not appropriate, you will not be charged for this service\"     Consent has been obtained for this service by care team member: Yes    Visit number: Long term     [facility] Visit Activities (Refresh list every visit): [facility] Only     [place] Diagnostic Assessment: 11/28/24  Treatment Plan Review Date: 5/4/25      DATA:    Extended Session (60+ minutes): No   Interactive Complexity: No   Crisis: No   [facility] Patient: Yes, addressed the follow [facility] Core Component(s):                          Comprehensive Care Management: utilized the electronic medical record / patient registry to identify and support patient's health conditions / needs more effectively      Assessments completed prior to visit:   The following assessments were completed by patient for this visit:  PHQ2: No questionnaires on file.  PHQ9:       6/4/2024     1:00 PM 6/24/2024     8:26 AM 7/30/2024     1:58 PM 9/17/2024     2:47 PM 11/4/2024     7:20 AM 11/25/2024     3:00 PM 12/24/2024    10:58 AM   PHQ-9 SCORE   PHQ-9 Total Score MyChart  9 (Mild depression) 5 (Mild depression) 15 (Moderately severe depression) 7 (Mild depression)     PHQ-9 Total Score 12 9 5 15 7  12 15       Proxy-reported     GAD2:       5/1/2023    10:38 AM 7/3/2023    10:42 AM 8/18/2023     2:10 PM 10/26/2023     9:10 AM 7/30/2024     1:58 PM   LIBRA-2   Feeling nervous, anxious, or on edge 1  0  1  2  1    Not being able to stop or control worrying 3  1  1  1  2    LIBRA-2 Total Score 4    4    4 1 2    2 3    3 3    3    3       Proxy-reported     GAD7:       12/15/2023     1:00 PM 4/2/2024     2:40 PM 5/21/2024     3:03 PM 5/21/2024     3:04 PM 6/4/2024     2:00 PM 7/30/2024     1:58 PM 11/25/2024     3:00 PM   LIBRA-7 SCORE   Total Score  8 (mild anxiety)  9 (mild anxiety)  8 (mild anxiety)    Total Score 10 8 9  11 8    8    8 13     CAGE-AID:        No data to " "display              PROMIS 10-Global Health (only subscores and total score):       5/1/2023    10:40 AM 5/10/2023     3:47 PM 8/18/2023     2:12 PM 7/30/2024     2:00 PM   PROMIS-10 Scores Only   Global Mental Health Score 8        8 7 6     10        10   Global Physical Health Score 8        8 9 15     5        5   PROMIS TOTAL - SUBSCORES 16        16 16 21     15        15       Information is confidential and restricted. Go to Review Flowsheets to unlock data.     [place] Suicide Severity Rating Scale (Lifetime/Recent)      10/10/2019     3:54 PM 2/14/2020     4:00 PM 10/31/2022    11:18 AM   [place] Suicide Severity Rating (Lifetime/Recent)   Q1 Wish to be Dead (Lifetime) Yes No    Comments Often due to pain and loss. \"I hate to be alone.\"     Q2 Non-Specific Active Suicidal Thoughts (Lifetime) Yes Yes    Non-Specific Active Suicidal Thought Description (Lifetime) Often due to pain and loss. \"I hate to be alone.\" Did attempt in the past, 15 years ago.     Most Severe Ideation Rating (Lifetime) 4 3    Most Severe Ideation Description (Lifetime)  Did attempt in the past, 15 years ago. Not fully assessed since Paula needed to leave the session early.     Frequency (Lifetime)  3    Duration (Lifetime)  3    Controllability (Lifetime)  3    Protective Factors  (Lifetime)  4    Reasons for Ideation (Lifetime)  4    RETIRED: 1. Wish to be Dead (Recent) Yes     RETIRED: Wish to be Dead Description (Recent) Often due to pain and loss. \"I hate to be alone.\" No plan or intention.     RETIRED: 2. Non-Specific Active Suicidal Thoughts (Recent) Yes     Non-Specific Active Suicidal Thought Description (Recent) Often due to pain and loss. \"I hate to be alone.\" No plan or intention.     3. Active Suicidal Ideation with any Methods (Not Plan) Without Intent to Act (Lifetime) Yes Yes    RETIRE: Active Suicidal Ideation with any Methods (Not Plan) Description (Lifetime) Often due to pain and loss. \"I hate to be alone.\" Did " "attempt in the past, 15 years ago.     RETIRED: 3. Active Suicidal Ideation with any Methods (Not Plan) Without Intent to Act (Recent) Yes     RETIRED: Active Suicidal Ideation with any Methods (Not Plan) Description (Recent) Often due to pain and loss. \"I hate to be alone.\" No plan or intention.     RETIRE: 4. Active Suicidal Ideation with Some Intent to Act, Without Specific Plan (Lifetime) Yes Yes    RETIRE: Active Suicidal Ideation with Some Intent to Act, Without Specific Plan Description (Lifetime) Did attempt in the past, 15 years ago.     4. Active Suicidal Ideation with Some Intent to Act, Without Specific Plan (Recent) No No    RETIRE: 5. Active Suicidal Ideation with Specific Plan and Intent (Lifetime) Yes     RETIRE: Active Suicidal Ideation with Specific Plan and Intent Description (Lifetime) Did attempt in the past, 15 years ago.     RETIRED: 5. Active Suicidal Ideation with Specific Plan and Intent (Recent) No     Most Severe Ideation Rating (Past Month) 2 1    Most Severe Ideation Description (Past Month) Regular thoughts of being dead due to chronic pain and loss.     Frequency (Past Month) 3 2    Duration (Past Month) 1     Controllability (Past Month) 2 3    Protective Factors (Past Month) NA 2    Reasons for Ideation (Past Month)  4    Actual Attempt (Lifetime)  Yes    Actual Attempt Description (Lifetime)  --    Comments  OD 25 years ago was hospitalized none since    Has subject engaged in non-suicidal self-injurious behavior? (Past 3 Months)  Yes    Interrupted Attempts (Lifetime)  --    Comments  burning arms in oven since marriage in 2014.    Most Recent Attempt Date  --    Comments  25 years ago    Most Recent Attempt Actual Lethality Code NA 1    Most Lethal Attempt Actual Lethality Code NA     Initial/First Attempt Actual Lethality Code NA     1. Wish to be Dead (Lifetime)   Y   Wish to be Dead Description (Lifetime)   After a breakup, she reports that she was around [age]   1. Wish to " be Dead (Past 1 Month)   N   2. Non-Specific Active Suicidal Thoughts (Lifetime)   Y   2. Non-Specific Active Suicidal Thoughts (Past 1 Month)   N   3. Active Suicidal Ideation with any Methods (Not Plan) Without Intent to Act (Lifetime)   Y   3. Active Suicidal Ideation with any Methods (Not Plan) Without Intent to Act (Past 1 Month)   N   4. Active Suicidal Ideation with Some Intent to Act, Without Specific Plan (Lifetime)   N   5. Active Suicidal Ideation with Specific Plan and Intent (Lifetime)   N   Description of Most Severe Ideation (Lifetime)   5   Description of Most Severe Ideation (Past 1 Month)   NA   Frequency (Lifetime)   3   Duration (Lifetime)   1   Controllability (Lifetime)   1   Deterrents (Lifetime)   0   Reasons for Ideation (Lifetime)   4   Actual Attempt (Lifetime)   Y   Total Number of Actual Attempts (Lifetime)   1   Actual Attempt Description (Lifetime)   Patient states she attempted through medication.   Actual Attempt (Past 3 Months)   N   Has subject engaged in non-suicidal self-injurious behavior? (Lifetime)   Y   Has subject engaged in non-suicidal self-injurious behavior? (Past 3 Months)   N   Interrupted Attempts (Lifetime)   N   Preparatory Acts or Behavior (Lifetime)   Y   Total Number of Preparatory Acts (Lifetime)   1   Most Recent Attempt Date   --   Actual Lethality/Medical Damage Code (Most Recent Attempt)   2   Potential Lethality Code (Most Recent Attempt)   1   Actual Lethality/Medical Damage Code (Most Lethal Attempt)   2   Potential Lethality Code (Most Lethal Attempt)   1   Initial/First Attempt Date   --   Calculated C-SSRS Risk Score (Lifetime/Recent)   Moderate Risk     [place] Suicide Severity Rating Scale (Short Version)      4/9/2020    10:00 AM 7/15/2020     9:00 AM 10/31/2022    11:18 AM   [place] Suicide Severity Rating (Short Version)   Over the past 2 weeks have you felt down, depressed, or hopeless? yes yes    Over the past 2 weeks have you had thoughts of  killing yourself? no no    Have you ever attempted to kill yourself? no yes    When did this last happen?  more than 6 months ago    Actual Lethality/Medical Damage Code (Most Lethal Attempt)   2   Potential Lethality Code (Most Lethal Attempt)   1        Reason for Visit/Presenting Concern:  Depression    Current Stressors / Issues:   Pt updated writer on the status of her mental and physical health.  Allowed her to process her frustrations with a male friend and the boundaries he was pushing and the hones she wanted to set with him.  Praise her for her insight and encourage her to do what feels best for her.  Review coping skills and prompt follow through.      Therapeutic Interventions:  Motivational Interviewing (MI): Validated patient's thoughts, feelings and experience. Expressed respect for patient's autonomy in decision making.  Affirmed patient's strengths and abilities.  Cognitive Behavioral Therapy (CBT): Provided psycho-education on cognitive distortions. and Identified behavioral changes that can be made to move towards treatment goals.     Response to treatment interventions:   Patient was receptive to interventions utilized.      Progress on Treatment Objective(s) / Homework:   Stable - CONTEMPLATION (Considering change and yet undecided); Intervened by assessing the negative and positive thinking (ambivalence) about behavior change     Medication Review:   No changes to current psychiatric medication(s)     Medication Compliance:   Yes     Chemical Use Review:  Substance Use: Chemical use reviewed, no active concerns identified      Tobacco Use: No current tobacco use.       Assessment: Current Emotional / Mental Status (status of significant symptoms):    Risk status (Self / Other harm or suicidal ideation)   Patient denies a history of suicidal ideation, suicide attempts, self-injurious behavior, homicidal ideation, homicidal behavior, and and other safety concerns   Patient denies current fears or  concerns for personal safety.   Patient denies current or recent suicidal ideation or behaviors.   Patient denies current or recent homicidal ideation or behaviors.   Patient denies current or recent self injurious behavior or ideation.   Patient denies other safety concerns.   Recommended that patient call 911 or go to the local ED should there be a change in any of these risk factors      ASSESSMENT:   Mental Status:     Appearance:   Unable to assess due to phone visit   Eye Contact:   Unable to assess due to phone visit   Psychomotor Behavior: Unable to assess due to phone visit   Attitude:   Cooperative    Orientation:   All   Speech Rate / Production: Normal    Volume:   Normal    Mood:    Depressed    Affect:    Appropriate  Worrisome    Thought Content:  Clear    Thought Form:  Coherent  Logical    Insight:    Fair          Diagnoses:   Moderate episode of recurrent major depressive disorder (H)    Collateral Reports Completed:   Not Applicable       Plan: (Homework, other):   Patient was provided No indications of CD issues  Patient was given information about behavioral services and encouraged to schedule a follow up appointment with the clinic [facility] in 1 week.         RASHEED Parekh, [facility]     _____________________________________________________________________________________________________________________________________                                              Individual Treatment Plan    Patient's Name: Paula Ho   YOB: 1960  Date of Creation: 2/4/25  Date Treatment Plan Last Reviewed/Revised: Review    DSM5 Diagnoses: 296.32 (F33.1) Major Depressive Disorder, Recurrent Episode, Moderate _  Psychosocial / Contextual Factors: Trauma History, Relationship Concerns, Interpersonal Concerns, Limited Social Support, and Financial Strain  PROMIS (reviewed every 90 days):   The following assessments were completed by patient for this visit:  PROMIS 10-Global Health (only  subscores and total score):       5/1/2023    10:40 AM 5/10/2023     3:47 PM 8/18/2023     2:12 PM 7/30/2024     2:00 PM   PROMIS-10 Scores Only   Global Mental Health Score 8        8 7 6     10        10   Global Physical Health Score 8        8 9 15     5        5   PROMIS TOTAL - SUBSCORES 16        16 16 21     15        15       Information is confidential and restricted. Go to Review Flowsheets to unlock data.        Referral / Collaboration:  Referral to another professional/service is not indicated at this time..    Anticipated number of session for this episode of care:  9-12 sessions  Anticipation frequency of session: Biweekly  Anticipated Duration of each session: 16-37 minutes  Treatment plan will be reviewed in 90 days or when goals have been changed.       MeasurableTreatment Goal(s) related to diagnosis / functional impairment(s)  Goal 1: Patient will reduce frequency and intensity of depressive episode(s). increase physical health behaviors. learn and utilize skills for coping with triggers. identify ways to improve quality of life. recognize and appropriately reframe cognitive distortions.    I will know I've met my goal when I can set healthy boundaries with others, four out of eight times.      Status: New - Date: 5/4/25      Intervention(s)  [facility] will Cognitive Behavioral Therapy (CBT): Provide psycho-education on cognitive distortions., Identify and challenge maladaptive patterns of behavior and thinking that interfere with patient's life, Identify behavioral changes that can be made to move towards treatment goals. , Assist patient in developing coping strategies (e.g. more physical exercise, less internal focus, increase social involvement, more assertiveness, etc.)., Reinforce successes reported by patient since last appointment., Work with patient to recognize irrational thought processes and identify more adaptive thoughts.  , and Work with patient to complete a cost/benefit analysis of  current behavior patterns and explored alternative behaviors.  Dialectical Behavioral Therapy (DBT): Utilize dialectical behavior therapy strategies to improve effective coping skills in treatment of the depression., Teach patient mindfulness skills. , Teach patient distress tolerance skills to help improve patient's ability to accept change (radical acceptance)., Teach patient emotional regulation strategies.  , and Teach patient interpersonal effectiveness skills.  Psycho-education: Provide psycho-education about patient's behavioral health condition and symptoms. Explain and reviewed treatment options. Teach boundary clarification and setting to develop and maintain healthy relationships. Teach conflict resolution skills to help manage personal conflict in relationships. Discuss ways to cope with grief following relationship breakdown, divorce, bereavement, or job loss.  Teach and practice use of assertiveness skills. Provide support and education to family members of patient.     Patient has reviewed and agreed to the above plan.     Written by  RASEHED Parekh, [facility]

## 2025-02-10 NOTE — TELEPHONE ENCOUNTER
Prior Authorization Approval    Medication: WEGOVY 0.25 MG/0.5ML SC SOAJ  Authorization Effective Date: 5/1/2024  Authorization Expiration Date: 5/1/2025  Approved Dose/Quantity:   Reference #:     Insurance Company: [employer] [phone] Fax [phone]  Expected CoPay: $    CoPay Card Available:      Financial Assistance Needed:   Which Pharmacy is filling the prescription: [id number] - [facility] [address] [address] [facility] [facility]  Pharmacy Notified: Y  Patient Notified: Instructed pharmacy to notify patient once order is ready.     PER PC today with [employer] approved and good until May. They are faxing over the approval letter.

## 2025-02-17 DIAGNOSIS — E66.01 MORBID OBESITY WITH BMI OF 45.0-49.9, ADULT (H): ICD-10-CM

## 2025-02-17 RX ORDER — SEMAGLUTIDE 0.5 MG/.5ML
INJECTION, SOLUTION SUBCUTANEOUS
Qty: 2 ML | Refills: 0 | OUTPATIENT
Start: 2025-02-17

## 2025-03-03 ENCOUNTER — VIRTUAL VISIT (OUTPATIENT)
Dept: BEHAVIORAL HEALTH | Facility: CLINIC | Age: 65
End: 2025-03-03
Payer: MEDICARE

## 2025-03-03 DIAGNOSIS — R69 DIAGNOSIS DEFERRED: Primary | ICD-10-CM

## 2025-03-03 NOTE — PROGRESS NOTES
"Behavioral Health Home Services  [facility]: [place]        Social Work Care Navigator Note      Patient: Paula Ho  Date: March 3, 2025  Preferred Name: Paula    Previous PHQ-9:       11/4/2024     7:20 AM 11/25/2024     3:00 PM 12/24/2024    10:58 AM   PHQ-9 SCORE   PHQ-9 Total Score MyChart 7 (Mild depression)     PHQ-9 Total Score 7  12 15       Proxy-reported     Previous LIBRA-7:       6/4/2024     2:00 PM 7/30/2024     1:58 PM 11/25/2024     3:00 PM   LIBRA-7 SCORE   Total Score  8 (mild anxiety)    Total Score 11 8    8    8 13     ARNALDO LEVEL:       No data to display                Preferred Contact:  Need for : No  Preferred Contact: Cell      Type of Contact Today: Phone call (patient / identified key support person reached)    Service Modality: Phone Visit:      Provider verified identity through the following two step process.  Patient provided:  Patient is known previously to provider    Telephone Visit: The patient's condition can be safely assessed and treated via synchronous audio telemedicine encounter.      Reason for Audio Telemedicine Visit: Patient has requested telehealth visit    Originating Site (Patient Location): Patient's home    Distant Site (Provider Location): Provider Remote Setting- Home Office    Telephone visit completed due to the patient did not consent to a video visit.    Consent:  The patient/guardian has verbally consented to:     1. The potential risks and benefits of telemedicine (telephone visit) versus in person care;    The patient has been notified of the following:      \"We have found that certain health care needs can be provided without the need for a face to face visit.  This service lets us provide the care you need with a phone conversation.       I will have full access to your [facility] medical record during this entire phone call.  I will be taking notes for your medical record.      Since this is like an office visit, we will bill your " "insurance company for this service.       There are potential benefits and risks of telephone visits (e.g. limits to patient confidentiality) that differ from in-person visits.?Confidentiality still applies for telephone services, and nobody will record the visit.  It is important to be in a quiet, private space that is free of distractions (including cell phone or other devices) during the visit.??      If during the course of the call I believe a telephone visit is not appropriate, you will not be charged for this service\"     Consent has been obtained for this service by care team member: Yes       Data: (subjective / Objective):  Recent ED/IP Admission or Discharge?   None    Patient Goals:  Goal Areas: Health; Mental Health; Future HAP Goal area; Chemical Health  Patient stated goals: Health: Paula would like to continue to meet with her providers, and take her medications as prescribed.   -Paula would like to work on losing weight with a provider. She will start using prescribed medication for assistance, as well as working on a nutrition plan.   -Paula would like to go to the gym at least two times per week.   Mental Health: Paula would like to continue receiving support from the [facility], [place], for therapy within [place], and receive a long term therapy referral when needed. She will also continue to receive assistance with [facility] for monthly check ins.   -She would like to see a new psychiatrist as well.   -She would like to continue working with an [facility] worker and Peer Support Person through the same agency.   -She reported that she wants to build herself back to feel like somebody and take it day by day.   Chemical Health: Paula would like assistance from her providers to quit smoking and will work on reducing her use, so she can be approved for back surgery.   Future goals: Paula is going to consider back surgery in the future after losing the recommended weight.        [facility] Core Service " Provided:  Comprehensive Care Management: utilized the electronic medical record / patient registry to identify and support patient's health conditions / needs more effectively   Care Transitions: focused on the coordinated and seamless movement of patient between or within different levels of care or settings  Care Coordination: provided care management services/referrals necessary to ensure patient and their identified supports have access to medical, behavioral health, pharmacology and recovery support services.  Ensured that patient's care is integrated across all settings and services.   Individual and Family Support: aimed to help clients reduce barriers to achieving goals, increase health literacy and knowledge about chronic condition(s), increase self-efficacy skills, and improve health outcomes    Current Stressors / Issues / Care Plan Objective Addressed Today:  [facility] and patient were able to meet today for Behavioral Health Home ([facility]) monthly check-in via telehealth visit. All required ROIs have been filed with HIM/patient chart.    - Patient reports she's been in pain again today, and she was having a hard time sleeping so she just woke up a little bit ago. She reported that she struggled to go to the grocery store the other day as well, and she was having a hard time standing up. She reports the pain has been very bad, and she's not sure it's been better since the pain injections. She has an appointment tomorrow, so she'll bring up her pain again.     - Patient's mom has still be in and out of the hospital. She is considering moving into assisted living or nursing homes. Patient sent her mom some options, but she's not sure if she's going to move. She reports her mom is interested in moving closer to the cities. She fell recently, which is why she went into the hospital, and she has double pneumonia as well. She reports her mom is home now for now.     - Patient reports she's still considering moving,  but she's not sure she wants to do this right now. She is also concerned about packing if she were to move.     - Patient reports that she's working with her [facility] worker and PCA still as well. She hasn't received the PCA renewal paperwork yet since her renewal appointment. She reports that her appointment with the new [facility] worker, Ale, went okay. She sees her again tomorrow.     - Patient and [facility] discussed struggles with her making the [facility] therapy appointments. She's not sure how she missed the appointment, but she has reminders on her phone that come through. She is rescheduled for 3/17. She also [facility]. Patient reports she'll be able to make those appointments.     - Patient reports that her [facility] worker called her about the verification form. She doesn't remember having to do this in the past, but she completed it and sent it back.     Intervention:  Motivational Interviewing: Expressed Empathy/Understanding, Supported Autonomy, Collaboration, Evocation, Permission to raise concern or advise, Open-ended questions, and Reflections: simple and complex   Target Behavior(s): Explored thoughts about taking an anti-depressant, Explored and resolved challenges related to taking anti-depressants as prescribed, Explored thoughts and readiness to participate in individual therapy, Explored and resolved challenges to attending appointments as scheduled, and Explored current social supports and reinforced opportunities to increase engagement    Assessment: (Progress on Goals / Homework):  Patient would benefit from continued coordination in reaching their goals set for the Behavioral Health Home ([facility]) program. [facility] reviewed Health Action Plan goals and will continue to monitor progress and work with patient and their care team.    Plan: (Homework, other):  Patient was encouraged to continue to seek condition-related information and education.      Scheduled a  Phone follow up appointment with MISA RICE in 4 weeks     Patient has set self-identified goals and will monitor progress until the next appointment on: 4/7/25.        DENI Humphries  Behavioral Health Home ([facility])   [facility]  [phone]    Next 5 appointments (look out 90 days)      Mar 04, 2025 4:30 PM  (Arrive by 4:10 PM)  Provider Visit with Renan Dickerson MD  [facility] ([facility] ) [address] [place] [id number] [phone]

## 2025-03-04 ENCOUNTER — OFFICE VISIT (OUTPATIENT)
Dept: FAMILY MEDICINE | Facility: CLINIC | Age: 65
End: 2025-03-04
Payer: MEDICARE

## 2025-03-04 VITALS
SYSTOLIC BLOOD PRESSURE: 139 MMHG | OXYGEN SATURATION: 95 % | RESPIRATION RATE: 22 BRPM | HEIGHT: 66 IN | BODY MASS INDEX: 44.77 KG/M2 | WEIGHT: 278.6 LBS | HEART RATE: 76 BPM | TEMPERATURE: 98.1 F | DIASTOLIC BLOOD PRESSURE: 82 MMHG

## 2025-03-04 DIAGNOSIS — J44.9 CHRONIC OBSTRUCTIVE PULMONARY DISEASE, UNSPECIFIED COPD TYPE (H): ICD-10-CM

## 2025-03-04 DIAGNOSIS — R91.8 PULMONARY NODULES: ICD-10-CM

## 2025-03-04 DIAGNOSIS — E66.01 MORBID OBESITY WITH BMI OF 40.0-44.9, ADULT (H): ICD-10-CM

## 2025-03-04 DIAGNOSIS — E66.01 MORBID OBESITY WITH BMI OF 40.0-44.9, ADULT (H): Primary | ICD-10-CM

## 2025-03-04 DIAGNOSIS — Z13.6 CARDIOVASCULAR SCREENING; LDL GOAL LESS THAN 100: ICD-10-CM

## 2025-03-04 DIAGNOSIS — K64.4 EXTERNAL HEMORRHOIDS: ICD-10-CM

## 2025-03-04 DIAGNOSIS — F33.1 MODERATE RECURRENT MAJOR DEPRESSION (H): ICD-10-CM

## 2025-03-04 DIAGNOSIS — E03.8 OTHER SPECIFIED HYPOTHYROIDISM: ICD-10-CM

## 2025-03-04 DIAGNOSIS — M62.830 BACK MUSCLE SPASM: ICD-10-CM

## 2025-03-04 DIAGNOSIS — J44.1 COPD EXACERBATION (H): ICD-10-CM

## 2025-03-04 DIAGNOSIS — M62.830 BACK MUSCLE SPASM: Primary | ICD-10-CM

## 2025-03-04 PROCEDURE — 82308 ASSAY OF CALCITONIN: CPT | Mod: 90 | Performed by: INTERNAL MEDICINE

## 2025-03-04 PROCEDURE — 1125F AMNT PAIN NOTED PAIN PRSNT: CPT | Performed by: INTERNAL MEDICINE

## 2025-03-04 PROCEDURE — 99214 OFFICE O/P EST MOD 30 MIN: CPT | Performed by: INTERNAL MEDICINE

## 2025-03-04 PROCEDURE — 84439 ASSAY OF FREE THYROXINE: CPT | Performed by: INTERNAL MEDICINE

## 2025-03-04 PROCEDURE — 80053 COMPREHEN METABOLIC PANEL: CPT | Performed by: INTERNAL MEDICINE

## 2025-03-04 PROCEDURE — 99000 SPECIMEN HANDLING OFFICE-LAB: CPT | Performed by: INTERNAL MEDICINE

## 2025-03-04 PROCEDURE — 80061 LIPID PANEL: CPT | Performed by: INTERNAL MEDICINE

## 2025-03-04 PROCEDURE — 84443 ASSAY THYROID STIM HORMONE: CPT | Performed by: INTERNAL MEDICINE

## 2025-03-04 PROCEDURE — 3075F SYST BP GE 130 - 139MM HG: CPT | Performed by: INTERNAL MEDICINE

## 2025-03-04 PROCEDURE — 3079F DIAST BP 80-89 MM HG: CPT | Performed by: INTERNAL MEDICINE

## 2025-03-04 PROCEDURE — 96127 BRIEF EMOTIONAL/BEHAV ASSMT: CPT | Performed by: INTERNAL MEDICINE

## 2025-03-04 PROCEDURE — 36415 COLL VENOUS BLD VENIPUNCTURE: CPT | Performed by: INTERNAL MEDICINE

## 2025-03-04 PROCEDURE — 83690 ASSAY OF LIPASE: CPT | Performed by: INTERNAL MEDICINE

## 2025-03-04 PROCEDURE — G2211 COMPLEX E/M VISIT ADD ON: HCPCS | Performed by: INTERNAL MEDICINE

## 2025-03-04 RX ORDER — ALBUTEROL SULFATE 0.83 MG/ML
SOLUTION RESPIRATORY (INHALATION)
Qty: 120 ML | Refills: 11 | Status: SHIPPED | OUTPATIENT
Start: 2025-03-04

## 2025-03-04 RX ORDER — PREDNISONE 10 MG/1
10 TABLET ORAL DAILY
Qty: 10 TABLET | Refills: 5 | Status: SHIPPED | OUTPATIENT
Start: 2025-03-04 | End: 2025-03-14

## 2025-03-04 RX ORDER — TIZANIDINE 2 MG/1
2 TABLET ORAL 3 TIMES DAILY PRN
Qty: 90 TABLET | Refills: 11 | Status: SHIPPED | OUTPATIENT
Start: 2025-03-04

## 2025-03-04 RX ORDER — HYDROCORTISONE 25 MG/G
CREAM TOPICAL 2 TIMES DAILY PRN
Qty: 28 G | Refills: 5 | Status: SHIPPED | OUTPATIENT
Start: 2025-03-04

## 2025-03-04 ASSESSMENT — ANXIETY QUESTIONNAIRES
3. WORRYING TOO MUCH ABOUT DIFFERENT THINGS: MORE THAN HALF THE DAYS
GAD7 TOTAL SCORE: 11
GAD7 TOTAL SCORE: 11
8. IF YOU CHECKED OFF ANY PROBLEMS, HOW DIFFICULT HAVE THESE MADE IT FOR YOU TO DO YOUR WORK, TAKE CARE OF THINGS AT HOME, OR GET ALONG WITH OTHER PEOPLE?: VERY DIFFICULT
IF YOU CHECKED OFF ANY PROBLEMS ON THIS QUESTIONNAIRE, HOW DIFFICULT HAVE THESE PROBLEMS MADE IT FOR YOU TO DO YOUR WORK, TAKE CARE OF THINGS AT HOME, OR GET ALONG WITH OTHER PEOPLE: VERY DIFFICULT
6. BECOMING EASILY ANNOYED OR IRRITABLE: MORE THAN HALF THE DAYS
2. NOT BEING ABLE TO STOP OR CONTROL WORRYING: MORE THAN HALF THE DAYS
7. FEELING AFRAID AS IF SOMETHING AWFUL MIGHT HAPPEN: MORE THAN HALF THE DAYS
4. TROUBLE RELAXING: NOT AT ALL
GAD7 TOTAL SCORE: 11
1. FEELING NERVOUS, ANXIOUS, OR ON EDGE: MORE THAN HALF THE DAYS
5. BEING SO RESTLESS THAT IT IS HARD TO SIT STILL: SEVERAL DAYS
7. FEELING AFRAID AS IF SOMETHING AWFUL MIGHT HAPPEN: MORE THAN HALF THE DAYS

## 2025-03-04 ASSESSMENT — PAIN SCALES - GENERAL: PAINLEVEL_OUTOF10: MODERATE PAIN (4)

## 2025-03-04 ASSESSMENT — PATIENT HEALTH QUESTIONNAIRE - PHQ9
SUM OF ALL RESPONSES TO PHQ QUESTIONS 1-9: 10
10. IF YOU CHECKED OFF ANY PROBLEMS, HOW DIFFICULT HAVE THESE PROBLEMS MADE IT FOR YOU TO DO YOUR WORK, TAKE CARE OF THINGS AT HOME, OR GET ALONG WITH OTHER PEOPLE: VERY DIFFICULT
SUM OF ALL RESPONSES TO PHQ QUESTIONS 1-9: 10

## 2025-03-04 NOTE — PROGRESS NOTES
{PROVIDER CHARTING PREFERENCE:190951}    Igor Mitchell is a 64 year old, presenting for the following health issues:  Recheck Medication      3/4/2025     4:08 PM   Additional Questions   Roomed by eduardo   Accompanied by self         3/4/2025     4:08 PM   Patient Reported Additional Medications   Patient reports taking the following new medications none     History of Present Illness       Back Pain:  She presents for follow up of back pain. Patient's back pain is a chronic problem.  Location of back pain:  Right lower back, left lower back, right shoulder, left shoulder, right side of waist and left side of waist  Description of back pain: burning, dull ache, sharp and shooting  Back pain spreads: right buttocks, left buttocks, right thigh, left thigh, right shoulder and left shoulder    Since patient first noticed back pain, pain is: gradually worsening  Does back pain interfere with her job:  Yes       COPD:  She presents for follow up of COPD.   Overall, COPD symptoms are slightly worse since last visit. She has less than usual fatigue or shortness of breath with exertion and no shortness of breath at rest.  She sometimes coughs and does not have change in sputum. No recent fever. She can walk the length of 3-5 rooms without stopping to rest. She can walk 1 flights of stairs without resting. The patient has had no ED, urgent care, or hospital admissions because of COPD since the last visit.     Mental Health Follow-up:  Patient presents to follow-up on Depression & Anxiety.Patient's depression since last visit has been:  Better  The patient is having other symptoms associated with depression.  Patient's anxiety since last visit has been:  Medium  The patient is not having other symptoms associated with anxiety.  Any significant life events: other  Patient is not feeling anxious or having panic attacks.  Patient has no concerns about alcohol or drug use.    She eats 0-1 servings of fruits and vegetables  "daily.She consumes 1 sweetened beverage(s) daily.She exercises with enough effort to increase her heart rate 9 or less minutes per day.  She exercises with enough effort to increase her heart rate 3 or less days per week. She is missing 1 dose(s) of medications per week.        {MA/LPN/RN Pre-Provider Visit Orders- hCG/UA/Strep (Optional):354275}  Medication Followup of Semaglutide-Weight Management (WEGOVY) 0.25 MG/0.5ML pen   Taking Medication as prescribed: yes- patient currently on 0.5 mg   Side Effects:  None  Medication Helping Symptoms:  patient states \"a little\".    {additonal problems for provider to add (Optional):093191}    {ROS Picklists (Optional):295366}      Objective    /82 (BP Location: Right arm, Patient Position: Sitting, Cuff Size: Adult Large)   Pulse 76   Temp 98.1  F (36.7  C) (Temporal)   Resp 22   Ht 1.676 m (5' 6\")   Wt 126.4 kg (278 lb 9.6 oz)   LMP  (LMP Unknown)   SpO2 95%   BMI 44.97 kg/m    Body mass index is 44.97 kg/m .  Physical Exam   {Exam List (Optional):865083}    {Diagnostic Test Results (Optional):655332}        Signed Electronically by: Renan Dickerson MD  {Email feedback regarding this note to [email or web address]   :[id number]}  Answers submitted by the patient for this visit:  Patient Health Questionnaire (Submitted on 3/4/2025)  If you checked off any problems, how difficult have these problems made it for you to do your work, take care of things at home, or get along with other people?: Very difficult  PHQ9 TOTAL SCORE: 10    " solution; NEBULIZE THE CONTENTS OF 1 VIAL EVERY 6 HOURS AS NEEDED.  - predniSONE (DELTASONE) 10 MG tablet; Take 1 tablet (10 mg) by mouth daily for 10 days.    CARDIOVASCULAR SCREENING; LDL GOAL LESS THAN 100  - Lipid panel reflex to direct LDL Non-fasting    Pulmonary nodules  - CT Chest w/o Contrast; Future    Other specified hypothyroidism  - TSH  - T4, free    External hemorrhoids  - hydrocortisone, Perianal, (HYDROCORTISONE) 2.5 % cream; Place rectally 2 times daily as needed for hemorrhoids.       Signed Electronically by: Renan Dickerson MD

## 2025-03-05 LAB
ALBUMIN SERPL BCG-MCNC: 4.2 G/DL (ref 3.5–5.2)
ALP SERPL-CCNC: 99 U/L (ref 40–150)
ALT SERPL W P-5'-P-CCNC: 13 U/L (ref 0–50)
ANION GAP SERPL CALCULATED.3IONS-SCNC: 11 MMOL/L (ref 7–15)
AST SERPL W P-5'-P-CCNC: 18 U/L (ref 0–45)
BILIRUB SERPL-MCNC: 0.3 MG/DL
BUN SERPL-MCNC: 9.4 MG/DL (ref 8–23)
CALCIUM SERPL-MCNC: 9.7 MG/DL (ref 8.8–10.4)
CHLORIDE SERPL-SCNC: 104 MMOL/L (ref 98–107)
CHOLEST SERPL-MCNC: 212 MG/DL
CREAT SERPL-MCNC: 0.61 MG/DL (ref 0.51–0.95)
EGFRCR SERPLBLD CKD-EPI 2021: >90 ML/MIN/1.73M2
FASTING STATUS PATIENT QL REPORTED: NO
FASTING STATUS PATIENT QL REPORTED: NO
GLUCOSE SERPL-MCNC: 101 MG/DL (ref 70–99)
HCO3 SERPL-SCNC: 25 MMOL/L (ref 22–29)
HDLC SERPL-MCNC: 43 MG/DL
LDLC SERPL CALC-MCNC: 149 MG/DL
LIPASE SERPL-CCNC: 23 U/L (ref 13–60)
NONHDLC SERPL-MCNC: 169 MG/DL
POTASSIUM SERPL-SCNC: 4.6 MMOL/L (ref 3.4–5.3)
PROT SERPL-MCNC: 6.9 G/DL (ref 6.4–8.3)
SODIUM SERPL-SCNC: 140 MMOL/L (ref 135–145)
T4 FREE SERPL-MCNC: 1.04 NG/DL (ref 0.9–1.7)
TRIGL SERPL-MCNC: 99 MG/DL
TSH SERPL DL<=0.005 MIU/L-ACNC: 3.6 UIU/ML (ref 0.3–4.2)

## 2025-03-07 LAB — CALCIT SERPL-MCNC: 4.3 PG/ML

## 2025-03-12 ENCOUNTER — PATIENT OUTREACH (OUTPATIENT)
Dept: CARE COORDINATION | Facility: CLINIC | Age: 65
End: 2025-03-12
Payer: MEDICARE

## 2025-03-13 DIAGNOSIS — Z12.11 COLON CANCER SCREENING: ICD-10-CM

## 2025-03-18 ENCOUNTER — VIRTUAL VISIT (OUTPATIENT)
Dept: BEHAVIORAL HEALTH | Facility: CLINIC | Age: 65
End: 2025-03-18
Payer: MEDICARE

## 2025-03-18 DIAGNOSIS — F33.1 MODERATE EPISODE OF RECURRENT MAJOR DEPRESSIVE DISORDER (H): Primary | ICD-10-CM

## 2025-03-18 PROCEDURE — 90832 PSYTX W PT 30 MINUTES: CPT | Mod: 93

## 2025-03-18 NOTE — PROGRESS NOTES
"    [facility]: Integrated Behavioral Health    Behavioral Health Clinician Progress Note   Mental Health & Addiction Services      Tuesday February 04, 2025    Patient Name: Paula Ho       Service Type:  Individual   Service Location:  MyChart / Email (patient reached)   Visit Start Time: 2:30pm  Visit End Time:  3:00pm    Session Length: 16 - 37    Attendees: Patient   Service Modality: Phone Visit:      Provider verified identity through the following two step process.  Patient provided:  Patient is known previously to provider    Telephone Visit: The patient's condition can be safely assessed and treated via synchronous audio telemedicine encounter.      Reason for Audio Telemedicine Visit: Patient has requested telehealth visit    Originating Site (Patient Location): Patient's home    Distant Site (Provider Location): Provider Remote Setting- Home Office    Telephone visit completed due to the patient did not consent to a video visit.    Consent:  The patient/guardian has verbally consented to:     1. The potential risks and benefits of telemedicine (telephone visit) versus in person care;    The patient has been notified of the following:      \"We have found that certain health care needs can be provided without the need for a face to face visit.  This service lets us provide the care you need with a phone conversation.       I will have full access to your [facility] medical record during this entire phone call.  I will be taking notes for your medical record.      Since this is like an office visit, we will bill your insurance company for this service.       There are potential benefits and risks of telephone visits (e.g. limits to patient confidentiality) that differ from in-person visits.?Confidentiality still applies for telephone services, and nobody will record the visit.  It is important to be in a quiet, private space that is free of distractions " "(including cell phone or other devices) during the visit.??      If during the course of the call I believe a telephone visit is not appropriate, you will not be charged for this service\"     Consent has been obtained for this service by care team member: Yes    Visit number: Long term     [facility] Visit Activities (Refresh list every visit): [facility] Only     [facility] Assessment: 11/28/24  Treatment Plan Review Date: 5/4/25      DATA:    Extended Session (60+ minutes): No   Interactive Complexity: No   Crisis: No   [facility] Patient: Yes, addressed the follow [facility] Core Component(s):                          Comprehensive Care Management: utilized the electronic medical record / patient registry to identify and support patient's health conditions / needs more effectively      Assessments completed prior to visit:   The following assessments were completed by patient for this visit:  PHQ2: No questionnaires on file.  PHQ9:       7/30/2024     1:58 PM 9/17/2024     2:47 PM 11/4/2024     7:20 AM 11/25/2024     3:00 PM 12/24/2024    10:58 AM 3/4/2025     3:59 PM 3/11/2025     2:43 PM   PHQ-9 SCORE   PHQ-9 Total Score MyChart 5 (Mild depression) 15 (Moderately severe depression) 7 (Mild depression)   10 (Moderate depression) 12 (Moderate depression)   PHQ-9 Total Score 5 15 7  12 15 10  12        Patient-reported    Proxy-reported     GAD2:       5/1/2023    10:38 AM 7/3/2023    10:42 AM 8/18/2023     2:10 PM 10/26/2023     9:10 AM 7/30/2024     1:58 PM 3/11/2025     2:44 PM   LIBRA-2   Feeling nervous, anxious, or on edge 1  0  1  2  1  1    Not being able to stop or control worrying 3  1  1  1  2  1    LIBRA-2 Total Score 4    4    4 1 2    2 3    3 3    3    3 2        Proxy-reported     GAD7:       4/2/2024     2:40 PM 5/21/2024     3:03 PM 5/21/2024     3:04 PM 6/4/2024     2:00 PM 7/30/2024     1:58 PM 11/25/2024     3:00 PM 3/4/2025     4:17 PM   LIBRA-7 SCORE   Total Score 8 (mild anxiety)  9 (mild anxiety)  8 (mild " "anxiety)  11 (moderate anxiety)   Total Score 8 9  11 8    8    8 13 11        Patient-reported     CAGE-AID:       3/11/2025     2:49 PM   CAGE-AID Total Score   Total Score 0    Total Score MyChart 0 (A total score of 2 or greater is considered clinically significant)       Proxy-reported     PROMIS 10-Global Health (only subscores and total score):       5/1/2023    10:40 AM 5/10/2023     3:47 PM 8/18/2023     2:12 PM 7/30/2024     2:00 PM 3/11/2025     2:48 PM   PROMIS-10 Scores Only   Global Mental Health Score 8        8 7 6     10        10 11    Global Physical Health Score 8        8 9 15     5        5 8    PROMIS TOTAL - SUBSCORES 16        16 16 21     15        15 19        Information is confidential and restricted. Go to Review Flowsheets to unlock data.    Proxy-reported     [place] Suicide Severity Rating Scale (Lifetime/Recent)      10/10/2019     3:54 PM 2/14/2020     4:00 PM 10/31/2022    11:18 AM   [place] Suicide Severity Rating (Lifetime/Recent)   Q1 Wish to be Dead (Lifetime) Yes No    Comments Often due to pain and loss. \"I hate to be alone.\"     Q2 Non-Specific Active Suicidal Thoughts (Lifetime) Yes Yes    Non-Specific Active Suicidal Thought Description (Lifetime) Often due to pain and loss. \"I hate to be alone.\" Did attempt in the past, 15 years ago.     Most Severe Ideation Rating (Lifetime) 4 3    Most Severe Ideation Description (Lifetime)  Did attempt in the past, 15 years ago. Not fully assessed since Paula needed to leave the session early.     Frequency (Lifetime)  3    Duration (Lifetime)  3    Controllability (Lifetime)  3    Protective Factors  (Lifetime)  4    Reasons for Ideation (Lifetime)  4    RETIRED: 1. Wish to be Dead (Recent) Yes     RETIRED: Wish to be Dead Description (Recent) Often due to pain and loss. \"I hate to be alone.\" No plan or intention.     RETIRED: 2. Non-Specific Active Suicidal Thoughts (Recent) Yes     Non-Specific Active Suicidal Thought Description " "(Recent) Often due to pain and loss. \"I hate to be alone.\" No plan or intention.     3. Active Suicidal Ideation with any Methods (Not Plan) Without Intent to Act (Lifetime) Yes Yes    RETIRE: Active Suicidal Ideation with any Methods (Not Plan) Description (Lifetime) Often due to pain and loss. \"I hate to be alone.\" Did attempt in the past, 15 years ago.     RETIRED: 3. Active Suicidal Ideation with any Methods (Not Plan) Without Intent to Act (Recent) Yes     RETIRED: Active Suicidal Ideation with any Methods (Not Plan) Description (Recent) Often due to pain and loss. \"I hate to be alone.\" No plan or intention.     RETIRE: 4. Active Suicidal Ideation with Some Intent to Act, Without Specific Plan (Lifetime) Yes Yes    RETIRE: Active Suicidal Ideation with Some Intent to Act, Without Specific Plan Description (Lifetime) Did attempt in the past, 15 years ago.     4. Active Suicidal Ideation with Some Intent to Act, Without Specific Plan (Recent) No No    RETIRE: 5. Active Suicidal Ideation with Specific Plan and Intent (Lifetime) Yes     RETIRE: Active Suicidal Ideation with Specific Plan and Intent Description (Lifetime) Did attempt in the past, 15 years ago.     RETIRED: 5. Active Suicidal Ideation with Specific Plan and Intent (Recent) No     Most Severe Ideation Rating (Past Month) 2 1    Most Severe Ideation Description (Past Month) Regular thoughts of being dead due to chronic pain and loss.     Frequency (Past Month) 3 2    Duration (Past Month) 1     Controllability (Past Month) 2 3    Protective Factors (Past Month) NA 2    Reasons for Ideation (Past Month)  4    Actual Attempt (Lifetime)  Yes    Actual Attempt Description (Lifetime)  --    Comments  OD 25 years ago was hospitalized none since    Has subject engaged in non-suicidal self-injurious behavior? (Past 3 Months)  Yes    Interrupted Attempts (Lifetime)  --    Comments  burning arms in oven since marriage in 2014.    Most Recent Attempt Date  --  "   Comments  25 years ago    Most Recent Attempt Actual Lethality Code NA 1    Most Lethal Attempt Actual Lethality Code NA     Initial/First Attempt Actual Lethality Code NA     1. Wish to be Dead (Lifetime)   Y   Wish to be Dead Description (Lifetime)   After a breakup, she reports that she was around [age]   1. Wish to be Dead (Past 1 Month)   N   2. Non-Specific Active Suicidal Thoughts (Lifetime)   Y   2. Non-Specific Active Suicidal Thoughts (Past 1 Month)   N   3. Active Suicidal Ideation with any Methods (Not Plan) Without Intent to Act (Lifetime)   Y   3. Active Suicidal Ideation with any Methods (Not Plan) Without Intent to Act (Past 1 Month)   N   4. Active Suicidal Ideation with Some Intent to Act, Without Specific Plan (Lifetime)   N   5. Active Suicidal Ideation with Specific Plan and Intent (Lifetime)   N   Description of Most Severe Ideation (Lifetime)   5   Description of Most Severe Ideation (Past 1 Month)   NA   Frequency (Lifetime)   3   Duration (Lifetime)   1   Controllability (Lifetime)   1   Deterrents (Lifetime)   0   Reasons for Ideation (Lifetime)   4   Actual Attempt (Lifetime)   Y   Total Number of Actual Attempts (Lifetime)   1   Actual Attempt Description (Lifetime)   Patient states she attempted through medication.   Actual Attempt (Past 3 Months)   N   Has subject engaged in non-suicidal self-injurious behavior? (Lifetime)   Y   Has subject engaged in non-suicidal self-injurious behavior? (Past 3 Months)   N   Interrupted Attempts (Lifetime)   N   Preparatory Acts or Behavior (Lifetime)   Y   Total Number of Preparatory Acts (Lifetime)   1   Most Recent Attempt Date   --   Actual Lethality/Medical Damage Code (Most Recent Attempt)   2   Potential Lethality Code (Most Recent Attempt)   1   Actual Lethality/Medical Damage Code (Most Lethal Attempt)   2   Potential Lethality Code (Most Lethal Attempt)   1   Initial/First Attempt Date   --   Calculated C-SSRS Risk Score  (Lifetime/Recent)   Moderate Risk     [place] Suicide Severity Rating Scale (Short Version)      4/9/2020    10:00 AM 7/15/2020     9:00 AM 10/31/2022    11:18 AM   [place] Suicide Severity Rating (Short Version)   Over the past 2 weeks have you felt down, depressed, or hopeless? yes yes    Over the past 2 weeks have you had thoughts of killing yourself? no no    Have you ever attempted to kill yourself? no yes    When did this last happen?  more than 6 months ago    Actual Lethality/Medical Damage Code (Most Lethal Attempt)   2   Potential Lethality Code (Most Lethal Attempt)   1        Reason for Visit/Presenting Concern:  Depression    Current Stressors / Issues:   Pt updated writer on the status of her mental and physical health.  Allowed her to process her frustrations with a male friend and the boundaries he was pushing and the hones she wanted to set with him.  Praise her for her insight and encourage her to do what feels best for her.  Review coping skills and prompt follow through.      Therapeutic Interventions:  Motivational Interviewing (MI): Validated patient's thoughts, feelings and experience. Expressed respect for patient's autonomy in decision making.  Affirmed patient's strengths and abilities.  Cognitive Behavioral Therapy (CBT): Provided psycho-education on cognitive distortions. and Identified behavioral changes that can be made to move towards treatment goals.     Response to treatment interventions:   Patient was receptive to interventions utilized.      Progress on Treatment Objective(s) / Homework:   Stable - CONTEMPLATION (Considering change and yet undecided); Intervened by assessing the negative and positive thinking (ambivalence) about behavior change     Medication Review:   No changes to current psychiatric medication(s)     Medication Compliance:   Yes     Chemical Use Review:  Substance Use: Chemical use reviewed, no active concerns identified      Tobacco Use: No current tobacco  use.       Assessment: Current Emotional / Mental Status (status of significant symptoms):    Risk status (Self / Other harm or suicidal ideation)   Patient denies a history of suicidal ideation, suicide attempts, self-injurious behavior, homicidal ideation, homicidal behavior, and and other safety concerns   Patient denies current fears or concerns for personal safety.   Patient denies current or recent suicidal ideation or behaviors.   Patient denies current or recent homicidal ideation or behaviors.   Patient denies current or recent self injurious behavior or ideation.   Patient denies other safety concerns.   Recommended that patient call 911 or go to the local ED should there be a change in any of these risk factors      ASSESSMENT:   Mental Status:     Appearance:   Unable to assess due to phone visit   Eye Contact:   Unable to assess due to phone visit   Psychomotor Behavior: Unable to assess due to phone visit   Attitude:   Cooperative    Orientation:   All   Speech Rate / Production: Normal    Volume:   Normal    Mood:    Depressed    Affect:    Appropriate  Worrisome    Thought Content:  Clear    Thought Form:  Coherent  Logical    Insight:    Fair          Diagnoses:   Data Unavailable    Collateral Reports Completed:   Not Applicable       Plan: (Homework, other):   Patient was provided No indications of CD issues  Patient was given information about behavioral services and encouraged to schedule a follow up appointment with the clinic [facility] in 1 week.         RASHEED Parekh, [facility]     _____________________________________________________________________________________________________________________________________                                              Individual Treatment Plan    Patient's Name: Paula Ho   YOB: 1960  Date of Creation: 2/4/25  Date Treatment Plan Last Reviewed/Revised: Review    DSM5 Diagnoses: 296.32 (F33.1) Major Depressive Disorder, Recurrent  Episode, Moderate _  Psychosocial / Contextual Factors: Trauma History, Relationship Concerns, Interpersonal Concerns, Limited Social Support, and Financial Strain  PROMIS (reviewed every 90 days):   The following assessments were completed by patient for this visit:  PROMIS 10-Global Health (only subscores and total score):       5/1/2023    10:40 AM 5/10/2023     3:47 PM 8/18/2023     2:12 PM 7/30/2024     2:00 PM 3/11/2025     2:48 PM   PROMIS-10 Scores Only   Global Mental Health Score 8        8 7 6     10        10 11    Global Physical Health Score 8        8 9 15     5        5 8    PROMIS TOTAL - SUBSCORES 16        16 16 21     15        15 19        Information is confidential and restricted. Go to Review Flowsheets to unlock data.    Proxy-reported        Referral / Collaboration:  Referral to another professional/service is not indicated at this time..    Anticipated number of session for this episode of care:  9-12 sessions  Anticipation frequency of session: Biweekly  Anticipated Duration of each session: 16-37 minutes  Treatment plan will be reviewed in 90 days or when goals have been changed.       MeasurableTreatment Goal(s) related to diagnosis / functional impairment(s)  Goal 1: Patient will reduce frequency and intensity of depressive episode(s). increase physical health behaviors. learn and utilize skills for coping with triggers. identify ways to improve quality of life. recognize and appropriately reframe cognitive distortions.    I will know I've met my goal when I can set healthy boundaries with others, four out of eight times.      Status: New - Date: 5/4/25      Intervention(s)  [facility] will Cognitive Behavioral Therapy (CBT): Provide psycho-education on cognitive distortions., Identify and challenge maladaptive patterns of behavior and thinking that interfere with patient's life, Identify behavioral changes that can be made to move towards treatment goals. , Assist patient in developing  coping strategies (e.g. more physical exercise, less internal focus, increase social involvement, more assertiveness, etc.)., Reinforce successes reported by patient since last appointment., Work with patient to recognize irrational thought processes and identify more adaptive thoughts.  , and Work with patient to complete a cost/benefit analysis of current behavior patterns and explored alternative behaviors.  Dialectical Behavioral Therapy (DBT): Utilize dialectical behavior therapy strategies to improve effective coping skills in treatment of the depression., Teach patient mindfulness skills. , Teach patient distress tolerance skills to help improve patient's ability to accept change (radical acceptance)., Teach patient emotional regulation strategies.  , and Teach patient interpersonal effectiveness skills.  Psycho-education: Provide psycho-education about patient's behavioral health condition and symptoms. Explain and reviewed treatment options. Teach boundary clarification and setting to develop and maintain healthy relationships. Teach conflict resolution skills to help manage personal conflict in relationships. Discuss ways to cope with grief following relationship breakdown, divorce, bereavement, or job loss.  Teach and practice use of assertiveness skills. Provide support and education to family members of patient.     Patient has reviewed and agreed to the above plan.     Written by  RASHEED Parekh, [facility] [place]

## 2025-03-19 ENCOUNTER — TELEPHONE (OUTPATIENT)
Dept: FAMILY MEDICINE | Facility: CLINIC | Age: 65
End: 2025-03-19
Payer: MEDICARE

## 2025-03-19 NOTE — TELEPHONE ENCOUNTER
Forms/Letter Request    Type of form/letter: [facility] [place] Standard Written Order   Albuterol 0.083%(2.5MG/3ML)    Do we have the form/letter: Yes: placed in provider bin    Who is the form from? [employer] (if other please explain)    Where did/will the form come from? form was faxed in    [facility]    How would you like the form/letter returned: Fax : [phone]    Patient Notified form requests are processed in 5-7 business days:N/A    Okay to leave a detailed message?: N/A at Cell number on file:    Telephone Information:   Mobile [phone]

## 2025-03-26 ENCOUNTER — MEDICAL CORRESPONDENCE (OUTPATIENT)
Dept: HEALTH INFORMATION MANAGEMENT | Facility: CLINIC | Age: 65
End: 2025-03-26
Payer: MEDICARE

## 2025-03-26 NOTE — TELEPHONE ENCOUNTER
Form received and faxed to [employer] at [phone].  A copy placed in TC bin and Abstract.  RightFax confirmed at 1:58pm

## 2025-03-27 ENCOUNTER — ORDERS ONLY (AUTO-RELEASED) (OUTPATIENT)
Dept: URGENT CARE | Facility: CLINIC | Age: 65
End: 2025-03-27
Payer: MEDICARE

## 2025-03-27 DIAGNOSIS — Z12.11 COLON CANCER SCREENING: ICD-10-CM

## 2025-03-27 DIAGNOSIS — E66.01 MORBID OBESITY (H): ICD-10-CM

## 2025-03-27 DIAGNOSIS — E66.01 MORBID OBESITY WITH BMI OF 45.0-49.9, ADULT (H): ICD-10-CM

## 2025-03-31 RX ORDER — SEMAGLUTIDE 1 MG/.5ML
INJECTION, SOLUTION SUBCUTANEOUS
Qty: 2 ML | Refills: 0 | Status: SHIPPED | OUTPATIENT
Start: 2025-03-31 | End: 2025-04-02 | Stop reason: SINTOL

## 2025-03-31 NOTE — TELEPHONE ENCOUNTER
"Pt calling and states the Wegovy 1 mg / 0.5 ml pen got her \"deathly ill.\" She states she was vomiting up her coffee and could not keep anything down. She is requesting to decrease to the previous dose prior to starting the 1 mg / 0.5 ml pen or have Dr. Dickerson advise on a dose that will not make her vomit. Routed to PCP for review. Writer pending the previous dose prescribed if appropriate.     Lenore Viramontes RN on 3/31/2025 at 3:39 PM   "

## 2025-04-01 NOTE — PROGRESS NOTES
Documentation of a Face-to-Face Physician Encounter April 1, 2025    Tomasa CUNNINGHAM Sarwat  1938  [id number]    I certify that this patient is under my care and that I, or a nurse practitioner or physician's assistant working with me, had a face-to-face encounter that meets the physician face-to-face encounter requirements with this patient on: 4/1/2025.    The encounter with the patient was in whole, or in part, for the following medical condition, which is the primary reason for home health care:  Encounter Diagnoses   Name Primary?    Nocturnal hypoxia Yes    Encounter for Medicare annual wellness exam     Chronic obstructive pulmonary disease, unspecified COPD type (H)     Malignant neoplasm of lung, unspecified laterality, unspecified part of lung (H)     Acute congestive heart failure, unspecified heart failure type (H)     Essential hypertension with goal blood pressure less than 140/90     Stage 3a chronic kidney disease (H)     Persistent atrial fibrillation (H)        I certify that, based on my findings, the following services are medically necessary home health services:     My clinical findings support the need for the above services because: cardiology is recommending home 02 based on results of overnight Oximetry testing done on 1/8/25.      Further, I certify that my clinical findings support that this patient is homebound (i.e. absences from home require considerable and taxing effort and are for medical reasons or [facility] services or infrequently or of short duration when for other reasons) because: patient is not homebound.      Physician signature ___________________________________   April 1, 2025  Physician name: Chance Fam PA-C    Fax ([phone]) or scan/email ([email or web address]) this completed document to [facility] within 24 hours of the referral date.  Questions: [phone].       Behavioral Health Home Services  [facility]: [place]        Social Work Care Navigator Note      Patient: Paula Ho  Date: February 19, 2021  Preferred Name: Paula    Previous PHQ-9:   PHQ-9 SCORE 12/22/2020 1/21/2021 2/4/2021   PHQ-9 Total Score 17 19 19     Previous LIBRA-7:   LIBRA-7 SCORE 12/22/2020 1/21/2021 2/4/2021   Total Score 15 18 10     ARNALDO LEVEL:  No flowsheet data found.    Preferred Contact:  Need for : No  Preferred Contact: Cell      Type of Contact Today: Phone call (patient / identified key support person reached)      Data: (subjective / Objective):  Recent ED/IP Admission or Discharge?   None    Patient Goals:  Goal Areas: Health;Mental Health;Financial and Social Service Benefits;Transportation  Patient stated goals: Patient would like assistance with her physical and mental health for creating a balanced and healthy lifestyle. Patient would like assistance with continued SSDI and social service assistance to aid with county benefits to increase her financial stability. Patient would like assistance with additional transportation services, such as [employer] for reliable transportation to get to her appointments, run errands and get out into the community.         [facility] Core Service Provided:  Comprehensive Care Management: utilized the electronic medical record / patient registry to identify and support patient's health conditions / needs more effectively   Care Transitions: focused on the coordinated and seamless movement of patient between or within different levels of care or settings  Care Coordination: provided care management services/referrals necessary to ensure patient and their identified supports have access to medical, behavioral health, pharmacology and recovery support services.  Ensured that patient's care is integrated across all settings and services.   Individual and Family Support: aimed to help clients reduce barriers to achieving goals, increase health  literacy and knowledge about chronic condition(s), increase self-efficacy skills, and improve health outcomes  Referral to Community and Social Support Services: Provided patient with referrals (see plan section)  Assisted in scheduling an appointment to a referral / service (see plan section)  Coordinated / Confirmed patient's appointment time or referral and transportation plan  Followed-up with patient on whether or not they completed a referral    Current Stressors / Issues / Care Plan Objective Addressed Today:  [facility] and patient were able to meet today for Behavioral Health [place] ([facility]) monthly check-in via telehealth visit. All required ROIs have been filed with HIM/patient chart.    1. Patient reports she would like to set up an appt with her PCP to have her metro mobility forms signed and to discuss her worsening asthma. Patient reports she is wondering about taking an over-the-counter decongestant.     Patient would also like a referral placed for the  Weight Management Clinic. Patient reports she is concerned about all the pounds she has put on and worries this is impacting her health. Patient reports she has been trying the Keto diet but has not found it to be helping. [facility] messaged provider to place referral. Patient is also wondering if she could have order placed for a scooter, as she had one years ago before her spouse passed away, [facility] messaged provider. [facility] was able to set-up appointment with PCP for next Tuesday.     Patient's PCP did place referral for second opinion with [facility] at [phone]. [facility] was able to provide patient with scheduling phone number and patient will be calling to make appt. [facility] to monitor and follow-up with patient.    2. Patient reports she continues to have pain in her chest and will be having EKG and CT Chest completed on March 5th.     3. Patient reports she has not been contacted by [facility] about her walker. [facility] called and left message  with DME provider, awaiting call back.    4. Patient reports she was denied services at the  Pain Clinic but was able to access services at [facility] in [place] and another location on [facility] that is closer to her home. Patient reports she has completed a Rule 25 (completed by [facility] in [place]) that was required for admission into the pain clinic. Patient was recommended for methadone clinic but patient does not want to do this and would rather be monitored by pain clinic. Patient reports she has an appt. next Thursday.    Patient reports she would like to find a chiropractor. CC encouraged patient to discuss this with her pain clinic, as often they have access and make referrals for these services. CC to monitor and provide resources for chiropractic services if needed/requested by patient.    5. 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 [facility] to monitor and follow-up with patient.      Intervention:  Motivational Interviewing: Expressed Empathy/Understanding, Supported Autonomy, Collaboration, Evocation, Permission to raise concern or advise, Open-ended questions, Reflections: simple and complex, Change talk (evoked) and Reframe   Target Behavior(s): Explored thoughts about taking an anti-depressant, Explored and resolved challenges related to taking anti-depressants as prescribed, Explored thoughts and readiness to participate in individual therapy, Explored and resolved challenges to attending appointments as scheduled, Explored patient's knowledge of the impact of exercise on mood, Explored current exercise activities and thoughts about how this influences mood, Explored current social supports and reinforced opportunities to increase engagement, Explored current sleep hygeine and patient's perception and knowledge of relationship to mood and Explored  patient's current diet and knowledge of influence on mood    Assessment: (Progress on Goals / Homework):  Patient would benefit from continued coordination in reaching their goals set for the Behavioral Health Home ([facility]) program. CC reviewed Health Action Plan goals and will continue to monitor progress and work with patient and their care team.      Plan: (Homework, other):  Patient was encouraged to continue to seek condition-related information and education.      Scheduled a Phone follow up appointment with SW  in 2 weeks     Patient has set self-identified goals and will monitor progress until the next appointment on: 03/04/2021.        JASON Mann [facility]  Behavioral Health Home ([facility])   [facility]  [phone]                  Next 5 appointments (look out 90 days)    Feb 23, 2021  2:20 PM  SHORT with LEONA Anderson CNP  [facility] ([facility] ) [address] [place] [id number] [phone]

## 2025-04-02 NOTE — TELEPHONE ENCOUNTER
Outpatient Medication Detail     Disp Refills Start End AARON   Semaglutide-Weight Management (WEGOVY) 0.5 MG/0.5ML pen 2 mL 5 4/2/2025 -- No   Sig - Route: Inject 0.5 mg subcutaneously once a week. - Subcutaneous   Sent to pharmacy as: Semaglutide-Weight Management 0.5 MG/0.5ML Subcutaneous Solution Auto-injector (WEGOVY)   Class: E-Prescribe   Notes to Pharmacy: Reduced dose due to inability to tolerate Wegovy 1 mg.   Order: [id number]   E-Prescribing Status: Receipt confirmed by pharmacy (4/2/2025  3:47 PM CDT)     Pharmacy    [id number] - [place] - [address] [address]     Associated Diagnoses    Morbid obesity (H) [E66.01]

## 2025-04-03 ENCOUNTER — TELEPHONE (OUTPATIENT)
Dept: FAMILY MEDICINE | Facility: CLINIC | Age: 65
End: 2025-04-03

## 2025-04-03 NOTE — TELEPHONE ENCOUNTER
Semaglutide-Weight Management (WEGOVY) 0.5 MG/0.5ML pen 2 mL 5       [email or web address]    [id number]

## 2025-04-07 ENCOUNTER — VIRTUAL VISIT (OUTPATIENT)
Dept: BEHAVIORAL HEALTH | Facility: CLINIC | Age: 65
End: 2025-04-07
Payer: MEDICARE

## 2025-04-07 DIAGNOSIS — R69 DIAGNOSIS DEFERRED: Primary | ICD-10-CM

## 2025-04-07 NOTE — PROGRESS NOTES
"Behavioral Health Home Services  [facility]: [place]        Social Work Care Navigator Note      Patient: Paula Ho  Date: April 7, 2025  Preferred Name: Paula Pryor PHQ-9:       12/24/2024    10:58 AM 3/4/2025     3:59 PM 3/11/2025     2:43 PM   PHQ-9 SCORE   PHQ-9 Total Score MyChart  10 (Moderate depression) 12 (Moderate depression)   PHQ-9 Total Score 15 10  12        Patient-reported    Proxy-reported     Previous LIBRA-7:       7/30/2024     1:58 PM 11/25/2024     3:00 PM 3/4/2025     4:17 PM   LIBRA-7 SCORE   Total Score 8 (mild anxiety)  11 (moderate anxiety)   Total Score 8    8    8 13 11        Patient-reported     ARNALDO LEVEL:       No data to display                Preferred Contact:  Need for : No  Preferred Contact: Cell      Type of Contact Today: Phone call (patient / identified key support person reached)    Service Modality: Phone Visit:      Provider verified identity through the following two step process.  Patient provided:  Patient is known previously to provider    Telephone Visit: The patient's condition can be safely assessed and treated via synchronous audio telemedicine encounter.      Reason for Audio Telemedicine Visit: Patient has requested telehealth visit    Originating Site (Patient Location): Patient's home    Distant Site (Provider Location): Provider Remote Setting- Home Office    Telephone visit completed due to the patient did not consent to a video visit.    Consent:  The patient/guardian has verbally consented to:     1. The potential risks and benefits of telemedicine (telephone visit) versus in person care;    The patient has been notified of the following:      \"We have found that certain health care needs can be provided without the need for a face to face visit.  This service lets us provide the care you need with a phone conversation.       I will have full access to your [facility] medical record during this entire phone call.  I will be " "taking notes for your medical record.      Since this is like an office visit, we will bill your insurance company for this service.       There are potential benefits and risks of telephone visits (e.g. limits to patient confidentiality) that differ from in-person visits.?Confidentiality still applies for telephone services, and nobody will record the visit.  It is important to be in a quiet, private space that is free of distractions (including cell phone or other devices) during the visit.??      If during the course of the call I believe a telephone visit is not appropriate, you will not be charged for this service\"     Consent has been obtained for this service by care team member: Yes       Data: (subjective / Objective):  Recent ED/IP Admission or Discharge?   None    Patient Goals:  Goal Areas: Health; Mental Health; Future HAP Goal area; Chemical Health  Patient stated goals: Health: Paula would like to continue to meet with her providers, and take her medications as prescribed.   -Paula would like to work on losing weight with a provider. She will start using prescribed medication for assistance, as well as working on a nutrition plan.   -Paula would like to go to the gym at least two times per week.   Mental Health: Paula would like to continue receiving support from the [facility], [facility], for therapy within [place], and receive a long term therapy referral when needed. She will also continue to receive assistance with [facility] for monthly check ins.   -She would like to see a new psychiatrist as well.   -She would like to continue working with an formerly [facility] worker and Peer Support Person through the same agency.   -She reported that she wants to build herself back to feel like somebody and take it day by day.   Chemical Health: Paula would like assistance from her providers to quit smoking and will work on reducing her use, so she can be approved for back surgery.   Future goals: Paula is going to consider back " surgery in the future after losing the recommended weight.    [facility] Core Service Provided:  Comprehensive Care Management: utilized the electronic medical record / patient registry to identify and support patient's health conditions / needs more effectively   Care Transitions: focused on the coordinated and seamless movement of patient between or within different levels of care or settings  Care Coordination: provided care management services/referrals necessary to ensure patient and their identified supports have access to medical, behavioral health, pharmacology and recovery support services.  Ensured that patient's care is integrated across all settings and services.   Individual and Family Support: aimed to help clients reduce barriers to achieving goals, increase health literacy and knowledge about chronic condition(s), increase self-efficacy skills, and improve health outcomes    Current Stressors / Issues / Care Plan Objective Addressed Today:  [facility] and patient were able to meet today for Behavioral Health Home ([facility]) monthly check-in via telehealth visit. All required ROIs have been filed with HIM/patient chart.    - Patient is still having issues with her on and off partner. She reports that his RV is parked at her place now, but her partner's son is coming from another state to come get the RV and reports he'll hopefully take it so it's not at her place anymore. She reports having ongoing issues with her ex-partner, and she reports she's had enough.     Patient was on the phone with [facility] while she was at her place and her ex came there, so she decided to leave her place and go to her friend's house. She also reports knowing that she can go stay with this friend if she needs to. Patient hung up while she was driving to her friends, and she called [facility] back when she was settled at her friend's place. She is trying to coordinate with her ex to come  his things. [facility] encouraged her to file an order for  protection if she would like to do this. Patient knows the process for this and will do so if she needs.     - Patient reports that she has check up tomorrow for aneurysms.     - Patient reports that her self-esteem is low right now, so she's going to maybe get her hair done later this week. [facility] encouraged her to take care of herself.     - Patient reports she continues to think about moving options, and she would talk to her landlords to see what other properties they have.     Intervention:  Motivational Interviewing: Expressed Empathy/Understanding, Supported Autonomy, Collaboration, Evocation, Permission to raise concern or advise, Open-ended questions, and Reflections: simple and complex   Target Behavior(s): Explored thoughts about taking an anti-depressant, Explored and resolved challenges related to taking anti-depressants as prescribed, Explored thoughts and readiness to participate in individual therapy, Explored and resolved challenges to attending appointments as scheduled, and Explored current social supports and reinforced opportunities to increase engagement    Assessment: (Progress on Goals / Homework):  Patient would benefit from continued coordination in reaching their goals set for the Behavioral Health Home ([facility]) program. [facility] reviewed Health Action Plan goals and will continue to monitor progress and work with patient and their care team.    Plan: (Homework, other):  Patient was encouraged to continue to seek condition-related information and education.     Scheduled a Phone follow up appointment with MISA RICE in 2 weeks     Patient has set self-identified goals and will monitor progress until the next appointment on: 4/23/25.         DENI Humphries  Behavioral Health [place] ([facility])   [facility]  [phone]

## 2025-04-08 NOTE — TELEPHONE ENCOUNTER
Prior Authorization Not Needed per Insurance    Medication: WEGOVY 0.5 MG/0.5ML SC SOAJ  Insurance Company: [employer] - Phone [phone] Fax [phone]  Expected CoPay: $    Pharmacy Filling the Rx: [id number] - [facility] [id number] [address]  Pharmacy Notified: YES  Patient Notified: Instructed pharmacy to notify patient once order is ready.     Pharmacy was trying to bill [employer] instead of [employer]. Paid claim. PA not needed.

## 2025-04-15 ENCOUNTER — VIRTUAL VISIT (OUTPATIENT)
Dept: BEHAVIORAL HEALTH | Facility: CLINIC | Age: 65
End: 2025-04-15
Payer: MEDICARE

## 2025-04-15 DIAGNOSIS — F41.1 GAD (GENERALIZED ANXIETY DISORDER): ICD-10-CM

## 2025-04-15 DIAGNOSIS — F33.1 MODERATE EPISODE OF RECURRENT MAJOR DEPRESSIVE DISORDER (H): Primary | ICD-10-CM

## 2025-04-15 PROCEDURE — 90832 PSYTX W PT 30 MINUTES: CPT | Mod: 93

## 2025-04-15 NOTE — PROGRESS NOTES
"    [facility]: Integrated Behavioral Health    Behavioral Health Clinician Progress Note   Mental Health & Addiction Services      Tuesday April 15, 2025    Patient Name: Paula Ho       Service Type:  Individual   Service Location:  MyChart / Email (patient reached)   Visit Start Time: 12:36pm  Visit End Time:  1:01pm    Session Length: 16 - 37    Attendees: Patient   Service Modality: Phone Visit:      Provider verified identity through the following two step process.  Patient provided:  Patient is known previously to provider    Telephone Visit: The patient's condition can be safely assessed and treated via synchronous audio telemedicine encounter.      Reason for Audio Telemedicine Visit: Patient has requested telehealth visit    Originating Site (Patient Location): Patient's home    Distant Site (Provider Location): Provider Remote Setting- Home Office    Telephone visit completed due to the patient did not consent to a video visit.    Consent:  The patient/guardian has verbally consented to:     1. The potential risks and benefits of telemedicine (telephone visit) versus in person care;    The patient has been notified of the following:      \"We have found that certain health care needs can be provided without the need for a face to face visit.  This service lets us provide the care you need with a phone conversation.       I will have full access to your [facility] medical record during this entire phone call.  I will be taking notes for your medical record.      Since this is like an office visit, we will bill your insurance company for this service.       There are potential benefits and risks of telephone visits (e.g. limits to patient confidentiality) that differ from in-person visits.?Confidentiality still applies for telephone services, and nobody will record the visit.  It is important to be in a quiet, private space that is free of distractions " "(including cell phone or other devices) during the visit.??      If during the course of the call I believe a telephone visit is not appropriate, you will not be charged for this service\"     Consent has been obtained for this service by care team member: Yes    Visit number: Long term     [facility] Visit Activities (Refresh list every visit): [facility] Only     [facility] Diagnostic Assessment: 11/18/24  Treatment Plan Review Date: 5/4/25      DATA:    Extended Session (60+ minutes): No   Interactive Complexity: No   Crisis: No   [facility] Patient: Yes, addressed the follow [facility] Core Component(s):                          Comprehensive Care Management: utilized the electronic medical record / patient registry to identify and support patient's health conditions / needs more effectively      Assessments completed prior to visit:   The following assessments were completed by patient for this visit:  PHQ2: No questionnaires on file.  PHQ9:       7/30/2024     1:58 PM 9/17/2024     2:47 PM 11/4/2024     7:20 AM 11/25/2024     3:00 PM 12/24/2024    10:58 AM 3/4/2025     3:59 PM 3/11/2025     2:43 PM   PHQ-9 SCORE   PHQ-9 Total Score MyChart 5 (Mild depression) 15 (Moderately severe depression) 7 (Mild depression)   10 (Moderate depression) 12 (Moderate depression)   PHQ-9 Total Score 5 15 7  12 15 10  12        Patient-reported    Proxy-reported     GAD2:       5/1/2023    10:38 AM 7/3/2023    10:42 AM 8/18/2023     2:10 PM 10/26/2023     9:10 AM 7/30/2024     1:58 PM 3/11/2025     2:44 PM   LIBRA-2   Feeling nervous, anxious, or on edge 1  0  1  2  1  1    Not being able to stop or control worrying 3  1  1  1  2  1    LIBRA-2 Total Score 4    4    4 1 2    2 3    3 3    3    3 2        Proxy-reported     GAD7:       4/2/2024     2:40 PM 5/21/2024     3:03 PM 5/21/2024     3:04 PM 6/4/2024     2:00 PM 7/30/2024     1:58 PM 11/25/2024     3:00 PM 3/4/2025     4:17 PM   LIBRA-7 SCORE   Total Score 8 (mild anxiety)  9 (mild anxiety)  8 (mild " "anxiety)  11 (moderate anxiety)   Total Score 8 9  11 8    8    8 13 11        Patient-reported     CAGE-AID:       3/11/2025     2:49 PM   CAGE-AID Total Score   Total Score 0    Total Score MyChart 0 (A total score of 2 or greater is considered clinically significant)       Proxy-reported     PROMIS 10-Global Health (only subscores and total score):       5/1/2023    10:40 AM 5/10/2023     3:47 PM 8/18/2023     2:12 PM 7/30/2024     2:00 PM 3/11/2025     2:48 PM   PROMIS-10 Scores Only   Global Mental Health Score 8        8 7 6     10        10 11    Global Physical Health Score 8        8 9 15     5        5 8    PROMIS TOTAL - SUBSCORES 16        16 16 21     15        15 19        Information is confidential and restricted. Go to Review Flowsheets to unlock data.    Proxy-reported     [place] Suicide Severity Rating Scale (Lifetime/Recent)      10/10/2019     3:54 PM 2/14/2020     4:00 PM 10/31/2022    11:18 AM   [place] Suicide Severity Rating (Lifetime/Recent)   Q1 Wish to be Dead (Lifetime) Yes No    Comments Often due to pain and loss. \"I hate to be alone.\"     Q2 Non-Specific Active Suicidal Thoughts (Lifetime) Yes Yes    Non-Specific Active Suicidal Thought Description (Lifetime) Often due to pain and loss. \"I hate to be alone.\" Did attempt in the past, 15 years ago.     Most Severe Ideation Rating (Lifetime) 4 3    Most Severe Ideation Description (Lifetime)  Did attempt in the past, 15 years ago. Not fully assessed since Paula needed to leave the session early.     Frequency (Lifetime)  3    Duration (Lifetime)  3    Controllability (Lifetime)  3    Protective Factors  (Lifetime)  4    Reasons for Ideation (Lifetime)  4    RETIRED: 1. Wish to be Dead (Recent) Yes     RETIRED: Wish to be Dead Description (Recent) Often due to pain and loss. \"I hate to be alone.\" No plan or intention.     RETIRED: 2. Non-Specific Active Suicidal Thoughts (Recent) Yes     Non-Specific Active Suicidal Thought Description " "(Recent) Often due to pain and loss. \"I hate to be alone.\" No plan or intention.     3. Active Suicidal Ideation with any Methods (Not Plan) Without Intent to Act (Lifetime) Yes Yes    RETIRE: Active Suicidal Ideation with any Methods (Not Plan) Description (Lifetime) Often due to pain and loss. \"I hate to be alone.\" Did attempt in the past, 15 years ago.     RETIRED: 3. Active Suicidal Ideation with any Methods (Not Plan) Without Intent to Act (Recent) Yes     RETIRED: Active Suicidal Ideation with any Methods (Not Plan) Description (Recent) Often due to pain and loss. \"I hate to be alone.\" No plan or intention.     RETIRE: 4. Active Suicidal Ideation with Some Intent to Act, Without Specific Plan (Lifetime) Yes Yes    RETIRE: Active Suicidal Ideation with Some Intent to Act, Without Specific Plan Description (Lifetime) Did attempt in the past, 15 years ago.     4. Active Suicidal Ideation with Some Intent to Act, Without Specific Plan (Recent) No No    RETIRE: 5. Active Suicidal Ideation with Specific Plan and Intent (Lifetime) Yes     RETIRE: Active Suicidal Ideation with Specific Plan and Intent Description (Lifetime) Did attempt in the past, 15 years ago.     RETIRED: 5. Active Suicidal Ideation with Specific Plan and Intent (Recent) No     Most Severe Ideation Rating (Past Month) 2 1    Most Severe Ideation Description (Past Month) Regular thoughts of being dead due to chronic pain and loss.     Frequency (Past Month) 3 2    Duration (Past Month) 1     Controllability (Past Month) 2 3    Protective Factors (Past Month) NA 2    Reasons for Ideation (Past Month)  4    Actual Attempt (Lifetime)  Yes    Actual Attempt Description (Lifetime)  --    Comments  OD 25 years ago was hospitalized none since    Has subject engaged in non-suicidal self-injurious behavior? (Past 3 Months)  Yes    Interrupted Attempts (Lifetime)  --    Comments  burning arms in oven since marriage in 2014.    Most Recent Attempt Date  --  "   Comments  25 years ago    Most Recent Attempt Actual Lethality Code NA 1    Most Lethal Attempt Actual Lethality Code NA     Initial/First Attempt Actual Lethality Code NA     1. Wish to be Dead (Lifetime)   Y   Wish to be Dead Description (Lifetime)   After a breakup, she reports that she was around [age]   1. Wish to be Dead (Past 1 Month)   N   2. Non-Specific Active Suicidal Thoughts (Lifetime)   Y   2. Non-Specific Active Suicidal Thoughts (Past 1 Month)   N   3. Active Suicidal Ideation with any Methods (Not Plan) Without Intent to Act (Lifetime)   Y   3. Active Suicidal Ideation with any Methods (Not Plan) Without Intent to Act (Past 1 Month)   N   4. Active Suicidal Ideation with Some Intent to Act, Without Specific Plan (Lifetime)   N   5. Active Suicidal Ideation with Specific Plan and Intent (Lifetime)   N   Description of Most Severe Ideation (Lifetime)   5   Description of Most Severe Ideation (Past 1 Month)   NA   Frequency (Lifetime)   3   Duration (Lifetime)   1   Controllability (Lifetime)   1   Deterrents (Lifetime)   0   Reasons for Ideation (Lifetime)   4   Actual Attempt (Lifetime)   Y   Total Number of Actual Attempts (Lifetime)   1   Actual Attempt Description (Lifetime)   Patient states she attempted through medication.   Actual Attempt (Past 3 Months)   N   Has subject engaged in non-suicidal self-injurious behavior? (Lifetime)   Y   Has subject engaged in non-suicidal self-injurious behavior? (Past 3 Months)   N   Interrupted Attempts (Lifetime)   N   Preparatory Acts or Behavior (Lifetime)   Y   Total Number of Preparatory Acts (Lifetime)   1   Most Recent Attempt Date   --   Actual Lethality/Medical Damage Code (Most Recent Attempt)   2   Potential Lethality Code (Most Recent Attempt)   1   Actual Lethality/Medical Damage Code (Most Lethal Attempt)   2   Potential Lethality Code (Most Lethal Attempt)   1   Initial/First Attempt Date   --   Calculated C-SSRS Risk Score  (Lifetime/Recent)   Moderate Risk     [place] Suicide Severity Rating Scale (Short Version)      4/9/2020    10:00 AM 7/15/2020     9:00 AM 10/31/2022    11:18 AM   [place] Suicide Severity Rating (Short Version)   Over the past 2 weeks have you felt down, depressed, or hopeless? yes yes    Over the past 2 weeks have you had thoughts of killing yourself? no no    Have you ever attempted to kill yourself? no yes    When did this last happen?  more than 6 months ago    Actual Lethality/Medical Damage Code (Most Lethal Attempt)   2   Potential Lethality Code (Most Lethal Attempt)   1      Reason for Visit/Presenting Concern:  Depression    Current Stressors / Issues:   Pt  discussed frustrations with needing to get documents and was worried she would not get them in. Also about her relationships and the stressors it brings her.  Assisted her in reframing and focus on what was in her control.  Review coping skills and prompt follow through.      Therapeutic Interventions:  Motivational Interviewing (MI): Validated patient's thoughts, feelings and experience. Expressed respect for patient's autonomy in decision making.  Affirmed patient's strengths and abilities.  Cognitive Behavioral Therapy (CBT): Provided psycho-education on cognitive distortions. and Identified behavioral changes that can be made to move towards treatment goals.     Response to treatment interventions:   Patient was receptive to interventions utilized.      Progress on Treatment Objective(s) / Homework:   Stable - CONTEMPLATION (Considering change and yet undecided); Intervened by assessing the negative and positive thinking (ambivalence) about behavior change     Medication Review:   No changes to current psychiatric medication(s)     Medication Compliance:   Yes     Chemical Use Review:  Substance Use: Chemical use reviewed, no active concerns identified      Tobacco Use: No current tobacco use.       Assessment: Current Emotional / Mental Status  (status of significant symptoms):    Risk status (Self / Other harm or suicidal ideation)   Patient denies a history of suicidal ideation, suicide attempts, self-injurious behavior, homicidal ideation, homicidal behavior, and and other safety concerns   Patient denies current fears or concerns for personal safety.   Patient denies current or recent suicidal ideation or behaviors.   Patient denies current or recent homicidal ideation or behaviors.   Patient denies current or recent self injurious behavior or ideation.   Patient denies other safety concerns.   Recommended that patient call 911 or go to the local ED should there be a change in any of these risk factors      ASSESSMENT:   Mental Status:     Appearance:   Unable to assess due to phone visit   Eye Contact:   Unable to assess due to phone visit   Psychomotor Behavior: Unable to assess due to phone visit   Attitude:   Cooperative    Orientation:   All   Speech Rate / Production: Normal    Volume:   Normal    Mood:    Depressed    Affect:    Appropriate  Worrisome    Thought Content:  Clear    Thought Form:  Coherent  Logical    Insight:    Fair          Diagnoses:      Moderate episode of recurrent major depressive disorder (H)  LIBRA (generalized anxiety disorder)    Collateral Reports Completed:   Not Applicable       Plan: (Homework, other):   Patient was provided No indications of CD issues  Patient was given information about behavioral services and encouraged to schedule a follow up appointment with the clinic [facility] in 2 weeks.         RASHEED Parekh, [facility]     _____________________________________________________________________________________________________________________________________                                              Individual Treatment Plan    Patient's Name: Paula Ho   YOB: 1960  Date of Creation: 2/4/25  Date Treatment Plan Last Reviewed/Revised: Review    DSM5 Diagnoses: 296.32 (F33.1) Major  Depressive Disorder, Recurrent Episode, Moderate _  Psychosocial / Contextual Factors: Trauma History, Relationship Concerns, Interpersonal Concerns, Limited Social Support, and Financial Strain  PROMIS (reviewed every 90 days):   The following assessments were completed by patient for this visit:  PROMIS 10-Global Health (only subscores and total score):       5/1/2023    10:40 AM 5/10/2023     3:47 PM 8/18/2023     2:12 PM 7/30/2024     2:00 PM 3/11/2025     2:48 PM   PROMIS-10 Scores Only   Global Mental Health Score 8        8 7 6     10        10 11    Global Physical Health Score 8        8 9 15     5        5 8    PROMIS TOTAL - SUBSCORES 16        16 16 21     15        15 19        Information is confidential and restricted. Go to Review Flowsheets to unlock data.    Proxy-reported        Referral / Collaboration:  Referral to another professional/service is not indicated at this time..    Anticipated number of session for this episode of care:  9-12 sessions  Anticipation frequency of session: Biweekly  Anticipated Duration of each session: 16-37 minutes  Treatment plan will be reviewed in 90 days or when goals have been changed.       MeasurableTreatment Goal(s) related to diagnosis / functional impairment(s)  Goal 1: Patient will reduce frequency and intensity of depressive episode(s). increase physical health behaviors. learn and utilize skills for coping with triggers. identify ways to improve quality of life. recognize and appropriately reframe cognitive distortions.    I will know I've met my goal when I can set healthy boundaries with others, four out of eight times.      Status: New - Date: 5/4/25      Intervention(s)  [facility] will Cognitive Behavioral Therapy (CBT): Provide psycho-education on cognitive distortions., Identify and challenge maladaptive patterns of behavior and thinking that interfere with patient's life, Identify behavioral changes that can be made to move towards treatment goals. ,  Assist patient in developing coping strategies (e.g. more physical exercise, less internal focus, increase social involvement, more assertiveness, etc.)., Reinforce successes reported by patient since last appointment., Work with patient to recognize irrational thought processes and identify more adaptive thoughts.  , and Work with patient to complete a cost/benefit analysis of current behavior patterns and explored alternative behaviors.  Dialectical Behavioral Therapy (DBT): Utilize dialectical behavior therapy strategies to improve effective coping skills in treatment of the depression., Teach patient mindfulness skills. , Teach patient distress tolerance skills to help improve patient's ability to accept change (radical acceptance)., Teach patient emotional regulation strategies.  , and Teach patient interpersonal effectiveness skills.  Psycho-education: Provide psycho-education about patient's behavioral health condition and symptoms. Explain and reviewed treatment options. Teach boundary clarification and setting to develop and maintain healthy relationships. Teach conflict resolution skills to help manage personal conflict in relationships. Discuss ways to cope with grief following relationship breakdown, divorce, bereavement, or job loss.  Teach and practice use of assertiveness skills. Provide support and education to family members of patient.     Patient has reviewed and agreed to the above plan.     Written by  RASHEED Parekh, [facility]

## 2025-04-16 ENCOUNTER — TELEPHONE (OUTPATIENT)
Dept: DERMATOLOGY | Facility: CLINIC | Age: 65
End: 2025-04-16
Payer: MEDICARE

## 2025-04-16 NOTE — TELEPHONE ENCOUNTER
[facility] Call Center    Phone Message    May a detailed message be left on voicemail: yes     Reason for Call: Other: Pt is self referred and has a changing lesion that sometimes bleeds. Pt is scheduled in November. Please call back. Thank you.      Action Taken: Other: [facility]    Travel Screening: Not Applicable     Date of Service:

## 2025-04-16 NOTE — TELEPHONE ENCOUNTER
Spoke with patient and rescheduled appointment tomorrow 4/17/25    Almaz PATEL RN BSN  [facility]  [phone]

## 2025-04-17 ENCOUNTER — OFFICE VISIT (OUTPATIENT)
Dept: DERMATOLOGY | Facility: CLINIC | Age: 65
End: 2025-04-17
Payer: MEDICARE

## 2025-04-17 DIAGNOSIS — Z12.83 ENCOUNTER FOR SCREENING FOR MALIGNANT NEOPLASM OF SKIN: ICD-10-CM

## 2025-04-17 DIAGNOSIS — Z85.828 HISTORY OF NONMELANOMA SKIN CANCER: ICD-10-CM

## 2025-04-17 DIAGNOSIS — L81.4 LENTIGINES: ICD-10-CM

## 2025-04-17 DIAGNOSIS — D18.01 CHERRY ANGIOMA: ICD-10-CM

## 2025-04-17 DIAGNOSIS — L82.1 SEBORRHEIC KERATOSES: ICD-10-CM

## 2025-04-17 DIAGNOSIS — D22.9 MULTIPLE BENIGN NEVI: Primary | ICD-10-CM

## 2025-04-17 NOTE — LETTER
4/17/2025      Paula Ho  [id number] [facility] [id number]      Dear Colleague,    Thank you for referring your patient, Paula Ho, to the [facility]. Please see a copy of my visit note below.    [facility] Dermatology Note  Encounter Date: Apr 17, 2025  Office Visit     Reviewed patients past medical history and pertinent chart review prior to patients visit today.     CC: Derm Problem (2 lesion on back with itching/Developed in last month)    History of present illness  Paula Ho, a 64-year-old female here for a full body skin cancer screening and spots of concern, she reports experiencing itching and the development of spots on her back. She initially noticed itching without much concern, but then observed one spot, followed by another, with one spot increasing in size and starting to bleed recently. She recalls a previous incident where something was removed from her wrist, which was biopsied and found to be cancerous, likely a basal or squamous cell carcinoma, approximately 6-7 years ago.      Past medical history  - History of skin cancer (basal or squamous cell carcinoma) on the wrist around 2018 removed with biopsy per patient report     Family history  - Family history of melanoma and non-melanoma skin cancers     Physical exam  Skin: Total skin excluding the genitalia areas was performed. The exam included the head/face, neck, both arms, chest, back, abdomen, both legs, digits, mons pubis, buttock and nails.   -several 1-2mm red dome shaped symmetric papules scattered on the trunk  -multiple tan/brown flat round macules and raised papules scattered throughout trunk, extremities and head. No worrisome features for malignancy noted on examination.  -scattered tan, homogenous macules scattered on sun exposed areas of trunk, extremities and face.   -scattered waxy, stuck on tan/brown papules and patches on the trunk    -On the right  nasal ala, a 3 to 4 mm pink dome-shaped lesion observed, no arborizing vessels noted, appears as a benign nevus.  --Two brown cerebriform verrucous papules on the mid upper back, identified as seborrheic keratosis as mentioned in HPI. Several others scattered around the back.    -Many areas of hypopigmented scarring on the forearms and wrists, unable to appreciate exactly which one was from a skin cancer in the past.     Assessment/Plan  - History of nonmelanoma skin cancer, many scars noted on the forearms and wrists and unable to appreciate exactly where the previous biopsy was but no signs of recurrent malignancy at this time.    - Irritated and inflamed Seborrheic Keratosis. Discussed treatment options with patient including no treatment, cryotherapy, and shave removal.  Patient declines any treatment today and will contact the clinic for further appointment if treatment is desired.    - Benign skin findings including: seborrheic keratoses, cherry angioma, lentigines and benign nevi.   - No further intervention required. Patient to report changes.   - Patient reassured of the benign nature of these lesions.    -Signs and Symptoms of non-melanoma skin cancer and ABCDEs of melanoma reviewed with patient. Patient encouraged to perform monthly self skin exams and educated on how to perform them. UV precautions reviewed with patient. Patient was asked about new or changing moles/lesions on body.     -Reviewed Sunscreen: Apply 20 minutes prior to going outdoors and reapply every two hours, when wet or sweating. We recommend using an SPF 30 or higher, and to use one that is water resistant.       Follow-up:  1 years for follow up full body skin exam, prn for new or changing lesions or new concerns     Medications:  Current Outpatient Medications   Medication Sig Dispense Refill     albuterol (PROAIR HFA) 108 (90 Base) MCG/ACT inhaler Inhale 2 puffs into the lungs every 4 hours as needed for shortness of breath or  wheezing 8.5 g 11     albuterol (PROVENTIL) (2.5 MG/3ML) 0.083% neb solution NEBULIZE THE CONTENTS OF 1 VIAL EVERY 6 HOURS AS NEEDED. 120 mL 11     amphetamine-dextroamphetamine (ADDERALL) 15 MG tablet Take 1 tablet (15 mg) by mouth 2 times daily. 60 tablet 0     amphetamine-dextroamphetamine (ADDERALL) 15 MG tablet Take 1 tablet (15 mg) by mouth 2 times daily 60 tablet 0     ARIPiprazole (ABILIFY) 10 MG tablet Take 1 tablet (10 mg) by mouth daily. 30 tablet 5     buprenorphine HCl-naloxone HCl (SUBOXONE) 8-2 MG per film Place 1 Film under the tongue daily       cefdinir (OMNICEF) 300 MG capsule Take 1 capsule (300 mg) by mouth 2 times daily. 20 capsule 2     cetirizine (ZYRTEC) 10 MG tablet Take 1 tablet (10 mg) by mouth daily 14 tablet 1     cyanocobalamin (CYANOCOBALAMIN) 1000 MCG/ML injection 1000mcg subcutaneous injection daily for 7 days, then weekly for 4 weeks, then monthly 20 mL 1     cyclobenzaprine (FLEXERIL) 10 MG tablet Take 1 tablet (10 mg) by mouth at bedtime. 90 tablet 3     dexamethasone (DECADRON) 1 MG tablet Take 1 mg at 11 pm and get a morning blood draw at 8 am for cortisol. 1 tablet 0     DULoxetine (CYMBALTA) 30 MG capsule Take 1 capsule (30 mg) by mouth daily. 30 capsule 1     empagliflozin (JARDIANCE) 10 MG TABS tablet Take 1 tablet (10 mg) by mouth daily. 30 tablet 5     EPINEPHrine (ANY BX GENERIC EQUIV) 0.3 MG/0.3ML injection 2-pack Inject 0.3 mLs (0.3 mg) into the muscle as needed for anaphylaxis May repeat one time in 5-15 minutes if response to initial dose is inadequate. 2 each 0     Fluticasone-Umeclidin-Vilanterol (TRELEGY ELLIPTA) 200-62.5-25 MCG/ACT oral inhaler Inhale 1 puff into the lungs daily 60 each 11     folic acid (FOLVITE) 1 MG tablet Take 1 tablet (1 mg) by mouth daily 90 tablet 3     gabapentin (NEURONTIN) 600 MG tablet Take 1 tablet (600 mg) by mouth daily At bedtime 30 tablet 3     hydrocortisone, Perianal, (HYDROCORTISONE) 2.5 % cream Place rectally 2 times daily as  needed for hemorrhoids. 28 g 5     hydrocortisone, Perianal, (HYDROCORTISONE) 2.5 % cream Place rectally 2 times daily as needed for hemorrhoids 28 g 11     levothyroxine (SYNTHROID/LEVOTHROID) 25 MCG tablet Take 1 tablet (25 mcg) by mouth daily 90 tablet 1     levothyroxine (SYNTHROID/LEVOTHROID) 25 MCG tablet Take 1 tablet (25 mcg) by mouth daily 90 tablet 1     lidocaine (XYLOCAINE) 5 % external ointment Apply topically 2 times daily 50 g 1     medical cannabis (Patient's own supply) See Admin Instructions (The purpose of this order is to document that the patient reports taking medical cannabis.  This is not a prescription, and is not used to certify that the patient has a qualifying medical condition.)       Multiple Vitamins-Minerals (MULTIVITAMIN ADULT PO)        mupirocin (BACTROBAN) 2 % external ointment Apply topically 3 times daily (Patient not taking: Reported on 4/17/2025) 30 g 2     NARCAN 4 MG/0.1ML nasal spray   0     order for DME Equipment being ordered: Nebulizer 1 Units 0     orlistat (XENICAL) 120 MG capsule Take 1 capsule (120 mg) by mouth 3 times daily (with meals) 90 capsule 5     oxyCODONE IR (ROXICODONE) 15 MG tablet Take 15 mg by mouth 3 times daily + 5 mg x1 daily       predniSONE (DELTASONE) 10 MG tablet Take 1 tablet (10 mg) by mouth every morning. 10 tablet 2     Semaglutide-Weight Management (WEGOVY) 0.25 MG/0.5ML pen Inject 0.25 mg subcutaneously once a week. 2 mL 2     Semaglutide-Weight Management (WEGOVY) 0.5 MG/0.5ML pen Inject 0.5 mg subcutaneously once a week. 2 mL 5     sulfamethoxazole-trimethoprim (BACTRIM DS) 800-160 MG tablet Take 1 tablet by mouth 2 times daily 20 tablet 0     tiZANidine (ZANAFLEX) 2 MG tablet Take 1 tablet (2 mg) by mouth 3 times daily as needed for muscle spasms. 90 tablet 11     traZODone (DESYREL) 50 MG tablet Take 1 tablet (50 mg) by mouth at bedtime. 30 tablet 5     valACYclovir (VALTREX) 1000 mg tablet Take 1 tablet (1,000 mg) by mouth 2 times  daily for 7 days 14 tablet 1     vitamin D3 (CHOLECALCIFEROL) 2000 units (50 mcg) tablet Take 1 tablet by mouth daily  1     No current facility-administered medications for this visit.        Allergies:   Allergies   Allergen Reactions     Compazine [Prochlorperazine]      Droperidol      Lisinopril      Morphine      Other reaction(s): hives     Pravastatin      Other reaction(s): Edema     Seroquel [Quetiapine]        Yesi Mix CNP   Dermatology      This note has been generated using dictation services and may contain typos.    CC Referred MD Humphrey  No address on file on close of this encounter.       Again, thank you for allowing me to participate in the care of your patient.        Sincerely,        LEONA Velazquez CNP    Electronically signed

## 2025-04-17 NOTE — PROGRESS NOTES
[facility] Dermatology Note  Encounter Date: Apr 17, 2025  Office Visit     Reviewed patients past medical history and pertinent chart review prior to patients visit today.     CC: Derm Problem (2 lesion on back with itching/Developed in last month)    History of present illness  Paula Ho, a 64-year-old female here for a full body skin cancer screening and spots of concern, she reports experiencing itching and the development of spots on her back. She initially noticed itching without much concern, but then observed one spot, followed by another, with one spot increasing in size and starting to bleed recently. She recalls a previous incident where something was removed from her wrist, which was biopsied and found to be cancerous, likely a basal or squamous cell carcinoma, approximately 6-7 years ago.      Past medical history  - History of skin cancer (basal or squamous cell carcinoma) on the wrist around 2018 removed with biopsy per patient report     Family history  - Family history of melanoma and non-melanoma skin cancers     Physical exam  Skin: Total skin excluding the genitalia areas was performed. The exam included the head/face, neck, both arms, chest, back, abdomen, both legs, digits, mons pubis, buttock and nails.   -several 1-2mm red dome shaped symmetric papules scattered on the trunk  -multiple tan/brown flat round macules and raised papules scattered throughout trunk, extremities and head. No worrisome features for malignancy noted on examination.  -scattered tan, homogenous macules scattered on sun exposed areas of trunk, extremities and face.   -scattered waxy, stuck on tan/brown papules and patches on the trunk    -On the right nasal ala, a 3 to 4 mm pink dome-shaped lesion observed, no arborizing vessels noted, appears as a benign nevus.  --Two brown cerebriform verrucous papules on the mid upper back, identified as seborrheic keratosis as mentioned in HPI. Several  others scattered around the back.    -Many areas of hypopigmented scarring on the forearms and wrists, unable to appreciate exactly which one was from a skin cancer in the past.     Assessment/Plan  - History of nonmelanoma skin cancer, many scars noted on the forearms and wrists and unable to appreciate exactly where the previous biopsy was but no signs of recurrent malignancy at this time.    - Irritated and inflamed Seborrheic Keratosis. Discussed treatment options with patient including no treatment, cryotherapy, and shave removal.  Patient declines any treatment today and will contact the clinic for further appointment if treatment is desired.    - Benign skin findings including: seborrheic keratoses, cherry angioma, lentigines and benign nevi.   - No further intervention required. Patient to report changes.   - Patient reassured of the benign nature of these lesions.    -Signs and Symptoms of non-melanoma skin cancer and ABCDEs of melanoma reviewed with patient. Patient encouraged to perform monthly self skin exams and educated on how to perform them. UV precautions reviewed with patient. Patient was asked about new or changing moles/lesions on body.     -Reviewed Sunscreen: Apply 20 minutes prior to going outdoors and reapply every two hours, when wet or sweating. We recommend using an SPF 30 or higher, and to use one that is water resistant.       Follow-up:  1 years for follow up full body skin exam, prn for new or changing lesions or new concerns     Medications:  Current Outpatient Medications   Medication Sig Dispense Refill    albuterol (PROAIR HFA) 108 (90 Base) MCG/ACT inhaler Inhale 2 puffs into the lungs every 4 hours as needed for shortness of breath or wheezing 8.5 g 11    albuterol (PROVENTIL) (2.5 MG/3ML) 0.083% neb solution NEBULIZE THE CONTENTS OF 1 VIAL EVERY 6 HOURS AS NEEDED. 120 mL 11    amphetamine-dextroamphetamine (ADDERALL) 15 MG tablet Take 1 tablet (15 mg) by mouth 2 times daily. 60  tablet 0    amphetamine-dextroamphetamine (ADDERALL) 15 MG tablet Take 1 tablet (15 mg) by mouth 2 times daily 60 tablet 0    ARIPiprazole (ABILIFY) 10 MG tablet Take 1 tablet (10 mg) by mouth daily. 30 tablet 5    buprenorphine HCl-naloxone HCl (SUBOXONE) 8-2 MG per film Place 1 Film under the tongue daily      cefdinir (OMNICEF) 300 MG capsule Take 1 capsule (300 mg) by mouth 2 times daily. 20 capsule 2    cetirizine (ZYRTEC) 10 MG tablet Take 1 tablet (10 mg) by mouth daily 14 tablet 1    cyanocobalamin (CYANOCOBALAMIN) 1000 MCG/ML injection 1000mcg subcutaneous injection daily for 7 days, then weekly for 4 weeks, then monthly 20 mL 1    cyclobenzaprine (FLEXERIL) 10 MG tablet Take 1 tablet (10 mg) by mouth at bedtime. 90 tablet 3    dexamethasone (DECADRON) 1 MG tablet Take 1 mg at 11 pm and get a morning blood draw at 8 am for cortisol. 1 tablet 0    DULoxetine (CYMBALTA) 30 MG capsule Take 1 capsule (30 mg) by mouth daily. 30 capsule 1    empagliflozin (JARDIANCE) 10 MG TABS tablet Take 1 tablet (10 mg) by mouth daily. 30 tablet 5    EPINEPHrine (ANY BX GENERIC EQUIV) 0.3 MG/0.3ML injection 2-pack Inject 0.3 mLs (0.3 mg) into the muscle as needed for anaphylaxis May repeat one time in 5-15 minutes if response to initial dose is inadequate. 2 each 0    Fluticasone-Umeclidin-Vilanterol (TRELEGY ELLIPTA) 200-62.5-25 MCG/ACT oral inhaler Inhale 1 puff into the lungs daily 60 each 11    folic acid (FOLVITE) 1 MG tablet Take 1 tablet (1 mg) by mouth daily 90 tablet 3    gabapentin (NEURONTIN) 600 MG tablet Take 1 tablet (600 mg) by mouth daily At bedtime 30 tablet 3    hydrocortisone, Perianal, (HYDROCORTISONE) 2.5 % cream Place rectally 2 times daily as needed for hemorrhoids. 28 g 5    hydrocortisone, Perianal, (HYDROCORTISONE) 2.5 % cream Place rectally 2 times daily as needed for hemorrhoids 28 g 11    levothyroxine (SYNTHROID/LEVOTHROID) 25 MCG tablet Take 1 tablet (25 mcg) by mouth daily 90 tablet 1     levothyroxine (SYNTHROID/LEVOTHROID) 25 MCG tablet Take 1 tablet (25 mcg) by mouth daily 90 tablet 1    lidocaine (XYLOCAINE) 5 % external ointment Apply topically 2 times daily 50 g 1    medical cannabis (Patient's own supply) See Admin Instructions (The purpose of this order is to document that the patient reports taking medical cannabis.  This is not a prescription, and is not used to certify that the patient has a qualifying medical condition.)      Multiple Vitamins-Minerals (MULTIVITAMIN ADULT PO)       mupirocin (BACTROBAN) 2 % external ointment Apply topically 3 times daily (Patient not taking: Reported on 4/17/2025) 30 g 2    NARCAN 4 MG/0.1ML nasal spray   0    order for DME Equipment being ordered: Nebulizer 1 Units 0    orlistat (XENICAL) 120 MG capsule Take 1 capsule (120 mg) by mouth 3 times daily (with meals) 90 capsule 5    oxyCODONE IR (ROXICODONE) 15 MG tablet Take 15 mg by mouth 3 times daily + 5 mg x1 daily      predniSONE (DELTASONE) 10 MG tablet Take 1 tablet (10 mg) by mouth every morning. 10 tablet 2    Semaglutide-Weight Management (WEGOVY) 0.25 MG/0.5ML pen Inject 0.25 mg subcutaneously once a week. 2 mL 2    Semaglutide-Weight Management (WEGOVY) 0.5 MG/0.5ML pen Inject 0.5 mg subcutaneously once a week. 2 mL 5    sulfamethoxazole-trimethoprim (BACTRIM DS) 800-160 MG tablet Take 1 tablet by mouth 2 times daily 20 tablet 0    tiZANidine (ZANAFLEX) 2 MG tablet Take 1 tablet (2 mg) by mouth 3 times daily as needed for muscle spasms. 90 tablet 11    traZODone (DESYREL) 50 MG tablet Take 1 tablet (50 mg) by mouth at bedtime. 30 tablet 5    valACYclovir (VALTREX) 1000 mg tablet Take 1 tablet (1,000 mg) by mouth 2 times daily for 7 days 14 tablet 1    vitamin D3 (CHOLECALCIFEROL) 2000 units (50 mcg) tablet Take 1 tablet by mouth daily  1     No current facility-administered medications for this visit.        Allergies:   Allergies   Allergen Reactions    Compazine [Prochlorperazine]      Droperidol     Lisinopril     Morphine      Other reaction(s): hives    Pravastatin      Other reaction(s): Edema    Seroquel [Quetiapine]        Yesi Mix, RIGOBERTO   Dermatology      This note has been generated using dictation services and may contain typos.    CC Referred Self, MD  No address on file on close of this encounter.

## 2025-04-22 ENCOUNTER — VIRTUAL VISIT (OUTPATIENT)
Dept: BEHAVIORAL HEALTH | Facility: CLINIC | Age: 65
End: 2025-04-22
Payer: MEDICARE

## 2025-04-22 DIAGNOSIS — F41.1 GAD (GENERALIZED ANXIETY DISORDER): ICD-10-CM

## 2025-04-22 DIAGNOSIS — F33.1 MODERATE EPISODE OF RECURRENT MAJOR DEPRESSIVE DISORDER (H): Primary | ICD-10-CM

## 2025-04-22 PROCEDURE — 90832 PSYTX W PT 30 MINUTES: CPT | Mod: 93

## 2025-04-22 NOTE — PROGRESS NOTES
"    [facility]: Integrated Behavioral Health    Behavioral Health Clinician Progress Note   Mental Health & Addiction Services      Tuesday April 22, 2025    Patient Name: Paula Ho       Service Type:  Individual   Service Location:  MyChart / Email (patient reached)   Visit Start Time: 3:00pm  Visit End Time:  3:30pm    Session Length: 16 - 37    Attendees: Patient   Service Modality: Phone Visit:      Provider verified identity through the following two step process.  Patient provided:  Patient is known previously to provider    Telephone Visit: The patient's condition can be safely assessed and treated via synchronous audio telemedicine encounter.      Reason for Audio Telemedicine Visit: Patient has requested telehealth visit    Originating Site (Patient Location): Patient's home    Distant Site (Provider Location): Provider Remote Setting- Home Office    Telephone visit completed due to the patient did not consent to a video visit.    Consent:  The patient/guardian has verbally consented to:     1. The potential risks and benefits of telemedicine (telephone visit) versus in person care;    The patient has been notified of the following:      \"We have found that certain health care needs can be provided without the need for a face to face visit.  This service lets us provide the care you need with a phone conversation.       I will have full access to your [facility] medical record during this entire phone call.  I will be taking notes for your medical record.      Since this is like an office visit, we will bill your insurance company for this service.       There are potential benefits and risks of telephone visits (e.g. limits to patient confidentiality) that differ from in-person visits.?Confidentiality still applies for telephone services, and nobody will record the visit.  It is important to be in a quiet, private space that is free of distractions " "(including cell phone or other devices) during the visit.??      If during the course of the call I believe a telephone visit is not appropriate, you will not be charged for this service\"     Consent has been obtained for this service by care team member: Yes    Visit number: Long term     [facility] Visit Activities (Refresh list every visit): [facility] Only     [place] Diagnostic Assessment: 11/18/24  Treatment Plan Review Date: 5/4/25      DATA:    Extended Session (60+ minutes): No   Interactive Complexity: No   Crisis: No   [facility] Patient: Yes, addressed the follow [facility] Core Component(s):                          Comprehensive Care Management: utilized the electronic medical record / patient registry to identify and support patient's health conditions / needs more effectively      Assessments completed prior to visit:   The following assessments were completed by patient for this visit:  PHQ2: No questionnaires on file.  PHQ9:       7/30/2024     1:58 PM 9/17/2024     2:47 PM 11/4/2024     7:20 AM 11/25/2024     3:00 PM 12/24/2024    10:58 AM 3/4/2025     3:59 PM 3/11/2025     2:43 PM   PHQ-9 SCORE   PHQ-9 Total Score MyChart 5 (Mild depression) 15 (Moderately severe depression) 7 (Mild depression)   10 (Moderate depression) 12 (Moderate depression)   PHQ-9 Total Score 5 15 7  12 15 10  12        Patient-reported    Proxy-reported     GAD2:       5/1/2023    10:38 AM 7/3/2023    10:42 AM 8/18/2023     2:10 PM 10/26/2023     9:10 AM 7/30/2024     1:58 PM 3/11/2025     2:44 PM   LIBRA-2   Feeling nervous, anxious, or on edge 1  0  1  2  1  1    Not being able to stop or control worrying 3  1  1  1  2  1    LIBRA-2 Total Score 4    4    4 1 2    2 3    3 3    3    3 2        Proxy-reported     GAD7:       4/2/2024     2:40 PM 5/21/2024     3:03 PM 5/21/2024     3:04 PM 6/4/2024     2:00 PM 7/30/2024     1:58 PM 11/25/2024     3:00 PM 3/4/2025     4:17 PM   LIBRA-7 SCORE   Total Score 8 (mild anxiety)  9 (mild anxiety)  8 (mild " "anxiety)  11 (moderate anxiety)   Total Score 8 9  11 8    8    8 13 11        Patient-reported     CAGE-AID:       3/11/2025     2:49 PM   CAGE-AID Total Score   Total Score 0    Total Score MyChart 0 (A total score of 2 or greater is considered clinically significant)       Proxy-reported     PROMIS 10-Global Health (only subscores and total score):       5/1/2023    10:40 AM 5/10/2023     3:47 PM 8/18/2023     2:12 PM 7/30/2024     2:00 PM 3/11/2025     2:48 PM   PROMIS-10 Scores Only   Global Mental Health Score 8        8 7 6     10        10 11    Global Physical Health Score 8        8 9 15     5        5 8    PROMIS TOTAL - SUBSCORES 16        16 16 21     15        15 19        Information is confidential and restricted. Go to Review Flowsheets to unlock data.    Proxy-reported     [place] Suicide Severity Rating Scale (Lifetime/Recent)      10/10/2019     3:54 PM 2/14/2020     4:00 PM 10/31/2022    11:18 AM   [place] Suicide Severity Rating (Lifetime/Recent)   Q1 Wish to be Dead (Lifetime) Yes No    Comments Often due to pain and loss. \"I hate to be alone.\"     Q2 Non-Specific Active Suicidal Thoughts (Lifetime) Yes Yes    Non-Specific Active Suicidal Thought Description (Lifetime) Often due to pain and loss. \"I hate to be alone.\" Did attempt in the past, 15 years ago.     Most Severe Ideation Rating (Lifetime) 4 3    Most Severe Ideation Description (Lifetime)  Did attempt in the past, 15 years ago. Not fully assessed since Paula needed to leave the session early.     Frequency (Lifetime)  3    Duration (Lifetime)  3    Controllability (Lifetime)  3    Protective Factors  (Lifetime)  4    Reasons for Ideation (Lifetime)  4    RETIRED: 1. Wish to be Dead (Recent) Yes     RETIRED: Wish to be Dead Description (Recent) Often due to pain and loss. \"I hate to be alone.\" No plan or intention.     RETIRED: 2. Non-Specific Active Suicidal Thoughts (Recent) Yes     Non-Specific Active Suicidal Thought Description " "(Recent) Often due to pain and loss. \"I hate to be alone.\" No plan or intention.     3. Active Suicidal Ideation with any Methods (Not Plan) Without Intent to Act (Lifetime) Yes Yes    RETIRE: Active Suicidal Ideation with any Methods (Not Plan) Description (Lifetime) Often due to pain and loss. \"I hate to be alone.\" Did attempt in the past, 15 years ago.     RETIRED: 3. Active Suicidal Ideation with any Methods (Not Plan) Without Intent to Act (Recent) Yes     RETIRED: Active Suicidal Ideation with any Methods (Not Plan) Description (Recent) Often due to pain and loss. \"I hate to be alone.\" No plan or intention.     RETIRE: 4. Active Suicidal Ideation with Some Intent to Act, Without Specific Plan (Lifetime) Yes Yes    RETIRE: Active Suicidal Ideation with Some Intent to Act, Without Specific Plan Description (Lifetime) Did attempt in the past, 15 years ago.     4. Active Suicidal Ideation with Some Intent to Act, Without Specific Plan (Recent) No No    RETIRE: 5. Active Suicidal Ideation with Specific Plan and Intent (Lifetime) Yes     RETIRE: Active Suicidal Ideation with Specific Plan and Intent Description (Lifetime) Did attempt in the past, 15 years ago.     RETIRED: 5. Active Suicidal Ideation with Specific Plan and Intent (Recent) No     Most Severe Ideation Rating (Past Month) 2 1    Most Severe Ideation Description (Past Month) Regular thoughts of being dead due to chronic pain and loss.     Frequency (Past Month) 3 2    Duration (Past Month) 1     Controllability (Past Month) 2 3    Protective Factors (Past Month) NA 2    Reasons for Ideation (Past Month)  4    Actual Attempt (Lifetime)  Yes    Actual Attempt Description (Lifetime)  --    Comments  OD 25 years ago was hospitalized none since    Has subject engaged in non-suicidal self-injurious behavior? (Past 3 Months)  Yes    Interrupted Attempts (Lifetime)  --    Comments  burning arms in oven since marriage in 2014.    Most Recent Attempt Date  --  "   Comments  25 years ago    Most Recent Attempt Actual Lethality Code NA 1    Most Lethal Attempt Actual Lethality Code NA     Initial/First Attempt Actual Lethality Code NA     1. Wish to be Dead (Lifetime)   Y   Wish to be Dead Description (Lifetime)   After a breakup, she reports that she was around [age]   1. Wish to be Dead (Past 1 Month)   N   2. Non-Specific Active Suicidal Thoughts (Lifetime)   Y   2. Non-Specific Active Suicidal Thoughts (Past 1 Month)   N   3. Active Suicidal Ideation with any Methods (Not Plan) Without Intent to Act (Lifetime)   Y   3. Active Suicidal Ideation with any Methods (Not Plan) Without Intent to Act (Past 1 Month)   N   4. Active Suicidal Ideation with Some Intent to Act, Without Specific Plan (Lifetime)   N   5. Active Suicidal Ideation with Specific Plan and Intent (Lifetime)   N   Description of Most Severe Ideation (Lifetime)   5   Description of Most Severe Ideation (Past 1 Month)   NA   Frequency (Lifetime)   3   Duration (Lifetime)   1   Controllability (Lifetime)   1   Deterrents (Lifetime)   0   Reasons for Ideation (Lifetime)   4   Actual Attempt (Lifetime)   Y   Total Number of Actual Attempts (Lifetime)   1   Actual Attempt Description (Lifetime)   Patient states she attempted through medication.   Actual Attempt (Past 3 Months)   N   Has subject engaged in non-suicidal self-injurious behavior? (Lifetime)   Y   Has subject engaged in non-suicidal self-injurious behavior? (Past 3 Months)   N   Interrupted Attempts (Lifetime)   N   Preparatory Acts or Behavior (Lifetime)   Y   Total Number of Preparatory Acts (Lifetime)   1   Most Recent Attempt Date   --   Actual Lethality/Medical Damage Code (Most Recent Attempt)   2   Potential Lethality Code (Most Recent Attempt)   1   Actual Lethality/Medical Damage Code (Most Lethal Attempt)   2   Potential Lethality Code (Most Lethal Attempt)   1   Initial/First Attempt Date   --   Calculated C-SSRS Risk Score  (Lifetime/Recent)   Moderate Risk     [place] Suicide Severity Rating Scale (Short Version)      4/9/2020    10:00 AM 7/15/2020     9:00 AM 10/31/2022    11:18 AM   [place] Suicide Severity Rating (Short Version)   Over the past 2 weeks have you felt down, depressed, or hopeless? yes yes    Over the past 2 weeks have you had thoughts of killing yourself? no no    Have you ever attempted to kill yourself? no yes    When did this last happen?  more than 6 months ago    Actual Lethality/Medical Damage Code (Most Lethal Attempt)   2   Potential Lethality Code (Most Lethal Attempt)   1      Reason for Visit/Presenting Concern:  Depression    Current Stressors / Issues:   Pt  discussed her ongoing complicated relationship with her partner/ex partner.  Stated that she was confused about what she wanted to do and the relationship she wanted.  Assisted her in reframing and focus on what was in her control.  Review coping skills and prompt follow through.      Therapeutic Interventions:  Motivational Interviewing (MI): Validated patient's thoughts, feelings and experience. Expressed respect for patient's autonomy in decision making.  Affirmed patient's strengths and abilities.  Cognitive Behavioral Therapy (CBT): Provided psycho-education on cognitive distortions. and Identified behavioral changes that can be made to move towards treatment goals.     Response to treatment interventions:   Patient was receptive to interventions utilized.      Progress on Treatment Objective(s) / Homework:   Stable - CONTEMPLATION (Considering change and yet undecided); Intervened by assessing the negative and positive thinking (ambivalence) about behavior change     Medication Review:   No changes to current psychiatric medication(s)     Medication Compliance:   Yes     Chemical Use Review:  Substance Use: Chemical use reviewed, no active concerns identified      Tobacco Use: No current tobacco use.       Assessment: Current Emotional /  Mental Status (status of significant symptoms):    Risk status (Self / Other harm or suicidal ideation)   Patient denies a history of suicidal ideation, suicide attempts, self-injurious behavior, homicidal ideation, homicidal behavior, and and other safety concerns   Patient denies current fears or concerns for personal safety.   Patient denies current or recent suicidal ideation or behaviors.   Patient denies current or recent homicidal ideation or behaviors.   Patient denies current or recent self injurious behavior or ideation.   Patient denies other safety concerns.   Recommended that patient call 911 or go to the local ED should there be a change in any of these risk factors      ASSESSMENT:   Mental Status:     Appearance:   Unable to assess due to phone visit   Eye Contact:   Unable to assess due to phone visit   Psychomotor Behavior: Unable to assess due to phone visit   Attitude:   Cooperative    Orientation:   All   Speech Rate / Production: Normal    Volume:   Normal    Mood:    Depressed    Affect:    Appropriate  Worrisome    Thought Content:  Clear    Thought Form:  Coherent  Logical    Insight:    Fair      Diagnoses:      Moderate episode of recurrent major depressive disorder (H)  LIBRA (generalized anxiety disorder)    Collateral Reports Completed:   Not Applicable       Plan: (Homework, other):   Patient was provided No indications of CD issues  Patient was given information about behavioral services and encouraged to schedule a follow up appointment with the clinic [facility] in 2 weeks.         RASHEED Parekh, [facility]     _____________________________________________________________________________________________________________________________________                                              Individual Treatment Plan    Patient's Name: Paula Ho   YOB: 1960  Date of Creation: 2/4/25  Date Treatment Plan Last Reviewed/Revised: Review    DSM5 Diagnoses: 296.32 (F33.1)  Major Depressive Disorder, Recurrent Episode, Moderate _  Psychosocial / Contextual Factors: Trauma History, Relationship Concerns, Interpersonal Concerns, Limited Social Support, and Financial Strain  PROMIS (reviewed every 90 days):   The following assessments were completed by patient for this visit:  PROMIS 10-Global Health (only subscores and total score):       5/1/2023    10:40 AM 5/10/2023     3:47 PM 8/18/2023     2:12 PM 7/30/2024     2:00 PM 3/11/2025     2:48 PM   PROMIS-10 Scores Only   Global Mental Health Score 8        8 7 6     10        10 11    Global Physical Health Score 8        8 9 15     5        5 8    PROMIS TOTAL - SUBSCORES 16        16 16 21     15        15 19        Information is confidential and restricted. Go to Review Flowsheets to unlock data.    Proxy-reported        Referral / Collaboration:  Referral to another professional/service is not indicated at this time..    Anticipated number of session for this episode of care:  9-12 sessions  Anticipation frequency of session: Biweekly  Anticipated Duration of each session: 16-37 minutes  Treatment plan will be reviewed in 90 days or when goals have been changed.       MeasurableTreatment Goal(s) related to diagnosis / functional impairment(s)  Goal 1: Patient will reduce frequency and intensity of depressive episode(s). increase physical health behaviors. learn and utilize skills for coping with triggers. identify ways to improve quality of life. recognize and appropriately reframe cognitive distortions.    I will know I've met my goal when I can set healthy boundaries with others, four out of eight times.      Status: New - Date: 5/4/25      Intervention(s)  [facility] will Cognitive Behavioral Therapy (CBT): Provide psycho-education on cognitive distortions., Identify and challenge maladaptive patterns of behavior and thinking that interfere with patient's life, Identify behavioral changes that can be made to move towards treatment  goals. , Assist patient in developing coping strategies (e.g. more physical exercise, less internal focus, increase social involvement, more assertiveness, etc.)., Reinforce successes reported by patient since last appointment., Work with patient to recognize irrational thought processes and identify more adaptive thoughts.  , and Work with patient to complete a cost/benefit analysis of current behavior patterns and explored alternative behaviors.  Dialectical Behavioral Therapy (DBT): Utilize dialectical behavior therapy strategies to improve effective coping skills in treatment of the depression., Teach patient mindfulness skills. , Teach patient distress tolerance skills to help improve patient's ability to accept change (radical acceptance)., Teach patient emotional regulation strategies.  , and Teach patient interpersonal effectiveness skills.  Psycho-education: Provide psycho-education about patient's behavioral health condition and symptoms. Explain and reviewed treatment options. Teach boundary clarification and setting to develop and maintain healthy relationships. Teach conflict resolution skills to help manage personal conflict in relationships. Discuss ways to cope with grief following relationship breakdown, divorce, bereavement, or job loss.  Teach and practice use of assertiveness skills. Provide support and education to family members of patient.     Patient has reviewed and agreed to the above plan.     Written by  RASHEED Parekh, [facility]

## 2025-04-23 ENCOUNTER — TELEPHONE (OUTPATIENT)
Dept: BEHAVIORAL HEALTH | Facility: CLINIC | Age: 65
End: 2025-04-23
Payer: MEDICARE

## 2025-04-23 NOTE — TELEPHONE ENCOUNTER
Behavioral Health Home Services  [facility]: [place]        Social Work Care Navigator Note      Patient: Paula Ho  Date: April 23, 2025  Preferred Name: Paula    Previous PHQ-9:       12/24/2024    10:58 AM 3/4/2025     3:59 PM 3/11/2025     2:43 PM   PHQ-9 SCORE   PHQ-9 Total Score MyChart  10 (Moderate depression) 12 (Moderate depression)   PHQ-9 Total Score 15 10  12        Patient-reported    Proxy-reported     Previous LIBRA-7:       7/30/2024     1:58 PM 11/25/2024     3:00 PM 3/4/2025     4:17 PM   LIBRA-7 SCORE   Total Score 8 (mild anxiety)  11 (moderate anxiety)   Total Score 8    8    8 13 11        Patient-reported     ARNALDO LEVEL:       No data to display                Preferred Contact:  Need for : No  Preferred Contact: Cell      Type of Contact Today: Phone call (not reached/unavailable)      Data: (subjective / Objective):  Attempted to reach patient for scheduled check in, but was unsuccessful.  Plan to attempt again.     Update 4/23: Patient called back and apologized for missing appointment and she's visiting with her brother who she hasn't seen/talked to in a year. They rescheduled HAP review for 5/2, and patient reports she has no immediate concerns or needs today.     DENI Humphries  Behavioral Health [place] ([facility])   [facility] [place]  [phone]

## 2025-04-28 ENCOUNTER — PATIENT OUTREACH (OUTPATIENT)
Dept: CARE COORDINATION | Facility: CLINIC | Age: 65
End: 2025-04-28
Payer: MEDICARE

## 2025-04-28 ENCOUNTER — TELEPHONE (OUTPATIENT)
Dept: FAMILY MEDICINE | Facility: CLINIC | Age: 65
End: 2025-04-28
Payer: MEDICARE

## 2025-04-30 NOTE — TELEPHONE ENCOUNTER
Prior Authorization Not Needed per Insurance    Medication: Semaglutide-Weight Management (WEGOVY) 0.5 MG/0.5ML pen  Insurance Company: [employer] - Phone [phone] Fax [phone]  Expected CoPay:      Pharmacy Filling the Rx: [id number] - [place], [place] - [address]  Pharmacy Notified:  Yes  Patient Notified:  **Instructed pharmacy to notify patient when script is ready to /ship.**    I called the pharmacy and they received a paid claim.

## 2025-05-05 ENCOUNTER — TELEPHONE (OUTPATIENT)
Dept: FAMILY MEDICINE | Facility: CLINIC | Age: 65
End: 2025-05-05
Payer: MEDICARE

## 2025-05-05 NOTE — TELEPHONE ENCOUNTER
PRIOR AUTHORIZATION DENIED    Medication: Wegovy 0.5mg    Denial Date: 5/5/2025    Denial Rational:  Per Wegovy's manufacture, The 0.25 mg, 0.5 mg, and 1 mg once-weekly doses are initiation and escalation doses and are not approved as maintenance doses for chronic weight management. The maintenance dose of Wegovy in adults is either 2.4 mg or 1.7 mg once weekly. This is what insurance companies go by.              Appeal Information:    If you would like to appeal, please supply P/A team with a letter of medical necessity with clinical reason.

## 2025-05-05 NOTE — TELEPHONE ENCOUNTER
Central Prior Authorization Team   Phone: [phone]    PA Initiation    Medication: Wegovy 0.5mg  Insurance Company: [employer] - Phone [phone] Fax [phone]  Pharmacy Filling the Rx: [id number] - [place] - [address]  Filling Pharmacy Phone: [phone]  Filling Pharmacy Fax:    Start Date: 5/5/2025    [facility] KEY: [id number]

## 2025-05-06 ENCOUNTER — VIRTUAL VISIT (OUTPATIENT)
Dept: PSYCHIATRY | Facility: CLINIC | Age: 65
End: 2025-05-06
Payer: MEDICARE

## 2025-05-06 DIAGNOSIS — F33.1 MODERATE EPISODE OF RECURRENT MAJOR DEPRESSIVE DISORDER (H): ICD-10-CM

## 2025-05-06 DIAGNOSIS — F41.1 GAD (GENERALIZED ANXIETY DISORDER): ICD-10-CM

## 2025-05-06 RX ORDER — DULOXETIN HYDROCHLORIDE 60 MG/1
60 CAPSULE, DELAYED RELEASE ORAL DAILY
Qty: 60 CAPSULE | Refills: 1 | Status: SHIPPED | OUTPATIENT
Start: 2025-05-06

## 2025-05-06 NOTE — PROGRESS NOTES
Virtual Visit Details    Type of service:  Video Visit   Video Start Time: 2:05p  Video End Time: 2:10p    Originating Location (pt. Location): Home    Distant Location (provider location):  Off-site  Platform used for Video Visit: [facility] TEAM:    PCP- Renan Strickland Vocal  Therapist- Iman Mitchell is a 64 year old who uses the pronouns she, her, hers, herself.      Diagnoses        Attention deficit hyperactivity disorder (ADHD), predominantly inattentive type  Moderate episode of recurrent major depressive disorder (H)  Psychophysiological insomnia  LIBRA (generalized anxiety disorder)       Assessment   Patient presents today for a follow-up anxiety not controlled at this time due to life stressors which is exacerbating anxiety we are increasing duloxetine to 60 mg for better treatment control.       Future Considerations:increasing cymbalta to a therapeutic dose.   Psychotropic Drug Interactions:    gabapentin + trazodone: synergistic effect; CNS depression.   trazodone + adderall: increases/decreases sedation; additive serotonin; increases effects of adderall. .   aripiprazole + trazodone: increases sedation; increases Qtc interval.   aripiprazole + adderall: increases/decreases sedation  Management: use lowest therapeutic doses of adderall, trazodone, abilify and routine monitoring    [place]: indicates        Risk Statements:   Treatment Risk- Risks, benefits, alternatives and potential adverse effects have been discussed and are understood.   Safety Risk-Paula Mitchell did not appear to be an imminent safety risk to self or others.     Plan     1) Medications:   INCREASE:     DULoxetine (CYMBALTA) 60 MG capsule, Take 1 capsule (60 mg) by mouth daily., Disp: 60 capsule, Rfl: 1  CONTINUE:     amphetamine-dextroamphetamine (ADDERALL) 15 MG tablet, Take 1 tablet (15 mg) by mouth 2 times daily., Disp: 60 tablet, Rfl: 0    ARIPiprazole (ABILIFY) 10 MG tablet, Take 1 tablet (10 mg) by  "mouth daily., Disp: 30 tablet, Rfl: 5    traZODone (DESYREL) 50 MG tablet, Take 1 tablet (50 mg) by mouth at bedtime., Disp: 30 tablet, Rfl: 5    amphetamine-dextroamphetamine (ADDERALL) 15 MG tablet, Take 1 tablet (15 mg) by mouth 2 times daily, Disp: 60 tablet, Rfl: 0    gabapentin (NEURONTIN) 600 MG tablet, Take 1 tablet (600 mg) by mouth daily At bedtime, Disp: 30 tablet, Rfl: 3    2) Psychotherapy: recommend.     3) Next due:  Labs- Routine monitoring is not indicated for current psychotropic medication regimen   EKG- Routine monitoring is not indicated for current psychotropic medication regimen   Rating scales- none needed    4) Referrals: none    5) Other: none    6) Follow-up: Return to clinic in 4 weeks.        Pertinent Background                                                   [most recent eval 03/14/25]       See note from previous psychiatric provider from 7/30/24:   DSM5 Diagnosis:  Attention-Deficit/Hyperactivity Disorder  314.00 (F90.0) Predominantly inattentive presentation  296.32 (F33.1) Major Depressive Disorder, Recurrent Episode, Moderate _ and With mixed features  300.02 (F41.1) Generalized Anxiety Disorder  780.52 (G47.00) Insomnia Disorder   With non-sleep disorder mental comorbidity  Recurrent     Patient Identification:     Patient is a 64 year old year old, single  White Not  or  female  who presents for return visit with me.  Patient is currently unemployed. Patient attended the session alone. Patient prefers to be called: \" Paula\".     Current medications include:   Medications Ordered Prior to Encounter          Current Outpatient Medications   Medication Sig Dispense Refill    albuterol (PROAIR HFA) 108 (90 Base) MCG/ACT inhaler Inhale 2 puffs into the lungs every 4 hours as needed for shortness of breath or wheezing 8.5 g 11    albuterol (PROVENTIL) (2.5 MG/3ML) 0.083% neb solution NEBULIZE THE CONTENTS OF 1 VIAL EVERY 6 HOURS AS NEEDED. 90 mL 2    " amphetamine-dextroamphetamine (ADDERALL) 15 MG tablet Take 1 tablet (15 mg) by mouth 2 times daily 60 tablet 0    amphetamine-dextroamphetamine (ADDERALL) 15 MG tablet Take 1 tablet (15 mg) by mouth 2 times daily 60 tablet 0    ARIPiprazole (ABILIFY) 10 MG tablet Take 1 tablet (10 mg) by mouth daily 30 tablet 3    buprenorphine HCl-naloxone HCl (SUBOXONE) 8-2 MG per film Place 1 Film under the tongue daily        cetirizine (ZYRTEC) 10 MG tablet Take 1 tablet (10 mg) by mouth daily 14 tablet 1    cyanocobalamin (CYANOCOBALAMIN) 1000 MCG/ML injection 1000mcg subcutaneous injection daily for 7 days, then weekly for 4 weeks, then monthly 20 mL 1    dexamethasone (DECADRON) 1 MG tablet Take 1 mg at 11 pm and get a morning blood draw at 8 am for cortisol. 1 tablet 0    EPINEPHrine (ANY BX GENERIC EQUIV) 0.3 MG/0.3ML injection 2-pack Inject 0.3 mLs (0.3 mg) into the muscle as needed for anaphylaxis May repeat one time in 5-15 minutes if response to initial dose is inadequate. 2 each 0    FLUoxetine (PROZAC) 40 MG capsule Take 1 capsule (40 mg) by mouth daily 30 capsule 3    Fluticasone-Umeclidin-Vilanterol (TRELEGY ELLIPTA) 200-62.5-25 MCG/ACT oral inhaler Inhale 1 puff into the lungs daily 60 each 11    folic acid (FOLVITE) 1 MG tablet Take 1 tablet (1 mg) by mouth daily 90 tablet 3    gabapentin (NEURONTIN) 600 MG tablet Take 1 tablet (600 mg) by mouth daily At bedtime 30 tablet 3    hydrocortisone, Perianal, (HYDROCORTISONE) 2.5 % cream Place rectally 2 times daily as needed for hemorrhoids 28 g 11    levothyroxine (SYNTHROID/LEVOTHROID) 25 MCG tablet Take 1 tablet (25 mcg) by mouth daily 90 tablet 1    levothyroxine (SYNTHROID/LEVOTHROID) 25 MCG tablet Take 1 tablet (25 mcg) by mouth daily 90 tablet 1    medical cannabis (Patient's own supply) See Admin Instructions (The purpose of this order is to document that the patient reports taking medical cannabis.  This is not a prescription, and is not used to certify that  the patient has a qualifying medical condition.)        mirtazapine (REMERON) 45 MG tablet Take 0.5 tablets (22.5 mg) by mouth at bedtime For 7 days then stop        Multiple Vitamins-Minerals (MULTIVITAMIN ADULT PO)          mupirocin (BACTROBAN) 2 % external ointment Apply topically 3 times daily 30 g 2    NARCAN 4 MG/0.1ML nasal spray     0    order for DME Equipment being ordered: Nebulizer 1 Units 0    orlistat (XENICAL) 120 MG capsule Take 1 capsule (120 mg) by mouth 3 times daily (with meals) 90 capsule 5    oxyCODONE IR (ROXICODONE) 15 MG tablet Take 15 mg by mouth 3 times daily + 5 mg x1 daily        sulfamethoxazole-trimethoprim (BACTRIM DS) 800-160 MG tablet Take 1 tablet by mouth 2 times daily 20 tablet 0    tiZANidine (ZANAFLEX) 2 MG tablet TK 2 TO 3 TS PO QHS   4    traZODone (DESYREL) 50 MG tablet Take 1 tablet (50 mg) by mouth at bedtime 30 tablet 3    vitamin D3 (CHOLECALCIFEROL) 2000 units (50 mcg) tablet Take 1 tablet by mouth daily   1                Current Facility-Administered Medications   Medication Dose Route Frequency Provider Last Rate Last Admin    lidocaine (PF) (XYLOCAINE) 1 % injection 8 mL  8 mL     Greg Lam MD   8 mL at 07/27/21 1123            The [place] Prescription Monitoring Program has been reviewed and there are no concerns about diversionary activity for controlled substances at this time.       I was able to review most recent Primary Care Provider, specialty provider, and therapy visit notes that I have access to.      Today, patient reports that she is now working 3 to 4 hours a day at a new job.  Her pain symptoms are worse as it wakes her up at night and it is painful for her to walk long distances.  Currently she is taking 4 tablets of oxycodone and has been on this medication since 2014.  She does have a therapist available to her but has to reschedule her appointment.  She finds the medications helpful in stabilizing her mood and helping her to sleep.         has a past medical history of Acute respiratory failure (H) (02/01/2020), Anxiety, Asthma, Depression, and Hypertension.    Treatment Plan:      1.  Continue Adderall 15 mg 2 times daily for ADHD  2.  Continue Abilify 10 mg daily for mood regulation  3.  Continue fluoxetine 40 mg daily for depression  4.  Continue gabapentin 600 mg at bedtime for anxiety, and mood regulation  5.  Discontinue mirtazapine  6.  Trazodone 50 mg at bedtime for insomnia  7.  Continue talk therapy to learn skills to manage life stressors    3/11/25:   Pt presents to clinic to establish care. Depression not controlled. Starting cymbalta 30mg to treat depression with the benefit of treating chronic pain as well.   Notes primary care provider had her on pristiq which was not effective. Notes struggling with depression and chronic pain would like to restart treatment.     Since the last visit:   -Going through a separation. Notes sleep is poor and would like to increase to duloxetine.     Current Social History:  Financial/occupational: yes  Living situation (partner, children, pets, etc): with a friend  Social/spiritual support: yes  Feels safe at home: Yes    Medical Review of Systems:   Lightheadedness/orthostasis: None  Headaches: None  GI: none  Sexual health concerns: None     Mental Status Exam     General/Constitutional:  Appearance:  awake, alert, adequately groomed, appeared stated age and no apparent distress  Attitude:   cooperative   Eye Contact:  good  Musculoskeletal:  Psychomotor Behavior:  no evidence of tardive dyskinesia, dystonia, or tics from the head up  Psychiatric:  Speech:  clear, coherent, regular rate, rhythm, and volume,  No pressure speech noted.  Associations:  no loose associations  Thought Process:  logical, linear and goal oriented  Thought Content:   No evidence of suicidal ideation or homicidal ideation, no evidence of psychotic thought, no auditory hallucinations present and no visual hallucinations  present  Mood:  good  Affect:  full range/stable (normal variation of emotions during exam) and was congruent to speech content.  Insight:  good  Judgment:  intact, adequate for safety  Impulse Control:  intact  Neurological:  Oriented to:  person, place, time, and situation  Attention Span and Concentration:  Able to attend to the interview     Language: intact    Recent and Remote Memory:  Intact to interview. Not formally assessed. No amnesia.   Fund of Knowledge: appropriate         Past Psych Med Trials          Medication Max Dose (mg) Dates / Duration Helpful? DC Reason / Adverse Effects?   pristiq   denies    wellbutrin       klonopin       adderall       prozac       zoloft       guanfacine       strattera       buspar       valium          Treatment Course and [place] Events since  JULY 2023     3/12/25: established care. Started cymbalta 30mg  5/6/25: increased duloxetine 60mg.      Vitals   LMP  (LMP Unknown)   Pulse Readings from Last 3 Encounters:   03/04/25 76   12/24/24 76   09/17/24 80     Wt Readings from Last 3 Encounters:   03/04/25 126.4 kg (278 lb 9.6 oz)   12/24/24 131.7 kg (290 lb 6.4 oz)   08/08/24 129.3 kg (285 lb)     BP Readings from Last 3 Encounters:   03/04/25 139/82   12/24/24 135/81   09/17/24 138/85        Medical History     ALLERGIES: Compazine [prochlorperazine], Droperidol, Lisinopril, Morphine, Pravastatin, and Seroquel [quetiapine]    Patient Active Problem List   Diagnosis    Chronic knee pain    LIBRA (generalized anxiety disorder)    Brain aneurysm    Chronic, continuous use of opioids    Asthma    Chronic GERD    Chronic pain    Cigarette smoker    DJD (degenerative joint disease)    Insomnia    Morbid obesity (H)    History of subarachnoid hemorrhage    Status post knee surgery    Tobacco use disorder    Chronic obstructive pulmonary disease, unspecified COPD type (H)    MDD (major depressive disorder), recurrent severe, without psychosis (H)    Attention deficit hyperactivity  disorder (ADHD)    Chronic midline low back pain with bilateral sciatica    Hyperlipidemia    Benign essential hypertension    Infection due to 2019 novel coronavirus    Opioid dependence with current use (H)    Angiolipoma of right kidney    Adrenal nodule        Medications     Current Outpatient Medications   Medication Sig Dispense Refill    albuterol (PROAIR HFA) 108 (90 Base) MCG/ACT inhaler Inhale 2 puffs into the lungs every 4 hours as needed for shortness of breath or wheezing 8.5 g 11    albuterol (PROVENTIL) (2.5 MG/3ML) 0.083% neb solution NEBULIZE THE CONTENTS OF 1 VIAL EVERY 6 HOURS AS NEEDED. 120 mL 11    amphetamine-dextroamphetamine (ADDERALL) 15 MG tablet Take 1 tablet (15 mg) by mouth 2 times daily. 60 tablet 0    amphetamine-dextroamphetamine (ADDERALL) 15 MG tablet Take 1 tablet (15 mg) by mouth 2 times daily 60 tablet 0    ARIPiprazole (ABILIFY) 10 MG tablet Take 1 tablet (10 mg) by mouth daily. 30 tablet 5    buprenorphine HCl-naloxone HCl (SUBOXONE) 8-2 MG per film Place 1 Film under the tongue daily      cefdinir (OMNICEF) 300 MG capsule Take 1 capsule (300 mg) by mouth 2 times daily. 20 capsule 2    cetirizine (ZYRTEC) 10 MG tablet Take 1 tablet (10 mg) by mouth daily 14 tablet 1    cyanocobalamin (CYANOCOBALAMIN) 1000 MCG/ML injection 1000mcg subcutaneous injection daily for 7 days, then weekly for 4 weeks, then monthly 20 mL 1    cyclobenzaprine (FLEXERIL) 10 MG tablet Take 1 tablet (10 mg) by mouth at bedtime. 90 tablet 3    dexamethasone (DECADRON) 1 MG tablet Take 1 mg at 11 pm and get a morning blood draw at 8 am for cortisol. 1 tablet 0    DULoxetine (CYMBALTA) 30 MG capsule Take 1 capsule (30 mg) by mouth daily. 30 capsule 1    empagliflozin (JARDIANCE) 10 MG TABS tablet Take 1 tablet (10 mg) by mouth daily. 30 tablet 5    EPINEPHrine (ANY BX GENERIC EQUIV) 0.3 MG/0.3ML injection 2-pack Inject 0.3 mLs (0.3 mg) into the muscle as needed for anaphylaxis May repeat one time in 5-15  minutes if response to initial dose is inadequate. 2 each 0    Fluticasone-Umeclidin-Vilanterol (TRELEGY ELLIPTA) 200-62.5-25 MCG/ACT oral inhaler Inhale 1 puff into the lungs daily 60 each 11    folic acid (FOLVITE) 1 MG tablet Take 1 tablet (1 mg) by mouth daily 90 tablet 3    gabapentin (NEURONTIN) 600 MG tablet Take 1 tablet (600 mg) by mouth daily At bedtime 30 tablet 3    hydrocortisone, Perianal, (HYDROCORTISONE) 2.5 % cream Place rectally 2 times daily as needed for hemorrhoids. 28 g 5    hydrocortisone, Perianal, (HYDROCORTISONE) 2.5 % cream Place rectally 2 times daily as needed for hemorrhoids 28 g 11    levothyroxine (SYNTHROID/LEVOTHROID) 25 MCG tablet Take 1 tablet (25 mcg) by mouth daily 90 tablet 1    levothyroxine (SYNTHROID/LEVOTHROID) 25 MCG tablet Take 1 tablet (25 mcg) by mouth daily 90 tablet 1    lidocaine (XYLOCAINE) 5 % external ointment Apply topically 2 times daily 50 g 1    medical cannabis (Patient's own supply) See Admin Instructions (The purpose of this order is to document that the patient reports taking medical cannabis.  This is not a prescription, and is not used to certify that the patient has a qualifying medical condition.)      Multiple Vitamins-Minerals (MULTIVITAMIN ADULT PO)       mupirocin (BACTROBAN) 2 % external ointment Apply topically 3 times daily (Patient not taking: Reported on 4/17/2025) 30 g 2    NARCAN 4 MG/0.1ML nasal spray   0    order for DME Equipment being ordered: Nebulizer 1 Units 0    orlistat (XENICAL) 120 MG capsule Take 1 capsule (120 mg) by mouth 3 times daily (with meals) 90 capsule 5    oxyCODONE IR (ROXICODONE) 15 MG tablet Take 15 mg by mouth 3 times daily + 5 mg x1 daily      predniSONE (DELTASONE) 10 MG tablet Take 1 tablet (10 mg) by mouth every morning. 10 tablet 2    Semaglutide-Weight Management (WEGOVY) 0.25 MG/0.5ML pen Inject 0.25 mg subcutaneously once a week. 2 mL 2    Semaglutide-Weight Management (WEGOVY) 0.5 MG/0.5ML pen Inject 0.5 mg  subcutaneously once a week. 2 mL 5    sulfamethoxazole-trimethoprim (BACTRIM DS) 800-160 MG tablet Take 1 tablet by mouth 2 times daily 20 tablet 0    tiZANidine (ZANAFLEX) 2 MG tablet Take 1 tablet (2 mg) by mouth 3 times daily as needed for muscle spasms. 90 tablet 11    traZODone (DESYREL) 50 MG tablet Take 1 tablet (50 mg) by mouth at bedtime. 30 tablet 5    valACYclovir (VALTREX) 1000 mg tablet Take 1 tablet (1,000 mg) by mouth 2 times daily for 7 days 14 tablet 1    vitamin D3 (CHOLECALCIFEROL) 2000 units (50 mcg) tablet Take 1 tablet by mouth daily  1        Labs and Data         8/18/2023     2:12 PM 7/30/2024     2:00 PM 3/11/2025     2:48 PM   PROMIS-10 Total Score w/o Sub Scores   PROMIS TOTAL - SUBSCORES 21    21 15    15    15 19        Proxy-reported         3/11/2025     2:49 PM   CAGE-AID Total Score   Total Score 0    Total Score MyChart 0 (A total score of 2 or greater is considered clinically significant)       Proxy-reported         12/24/2024    10:58 AM 3/4/2025     3:59 PM 3/11/2025     2:43 PM   PHQ-9 SCORE   PHQ-9 Total Score MyChart  10 (Moderate depression) 12 (Moderate depression)   PHQ-9 Total Score 15 10  12        Patient-reported    Proxy-reported         7/30/2024     1:58 PM 11/25/2024     3:00 PM 3/4/2025     4:17 PM   LIBRA-7 SCORE   Total Score 8 (mild anxiety)  11 (moderate anxiety)   Total Score 8    8    8 13 11        Patient-reported       Liver/Kidney Function, TSH Metabolic Blood counts   Recent Labs   Lab Test 03/04/25  1701 12/24/24  1224   AST 18  --    ALT 13  --    ALKPHOS 99  --    CR 0.61 0.62     Recent Labs   Lab Test 03/04/25  1701   TSH 3.60    Recent Labs   Lab Test 03/04/25  1701   CHOL 212*   TRIG 99   *   HDL 43*     Recent Labs   Lab Test 02/27/24  1314   A1C 5.9*     Recent Labs   Lab Test 03/04/25  1701   *    Recent Labs   Lab Test 05/26/23  1301   WBC 9.3   HGB 14.4   HCT 43.8   MCV 94            {    PROVIDER: Dane Whitehead,  STEPH

## 2025-05-06 NOTE — NURSING NOTE
Current patient location: [address] [place] [id number]    Is the patient currently in the state of [place]? YES    Visit mode: VIDEO    If the visit is dropped, the patient can be reconnected by:VIDEO VISIT: Text to cell phone:   Telephone Information:   Mobile [phone]       Will anyone else be joining the visit? NO  (If patient encounters technical issues they should call [phone])    Are changes needed to the allergy or medication list? Pt stated no changes to allergies and Pt stated no med changes    Are refills needed on medications prescribed by this physician? Discuss with provider    Rooming Documentation:  Patient declined to complete questionnaire(s) Unable to complete questionnaire(s) due to time    Reason for visit: RECHECK    Brooklynn DAVIS

## 2025-05-08 ENCOUNTER — VIRTUAL VISIT (OUTPATIENT)
Dept: BEHAVIORAL HEALTH | Facility: CLINIC | Age: 65
End: 2025-05-08
Payer: MEDICARE

## 2025-05-08 DIAGNOSIS — F41.1 GAD (GENERALIZED ANXIETY DISORDER): ICD-10-CM

## 2025-05-08 DIAGNOSIS — F33.1 MODERATE EPISODE OF RECURRENT MAJOR DEPRESSIVE DISORDER (H): Primary | ICD-10-CM

## 2025-05-08 PROCEDURE — 90832 PSYTX W PT 30 MINUTES: CPT | Mod: 93

## 2025-05-08 NOTE — PROGRESS NOTES
"    [facility]: Integrated Behavioral Health    Behavioral Health Clinician Progress Note   Mental Health & Addiction Services      Tuesday April 22, 2025    Patient Name: Paula Ho       Service Type:  Individual   Service Location:  MyChart / Email (patient reached)   Visit Start Time: 3:00pm  Visit End Time:  3:30pm   Session Length: 16 - 37    Attendees: Patient   Service Modality: Phone Visit:      Provider verified identity through the following two step process.  Patient provided:  Patient is known previously to provider    Telephone Visit: The patient's condition can be safely assessed and treated via synchronous audio telemedicine encounter.      Reason for Audio Telemedicine Visit: Patient has requested telehealth visit    Originating Site (Patient Location): Patient's home    Distant Site (Provider Location): Provider Remote Setting- Home Office    Telephone visit completed due to the patient did not consent to a video visit.    Consent:  The patient/guardian has verbally consented to:     1. The potential risks and benefits of telemedicine (telephone visit) versus in person care;    The patient has been notified of the following:      \"We have found that certain health care needs can be provided without the need for a face to face visit.  This service lets us provide the care you need with a phone conversation.       I will have full access to your [facility] medical record during this entire phone call.  I will be taking notes for your medical record.      Since this is like an office visit, we will bill your insurance company for this service.       There are potential benefits and risks of telephone visits (e.g. limits to patient confidentiality) that differ from in-person visits.?Confidentiality still applies for telephone services, and nobody will record the visit.  It is important to be in a quiet, private space that is free of distractions " "(including cell phone or other devices) during the visit.??      If during the course of the call I believe a telephone visit is not appropriate, you will not be charged for this service\"     Consent has been obtained for this service by care team member: Yes    Visit number: Long term     [facility] Visit Activities (Refresh list every visit): [facility] Only     [facility] Diagnostic Assessment: 11/18/24  Treatment Plan Review Date: 5/4/25      DATA:    Extended Session (60+ minutes): No   Interactive Complexity: No   Crisis: No   [facility] Patient: Yes, addressed the follow [facility] Core Component(s):                          Comprehensive Care Management: utilized the electronic medical record / patient registry to identify and support patient's health conditions / needs more effectively      Assessments completed prior to visit:   The following assessments were completed by patient for this visit:  PHQ2: No questionnaires on file.  PHQ9:       7/30/2024     1:58 PM 9/17/2024     2:47 PM 11/4/2024     7:20 AM 11/25/2024     3:00 PM 12/24/2024    10:58 AM 3/4/2025     3:59 PM 3/11/2025     2:43 PM   PHQ-9 SCORE   PHQ-9 Total Score MyChart 5 (Mild depression) 15 (Moderately severe depression) 7 (Mild depression)   10 (Moderate depression) 12 (Moderate depression)   PHQ-9 Total Score 5 15 7  12 15 10  12        Patient-reported    Proxy-reported     GAD2:       5/1/2023    10:38 AM 7/3/2023    10:42 AM 8/18/2023     2:10 PM 10/26/2023     9:10 AM 7/30/2024     1:58 PM 3/11/2025     2:44 PM   LIBRA-2   Feeling nervous, anxious, or on edge 1  0  1  2  1  1    Not being able to stop or control worrying 3  1  1  1  2  1    LIBRA-2 Total Score 4    4    4 1 2    2 3    3 3    3    3 2        Proxy-reported     GAD7:       4/2/2024     2:40 PM 5/21/2024     3:03 PM 5/21/2024     3:04 PM 6/4/2024     2:00 PM 7/30/2024     1:58 PM 11/25/2024     3:00 PM 3/4/2025     4:17 PM   LIBRA-7 SCORE   Total Score 8 (mild anxiety)  9 (mild anxiety)  8 (mild " "anxiety)  11 (moderate anxiety)   Total Score 8 9  11 8    8    8 13 11        Patient-reported     CAGE-AID:       3/11/2025     2:49 PM   CAGE-AID Total Score   Total Score 0    Total Score MyChart 0 (A total score of 2 or greater is considered clinically significant)       Proxy-reported     PROMIS 10-Global Health (only subscores and total score):       5/1/2023    10:40 AM 5/10/2023     3:47 PM 8/18/2023     2:12 PM 7/30/2024     2:00 PM 3/11/2025     2:48 PM   PROMIS-10 Scores Only   Global Mental Health Score 8        8 7 6     10        10 11    Global Physical Health Score 8        8 9 15     5        5 8    PROMIS TOTAL - SUBSCORES 16        16 16 21     15        15 19        Information is confidential and restricted. Go to Review Flowsheets to unlock data.    Proxy-reported     [place] Suicide Severity Rating Scale (Lifetime/Recent)      10/10/2019     3:54 PM 2/14/2020     4:00 PM 10/31/2022    11:18 AM   [place] Suicide Severity Rating (Lifetime/Recent)   Q1 Wish to be Dead (Lifetime) Yes  No     Comments Often due to pain and loss. \"I hate to be alone.\"      Q2 Non-Specific Active Suicidal Thoughts (Lifetime) Yes  Yes     Non-Specific Active Suicidal Thought Description (Lifetime) Often due to pain and loss. \"I hate to be alone.\" Did attempt in the past, 15 years ago.      Most Severe Ideation Rating (Lifetime) 4  3     Most Severe Ideation Description (Lifetime)  Did attempt in the past, 15 years ago. Not fully assessed since Paula needed to leave the session early.      Frequency (Lifetime)  3     Duration (Lifetime)  3     Controllability (Lifetime)  3     Protective Factors  (Lifetime)  4     Reasons for Ideation (Lifetime)  4     RETIRED: 1. Wish to be Dead (Recent) Yes      RETIRED: Wish to be Dead Description (Recent) Often due to pain and loss. \"I hate to be alone.\" No plan or intention.      RETIRED: 2. Non-Specific Active Suicidal Thoughts (Recent) Yes      Non-Specific Active Suicidal " "Thought Description (Recent) Often due to pain and loss. \"I hate to be alone.\" No plan or intention.      3. Active Suicidal Ideation with any Methods (Not Plan) Without Intent to Act (Lifetime) Yes  Yes     RETIRE: Active Suicidal Ideation with any Methods (Not Plan) Description (Lifetime) Often due to pain and loss. \"I hate to be alone.\" Did attempt in the past, 15 years ago.      RETIRED: 3. Active Suicidal Ideation with any Methods (Not Plan) Without Intent to Act (Recent) Yes      RETIRED: Active Suicidal Ideation with any Methods (Not Plan) Description (Recent) Often due to pain and loss. \"I hate to be alone.\" No plan or intention.      RETIRE: 4. Active Suicidal Ideation with Some Intent to Act, Without Specific Plan (Lifetime) Yes  Yes     RETIRE: Active Suicidal Ideation with Some Intent to Act, Without Specific Plan Description (Lifetime) Did attempt in the past, 15 years ago.      4. Active Suicidal Ideation with Some Intent to Act, Without Specific Plan (Recent) No  No     RETIRE: 5. Active Suicidal Ideation with Specific Plan and Intent (Lifetime) Yes      RETIRE: Active Suicidal Ideation with Specific Plan and Intent Description (Lifetime) Did attempt in the past, 15 years ago.     RETIRED: 5. Active Suicidal Ideation with Specific Plan and Intent (Recent) No      Most Severe Ideation Rating (Past Month) 2  1     Most Severe Ideation Description (Past Month) Regular thoughts of being dead due to chronic pain and loss.      Frequency (Past Month) 3  2     Duration (Past Month) 1      Controllability (Past Month) 2  3     Protective Factors (Past Month) NA  2     Reasons for Ideation (Past Month)  4     Actual Attempt (Lifetime)  Yes     Actual Attempt Description (Lifetime)  --     Comments  OD 25 years ago was hospitalized none since     Has subject engaged in non-suicidal self-injurious behavior? (Past 3 Months)  Yes     Interrupted Attempts (Lifetime)  --     Comments  burning arms in oven since " marriage in 2014.     Most Recent Attempt Date  --     Comments  25 years ago     Most Recent Attempt Actual Lethality Code NA  1     Most Lethal Attempt Actual Lethality Code NA      Initial/First Attempt Actual Lethality Code NA      1. Wish to be Dead (Lifetime)   Y   Wish to be Dead Description (Lifetime)   After a breakup, she reports that she was around [age]   1. Wish to be Dead (Past 1 Month)   N   2. Non-Specific Active Suicidal Thoughts (Lifetime)   Y   2. Non-Specific Active Suicidal Thoughts (Past 1 Month)   N   3. Active Suicidal Ideation with any Methods (Not Plan) Without Intent to Act (Lifetime)   Y   3. Active Suicidal Ideation with any Methods (Not Plan) Without Intent to Act (Past 1 Month)   N   4. Active Suicidal Ideation with Some Intent to Act, Without Specific Plan (Lifetime)   N   5. Active Suicidal Ideation with Specific Plan and Intent (Lifetime)   N   Description of Most Severe Ideation (Lifetime)   5   Description of Most Severe Ideation (Past 1 Month)   NA   Frequency (Lifetime)   3   Duration (Lifetime)   1   Controllability (Lifetime)   1   Deterrents (Lifetime)   0   Reasons for Ideation (Lifetime)   4   Actual Attempt (Lifetime)   Y   Total Number of Actual Attempts (Lifetime)   1   Actual Attempt Description (Lifetime)   Patient states she attempted through medication.   Actual Attempt (Past 3 Months)   N   Has subject engaged in non-suicidal self-injurious behavior? (Lifetime)   Y   Has subject engaged in non-suicidal self-injurious behavior? (Past 3 Months)   N   Interrupted Attempts (Lifetime)   N   Preparatory Acts or Behavior (Lifetime)   Y   Total Number of Preparatory Acts (Lifetime)   1   Most Recent Attempt Date   --   Actual Lethality/Medical Damage Code (Most Recent Attempt)   2   Potential Lethality Code (Most Recent Attempt)   1   Actual Lethality/Medical Damage Code (Most Lethal Attempt)   2   Potential Lethality Code (Most Lethal Attempt)   1   Initial/First  Attempt Date   --   Calculated C-SSRS Risk Score (Lifetime/Recent)   Moderate Risk       Data saved with a previous flowsheet row definition     [place] Suicide Severity Rating Scale (Short Version)      4/9/2020    10:00 AM 7/15/2020     9:00 AM 10/31/2022    11:18 AM   [place] Suicide Severity Rating (Short Version)   Over the past 2 weeks have you felt down, depressed, or hopeless? yes yes    Over the past 2 weeks have you had thoughts of killing yourself? no no    Have you ever attempted to kill yourself? no yes    When did this last happen?  more than 6 months ago    Actual Lethality/Medical Damage Code (Most Lethal Attempt)   2   Potential Lethality Code (Most Lethal Attempt)   1      Reason for Visit/Presenting Concern:  Depression    Current Stressors / Issues:   Pt  discussed her ongoing complicated relationship with her partner/ex partner.  Stated that she was confused about what she wanted to do and the relationship she wanted.  Assisted her in reframing and focus on what was in her control.  Review coping skills and prompt follow through.      Therapeutic Interventions:  Motivational Interviewing (MI): Validated patient's thoughts, feelings and experience. Expressed respect for patient's autonomy in decision making.  Affirmed patient's strengths and abilities.  Cognitive Behavioral Therapy (CBT): Provided psycho-education on cognitive distortions. and Identified behavioral changes that can be made to move towards treatment goals.     Response to treatment interventions:   Patient was receptive to interventions utilized.      Progress on Treatment Objective(s) / Homework:   Stable - CONTEMPLATION (Considering change and yet undecided); Intervened by assessing the negative and positive thinking (ambivalence) about behavior change     Medication Review:   No changes to current psychiatric medication(s)     Medication Compliance:   Yes     Chemical Use Review:  Substance Use: Chemical use reviewed, no  active concerns identified      Tobacco Use: No current tobacco use.       Assessment: Current Emotional / Mental Status (status of significant symptoms):    Risk status (Self / Other harm or suicidal ideation)   Patient denies a history of suicidal ideation, suicide attempts, self-injurious behavior, homicidal ideation, homicidal behavior, and and other safety concerns   Patient denies current fears or concerns for personal safety.   Patient denies current or recent suicidal ideation or behaviors.   Patient denies current or recent homicidal ideation or behaviors.   Patient denies current or recent self injurious behavior or ideation.   Patient denies other safety concerns.   Recommended that patient call 911 or go to the local ED should there be a change in any of these risk factors      ASSESSMENT:   Mental Status:     Appearance:   Unable to assess due to phone visit   Eye Contact:   Unable to assess due to phone visit   Psychomotor Behavior: Unable to assess due to phone visit   Attitude:   Cooperative    Orientation:   All   Speech Rate / Production: Normal    Volume:   Normal    Mood:    Depressed    Affect:    Appropriate  Worrisome    Thought Content:  Clear    Thought Form:  Coherent  Logical    Insight:    Fair      Diagnoses:   Data Unavailable    Collateral Reports Completed:   Not Applicable       Plan: (Homework, other):   Patient was provided No indications of CD issues  Patient was given information about behavioral services and encouraged to schedule a follow up appointment with the clinic [facility] in 2 weeks.         RASHEED Parekh, [facility]     _____________________________________________________________________________________________________________________________________                                              Individual Treatment Plan    Patient's Name: Paula Ho   YOB: 1960  Date of Creation: 2/4/25  Date Treatment Plan Last Reviewed/Revised: Review    DSM5  Diagnoses: 296.32 (F33.1) Major Depressive Disorder, Recurrent Episode, Moderate _  Psychosocial / Contextual Factors: Trauma History, Relationship Concerns, Interpersonal Concerns, Limited Social Support, and Financial Strain  PROMIS (reviewed every 90 days):   The following assessments were completed by patient for this visit:  PROMIS 10-Global Health (only subscores and total score):       5/1/2023    10:40 AM 5/10/2023     3:47 PM 8/18/2023     2:12 PM 7/30/2024     2:00 PM 3/11/2025     2:48 PM   PROMIS-10 Scores Only   Global Mental Health Score 8        8 7 6     10        10 11    Global Physical Health Score 8        8 9 15     5        5 8    PROMIS TOTAL - SUBSCORES 16        16 16 21     15        15 19        Information is confidential and restricted. Go to Review Flowsheets to unlock data.    Proxy-reported        Referral / Collaboration:  Referral to another professional/service is not indicated at this time..    Anticipated number of session for this episode of care:  9-12 sessions  Anticipation frequency of session: Biweekly  Anticipated Duration of each session: 16-37 minutes  Treatment plan will be reviewed in 90 days or when goals have been changed.       MeasurableTreatment Goal(s) related to diagnosis / functional impairment(s)  Goal 1: Patient will reduce frequency and intensity of depressive episode(s). increase physical health behaviors. learn and utilize skills for coping with triggers. identify ways to improve quality of life. recognize and appropriately reframe cognitive distortions.    I will know I've met my goal when I can set healthy boundaries with others, four out of eight times.      Status: New - Date: 5/4/25     Intervention(s)  [facility] will Cognitive Behavioral Therapy (CBT): Provide psycho-education on cognitive distortions., Identify and challenge maladaptive patterns of behavior and thinking that interfere with patient's life, Identify behavioral changes that can be made to  move towards treatment goals. , Assist patient in developing coping strategies (e.g. more physical exercise, less internal focus, increase social involvement, more assertiveness, etc.)., Reinforce successes reported by patient since last appointment., Work with patient to recognize irrational thought processes and identify more adaptive thoughts.  , and Work with patient to complete a cost/benefit analysis of current behavior patterns and explored alternative behaviors.  Dialectical Behavioral Therapy (DBT): Utilize dialectical behavior therapy strategies to improve effective coping skills in treatment of the depression., Teach patient mindfulness skills. , Teach patient distress tolerance skills to help improve patient's ability to accept change (radical acceptance)., Teach patient emotional regulation strategies.  , and Teach patient interpersonal effectiveness skills.  Psycho-education: Provide psycho-education about patient's behavioral health condition and symptoms. Explain and reviewed treatment options. Teach boundary clarification and setting to develop and maintain healthy relationships. Teach conflict resolution skills to help manage personal conflict in relationships. Discuss ways to cope with grief following relationship breakdown, divorce, bereavement, or job loss.  Teach and practice use of assertiveness skills. Provide support and education to family members of patient.     Patient has reviewed and agreed to the above plan.     Written by  RASHEED Parekh, [facility]

## 2025-05-15 ENCOUNTER — PATIENT OUTREACH (OUTPATIENT)
Dept: CARE COORDINATION | Facility: CLINIC | Age: 65
End: 2025-05-15
Payer: MEDICARE

## 2025-05-21 ENCOUNTER — VIRTUAL VISIT (OUTPATIENT)
Dept: BEHAVIORAL HEALTH | Facility: CLINIC | Age: 65
End: 2025-05-21
Payer: MEDICARE

## 2025-05-21 DIAGNOSIS — R69 DIAGNOSIS DEFERRED: Primary | ICD-10-CM

## 2025-05-21 ASSESSMENT — ANXIETY QUESTIONNAIRES
1. FEELING NERVOUS, ANXIOUS, OR ON EDGE: SEVERAL DAYS
4. TROUBLE RELAXING: NOT AT ALL
GAD7 TOTAL SCORE: 9
3. WORRYING TOO MUCH ABOUT DIFFERENT THINGS: NEARLY EVERY DAY
5. BEING SO RESTLESS THAT IT IS HARD TO SIT STILL: SEVERAL DAYS
6. BECOMING EASILY ANNOYED OR IRRITABLE: NEARLY EVERY DAY
IF YOU CHECKED OFF ANY PROBLEMS ON THIS QUESTIONNAIRE, HOW DIFFICULT HAVE THESE PROBLEMS MADE IT FOR YOU TO DO YOUR WORK, TAKE CARE OF THINGS AT HOME, OR GET ALONG WITH OTHER PEOPLE: SOMEWHAT DIFFICULT
2. NOT BEING ABLE TO STOP OR CONTROL WORRYING: SEVERAL DAYS
7. FEELING AFRAID AS IF SOMETHING AWFUL MIGHT HAPPEN: NOT AT ALL
GAD7 TOTAL SCORE: 9

## 2025-05-21 ASSESSMENT — PATIENT HEALTH QUESTIONNAIRE - PHQ9: SUM OF ALL RESPONSES TO PHQ QUESTIONS 1-9: 11

## 2025-05-21 NOTE — LETTER
Behavioral Health [place] (State mental health facility): Health Action Plan  State mental health facility Clinic: [place]    [facility]      Name: Paula Ho  Preferred Name: Paula  : 1960  MRN: [id number]      My Goals  Goal Areas: Health; Mental Health; Future HAP Goal area; Chemical Health    Patient stated goals:   Health: Paula would like to continue to meet with her providers, and take her medications as prescribed.     -Paula would like to work on losing weight with a provider. She will start using prescribed medication for assistance, as well as working on a nutrition plan.     -Paula would like to go to the gym at least once times per week.       Mental Health: Paula would like to continue receiving support from the [facility], Pam, for therapy within [place], and receive a long term therapy referral when needed. She will also continue to receive assistance with [facility] for monthly check ins.     -Paula will continue to work with the [facility] psychiatrist through [facility].     -She would like to continue working with an [facility] worker and Peer Support Person through the same agency.      -She reported that she wants to build herself back to feel like somebody and take it day by day.       Chemical Health: Paula would like assistance from her providers to quit smoking and will work on reducing her use, so she can be approved for back surgery.       Future goals: Paula is going to consider back surgery in the future after losing the recommended weight.    Strengths related to each goal: Paula states her strengths are the support of her dogs, good advocate for herself, support of good friends, and care for others. Patient states she has a big heart.    Services and Supports Needed: The State mental health facility Team will provide monthly contact with patient in order to monitor progress towards goals.    Activities / Actions of Team to support goal(s): State mental health facility team will locate appropriate resources that align with patient's goals and promote health and  wellness.    Activities / Actions of Patient / Parent / Guardian to support goal(s): It is a requirement that patient's primary care physician is through the [facility] system and that they are on Medical Assistance/Medicaid. If either of these were to change or if patient needs any type of assistance, they are to reach out to their Behavioral Health Home ([facility]) team.      Recommended Referral  Tobacco cessation referrals made?: No  Mental Health / Chemical Dependency Referrals: Yes  Substance Use Referrals: Not Applicable  Mental Health Referrals: [facility] Referrals: [facility]; [facility] ; Other (see comments)      My Team Members and Their Contact Information  Patient Care Team         Relationship Specialty Notifications Start End    Tuan, Renan Strickland MD PCP - General Internal Medicine Admissions 10/29/23     Phone: [phone] Fax: [phone] [address] [place] [address]    Tigist Manjarrez NP Nurse Practitioner Nurse Practitioner Admissions 8/3/20     Psychiatry    Phone: [phone] Fax: [phone]         [address] [place] [address]    [facility]  12/23/20     Phone: [phone]         Dane Irizarry MD Anesthesiologist Anesthesiology  1/21/21     Phone: [phone] Pager: [phone] Fax: [phone]        [address] [facility] [address]    Chanell Joya MD MD Dermatology  10/25/21     Phone: [phone] Fax: [phone]         [address] [facility] [address]    Joie Nobles MD Referring Physician Family Medicine  10/25/21     referring to Dermatology    Phone: [phone] Fax: [phone]         [address] [place] [address]    Paty Dial, BSW  Clinic Admissions 2/17/22     [facility] Case Management - Enrolled 02/17/2021 -[facility] - direct extension [phone].    Smita  MD ALEXANDRO Sloan Endocrinology, Diabetes, and Metabolism  1/20/23     Phone: [phone] Fax: [phone] [address] [facility] [address]    Raul Marinelli MD MD Urology  1/20/23     Phone: [phone] Fax: [phone] [address] [place] [address]    Renan Dickerson MD Assigned PCP   11/4/23     Phone: [phone] Fax: [phone] [address]  [place] [facility] [address]    Paty Mix APRN CNP Nurse Practitioner Dermatology  3/19/24     Phone: [phone] Fax: [phone] [address] [facility] [address]    Trace Capellan MD MD Otolaryngology  6/24/24     Phone: [phone] Fax: [phone]         [address] [facility] [address]    Trace Capellan MD Assigned Surgical Provider   7/23/24     Phone: [phone] Fax: [phone]         [address] [facility] [address]    Pam Arboleda LICSW Assigned Behavioral Health Provider   11/23/24     Phone: [phone] Fax: [phone] [address] [facility] [address]    Paty Mix APRN CNP Nurse Practitioner Dermatology  4/16/25     Phone: [phone] Fax: [phone] [address] [facility] [address]    Paty Mix APRN CNP Assigned Dermatology Provider   4/23/25     Phone: [phone] Fax: [phone] [address] [facility] [address]            My Wellness Plan  Safety Concerns: Suicide Ideation    Recommendations / Plan for safety concerns: Patient will follow her safety plan that was co-developed with therapist, Fernanda, on 10/31/22, and she also feels comfortable reaching out to her clinic or [facility] crisis, as well as the suicide hotline.    Crisis Plan (emergencies / when urgent support needed): Patient states she feels comfortable contacting her PCA Sharla, utilizing [facility] crisis/suicide hotline, or go to the [facility] unit in case immediate assistance is needed.    Paula Ho  co-developed the Health Action Plan with the [facility] Team and received a copy of this document.  Date Health Action Plan Completed/Updated: 05/21/25

## 2025-05-21 NOTE — Clinical Note
Beau Vela,  I met with this patient today for her [facility] care coordination check in. She mentioned that she forgot to talk with you about her Adderall refill when she met with you at the beginning of the month. Are you able to refill this medication again? She is out of the medication, and she doesn't have another scheduled appointment with you until 6/30.  Thanks!  Yesi Dial, [facility] [place] ([facility])  [facility], [place], & [facility] [phone]

## 2025-05-24 DIAGNOSIS — B02.9 HERPES ZOSTER WITHOUT COMPLICATION: ICD-10-CM

## 2025-05-24 DIAGNOSIS — K64.4 EXTERNAL HEMORRHOIDS: ICD-10-CM

## 2025-05-27 ENCOUNTER — VIRTUAL VISIT (OUTPATIENT)
Dept: BEHAVIORAL HEALTH | Facility: CLINIC | Age: 65
End: 2025-05-27
Payer: MEDICARE

## 2025-05-27 DIAGNOSIS — F41.1 GAD (GENERALIZED ANXIETY DISORDER): ICD-10-CM

## 2025-05-27 DIAGNOSIS — F33.1 MODERATE EPISODE OF RECURRENT MAJOR DEPRESSIVE DISORDER (H): Primary | ICD-10-CM

## 2025-05-27 PROCEDURE — 90832 PSYTX W PT 30 MINUTES: CPT | Mod: 93

## 2025-05-27 RX ORDER — HYDROCORTISONE 25 MG/G
CREAM TOPICAL
Qty: 30 G | Refills: 7 | Status: SHIPPED | OUTPATIENT
Start: 2025-05-27

## 2025-05-27 RX ORDER — LIDOCAINE 50 MG/G
OINTMENT TOPICAL 2 TIMES DAILY
Qty: 50 G | Refills: 1 | Status: SHIPPED | OUTPATIENT
Start: 2025-05-27

## 2025-05-28 NOTE — TELEPHONE ENCOUNTER
Patient calling back and RN relayed provider message below. Patient verbalized good understanding. States that she will also be going to get a CT scan completed tomorrow and will schedule her appointment then. No further questions or concerns.    Lynne Garza RN  [facility]     24

## 2025-05-29 NOTE — PROGRESS NOTES
"    [facility] Primary Care: Integrated Behavioral Health    Behavioral Health Clinician Progress Note   Mental Health & Addiction Services      Tuesday May 27, 2025    Patient Name: Paula Ho       Service Type:  Individual   Service Location:  MyChart / Email (patient reached)   Visit Start Time: 10:30am  Visit End Time:  11:00am   Session Length: 16 - 37    Attendees: Patient   Service Modality: Phone Visit:      Provider verified identity through the following two step process.  Patient provided:  Patient is known previously to provider    Telephone Visit: The patient's condition can be safely assessed and treated via synchronous audio telemedicine encounter.      Reason for Audio Telemedicine Visit: Patient has requested telehealth visit    Originating Site (Patient Location): Patient's home    Distant Site (Provider Location): Provider Remote Setting- Home Office    Telephone visit completed due to the patient did not consent to a video visit.    Consent:  The patient/guardian has verbally consented to:     1. The potential risks and benefits of telemedicine (telephone visit) versus in person care;    The patient has been notified of the following:      \"We have found that certain health care needs can be provided without the need for a face to face visit.  This service lets us provide the care you need with a phone conversation.       I will have full access to your [facility] medical record during this entire phone call.  I will be taking notes for your medical record.      Since this is like an office visit, we will bill your insurance company for this service.       There are potential benefits and risks of telephone visits (e.g. limits to patient confidentiality) that differ from in-person visits.?Confidentiality still applies for telephone services, and nobody will record the visit.  It is important to be in a quiet, private space that is free of distractions " "(including cell phone or other devices) during the visit.??      If during the course of the call I believe a telephone visit is not appropriate, you will not be charged for this service\"     Consent has been obtained for this service by care team member: Yes    Visit number: Long term     [facility] Visit Activities (Refresh list every visit): [facility] Only     [place] Diagnostic Assessment: 11/18/24  Treatment Plan Review Date: 8/8/25      DATA:    Extended Session (60+ minutes): No   Interactive Complexity: No   Crisis: No   [facility] Patient: Yes, addressed the follow [facility] Core Component(s):                          Comprehensive Care Management: utilized the electronic medical record / patient registry to identify and support patient's health conditions / needs more effectively      Assessments completed prior to visit:   The following assessments were completed by patient for this visit:  PHQ2: No questionnaires on file.  PHQ9:       9/17/2024     2:47 PM 11/4/2024     7:20 AM 11/25/2024     3:00 PM 12/24/2024    10:58 AM 3/4/2025     3:59 PM 3/11/2025     2:43 PM 5/21/2025    12:00 PM   PHQ-9 SCORE   PHQ-9 Total Score MyChart 15 (Moderately severe depression) 7 (Mild depression)   10 (Moderate depression) 12 (Moderate depression)    PHQ-9 Total Score 15 7  12 15 10  12  11       Patient-reported    Proxy-reported     GAD2:       5/1/2023    10:38 AM 7/3/2023    10:42 AM 8/18/2023     2:10 PM 10/26/2023     9:10 AM 7/30/2024     1:58 PM 3/11/2025     2:44 PM   LIBRA-2   Feeling nervous, anxious, or on edge 1  0  1  2  1  1    Not being able to stop or control worrying 3  1  1  1  2  1    LIBRA-2 Total Score 4    4    4 1 2    2 3    3 3    3    3 2        Proxy-reported     GAD7:       5/21/2024     3:03 PM 5/21/2024     3:04 PM 6/4/2024     2:00 PM 7/30/2024     1:58 PM 11/25/2024     3:00 PM 3/4/2025     4:17 PM 5/21/2025    12:00 PM   LIBRA-7 SCORE   Total Score  9 (mild anxiety)  8 (mild anxiety)  11 (moderate anxiety)    Total " "Score 9  11 8    8    8 13 11  9       Patient-reported     CAGE-AID:       3/11/2025     2:49 PM   CAGE-AID Total Score   Total Score 0    Total Score MyChart 0 (A total score of 2 or greater is considered clinically significant)       Proxy-reported     PROMIS 10-Global Health (only subscores and total score):       5/1/2023    10:40 AM 5/10/2023     3:47 PM 8/18/2023     2:12 PM 7/30/2024     2:00 PM 3/11/2025     2:48 PM   PROMIS-10 Scores Only   Global Mental Health Score 8        8 7 6     10        10 11    Global Physical Health Score 8        8 9 15     5        5 8    PROMIS TOTAL - SUBSCORES 16        16 16 21     15        15 19        Information is confidential and restricted. Go to Review Flowsheets to unlock data.    Proxy-reported     [place] Suicide Severity Rating Scale (Lifetime/Recent)      10/10/2019     3:54 PM 2/14/2020     4:00 PM 10/31/2022    11:18 AM   [place] Suicide Severity Rating (Lifetime/Recent)   Q1 Wish to be Dead (Lifetime) Yes  No     Comments Often due to pain and loss. \"I hate to be alone.\"      Q2 Non-Specific Active Suicidal Thoughts (Lifetime) Yes  Yes     Non-Specific Active Suicidal Thought Description (Lifetime) Often due to pain and loss. \"I hate to be alone.\" Did attempt in the past, 15 years ago.      Most Severe Ideation Rating (Lifetime) 4  3     Most Severe Ideation Description (Lifetime)  Did attempt in the past, 15 years ago. Not fully assessed since Paula needed to leave the session early.      Frequency (Lifetime)  3     Duration (Lifetime)  3     Controllability (Lifetime)  3     Protective Factors  (Lifetime)  4     Reasons for Ideation (Lifetime)  4     RETIRED: 1. Wish to be Dead (Recent) Yes      RETIRED: Wish to be Dead Description (Recent) Often due to pain and loss. \"I hate to be alone.\" No plan or intention.      RETIRED: 2. Non-Specific Active Suicidal Thoughts (Recent) Yes      Non-Specific Active Suicidal Thought Description (Recent) Often due to " "pain and loss. \"I hate to be alone.\" No plan or intention.      3. Active Suicidal Ideation with any Methods (Not Plan) Without Intent to Act (Lifetime) Yes  Yes     RETIRE: Active Suicidal Ideation with any Methods (Not Plan) Description (Lifetime) Often due to pain and loss. \"I hate to be alone.\" Did attempt in the past, 15 years ago.      RETIRED: 3. Active Suicidal Ideation with any Methods (Not Plan) Without Intent to Act (Recent) Yes      RETIRED: Active Suicidal Ideation with any Methods (Not Plan) Description (Recent) Often due to pain and loss. \"I hate to be alone.\" No plan or intention.      RETIRE: 4. Active Suicidal Ideation with Some Intent to Act, Without Specific Plan (Lifetime) Yes  Yes     RETIRE: Active Suicidal Ideation with Some Intent to Act, Without Specific Plan Description (Lifetime) Did attempt in the past, 15 years ago.      4. Active Suicidal Ideation with Some Intent to Act, Without Specific Plan (Recent) No  No     RETIRE: 5. Active Suicidal Ideation with Specific Plan and Intent (Lifetime) Yes      RETIRE: Active Suicidal Ideation with Specific Plan and Intent Description (Lifetime) Did attempt in the past, 15 years ago.     RETIRED: 5. Active Suicidal Ideation with Specific Plan and Intent (Recent) No      Most Severe Ideation Rating (Past Month) 2  1     Most Severe Ideation Description (Past Month) Regular thoughts of being dead due to chronic pain and loss.      Frequency (Past Month) 3  2     Duration (Past Month) 1      Controllability (Past Month) 2  3     Protective Factors (Past Month) NA  2     Reasons for Ideation (Past Month)  4     Actual Attempt (Lifetime)  Yes     Actual Attempt Description (Lifetime)  --     Comments  OD 25 years ago was hospitalized none since     Has subject engaged in non-suicidal self-injurious behavior? (Past 3 Months)  Yes     Interrupted Attempts (Lifetime)  --     Comments  burning arms in oven since marriage in 2014.     Most Recent Attempt Date "  --     Comments  25 years ago     Most Recent Attempt Actual Lethality Code NA  1     Most Lethal Attempt Actual Lethality Code NA      Initial/First Attempt Actual Lethality Code NA      1. Wish to be Dead (Lifetime)   Y   Wish to be Dead Description (Lifetime)   After a breakup, she reports that she was around [age]   1. Wish to be Dead (Past 1 Month)   N   2. Non-Specific Active Suicidal Thoughts (Lifetime)   Y   2. Non-Specific Active Suicidal Thoughts (Past 1 Month)   N   3. Active Suicidal Ideation with any Methods (Not Plan) Without Intent to Act (Lifetime)   Y   3. Active Suicidal Ideation with any Methods (Not Plan) Without Intent to Act (Past 1 Month)   N   4. Active Suicidal Ideation with Some Intent to Act, Without Specific Plan (Lifetime)   N   5. Active Suicidal Ideation with Specific Plan and Intent (Lifetime)   N   Description of Most Severe Ideation (Lifetime)   5   Description of Most Severe Ideation (Past 1 Month)   NA   Frequency (Lifetime)   3   Duration (Lifetime)   1   Controllability (Lifetime)   1   Deterrents (Lifetime)   0   Reasons for Ideation (Lifetime)   4   Actual Attempt (Lifetime)   Y   Total Number of Actual Attempts (Lifetime)   1   Actual Attempt Description (Lifetime)   Patient states she attempted through medication.   Actual Attempt (Past 3 Months)   N   Has subject engaged in non-suicidal self-injurious behavior? (Lifetime)   Y   Has subject engaged in non-suicidal self-injurious behavior? (Past 3 Months)   N   Interrupted Attempts (Lifetime)   N   Preparatory Acts or Behavior (Lifetime)   Y   Total Number of Preparatory Acts (Lifetime)   1   Most Recent Attempt Date   --   Actual Lethality/Medical Damage Code (Most Recent Attempt)   2   Potential Lethality Code (Most Recent Attempt)   1   Actual Lethality/Medical Damage Code (Most Lethal Attempt)   2   Potential Lethality Code (Most Lethal Attempt)   1   Initial/First Attempt Date   --   Calculated C-SSRS Risk Score  (Lifetime/Recent)   Moderate Risk       Data saved with a previous flowsheet row definition     [place] Suicide Severity Rating Scale (Short Version)      4/9/2020    10:00 AM 7/15/2020     9:00 AM 10/31/2022    11:18 AM   [place] Suicide Severity Rating (Short Version)   Over the past 2 weeks have you felt down, depressed, or hopeless? yes yes    Over the past 2 weeks have you had thoughts of killing yourself? no no    Have you ever attempted to kill yourself? no yes    When did this last happen?  more than 6 months ago    Actual Lethality/Medical Damage Code (Most Lethal Attempt)   2   Potential Lethality Code (Most Lethal Attempt)   1      Reason for Visit/Presenting Concern:  Depression    Current Stressors / Issues:   Pt stated that overall she was doing well.  She continued to set boundaries with others and felt good about it.  Pt stated that she was still working with her pain doctor and she planned to continue to advocate for herself.  Review coping skills and prompt follow through.      Therapeutic Interventions:  Motivational Interviewing (MI): Validated patient's thoughts, feelings and experience. Expressed respect for patient's autonomy in decision making.  Affirmed patient's strengths and abilities.  Cognitive Behavioral Therapy (CBT): Provided psycho-education on cognitive distortions. and Identified behavioral changes that can be made to move towards treatment goals.     Response to treatment interventions:   Patient was receptive to interventions utilized.      Progress on Treatment Objective(s) / Homework:   Stable - CONTEMPLATION (Considering change and yet undecided); Intervened by assessing the negative and positive thinking (ambivalence) about behavior change     Medication Review:   No changes to current psychiatric medication(s)     Medication Compliance:   Yes     Chemical Use Review:  Substance Use: Chemical use reviewed, no active concerns identified      Tobacco Use: No current tobacco  use.       Assessment: Current Emotional / Mental Status (status of significant symptoms):    Risk status (Self / Other harm or suicidal ideation)   Patient denies a history of suicidal ideation, suicide attempts, self-injurious behavior, homicidal ideation, homicidal behavior, and and other safety concerns   Patient denies current fears or concerns for personal safety.   Patient denies current or recent suicidal ideation or behaviors.   Patient denies current or recent homicidal ideation or behaviors.   Patient denies current or recent self injurious behavior or ideation.   Patient denies other safety concerns.   Recommended that patient call 911 or go to the local ED should there be a change in any of these risk factors      ASSESSMENT:   Mental Status:     Appearance:   Unable to assess due to phone visit   Eye Contact:   Unable to assess due to phone visit   Psychomotor Behavior: Unable to assess due to phone visit   Attitude:   Cooperative    Orientation:   All   Speech Rate / Production: Normal    Volume:   Normal    Mood:    Depressed    Affect:    Appropriate  Worrisome    Thought Content:  Clear    Thought Form:  Coherent  Logical    Insight:    Fair      Diagnoses:      Moderate episode of recurrent major depressive disorder (H)  LIBRA (generalized anxiety disorder)    Collateral Reports Completed:   Not Applicable       Plan: (Homework, other):   Patient was provided No indications of CD issues  Patient was given information about behavioral services and encouraged to schedule a follow up appointment with the clinic [facility] in 2 weeks.         RASHEED Parekh, [facility]     _____________________________________________________________________________________________________________________________________                                              Individual Treatment Plan    Patient's Name: Paula Ho   YOB: 1960  Date of Creation: 2/4/25; 5/8/25  Date Treatment Plan Last  Reviewed/Revised: Review    DSM5 Diagnoses: 296.32 (F33.1) Major Depressive Disorder, Recurrent Episode, Moderate _  Psychosocial / Contextual Factors: Trauma History, Relationship Concerns, Interpersonal Concerns, Limited Social Support, and Financial Strain  PROMIS (reviewed every 90 days):   The following assessments were completed by patient for this visit:  PROMIS 10-Global Health (only subscores and total score):       5/1/2023    10:40 AM 5/10/2023     3:47 PM 8/18/2023     2:12 PM 7/30/2024     2:00 PM 3/11/2025     2:48 PM   PROMIS-10 Scores Only   Global Mental Health Score 8        8 7 6     10        10 11    Global Physical Health Score 8        8 9 15     5        5 8    PROMIS TOTAL - SUBSCORES 16        16 16 21     15        15 19        Information is confidential and restricted. Go to Review Flowsheets to unlock data.    Proxy-reported      Referral / Collaboration:  Referral to another professional/service is not indicated at this time..    Anticipated number of session for this episode of care:  9-12 sessions  Anticipation frequency of session: Biweekly  Anticipated Duration of each session: 16-37 minutes  Treatment plan will be reviewed in 90 days or when goals have been changed.     MeasurableTreatment Goal(s) related to diagnosis / functional impairment(s)  Goal 1: Patient will reduce frequency and intensity of depressive episode(s). increase physical health behaviors. learn and utilize skills for coping with triggers. identify ways to improve quality of life. recognize and appropriately reframe cognitive distortions.    I will know I've met my goal when I can set healthy boundaries with others, seven out of eight times.      Status: New - Date: 8/8/25     Intervention(s)  [facility] will Cognitive Behavioral Therapy (CBT): Provide psycho-education on cognitive distortions., Identify and challenge maladaptive patterns of behavior and thinking that interfere with patient's life, Identify behavioral  changes that can be made to move towards treatment goals. , Assist patient in developing coping strategies (e.g. more physical exercise, less internal focus, increase social involvement, more assertiveness, etc.)., Reinforce successes reported by patient since last appointment., Work with patient to recognize irrational thought processes and identify more adaptive thoughts.  , and Work with patient to complete a cost/benefit analysis of current behavior patterns and explored alternative behaviors.  Dialectical Behavioral Therapy (DBT): Utilize dialectical behavior therapy strategies to improve effective coping skills in treatment of the depression., Teach patient mindfulness skills. , Teach patient distress tolerance skills to help improve patient's ability to accept change (radical acceptance)., Teach patient emotional regulation strategies.  , and Teach patient interpersonal effectiveness skills.  Psycho-education: Provide psycho-education about patient's behavioral health condition and symptoms. Explain and reviewed treatment options. Teach boundary clarification and setting to develop and maintain healthy relationships. Teach conflict resolution skills to help manage personal conflict in relationships. Discuss ways to cope with grief following relationship breakdown, divorce, bereavement, or job loss.  Teach and practice use of assertiveness skills. Provide support and education to family members of patient.     Patient has reviewed and agreed to the above plan.     Written by  RASHEED Parekh, [facility]

## 2025-06-18 ENCOUNTER — VIRTUAL VISIT (OUTPATIENT)
Dept: BEHAVIORAL HEALTH | Facility: CLINIC | Age: 65
End: 2025-06-18
Payer: MEDICARE

## 2025-06-18 DIAGNOSIS — F41.1 GAD (GENERALIZED ANXIETY DISORDER): ICD-10-CM

## 2025-06-18 DIAGNOSIS — F33.1 MODERATE EPISODE OF RECURRENT MAJOR DEPRESSIVE DISORDER (H): Primary | ICD-10-CM

## 2025-06-18 PROCEDURE — 90832 PSYTX W PT 30 MINUTES: CPT | Mod: 93

## 2025-06-18 NOTE — PROGRESS NOTES
"    [facility] Primary Care: Integrated Behavioral Health    Behavioral Health Clinician Progress Note   Mental Health & Addiction Services      Wednesday 18, 2025    Patient Name: Paula Ho       Service Type:  Individual   Service Location:  MyChart / Email (patient reached)   Visit Start Time: 3:33pm   Visit End Time:  3:54pm   Session Length: 16 - 37    Attendees: Patient   Service Modality: Phone Visit:      Provider verified identity through the following two step process.  Patient provided:  Patient is known previously to provider    Telephone Visit: The patient's condition can be safely assessed and treated via synchronous audio telemedicine encounter.      Reason for Audio Telemedicine Visit: Patient has requested telehealth visit    Originating Site (Patient Location): Patient's home    Distant Site (Provider Location): Provider Remote Setting- Home Office    Telephone visit completed due to the patient did not consent to a video visit.    Consent:  The patient/guardian has verbally consented to:     1. The potential risks and benefits of telemedicine (telephone visit) versus in person care;    The patient has been notified of the following:      \"We have found that certain health care needs can be provided without the need for a face to face visit.  This service lets us provide the care you need with a phone conversation.       I will have full access to your [facility] medical record during this entire phone call.  I will be taking notes for your medical record.      Since this is like an office visit, we will bill your insurance company for this service.       There are potential benefits and risks of telephone visits (e.g. limits to patient confidentiality) that differ from in-person visits.?Confidentiality still applies for telephone services, and nobody will record the visit.  It is important to be in a quiet, private space that is free of distractions (including " "cell phone or other devices) during the visit.??      If during the course of the call I believe a telephone visit is not appropriate, you will not be charged for this service\"     Consent has been obtained for this service by care team member: Yes    Visit number: Long term     [facility] Visit Activities (Refresh list every visit): [facility] Only     [place] Diagnostic Assessment: 11/18/24  Treatment Plan Review Date: 8/8/25      DATA:    Extended Session (60+ minutes): No   Interactive Complexity: No   Crisis: No   [facility] Patient: Yes, addressed the follow [facility] Core Component(s):                          Comprehensive Care Management: utilized the electronic medical record / patient registry to identify and support patient's health conditions / needs more effectively      Assessments completed prior to visit:   The following assessments were completed by patient for this visit:  PHQ2: No questionnaires on file.  PHQ9:       9/17/2024     2:47 PM 11/4/2024     7:20 AM 11/25/2024     3:00 PM 12/24/2024    10:58 AM 3/4/2025     3:59 PM 3/11/2025     2:43 PM 5/21/2025    12:00 PM   PHQ-9 SCORE   PHQ-9 Total Score MyChart 15 (Moderately severe depression) 7 (Mild depression)   10 (Moderate depression) 12 (Moderate depression)    PHQ-9 Total Score 15 7  12 15 10  12  11       Patient-reported    Proxy-reported     GAD2:       5/1/2023    10:38 AM 7/3/2023    10:42 AM 8/18/2023     2:10 PM 10/26/2023     9:10 AM 7/30/2024     1:58 PM 3/11/2025     2:44 PM   LIBRA-2   Feeling nervous, anxious, or on edge 1  0  1  2  1  1    Not being able to stop or control worrying 3  1  1  1  2  1    LIBRA-2 Total Score 4    4    4 1 2    2 3    3 3    3    3 2        Proxy-reported     GAD7:       5/21/2024     3:03 PM 5/21/2024     3:04 PM 6/4/2024     2:00 PM 7/30/2024     1:58 PM 11/25/2024     3:00 PM 3/4/2025     4:17 PM 5/21/2025    12:00 PM   LIBRA-7 SCORE   Total Score  9 (mild anxiety)  8 (mild anxiety)  11 (moderate anxiety)    Total Score 9  11 " "8    8    8 13 11  9       Patient-reported     CAGE-AID:       3/11/2025     2:49 PM   CAGE-AID Total Score   Total Score 0    Total Score MyChart 0 (A total score of 2 or greater is considered clinically significant)       Proxy-reported     PROMIS 10-Global Health (only subscores and total score):       5/1/2023    10:40 AM 5/10/2023     3:47 PM 8/18/2023     2:12 PM 7/30/2024     2:00 PM 3/11/2025     2:48 PM   PROMIS-10 Scores Only   Global Mental Health Score 8        8 7 6     10        10 11    Global Physical Health Score 8        8 9 15     5        5 8    PROMIS TOTAL - SUBSCORES 16        16 16 21     15        15 19        Information is confidential and restricted. Go to Review Flowsheets to unlock data.    Proxy-reported     [place] Suicide Severity Rating Scale (Lifetime/Recent)      10/10/2019     3:54 PM 2/14/2020     4:00 PM 10/31/2022    11:18 AM   [place] Suicide Severity Rating (Lifetime/Recent)   Q1 Wish to be Dead (Lifetime) Yes  No     Comments Often due to pain and loss. \"I hate to be alone.\"      Q2 Non-Specific Active Suicidal Thoughts (Lifetime) Yes  Yes     Non-Specific Active Suicidal Thought Description (Lifetime) Often due to pain and loss. \"I hate to be alone.\" Did attempt in the past, 15 years ago.      Most Severe Ideation Rating (Lifetime) 4  3     Most Severe Ideation Description (Lifetime)  Did attempt in the past, 15 years ago. Not fully assessed since Paula needed to leave the session early.      Frequency (Lifetime)  3     Duration (Lifetime)  3     Controllability (Lifetime)  3     Protective Factors  (Lifetime)  4     Reasons for Ideation (Lifetime)  4     RETIRED: 1. Wish to be Dead (Recent) Yes      RETIRED: Wish to be Dead Description (Recent) Often due to pain and loss. \"I hate to be alone.\" No plan or intention.      RETIRED: 2. Non-Specific Active Suicidal Thoughts (Recent) Yes      Non-Specific Active Suicidal Thought Description (Recent) Often due to pain and " "loss. \"I hate to be alone.\" No plan or intention.      3. Active Suicidal Ideation with any Methods (Not Plan) Without Intent to Act (Lifetime) Yes  Yes     RETIRE: Active Suicidal Ideation with any Methods (Not Plan) Description (Lifetime) Often due to pain and loss. \"I hate to be alone.\" Did attempt in the past, 15 years ago.      RETIRED: 3. Active Suicidal Ideation with any Methods (Not Plan) Without Intent to Act (Recent) Yes      RETIRED: Active Suicidal Ideation with any Methods (Not Plan) Description (Recent) Often due to pain and loss. \"I hate to be alone.\" No plan or intention.      RETIRE: 4. Active Suicidal Ideation with Some Intent to Act, Without Specific Plan (Lifetime) Yes  Yes     RETIRE: Active Suicidal Ideation with Some Intent to Act, Without Specific Plan Description (Lifetime) Did attempt in the past, 15 years ago.      4. Active Suicidal Ideation with Some Intent to Act, Without Specific Plan (Recent) No  No     RETIRE: 5. Active Suicidal Ideation with Specific Plan and Intent (Lifetime) Yes      RETIRE: Active Suicidal Ideation with Specific Plan and Intent Description (Lifetime) Did attempt in the past, 15 years ago.     RETIRED: 5. Active Suicidal Ideation with Specific Plan and Intent (Recent) No      Most Severe Ideation Rating (Past Month) 2  1     Most Severe Ideation Description (Past Month) Regular thoughts of being dead due to chronic pain and loss.      Frequency (Past Month) 3  2     Duration (Past Month) 1      Controllability (Past Month) 2  3     Protective Factors (Past Month) NA  2     Reasons for Ideation (Past Month)  4     Actual Attempt (Lifetime)  Yes     Actual Attempt Description (Lifetime)  --     Comments  OD 25 years ago was hospitalized none since     Has subject engaged in non-suicidal self-injurious behavior? (Past 3 Months)  Yes     Interrupted Attempts (Lifetime)  --     Comments  burning arms in oven since marriage in 2014.     Most Recent Attempt Date  --   "   Comments  25 years ago     Most Recent Attempt Actual Lethality Code NA  1     Most Lethal Attempt Actual Lethality Code NA      Initial/First Attempt Actual Lethality Code NA      1. Wish to be Dead (Lifetime)   Y   Wish to be Dead Description (Lifetime)   After a breakup, she reports that she was around [age]   1. Wish to be Dead (Past 1 Month)   N   2. Non-Specific Active Suicidal Thoughts (Lifetime)   Y   2. Non-Specific Active Suicidal Thoughts (Past 1 Month)   N   3. Active Suicidal Ideation with any Methods (Not Plan) Without Intent to Act (Lifetime)   Y   3. Active Suicidal Ideation with any Methods (Not Plan) Without Intent to Act (Past 1 Month)   N   4. Active Suicidal Ideation with Some Intent to Act, Without Specific Plan (Lifetime)   N   5. Active Suicidal Ideation with Specific Plan and Intent (Lifetime)   N   Description of Most Severe Ideation (Lifetime)   5   Description of Most Severe Ideation (Past 1 Month)   NA   Frequency (Lifetime)   3   Duration (Lifetime)   1   Controllability (Lifetime)   1   Deterrents (Lifetime)   0   Reasons for Ideation (Lifetime)   4   Actual Attempt (Lifetime)   Y   Total Number of Actual Attempts (Lifetime)   1   Actual Attempt Description (Lifetime)   Patient states she attempted through medication.   Actual Attempt (Past 3 Months)   N   Has subject engaged in non-suicidal self-injurious behavior? (Lifetime)   Y   Has subject engaged in non-suicidal self-injurious behavior? (Past 3 Months)   N   Interrupted Attempts (Lifetime)   N   Preparatory Acts or Behavior (Lifetime)   Y   Total Number of Preparatory Acts (Lifetime)   1   Most Recent Attempt Date   --   Actual Lethality/Medical Damage Code (Most Recent Attempt)   2   Potential Lethality Code (Most Recent Attempt)   1   Actual Lethality/Medical Damage Code (Most Lethal Attempt)   2   Potential Lethality Code (Most Lethal Attempt)   1   Initial/First Attempt Date   --   Calculated C-SSRS Risk Score  (Lifetime/Recent)   Moderate Risk       Data saved with a previous flowsheet row definition     [place] Suicide Severity Rating Scale (Short Version)      4/9/2020    10:00 AM 7/15/2020     9:00 AM 10/31/2022    11:18 AM   [place] Suicide Severity Rating (Short Version)   Over the past 2 weeks have you felt down, depressed, or hopeless? yes yes    Over the past 2 weeks have you had thoughts of killing yourself? no no    Have you ever attempted to kill yourself? no yes    When did this last happen?  more than 6 months ago    Actual Lethality/Medical Damage Code (Most Lethal Attempt)   2   Potential Lethality Code (Most Lethal Attempt)   1      Reason for Visit/Presenting Concern:  Depression    Current Stressors / Issues:   Pt stated that she was doing ok, stated she was considering moving and going to explore options.  Discussed writer leaving and she is going to think about what she wants to do.  Review and set boundaries with others.    Review coping skills and prompt follow through.      Therapeutic Interventions:  Motivational Interviewing (MI): Validated patient's thoughts, feelings and experience. Expressed respect for patient's autonomy in decision making.  Affirmed patient's strengths and abilities.  Cognitive Behavioral Therapy (CBT): Provided psycho-education on cognitive distortions. and Identified behavioral changes that can be made to move towards treatment goals.     Response to treatment interventions:   Patient was receptive to interventions utilized.      Progress on Treatment Objective(s) / Homework:   Stable - CONTEMPLATION (Considering change and yet undecided); Intervened by assessing the negative and positive thinking (ambivalence) about behavior change     Medication Review:   No changes to current psychiatric medication(s)     Medication Compliance:   Yes     Chemical Use Review:  Substance Use: Chemical use reviewed, no active concerns identified      Tobacco Use: No current tobacco use.        Assessment: Current Emotional / Mental Status (status of significant symptoms):    Risk status (Self / Other harm or suicidal ideation)   Patient denies a history of suicidal ideation, suicide attempts, self-injurious behavior, homicidal ideation, homicidal behavior, and and other safety concerns   Patient denies current fears or concerns for personal safety.   Patient denies current or recent suicidal ideation or behaviors.   Patient denies current or recent homicidal ideation or behaviors.   Patient denies current or recent self injurious behavior or ideation.   Patient denies other safety concerns.   Recommended that patient call 911 or go to the local ED should there be a change in any of these risk factors      ASSESSMENT:   Mental Status:     Appearance:   Unable to assess due to phone visit   Eye Contact:   Unable to assess due to phone visit   Psychomotor Behavior: Unable to assess due to phone visit   Attitude:   Cooperative    Orientation:   All   Speech Rate / Production: Normal    Volume:   Normal    Mood:    Depressed    Affect:    Appropriate  Worrisome    Thought Content:  Clear    Thought Form:  Coherent  Logical    Insight:    Fair      Diagnoses:      Moderate episode of recurrent major depressive disorder (H)  LIBRA (generalized anxiety disorder)    Collateral Reports Completed:   Not Applicable       Plan: (Homework, other):   Patient was provided No indications of CD issues  Patient was given information about behavioral services and encouraged to schedule a follow up appointment with the clinic [facility] in 2 weeks.         RASHEED Parekh, [facility]     _____________________________________________________________________________________________________________________________________                                              Individual Treatment Plan    Patient's Name: Paula Ho   YOB: 1960  Date of Creation: 2/4/25; 5/8/25  Date Treatment Plan Last Reviewed/Revised:  Review    DSM5 Diagnoses: 296.32 (F33.1) Major Depressive Disorder, Recurrent Episode, Moderate _  Psychosocial / Contextual Factors: Trauma History, Relationship Concerns, Interpersonal Concerns, Limited Social Support, and Financial Strain  PROMIS (reviewed every 90 days):   The following assessments were completed by patient for this visit:  PROMIS 10-Global Health (only subscores and total score):       5/1/2023    10:40 AM 5/10/2023     3:47 PM 8/18/2023     2:12 PM 7/30/2024     2:00 PM 3/11/2025     2:48 PM   PROMIS-10 Scores Only   Global Mental Health Score 8        8 7 6     10        10 11    Global Physical Health Score 8        8 9 15     5        5 8    PROMIS TOTAL - SUBSCORES 16        16 16 21     15        15 19        Information is confidential and restricted. Go to Review Flowsheets to unlock data.    Proxy-reported      Referral / Collaboration:  Referral to another professional/service is not indicated at this time..    Anticipated number of session for this episode of care:  9-12 sessions  Anticipation frequency of session: Biweekly  Anticipated Duration of each session: 16-37 minutes  Treatment plan will be reviewed in 90 days or when goals have been changed.     MeasurableTreatment Goal(s) related to diagnosis / functional impairment(s)  Goal 1: Patient will reduce frequency and intensity of depressive episode(s). increase physical health behaviors. learn and utilize skills for coping with triggers. identify ways to improve quality of life. recognize and appropriately reframe cognitive distortions.    I will know I've met my goal when I can set healthy boundaries with others, seven out of eight times.      Status: New - Date: 8/8/25     Intervention(s)  [facility] will Cognitive Behavioral Therapy (CBT): Provide psycho-education on cognitive distortions., Identify and challenge maladaptive patterns of behavior and thinking that interfere with patient's life, Identify behavioral changes that can be  made to move towards treatment goals. , Assist patient in developing coping strategies (e.g. more physical exercise, less internal focus, increase social involvement, more assertiveness, etc.)., Reinforce successes reported by patient since last appointment., Work with patient to recognize irrational thought processes and identify more adaptive thoughts.  , and Work with patient to complete a cost/benefit analysis of current behavior patterns and explored alternative behaviors.  Dialectical Behavioral Therapy (DBT): Utilize dialectical behavior therapy strategies to improve effective coping skills in treatment of the depression., Teach patient mindfulness skills. , Teach patient distress tolerance skills to help improve patient's ability to accept change (radical acceptance)., Teach patient emotional regulation strategies.  , and Teach patient interpersonal effectiveness skills.  Psycho-education: Provide psycho-education about patient's behavioral health condition and symptoms. Explain and reviewed treatment options. Teach boundary clarification and setting to develop and maintain healthy relationships. Teach conflict resolution skills to help manage personal conflict in relationships. Discuss ways to cope with grief following relationship breakdown, divorce, bereavement, or job loss.  Teach and practice use of assertiveness skills. Provide support and education to family members of patient.     Patient has reviewed and agreed to the above plan.     Written by  RASHEED Parekh, [facility]

## 2025-06-19 ENCOUNTER — VIRTUAL VISIT (OUTPATIENT)
Dept: BEHAVIORAL HEALTH | Facility: CLINIC | Age: 65
End: 2025-06-19
Payer: MEDICARE

## 2025-06-19 DIAGNOSIS — R69 DIAGNOSIS DEFERRED: Primary | ICD-10-CM

## 2025-06-19 NOTE — PROGRESS NOTES
"[facility]  Merged with [facility]: [place]        Social Work Care Navigator Note      Patient: Paula Ho  Date: June 19, 2025  Preferred Name: Paula Pryor PHQ-9:       3/4/2025     3:59 PM 3/11/2025     2:43 PM 5/21/2025    12:00 PM   PHQ-9 SCORE   PHQ-9 Total Score MyChart 10 (Moderate depression) 12 (Moderate depression)    PHQ-9 Total Score 10  12  11       Patient-reported    Proxy-reported     Previous LIBRA-7:       11/25/2024     3:00 PM 3/4/2025     4:17 PM 5/21/2025    12:00 PM   LIBRA-7 SCORE   Total Score  11 (moderate anxiety)    Total Score 13 11  9       Patient-reported     ARNALDO LEVEL:       No data to display                Preferred Contact:  Need for : No  Preferred Contact: Cell      Type of Contact Today: Phone call (patient / identified key support person reached)    Service Modality: Phone Visit:      Provider verified identity through the following two step process.  Patient provided:  Patient is known previously to provider    Telephone Visit: The patient's condition can be safely assessed and treated via synchronous audio telemedicine encounter.      Reason for Audio Telemedicine Visit: Patient has requested telehealth visit    Originating Site (Patient Location): Patient's home    Distant Site (Provider Location): Provider Remote Setting- Home Office    Telephone visit completed due to the patient did not consent to a video visit.    Consent:  The patient/guardian has verbally consented to:     1. The potential risks and benefits of telemedicine (telephone visit) versus in person care;    The patient has been notified of the following:      \"We have found that certain health care needs can be provided without the need for a face to face visit.  This service lets us provide the care you need with a phone conversation.       I will have full access to your [facility] medical record during this entire phone call.  I will be taking notes for your medical " "record.      Since this is like an office visit, we will bill your insurance company for this service.       There are potential benefits and risks of telephone visits (e.g. limits to patient confidentiality) that differ from in-person visits.?Confidentiality still applies for telephone services, and nobody will record the visit.  It is important to be in a quiet, private space that is free of distractions (including cell phone or other devices) during the visit.??      If during the course of the call I believe a telephone visit is not appropriate, you will not be charged for this service\"     Consent has been obtained for this service by care team member: Yes       Data: (subjective / Objective):  Recent ED/IP Admission or Discharge?   None    Patient Goals:  Goal Areas: Health; Mental Health; Future HAP Goal area; Chemical Health  Patient stated goals: Health: aPula would like to continue to meet with her providers, and take her medications as prescribed.   -Paula would like to work on losing weight with a provider. She will start using prescribed medication for assistance, as well as working on a nutrition plan.   -Paula would like to go to the gym at least once times per week.   Mental Health: Paula would like to continue receiving support from the [facility], [facility], for therapy within [place], and receive a long term therapy referral when needed. She will also continue to receive assistance with [facility] for monthly check ins.   -Paula will continue to work with the [place] psychiatrist through [facility].   -She would like to continue working with an [facility] worker and Peer Support Person through the same agency.    -She reported that she wants to build herself back to feel like somebody and take it day by day.   Chemical Health: Paula would like assistance from her providers to quit smoking and will work on reducing her use, so she can be approved for back surgery.   Future goals: Paula is going to consider back " "surgery in the future after losing the recommended weight.        [facility] Core Service Provided:  Comprehensive Care Management: utilized the electronic medical record / patient registry to identify and support patient's health conditions / needs more effectively   Care Transitions: focused on the coordinated and seamless movement of patient between or within different levels of care or settings  Care Coordination: provided care management services/referrals necessary to ensure patient and their identified supports have access to medical, behavioral health, pharmacology and recovery support services.  Ensured that patient's care is integrated across all settings and services.   Individual and Family Support: aimed to help clients reduce barriers to achieving goals, increase health literacy and knowledge about chronic condition(s), increase self-efficacy skills, and improve health outcomes    Current Stressors / Issues / Care Plan Objective Addressed Today:  [facility] and patient were able to meet today for Behavioral Health Home ([facility]) monthly check-in via telehealth visit. All required ROIs have been filed with HIM/patient chart.    -Patient reports she's been doing \"so-so\". She reports that her neighbor is moving, so she's been helping them with packing/moving. She reports that the rent is cheaper, so she's wondering about moving there. The other helpful thing is that there's two bathroom in that place, so she doesn't need to use the stairs to use the bathroom. Patient reports she's unsure about [address] being okay about transferring her to that apartment, as well as breaking her lease. She reports needing to talk to her landlord about it, and then she'll need to talk to [address] [employer].    -Patient reports that she's worried about potential mold in her house, and she reports having breathing issues lately. She reports that she went to urgent care last week, and she was told she has an upper respiratory infection. However, she's " worried its more than this because she's been having a hard time breathing lately, especially when she gets up in the morning. [facility] found that her last annual physical with PCP was in January 2024, so [facility] encouraged her to get another physical PCP. She will call to make an appointment.    -Patient reports that she's sad about [facility] leaving, and she reports that struggling with getting comfortable with someone leaving. She's open to long term therapy referral.     -Patient reported that she's had no recent concerns with her ex recently. She's talked to him once and she report she still wants to focus on herself right now. She reports having struggles since he's been gone, like with moving the lawn, because of breathing issues and when it's hot outside.     -Patient reports that she's been reducing her smoking because she knows it affects her breathing, so she's down to the one pack per two days.     -Patient hasn't been able to go to the gym once a week yet, but she will wants this to be a goal.     -Patient reports that she is no longer taking the Wegov[employer] because it's no longer covered. She gained her weight back, and she's not sure because she only eats once a day. Patient reports they started her on another medication, but she just started this. She is going to be talking to a nutritionist to talk more about food because she is unsure why she's gaining weight with the food she's eating.     -Patient reports she's been having pain still and she has a hard time with getting tasks done. She reports her brother has been calling her and she's going to see him but she's been in pain.     -Patient and [facility] discussed PCA and waiver services. Patient reports concerns about her PCA hours would decrease. She'll continue to think about the waiver services.     Intervention:  Motivational Interviewing: Expressed Empathy/Understanding, Supported Autonomy, Collaboration, Evocation, Permission to raise concern or advise,  Open-ended questions, and Reflections: simple and complex   Target Behavior(s): Explored thoughts about taking an anti-depressant, Explored and resolved challenges related to taking anti-depressants as prescribed, Explored thoughts and readiness to participate in individual therapy, Explored and resolved challenges to attending appointments as scheduled, and Explored current social supports and reinforced opportunities to increase engagement    Assessment: (Progress on Goals / Homework):  Patient would benefit from continued coordination in reaching their goals set for the Behavioral Health Home ([facility]) program. [facility] reviewed Health Action Plan goals and will continue to monitor progress and work with patient and their care team.    Plan: (Homework, other):  Patient was encouraged to continue to seek condition-related information and education.      Scheduled a Phone follow up appointment with MISA RICE in 3 weeks     Patient has set self-identified goals and will monitor progress until the next appointment on: 7/7/25     DENI Humphries  [facility] [place] ([facility])   [facility], [facility]  [phone]

## 2025-07-01 ENCOUNTER — VIRTUAL VISIT (OUTPATIENT)
Dept: BEHAVIORAL HEALTH | Facility: CLINIC | Age: 65
End: 2025-07-01
Payer: MEDICARE

## 2025-07-01 DIAGNOSIS — F41.1 GAD (GENERALIZED ANXIETY DISORDER): ICD-10-CM

## 2025-07-01 DIAGNOSIS — F33.1 MODERATE EPISODE OF RECURRENT MAJOR DEPRESSIVE DISORDER (H): Primary | ICD-10-CM

## 2025-07-01 PROCEDURE — 90832 PSYTX W PT 30 MINUTES: CPT | Mod: 93

## 2025-07-01 NOTE — PROGRESS NOTES
"    [facility] Primary Care: Integrated Behavioral Health    Behavioral Health Clinician Progress Note   Mental Health & Addiction Services      Tuesday July 1, 2025    Patient Name: Paula Ho       Service Type:  Individual   Service Location:  MyChart / Email (patient reached)   Visit Start Time: 3:56pm   Visit End Time:  4:25pm   Session Length: 16 - 37    Attendees: Patient   Service Modality: Phone Visit:      Provider verified identity through the following two step process.  Patient provided:  Patient is known previously to provider    Telephone Visit: The patient's condition can be safely assessed and treated via synchronous audio telemedicine encounter.      Reason for Audio Telemedicine Visit: Patient has requested telehealth visit    Originating Site (Patient Location): Patient's home    Distant Site (Provider Location): Provider Remote Setting- Home Office    Telephone visit completed due to the patient did not consent to a video visit.    Consent:  The patient/guardian has verbally consented to:     1. The potential risks and benefits of telemedicine (telephone visit) versus in person care;    The patient has been notified of the following:      \"We have found that certain health care needs can be provided without the need for a face to face visit.  This service lets us provide the care you need with a phone conversation.       I will have full access to your [facility] medical record during this entire phone call.  I will be taking notes for your medical record.      Since this is like an office visit, we will bill your insurance company for this service.       There are potential benefits and risks of telephone visits (e.g. limits to patient confidentiality) that differ from in-person visits.?Confidentiality still applies for telephone services, and nobody will record the visit.  It is important to be in a quiet, private space that is free of distractions " "(including cell phone or other devices) during the visit.??      If during the course of the call I believe a telephone visit is not appropriate, you will not be charged for this service\"     Consent has been obtained for this service by care team member: Yes    Visit number: Long term     [facility] Visit Activities (Refresh list every visit): [facility] Only     [facility] Assessment: 11/18/24  Treatment Plan Review Date: 8/8/25      DATA:    Extended Session (60+ minutes): No   Interactive Complexity: No   Crisis: No   [facility] Patient: Yes, addressed the follow [facility] Core Component(s):                          Comprehensive Care Management: utilized the electronic medical record / patient registry to identify and support patient's health conditions / needs more effectively      Assessments completed prior to visit:   The following assessments were completed by patient for this visit:  PHQ2: No questionnaires on file.  PHQ9:       9/17/2024     2:47 PM 11/4/2024     7:20 AM 11/25/2024     3:00 PM 12/24/2024    10:58 AM 3/4/2025     3:59 PM 3/11/2025     2:43 PM 5/21/2025    12:00 PM   PHQ-9 SCORE   PHQ-9 Total Score MyChart 15 (Moderately severe depression) 7 (Mild depression)   10 (Moderate depression) 12 (Moderate depression)    PHQ-9 Total Score 15 7  12 15 10  12  11       Patient-reported    Proxy-reported     GAD2:       5/1/2023    10:38 AM 7/3/2023    10:42 AM 8/18/2023     2:10 PM 10/26/2023     9:10 AM 7/30/2024     1:58 PM 3/11/2025     2:44 PM   LIBRA-2   Feeling nervous, anxious, or on edge 1  0  1  2  1  1    Not being able to stop or control worrying 3  1  1  1  2  1    LIBRA-2 Total Score 4    4    4 1 2    2 3    3 3    3    3 2        Proxy-reported     GAD7:       5/21/2024     3:03 PM 5/21/2024     3:04 PM 6/4/2024     2:00 PM 7/30/2024     1:58 PM 11/25/2024     3:00 PM 3/4/2025     4:17 PM 5/21/2025    12:00 PM   LIBRA-7 SCORE   Total Score  9 (mild anxiety)  8 (mild anxiety)  11 (moderate anxiety)    Total " "Score 9  11 8    8    8 13 11  9       Patient-reported     CAGE-AID:       3/11/2025     2:49 PM   CAGE-AID Total Score   Total Score 0    Total Score MyChart 0 (A total score of 2 or greater is considered clinically significant)       Proxy-reported     PROMIS 10-Global Health (only subscores and total score):       5/1/2023    10:40 AM 5/10/2023     3:47 PM 8/18/2023     2:12 PM 7/30/2024     2:00 PM 3/11/2025     2:48 PM   PROMIS-10 Scores Only   Global Mental Health Score 8        8 7 6     10        10 11    Global Physical Health Score 8        8 9 15     5        5 8    PROMIS TOTAL - SUBSCORES 16        16 16 21     15        15 19        Information is confidential and restricted. Go to Review Flowsheets to unlock data.    Proxy-reported     [place] Suicide Severity Rating Scale (Lifetime/Recent)      10/10/2019     3:54 PM 2/14/2020     4:00 PM 10/31/2022    11:18 AM   [place] Suicide Severity Rating (Lifetime/Recent)   Q1 Wish to be Dead (Lifetime) Yes  No     Comments Often due to pain and loss. \"I hate to be alone.\"      Q2 Non-Specific Active Suicidal Thoughts (Lifetime) Yes  Yes     Non-Specific Active Suicidal Thought Description (Lifetime) Often due to pain and loss. \"I hate to be alone.\" Did attempt in the past, 15 years ago.      Most Severe Ideation Rating (Lifetime) 4  3     Most Severe Ideation Description (Lifetime)  Did attempt in the past, 15 years ago. Not fully assessed since Paula needed to leave the session early.      Frequency (Lifetime)  3     Duration (Lifetime)  3     Controllability (Lifetime)  3     Protective Factors  (Lifetime)  4     Reasons for Ideation (Lifetime)  4     RETIRED: 1. Wish to be Dead (Recent) Yes      RETIRED: Wish to be Dead Description (Recent) Often due to pain and loss. \"I hate to be alone.\" No plan or intention.      RETIRED: 2. Non-Specific Active Suicidal Thoughts (Recent) Yes      Non-Specific Active Suicidal Thought Description (Recent) Often due to " "pain and loss. \"I hate to be alone.\" No plan or intention.      3. Active Suicidal Ideation with any Methods (Not Plan) Without Intent to Act (Lifetime) Yes  Yes     RETIRE: Active Suicidal Ideation with any Methods (Not Plan) Description (Lifetime) Often due to pain and loss. \"I hate to be alone.\" Did attempt in the past, 15 years ago.      RETIRED: 3. Active Suicidal Ideation with any Methods (Not Plan) Without Intent to Act (Recent) Yes      RETIRED: Active Suicidal Ideation with any Methods (Not Plan) Description (Recent) Often due to pain and loss. \"I hate to be alone.\" No plan or intention.      RETIRE: 4. Active Suicidal Ideation with Some Intent to Act, Without Specific Plan (Lifetime) Yes  Yes     RETIRE: Active Suicidal Ideation with Some Intent to Act, Without Specific Plan Description (Lifetime) Did attempt in the past, 15 years ago.      4. Active Suicidal Ideation with Some Intent to Act, Without Specific Plan (Recent) No  No     RETIRE: 5. Active Suicidal Ideation with Specific Plan and Intent (Lifetime) Yes      RETIRE: Active Suicidal Ideation with Specific Plan and Intent Description (Lifetime) Did attempt in the past, 15 years ago.     RETIRED: 5. Active Suicidal Ideation with Specific Plan and Intent (Recent) No      Most Severe Ideation Rating (Past Month) 2  1     Most Severe Ideation Description (Past Month) Regular thoughts of being dead due to chronic pain and loss.      Frequency (Past Month) 3  2     Duration (Past Month) 1      Controllability (Past Month) 2  3     Protective Factors (Past Month) NA  2     Reasons for Ideation (Past Month)  4     Actual Attempt (Lifetime)  Yes     Actual Attempt Description (Lifetime)  --     Comments  OD 25 years ago was hospitalized none since     Has subject engaged in non-suicidal self-injurious behavior? (Past 3 Months)  Yes     Interrupted Attempts (Lifetime)  --     Comments  burning arms in oven since marriage in 2014.     Most Recent Attempt Date "  --     Comments  25 years ago     Most Recent Attempt Actual Lethality Code NA  1     Most Lethal Attempt Actual Lethality Code NA      Initial/First Attempt Actual Lethality Code NA      1. Wish to be Dead (Lifetime)   Y   Wish to be Dead Description (Lifetime)   After a breakup, she reports that she was around [age]   1. Wish to be Dead (Past 1 Month)   N   2. Non-Specific Active Suicidal Thoughts (Lifetime)   Y   2. Non-Specific Active Suicidal Thoughts (Past 1 Month)   N   3. Active Suicidal Ideation with any Methods (Not Plan) Without Intent to Act (Lifetime)   Y   3. Active Suicidal Ideation with any Methods (Not Plan) Without Intent to Act (Past 1 Month)   N   4. Active Suicidal Ideation with Some Intent to Act, Without Specific Plan (Lifetime)   N   5. Active Suicidal Ideation with Specific Plan and Intent (Lifetime)   N   Description of Most Severe Ideation (Lifetime)   5   Description of Most Severe Ideation (Past 1 Month)   NA   Frequency (Lifetime)   3   Duration (Lifetime)   1   Controllability (Lifetime)   1   Deterrents (Lifetime)   0   Reasons for Ideation (Lifetime)   4   Actual Attempt (Lifetime)   Y   Total Number of Actual Attempts (Lifetime)   1   Actual Attempt Description (Lifetime)   Patient states she attempted through medication.   Actual Attempt (Past 3 Months)   N   Has subject engaged in non-suicidal self-injurious behavior? (Lifetime)   Y   Has subject engaged in non-suicidal self-injurious behavior? (Past 3 Months)   N   Interrupted Attempts (Lifetime)   N   Preparatory Acts or Behavior (Lifetime)   Y   Total Number of Preparatory Acts (Lifetime)   1   Most Recent Attempt Date   --   Actual Lethality/Medical Damage Code (Most Recent Attempt)   2   Potential Lethality Code (Most Recent Attempt)   1   Actual Lethality/Medical Damage Code (Most Lethal Attempt)   2   Potential Lethality Code (Most Lethal Attempt)   1   Initial/First Attempt Date   --   Calculated C-SSRS Risk Score  (Lifetime/Recent)   Moderate Risk       Data saved with a previous flowsheet row definition     [place] Suicide Severity Rating Scale (Short Version)      4/9/2020    10:00 AM 7/15/2020     9:00 AM 10/31/2022    11:18 AM   [place] Suicide Severity Rating (Short Version)   Over the past 2 weeks have you felt down, depressed, or hopeless? yes yes    Over the past 2 weeks have you had thoughts of killing yourself? no no    Have you ever attempted to kill yourself? no yes    When did this last happen?  more than 6 months ago    Actual Lethality/Medical Damage Code (Most Lethal Attempt)   2   Potential Lethality Code (Most Lethal Attempt)   1      Reason for Visit/Presenting Concern:  Depression    Current Stressors / Issues:   Pt discussed feeling overwhelmed and questioning why she was getting a chance at this new home.  Allowed her to process her anxious thoughts and help her reframe them.  Pt was open to educating and focusing on what she was in control over.    Review coping skills and prompt follow through.      Therapeutic Interventions:  Motivational Interviewing (MI): Validated patient's thoughts, feelings and experience. Expressed respect for patient's autonomy in decision making.  Affirmed patient's strengths and abilities.  Cognitive Behavioral Therapy (CBT): Provided psycho-education on cognitive distortions. and Identified behavioral changes that can be made to move towards treatment goals.     Response to treatment interventions:   Patient was receptive to interventions utilized.      Progress on Treatment Objective(s) / Homework:   Stable - CONTEMPLATION (Considering change and yet undecided); Intervened by assessing the negative and positive thinking (ambivalence) about behavior change     Medication Review:   No changes to current psychiatric medication(s)     Medication Compliance:   Yes     Chemical Use Review:  Substance Use: Chemical use reviewed, no active concerns identified      Tobacco Use: No  current tobacco use.       Assessment: Current Emotional / Mental Status (status of significant symptoms):    Risk status (Self / Other harm or suicidal ideation)   Patient denies a history of suicidal ideation, suicide attempts, self-injurious behavior, homicidal ideation, homicidal behavior, and and other safety concerns   Patient denies current fears or concerns for personal safety.   Patient denies current or recent suicidal ideation or behaviors.   Patient denies current or recent homicidal ideation or behaviors.   Patient denies current or recent self injurious behavior or ideation.   Patient denies other safety concerns.   Recommended that patient call 911 or go to the local ED should there be a change in any of these risk factors      ASSESSMENT:   Mental Status:     Appearance:   Unable to assess due to phone visit   Eye Contact:   Unable to assess due to phone visit   Psychomotor Behavior: Unable to assess due to phone visit   Attitude:   Cooperative    Orientation:   All   Speech Rate / Production: Normal    Volume:   Normal    Mood:    Depressed    Affect:    Appropriate  Worrisome    Thought Content:  Clear    Thought Form:  Coherent  Logical    Insight:    Fair      Diagnoses:      Moderate episode of recurrent major depressive disorder (H)  LIBRA (generalized anxiety disorder)    Collateral Reports Completed:   Not Applicable       Plan: (Homework, other):   Patient was provided No indications of CD issues  Patient was given information about behavioral services and encouraged to schedule a follow up appointment with the clinic [facility] as needed.         RASHEED Parekh, [facility]     _____________________________________________________________________________________________________________________________________                                              Individual Treatment Plan    Patient's Name: Paula Ho   YOB: 1960  Date of Creation: 2/4/25; 5/8/25  Date Treatment Plan  Last Reviewed/Revised: Review    DSM5 Diagnoses: 296.32 (F33.1) Major Depressive Disorder, Recurrent Episode, Moderate _  Psychosocial / Contextual Factors: Trauma History, Relationship Concerns, Interpersonal Concerns, Limited Social Support, and Financial Strain  PROMIS (reviewed every 90 days):   The following assessments were completed by patient for this visit:  PROMIS 10-Global Health (only subscores and total score):       5/1/2023    10:40 AM 5/10/2023     3:47 PM 8/18/2023     2:12 PM 7/30/2024     2:00 PM 3/11/2025     2:48 PM   PROMIS-10 Scores Only   Global Mental Health Score 8        8 7 6     10        10 11    Global Physical Health Score 8        8 9 15     5        5 8    PROMIS TOTAL - SUBSCORES 16        16 16 21     15        15 19        Information is confidential and restricted. Go to Review Flowsheets to unlock data.    Proxy-reported      Referral / Collaboration:  Referral to another professional/service is not indicated at this time..    Anticipated number of session for this episode of care:  9-12 sessions  Anticipation frequency of session: Biweekly  Anticipated Duration of each session: 16-37 minutes  Treatment plan will be reviewed in 90 days or when goals have been changed.     MeasurableTreatment Goal(s) related to diagnosis / functional impairment(s)  Goal 1: Patient will reduce frequency and intensity of depressive episode(s). increase physical health behaviors. learn and utilize skills for coping with triggers. identify ways to improve quality of life. recognize and appropriately reframe cognitive distortions.    I will know I've met my goal when I can set healthy boundaries with others, seven out of eight times.      Status: New - Date: 8/8/25     Intervention(s)  [facility] will Cognitive Behavioral Therapy (CBT): Provide psycho-education on cognitive distortions., Identify and challenge maladaptive patterns of behavior and thinking that interfere with patient's life, Identify  behavioral changes that can be made to move towards treatment goals. , Assist patient in developing coping strategies (e.g. more physical exercise, less internal focus, increase social involvement, more assertiveness, etc.)., Reinforce successes reported by patient since last appointment., Work with patient to recognize irrational thought processes and identify more adaptive thoughts.  , and Work with patient to complete a cost/benefit analysis of current behavior patterns and explored alternative behaviors.  Dialectical Behavioral Therapy (DBT): Utilize dialectical behavior therapy strategies to improve effective coping skills in treatment of the depression., Teach patient mindfulness skills. , Teach patient distress tolerance skills to help improve patient's ability to accept change (radical acceptance)., Teach patient emotional regulation strategies.  , and Teach patient interpersonal effectiveness skills.  Psycho-education: Provide psycho-education about patient's behavioral health condition and symptoms. Explain and reviewed treatment options. Teach boundary clarification and setting to develop and maintain healthy relationships. Teach conflict resolution skills to help manage personal conflict in relationships. Discuss ways to cope with grief following relationship breakdown, divorce, bereavement, or job loss.  Teach and practice use of assertiveness skills. Provide support and education to family members of patient.     Patient has reviewed and agreed to the above plan.     Written by  RASHEED Parekh, [facility]

## 2025-07-02 ENCOUNTER — PATIENT OUTREACH (OUTPATIENT)
Dept: CARE COORDINATION | Facility: CLINIC | Age: 65
End: 2025-07-02
Payer: COMMERCIAL

## 2025-07-05 ENCOUNTER — PATIENT OUTREACH (OUTPATIENT)
Dept: CARE COORDINATION | Facility: CLINIC | Age: 65
End: 2025-07-05
Payer: COMMERCIAL

## 2025-07-11 ENCOUNTER — TELEPHONE (OUTPATIENT)
Dept: BEHAVIORAL HEALTH | Facility: CLINIC | Age: 65
End: 2025-07-11
Payer: COMMERCIAL

## 2025-07-11 NOTE — TELEPHONE ENCOUNTER
Behavioral Health Home Services  [facility]: [place]        Social Work Care Navigator Note      Patient: Paula Ho  Date: July 13, 2025  Preferred Name: Paula    Previous PHQ-9:       3/4/2025     3:59 PM 3/11/2025     2:43 PM 5/21/2025    12:00 PM   PHQ-9 SCORE   PHQ-9 Total Score MyChart 10 (Moderate depression) 12 (Moderate depression)    PHQ-9 Total Score 10  12  11       Patient-reported    Proxy-reported     Previous LIBRA-7:       11/25/2024     3:00 PM 3/4/2025     4:17 PM 5/21/2025    12:00 PM   LIBRA-7 SCORE   Total Score  11 (moderate anxiety)    Total Score 13 11  9       Patient-reported     ARNALDO LEVEL:       No data to display                Preferred Contact:  Need for : No  Preferred Contact: Cell      Type of Contact Today: Phone call (not reached/unavailable)      Data: (subjective / Objective):  [facility] received message from patient, Attempted to reach patient for check in, but was unsuccessful. [facility] left a message requesting a call back and letting patient know [facility] is out of office next week and will return on 7/22. Plan to attempt again.      DENI Humphries  Behavioral Health [place] ([facility])   [facility], [place], & [facility]  [phone]

## 2025-07-28 ENCOUNTER — LAB (OUTPATIENT)
Dept: LAB | Facility: CLINIC | Age: 65
End: 2025-07-28
Payer: COMMERCIAL

## 2025-07-28 ENCOUNTER — VIRTUAL VISIT (OUTPATIENT)
Dept: FAMILY MEDICINE | Facility: CLINIC | Age: 65
End: 2025-07-28
Payer: COMMERCIAL

## 2025-07-28 ENCOUNTER — ANCILLARY PROCEDURE (OUTPATIENT)
Dept: GENERAL RADIOLOGY | Facility: CLINIC | Age: 65
End: 2025-07-28
Attending: INTERNAL MEDICINE
Payer: COMMERCIAL

## 2025-07-28 DIAGNOSIS — L50.1 ACUTE IDIOPATHIC URTICARIA: ICD-10-CM

## 2025-07-28 DIAGNOSIS — M54.16 LEFT LUMBAR RADICULOPATHY: Primary | ICD-10-CM

## 2025-07-28 DIAGNOSIS — M54.16 LEFT LUMBAR RADICULOPATHY: ICD-10-CM

## 2025-07-28 DIAGNOSIS — K64.4 EXTERNAL HEMORRHOIDS: ICD-10-CM

## 2025-07-28 PROBLEM — U07.1 INFECTION DUE TO 2019 NOVEL CORONAVIRUS: Status: RESOLVED | Noted: 2020-12-09 | Resolved: 2025-07-28

## 2025-07-28 LAB
BASOPHILS # BLD AUTO: 0 10E3/UL (ref 0–0.2)
BASOPHILS NFR BLD AUTO: 0 %
EOSINOPHIL # BLD AUTO: 0.2 10E3/UL (ref 0–0.7)
EOSINOPHIL NFR BLD AUTO: 2 %
ERYTHROCYTE [DISTWIDTH] IN BLOOD BY AUTOMATED COUNT: 12.5 % (ref 10–15)
HCT VFR BLD AUTO: 45.1 % (ref 35–47)
HGB BLD-MCNC: 14.5 G/DL (ref 11.7–15.7)
IMM GRANULOCYTES # BLD: 0 10E3/UL
IMM GRANULOCYTES NFR BLD: 0 %
LYMPHOCYTES # BLD AUTO: 3.5 10E3/UL (ref 0.8–5.3)
LYMPHOCYTES NFR BLD AUTO: 31 %
MCH RBC QN AUTO: 30.7 PG (ref 26.5–33)
MCHC RBC AUTO-ENTMCNC: 32.2 G/DL (ref 31.5–36.5)
MCV RBC AUTO: 95 FL (ref 78–100)
MONOCYTES # BLD AUTO: 0.5 10E3/UL (ref 0–1.3)
MONOCYTES NFR BLD AUTO: 5 %
NEUTROPHILS # BLD AUTO: 7 10E3/UL (ref 1.6–8.3)
NEUTROPHILS NFR BLD AUTO: 62 %
PLATELET # BLD AUTO: 313 10E3/UL (ref 150–450)
RBC # BLD AUTO: 4.73 10E6/UL (ref 3.8–5.2)
WBC # BLD AUTO: 11.4 10E3/UL (ref 4–11)

## 2025-07-28 PROCEDURE — 82785 ASSAY OF IGE: CPT

## 2025-07-28 PROCEDURE — 85025 COMPLETE CBC W/AUTO DIFF WBC: CPT

## 2025-07-28 PROCEDURE — 72100 X-RAY EXAM L-S SPINE 2/3 VWS: CPT | Mod: TC | Performed by: RADIOLOGY

## 2025-07-28 PROCEDURE — 36415 COLL VENOUS BLD VENIPUNCTURE: CPT

## 2025-07-28 PROCEDURE — 98014 SYNCH AUDIO-ONLY EST MOD 30: CPT | Performed by: INTERNAL MEDICINE

## 2025-07-28 PROCEDURE — 82785 ASSAY OF IGE: CPT | Mod: 59

## 2025-07-28 PROCEDURE — 86003 ALLG SPEC IGE CRUDE XTRC EA: CPT | Mod: 91

## 2025-07-28 RX ORDER — HYDROCORTISONE 25 MG/G
CREAM TOPICAL 2 TIMES DAILY PRN
Qty: 28 G | Refills: 2 | Status: SHIPPED | OUTPATIENT
Start: 2025-07-28

## 2025-07-28 RX ORDER — PREDNISONE 5 MG/1
5 TABLET ORAL DAILY
Qty: 21 TABLET | Refills: 0 | Status: SHIPPED | OUTPATIENT
Start: 2025-07-28 | End: 2025-08-18

## 2025-07-28 ASSESSMENT — PATIENT HEALTH QUESTIONNAIRE - PHQ9
SUM OF ALL RESPONSES TO PHQ QUESTIONS 1-9: 9
SUM OF ALL RESPONSES TO PHQ QUESTIONS 1-9: 9
10. IF YOU CHECKED OFF ANY PROBLEMS, HOW DIFFICULT HAVE THESE PROBLEMS MADE IT FOR YOU TO DO YOUR WORK, TAKE CARE OF THINGS AT HOME, OR GET ALONG WITH OTHER PEOPLE: SOMEWHAT DIFFICULT

## 2025-07-28 NOTE — PROGRESS NOTES
Paula is a 65 year old who is being evaluated via a billable telephone visit.    What phone number would you like to be contacted at? [phone]  How would you like to obtain your AVS? Artiehart  No video available.    [facility] Progress Note           Assessment and Plan:     Left lumbar radiculopathy  - XR Lumbar Spine 2/3 Views; Future  - predniSONE (DELTASONE) 5 MG tablet; Take 1 tablet (5 mg) by mouth daily for 21 days.    Acute idiopathic urticaria  - IgE; Future  - CBC with Platelets & Differential; Future  - Allergy adult food panel; Future  - predniSONE (DELTASONE) 5 MG tablet; Take 1 tablet (5 mg) by mouth daily for 21 days.    External hemorrhoids  - hydrocortisone, Perianal, (HYDROCORTISONE) 2.5 % cream; Place rectally 2 times daily as needed for hemorrhoids.          Interval History:     Neuropathy  Duration: two weeks  Course: worse  Distribution: left buttock and left leg  Description         Paresthesia: no         Pain: yes         Numbness: no         Weakness: no         Autonomic symptoms: no  Intensity:  moderate-severe  History (similar episodes/previous evaluation):          Diabetes: prediabetes         Hypothyroidism: yes         Vitamin B12 deficiency: no         Multiple myeloma: no         Toxin exposure: no         Sexual history: unremarkable         Recreational drug use: none         Smoking: no         Alcohol intake/Dietary habits: no         EMG: no  Alleviating factors: none  Therapies tried and outcome: Patient takes Duloxetine and Gabapentin for other conditions (both are not effective).            Significant Problems:     Patient Active Problem List   Diagnosis    Chronic knee pain    LIBRA (generalized anxiety disorder)    Brain aneurysm    Chronic, continuous use of opioids    Asthma    Chronic GERD    Chronic pain    Cigarette smoker    DJD (degenerative joint disease)    Insomnia    Morbid obesity (H)    History of subarachnoid hemorrhage     Status post knee surgery    Tobacco use disorder    Chronic obstructive pulmonary disease, unspecified COPD type (H)    MDD (major depressive disorder), recurrent severe, without psychosis (H)    Attention deficit hyperactivity disorder (ADHD)    Chronic midline low back pain with bilateral sciatica    Hyperlipidemia    Benign essential hypertension    Opioid dependence with current use (H)    Angiolipoma of right kidney    Adrenal nodule    Acute idiopathic urticaria    Left lumbar radiculopathy              Review of Systems:     CONSTITUTIONAL: NEGATIVE for fever, chills, change in weight  INTEGUMENTARY/SKIN: NEGATIVE for worrisome rashes, moles or lesions  EYES: NEGATIVE for vision changes or irritation  ENT/MOUTH: NEGATIVE for ear, mouth and throat problems  RESP: NEGATIVE for significant cough or SOB  BREAST: NEGATIVE for masses, tenderness or discharge  CV: NEGATIVE for chest pain, palpitations or peripheral edema  GI: NEGATIVE for nausea, abdominal pain, heartburn, or change in bowel habits  GI: POSITIVE for external hemorrhoids.  : NEGATIVE for frequency, dysuria, or hematuria  MUSCULOSKELETAL:As above.  NEURO: NEGATIVE for HX CVA and HX TIA  ENDOCRINE: NEGATIVE for temperature intolerance, skin/hair changes  HEME: NEGATIVE for bleeding problems  PSYCHIATRIC: NEGATIVE for changes in mood or affect             Physical Exam:   Vital signs and physical exam not obtained due to nature of visit.          Data:   None     Disposition:  Follow-up in 3 weeks.    Renan Dickerson MD  Internal Medicine  [facility] Team     Phone call duration: 25 minutes  Start: 0935  End: 1000

## 2025-07-29 ENCOUNTER — TELEPHONE (OUTPATIENT)
Dept: BEHAVIORAL HEALTH | Facility: CLINIC | Age: 65
End: 2025-07-29
Payer: COMMERCIAL

## 2025-07-29 LAB
ALMOND IGE QN: <0.1 KU(A)/L
CASHEW NUT IGE QN: <0.1 KU(A)/L
CODFISH IGE QN: <0.1 KU(A)/L
COW MILK IGE QN: <0.1 KU(A)/L
EGG WHITE IGE QN: <0.1 KU(A)/L
HAZELNUT IGE QN: <0.1 KU(A)/L
IGE SERPL-ACNC: 85 KU/L (ref 0–114)
IGE SERPL-ACNC: 93 KU/L (ref 0–114)
PEANUT IGE QN: <0.1 KU(A)/L
SALMON IGE QN: <0.1 KU(A)/L
SCALLOP IGE QN: <0.1 KU(A)/L
SESAME SEED IGE QN: <0.1 KU(A)/L
SHRIMP IGE QN: <0.1 KU(A)/L
SOYBEAN IGE QN: <0.1 KU(A)/L
TUNA IGE QN: <0.1 KU(A)/L
WALNUT IGE QN: <0.1 KU(A)/L
WHEAT IGE QN: <0.1 KU(A)/L

## 2025-07-29 NOTE — TELEPHONE ENCOUNTER
[facility] called patient to check in and she reports her aunt  and she's on the way to the  currently. She requested they meet tomorrow, so [facility] scheduled visit with patient for tomorrow.     JASON Humphries, [facility]  Behavioral [facility] ([facility])   [facility]  [phone]

## 2025-07-30 ENCOUNTER — VIRTUAL VISIT (OUTPATIENT)
Dept: BEHAVIORAL HEALTH | Facility: CLINIC | Age: 65
End: 2025-07-30
Payer: COMMERCIAL

## 2025-07-30 DIAGNOSIS — R69 DIAGNOSIS DEFERRED: Primary | ICD-10-CM

## 2025-07-30 NOTE — PROGRESS NOTES
"[facility]  [facility]: [place]        Social Work Care Navigator Note      Patient: Paula Ho  Date: July 30, 2025  Preferred Name: Paula    Previous PHQ-9:       3/11/2025     2:43 PM 5/21/2025    12:00 PM 7/28/2025     7:05 AM   PHQ-9 SCORE   PHQ-9 Total Score MyChart 12 (Moderate depression)  9 (Mild depression)   PHQ-9 Total Score 12  11 9        Proxy-reported     Previous LIBRA-7:       11/25/2024     3:00 PM 3/4/2025     4:17 PM 5/21/2025    12:00 PM   LIBRA-7 SCORE   Total Score  11 (moderate anxiety)    Total Score 13 11  9       Patient-reported     ARNALDO LEVEL:       No data to display                Preferred Contact:  Need for : No  Preferred Contact: Cell      Type of Contact Today: Phone call (patient / identified key support person reached)    Service Modality: Phone Visit:      Provider verified identity through the following two step process.  Patient provided:  Patient is known previously to provider    Telephone Visit: The patient's condition can be safely assessed and treated via synchronous audio telemedicine encounter.      Reason for Audio Telemedicine Visit: Patient has requested telehealth visit    Originating Site (Patient Location): Patient's home    Distant Site (Provider Location): [facility]    Telephone visit completed due to the patient did not consent to a video visit.    Consent:  The patient/guardian has verbally consented to:     1. The potential risks and benefits of telemedicine (telephone visit) versus in person care;    The patient has been notified of the following:      \"We have found that certain health care needs can be provided without the need for a face to face visit.  This service lets us provide the care you need with a phone conversation.       I will have full access to your [facility] medical record during this entire phone call.  I will be taking notes for your medical record.      Since this " "is like an office visit, we will bill your insurance company for this service.       There are potential benefits and risks of telephone visits (e.g. limits to patient confidentiality) that differ from in-person visits.?Confidentiality still applies for telephone services, and nobody will record the visit.  It is important to be in a quiet, private space that is free of distractions (including cell phone or other devices) during the visit.??      If during the course of the call I believe a telephone visit is not appropriate, you will not be charged for this service\"     Consent has been obtained for this service by care team member: Yes       Data: (subjective / Objective):  Recent ED/IP Admission or Discharge?   None    Patient Goals:  Goal Areas: Health; Mental Health; Future HAP Goal area; Chemical Health  Patient stated goals: Health: Paula would like to continue to meet with her providers, and take her medications as prescribed.   -Paula would like to work on losing weight with a provider. She will start using prescribed medication for assistance, as well as working on a nutrition plan.   -Paula would like to go to the gym at least once times per week.   Mental Health: Paula would like to continue receiving support from the [facility], [place], for therapy within [place], and receive a long term therapy referral when needed. She will also continue to receive assistance with [facility] for monthly check ins.   -Paula will continue to work with the [facility] psychiatrist through [facility].   -She would like to continue working with an [facility] worker and Peer Support Person through the same agency.    -She reported that she wants to build herself back to feel like somebody and take it day by day.   Chemical Health: Paula would like assistance from her providers to quit smoking and will work on reducing her use, so she can be approved for back surgery.   Future goals: Paula is going to consider back surgery in the future " after losing the recommended weight.    [facility] Core Service Provided:  Comprehensive Care Management: utilized the electronic medical record / patient registry to identify and support patient's health conditions / needs more effectively   Care Transitions: focused on the coordinated and seamless movement of patient between or within different levels of care or settings  Care Coordination: provided care management services/referrals necessary to ensure patient and their identified supports have access to medical, behavioral health, pharmacology and recovery support services.  Ensured that patient's care is integrated across all settings and services.   Individual and Family Support: aimed to help clients reduce barriers to achieving goals, increase health literacy and knowledge about chronic condition(s), increase self-efficacy skills, and improve health outcomes    Current Stressors / Issues / Care Plan Objective Addressed Today:  [facility] and patient were able to meet today for Behavioral Health Home ([facility]) monthly check-in via telehealth visit. All required ROIs have been filed with HIM/patient chart.    -Patient reports she's moving and she's almost done. She moved right next door. She talked with the landlord and he was open to her having [address] and having her dogs. It's a 3 bedroom/2 bathroom place, and the bathroom is on the same floor so she won't have to do stairs as much. She has already started moving in, and the landlord has let her start moving her stuff. [employer] 8 is coming to inspect the place on 8/1, and she's hoping they will approve the place. The landlord has other properties that are section 8 residents as well, so he doesn't think they'll have any issues. Patient has talked with her current landlord and they know that she needs to get out of her lease because the stairs have been an issue for her, especially with the bathroom. The new place is also $200 cheaper per month.     -Patient reports that the  moving has been stressful because she needed to get support from her ex, but she still wants to focus on herself without him. She thinks she still needs to get movers to move over the bigger things.     -Patient reports she didn't receive a call to schedule with a long term provider. [facility] will call and leave her a voicemail with the scheduling phone number. [facility] offered to help as needed and offered to schedule her with [facility] if needed as well.     -Patient reports that her aunt fell, and she ended up passing. She had the  yesterday and she reports it was nice to see her cousins and family she hasn't seen in awhile.     Intervention:  Motivational Interviewing: Expressed Empathy/Understanding, Supported Autonomy, Collaboration, Evocation, Permission to raise concern or advise, Open-ended questions, and Reflections: simple and complex   Target Behavior(s): Explored thoughts about taking an anti-depressant, Explored and resolved challenges related to taking anti-depressants as prescribed, Explored thoughts and readiness to participate in individual therapy, Explored and resolved challenges to attending appointments as scheduled, and Explored current social supports and reinforced opportunities to increase engagement    Assessment: (Progress on Goals / Homework):  Patient would benefit from continued coordination in reaching their goals set for the [facility] [place] program. [facility] reviewed Health Action Plan goals and will continue to monitor progress and work with patient and their care team.    Plan: (Homework, other):  Patient was encouraged to continue to seek condition-related information and education.      Scheduled a Phone follow up appointment with [facility] in 2 weeks     Patient has set self-identified goals and will monitor progress until the next appointment on: 25.         JASON Humphries, [facility]  [facility] [place] [facility]   [facility],  [facility]  [phone]      Next 5 appointments (look out 90 days)      Aug 18, 2025 3:30 PM  (Arrive by 3:10 PM)  Provider Visit with Renan Dickerson MD  [facility] ([facility]) [address] [place] [phone]

## 2025-08-06 DIAGNOSIS — F90.0 ATTENTION DEFICIT HYPERACTIVITY DISORDER (ADHD), PREDOMINANTLY INATTENTIVE TYPE: ICD-10-CM

## 2025-08-06 RX ORDER — DEXTROAMPHETAMINE SACCHARATE, AMPHETAMINE ASPARTATE, DEXTROAMPHETAMINE SULFATE AND AMPHETAMINE SULFATE 3.75; 3.75; 3.75; 3.75 MG/1; MG/1; MG/1; MG/1
15 TABLET ORAL 2 TIMES DAILY
Qty: 60 TABLET | Refills: 0 | Status: SHIPPED | OUTPATIENT
Start: 2025-08-06

## 2025-08-18 ENCOUNTER — OFFICE VISIT (OUTPATIENT)
Dept: FAMILY MEDICINE | Facility: CLINIC | Age: 65
End: 2025-08-18
Payer: MEDICARE

## 2025-08-18 VITALS
OXYGEN SATURATION: 90 % | HEART RATE: 71 BPM | HEIGHT: 66 IN | WEIGHT: 269.4 LBS | RESPIRATION RATE: 14 BRPM | TEMPERATURE: 97.9 F | BODY MASS INDEX: 43.3 KG/M2 | DIASTOLIC BLOOD PRESSURE: 77 MMHG | SYSTOLIC BLOOD PRESSURE: 132 MMHG

## 2025-08-18 DIAGNOSIS — J44.9 CHRONIC OBSTRUCTIVE PULMONARY DISEASE, UNSPECIFIED COPD TYPE (H): Primary | ICD-10-CM

## 2025-08-18 DIAGNOSIS — E66.01 MORBID OBESITY (H): ICD-10-CM

## 2025-08-18 DIAGNOSIS — L29.9 GENERALIZED PRURITUS: ICD-10-CM

## 2025-08-18 DIAGNOSIS — G47.39 OTHER SLEEP APNEA: ICD-10-CM

## 2025-08-18 PROCEDURE — 3078F DIAST BP <80 MM HG: CPT | Performed by: INTERNAL MEDICINE

## 2025-08-18 PROCEDURE — 99214 OFFICE O/P EST MOD 30 MIN: CPT | Performed by: INTERNAL MEDICINE

## 2025-08-18 PROCEDURE — 3075F SYST BP GE 130 - 139MM HG: CPT | Performed by: INTERNAL MEDICINE

## 2025-08-18 RX ORDER — ALBUTEROL SULFATE AND BUDESONIDE 90; 80 UG/1; UG/1
2 AEROSOL, METERED RESPIRATORY (INHALATION) EVERY 4 HOURS PRN
Qty: 10.7 G | Refills: 11 | Status: SHIPPED | OUTPATIENT
Start: 2025-08-18

## 2025-08-18 RX ORDER — HYDROXYZINE HYDROCHLORIDE 25 MG/1
25 TABLET, FILM COATED ORAL 3 TIMES DAILY PRN
Qty: 90 TABLET | Refills: 11 | Status: SHIPPED | OUTPATIENT
Start: 2025-08-18

## 2025-08-18 RX ORDER — ALBUTEROL SULFATE 0.83 MG/ML
SOLUTION RESPIRATORY (INHALATION)
Qty: 120 ML | Refills: 11 | Status: SHIPPED | OUTPATIENT
Start: 2025-08-18

## 2025-08-18 ASSESSMENT — ANXIETY QUESTIONNAIRES
7. FEELING AFRAID AS IF SOMETHING AWFUL MIGHT HAPPEN: SEVERAL DAYS
2. NOT BEING ABLE TO STOP OR CONTROL WORRYING: SEVERAL DAYS
8. IF YOU CHECKED OFF ANY PROBLEMS, HOW DIFFICULT HAVE THESE MADE IT FOR YOU TO DO YOUR WORK, TAKE CARE OF THINGS AT HOME, OR GET ALONG WITH OTHER PEOPLE?: SOMEWHAT DIFFICULT
1. FEELING NERVOUS, ANXIOUS, OR ON EDGE: MORE THAN HALF THE DAYS
IF YOU CHECKED OFF ANY PROBLEMS ON THIS QUESTIONNAIRE, HOW DIFFICULT HAVE THESE PROBLEMS MADE IT FOR YOU TO DO YOUR WORK, TAKE CARE OF THINGS AT HOME, OR GET ALONG WITH OTHER PEOPLE: SOMEWHAT DIFFICULT
4. TROUBLE RELAXING: SEVERAL DAYS
GAD7 TOTAL SCORE: 8
3. WORRYING TOO MUCH ABOUT DIFFERENT THINGS: SEVERAL DAYS
7. FEELING AFRAID AS IF SOMETHING AWFUL MIGHT HAPPEN: SEVERAL DAYS
5. BEING SO RESTLESS THAT IT IS HARD TO SIT STILL: NOT AT ALL
GAD7 TOTAL SCORE: 8
6. BECOMING EASILY ANNOYED OR IRRITABLE: MORE THAN HALF THE DAYS
GAD7 TOTAL SCORE: 8

## 2025-08-18 ASSESSMENT — ASTHMA QUESTIONNAIRES: ACT_TOTALSCORE: 18

## 2025-08-19 ENCOUNTER — VIRTUAL VISIT (OUTPATIENT)
Dept: BEHAVIORAL HEALTH | Facility: CLINIC | Age: 65
End: 2025-08-19
Payer: MEDICARE

## 2025-08-19 ENCOUNTER — PATIENT OUTREACH (OUTPATIENT)
Dept: CARE COORDINATION | Facility: CLINIC | Age: 65
End: 2025-08-19
Payer: MEDICARE

## 2025-08-19 DIAGNOSIS — R69 DIAGNOSIS DEFERRED: Primary | ICD-10-CM

## 2025-08-21 ENCOUNTER — PATIENT OUTREACH (OUTPATIENT)
Dept: CARE COORDINATION | Facility: CLINIC | Age: 65
End: 2025-08-21
Payer: MEDICARE

## 2025-08-21 ENCOUNTER — TELEPHONE (OUTPATIENT)
Dept: FAMILY MEDICINE | Facility: CLINIC | Age: 65
End: 2025-08-21
Payer: MEDICARE

## 2025-08-21 DIAGNOSIS — E66.01 MORBID OBESITY (H): ICD-10-CM

## 2025-09-02 DIAGNOSIS — K64.4 EXTERNAL HEMORRHOIDS: ICD-10-CM

## 2025-09-02 RX ORDER — HYDROCORTISONE 25 MG/G
CREAM TOPICAL 2 TIMES DAILY PRN
Qty: 28 G | Refills: 5 | Status: SHIPPED | OUTPATIENT
Start: 2025-09-02

## (undated) RX ORDER — METOPROLOL TARTRATE 100 MG
TABLET ORAL
Status: DISPENSED
Start: 2020-12-28

## (undated) RX ORDER — METOPROLOL TARTRATE 1 MG/ML
INJECTION, SOLUTION INTRAVENOUS
Status: DISPENSED
Start: 2020-12-28

## (undated) RX ORDER — NITROGLYCERIN 0.4 MG/1
TABLET SUBLINGUAL
Status: DISPENSED
Start: 2020-12-28